# Patient Record
Sex: MALE | Race: BLACK OR AFRICAN AMERICAN | NOT HISPANIC OR LATINO | Employment: FULL TIME | URBAN - METROPOLITAN AREA
[De-identification: names, ages, dates, MRNs, and addresses within clinical notes are randomized per-mention and may not be internally consistent; named-entity substitution may affect disease eponyms.]

---

## 2018-03-23 ENCOUNTER — THERAPY VISIT (OUTPATIENT)
Dept: PHYSICAL THERAPY | Facility: CLINIC | Age: 53
End: 2018-03-23
Payer: OTHER MISCELLANEOUS

## 2018-03-23 DIAGNOSIS — M75.42 IMPINGEMENT SYNDROME, SHOULDER, LEFT: ICD-10-CM

## 2018-03-23 DIAGNOSIS — M25.512 ACUTE PAIN OF LEFT SHOULDER: Primary | ICD-10-CM

## 2018-03-23 PROCEDURE — 97161 PT EVAL LOW COMPLEX 20 MIN: CPT | Mod: GP | Performed by: PHYSICAL THERAPIST

## 2018-03-23 PROCEDURE — 97110 THERAPEUTIC EXERCISES: CPT | Mod: GP | Performed by: PHYSICAL THERAPIST

## 2018-03-23 NOTE — MR AVS SNAPSHOT
After Visit Summary   3/23/2018    Dimitrios Goldberg    MRN: [id number]           Patient Information     Date Of Birth          1965        Visit Information        Provider Department      3/23/2018 7:30 AM Sandy Alvarez, PT [facility]        Today's Diagnoses     Acute pain of left shoulder    -  1    Impingement syndrome, shoulder, left           Follow-ups after your visit        Your next 10 appointments already scheduled     Mar 30, 2018  2:40 PM CDT   ADELINE Extremity with Sandy Alvarez PT   [facility] ([facility])    [address]  [facility] [address]   [phone]            Apr 06, 2018  2:40 PM CDT   ADELINE Extremity with Sandy Alvarez PT   [facility] ([facility])    [address]  [facility] [address]   [phone]            Apr 13, 2018  2:40 PM CDT   ADELINE Extremity with Sandy Alvarez PT   [facility] ([facility])    [address]  [facility] [address]   [phone]            Apr 20, 2018  2:40 PM CDT   ADELINE Extremity with Sandy Alvarez PT   [facility] ([facility])    [address]  [facility] [address]   [phone]              Who to contact     If you have questions or need follow up information about today's clinic visit or your schedule please contact [facility] PHYSICAL THERAP[facility] directly at [phone].  Normal or non-critical lab and imaging results will be communicated to you by MyChart, letter or phone within 4 business days after the clinic has received the results. If you do not hear from us within 7 days, please contact the clinic through MyChart or phone. If you have a critical or abnormal  "lab result, we will notify you by phone as soon as possible.  Submit refill requests through [employer] or call your pharmacy and they will forward the refill request to us. Please allow 3 business days for your refill to be completed.          Additional Information About Your Visit        MyChart Information     [employer] lets you send messages to your doctor, view your test results, renew your prescriptions, schedule appointments and more. To sign up, go to [email or web address] . Click on \"Log in\" on the left side of the screen, which will take you to the Welcome page. Then click on \"Sign up Now\" on the right side of the page.     You will be asked to enter the access code listed below, as well as some personal information. Please follow the directions to create your username and password.     Your access code is: [email or web address]  Expires: 2018 12:24 PM     Your access code will  in 90 days. If you need help or a new code, please call your [place] clinic or [phone].        Care EveryWhere ID     This is your Care EveryWhere ID. This could be used by other organizations to access your [place] medical records  [id number]         Blood Pressure from Last 3 Encounters:   No data found for BP    Weight from Last 3 Encounters:   No data found for Wt              We Performed the Following     HC PT EVAL, LOW COMPLEXITY     ADELINE INITIAL EVAL REPORT     THERAPEUTIC EXERCISES        Primary Care Provider Fax #    Physician No Ref-Primary [phone]       No address on file        Equal Access to Services     WALLY ELLIOTT : Hadii yarelis nelsono Soearnestine, waaxda luqadaha, qaybta kaalmada adeegyada, laisha ruby . So [facility] [phone].    ATENCIÓN: Si habla español, tiene a boyce disposición servicios gratuitos de asistencia lingüística. Llame al [phone].    We comply with applicable federal civil rights laws and [place] laws. We do not discriminate on the basis of race, color, " national origin, age, disability, sex, sexual orientation, or gender identity.            Thank you!     Thank you for choosing [facility]  for your care. Our goal is always to provide you with excellent care. Hearing back from our patients is one way we can continue to improve our services. Please take a few minutes to complete the written survey that you may receive in the mail after your visit with us. Thank you!             Your Updated Medication List - Protect others around you: Learn how to safely use, store and throw away your medicines at www.disposemymeds.org.      Notice  As of 3/23/2018 12:24 PM    You have not been prescribed any medications.

## 2018-03-23 NOTE — PROGRESS NOTES
[facility] Initial Evaluation  Subjective:  Patient is a 53 year old male presenting with rehab left shoulder hpi. The history is provided by the patient. No  was used.   Dimitrios Goldberg is a 53 year old male with a left shoulder condition.  Condition occurred with:  Unknown cause.  Condition occurred: for unknown reasons.  This is a new condition  Pt reports he has had shoulder pain on his L on and off for 8-10 years. He states that it will flare about 1x/year. He most recently had his flare of pain around Christmas of 2017. He went to see Dr. Brand and had an injection on 3-15-18. PMH: SLAP repair on R in 2007 and previous PT for impingement before and after his surgery (most recently in 2012). .    Patient reports pain:  In the joint and upper trap.  Radiates to:  Upper arm.  Pain is described as sharp and shooting and is intermittent and reported as 2/10 (best 0/10 at worst 10/10).  Associated symptoms:  Loss of strength, loss of motion/stiffness and painful arc. Pain is the same all the time.  Symptoms are exacerbated by lying on extremity, lifting, using arm behind back, using arm overhead and using arm at shoulder level and relieved by rest and activity/movement.  Since onset symptoms are rapidly improving.  Special tests:  X-ray.  Previous treatment includes physical therapy.  There was moderate improvement following previous treatment.  General health as reported by patient is good.  Pertinent medical history includes:  None.  Medical allergies: no.  Other surgeries include:  Orthopedic surgery (R shoulder SLAP in 2007).  Current medications:  None as reported by the patient.  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  Patient is working in normal job without restrictions.  Primary job tasks include:  Repetitive tasks, prolonged standing, operating a machine, lifting, driving and prolonged sitting.    Barriers include:  None as reported by the  patient.    Red flags:  None as reported by the patient.                        Objective:  System                   Shoulder Evaluation:  ROM:  AROM:    Flexion:  Left:  155    Right:  155    Abduction:  Left: 123   Right:  162      External Rotation:  Left:  70    Right:  81            Extension/Internal Rotation:  Left:  T7    Right:  T8          Strength:  : R shoulder: 5/5/5/5- and L shoulder: 5-/5-/5/4.                                                               General     ROS    Assessment/Plan:    Patient is a 53 year old male with left side shoulder complaints.    Patient has the following significant findings with corresponding treatment plan.                Diagnosis 1:  L shoulder impingement    Pain -  hot/cold therapy, manual therapy, self management, education and home program  Decreased ROM/flexibility - manual therapy and therapeutic exercise  Decreased joint mobility - manual therapy and therapeutic exercise  Decreased strength - therapeutic exercise and therapeutic activities  Impaired muscle performance - neuro re-education  Decreased function - therapeutic activities    Therapy Evaluation Codes:   1) History comprised of:   Personal factors that impact the plan of care:      None.    Comorbidity factors that impact the plan of care are:      None.     Medications impacting care: None.  2) Examination of Body Systems comprised of:   Body structures and functions that impact the plan of care:      Shoulder.   Activity limitations that impact the plan of care are:      Bathing, Cooking, Driving, Dressing, Grasping, Lifting and Sleeping.  3) Clinical presentation characteristics are:   Stable/Uncomplicated.  4) Decision-Making    Low complexity using standardized patient assessment instrument and/or measureable assessment of functional outcome.  Cumulative Therapy Evaluation is: Low complexity.    Previous and current functional limitations:  (See Goal Flow Sheet for this information)    Short  term and Long term goals: (See Goal Flow Sheet for this information)     Communication ability:  Patient appears to be able to clearly communicate and understand verbal and written communication and follow directions correctly.  Treatment Explanation - The following has been discussed with the patient:   RX ordered/plan of care  Anticipated outcomes  Possible risks and side effects  This patient would benefit from PT intervention to resume normal activities.   Rehab potential is good.    Frequency:  1 X week, once daily  Duration:  for 6 weeks  Discharge Plan:  Achieve all LTG.  Independent in home treatment program.  Reach maximal therapeutic benefit.    Please refer to the daily flowsheet for treatment today, total treatment time and time spent performing 1:1 timed codes.

## 2018-03-30 ENCOUNTER — THERAPY VISIT (OUTPATIENT)
Dept: PHYSICAL THERAPY | Facility: CLINIC | Age: 53
End: 2018-03-30
Payer: OTHER MISCELLANEOUS

## 2018-03-30 DIAGNOSIS — M75.42 IMPINGEMENT SYNDROME, SHOULDER, LEFT: ICD-10-CM

## 2018-03-30 DIAGNOSIS — M25.512 ACUTE PAIN OF LEFT SHOULDER: ICD-10-CM

## 2018-03-30 PROCEDURE — 97110 THERAPEUTIC EXERCISES: CPT | Mod: GP | Performed by: PHYSICAL THERAPIST

## 2018-03-30 PROCEDURE — 97140 MANUAL THERAPY 1/> REGIONS: CPT | Mod: GP | Performed by: PHYSICAL THERAPIST

## 2018-03-30 NOTE — PROGRESS NOTES
Subjective:  HPI                    Objective:  System    Physical Exam    General     ROS    Assessment/Plan:    SUBJECTIVE  Subjective changes as noted by pt:  He feels like he is not good as he was last week--felt some pain when he was driving and noticed it more 2-3 days ago but it's not back to where it was before he had the shot.        Current pain level: 5/10     Changes in function:  Yes (See Goal flowsheet attached for changes in current functional level)     Adverse reaction to treatment or activity:  None    OBJECTIVE  Changes in objective findings:  Yes, L shoulder AROM after stretchin, 128 when pain starts but can raise to 155    Pt has increased UT pain and hiking with S/L ABD    Hypertonicity noted along UT/LS and rhomboids today.      ASSESSMENT  Dimitrios continues to require intervention to meet STG and LTG's: PT  Patient's symptoms are resolving.  Patient is progressing as expected.  Response to therapy has shown an improvement in  pain level and ROM   Progress made towards STG/LTG?  Yes (See Goal flowsheet attached for updates on achievement of STG and LTG)    PLAN  Current treatment program is being advanced to more complex exercises.    PTA/ATC plan:  N/A    Please refer to the daily flowsheet for treatment today, total treatment time and time spent performing 1:1 timed codes.

## 2018-03-30 NOTE — MR AVS SNAPSHOT
After Visit Summary   3/30/2018    Dimitrios Goldberg    MRN: [id number]           Patient Information     Date Of Birth          1965        Visit Information        Provider Department      3/30/2018 2:40 PM Sandy Alvarez, PT [facility]        Today's Diagnoses     Acute pain of left shoulder        Impingement syndrome, shoulder, left           Follow-ups after your visit        Your next 10 appointments already scheduled     Apr 06, 2018  2:40 PM CDT   ADELINE Extremity with Sandy Alvarez PT   [facility] ([facility])    [address]  [facility] [address]   [phone]            Apr 13, 2018  2:40 PM CDT   ADELINE Extremity with Sandy Alvarez PT   [facility] ([facility])    [address]  [facility] [address]   [phone]            Apr 20, 2018  2:40 PM CDT   ADELINE Extremity with Sandy Alvarez PT   [facility] ([facility])    [address]  [facility] [address]   [phone]              Who to contact     If you have questions or need follow up information about today's clinic visit or your schedule please contact [facility] PHYSICAL [facility] directly at [phone].  Normal or non-critical lab and imaging results will be communicated to you by MyChart, letter or phone within 4 business days after the clinic has received the results. If you do not hear from us within 7 days, please contact the clinic through MyChart or phone. If you have a critical or abnormal lab result, we will notify you by phone as soon as possible.  Submit refill requests through [employer] or call your pharmacy and they will forward the refill request to us. Please allow 3 business days for your refill to be completed.     "      Additional Information About Your Visit        MyChart Information     [employer] lets you send messages to your doctor, view your test results, renew your prescriptions, schedule appointments and more. To sign up, go to [email or web address] . Click on \"Log in\" on the left side of the screen, which will take you to the Welcome page. Then click on \"Sign up Now\" on the right side of the page.     You will be asked to enter the access code listed below, as well as some personal information. Please follow the directions to create your username and password.     Your access code is: [email or web address]  Expires: 2018 12:24 PM     Your access code will  in 90 days. If you need help or a new code, please call your [place] clinic or [phone].        Care EveryWhere ID     This is your Care EveryWhere ID. This could be used by other organizations to access your [place] medical records  [id number]         Blood Pressure from Last 3 Encounters:   No data found for BP    Weight from Last 3 Encounters:   No data found for Wt              We Performed the Following     MANUAL THER TECH,1+REGIONS,EA 15 MIN     THERAPEUTIC EXERCISES        Primary Care Provider Fax #    Physician No Ref-Primary [phone]       No address on file        Equal Access to Services     WALLY ELLIOTT : Hadii yarelis nelsono Sogennaali, waaxda luqadaha, qaybta kaalmada adeegyada, laisha alvarez. So [facility] [phone].    ATENCIÓN: Si habla español, tiene a boyce disposición servicios gratuitos de asistencia lingüística. Llame al [phone].    We comply with applicable federal civil rights laws and [place] laws. We do not discriminate on the basis of race, color, national origin, age, disability, sex, sexual orientation, or gender identity.            Thank you!     Thank you for choosing [facility] PHYSICAL THERAPY  for your care. Our goal is always to provide you with " excellent care. Hearing back from our patients is one way we can continue to improve our services. Please take a few minutes to complete the written survey that you may receive in the mail after your visit with us. Thank you!             Your Updated Medication List - Protect others around you: Learn how to safely use, store and throw away your medicines at www.disposemymeds.org.      Notice  As of 3/30/2018  3:44 PM    You have not been prescribed any medications.

## 2018-04-06 ENCOUNTER — THERAPY VISIT (OUTPATIENT)
Dept: PHYSICAL THERAPY | Facility: CLINIC | Age: 53
End: 2018-04-06
Payer: OTHER MISCELLANEOUS

## 2018-04-06 DIAGNOSIS — M25.512 ACUTE PAIN OF LEFT SHOULDER: ICD-10-CM

## 2018-04-06 DIAGNOSIS — M75.42 IMPINGEMENT SYNDROME, SHOULDER, LEFT: ICD-10-CM

## 2018-04-06 PROCEDURE — 97140 MANUAL THERAPY 1/> REGIONS: CPT | Mod: GP | Performed by: PHYSICAL THERAPIST

## 2018-04-06 PROCEDURE — 97110 THERAPEUTIC EXERCISES: CPT | Mod: GP | Performed by: PHYSICAL THERAPIST

## 2018-04-06 NOTE — MR AVS SNAPSHOT
After Visit Summary   4/6/2018    Dimitrios Goldberg    MRN: [id number]           Patient Information     Date Of Birth          1965        Visit Information        Provider Department      4/6/2018 2:40 PM Sandy Alvarez, PT [facility] Physical Therapy        Today's Diagnoses     Acute pain of left shoulder        Impingement syndrome, shoulder, left           Follow-ups after your visit        Your next 10 appointments already scheduled     Apr 13, 2018  2:40 PM CDT   ADELINE Extremity with Sandy Alvarez PT   [facility] ([facility])    [address]  [facility] [address]   [phone]            Apr 20, 2018  2:40 PM CDT   ADELINE Extremity with Sandy Alvarez PT   [facility] ([facility])    [address]  [facility] [address]   [phone]              Who to contact     If you have questions or need follow up information about today's clinic visit or your schedule please contact [facility] PHYSICAL THERAPY directly at [phone].  Normal or non-critical lab and imaging results will be communicated to you by MyChart, letter or phone within 4 business days after the clinic has received the results. If you do not hear from us within 7 days, please contact the clinic through MyChart or phone. If you have a critical or abnormal lab result, we will notify you by phone as soon as possible.  Submit refill requests through [employer]Trackr or call your pharmacy and they will forward the refill request to us. Please allow 3 business days for your refill to be completed.          Additional Information About Your Visit        [employer] Information     docTrackr lets you send messages to your doctor, view your test results, renew your prescriptions, schedule appointments and more. To sign up, go to  [email or web address] . Click on \"Log in\" on the left side of the screen, which will take you to the Welcome page. Then click on \"Sign up Now\" on the right side of the page.     You will be asked to enter the access code listed below, as well as some personal information. Please follow the directions to create your username and password.     Your access code is: [email or web address]  Expires: 2018 12:24 PM     Your access code will  in 90 days. If you need help or a new code, please call your [place] clinic or [phone].        Care EveryWhere ID     This is your Care EveryWhere ID. This could be used by other organizations to access your [place] medical records  [id number]         Blood Pressure from Last 3 Encounters:   No data found for BP    Weight from Last 3 Encounters:   No data found for Wt              We Performed the Following     MANUAL THER TECH,1+REGIONS,EA 15 MIN     THERAPEUTIC EXERCISES        Primary Care Provider Fax #    Physician No Ref-Primary [phone]       No address on file        Equal Access to Services     [facility]: Hadii yarelis Villa, waaxda lujozef, qaybta kaaldarryl arroyo, laisha ruby . So [facility] [phone].    ATENCIÓN: Si habla español, tiene a boyce disposición servicios gratuitos de asistencia lingüística. Llame al [phone].    We comply with applicable federal civil rights laws and [place] laws. We do not discriminate on the basis of race, color, national origin, age, disability, sex, sexual orientation, or gender identity.            Thank you!     Thank you for choosing [facility] PHYSICAL THERAPY  for your care. Our goal is always to provide you with excellent care. Hearing back from our patients is one way we can continue to improve our services. Please take a few minutes to complete the written survey that you may receive in the mail after your visit with us. Thank you!      "        Your Updated Medication List - Protect others around you: Learn how to safely use, store and throw away your medicines at www.disposemymeds.org.      Notice  As of 4/6/2018  4:09 PM    You have not been prescribed any medications.

## 2018-04-06 NOTE — PROGRESS NOTES
"Subjective:  HPI                    Objective:  System    Physical Exam    General     ROS    Assessment/Plan:    SUBJECTIVE  Subjective changes as noted by pt:  Pt reports that his shot is wearing off and he is \"feeling it more than he did a few weeks ago but it's still better than it was before the shot.\"       Current pain level: 2/10     Changes in function:  Yes (See Goal flowsheet attached for changes in current functional level)     Adverse reaction to treatment or activity:  None    OBJECTIVE  Changes in objective findings:  Yes, L shoulder AROM: 100 but had 150 with AAROM        ASSESSMENT  Dimitrios continues to require intervention to meet STG and LTG's: PT  Patient's symptoms are resolving.  Patient is progressing as expected.  Response to therapy has shown an improvement in  pain level and ROM   Progress made towards STG/LTG?  Yes (See Goal flowsheet attached for updates on achievement of STG and LTG)    PLAN  Current treatment program is being advanced to more complex exercises.    PTA/ATC plan:  N/A    Please refer to the daily flowsheet for treatment today, total treatment time and time spent performing 1:1 timed codes.          "

## 2018-04-13 ENCOUNTER — THERAPY VISIT (OUTPATIENT)
Dept: PHYSICAL THERAPY | Facility: CLINIC | Age: 53
End: 2018-04-13
Payer: OTHER MISCELLANEOUS

## 2018-04-13 DIAGNOSIS — M25.512 ACUTE PAIN OF LEFT SHOULDER: ICD-10-CM

## 2018-04-13 DIAGNOSIS — M75.42 IMPINGEMENT SYNDROME, SHOULDER, LEFT: ICD-10-CM

## 2018-04-13 PROCEDURE — 97140 MANUAL THERAPY 1/> REGIONS: CPT | Mod: GP | Performed by: PHYSICAL THERAPIST

## 2018-04-13 PROCEDURE — 97110 THERAPEUTIC EXERCISES: CPT | Mod: GP | Performed by: PHYSICAL THERAPIST

## 2018-04-13 NOTE — MR AVS SNAPSHOT
After Visit Summary   4/13/2018    Dimitrios Goldberg    MRN: [id number]           Patient Information     Date Of Birth          1965        Visit Information        Provider Department      4/13/2018 2:40 PM Sandy Alvarez, RAMBO [facility]        Today's Diagnoses     Acute pain of left shoulder        Impingement syndrome, shoulder, left           Follow-ups after your visit        Your next 10 appointments already scheduled     Apr 20, 2018  2:40 PM CDT   ADELINE Extremity with Sandy Alvarez PT   [facility] ([facility])    [address]  [facility] [address]   [phone]            Apr 27, 2018 11:30 AM CDT   ADELINE Extremity with Sandy Alvarez PT   [facility] ([facility])    [address]  [facility] [address]   [phone]            May 07, 2018  2:00 PM CDT   ADELINE Extremity with Sandy Alvarez PT   [facility] ([facility])    [address]  [facility] [address]   [phone]              Who to contact     If you have questions or need follow up information about today's clinic visit or your schedule please contact [facility] directly at [phone].  Normal or non-critical lab and imaging results will be communicated to you by MyChart, letter or phone within 4 business days after the clinic has received the results. If you do not hear from us within 7 days, please contact the clinic through MyChart or phone. If you have a critical or abnormal lab result, we will notify you by phone as soon as possible.  Submit refill requests through [employer] or call your pharmacy and they will forward the refill request to us. Please allow 3 business days for your refill to be completed.     "      Additional Information About Your Visit        MyChart Information     Piictu lets you send messages to your doctor, view your test results, renew your prescriptions, schedule appointments and more. To sign up, go to [email or web address] . Click on \"Log in\" on the left side of the screen, which will take you to the Welcome page. Then click on \"Sign up Now\" on the right side of the page.     You will be asked to enter the access code listed below, as well as some personal information. Please follow the directions to create your username and password.     Your access code is: [email or web address]  Expires: 2018 12:24 PM     Your access code will  in 90 days. If you need help or a new code, please call your [place] clinic or [phone].        Care EveryWhere ID     This is your Care EveryWhere ID. This could be used by other organizations to access your [place] medical records  [id number]         Blood Pressure from Last 3 Encounters:   No data found for BP    Weight from Last 3 Encounters:   No data found for Wt              We Performed the Following     MANUAL THER TECH,1+REGIONS,EA 15 MIN     THERAPEUTIC EXERCISES        Primary Care Provider Fax #    Physician No Ref-Primary [phone]       No address on file        Equal Access to Services     [facility]ALLY E[facility] : Hadii yarelis nelsono Sogennaali, waaxda luqadaha, qaybta kaalmada adeegyada, laisha alvarez. So [facility] [phone].    ATENCIÓN: Si habla español, tiene a boyce disposición servicios gratuitos de asistencia lingüística. Llame al [phone].    We comply with applicable federal civil rights laws and [place] laws. We do not discriminate on the basis of race, color, national origin, age, disability, sex, sexual orientation, or gender identity.            Thank you!     Thank you for choosing [facility]ER[facility]  for your care. Our goal is always to provide you with " excellent care. Hearing back from our patients is one way we can continue to improve our services. Please take a few minutes to complete the written survey that you may receive in the mail after your visit with us. Thank you!             Your Updated Medication List - Protect others around you: Learn how to safely use, store and throw away your medicines at www.disposemymeds.org.      Notice  As of 4/13/2018 11:59 PM    You have not been prescribed any medications.

## 2018-04-13 NOTE — PROGRESS NOTES
Subjective:  HPI                    Objective:  System    Physical Exam    General     ROS    Assessment/Plan:    SUBJECTIVE  Subjective changes as noted by pt:  Pt reports his aching pain is still the same--more deep in the joint.  He gets sore doing the exercises and it's sore when he is leaning on his L elbow or reaching out to the side. 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 He has had more overtime this week so he hasn't been as consistent with the exercises.       Current pain level: 1-2/10     Changes in function:  Yes (See Goal flowsheet attached for changes in current functional level)     Adverse reaction to treatment or activity:  None    OBJECTIVE  Changes in objective findings:  Yes, L shoulder displays posterior tightness and passive extension on L=60 vs R=70    L shoulder ABD was WNL compared to R and pain free after repeated passive extension on table with chair.    ASSESSMENT  Dimitrios continues to require intervention to meet STG and LTG's: PT  Patient's symptoms are resolving.  Patient is progressing as expected.  Response to therapy has shown an improvement in  pain level  Progress made towards STG/LTG?  Yes (See Goal flowsheet attached for updates on achievement of STG and LTG)    PLAN  Current treatment program is being advanced to more complex exercises.    PTA/ATC plan:  N/A    Please refer to the daily flowsheet for treatment today, total treatment time and time spent performing 1:1 timed codes.

## 2018-04-20 ENCOUNTER — THERAPY VISIT (OUTPATIENT)
Dept: PHYSICAL THERAPY | Facility: CLINIC | Age: 53
End: 2018-04-20
Payer: OTHER MISCELLANEOUS

## 2018-04-20 DIAGNOSIS — M75.42 IMPINGEMENT SYNDROME, SHOULDER, LEFT: ICD-10-CM

## 2018-04-20 DIAGNOSIS — M25.512 ACUTE PAIN OF LEFT SHOULDER: ICD-10-CM

## 2018-04-20 PROCEDURE — 97140 MANUAL THERAPY 1/> REGIONS: CPT | Mod: GP | Performed by: PHYSICAL THERAPIST

## 2018-04-20 PROCEDURE — 97110 THERAPEUTIC EXERCISES: CPT | Mod: GP | Performed by: PHYSICAL THERAPIST

## 2018-04-20 NOTE — MR AVS SNAPSHOT
After Visit Summary   4/20/2018    Dimitrios Goldberg    MRN: [id number]           Patient Information     Date Of Birth          1965        Visit Information        Provider Department      4/20/2018 2:40 PM Sandy Alvarez, RAMBO [facility]        Today's Diagnoses     Acute pain of left shoulder        Impingement syndrome, shoulder, left           Follow-ups after your visit        Your next 10 appointments already scheduled     Apr 27, 2018 11:30 AM CDT   ADELINE Extremity with Sandy Alvarez PT   [facility] ([facility])    [address]  [facility] [address]   [phone]            May 07, 2018  2:00 PM CDT   ADELINE Extremity with Sandy Alvarez PT   [facility] ([facility])    [address]  [facility] [address]   [phone]              Who to contact     If you have questions or need follow up information about today's clinic visit or your schedule please contact [facility] PHYSICAL THERAPY directly at [phone].  Normal or non-critical lab and imaging results will be communicated to you by MyChart, letter or phone within 4 business days after the clinic has received the results. If you do not hear from us within 7 days, please contact the clinic through MyChart or phone. If you have a critical or abnormal lab result, we will notify you by phone as soon as possible.  Submit refill requests through "[employer] or call your pharmacy and they will forward the refill request to us. Please allow 3 business days for your refill to be completed.          Additional Information About Your Visit        [facility] Information     "[employer] lets you send messages to your doctor, view your test results, renew your prescriptions, schedule appointments and more. To sign up, go to  "[email or web address] . Click on \"Log in\" on the left side of the screen, which will take you to the Welcome page. Then click on \"Sign up Now\" on the right side of the page.     You will be asked to enter the access code listed below, as well as some personal information. Please follow the directions to create your username and password.     Your access code is: [email or web address]  Expires: 2018 12:24 PM     Your access code will  in 90 days. If you need help or a new code, please call your [place] clinic or [phone].        Care EveryWhere ID     This is your Care EveryWhere ID. This could be used by other organizations to access your [place] medical records  [id number]         Blood Pressure from Last 3 Encounters:   No data found for BP    Weight from Last 3 Encounters:   No data found for Wt              We Performed the Following     MANUAL THER TECH,1+REGIONS,EA 15 MIN     THERAPEUTIC EXERCISES        Primary Care Provider Fax #    Physician No Ref-Primary [phone]       No address on file        Equal Access to Services     [facility]: Hadii yarelis Villa, waaxda lujozef, qaybta kaaldarryl arroyo, laisha ruby . So [facility] [phone].    ATENCIÓN: Si habla español, tiene a boyce disposición servicios gratuitos de asistencia lingüística. Llame al [phone].    We comply with applicable federal civil rights laws and [place] laws. We do not discriminate on the basis of race, color, national origin, age, disability, sex, sexual orientation, or gender identity.            Thank you!     Thank you for choosing [facility]ICAL THERAP[facility]  for your care. Our goal is always to provide you with excellent care. Hearing back from our patients is one way we can continue to improve our services. Please take a few minutes to complete the written survey that you may receive in the mail after your visit with us. Thank you!      "        Your Updated Medication List - Protect others around you: Learn how to safely use, store and throw away your medicines at www.disposemymeds.org.      Notice  As of 4/20/2018  3:40 PM    You have not been prescribed any medications.

## 2018-04-20 NOTE — PROGRESS NOTES
"Subjective:  HPI                    Objective:  System    Physical Exam    General     ROS    Assessment/Plan:    SUBJECTIVE  Subjective changes as noted by pt:  Pt reports that shoveling the snow last weekend aggravated his shoulder and it's been sore all week. Pain is worse than it was last week. He feels like it's the \"dead pain\" that he had before the injection.        Current pain level: 4/10     Changes in function:  Yes (See Goal flowsheet attached for changes in current functional level)     Adverse reaction to treatment or activity:  None    OBJECTIVE  Changes in objective findings:  Yes, repeated shoulder extension on table did change his pain with abduction AROM to a 1-2/10 vs a 4/10        ASSESSMENT  Dimitrios continues to require intervention to meet STG and LTG's: PT  Patient's symptoms are resolving.  Patient is progressing as expected.  Response to therapy has shown an improvement in  pain level, ROM  and flexibility  Progress made towards STG/LTG?  Yes (See Goal flowsheet attached for updates on achievement of STG and LTG)    PLAN  Current treatment program is being advanced to more complex exercises.    PTA/ATC plan:  N/A    Please refer to the daily flowsheet for treatment today, total treatment time and time spent performing 1:1 timed codes.          "

## 2018-04-27 ENCOUNTER — THERAPY VISIT (OUTPATIENT)
Dept: PHYSICAL THERAPY | Facility: CLINIC | Age: 53
End: 2018-04-27
Payer: OTHER MISCELLANEOUS

## 2018-04-27 DIAGNOSIS — M25.512 ACUTE PAIN OF LEFT SHOULDER: ICD-10-CM

## 2018-04-27 DIAGNOSIS — M75.42 IMPINGEMENT SYNDROME, SHOULDER, LEFT: ICD-10-CM

## 2018-04-27 PROCEDURE — 97110 THERAPEUTIC EXERCISES: CPT | Mod: GP | Performed by: PHYSICAL THERAPIST

## 2018-04-27 PROCEDURE — 97140 MANUAL THERAPY 1/> REGIONS: CPT | Mod: GP | Performed by: PHYSICAL THERAPIST

## 2018-04-27 NOTE — MR AVS SNAPSHOT
"              After Visit Summary   4/27/2018    Dimitrios Goldberg    MRN: [id number]           Patient Information     Date Of Birth          1965        Visit Information        Provider Department      4/27/2018 11:30 AM Sandy Alvarez PT [facility]        Today's Diagnoses     Acute pain of left shoulder        Impingement syndrome, shoulder, left           Follow-ups after your visit        Your next 10 appointments already scheduled     May 07, 2018  2:00 PM CDT   ADELINE Extremity with Sandy Alvarez PT   [facility] ([facility])    [address]  [facility] [address] [phone]              Who to contact     If you have questions or need follow up information about today's clinic visit or your schedule please [facility] directly at [phone].  Normal or non-critical lab and imaging results will be communicated to you by MyChart, letter or phone within 4 business days after the clinic has received the results. If you do not hear from us within 7 days, please contact the clinic through [employer] or phone. If you have a critical or abnormal lab result, we will notify you by phone as soon as possible.  Submit refill requests through iwi or call your pharmacy and they will forward the refill request to us. Please allow 3 business days for your refill to be completed.          Additional Information About Your Visit        [employer] Information     iwi lets you send messages to your doctor, view your test results, renew your prescriptions, schedule appointments and more. To sign up, go to [email or web address] . Click on \"Log in\" on the left side of the screen, which will take you to the Welcome page. Then click on \"Sign up Now\" on the right side of the page.     You will be asked to enter the access code listed below, " as well as some personal information. Please follow the directions to create your username and password.     Your access code is: [email or web address]  Expires: 2018 12:24 PM     Your access code will  in 90 days. If you need help or a new code, please call your [place] clinic or [phone].        Care EveryWhere ID     This is your Care EveryWhere ID. This could be used by other organizations to access your [place] medical records  [id number]         Blood Pressure from Last 3 Encounters:   No data found for BP    Weight from Last 3 Encounters:   No data found for Wt              We Performed the Following     MANUAL THER TECH,1+REGIONS,EA 15 MIN     THERAPEUTIC EXERCISES        Primary Care Provider Fax #    Physician No Ref-Primary [phone]       No address on file        Equal Access to Services     WALLY ELLIOTT : Hadii yarelis mina Soearnestine, waaxda luqadaha, qaybta kaalmada adereal, laisha ruby . So [facility] [phone].    ATENCIÓN: Si habla español, tiene a boyce disposición servicios gratuitos de asistencia lingüística. Llame al [phone].    We comply with applicable federal civil rights laws and [place] laws. We do not discriminate on the basis of race, color, national origin, age, disability, sex, sexual orientation, or gender identity.            Thank you!     Thank you for choosing [facility] PH[facility]ICAL THERAP[facility]  for your care. Our goal is always to provide you with excellent care. Hearing back from our patients is one way we can continue to improve our services. Please take a few minutes to complete the written survey that you may receive in the mail after your visit with us. Thank you!             Your Updated Medication List - Protect others around you: Learn how to safely use, store and throw away your medicines at www.disposemymeds.org.      Notice  As of 2018  1:06 PM    You have not been prescribed any medications.

## 2018-04-27 NOTE — PROGRESS NOTES
Subjective:  HPI                    Objective:  System    Physical Exam    General     ROS    Assessment/Plan:    SUBJECTIVE  Subjective changes as noted by pt:  Pt reports his pain is better than it was last week. He has been doing his exercise (repeated extension) about 2-3x/day.       Current pain level: 2/10     Changes in function:  Yes (See Goal flowsheet attached for changes in current functional level)     Adverse reaction to treatment or activity:  None    OBJECTIVE  Changes in objective findings:  Yes,  L shoulder AROM abduction to 120 was 10/10 pain. After 1 set of repeated extension on the table, his motion went to 130 and he rated it an 8/10. After a second set,         ASSESSMENT  Dimitrios continues to require intervention to meet STG and LTG's: PT  Patient's symptoms are resolving.  Patient is progressing as expected.  Response to therapy has shown an improvement in  pain level, ROM  and flexibility  Progress made towards STG/LTG?  Yes (See Goal flowsheet attached for updates on achievement of STG and LTG)    PLAN  Current treatment program is being advanced to more complex exercises.    PTA/ATC plan:  N/A    Please refer to the daily flowsheet for treatment today, total treatment time and time spent performing 1:1 timed codes.

## 2018-05-07 ENCOUNTER — THERAPY VISIT (OUTPATIENT)
Dept: PHYSICAL THERAPY | Facility: CLINIC | Age: 53
End: 2018-05-07
Payer: OTHER MISCELLANEOUS

## 2018-05-07 DIAGNOSIS — M75.42 IMPINGEMENT SYNDROME, SHOULDER, LEFT: ICD-10-CM

## 2018-05-07 DIAGNOSIS — M25.512 ACUTE PAIN OF LEFT SHOULDER: ICD-10-CM

## 2018-05-07 PROCEDURE — 97112 NEUROMUSCULAR REEDUCATION: CPT | Mod: GP | Performed by: PHYSICAL THERAPIST

## 2018-05-07 PROCEDURE — 97110 THERAPEUTIC EXERCISES: CPT | Mod: GP | Performed by: PHYSICAL THERAPIST

## 2018-05-07 NOTE — PROGRESS NOTES
"Subjective:  HPI                    Objective:  System    Physical Exam    General     ROS  PROGRESS  REPORT    Progress reporting period is from 3-23-18 to 5-7-18.       SUBJECTIVE  Subjective changes noted by patient:  Pt reports that overall he is still better than he was before the injection--he doesn't feel the overall \"dead pain\" as much as he used to (a 2/10 vs a 5/10) that is constantly there. Pt states that the injection made him pain free for about 1.5 weeks. The pain slowly has come back--however not quite to where it was prior to the injection. He states when he sleeping on his shoulder and when he reaches out too fast he gets the same amount of pain as what he had before his injection.   .       Current pain level is 2/10  .     Previous pain level was  6/10  .   Changes in function:  Yes (See Goal flowsheet attached for changes in current functional level)  Adverse reaction to treatment or activity: None    OBJECTIVE  Changes noted in objective findings:  Yes, L shoulder AROM: 150/110 (due to pain but can push up to 140)/T6/65    L shoulder strength:4+/5/5/4 with pain and a \"pop\" with flexion    +scapular assistance test, however the eccentric phase was still painful with an audible \"clunk\"            ASSESSMENT/PLAN  Updated problem list and treatment plan: Diagnosis 1:  L shoulder impingement    Pain -  hot/cold therapy, manual therapy, self management, education, directional preference exercise and home program  Decreased ROM/flexibility - manual therapy and therapeutic exercise  Decreased joint mobility - manual therapy and therapeutic exercise  Decreased strength - therapeutic exercise and therapeutic activities  Impaired muscle performance - neuro re-education  Decreased function - therapeutic activities  STG/LTGs have been met or progress has been made towards goals:  Yes (See Goal flow sheet completed today.)  Assessment of Progress: The patient's condition is improving.  The patient's " condition has potential to improve.  The patient's progress has slowed.  Self Management Plans:  Patient has been instructed in a home treatment program.  Patient is independent in a home treatment program.  Patient  has been instructed in self management of symptoms.  Patient is independent in self management of symptoms.  I have re-evaluated this patient and find that the nature, scope, duration and intensity of the therapy is appropriate for the medical condition of the patient.  Dimitrios continues to require the following intervention to meet STG and LTG's:  PT    Recommendations:  This patient would benefit from further evaluation with MD (appt this week) to discuss next steps as it appears as though his progress with PT has slowed in the past few weeks and he continues to struggle with painful arc abduction.     Please refer to the daily flowsheet for treatment today, total treatment time and time spent performing 1:1 timed codes.

## 2018-05-07 NOTE — LETTER
"[facility]IC [facility]  [address] [facility]  [place] [address] [phone]    May 7, 2018    Re: Dimitrios Goldberg   :   1965  MRN:  [id number]   REFERRING PHYSICIAN:   Myles Brand    [facility]    Date of Initial Evaluation:  3-23-18  Visits:  Rxs Used: 7  Reason for Referral:     Acute pain of left shoulder  Impingement syndrome, shoulder, left    EVALUATION SUMMARY      PROGRESS  REPORT    Progress reporting period is from 3-23-18 to 18.       SUBJECTIVE  Subjective changes noted by patient:  Pt reports that overall he is still better than he was before the injection--he doesn't feel the overall \"dead pain\" as much as he used to (a 2/10 vs a 5/10) that is constantly there. Pt states that the injection made him pain free for about 1.5 weeks. The pain slowly has come back--however not quite to where it was prior to the injection. He states when he sleeping on his shoulder and when he reaches out too fast he gets the same amount of pain as what he had before his injection.   .       Current pain level is 2/10  .     Previous pain level was  6/10  .   Changes in function:  Yes (See Goal flowsheet attached for changes in current functional level)  Adverse reaction to treatment or activity: None    OBJECTIVE  Changes noted in objective findings:  Yes, L shoulder AROM: 150/110 (due to pain but can push up to 140)/T6/65    L shoulder strength:4+/5/5/4 with pain and a \"pop\" with flexion    +scapular assistance test, however the eccentric phase was still painful with an audible \"clunk\"            ASSESSMENT/PLAN  Updated problem list and treatment plan: Diagnosis 1:  L shoulder impingement    Pain -  hot/cold therapy, manual therapy, self management, education, directional preference exercise and home program  Decreased ROM/flexibility - manual therapy and therapeutic exercise  Decreased joint " mobility - manual therapy and therapeutic exercise  Decreased strength - therapeutic exercise and therapeutic activities  Impaired muscle performance - neuro re-education  Decreased function - therapeutic activities  STG/LTGs have been met or progress has been made towards goals:  Yes (See Goal flow sheet completed today.)  Assessment of Progress: The patient's condition is improving.  The patient's condition has potential to improve.  The patient's progress has slowed.  Self Management Plans:  Patient has been instructed in a home treatment program.  Patient is independent in a home treatment program.  Patient  has been instructed in self management of symptoms.  Patient is independent in self management of symptoms.  I have re-evaluated this patient and find that the nature, scope, duration and intensity of the therapy is appropriate for the medical condition of the patient.  Dimitrios continues to require the following intervention to meet STG and LTG's:  PT    Recommendations:  This patient would benefit from further evaluation with MD (appt this week) to discuss next steps as it appears as though his progress with PT has slowed in the past few weeks and he continues to struggle with painful arc abduction.     Please refer to the daily flowsheet for treatment today, total treatment time and time spent performing 1:1 timed codes.    PN was sent with patient today.        Thank you for your referral!!    INQUIRIES  Therapist: Sandy Young DPT,[facility]  [facility]  [address] [place] [address]. [address]  [facility] [place] [address]  Phone: [phone]  Fax: [phone]

## 2018-05-07 NOTE — MR AVS SNAPSHOT
"              After Visit Summary   2018    Dimitrios Goldberg    MRN: [id number]           Patient Information     Date Of Birth          1965        Visit Information        Provider Department      2018 2:00 PM Sandy Alvarez PT [facility] Physical [facility]        Today's Diagnoses     Acute pain of left shoulder        Impingement syndrome, shoulder, left           Follow-ups after your visit        Who to contact     If you have questions or need follow up information about today's clinic visit or your schedule please contact [facility]THLETIC [facility] PHYSICAL [facility] directly at [phone].  Normal or non-critical lab and imaging results will be communicated to you by [employer], letter or phone within 4 business days after the clinic has received the results. If you do not hear from us within 7 days, please contact the clinic through [employer] or phone. If you have a critical or abnormal lab result, we will notify you by phone as soon as possible.  Submit refill requests through [email or web address] or call your pharmacy and they will forward the refill request to us. Please allow 3 business days for your refill to be completed.          Additional Information About Your Visit        MyChart Information     [email or web address] lets you send messages to your doctor, view your test results, renew your prescriptions, schedule appointments and more. To sign up, go to [email or web address] . Click on \"Log in\" on the left side of the screen, which will take you to the Welcome page. Then click on \"Sign up Now\" on the right side of the page.     You will be asked to enter the access code listed below, as well as some personal information. Please follow the directions to create your username and password.     Your access code is: [email or web address]  Expires: 2018 12:24 PM     Your access code will  in 90 days. If you need help or a new code, please call your [place] clinic or " [phone].        Care EveryWhere ID     This is your Care EveryWhere ID. This could be used by other organizations to access your [place] medical records  [id number]         Blood Pressure from Last 3 Encounters:   No data found for BP    Weight from Last 3 Encounters:   No data found for Wt              We Performed the Following     ADELINE PROGRESS NOTES REPORT     NEUROMUSCULAR RE-EDUCATION     THERAPEUTIC EXERCISES        Primary Care Provider Fax #    Physician No Ref-Primary [phone]       No address on file        Equal Access to Services     AWLLY ELLIOTT : Hadii aad ku hadasho Soomaali, waaxda luqadaha, qaybta kaalmada adeegyada, waxay idiin hayaan adeeg meggankylegonzalez laalf . So [facility] [phone].    ATENCIÓN: Si habla español, tiene a boyce disposición servicios gratuitos de asistencia lingüística. [facility] [phone].    We comply with applicable federal civil rights laws and [place] laws. We do not discriminate on the basis of race, color, national origin, age, disability, sex, sexual orientation, or gender identity.            Thank you!     Thank you for choosing [facility] PHYSICAL THERAP[facility]  for your care. Our goal is always to provide you with excellent care. Hearing back from our patients is one way we can continue to improve our services. Please take a few minutes to complete the written survey that you may receive in the mail after your visit with us. Thank you!             Your Updated Medication List - Protect others around you: Learn how to safely use, store and throw away your medicines at www.disposemymeds.org.      Notice  As of 5/7/2018  2:45 PM    You have not been prescribed any medications.

## 2018-07-12 ENCOUNTER — THERAPY VISIT (OUTPATIENT)
Dept: PHYSICAL THERAPY | Facility: CLINIC | Age: 53
End: 2018-07-12
Payer: OTHER MISCELLANEOUS

## 2018-07-12 DIAGNOSIS — Z98.890 S/P SHOULDER SURGERY: ICD-10-CM

## 2018-07-12 DIAGNOSIS — M25.512 ACUTE PAIN OF LEFT SHOULDER: Primary | ICD-10-CM

## 2018-07-12 PROBLEM — M75.42 IMPINGEMENT SYNDROME, SHOULDER, LEFT: Status: RESOLVED | Noted: 2018-03-23 | Resolved: 2018-07-12

## 2018-07-12 PROCEDURE — 97161 PT EVAL LOW COMPLEX 20 MIN: CPT | Mod: GP

## 2018-07-12 PROCEDURE — 97110 THERAPEUTIC EXERCISES: CPT | Mod: GP

## 2018-07-12 NOTE — PROGRESS NOTES
[place] [facility] Initial Evaluation  Subjective:  Patient is a 53 year old male presenting with rehab left ankle/foot hpi.                                      Pertinent medical history includes:  Smoking.      Current medications:  Pain medication.  Current occupation is .    Primary job tasks include:  Prolonged standing, lifting, repetitive tasks, operating a machine, prolonged sitting and driving.                                Objective:  System    Physical Exam    General     ROS    Assessment/Plan:

## 2018-07-12 NOTE — MR AVS SNAPSHOT
After Visit Summary   7/12/2018    Dimitrios Goldberg    MRN: [id number]           Patient Information     Date Of Birth          1965        Visit Information        Provider Department      7/12/2018 7:40 AM Juan Diaz, PT [facility] Physical Therapy        Today's Diagnoses     Acute pain of left shoulder    -  1    S/P shoulder surgery           Follow-ups after your visit        Your next 10 appointments already scheduled     Jul 18, 2018 11:40 AM CDT   ADELINE Extremity with Juan Diaz PT   [facility] ([facility])    [address]  [facility] [address]   [phone]            Jul 23, 2018 11:00 AM CDT   ADELINE Extremity with Juan Diaz PT   [facility] ([facility])    [address]  [facility] [address]   [phone]            Jul 30, 2018 11:00 AM CDT   ADELINE Extremity with Juan Diaz PT   [facility] ([facility])    [address]  [facility] [address]   [phone]              Who to contact     If you have questions or need follow up information about today's clinic visit or your schedule please contact [facility] directly at [phone].  Normal or non-critical lab and imaging results will be communicated to you by MyChart, letter or phone within 4 business days after the clinic has received the results. If you do not hear from us within 7 days, please contact the clinic through MyChart or phone. If you have a critical or abnormal lab result, we will notify you by phone as soon as possible.  Submit refill requests through [employer] or call your pharmacy and they will forward the refill request to us. Please allow 3 business days for your refill to be completed.          Additional  "Information About Your Visit        MyChart Information     [employer] lets you send messages to your doctor, view your test results, renew your prescriptions, schedule appointments and more. To sign up, go to [email or web address] . Click on \"Log in\" on the left side of the screen, which will take you to the Welcome page. Then click on \"Sign up Now\" on the right side of the page.     You will be asked to enter the access code listed below, as well as some personal information. Please follow the directions to create your username and password.     Your access code is: [email or web address]  Expires: 10/10/2018  8:15 AM     Your access code will  in 90 days. If you need help or a new code, please call your [place] clinic or [phone].        Care EveryWhere ID     This is your Care EveryWhere ID. This could be used by other organizations to access your [place] medical records  [id number]         Blood Pressure from Last 3 Encounters:   No data found for BP    Weight from Last 3 Encounters:   No data found for Wt              We Performed the Following     HC PT EVAL, LOW COMPLEXITY     ADELINE INITIAL EVAL REPORT     THERAPEUTIC EXERCISES        Primary Care Provider Fax #    Physician No Ref-Primary [phone]       No address on file        Equal Access to Services     WALLY ELLIOTT : Hadii yarelis nelsono Soearnestine, waaxda luqadaha, qaybta kaalmada adeegyada, laisha ruby . So [facility] [phone].    ATENCIÓN: Si habla español, tiene a boyce disposición servicios gratuitos de asistencia lingüística. Llame al [phone].    We comply with applicable federal civil rights laws and [place] laws. We do not discriminate on the basis of race, color, national origin, age, disability, sex, sexual orientation, or gender identity.            Thank you!     Thank you for choosing [facility] PHYSICAL THERAPY  for your care. Our goal is always to provide you with " excellent care. Hearing back from our patients is one way we can continue to improve our services. Please take a few minutes to complete the written survey that you may receive in the mail after your visit with us. Thank you!             Your Updated Medication List - Protect others around you: Learn how to safely use, store and throw away your medicines at www.disposemymeds.org.      Notice  As of 7/12/2018  8:15 AM    You have not been prescribed any medications.

## 2018-07-12 NOTE — PROGRESS NOTES
[place] [facility] Initial Evaluation  Subjective:  Patient is a 53 year old male presenting with rehab left shoulder hpi.   Dimitrios Goldberg is a 53 year old male with a left shoulder condition.  Condition occurred with:  Other.  Condition occurred: other.  This is a new condition  S/P L SAD, DCE, debridement on 6/20/18. CC: sore; Sleep is ok; Works as a  - return to [place] at 6 wks;  .    Patient reports pain:  In the joint.     and is intermittent Pain Scale: tough to answer.  Associated symptoms:  Loss of strength and loss of motion/stiffness.   Symptoms are exacerbated by certain positions and using arm overhead and relieved by rest.  Since onset symptoms are gradually improving.    Previous treatment includes physical therapy.                                                  Objective:  System                   Shoulder Evaluation:  ROM:  AROM:    Flexion:  Left:  150        Abduction:  Left: 130       Internal Rotation:  Left:  40      External Rotation:  Left:  95                          Strength:  : Grossly 4+/5 L RTC.                          Palpation:  Palpation assessed shoulder: Incisional                                          General     ROS    Assessment/Plan:    Patient is a 53 year old male with left side shoulder complaints.    Patient has the following significant findings with corresponding treatment plan.                Diagnosis 1:  S/P L sh SAD, DCE, debridement   Pain -  education and home program  Decreased ROM/flexibility - manual therapy and therapeutic exercise  Decreased strength - therapeutic exercise and therapeutic activities  Impaired muscle performance - neuro re-education  Decreased function - therapeutic activities    Therapy Evaluation Codes:   1) History comprised of:   Personal factors that impact the plan of care:      None.    Comorbidity factors that impact the plan of care are:      None.     Medications impacting care: None.  2) Examination of Body Systems  comprised of:   Body structures and functions that impact the plan of care:      Shoulder.   Activity limitations that impact the plan of care are:      reaching .  3) Clinical presentation characteristics are:   Stable/Uncomplicated.  4) Decision-Making    Low complexity using standardized patient assessment instrument and/or measureable assessment of functional outcome.  Cumulative Therapy Evaluation is: Low complexity.    Previous and current functional limitations:  (See Goal Flow Sheet for this information)    Short term and Long term goals: (See Goal Flow Sheet for this information)     Communication ability:  Patient appears to be able to clearly communicate and understand verbal and written communication and follow directions correctly.  Treatment Explanation - The following has been discussed with the patient:   RX ordered/plan of care  Anticipated outcomes  Possible risks and side effects  This patient would benefit from PT intervention to resume normal activities.   Rehab potential is good.    Frequency:  1 X week, once daily  Duration:  for 6 weeks  Discharge Plan:  Achieve all LTG.  Independent in home treatment program.  Reach maximal therapeutic benefit.    Please refer to the daily flowsheet for treatment today, total treatment time and time spent performing 1:1 timed codes.

## 2018-07-18 ENCOUNTER — THERAPY VISIT (OUTPATIENT)
Dept: PHYSICAL THERAPY | Facility: CLINIC | Age: 53
End: 2018-07-18
Payer: OTHER MISCELLANEOUS

## 2018-07-18 DIAGNOSIS — Z98.890 S/P SHOULDER SURGERY: ICD-10-CM

## 2018-07-18 DIAGNOSIS — M25.512 ACUTE PAIN OF LEFT SHOULDER: ICD-10-CM

## 2018-07-18 PROCEDURE — 97110 THERAPEUTIC EXERCISES: CPT | Mod: GP

## 2018-07-18 PROCEDURE — 97112 NEUROMUSCULAR REEDUCATION: CPT | Mod: GP

## 2018-07-23 ENCOUNTER — THERAPY VISIT (OUTPATIENT)
Dept: PHYSICAL THERAPY | Facility: CLINIC | Age: 53
End: 2018-07-23
Payer: OTHER MISCELLANEOUS

## 2018-07-23 DIAGNOSIS — M25.512 ACUTE PAIN OF LEFT SHOULDER: ICD-10-CM

## 2018-07-23 DIAGNOSIS — Z98.890 S/P SHOULDER SURGERY: ICD-10-CM

## 2018-07-23 PROCEDURE — 97140 MANUAL THERAPY 1/> REGIONS: CPT | Mod: GP

## 2018-07-23 PROCEDURE — 97110 THERAPEUTIC EXERCISES: CPT | Mod: GP

## 2018-07-23 PROCEDURE — 97112 NEUROMUSCULAR REEDUCATION: CPT | Mod: GP

## 2018-07-30 ENCOUNTER — THERAPY VISIT (OUTPATIENT)
Dept: PHYSICAL THERAPY | Facility: CLINIC | Age: 53
End: 2018-07-30
Payer: OTHER MISCELLANEOUS

## 2018-07-30 DIAGNOSIS — Z98.890 S/P SHOULDER SURGERY: ICD-10-CM

## 2018-07-30 DIAGNOSIS — M25.512 ACUTE PAIN OF LEFT SHOULDER: ICD-10-CM

## 2018-07-30 PROCEDURE — 97112 NEUROMUSCULAR REEDUCATION: CPT | Mod: GP

## 2018-07-30 PROCEDURE — 97110 THERAPEUTIC EXERCISES: CPT | Mod: GP

## 2018-07-30 PROCEDURE — 97140 MANUAL THERAPY 1/> REGIONS: CPT | Mod: GP

## 2018-08-16 ENCOUNTER — THERAPY VISIT (OUTPATIENT)
Dept: PHYSICAL THERAPY | Facility: CLINIC | Age: 53
End: 2018-08-16
Payer: OTHER MISCELLANEOUS

## 2018-08-16 DIAGNOSIS — Z98.890 S/P SHOULDER SURGERY: ICD-10-CM

## 2018-08-16 DIAGNOSIS — M25.512 ACUTE PAIN OF LEFT SHOULDER: ICD-10-CM

## 2018-08-16 PROCEDURE — 97140 MANUAL THERAPY 1/> REGIONS: CPT | Mod: GP | Performed by: PHYSICAL THERAPIST

## 2018-08-16 PROCEDURE — 97110 THERAPEUTIC EXERCISES: CPT | Mod: GP | Performed by: PHYSICAL THERAPIST

## 2018-08-22 ENCOUNTER — THERAPY VISIT (OUTPATIENT)
Dept: PHYSICAL THERAPY | Facility: CLINIC | Age: 53
End: 2018-08-22
Payer: OTHER MISCELLANEOUS

## 2018-08-22 DIAGNOSIS — M25.512 ACUTE PAIN OF LEFT SHOULDER: ICD-10-CM

## 2018-08-22 DIAGNOSIS — Z98.890 S/P SHOULDER SURGERY: ICD-10-CM

## 2018-08-22 PROCEDURE — 97110 THERAPEUTIC EXERCISES: CPT | Mod: GP

## 2018-08-22 PROCEDURE — 97112 NEUROMUSCULAR REEDUCATION: CPT | Mod: GP

## 2018-08-29 ENCOUNTER — THERAPY VISIT (OUTPATIENT)
Dept: PHYSICAL THERAPY | Facility: CLINIC | Age: 53
End: 2018-08-29
Payer: OTHER MISCELLANEOUS

## 2018-08-29 DIAGNOSIS — Z98.890 S/P SHOULDER SURGERY: ICD-10-CM

## 2018-08-29 DIAGNOSIS — M25.512 ACUTE PAIN OF LEFT SHOULDER: ICD-10-CM

## 2018-08-29 PROCEDURE — 97112 NEUROMUSCULAR REEDUCATION: CPT | Mod: GP

## 2018-08-29 PROCEDURE — 97110 THERAPEUTIC EXERCISES: CPT | Mod: GP

## 2018-08-29 NOTE — PROGRESS NOTES
Subjective:  HPI                    Objective:  System                   Shoulder Evaluation:  ROM:          Strength:  normal                                                               General     ROS    Assessment/Plan:    PROGRESS  REPORT      SUBJECTIVE  Subjective changes noted by patient:  .  Subjective: Not getting catch anymore; occasionally getting soreness with certain activities.      Changes in function:  Yes (See Goal flowsheet attached for changes in current functional level)  Adverse reaction to treatment or activity: None    OBJECTIVE  Changes noted in objective findings:  The objective findings below are from DOS 8/29/18.  Objective: Flex=172; Abd=172; HR=809; IR=40;      ASSESSMENT/PLAN  Updated problem list and treatment plan: Diagnosis 1:  S/P L sh SAD, DCE, debridement on 6/20/18 Pain -  self management, education and home program  Decreased ROM/flexibility - manual therapy and therapeutic exercise  Impaired muscle performance - neuro re-education  Decreased function - therapeutic activities  STG/LTGs have been met or progress has been made towards goals:  Yes (See Goal flow sheet completed today.)  Assessment of Progress: The patient's condition is improving.  Self Management Plans:  Patient has been instructed in a home treatment program.  I have re-evaluated this patient and find that the nature, scope, duration and intensity of the therapy is appropriate for the medical condition of the patient.  Dimitrios continues to require the following intervention to meet STG and LTG's:  PT    Recommendations:  This patient would benefit from continued therapy.     Frequency:  1 X week, once daily  Duration:  for 2 weeks        Please refer to the daily flowsheet for treatment today, total treatment time and time spent performing 1:1 timed codes.

## 2018-08-29 NOTE — MR AVS SNAPSHOT
After Visit Summary   8/29/2018    Dimitrios Goldberg    MRN: [id number]           Patient Information     Date Of Birth          1965        Visit Information        Provider Department      8/29/2018 2:10 PM Juan Diaz, PT [facility]        Today's Diagnoses     S/P shoulder surgery        Acute pain of left shoulder           Follow-ups after your visit        Your next 10 appointments already scheduled     Sep 05, 2018  2:10 PM CDT   ADELINE Extremity with Juan Diaz PT   [facility] ([facility])    [address]  [facility] [address] [phone]            Sep 12, 2018  1:30 PM CDT   ADELINE Extremity with Juan Diaz PT   [facility] ([facility])    [address]  [facility] [id number] [phone]              Who to contact     If you have questions or need follow up information about today's clinic visit or your schedule please contact [facility] PH[facility] THERAP[facility] directly at [phone].  Normal or non-critical lab and imaging results will be communicated to you by MyChart, letter or phone within 4 business days after the clinic has received the results. If you do not hear from us within 7 days, please contact the clinic through MyChart or phone. If you have a critical or abnormal lab result, we will notify you by phone as soon as possible.  Submit refill requests through [employer] or call your pharmacy and they will forward the refill request to us. Please allow 3 business days for your refill to be completed.          Additional Information About Your Visit        Care EveryWhere ID     This is your Care EveryWhere ID. This could be used by other organizations to access your [place] medical records  [id number]         Blood Pressure from Last 3 Encounters:   No  data found for BP    Weight from Last 3 Encounters:   No data found for Wt              We Performed the Following     NEUROMUSCULAR RE-EDUCATION     THERAPEUTIC EXERCISES        Primary Care Provider Fax #    Physician No Ref-Primary [phone]       No address on file        Equal Access to Services     WALLY LEXI : Cedric yarelis larkin keeley Villa, waroxanneda luqadaha, harry kaalmada dina, laisha dallas oliortega villatoro laDariomaral alvarez. So [facility] [phone].    ATENCIÓN: Si habla español, tiene a boyce disposición servicios gratuitos de asistencia lingüística. Llame al [phone].    We comply with applicable federal civil rights laws and [place] laws. We do not discriminate on the basis of race, color, national origin, age, disability, sex, sexual orientation, or gender identity.            Thank you!     Thank you for choosing [place] [facility] PHYSICAL THERAPY  for your care. Our goal is always to provide you with excellent care. Hearing back from our patients is one way we can continue to improve our services. Please take a few minutes to complete the written survey that you may receive in the mail after your visit with us. Thank you!             Your Updated Medication List - Protect others around you: Learn how to safely use, store and throw away your medicines at www.disposemymeds.org.      Notice  As of 8/29/2018  2:53 PM    You have not been prescribed any medications.

## 2018-09-05 ENCOUNTER — THERAPY VISIT (OUTPATIENT)
Dept: PHYSICAL THERAPY | Facility: CLINIC | Age: 53
End: 2018-09-05
Payer: OTHER MISCELLANEOUS

## 2018-09-05 DIAGNOSIS — M25.512 ACUTE PAIN OF LEFT SHOULDER: ICD-10-CM

## 2018-09-05 DIAGNOSIS — Z98.890 S/P SHOULDER SURGERY: ICD-10-CM

## 2018-09-05 PROCEDURE — 97110 THERAPEUTIC EXERCISES: CPT | Mod: GP

## 2018-09-05 PROCEDURE — 97112 NEUROMUSCULAR REEDUCATION: CPT | Mod: GP

## 2018-09-12 ENCOUNTER — THERAPY VISIT (OUTPATIENT)
Dept: PHYSICAL THERAPY | Facility: CLINIC | Age: 53
End: 2018-09-12
Payer: OTHER MISCELLANEOUS

## 2018-09-12 DIAGNOSIS — Z98.890 S/P SHOULDER SURGERY: ICD-10-CM

## 2018-09-12 DIAGNOSIS — M25.512 ACUTE PAIN OF LEFT SHOULDER: ICD-10-CM

## 2018-09-12 PROCEDURE — 97112 NEUROMUSCULAR REEDUCATION: CPT | Mod: GP

## 2018-09-12 PROCEDURE — 97110 THERAPEUTIC EXERCISES: CPT | Mod: GP

## 2018-09-19 ENCOUNTER — THERAPY VISIT (OUTPATIENT)
Dept: PHYSICAL THERAPY | Facility: CLINIC | Age: 53
End: 2018-09-19
Payer: OTHER MISCELLANEOUS

## 2018-09-19 DIAGNOSIS — M25.512 ACUTE PAIN OF LEFT SHOULDER: ICD-10-CM

## 2018-09-19 DIAGNOSIS — Z98.890 S/P SHOULDER SURGERY: ICD-10-CM

## 2018-09-19 PROCEDURE — 97112 NEUROMUSCULAR REEDUCATION: CPT | Mod: GP

## 2018-09-19 PROCEDURE — 97110 THERAPEUTIC EXERCISES: CPT | Mod: GP

## 2018-09-26 ENCOUNTER — THERAPY VISIT (OUTPATIENT)
Dept: PHYSICAL THERAPY | Facility: CLINIC | Age: 53
End: 2018-09-26
Payer: OTHER MISCELLANEOUS

## 2018-09-26 DIAGNOSIS — M25.512 ACUTE PAIN OF LEFT SHOULDER: ICD-10-CM

## 2018-09-26 DIAGNOSIS — Z98.890 S/P SHOULDER SURGERY: ICD-10-CM

## 2018-09-26 PROCEDURE — 97112 NEUROMUSCULAR REEDUCATION: CPT | Mod: GP

## 2018-09-26 PROCEDURE — 97110 THERAPEUTIC EXERCISES: CPT | Mod: GP

## 2018-10-03 ENCOUNTER — THERAPY VISIT (OUTPATIENT)
Dept: PHYSICAL THERAPY | Facility: CLINIC | Age: 53
End: 2018-10-03
Payer: OTHER MISCELLANEOUS

## 2018-10-03 DIAGNOSIS — Z98.890 S/P SHOULDER SURGERY: ICD-10-CM

## 2018-10-03 DIAGNOSIS — M25.512 ACUTE PAIN OF LEFT SHOULDER: ICD-10-CM

## 2018-10-03 PROCEDURE — 97110 THERAPEUTIC EXERCISES: CPT | Mod: GP

## 2018-10-03 PROCEDURE — 97112 NEUROMUSCULAR REEDUCATION: CPT | Mod: GP

## 2018-10-10 ENCOUNTER — THERAPY VISIT (OUTPATIENT)
Dept: PHYSICAL THERAPY | Facility: CLINIC | Age: 53
End: 2018-10-10
Payer: OTHER MISCELLANEOUS

## 2018-10-10 DIAGNOSIS — M25.512 ACUTE PAIN OF LEFT SHOULDER: ICD-10-CM

## 2018-10-10 DIAGNOSIS — Z98.890 S/P SHOULDER SURGERY: ICD-10-CM

## 2018-10-10 PROCEDURE — 97112 NEUROMUSCULAR REEDUCATION: CPT | Mod: GP

## 2018-10-10 PROCEDURE — 97110 THERAPEUTIC EXERCISES: CPT | Mod: GP

## 2018-10-17 ENCOUNTER — THERAPY VISIT (OUTPATIENT)
Dept: PHYSICAL THERAPY | Facility: CLINIC | Age: 53
End: 2018-10-17
Payer: OTHER MISCELLANEOUS

## 2018-10-17 DIAGNOSIS — M25.512 ACUTE PAIN OF LEFT SHOULDER: ICD-10-CM

## 2018-10-17 DIAGNOSIS — Z98.890 S/P SHOULDER SURGERY: ICD-10-CM

## 2018-10-17 PROCEDURE — 97112 NEUROMUSCULAR REEDUCATION: CPT | Mod: GP

## 2018-10-17 PROCEDURE — 97110 THERAPEUTIC EXERCISES: CPT | Mod: GP

## 2018-10-24 ENCOUNTER — THERAPY VISIT (OUTPATIENT)
Dept: PHYSICAL THERAPY | Facility: CLINIC | Age: 53
End: 2018-10-24
Payer: OTHER MISCELLANEOUS

## 2018-10-24 DIAGNOSIS — Z98.890 S/P SHOULDER SURGERY: ICD-10-CM

## 2018-10-24 DIAGNOSIS — M25.512 ACUTE PAIN OF LEFT SHOULDER: ICD-10-CM

## 2018-10-24 PROCEDURE — 97110 THERAPEUTIC EXERCISES: CPT | Mod: GP

## 2018-10-24 PROCEDURE — 97112 NEUROMUSCULAR REEDUCATION: CPT | Mod: GP

## 2019-09-09 PROBLEM — Z98.890 S/P SHOULDER SURGERY: Status: RESOLVED | Noted: 2018-07-12 | Resolved: 2019-09-09

## 2019-09-09 PROBLEM — M25.512 ACUTE PAIN OF LEFT SHOULDER: Status: RESOLVED | Noted: 2018-07-12 | Resolved: 2019-09-09

## 2019-09-09 NOTE — PROGRESS NOTES
Discharge Note    Progress reporting period is from initial evaluation date (please see noted date below) to Oct 24, 2018.  Linked Episodes   Type: Episode: Status: Noted: Resolved: Last update: Updated by:   PHYSICAL THERAPY s/p L RIRI, ADWOA Active 7/12/2018  10/24/2018  1:52 PM Juan Diaz, PT      Comments:       Dimitrios failed to follow up and current status is unknown.  Please see information below for last relevant information on current status.  Patient seen for 15 visits.    SUBJECTIVE  Subjective changes noted by patient:     .  Current pain level is  .     Previous pain level was   .   Changes in function:  Yes (See Goal flowsheet attached for changes in current functional level)  Adverse reaction to treatment or activity: None    OBJECTIVE  Changes noted in objective findings:       ASSESSMENT/PLAN  Diagnosis: s/p L RIRI, ADWOA,    Updated problem list and treatment plan:   Pain - HEP  STG/LTGs have been met or progress has been made towards goals:  Yes, please see goal flowsheet for most current information  Assessment of Progress: current status is unknown.    Last current status: Pt is progressing as expected   Self Management Plans:  HEP  I have re-evaluated this patient and find that the nature, scope, duration and intensity of the therapy is appropriate for the medical condition of the patient.  Dimitrios continues to require the following intervention to meet STG and LTG's:  HEP.    Recommendations:  Discharge with current home program.  Patient to follow up with MD as needed.    Please refer to the daily flowsheet for treatment today, total treatment time and time spent performing 1:1 timed codes.

## 2021-07-25 ENCOUNTER — TRANSFERRED RECORDS (OUTPATIENT)
Dept: HEALTH INFORMATION MANAGEMENT | Facility: CLINIC | Age: 56
End: 2021-07-25

## 2021-07-27 ENCOUNTER — PRE VISIT (OUTPATIENT)
Dept: UROLOGY | Facility: CLINIC | Age: 56
End: 2021-07-27

## 2021-07-27 NOTE — TELEPHONE ENCOUNTER
MEDICAL RECORDS REQUEST   [facility]  Urology Clinic  [address] [place] [address]  PHONE: [phone]  Fax: [phone]        FUTURE VISIT INFORMATION                                                   Dimitrios Veganate : 1965 scheduled for future visit at [facility]    APPOINTMENT INFORMATION:    Date: 2021    Provider:  Feng Agrawal MD    Reason for Visit/Diagnosis: New renal mass with IVC thrombus    REFERRAL INFORMATION:    Referring provider: N/A    Specialty: N/A    Referring providers clinic:  N/A    Clinic contact number:  N/A    RECORDS REQUESTED FOR VISIT                                                     NOTES  STATUS/DETAILS   OFFICE NOTE from referring provider  yes, [facility]   OFFICE NOTE from other specialist  yes, [facility] -- 2021   DISCHARGE SUMMARY from hospital  yes   DISCHARGE REPORT from the ER  no   OPERATIVE REPORT  no   MEDICATION LIST  yes, Care Everywhere   LABS     URINALYSIS (UA)  yes, 2021   URINE CYTOLOGY  no   KIDNEY CANCER     CT Chest, CT Abdomen and Pelvis  yes, 2021, 2021   MRI ABDOMEN  (REPORT & SLIDES)  no   PATHOLOGY REPORT & SLIDES  no     PRE-VISIT CHECKLIST      Record collection complete yes   Appointment appropriately scheduled           (right time/right provider) Yes   Joint diagnostic appointment coordinated correctly          (ensure right order & amount of time) Yes   MyChart activation No   Questionnaire complete If no, please explain in process       Action 2021 JTV 10:58am   Action Taken [facility] called patient for Verbal Consent. Patient gave verbal consent for [facility] to collect and update patient's medical records. Patient confirmed that he has been seen at [facility] and has not had any recent imaging done.      @1:02 pm, CSS reached out to [facility] and spoke to Scott. Scott confirmed that Patient has images from 2021.  Scott confirmed that he will push the images over to [facility] today.     @2:10pm, CSS received images from [facility]. Images are resolved in PACS. Nurse Khalida notified.      Action 07/28/2021 JTV 8:03am   Action Taken CSS received medical records from [facility] and sent to scanning for processing.

## 2021-07-28 ENCOUNTER — OFFICE VISIT (OUTPATIENT)
Dept: UROLOGY | Facility: CLINIC | Age: 56
End: 2021-07-28
Payer: COMMERCIAL

## 2021-07-28 VITALS
HEART RATE: 81 BPM | SYSTOLIC BLOOD PRESSURE: 132 MMHG | DIASTOLIC BLOOD PRESSURE: 92 MMHG | BODY MASS INDEX: 30.34 KG/M2 | HEIGHT: 72 IN | WEIGHT: 224 LBS

## 2021-07-28 DIAGNOSIS — D49.511 NEOPLASM OF RIGHT KIDNEY WITH THROMBUS OF INFERIOR VENA CAVA (H): Primary | ICD-10-CM

## 2021-07-28 DIAGNOSIS — I82.220 NEOPLASM OF RIGHT KIDNEY WITH THROMBUS OF INFERIOR VENA CAVA (H): Primary | ICD-10-CM

## 2021-07-28 DIAGNOSIS — N28.89 RIGHT RENAL MASS: ICD-10-CM

## 2021-07-28 PROCEDURE — 99205 OFFICE O/P NEW HI 60 MIN: CPT | Mod: GC | Performed by: UROLOGY

## 2021-07-28 RX ORDER — NORTRIPTYLINE HCL 10 MG
10 CAPSULE ORAL AT BEDTIME
COMMUNITY
Start: 2020-03-02 | End: 2024-01-10

## 2021-07-28 RX ORDER — ACYCLOVIR 800 MG/1
800 TABLET ORAL 2 TIMES DAILY PRN
COMMUNITY
Start: 2021-01-28 | End: 2023-03-16

## 2021-07-28 RX ORDER — LISINOPRIL/HYDROCHLOROTHIAZIDE 10-12.5 MG
1 TABLET ORAL EVERY EVENING
COMMUNITY
Start: 2021-05-27 | End: 2021-12-07

## 2021-07-28 RX ORDER — RIZATRIPTAN BENZOATE 10 MG/1
10 TABLET, ORALLY DISINTEGRATING ORAL PRN
COMMUNITY
Start: 2020-07-08 | End: 2023-03-16

## 2021-07-28 ASSESSMENT — MIFFLIN-ST. JEOR: SCORE: 1884.06

## 2021-07-28 ASSESSMENT — PAIN SCALES - GENERAL: PAINLEVEL: MODERATE PAIN (5)

## 2021-07-28 NOTE — NURSING NOTE
Chief Complaint   Patient presents with     Consult For     renal mass       Blood pressure (!) 132/92, pulse 81, height 1.829 m (6'), weight 101.6 kg (224 lb). Body mass index is 30.38 kg/m .    Patient Active Problem List   Diagnosis   (none) - all problems resolved or deleted       No Known Allergies    Current Outpatient Medications   Medication Sig Dispense Refill     acyclovir (ZOVIRAX) 800 MG tablet Take 800 mg by mouth as needed       nortriptyline (PAMELOR) 10 MG capsule Take 10 mg by mouth daily       rizatriptan (MAXALT-MLT) 10 MG ODT Take 10 mg by mouth as needed       enoxaparin ANTICOAGULANT (LOVENOX) 100 MG/ML syringe Inject 100 mg into the vein 2 times daily       lisinopril-hydrochlorothiazide (ZESTORETIC) 10-12.5 MG tablet Take 1 tablet by mouth daily         Social History     Tobacco Use     Smoking status: Former Smoker     Types: Cigarettes     Smokeless tobacco: Never Used   Substance Use Topics     Alcohol use: None     Drug use: None       Mariana Liu  7/28/2021  4:44 PM
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
## 2021-07-29 ENCOUNTER — TELEPHONE (OUTPATIENT)
Dept: ONCOLOGY | Facility: CLINIC | Age: 56
End: 2021-07-29

## 2021-07-29 ENCOUNTER — PATIENT OUTREACH (OUTPATIENT)
Dept: UROLOGY | Facility: CLINIC | Age: 56
End: 2021-07-29

## 2021-07-29 DIAGNOSIS — D49.511 NEOPLASM OF RIGHT KIDNEY WITH THROMBUS OF INFERIOR VENA CAVA (H): Primary | ICD-10-CM

## 2021-07-29 DIAGNOSIS — Z11.59 ENCOUNTER FOR SCREENING FOR OTHER VIRAL DISEASES: ICD-10-CM

## 2021-07-29 DIAGNOSIS — I82.220 NEOPLASM OF RIGHT KIDNEY WITH THROMBUS OF INFERIOR VENA CAVA (H): Primary | ICD-10-CM

## 2021-07-29 PROBLEM — N28.89 RIGHT RENAL MASS: Status: ACTIVE | Noted: 2021-07-29

## 2021-07-29 RX ORDER — CEFAZOLIN SODIUM 2 G/50ML
2 SOLUTION INTRAVENOUS
Status: CANCELLED | OUTPATIENT
Start: 2021-07-29

## 2021-07-29 RX ORDER — HEPARIN SODIUM 5000 [USP'U]/.5ML
5000 INJECTION, SOLUTION INTRAVENOUS; SUBCUTANEOUS
Status: CANCELLED | OUTPATIENT
Start: 2021-07-29

## 2021-07-29 RX ORDER — ACETAMINOPHEN 500 MG
500-1000 TABLET ORAL EVERY 8 HOURS PRN
COMMUNITY

## 2021-07-29 RX ORDER — CEFAZOLIN SODIUM 2 G/50ML
2 SOLUTION INTRAVENOUS SEE ADMIN INSTRUCTIONS
Status: CANCELLED | OUTPATIENT
Start: 2021-07-29

## 2021-07-29 NOTE — PHARMACY - PREOPERATIVE ASSESSMENT CENTER
Anticoagulation Note - Preoperative Assessment Center (PAC) Pharmacist     Patient was interviewed on July 29, 2021 as a part of PAC clinic appointment. The purpose of this note is to document the perioperative anticoagulation plan outlined by the providers caring for Dimitrios Goldberg.     Current Regimen  Anticoagulation Regimen as of July 29, 2021: enoxaparin 100 mg subcutaneous every 12 hours scheduled   Indication: IVC tumor thrombus   Prescriber:  Dr. Pizarro  Expected Duration of therapy: undetermined   Current medications that may interact with this include: none  Last SCr (care everywhere): 1.74 mg/dL est CrCl ~58 mL/min    Perioperative plan  Dimitrios Goldberg is scheduled for RIGHT OPEN RADICAL NEPHRECTOMY, INFERIOR VENA CAVA THROMBECTOMY WITH RECONSTRUCTION on 8/10/21 with Dr. Agrawal and the perioperative anticoagulation plan outlined by PAC staff and surgery team (note out to Dr. Agrawal to confirm plan) is take last dose of lovenox 24 hr prior to surgery.     Resumption of anticoagulation after procedure will be based on surgery team assessment of bleeding risks and complications.  This plan may require re-assessment and modification by his primary team in the perioperative setting depending on patients clinical situation.        Clarence Fowler [facility]

## 2021-07-29 NOTE — TELEPHONE ENCOUNTER
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

## 2021-07-29 NOTE — TELEPHONE ENCOUNTER
Patient is scheduled for surgery with Dr. Agrawal     Spoke with: Patient     Date of Surgery: Tuesday 08/10/21     Location: [place] OR     Informed patient they will need an adult  Yes    Pre op with Provider shayy    H&P: Scheduled with PAC video visit Friday 07/30/21 @ 7:15am     Pre-procedure COVID-19 Test: Saturday 08/07/21 @ 10:20am     Additional imaging/appointments: 2 week post op in person with Dr. Agrawal Thursday 08/26/21 @ 10:45am     Surgery packet: optifreight to patient 07/28/21, verified address on file is current and correct.      Additional comments: shayy

## 2021-07-29 NOTE — PROGRESS NOTES
Preoperative Assessment Center Medication History Note    Medication history completed on July 29, 2021 by this writer. See Epic admission navigator for prior to admission medications. Operating room staff will still need to confirm medications and last dose information on day of surgery.     Medication history interview sources  Patient interview: Yes  Care Everywhere records: Yes  Surescripts pharmacy refill records: Yes  Other (if applicable):     Changes made to PTA medication list (reason)  Added: acetaminophen  Deleted: none  Changed: enoxaparin route. Acyclovir     Additional medication history information (including reliability of information, actions taken by pharmacist):    -- No recent (within 30 days) course of antibiotics  -- No recent (within 30 days) course of systemic steroids  -- Patient declines being on any other prescription or over-the-counter medications    Prior to Admission medications    Medication Sig Last Dose Taking? Auth Provider   acetaminophen (TYLENOL) 500 MG tablet Take 500-1,000 mg by mouth every 8 hours as needed for mild pain Taking Yes Unknown, Entered By History   enoxaparin ANTICOAGULANT (LOVENOX) 100 MG/ML syringe Inject 100 mg Subcutaneous 2 times daily  Taking Yes Reported, Patient   lisinopril-hydrochlorothiazide (ZESTORETIC) 10-12.5 MG tablet Take 1 tablet by mouth every evening  Taking Yes Reported, Patient   nortriptyline (PAMELOR) 10 MG capsule Take 10 mg by mouth At Bedtime  Taking Yes Reported, Patient   rizatriptan (MAXALT-MLT) 10 MG ODT Take 10 mg by mouth as needed for migraine  Taking Yes Reported, Patient   acyclovir (ZOVIRAX) 800 MG tablet Take 800 mg by mouth as needed  Patient not taking: Reported on 7/29/2021 Not Taking  Reported, Patient          Medication history completed by: Clarence Fowler [facility]

## 2021-07-30 ENCOUNTER — VIRTUAL VISIT (OUTPATIENT)
Dept: SURGERY | Facility: CLINIC | Age: 56
End: 2021-07-30
Payer: COMMERCIAL

## 2021-07-30 ENCOUNTER — ANESTHESIA EVENT (OUTPATIENT)
Dept: SURGERY | Facility: CLINIC | Age: 56
DRG: 656 | End: 2021-07-30
Payer: COMMERCIAL

## 2021-07-30 ENCOUNTER — PRE VISIT (OUTPATIENT)
Dept: SURGERY | Facility: CLINIC | Age: 56
End: 2021-07-30

## 2021-07-30 DIAGNOSIS — Z01.818 PREOP EXAMINATION: Primary | ICD-10-CM

## 2021-07-30 PROCEDURE — 99204 OFFICE O/P NEW MOD 45 MIN: CPT | Mod: 95 | Performed by: PHYSICIAN ASSISTANT

## 2021-07-30 ASSESSMENT — ENCOUNTER SYMPTOMS: SEIZURES: 0

## 2021-07-30 ASSESSMENT — LIFESTYLE VARIABLES: TOBACCO_USE: 1

## 2021-07-30 ASSESSMENT — PAIN SCALES - GENERAL: PAINLEVEL: MODERATE PAIN (5)
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
## 2021-07-30 NOTE — PROGRESS NOTES
Dimitrios is a 56 year old who is being evaluated via a billable video visit.      How would you like to obtain your AVS? MyChart  If the video visit is dropped, the invitation should be resent by: Text to cell phone: [phone]    HPI         Review of Systems         Objective    Vitals - Patient Reported  Pain Score: Moderate Pain (5)        Physical Exam

## 2021-07-30 NOTE — PATIENT INSTRUCTIONS
Preparing for Your Surgery      Name:  Dimitrios Goldberg   MRN:  [id number]   :  1965   Today's Date:  2021       Arriving for surgery:  Surgery date:  8/10/21  Arrival time:  6AM    Restrictions due to COVID 19:       One visitor is allowed in the Pre Op area. When you go into surgery, one visitor is allowed to wait in the Surgery Waiting Room       (provided there is enough space to social distance).   After surgery- Two visitors are allowed at a time if you have a private room and one visitor is allowed for those in a semi-private room.   Every 4 days the visitor(s) can rotate. During the 4 day period, the visitor(s) must be consistent. No visitors under the age of 18 years old.   Visiting Hours: 8 am - 8:30 pm   No ill visitors.   All visitors must wear face mask.    [employer] parking is available for anyone with mobility limitations or disabilities.  ([place]  24 hours/ 7 days a week; [facility]  7 am- 3:30 pm, Mon- Fri)    Please come to:     [facility] [address]  [address] [place] [address]    When entering the hospital you will be asked COVID screening questions, you will then be directed to Registration.  Registration will direct you to the 3rd floor Surgery waiting room. Please ask if you need an escort or a wheelchair to the Surgery Waiting Room.  Preop number- [phone]     What can I eat or drink?  -  You may eat and drink normally for up to 8 hours before your surgery. (Until Midnight)  -  You may have clear liquids until 2 hours before surgery. (Until 21, 6AM)    Examples of clear liquids:  Water  Clear broth  Juices (apple, white grape, white cranberry  and cider) without pulp  Noncarbonated, powder based beverages  (lemonade and Misbah-Aid)  Sodas (Sprite, 7-Up, ginger ale and seltzer)  Coffee or tea (without milk or cream)  Gatorade    -  No Alcohol for at least 24 hours before surgery     Which medicines can I  take?    You will be called by PAC PA regarding when to hold Enoxaparin(Lovenox).  Hold Aspirin for 7 days before surgery.   Hold Multivitamins for 7 days before surgery.  Hold Supplements for 7 days before surgery.  Hold Ibuprofen (Advil, Motrin) for 1 day before surgery--unless otherwise directed by surgeon.  Hold Rizatriptan(Maxalt) for 24 hours prior to surgery.  Hold Naproxen (Aleve) for 4 days before surgery.          -  PLEASE TAKE these medications the day of surgery:    Acetaminophen(Tylenol) as needed    How do I prepare myself?  - Please take 2 showers before surgery using Scrubcare or Hibiclens soap.    Use this soap only from the neck to your toes.     Leave the soap on your skin for one minute--then rinse thoroughly.      You may use your own shampoo and conditioner; no other hair products.   - Please remove all jewelry and body piercings.  - No lotions, deodorants or fragrance.  - Bring your ID and insurance card.    - All patients are required to have a Covid-19 test within 4 days of surgery/procedure.      -Patients will be contacted by the [facility] scheduling team within 1 week of surgery to make an appointment.      - Patients may call the Scheduling team at [phone] if they have not been scheduled within 4 days of  surgery.          Questions or Concerns:    - For any questions regarding the day of surgery or your hospital stay, please contact the Pre Admission Nursing Office at [phone].       - If you have health changes between today and your surgery please call your surgeon.       For questions after surgery please call your surgeons office.

## 2021-08-05 ENCOUNTER — ANESTHESIA (OUTPATIENT)
Dept: MRI IMAGING | Facility: CLINIC | Age: 56
End: 2021-08-05

## 2021-08-05 ENCOUNTER — ANCILLARY PROCEDURE (OUTPATIENT)
Dept: MRI IMAGING | Facility: CLINIC | Age: 56
End: 2021-08-05
Attending: UROLOGY
Payer: COMMERCIAL

## 2021-08-05 DIAGNOSIS — N28.89 RIGHT RENAL MASS: ICD-10-CM

## 2021-08-05 DIAGNOSIS — D49.511 NEOPLASM OF RIGHT KIDNEY WITH THROMBUS OF INFERIOR VENA CAVA (H): ICD-10-CM

## 2021-08-05 DIAGNOSIS — I82.220 NEOPLASM OF RIGHT KIDNEY WITH THROMBUS OF INFERIOR VENA CAVA (H): ICD-10-CM

## 2021-08-05 PROCEDURE — A9585 GADOBUTROL INJECTION: HCPCS | Performed by: RADIOLOGY

## 2021-08-05 PROCEDURE — 74183 MRI ABD W/O CNTR FLWD CNTR: CPT | Mod: TC | Performed by: RADIOLOGY

## 2021-08-05 RX ORDER — GADOBUTROL 604.72 MG/ML
10 INJECTION INTRAVENOUS ONCE
Status: COMPLETED | OUTPATIENT
Start: 2021-08-05 | End: 2021-08-05

## 2021-08-05 RX ADMIN — GADOBUTROL 10 ML: 604.72 INJECTION INTRAVENOUS at 08:50

## 2021-08-06 ENCOUNTER — MYC MEDICAL ADVICE (OUTPATIENT)
Dept: UROLOGY | Facility: CLINIC | Age: 56
End: 2021-08-06

## 2021-08-07 ENCOUNTER — LAB (OUTPATIENT)
Dept: URGENT CARE | Facility: URGENT CARE | Age: 56
End: 2021-08-07
Payer: COMMERCIAL

## 2021-08-07 DIAGNOSIS — Z11.59 ENCOUNTER FOR SCREENING FOR OTHER VIRAL DISEASES: ICD-10-CM

## 2021-08-07 PROCEDURE — U0005 INFEC AGEN DETEC AMPLI PROBE: HCPCS

## 2021-08-07 PROCEDURE — U0003 INFECTIOUS AGENT DETECTION BY NUCLEIC ACID (DNA OR RNA); SEVERE ACUTE RESPIRATORY SYNDROME CORONAVIRUS 2 (SARS-COV-2) (CORONAVIRUS DISEASE [COVID-19]), AMPLIFIED PROBE TECHNIQUE, MAKING USE OF HIGH THROUGHPUT TECHNOLOGIES AS DESCRIBED BY CMS-2020-01-R: HCPCS

## 2021-08-08 LAB — SARS-COV-2 RNA RESP QL NAA+PROBE: NEGATIVE

## 2021-08-09 ENCOUNTER — PRE VISIT (OUTPATIENT)
Dept: UROLOGY | Facility: CLINIC | Age: 56
End: 2021-08-09

## 2021-08-09 ENCOUNTER — MYC MEDICAL ADVICE (OUTPATIENT)
Dept: UROLOGY | Facility: CLINIC | Age: 56
End: 2021-08-09

## 2021-08-09 NOTE — TELEPHONE ENCOUNTER
Reason for visit: Post op follow up     Relevant information: Renal mass; nephrectomy on 8/10    Records/imaging/labs/orders: MRI abd done 8/5    Pt called: N/A    At Rooming: Standard

## 2021-08-10 ENCOUNTER — ANESTHESIA (OUTPATIENT)
Dept: ULTRASOUND IMAGING | Facility: CLINIC | Age: 56
End: 2021-08-10

## 2021-08-10 ENCOUNTER — APPOINTMENT (OUTPATIENT)
Dept: GENERAL RADIOLOGY | Facility: CLINIC | Age: 56
DRG: 656 | End: 2021-08-10
Attending: UROLOGY
Payer: COMMERCIAL

## 2021-08-10 ENCOUNTER — HOSPITAL ENCOUNTER (INPATIENT)
Facility: CLINIC | Age: 56
LOS: 7 days | Discharge: HOME | DRG: 656 | End: 2021-08-17
Attending: UROLOGY | Admitting: UROLOGY
Payer: COMMERCIAL

## 2021-08-10 ENCOUNTER — ANESTHESIA (OUTPATIENT)
Dept: SURGERY | Facility: CLINIC | Age: 56
DRG: 656 | End: 2021-08-10
Payer: COMMERCIAL

## 2021-08-10 ENCOUNTER — ANESTHESIA (OUTPATIENT)
Dept: GENERAL RADIOLOGY | Facility: CLINIC | Age: 56
End: 2021-08-10

## 2021-08-10 DIAGNOSIS — I82.220 NEOPLASM OF RIGHT KIDNEY WITH THROMBUS OF INFERIOR VENA CAVA (H): ICD-10-CM

## 2021-08-10 DIAGNOSIS — N28.89 RIGHT RENAL MASS: ICD-10-CM

## 2021-08-10 DIAGNOSIS — D49.511 NEOPLASM OF RIGHT KIDNEY WITH THROMBUS OF INFERIOR VENA CAVA (H): ICD-10-CM

## 2021-08-10 LAB
ABO/RH(D): NORMAL
ALBUMIN SERPL-MCNC: 2.6 G/DL (ref 3.4–5)
ALP SERPL-CCNC: 63 U/L (ref 40–150)
ALT SERPL W P-5'-P-CCNC: 205 U/L (ref 0–70)
ANION GAP SERPL CALCULATED.3IONS-SCNC: 4 MMOL/L (ref 3–14)
ANION GAP SERPL CALCULATED.3IONS-SCNC: 8 MMOL/L (ref 3–14)
ANTIBODY SCREEN: NEGATIVE
APTT PPP: 31 SECONDS (ref 22–38)
APTT PPP: 35 SECONDS (ref 22–38)
APTT PPP: 52 SECONDS (ref 22–38)
AST SERPL W P-5'-P-CCNC: 256 U/L (ref 0–45)
BASE EXCESS BLDA CALC-SCNC: -0.4 MMOL/L (ref -9.6–2)
BASE EXCESS BLDA CALC-SCNC: -1.7 MMOL/L (ref -9.6–2)
BASE EXCESS BLDA CALC-SCNC: -2.2 MMOL/L (ref -9.6–2)
BASE EXCESS BLDA CALC-SCNC: -5.7 MMOL/L (ref -9.6–2)
BASE EXCESS BLDA CALC-SCNC: 2 MMOL/L (ref -9.6–2)
BASOPHILS # BLD AUTO: 0 10E3/UL (ref 0–0.2)
BASOPHILS # BLD AUTO: 0 10E3/UL (ref 0–0.2)
BASOPHILS NFR BLD AUTO: 0 %
BASOPHILS NFR BLD AUTO: 1 %
BILIRUB DIRECT SERPL-MCNC: 0.2 MG/DL (ref 0–0.2)
BILIRUB SERPL-MCNC: 0.8 MG/DL (ref 0.2–1.3)
BLD PROD TYP BPU: NORMAL
BLOOD COMPONENT TYPE: NORMAL
BUN SERPL-MCNC: 26 MG/DL (ref 7–30)
BUN SERPL-MCNC: 28 MG/DL (ref 7–30)
CA-I BLD-MCNC: 4.3 MG/DL (ref 4.4–5.2)
CA-I BLD-MCNC: 4.6 MG/DL (ref 4.4–5.2)
CA-I BLD-MCNC: 4.9 MG/DL (ref 4.4–5.2)
CA-I BLD-MCNC: 5 MG/DL (ref 4.4–5.2)
CA-I BLD-MCNC: 5.3 MG/DL (ref 4.4–5.2)
CA-I BLD-MCNC: 5.5 MG/DL (ref 4.4–5.2)
CALCIUM SERPL-MCNC: 9.1 MG/DL (ref 8.5–10.1)
CALCIUM SERPL-MCNC: 9.4 MG/DL (ref 8.5–10.1)
CF REDUC 30M P MA P HEP LENFR BLD TEG: 88.4 % (ref 0–8)
CF REDUC 60M P MA LENFR BLD TEG: 93.7 % (ref 0–15)
CF REDUC 60M P MA P HEPASE LENFR BLD TEG: 94.1 % (ref 0–15)
CFT BLD TEG: 1 MINUTE (ref 1–3)
CFT P HPASE BLD TEG: 1.1 MINUTE (ref 1–3)
CHLORIDE BLD-SCNC: 104 MMOL/L (ref 94–109)
CHLORIDE BLD-SCNC: 108 MMOL/L (ref 94–109)
CHLORIDE BLD-SCNC: 109 MMOL/L (ref 94–109)
CHLORIDE BLD-SCNC: 111 MMOL/L (ref 94–109)
CI (COAGULATION INDEX) NATIVE: 0.4 (ref -3–3)
CI (COAGULATION INDEX): 0.8 (ref -3–3)
CLOT ANGLE BLD TEG: 76.8 DEGREES (ref 59–74)
CLOT ANGLE P HPASE BLD TEG: 75.4 DEGREES (ref 59–74)
CLOT INIT BLD TEG: 1.8 MINUTE (ref 4–9)
CLOT INIT P HPASE BLD TEG: 1.9 MINUTE (ref 4–9)
CLOT LYSIS 30M P MA LENFR BLD TEG: 87.6 % (ref 0–8)
CLOT STRENGTH BLD TEG: 2.7 KD/SC (ref 5.3–13.2)
CLOT STRENGTH P HPASE BLD TEG: 2.5 KD/SC (ref 5.3–13.2)
CO2 SERPL-SCNC: 26 MMOL/L (ref 20–32)
CO2 SERPL-SCNC: 27 MMOL/L (ref 20–32)
CODING SYSTEM: NORMAL
CREAT SERPL-MCNC: 1.85 MG/DL (ref 0.66–1.25)
CREAT SERPL-MCNC: 2.12 MG/DL (ref 0.66–1.25)
CROSSMATCH: NORMAL
EOSINOPHIL # BLD AUTO: 0 10E3/UL (ref 0–0.7)
EOSINOPHIL # BLD AUTO: 0.2 10E3/UL (ref 0–0.7)
EOSINOPHIL NFR BLD AUTO: 0 %
EOSINOPHIL NFR BLD AUTO: 3 %
ERYTHROCYTE [DISTWIDTH] IN BLOOD BY AUTOMATED COUNT: 14.1 % (ref 10–15)
ERYTHROCYTE [DISTWIDTH] IN BLOOD BY AUTOMATED COUNT: 14.2 % (ref 10–15)
ERYTHROCYTE [DISTWIDTH] IN BLOOD BY AUTOMATED COUNT: 14.5 % (ref 10–15)
ERYTHROCYTE [DISTWIDTH] IN BLOOD BY AUTOMATED COUNT: 14.5 % (ref 10–15)
FIBRINOGEN PPP-MCNC: 252 MG/DL (ref 170–490)
FIBRINOGEN PPP-MCNC: 321 MG/DL (ref 170–490)
GFR SERPL CREATININE-BSD FRML MDRD: 34 ML/MIN/1.73M2
GFR SERPL CREATININE-BSD FRML MDRD: 40 ML/MIN/1.73M2
GLUCOSE BLD-MCNC: 118 MG/DL (ref 70–99)
GLUCOSE BLD-MCNC: 130 MG/DL (ref 70–99)
GLUCOSE BLD-MCNC: 167 MG/DL (ref 70–99)
GLUCOSE BLD-MCNC: 171 MG/DL (ref 70–99)
GLUCOSE BLD-MCNC: 232 MG/DL (ref 70–99)
GLUCOSE BLD-MCNC: 253 MG/DL (ref 70–99)
GLUCOSE BLD-MCNC: 91 MG/DL (ref 70–99)
GLUCOSE BLDC GLUCOMTR-MCNC: 110 MG/DL (ref 70–99)
GLUCOSE BLDC GLUCOMTR-MCNC: 112 MG/DL (ref 70–99)
GLUCOSE BLDC GLUCOMTR-MCNC: 123 MG/DL (ref 70–99)
GLUCOSE BLDC GLUCOMTR-MCNC: 152 MG/DL (ref 70–99)
GLUCOSE BLDC GLUCOMTR-MCNC: 90 MG/DL (ref 70–99)
GLUCOSE BLDC GLUCOMTR-MCNC: 98 MG/DL (ref 70–99)
HBA1C MFR BLD: 5.8 % (ref 0–5.6)
HCO3 BLDA-SCNC: 20 MMOL/L (ref 21–28)
HCO3 BLDA-SCNC: 23 MMOL/L (ref 21–28)
HCO3 BLDA-SCNC: 23 MMOL/L (ref 21–28)
HCO3 BLDA-SCNC: 24 MMOL/L (ref 21–28)
HCO3 BLDA-SCNC: 27 MMOL/L (ref 21–28)
HCT VFR BLD AUTO: 27.6 % (ref 40–53)
HCT VFR BLD AUTO: 27.8 % (ref 40–53)
HCT VFR BLD AUTO: 30.9 % (ref 40–53)
HCT VFR BLD AUTO: 35.3 % (ref 40–53)
HGB BLD-MCNC: 11 G/DL (ref 13.3–17.7)
HGB BLD-MCNC: 8 G/DL (ref 13.3–17.7)
HGB BLD-MCNC: 8.3 G/DL (ref 13.3–17.7)
HGB BLD-MCNC: 9 G/DL (ref 13.3–17.7)
HGB BLD-MCNC: 9.1 G/DL (ref 13.3–17.7)
HGB BLD-MCNC: 9.2 G/DL (ref 13.3–17.7)
HGB BLD-MCNC: 9.4 G/DL (ref 13.3–17.7)
HGB BLD-MCNC: 9.6 G/DL (ref 13.3–17.7)
HGB BLD-MCNC: 9.8 G/DL (ref 13.3–17.7)
IMM GRANULOCYTES # BLD: 0 10E3/UL
IMM GRANULOCYTES # BLD: 0.1 10E3/UL
IMM GRANULOCYTES NFR BLD: 1 %
IMM GRANULOCYTES NFR BLD: 1 %
INR PPP: 1.23 (ref 0.85–1.15)
INR PPP: 1.24 (ref 0.85–1.15)
INR PPP: 1.42 (ref 0.85–1.15)
ISSUE DATE AND TIME: NORMAL
LACTATE BLD-SCNC: 1.3 MMOL/L
LACTATE BLD-SCNC: 1.4 MMOL/L
LACTATE BLD-SCNC: 1.5 MMOL/L
LACTATE BLD-SCNC: 3.4 MMOL/L
LACTATE BLD-SCNC: 4.1 MMOL/L
LACTATE SERPL-SCNC: 4.3 MMOL/L (ref 0.7–2)
LYMPHOCYTES # BLD AUTO: 0.7 10E3/UL (ref 0.8–5.3)
LYMPHOCYTES # BLD AUTO: 1.5 10E3/UL (ref 0.8–5.3)
LYMPHOCYTES NFR BLD AUTO: 26 %
LYMPHOCYTES NFR BLD AUTO: 6 %
MCF BLD TEG: 35.2 MM (ref 55–74)
MCF P HPASE BLD TEG: 33.6 MM (ref 55–74)
MCH RBC QN AUTO: 26.8 PG (ref 26.5–33)
MCH RBC QN AUTO: 28.1 PG (ref 26.5–33)
MCH RBC QN AUTO: 29.7 PG (ref 26.5–33)
MCH RBC QN AUTO: 29.8 PG (ref 26.5–33)
MCHC RBC AUTO-ENTMCNC: 31.2 G/DL (ref 31.5–36.5)
MCHC RBC AUTO-ENTMCNC: 31.7 G/DL (ref 31.5–36.5)
MCHC RBC AUTO-ENTMCNC: 33 G/DL (ref 31.5–36.5)
MCHC RBC AUTO-ENTMCNC: 34.5 G/DL (ref 31.5–36.5)
MCV RBC AUTO: 86 FL (ref 78–100)
MCV RBC AUTO: 86 FL (ref 78–100)
MCV RBC AUTO: 89 FL (ref 78–100)
MCV RBC AUTO: 91 FL (ref 78–100)
MONOCYTES # BLD AUTO: 0.5 10E3/UL (ref 0–1.3)
MONOCYTES # BLD AUTO: 0.6 10E3/UL (ref 0–1.3)
MONOCYTES NFR BLD AUTO: 6 %
MONOCYTES NFR BLD AUTO: 9 %
NEUTROPHILS # BLD AUTO: 3.5 10E3/UL (ref 1.6–8.3)
NEUTROPHILS # BLD AUTO: 9.7 10E3/UL (ref 1.6–8.3)
NEUTROPHILS NFR BLD AUTO: 60 %
NEUTROPHILS NFR BLD AUTO: 87 %
NRBC # BLD AUTO: 0 10E3/UL
NRBC # BLD AUTO: 0 10E3/UL
NRBC BLD AUTO-RTO: 0 /100
NRBC BLD AUTO-RTO: 0 /100
OXYHGB MFR BLDA: 97 % (ref 92–100)
OXYHGB MFR BLDA: 98 % (ref 92–100)
OXYHGB MFR BLDA: 98 % (ref 92–100)
PCO2 BLDA: 36 MM HG (ref 35–45)
PCO2 BLDA: 38 MM HG (ref 35–45)
PCO2 BLDA: 39 MM HG (ref 35–45)
PCO2 BLDA: 40 MM HG (ref 35–45)
PCO2 BLDA: 40 MM HG (ref 35–45)
PH BLDA: 7.32 [PH] (ref 7.35–7.45)
PH BLDA: 7.37 [PH] (ref 7.35–7.45)
PH BLDA: 7.41 [PH] (ref 7.35–7.45)
PH BLDA: 7.41 [PH] (ref 7.35–7.45)
PH BLDA: 7.43 [PH] (ref 7.35–7.45)
PLATELET # BLD AUTO: 124 10E3/UL (ref 150–450)
PLATELET # BLD AUTO: 198 10E3/UL (ref 150–450)
PLATELET # BLD AUTO: 253 10E3/UL (ref 150–450)
PLATELET # BLD AUTO: 386 10E3/UL (ref 150–450)
PO2 BLDA: 117 MM HG (ref 80–105)
PO2 BLDA: 126 MM HG (ref 80–105)
PO2 BLDA: 142 MM HG (ref 80–105)
PO2 BLDA: 273 MM HG (ref 80–105)
PO2 BLDA: 277 MM HG (ref 80–105)
POTASSIUM BLD-SCNC: 3.4 MMOL/L (ref 3.5–5)
POTASSIUM BLD-SCNC: 3.5 MMOL/L (ref 3.5–5)
POTASSIUM BLD-SCNC: 3.7 MMOL/L (ref 3.4–5.3)
POTASSIUM BLD-SCNC: 3.7 MMOL/L (ref 3.5–5)
POTASSIUM BLD-SCNC: 4.1 MMOL/L (ref 3.5–5)
POTASSIUM BLD-SCNC: 4.2 MMOL/L (ref 3.4–5.3)
POTASSIUM BLD-SCNC: 4.3 MMOL/L (ref 3.5–5)
POTASSIUM BLD-SCNC: 4.8 MMOL/L (ref 3.4–5.3)
PROT SERPL-MCNC: 5.3 G/DL (ref 6.8–8.8)
RBC # BLD AUTO: 3.05 10E6/UL (ref 4.4–5.9)
RBC # BLD AUTO: 3.23 10E6/UL (ref 4.4–5.9)
RBC # BLD AUTO: 3.49 10E6/UL (ref 4.4–5.9)
RBC # BLD AUTO: 4.1 10E6/UL (ref 4.4–5.9)
SODIUM BLD-SCNC: 138 MMOL/L (ref 133–144)
SODIUM BLD-SCNC: 138 MMOL/L (ref 133–144)
SODIUM BLD-SCNC: 140 MMOL/L (ref 133–144)
SODIUM SERPL-SCNC: 139 MMOL/L (ref 133–144)
SODIUM SERPL-SCNC: 139 MMOL/L (ref 133–144)
SPECIMEN EXPIRATION DATE: NORMAL
UNIT ABO/RH: NORMAL
UNIT NUMBER: NORMAL
UNIT STATUS: NORMAL
UNIT TYPE ISBT: 2800
UNIT TYPE ISBT: 5100
UNIT TYPE ISBT: 600
UNIT TYPE ISBT: 6200
UNIT TYPE ISBT: 7300
UNIT TYPE ISBT: 8400
UNIT TYPE ISBT: 9500
WBC # BLD AUTO: 11 10E3/UL (ref 4–11)
WBC # BLD AUTO: 11 10E3/UL (ref 4–11)
WBC # BLD AUTO: 5.7 10E3/UL (ref 4–11)
WBC # BLD AUTO: 8.4 10E3/UL (ref 4–11)

## 2021-08-10 PROCEDURE — 47600 CHOLECYSTECTOMY: CPT | Mod: 62 | Performed by: UROLOGY

## 2021-08-10 PROCEDURE — C1768 GRAFT, VASCULAR: HCPCS | Performed by: UROLOGY

## 2021-08-10 PROCEDURE — 85025 COMPLETE CBC W/AUTO DIFF WBC: CPT | Performed by: UROLOGY

## 2021-08-10 PROCEDURE — 999N000141 HC STATISTIC PRE-PROCEDURE NURSING ASSESSMENT: Performed by: UROLOGY

## 2021-08-10 PROCEDURE — 85396 CLOTTING ASSAY WHOLE BLOOD: CPT | Performed by: UROLOGY

## 2021-08-10 PROCEDURE — 360N000077 HC SURGERY LEVEL 4, PER MIN: Performed by: UROLOGY

## 2021-08-10 PROCEDURE — 85730 THROMBOPLASTIN TIME PARTIAL: CPT | Performed by: UROLOGY

## 2021-08-10 PROCEDURE — 82248 BILIRUBIN DIRECT: CPT | Performed by: SURGERY

## 2021-08-10 PROCEDURE — 99291 CRITICAL CARE FIRST HOUR: CPT | Mod: 24 | Performed by: INTERNAL MEDICINE

## 2021-08-10 PROCEDURE — 258N000003 HC RX IP 258 OP 636: Performed by: UROLOGY

## 2021-08-10 PROCEDURE — 258N000003 HC RX IP 258 OP 636: Performed by: STUDENT IN AN ORGANIZED HEALTH CARE EDUCATION/TRAINING PROGRAM

## 2021-08-10 PROCEDURE — 250N000009 HC RX 250

## 2021-08-10 PROCEDURE — 47600 CHOLECYSTECTOMY: CPT | Mod: 62 | Performed by: TRANSPLANT SURGERY

## 2021-08-10 PROCEDURE — 85730 THROMBOPLASTIN TIME PARTIAL: CPT | Performed by: STUDENT IN AN ORGANIZED HEALTH CARE EDUCATION/TRAINING PROGRAM

## 2021-08-10 PROCEDURE — 271N000002 HC RX 271

## 2021-08-10 PROCEDURE — P9041 ALBUMIN (HUMAN),5%, 50ML: HCPCS | Performed by: ANESTHESIOLOGY

## 2021-08-10 PROCEDURE — 250N000011 HC RX IP 250 OP 636

## 2021-08-10 PROCEDURE — 71045 X-RAY EXAM CHEST 1 VIEW: CPT | Mod: 26 | Performed by: STUDENT IN AN ORGANIZED HEALTH CARE EDUCATION/TRAINING PROGRAM

## 2021-08-10 PROCEDURE — P9056 BLOOD, L/R, IRRADIATED: HCPCS | Performed by: UROLOGY

## 2021-08-10 PROCEDURE — 94002 VENT MGMT INPAT INIT DAY: CPT

## 2021-08-10 PROCEDURE — 999N000155 HC STATISTIC RAPCV CVP MONITORING

## 2021-08-10 PROCEDURE — 06U00KZ SUPPLEMENT INFERIOR VENA CAVA WITH NONAUTOLOGOUS TISSUE SUBSTITUTE, OPEN APPROACH: ICD-10-PCS | Performed by: TRANSPLANT SURGERY

## 2021-08-10 PROCEDURE — 82435 ASSAY OF BLOOD CHLORIDE: CPT

## 2021-08-10 PROCEDURE — 250N000011 HC RX IP 250 OP 636: Performed by: STUDENT IN AN ORGANIZED HEALTH CARE EDUCATION/TRAINING PROGRAM

## 2021-08-10 PROCEDURE — 999N000157 HC STATISTIC RCP TIME EA 10 MIN

## 2021-08-10 PROCEDURE — P9016 RBC LEUKOCYTES REDUCED: HCPCS | Performed by: UROLOGY

## 2021-08-10 PROCEDURE — 250N000011 HC RX IP 250 OP 636: Performed by: UROLOGY

## 2021-08-10 PROCEDURE — 999N000065 XR ABDOMEN PORT 1 VIEWS

## 2021-08-10 PROCEDURE — 85610 PROTHROMBIN TIME: CPT | Performed by: UROLOGY

## 2021-08-10 PROCEDURE — 200N000002 HC R&B ICU UMMC

## 2021-08-10 PROCEDURE — 36415 COLL VENOUS BLD VENIPUNCTURE: CPT | Performed by: UROLOGY

## 2021-08-10 PROCEDURE — 85610 PROTHROMBIN TIME: CPT | Performed by: STUDENT IN AN ORGANIZED HEALTH CARE EDUCATION/TRAINING PROGRAM

## 2021-08-10 PROCEDURE — 82330 ASSAY OF CALCIUM: CPT

## 2021-08-10 PROCEDURE — C1758 CATHETER, URETERAL: HCPCS | Performed by: UROLOGY

## 2021-08-10 PROCEDURE — 84132 ASSAY OF SERUM POTASSIUM: CPT | Performed by: UROLOGY

## 2021-08-10 PROCEDURE — 250N000009 HC RX 250: Performed by: STUDENT IN AN ORGANIZED HEALTH CARE EDUCATION/TRAINING PROGRAM

## 2021-08-10 PROCEDURE — 50230 REMOVAL KIDNEY OPEN RADICAL: CPT | Mod: 22 | Performed by: TRANSPLANT SURGERY

## 2021-08-10 PROCEDURE — 50230 REMOVAL KIDNEY OPEN RADICAL: CPT | Mod: 22 | Performed by: UROLOGY

## 2021-08-10 PROCEDURE — 86923 COMPATIBILITY TEST ELECTRIC: CPT | Performed by: UROLOGY

## 2021-08-10 PROCEDURE — 82810 BLOOD GASES O2 SAT ONLY: CPT

## 2021-08-10 PROCEDURE — 0DNE0ZZ RELEASE LARGE INTESTINE, OPEN APPROACH: ICD-10-PCS | Performed by: TRANSPLANT SURGERY

## 2021-08-10 PROCEDURE — 85025 COMPLETE CBC W/AUTO DIFF WBC: CPT | Performed by: ANESTHESIOLOGY

## 2021-08-10 PROCEDURE — 0DN90ZZ RELEASE DUODENUM, OPEN APPROACH: ICD-10-PCS | Performed by: TRANSPLANT SURGERY

## 2021-08-10 PROCEDURE — P9037 PLATE PHERES LEUKOREDU IRRAD: HCPCS | Performed by: UROLOGY

## 2021-08-10 PROCEDURE — 88304 TISSUE EXAM BY PATHOLOGIST: CPT | Mod: 26 | Performed by: PATHOLOGY

## 2021-08-10 PROCEDURE — 06C90ZZ EXTIRPATION OF MATTER FROM RIGHT RENAL VEIN, OPEN APPROACH: ICD-10-PCS | Performed by: UROLOGY

## 2021-08-10 PROCEDURE — 38780 REMOVE ABDOMEN LYMPH NODES: CPT | Mod: 62 | Performed by: UROLOGY

## 2021-08-10 PROCEDURE — 84295 ASSAY OF SERUM SODIUM: CPT | Performed by: STUDENT IN AN ORGANIZED HEALTH CARE EDUCATION/TRAINING PROGRAM

## 2021-08-10 PROCEDURE — 88304 TISSUE EXAM BY PATHOLOGIST: CPT | Mod: TC | Performed by: UROLOGY

## 2021-08-10 PROCEDURE — 258N000003 HC RX IP 258 OP 636: Performed by: NURSE ANESTHETIST, CERTIFIED REGISTERED

## 2021-08-10 PROCEDURE — 83036 HEMOGLOBIN GLYCOSYLATED A1C: CPT | Performed by: STUDENT IN AN ORGANIZED HEALTH CARE EDUCATION/TRAINING PROGRAM

## 2021-08-10 PROCEDURE — 370N000017 HC ANESTHESIA TECHNICAL FEE, PER MIN: Performed by: UROLOGY

## 2021-08-10 PROCEDURE — 272N000001 HC OR GENERAL SUPPLY STERILE: Performed by: UROLOGY

## 2021-08-10 PROCEDURE — 250N000025 HC SEVOFLURANE, PER MIN: Performed by: UROLOGY

## 2021-08-10 PROCEDURE — 710N000011 HC RECOVERY PHASE 1, LEVEL 3, PER MIN: Performed by: UROLOGY

## 2021-08-10 PROCEDURE — 34502 RECONSTRUCT VENA CAVA: CPT | Mod: 22 | Performed by: TRANSPLANT SURGERY

## 2021-08-10 PROCEDURE — P9012 CRYOPRECIPITATE EACH UNIT: HCPCS | Performed by: UROLOGY

## 2021-08-10 PROCEDURE — 0DN80ZZ RELEASE SMALL INTESTINE, OPEN APPROACH: ICD-10-PCS | Performed by: TRANSPLANT SURGERY

## 2021-08-10 PROCEDURE — 250N000009 HC RX 250: Performed by: NURSE ANESTHETIST, CERTIFIED REGISTERED

## 2021-08-10 PROCEDURE — P9059 PLASMA, FRZ BETWEEN 8-24HOUR: HCPCS | Performed by: UROLOGY

## 2021-08-10 PROCEDURE — 82330 ASSAY OF CALCIUM: CPT | Performed by: UROLOGY

## 2021-08-10 PROCEDURE — 0JN80ZZ RELEASE ABDOMEN SUBCUTANEOUS TISSUE AND FASCIA, OPEN APPROACH: ICD-10-PCS | Performed by: TRANSPLANT SURGERY

## 2021-08-10 PROCEDURE — 999N000015 HC STATISTIC ARTERIAL MONITORING DAILY

## 2021-08-10 PROCEDURE — 07BD0ZZ EXCISION OF AORTIC LYMPHATIC, OPEN APPROACH: ICD-10-PCS | Performed by: TRANSPLANT SURGERY

## 2021-08-10 PROCEDURE — 250N000011 HC RX IP 250 OP 636: Performed by: ANESTHESIOLOGY

## 2021-08-10 PROCEDURE — 250N000011 HC RX IP 250 OP 636: Performed by: NURSE ANESTHETIST, CERTIFIED REGISTERED

## 2021-08-10 PROCEDURE — 85027 COMPLETE CBC AUTOMATED: CPT | Performed by: STUDENT IN AN ORGANIZED HEALTH CARE EDUCATION/TRAINING PROGRAM

## 2021-08-10 PROCEDURE — 84295 ASSAY OF SERUM SODIUM: CPT

## 2021-08-10 PROCEDURE — 86901 BLOOD TYPING SEROLOGIC RH(D): CPT | Performed by: UROLOGY

## 2021-08-10 PROCEDURE — 258N000003 HC RX IP 258 OP 636: Performed by: ANESTHESIOLOGY

## 2021-08-10 PROCEDURE — 85384 FIBRINOGEN ACTIVITY: CPT | Performed by: UROLOGY

## 2021-08-10 PROCEDURE — 80048 BASIC METABOLIC PNL TOTAL CA: CPT | Performed by: ANESTHESIOLOGY

## 2021-08-10 PROCEDURE — 0FT40ZZ RESECTION OF GALLBLADDER, OPEN APPROACH: ICD-10-PCS | Performed by: TRANSPLANT SURGERY

## 2021-08-10 PROCEDURE — 85027 COMPLETE CBC AUTOMATED: CPT | Performed by: UROLOGY

## 2021-08-10 PROCEDURE — 0TT00ZZ RESECTION OF RIGHT KIDNEY, OPEN APPROACH: ICD-10-PCS | Performed by: TRANSPLANT SURGERY

## 2021-08-10 PROCEDURE — 74018 RADEX ABDOMEN 1 VIEW: CPT | Mod: 26 | Performed by: STUDENT IN AN ORGANIZED HEALTH CARE EDUCATION/TRAINING PROGRAM

## 2021-08-10 PROCEDURE — 999N000065 XR CHEST PORT 1 VIEW

## 2021-08-10 PROCEDURE — 82803 BLOOD GASES ANY COMBINATION: CPT

## 2021-08-10 PROCEDURE — 88307 TISSUE EXAM BY PATHOLOGIST: CPT | Mod: 26 | Performed by: PATHOLOGY

## 2021-08-10 DEVICE — GRAFT GORTEX PERICARDIAL MEMBRANE 06X12CM 1PCM100: Type: IMPLANTABLE DEVICE | Site: VENA CAVA | Status: FUNCTIONAL

## 2021-08-10 RX ORDER — CEFAZOLIN SODIUM 2 G/100ML
2 INJECTION, SOLUTION INTRAVENOUS
Status: COMPLETED | OUTPATIENT
Start: 2021-08-10 | End: 2021-08-10

## 2021-08-10 RX ORDER — NALOXONE HYDROCHLORIDE 0.4 MG/ML
0.2 INJECTION, SOLUTION INTRAMUSCULAR; INTRAVENOUS; SUBCUTANEOUS
Status: DISCONTINUED | OUTPATIENT
Start: 2021-08-10 | End: 2021-08-17 | Stop reason: HOSPADM

## 2021-08-10 RX ORDER — FLUMAZENIL 0.1 MG/ML
0.2 INJECTION, SOLUTION INTRAVENOUS
Status: DISCONTINUED | OUTPATIENT
Start: 2021-08-10 | End: 2021-08-10 | Stop reason: HOSPADM

## 2021-08-10 RX ORDER — POLYETHYLENE GLYCOL 3350 17 G/17G
17 POWDER, FOR SOLUTION ORAL DAILY
Status: DISCONTINUED | OUTPATIENT
Start: 2021-08-11 | End: 2021-08-11

## 2021-08-10 RX ORDER — NOREPINEPHRINE BITARTRATE 0.06 MG/ML
INJECTION, SOLUTION INTRAVENOUS
Status: DISCONTINUED
Start: 2021-08-10 | End: 2021-08-11 | Stop reason: HOSPADM

## 2021-08-10 RX ORDER — ALBUMIN, HUMAN INJ 5% 5 %
250 SOLUTION INTRAVENOUS ONCE
Status: COMPLETED | OUTPATIENT
Start: 2021-08-10 | End: 2021-08-10

## 2021-08-10 RX ORDER — NALOXONE HYDROCHLORIDE 0.4 MG/ML
0.2 INJECTION, SOLUTION INTRAMUSCULAR; INTRAVENOUS; SUBCUTANEOUS
Status: DISCONTINUED | OUTPATIENT
Start: 2021-08-10 | End: 2021-08-10 | Stop reason: HOSPADM

## 2021-08-10 RX ORDER — ALBUTEROL SULFATE 0.83 MG/ML
2.5 SOLUTION RESPIRATORY (INHALATION) EVERY 4 HOURS PRN
Status: DISCONTINUED | OUTPATIENT
Start: 2021-08-10 | End: 2021-08-10 | Stop reason: HOSPADM

## 2021-08-10 RX ORDER — EPHEDRINE SULFATE 50 MG/ML
INJECTION, SOLUTION INTRAMUSCULAR; INTRAVENOUS; SUBCUTANEOUS PRN
Status: DISCONTINUED | OUTPATIENT
Start: 2021-08-10 | End: 2021-08-10

## 2021-08-10 RX ORDER — PROPOFOL 10 MG/ML
INJECTION, EMULSION INTRAVENOUS PRN
Status: DISCONTINUED | OUTPATIENT
Start: 2021-08-10 | End: 2021-08-10

## 2021-08-10 RX ORDER — ONDANSETRON 2 MG/ML
4 INJECTION INTRAMUSCULAR; INTRAVENOUS EVERY 6 HOURS PRN
Status: DISCONTINUED | OUTPATIENT
Start: 2021-08-10 | End: 2021-08-17 | Stop reason: HOSPADM

## 2021-08-10 RX ORDER — CALCIUM CHLORIDE 100 MG/ML
INJECTION INTRAVENOUS; INTRAVENTRICULAR PRN
Status: DISCONTINUED | OUTPATIENT
Start: 2021-08-10 | End: 2021-08-10

## 2021-08-10 RX ORDER — PROPOFOL 10 MG/ML
INJECTION, EMULSION INTRAVENOUS
Status: DISCONTINUED
Start: 2021-08-10 | End: 2021-08-11 | Stop reason: HOSPADM

## 2021-08-10 RX ORDER — ONDANSETRON 4 MG/1
4 TABLET, ORALLY DISINTEGRATING ORAL EVERY 30 MIN PRN
Status: DISCONTINUED | OUTPATIENT
Start: 2021-08-10 | End: 2021-08-10 | Stop reason: HOSPADM

## 2021-08-10 RX ORDER — NALOXONE HYDROCHLORIDE 0.4 MG/ML
0.4 INJECTION, SOLUTION INTRAMUSCULAR; INTRAVENOUS; SUBCUTANEOUS
Status: DISCONTINUED | OUTPATIENT
Start: 2021-08-10 | End: 2021-08-17 | Stop reason: HOSPADM

## 2021-08-10 RX ORDER — FENTANYL CITRATE 50 UG/ML
INJECTION, SOLUTION INTRAMUSCULAR; INTRAVENOUS
Status: COMPLETED
Start: 2021-08-10 | End: 2021-08-10

## 2021-08-10 RX ORDER — NICOTINE POLACRILEX 4 MG
15-30 LOZENGE BUCCAL
Status: DISCONTINUED | OUTPATIENT
Start: 2021-08-10 | End: 2021-08-10 | Stop reason: HOSPADM

## 2021-08-10 RX ORDER — ONDANSETRON 2 MG/ML
4 INJECTION INTRAMUSCULAR; INTRAVENOUS EVERY 30 MIN PRN
Status: DISCONTINUED | OUTPATIENT
Start: 2021-08-10 | End: 2021-08-10 | Stop reason: HOSPADM

## 2021-08-10 RX ORDER — LIDOCAINE HYDROCHLORIDE 20 MG/ML
INJECTION, SOLUTION INFILTRATION; PERINEURAL PRN
Status: DISCONTINUED | OUTPATIENT
Start: 2021-08-10 | End: 2021-08-10

## 2021-08-10 RX ORDER — LIDOCAINE 40 MG/G
CREAM TOPICAL
Status: DISCONTINUED | OUTPATIENT
Start: 2021-08-10 | End: 2021-08-17 | Stop reason: HOSPADM

## 2021-08-10 RX ORDER — SODIUM CHLORIDE, SODIUM LACTATE, POTASSIUM CHLORIDE, CALCIUM CHLORIDE 600; 310; 30; 20 MG/100ML; MG/100ML; MG/100ML; MG/100ML
INJECTION, SOLUTION INTRAVENOUS CONTINUOUS PRN
Status: DISCONTINUED | OUTPATIENT
Start: 2021-08-10 | End: 2021-08-10

## 2021-08-10 RX ORDER — HEPARIN SODIUM 10000 [USP'U]/100ML
0-5000 INJECTION, SOLUTION INTRAVENOUS CONTINUOUS
Status: DISCONTINUED | OUTPATIENT
Start: 2021-08-10 | End: 2021-08-13

## 2021-08-10 RX ORDER — DEXTROSE MONOHYDRATE 25 G/50ML
25-50 INJECTION, SOLUTION INTRAVENOUS
Status: DISCONTINUED | OUTPATIENT
Start: 2021-08-10 | End: 2021-08-11

## 2021-08-10 RX ORDER — FENTANYL CITRATE 50 UG/ML
50 INJECTION, SOLUTION INTRAMUSCULAR; INTRAVENOUS ONCE
Status: COMPLETED | OUTPATIENT
Start: 2021-08-10 | End: 2021-08-10

## 2021-08-10 RX ORDER — HEPARIN SODIUM 10000 [USP'U]/100ML
0-5000 INJECTION, SOLUTION INTRAVENOUS CONTINUOUS
Status: DISCONTINUED | OUTPATIENT
Start: 2021-08-10 | End: 2021-08-10

## 2021-08-10 RX ORDER — SODIUM CHLORIDE 9 MG/ML
INJECTION, SOLUTION INTRAVENOUS CONTINUOUS PRN
Status: DISCONTINUED | OUTPATIENT
Start: 2021-08-10 | End: 2021-08-10

## 2021-08-10 RX ORDER — MEPERIDINE HYDROCHLORIDE 25 MG/ML
12.5 INJECTION INTRAMUSCULAR; INTRAVENOUS; SUBCUTANEOUS
Status: DISCONTINUED | OUTPATIENT
Start: 2021-08-10 | End: 2021-08-10 | Stop reason: HOSPADM

## 2021-08-10 RX ORDER — NALOXONE HYDROCHLORIDE 0.4 MG/ML
0.4 INJECTION, SOLUTION INTRAMUSCULAR; INTRAVENOUS; SUBCUTANEOUS
Status: DISCONTINUED | OUTPATIENT
Start: 2021-08-10 | End: 2021-08-10 | Stop reason: HOSPADM

## 2021-08-10 RX ORDER — AMOXICILLIN 250 MG
1 CAPSULE ORAL 2 TIMES DAILY
Status: DISCONTINUED | OUTPATIENT
Start: 2021-08-10 | End: 2021-08-11

## 2021-08-10 RX ORDER — DEXAMETHASONE SODIUM PHOSPHATE 4 MG/ML
INJECTION, SOLUTION INTRA-ARTICULAR; INTRALESIONAL; INTRAMUSCULAR; INTRAVENOUS; SOFT TISSUE PRN
Status: DISCONTINUED | OUTPATIENT
Start: 2021-08-10 | End: 2021-08-10

## 2021-08-10 RX ORDER — DEXTROSE MONOHYDRATE 25 G/50ML
25-50 INJECTION, SOLUTION INTRAVENOUS
Status: DISCONTINUED | OUTPATIENT
Start: 2021-08-10 | End: 2021-08-10 | Stop reason: HOSPADM

## 2021-08-10 RX ORDER — SODIUM CHLORIDE, SODIUM LACTATE, POTASSIUM CHLORIDE, CALCIUM CHLORIDE 600; 310; 30; 20 MG/100ML; MG/100ML; MG/100ML; MG/100ML
INJECTION, SOLUTION INTRAVENOUS CONTINUOUS
Status: DISCONTINUED | OUTPATIENT
Start: 2021-08-10 | End: 2021-08-10 | Stop reason: HOSPADM

## 2021-08-10 RX ORDER — FENTANYL CITRATE 50 UG/ML
50 INJECTION, SOLUTION INTRAMUSCULAR; INTRAVENOUS EVERY 5 MIN PRN
Status: DISCONTINUED | OUTPATIENT
Start: 2021-08-10 | End: 2021-08-10 | Stop reason: HOSPADM

## 2021-08-10 RX ORDER — FENTANYL CITRATE 50 UG/ML
25-50 INJECTION, SOLUTION INTRAMUSCULAR; INTRAVENOUS
Status: DISCONTINUED | OUTPATIENT
Start: 2021-08-10 | End: 2021-08-10 | Stop reason: HOSPADM

## 2021-08-10 RX ORDER — LIDOCAINE 40 MG/G
CREAM TOPICAL
Status: DISCONTINUED | OUTPATIENT
Start: 2021-08-10 | End: 2021-08-10 | Stop reason: HOSPADM

## 2021-08-10 RX ORDER — FENTANYL CITRATE 50 UG/ML
INJECTION, SOLUTION INTRAMUSCULAR; INTRAVENOUS PRN
Status: DISCONTINUED | OUTPATIENT
Start: 2021-08-10 | End: 2021-08-10

## 2021-08-10 RX ORDER — DEXTROSE MONOHYDRATE 100 MG/ML
INJECTION, SOLUTION INTRAVENOUS CONTINUOUS PRN
Status: DISCONTINUED | OUTPATIENT
Start: 2021-08-10 | End: 2021-08-10 | Stop reason: HOSPADM

## 2021-08-10 RX ORDER — ACETAMINOPHEN 325 MG/1
650 TABLET ORAL EVERY 6 HOURS
Status: DISCONTINUED | OUTPATIENT
Start: 2021-08-13 | End: 2021-08-13

## 2021-08-10 RX ORDER — HEPARIN SODIUM 5000 [USP'U]/.5ML
5000 INJECTION, SOLUTION INTRAVENOUS; SUBCUTANEOUS
Status: COMPLETED | OUTPATIENT
Start: 2021-08-10 | End: 2021-08-10

## 2021-08-10 RX ORDER — PHENYLEPHRINE HCL IN 0.9% NACL 50MG/250ML
.1-1 PLASTIC BAG, INJECTION (ML) INTRAVENOUS CONTINUOUS
Status: DISCONTINUED | OUTPATIENT
Start: 2021-08-10 | End: 2021-08-10 | Stop reason: HOSPADM

## 2021-08-10 RX ORDER — PANTOPRAZOLE SODIUM 40 MG/1
40 TABLET, DELAYED RELEASE ORAL
Status: DISCONTINUED | OUTPATIENT
Start: 2021-08-11 | End: 2021-08-17 | Stop reason: HOSPADM

## 2021-08-10 RX ORDER — PROPOFOL 10 MG/ML
5-75 INJECTION, EMULSION INTRAVENOUS CONTINUOUS
Status: DISCONTINUED | OUTPATIENT
Start: 2021-08-10 | End: 2021-08-12

## 2021-08-10 RX ORDER — NOREPINEPHRINE BITARTRATE 0.06 MG/ML
.01-.6 INJECTION, SOLUTION INTRAVENOUS CONTINUOUS
Status: DISCONTINUED | OUTPATIENT
Start: 2021-08-10 | End: 2021-08-13

## 2021-08-10 RX ORDER — ONDANSETRON 4 MG/1
4 TABLET, ORALLY DISINTEGRATING ORAL EVERY 6 HOURS PRN
Status: DISCONTINUED | OUTPATIENT
Start: 2021-08-10 | End: 2021-08-17 | Stop reason: HOSPADM

## 2021-08-10 RX ORDER — HYDROMORPHONE HYDROCHLORIDE 1 MG/ML
0.2 INJECTION, SOLUTION INTRAMUSCULAR; INTRAVENOUS; SUBCUTANEOUS EVERY 5 MIN PRN
Status: DISCONTINUED | OUTPATIENT
Start: 2021-08-10 | End: 2021-08-10 | Stop reason: HOSPADM

## 2021-08-10 RX ORDER — DEXTROSE MONOHYDRATE 100 MG/ML
INJECTION, SOLUTION INTRAVENOUS CONTINUOUS PRN
Status: DISCONTINUED | OUTPATIENT
Start: 2021-08-10 | End: 2021-08-11

## 2021-08-10 RX ORDER — NICOTINE POLACRILEX 4 MG
15-30 LOZENGE BUCCAL
Status: DISCONTINUED | OUTPATIENT
Start: 2021-08-10 | End: 2021-08-11

## 2021-08-10 RX ORDER — CEFAZOLIN SODIUM 2 G/100ML
2 INJECTION, SOLUTION INTRAVENOUS SEE ADMIN INSTRUCTIONS
Status: DISCONTINUED | OUTPATIENT
Start: 2021-08-10 | End: 2021-08-10 | Stop reason: HOSPADM

## 2021-08-10 RX ORDER — LABETALOL HYDROCHLORIDE 5 MG/ML
5 INJECTION, SOLUTION INTRAVENOUS EVERY 5 MIN PRN
Status: DISCONTINUED | OUTPATIENT
Start: 2021-08-10 | End: 2021-08-10 | Stop reason: HOSPADM

## 2021-08-10 RX ORDER — SODIUM CHLORIDE, SODIUM LACTATE, POTASSIUM CHLORIDE, CALCIUM CHLORIDE 600; 310; 30; 20 MG/100ML; MG/100ML; MG/100ML; MG/100ML
INJECTION, SOLUTION INTRAVENOUS CONTINUOUS
Status: DISCONTINUED | OUTPATIENT
Start: 2021-08-10 | End: 2021-08-12

## 2021-08-10 RX ORDER — SODIUM CHLORIDE 9 MG/ML
INJECTION, SOLUTION INTRAVENOUS CONTINUOUS
Status: DISCONTINUED | OUTPATIENT
Start: 2021-08-10 | End: 2021-08-10

## 2021-08-10 RX ADMIN — MIDAZOLAM 1 MG: 1 INJECTION INTRAMUSCULAR; INTRAVENOUS at 12:30

## 2021-08-10 RX ADMIN — Medication 2 G: at 08:30

## 2021-08-10 RX ADMIN — PHENYLEPHRINE HYDROCHLORIDE 200 MCG: 10 INJECTION INTRAVENOUS at 16:09

## 2021-08-10 RX ADMIN — TRANEXAMIC ACID 125 MG/HR: 1 INJECTION, SOLUTION INTRAVENOUS at 16:26

## 2021-08-10 RX ADMIN — NOREPINEPHRINE BITARTRATE 12.8 MCG: 1 INJECTION, SOLUTION, CONCENTRATE INTRAVENOUS at 15:25

## 2021-08-10 RX ADMIN — Medication 50 MCG/HR: at 21:22

## 2021-08-10 RX ADMIN — PHENYLEPHRINE HYDROCHLORIDE 200 MCG: 10 INJECTION INTRAVENOUS at 13:56

## 2021-08-10 RX ADMIN — PHENYLEPHRINE HYDROCHLORIDE 200 MCG: 10 INJECTION INTRAVENOUS at 14:34

## 2021-08-10 RX ADMIN — NOREPINEPHRINE BITARTRATE 12.8 MCG: 1 INJECTION, SOLUTION, CONCENTRATE INTRAVENOUS at 15:19

## 2021-08-10 RX ADMIN — DEXAMETHASONE SODIUM PHOSPHATE 6 MG: 4 INJECTION, SOLUTION INTRA-ARTICULAR; INTRALESIONAL; INTRAMUSCULAR; INTRAVENOUS; SOFT TISSUE at 10:00

## 2021-08-10 RX ADMIN — PHENYLEPHRINE HYDROCHLORIDE 200 MCG: 10 INJECTION INTRAVENOUS at 14:00

## 2021-08-10 RX ADMIN — Medication 0.05 MCG/KG/MIN: at 20:00

## 2021-08-10 RX ADMIN — Medication 2 G: at 12:30

## 2021-08-10 RX ADMIN — FENTANYL CITRATE 50 MCG: 50 INJECTION, SOLUTION INTRAMUSCULAR; INTRAVENOUS at 13:02

## 2021-08-10 RX ADMIN — SODIUM CHLORIDE, POTASSIUM CHLORIDE, SODIUM LACTATE AND CALCIUM CHLORIDE: 600; 310; 30; 20 INJECTION, SOLUTION INTRAVENOUS at 14:37

## 2021-08-10 RX ADMIN — CALCIUM CHLORIDE 500 MG: 100 INJECTION INTRAVENOUS; INTRAVENTRICULAR at 12:12

## 2021-08-10 RX ADMIN — SODIUM CHLORIDE: 0.9 INJECTION, SOLUTION INTRAVENOUS at 09:15

## 2021-08-10 RX ADMIN — HEPARIN SODIUM 5000 UNITS: 5000 INJECTION, SOLUTION INTRAVENOUS; SUBCUTANEOUS at 09:10

## 2021-08-10 RX ADMIN — PHENYLEPHRINE HYDROCHLORIDE 100 MCG: 10 INJECTION INTRAVENOUS at 08:37

## 2021-08-10 RX ADMIN — PHENYLEPHRINE HYDROCHLORIDE 200 MCG: 10 INJECTION INTRAVENOUS at 15:04

## 2021-08-10 RX ADMIN — HUMAN INSULIN 3 UNITS/HR: 100 INJECTION, SOLUTION SUBCUTANEOUS at 15:40

## 2021-08-10 RX ADMIN — Medication 10 MG: at 08:25

## 2021-08-10 RX ADMIN — NOREPINEPHRINE BITARTRATE 12.8 MCG: 1 INJECTION, SOLUTION, CONCENTRATE INTRAVENOUS at 15:05

## 2021-08-10 RX ADMIN — ROCURONIUM BROMIDE 50 MG: 10 INJECTION INTRAVENOUS at 16:00

## 2021-08-10 RX ADMIN — PHENYLEPHRINE HYDROCHLORIDE 200 MCG: 10 INJECTION INTRAVENOUS at 12:33

## 2021-08-10 RX ADMIN — FENTANYL CITRATE 25 MCG: 50 INJECTION, SOLUTION INTRAMUSCULAR; INTRAVENOUS at 12:58

## 2021-08-10 RX ADMIN — ROCURONIUM BROMIDE 20 MG: 10 INJECTION INTRAVENOUS at 13:40

## 2021-08-10 RX ADMIN — NOREPINEPHRINE BITARTRATE 25.6 MCG: 1 INJECTION, SOLUTION, CONCENTRATE INTRAVENOUS at 15:27

## 2021-08-10 RX ADMIN — HEPARIN SODIUM 1200 UNITS/HR: 10000 INJECTION, SOLUTION INTRAVENOUS at 20:35

## 2021-08-10 RX ADMIN — SODIUM CHLORIDE, POTASSIUM CHLORIDE, SODIUM LACTATE AND CALCIUM CHLORIDE 500 ML: 600; 310; 30; 20 INJECTION, SOLUTION INTRAVENOUS at 18:30

## 2021-08-10 RX ADMIN — FENTANYL CITRATE 50 MCG: 50 INJECTION, SOLUTION INTRAMUSCULAR; INTRAVENOUS at 20:40

## 2021-08-10 RX ADMIN — FENTANYL CITRATE 50 MCG: 50 INJECTION, SOLUTION INTRAMUSCULAR; INTRAVENOUS at 18:13

## 2021-08-10 RX ADMIN — PHENYLEPHRINE HYDROCHLORIDE 100 MCG: 10 INJECTION INTRAVENOUS at 11:40

## 2021-08-10 RX ADMIN — PHENYLEPHRINE HYDROCHLORIDE 100 MCG: 10 INJECTION INTRAVENOUS at 16:08

## 2021-08-10 RX ADMIN — ROCURONIUM BROMIDE 45 MG: 10 INJECTION INTRAVENOUS at 08:35

## 2021-08-10 RX ADMIN — PROPOFOL 40 MCG/KG/MIN: 10 INJECTION, EMULSION INTRAVENOUS at 19:00

## 2021-08-10 RX ADMIN — SODIUM CHLORIDE, POTASSIUM CHLORIDE, SODIUM LACTATE AND CALCIUM CHLORIDE: 600; 310; 30; 20 INJECTION, SOLUTION INTRAVENOUS at 08:20

## 2021-08-10 RX ADMIN — CALCIUM CHLORIDE 500 MG: 100 INJECTION INTRAVENOUS; INTRAVENTRICULAR at 15:33

## 2021-08-10 RX ADMIN — CALCIUM CHLORIDE 250 MG: 100 INJECTION INTRAVENOUS; INTRAVENTRICULAR at 14:02

## 2021-08-10 RX ADMIN — SODIUM CHLORIDE: 0.9 INJECTION, SOLUTION INTRAVENOUS at 14:37

## 2021-08-10 RX ADMIN — PROPOFOL 100 MCG/KG/MIN: 10 INJECTION, EMULSION INTRAVENOUS at 17:24

## 2021-08-10 RX ADMIN — LIDOCAINE HYDROCHLORIDE 100 MG: 20 INJECTION, SOLUTION INFILTRATION; PERINEURAL at 08:23

## 2021-08-10 RX ADMIN — PHENYLEPHRINE HYDROCHLORIDE 100 MCG: 10 INJECTION INTRAVENOUS at 13:32

## 2021-08-10 RX ADMIN — SODIUM CHLORIDE, POTASSIUM CHLORIDE, SODIUM LACTATE AND CALCIUM CHLORIDE: 600; 310; 30; 20 INJECTION, SOLUTION INTRAVENOUS at 21:14

## 2021-08-10 RX ADMIN — ROCURONIUM BROMIDE 40 MG: 10 INJECTION INTRAVENOUS at 12:00

## 2021-08-10 RX ADMIN — Medication 0.5 MCG/KG/MIN: at 08:32

## 2021-08-10 RX ADMIN — MIDAZOLAM 2 MG: 1 INJECTION INTRAMUSCULAR; INTRAVENOUS at 07:47

## 2021-08-10 RX ADMIN — Medication 2 G: at 15:15

## 2021-08-10 RX ADMIN — PHENYLEPHRINE HYDROCHLORIDE 200 MCG: 10 INJECTION INTRAVENOUS at 15:01

## 2021-08-10 RX ADMIN — NOREPINEPHRINE BITARTRATE 12.8 MCG: 1 INJECTION, SOLUTION, CONCENTRATE INTRAVENOUS at 15:22

## 2021-08-10 RX ADMIN — Medication 100 MG: at 08:25

## 2021-08-10 RX ADMIN — SODIUM CHLORIDE, POTASSIUM CHLORIDE, SODIUM LACTATE AND CALCIUM CHLORIDE: 600; 310; 30; 20 INJECTION, SOLUTION INTRAVENOUS at 11:00

## 2021-08-10 RX ADMIN — PHENYLEPHRINE HYDROCHLORIDE 200 MCG: 10 INJECTION INTRAVENOUS at 14:26

## 2021-08-10 RX ADMIN — FENTANYL CITRATE 50 MCG: 50 INJECTION, SOLUTION INTRAMUSCULAR; INTRAVENOUS at 19:00

## 2021-08-10 RX ADMIN — ROCURONIUM BROMIDE 5 MG: 10 INJECTION INTRAVENOUS at 08:23

## 2021-08-10 RX ADMIN — CALCIUM CHLORIDE 1000 MG: 100 INJECTION INTRAVENOUS; INTRAVENTRICULAR at 15:12

## 2021-08-10 RX ADMIN — PHENYLEPHRINE HYDROCHLORIDE 100 MCG: 10 INJECTION INTRAVENOUS at 13:15

## 2021-08-10 RX ADMIN — ROCURONIUM BROMIDE 30 MG: 10 INJECTION INTRAVENOUS at 11:15

## 2021-08-10 RX ADMIN — PHENYLEPHRINE HYDROCHLORIDE 100 MCG: 10 INJECTION INTRAVENOUS at 08:35

## 2021-08-10 RX ADMIN — ROCURONIUM BROMIDE 30 MG: 10 INJECTION INTRAVENOUS at 09:30

## 2021-08-10 RX ADMIN — ALBUMIN HUMAN 250 ML: 0.05 INJECTION, SOLUTION INTRAVENOUS at 18:30

## 2021-08-10 RX ADMIN — FENTANYL CITRATE 125 MCG: 50 INJECTION, SOLUTION INTRAMUSCULAR; INTRAVENOUS at 08:23

## 2021-08-10 RX ADMIN — Medication 5 MG: at 08:37

## 2021-08-10 RX ADMIN — FENTANYL CITRATE 50 MCG: 50 INJECTION, SOLUTION INTRAMUSCULAR; INTRAVENOUS at 07:41

## 2021-08-10 RX ADMIN — PHENYLEPHRINE HYDROCHLORIDE 100 MCG: 10 INJECTION INTRAVENOUS at 12:40

## 2021-08-10 RX ADMIN — CALCIUM CHLORIDE 750 MG: 100 INJECTION INTRAVENOUS; INTRAVENTRICULAR at 14:55

## 2021-08-10 RX ADMIN — CALCIUM CHLORIDE 500 MG: 100 INJECTION INTRAVENOUS; INTRAVENTRICULAR at 13:13

## 2021-08-10 RX ADMIN — PROPOFOL 150 MG: 10 INJECTION, EMULSION INTRAVENOUS at 08:25

## 2021-08-10 RX ADMIN — MIDAZOLAM 1 MG: 1 INJECTION INTRAMUSCULAR; INTRAVENOUS at 08:15

## 2021-08-10 RX ADMIN — TRANEXAMIC ACID 1 G: 1 INJECTION, SOLUTION INTRAVENOUS at 16:05

## 2021-08-10 RX ADMIN — SODIUM CHLORIDE, SODIUM LACTATE, POTASSIUM CHLORIDE, CALCIUM CHLORIDE: 600; 310; 30; 20 INJECTION, SOLUTION INTRAVENOUS at 16:07

## 2021-08-10 RX ADMIN — Medication: at 22:30

## 2021-08-10 ASSESSMENT — ACTIVITIES OF DAILY LIVING (ADL): ADLS_ACUITY_SCORE: 15

## 2021-08-10 ASSESSMENT — MIFFLIN-ST. JEOR
SCORE: 1847
SCORE: 1862

## 2021-08-10 NOTE — ANESTHESIA PROCEDURE NOTES
"Airway       Patient location during procedure: OR       Procedure Start/Stop Times: 8/10/2021 8:25 AM and 8/10/2021 8:27 AM  Staff -        Anesthesiologist:  Lj Melendez MD       CRNA: Nile Blackwell APRN CRNA       Performed By: CRNA  Consent for Airway        Urgency: elective  Indications and Patient Condition       Indications for airway management: mery-procedural       Induction type:intravenous       Mask difficulty assessment: 1 - vent by mask    Final Airway Details       Final airway type: endotracheal airway       Successful airway: ETT - single  Endotracheal Airway Details        ETT size (mm): 7.5       Cuffed: yes       Successful intubation technique: video laryngoscopy       VL Blade Size: Other (comments) (C-MAC 4)       Grade View of Cords: 2 (With pronounced sniffing position (ear anterior to level of sternum))       Adjucts: stylet (Stylet was NOT Utilized)       Position: Right       Measured from: gums/teeth       Secured at (cm): 23       Bite Block used: Green NESTOR Tooth Guard.    Post intubation assessment        Placement verified by: capnometry, equal breath sounds and chest rise        Number of attempts at approach: 1       Number of other approaches attempted: 0       Secured with: pink tape       Ease of procedure: easy       Dentition: Unchanged and Intact    Additional Comments       Preoperative assessment revealed a Mallampati score of 4 with a Thyromental Distance of 3 fingerbreadths.  Patient was observed to have a prominent    Patient was positioned (awake) with optimized \"sniffing\" position (ear to level of sternum with slight neck extension) with folded pillow behind shoulders/neck.  Preoxygenation was performed with 100% FiO2.  Induction of anesthesia was performed with Lidocaine, Propofol, and Fentanyl IV.  Bag-Mask ventilation was performed/confirmed prior to administration of Succinylcholine IV administration (Easy Mask without Oral Airway).  C-MAC Videoscope " (size 4 blade) was utilized with a non-styletted 7.5 ETT (Easy Placement).      Of note - Direct Laryngoscopy with C-MAC size 4 blade revealed a DL view grade 3 (only arytenoids) with significant cricoid pressure (view may have improved with additional head lift into pronounced sniffing position).
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
## 2021-08-10 NOTE — OR NURSING
JOAO Melendez at bedside in PACU. Reviewed post op xray. Central line and NG tube placement confirmed. Labs just drawn at end of OR case, no new labs needed at this time in PACU since ICU bed will be available soon.

## 2021-08-10 NOTE — ANESTHESIA PROCEDURE NOTES
Erector spinae Procedure Note  Pre-Procedure   Staff -        Anesthesiologist:  Andres Kumar MD       Resident/Fellow: Nile Flores MD       Performed By: resident       Location: pre-op       Procedure Start/Stop Times: 8/10/2021 6:50 AM and 8/10/2021 7:20 AM       Pre-Anesthestic Checklist: patient identified, IV checked, site marked, risks and benefits discussed, informed consent, monitors and equipment checked, pre-op evaluation, at physician/surgeon's request and post-op pain management  Timeout:       Correct Patient: Yes        Correct Procedure: Yes        Correct Site: Yes        Correct Position: Yes        Correct Laterality: Yes        Site Marked: Yes  Procedure Documentation  Procedure: Erector spinae       Diagnosis: POST OPERATIVE PAIN       Laterality: right       Patient Position: prone       Patient Prep/Sterile Barriers: sterile gloves, mask, patient draped       Skin prep: Chloraprep       Insertion Site: T8-9.       Needle Type: Touhy needle       Needle Gauge: 20.        Needle Length (Inches): 3.13        Catheter: 18 G.         Catheter threaded easily.         Threaded 10 cm at skin.         Ultrasound guided       1. Ultrasound was used to identify targeted nerve, plexus, vascular marker, or fascial plane and place a needle adjacent to it in real-time.       2. Ultrasound was used to visualize the spread of anesthetic in close proximity to the above referenced structure.    Assessment/Narrative         The placement was negative for: blood aspirated, painful injection and site bleeding       Paresthesias: No.       Bolus given via catheter. No blood aspirated via catheter.        Secured via Tegaderm and Dermabond.        Insertion/Infusion Method: Continuous Infusion       Complications: none    Comments:  Right sided erector spinae catheter inserted 10cm at skin, 5cm into erector spinae space. Secured with biopatch, dermabond, tegaderm and tape.    Nile Flores DO  [facility]  Department of Anesthesiology  [phone]

## 2021-08-10 NOTE — ANESTHESIA PROCEDURE NOTES
Arterial Line Procedure Note  Pre-Procedure   Staff -        Anesthesiologist:  Lj Melendez MD       Performed By: anesthesiologist       Location: OR       Procedure Start/Stop Times: 8/10/2021 8:32 AM and 8/10/2021 8:36 AM       Pre-Anesthestic Checklist: patient identified, IV checked, risks and benefits discussed, informed consent, monitors and equipment checked, pre-op evaluation and at physician/surgeon's request  Timeout:       Correct Patient: Yes        Correct Procedure: Yes        Correct Site: Yes        Correct Position: Yes   Procedure   Procedure: arterial line       Laterality: right       Insertion Site: radial.  Sterile Prep        Standard elements of sterile barrier followed       Skin prep: Chloraprep  Insertion/Injection        Technique: ultrasound guided        1. Ultrasound was used to evaluate the access site.       2. Artery evaluated via ultrasound for patency/adequacy.       3. Using real-time ultrasound the needle/catheter was observed entering the artery/vein.       5. The visualized structures were anatomically normal.       6. There were no apparent abnormal pathologic findings.       Catheter Type/Size: 20 G, 1.75 in/4.5 cm quick cath (integral wire)  Narrative        Tegaderm dressing used.       Complications: None apparent,      Arterial waveform: Yes

## 2021-08-10 NOTE — PROGRESS NOTES
Block procedure complete.  Vss.  Patient responding appropriately.  Monitor will remain attached to patient until transferred to OR.

## 2021-08-10 NOTE — LETTER
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]  Admit Date/Time: 8/10/2021  6:13 AM  Discharge Date: 21  Payor Source: Payor: MEDICA / Plan: MEDICA NON-FV ACO NON IFB / Product Type: Indemnity   Discharge Plan: home       Discharge Needs Assessment:  Needs      Most Recent Value   Equipment Currently Used at Home  none        Follow-up plan:    Future Appointments   Date Time Provider Department Center   2021 11:00 AM Feng Agrawal MD [facility]       Any outstanding tests or procedures:        Referrals     Future Labs/Procedures    Physical Therapy Referral     Process Instructions:    Work Related Injury: Functional Capacity and Work Conditioning are only offered at [facility] (service can be provided by PT or OT).    *This therapy referral will be filtered to a centralized scheduling office at [facility] and the patient will receive a call to schedule an appointment at a [place] location most convenient for them. *    Comments:    [facility]    Please be aware that coverage of these services is subject to the terms and limitations of your health insurance plan.  Call member services at your health plan with any benefit or coverage questions.  [facility]  Please call the facility your provider referred you to schedule your appointment            Supplies     Future Labs/Procedures    [facility] Order     Process Instructions:    By signing this order, the Authorizing Provider is attesting that they have completed a face-to-face evaluation on the patient to determine  their need for this equipment in the last 60 days. A new face-to-face evaluation is required each time  A new prescription for one of the specified items is ordered.   If an additional provider completed the evaluation, please indicate their name below.     **As of 2018, an order requisition and face sheet will print for all DME orders. Please give printed order and face sheet to patient if not obtaining product from [facility] DME closet.     Comments:    DME Documentation:   Describe the reason for need to support medical necessity: impaired mobility.     I, the undersigned, certify that the above prescribed supplies are medically necessary for this patient and is both reasonable and necessary in reference to accepted standards of medical and necessary in reference to accepted standards of medical practice in the treatment of this patient's condition and is not prescribed as a convenience.          Key Recommendations:  See AVS    April Mcconnell RN    AVS/Discharge Summary is the source of truth; this is a helpful guide for improved communication of patient story

## 2021-08-10 NOTE — BRIEF OP NOTE
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]  Post-operative diagnosis Same as pre-operative diagnosis    Procedure: Procedure(s):  RIGHT OPEN RADICAL NEPHRECTOMY,  INFERIOR VENA CAVA THROMBECTOMY WITH RECONSTRUCTION WITH GORTEX PATCH  Laparotomy, lysis adhesions, combined  Cholecystectomy  Surgeon: Surgeon(s) and Role:  Panel 1:     * Feng Agrawal MD - Primary     * Ramona Harrington MD - Resident - Assisting  Panel 2:     * Bandar Hogue MD - Primary     * Kary Edgar MD - Fellow - Assisting  Anesthesia: Combined General with Block   Estimated blood loss: 8 liters  Drains: Wisam-Brewer  Specimens:   ID Type Source Tests Collected by Time Destination   1 :  Tissue Gallbladder SURGICAL PATHOLOGY EXAM Feng Agrawal MD 8/10/2021  2:53 PM    2 : Rigth Kidney Tissue Kidney, Right SURGICAL PATHOLOGY EXAM Feng Agrawal MD 8/10/2021  3:21 PM    3 : Inferior vena Cava Thrombus Tissue Other SURGICAL PATHOLOGY EXAM Feng Agrawal MD 8/10/2021  3:24 PM    4 : Intra aortic vena cava Lymph node Tissue Lymph Node(s) SURGICAL PATHOLOGY EXAM Feng Agrawal MD 8/10/2021  4:06 PM    A :  Blood, arterial Line, arterial PARTIAL THROMBOPLASTIN TIME, INR, CBC WITH PLATELETS & DIFFERENTIAL, FIBRINOGEN ANTIGEN (Canceled) Feng Agrawal MD 8/10/2021  2:25 PM      Findings:   Significant adhesions of small  bowel to anterior abdominal wall and RUQ organs  to each other requiring extensive BAYLEE and cholecystectomy. Many many parasitic vessels. Right kidney removed en bloc followed by IVC thrombus. IVC patched by transplant surgery with bovine pericardium patch.    Intra-op given 13U PRBCs, 3 platelets, 8 FFP, 2 cryo, 2L  crystalloid.  Complications: None.  Implants:   Implant Name Type Inv. Item Serial No.  Lot No. LRB No. Used Action   GRAFT GORTEX PERICARDIAL MEMBRANE [id number] - [id number] Graft GRAFT GORTEX PERICARDIAL MEMBRANE [id number]  [employer]  N/A 1 Implanted         To SICU overnight  Post-op labs  Low intensity heparin gtt per transplant  NPO, PCA

## 2021-08-10 NOTE — ANESTHESIA CARE TRANSFER NOTE
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    Anesthesia Type:   General     Note:    Oropharynx: endotracheal tube in place, nasal gatric tube in place and ventilatory support  Level of Consciousness: iatrogenic sedation      Independent Airway: airway patency not satisfactory and stable  Dentition: dentition unchanged  Vital Signs Stable: post-procedure vital signs reviewed and stable  Report to RN Given: handoff report given  Patient transferred to: PACU  Comments: Placed on vent in PACU and stable.  Handoff Report: Identifed the Patient, Identified the Reponsible Provider, Reviewed the pertinent medical history, Discussed the surgical course, Reviewed Intra-OP anesthesia mangement and issues during anesthesia, Set expectations for post-procedure period and Allowed opportunity for questions and acknowledgement of understanding      Vitals:  Vitals Value Taken Time   /88 08/10/21 1750   Temp     Pulse 107 08/10/21 1754   Resp     SpO2 100 % 08/10/21 1754   Vitals shown include unvalidated device data.    Electronically Signed By: PILY Martinez CRNA  August 10, 2021  5:55 PM

## 2021-08-10 NOTE — ANESTHESIA PROCEDURE NOTES
Central Line/PA Catheter Placement  Pre-Procedure   Staff -        Anesthesiologist:  Lj Melendez MD       Performed By: anesthesiologist       Location: OR       Procedure Start/Stop Times: 8/10/2021 8:44 AM and 8/10/2021 8:51 AM       Pre-Anesthestic Checklist: patient identified, IV checked, site marked, risks and benefits discussed, informed consent, monitors and equipment checked, pre-op evaluation and at physician/surgeon's request  Timeout:       Correct Patient: Yes        Correct Procedure: Yes        Correct Site: Yes        Correct Position: Yes        Correct Laterality: Yes     Procedure   Procedure: central line       Laterality: right       Insertion Site: internal jugular.       Patient Position: Trendelenburg  Sterile Prep        All elements of maximal sterile barrier technique followed       Patient Prep/Sterile Barriers: draped, x2, hand hygiene, gloves , hat , mask , draped, gown, sterile gel and probe cover       Skin prep: Chloraprep  Insertion/Injection        Technique: ultrasound guided and Seldinger Technique        1. Ultrasound was used to evaluate the access site.       2. Vein evaluated via ultrasound for patency/adequacy.       3. Using real-time ultrasound the needle/catheter was observed entering the artery/vein.       5. The visualized structures were anatomically normal.       6. There were no apparent abnormal pathologic findings.       Introducer Type: 9 Fr, 2-lumen MAC        Hands-off Catheter: Hands-off 3-lumen via introducer   Narrative         Secured by: suture       Tegaderm and Biopatch dressing used.       Complications: None apparent,        blood aspirated from all lumens,        All lumens flushed: Yes       Verification method: Placement to be verified post-op and NESTOR

## 2021-08-11 ENCOUNTER — APPOINTMENT (OUTPATIENT)
Dept: GENERAL RADIOLOGY | Facility: CLINIC | Age: 56
DRG: 656 | End: 2021-08-11
Attending: UROLOGY
Payer: COMMERCIAL

## 2021-08-11 ENCOUNTER — ANESTHESIA EVENT (OUTPATIENT)
Dept: ULTRASOUND IMAGING | Facility: CLINIC | Age: 56
End: 2021-08-11

## 2021-08-11 LAB
ALBUMIN SERPL-MCNC: 2.4 G/DL (ref 3.4–5)
ALP SERPL-CCNC: 59 U/L (ref 40–150)
ALT SERPL W P-5'-P-CCNC: 194 U/L (ref 0–70)
ANION GAP SERPL CALCULATED.3IONS-SCNC: 5 MMOL/L (ref 3–14)
AST SERPL W P-5'-P-CCNC: 233 U/L (ref 0–45)
BILIRUB DIRECT SERPL-MCNC: 0.2 MG/DL (ref 0–0.2)
BILIRUB SERPL-MCNC: 0.4 MG/DL (ref 0.2–1.3)
BUN SERPL-MCNC: 26 MG/DL (ref 7–30)
CALCIUM SERPL-MCNC: 8.2 MG/DL (ref 8.5–10.1)
CHLORIDE BLD-SCNC: 107 MMOL/L (ref 94–109)
CO2 SERPL-SCNC: 25 MMOL/L (ref 20–32)
CREAT SERPL-MCNC: 2.2 MG/DL (ref 0.66–1.25)
ERYTHROCYTE [DISTWIDTH] IN BLOOD BY AUTOMATED COUNT: 14.5 % (ref 10–15)
GFR SERPL CREATININE-BSD FRML MDRD: 32 ML/MIN/1.73M2
GLUCOSE BLD-MCNC: 121 MG/DL (ref 70–99)
GLUCOSE BLDC GLUCOMTR-MCNC: 103 MG/DL (ref 70–99)
GLUCOSE BLDC GLUCOMTR-MCNC: 103 MG/DL (ref 70–99)
GLUCOSE BLDC GLUCOMTR-MCNC: 104 MG/DL (ref 70–99)
GLUCOSE BLDC GLUCOMTR-MCNC: 104 MG/DL (ref 70–99)
GLUCOSE BLDC GLUCOMTR-MCNC: 106 MG/DL (ref 70–99)
GLUCOSE BLDC GLUCOMTR-MCNC: 107 MG/DL (ref 70–99)
GLUCOSE BLDC GLUCOMTR-MCNC: 109 MG/DL (ref 70–99)
GLUCOSE BLDC GLUCOMTR-MCNC: 79 MG/DL (ref 70–99)
GLUCOSE BLDC GLUCOMTR-MCNC: 89 MG/DL (ref 70–99)
GLUCOSE BLDC GLUCOMTR-MCNC: 90 MG/DL (ref 70–99)
GLUCOSE BLDC GLUCOMTR-MCNC: 97 MG/DL (ref 70–99)
GLUCOSE BLDC GLUCOMTR-MCNC: 99 MG/DL (ref 70–99)
HCT VFR BLD AUTO: 26.2 % (ref 40–53)
HGB BLD-MCNC: 9.1 G/DL (ref 13.3–17.7)
LACTATE SERPL-SCNC: 0.5 MMOL/L (ref 0.7–2)
LACTATE SERPL-SCNC: 0.7 MMOL/L (ref 0.7–2)
LACTATE SERPL-SCNC: 0.8 MMOL/L (ref 0.7–2)
LACTATE SERPL-SCNC: 0.9 MMOL/L (ref 0.7–2)
MAGNESIUM SERPL-MCNC: 1.8 MG/DL (ref 1.6–2.3)
MAGNESIUM SERPL-MCNC: 1.8 MG/DL (ref 1.6–2.3)
MCH RBC QN AUTO: 29.4 PG (ref 26.5–33)
MCHC RBC AUTO-ENTMCNC: 34.7 G/DL (ref 31.5–36.5)
MCV RBC AUTO: 85 FL (ref 78–100)
PHOSPHATE SERPL-MCNC: 4.2 MG/DL (ref 2.5–4.5)
PHOSPHATE SERPL-MCNC: 4.4 MG/DL (ref 2.5–4.5)
PLATELET # BLD AUTO: 189 10E3/UL (ref 150–450)
POTASSIUM BLD-SCNC: 4.6 MMOL/L (ref 3.4–5.3)
PROT SERPL-MCNC: 5.2 G/DL (ref 6.8–8.8)
RBC # BLD AUTO: 3.1 10E6/UL (ref 4.4–5.9)
SODIUM SERPL-SCNC: 137 MMOL/L (ref 133–144)
UFH PPP CHRO-ACNC: 0.3 IU/ML
UFH PPP CHRO-ACNC: 0.34 IU/ML
WBC # BLD AUTO: 10.6 10E3/UL (ref 4–11)

## 2021-08-11 PROCEDURE — 999N000157 HC STATISTIC RCP TIME EA 10 MIN

## 2021-08-11 PROCEDURE — 250N000013 HC RX MED GY IP 250 OP 250 PS 637

## 2021-08-11 PROCEDURE — 250N000011 HC RX IP 250 OP 636: Performed by: DIETITIAN, REGISTERED

## 2021-08-11 PROCEDURE — 83605 ASSAY OF LACTIC ACID: CPT | Performed by: SURGERY

## 2021-08-11 PROCEDURE — 250N000011 HC RX IP 250 OP 636: Performed by: STUDENT IN AN ORGANIZED HEALTH CARE EDUCATION/TRAINING PROGRAM

## 2021-08-11 PROCEDURE — 250N000013 HC RX MED GY IP 250 OP 250 PS 637: Performed by: STUDENT IN AN ORGANIZED HEALTH CARE EDUCATION/TRAINING PROGRAM

## 2021-08-11 PROCEDURE — 85520 HEPARIN ASSAY: CPT | Performed by: STUDENT IN AN ORGANIZED HEALTH CARE EDUCATION/TRAINING PROGRAM

## 2021-08-11 PROCEDURE — 200N000002 HC R&B ICU UMMC

## 2021-08-11 PROCEDURE — 85520 HEPARIN ASSAY: CPT | Performed by: UROLOGY

## 2021-08-11 PROCEDURE — 83735 ASSAY OF MAGNESIUM: CPT | Performed by: UROLOGY

## 2021-08-11 PROCEDURE — 83735 ASSAY OF MAGNESIUM: CPT

## 2021-08-11 PROCEDURE — 84100 ASSAY OF PHOSPHORUS: CPT | Performed by: UROLOGY

## 2021-08-11 PROCEDURE — 99291 CRITICAL CARE FIRST HOUR: CPT | Mod: 24 | Performed by: SURGERY

## 2021-08-11 PROCEDURE — 83605 ASSAY OF LACTIC ACID: CPT | Performed by: UROLOGY

## 2021-08-11 PROCEDURE — 250N000013 HC RX MED GY IP 250 OP 250 PS 637: Performed by: DIETITIAN, REGISTERED

## 2021-08-11 PROCEDURE — 999N000065 XR ABDOMEN PORT 1 VIEWS

## 2021-08-11 PROCEDURE — 82248 BILIRUBIN DIRECT: CPT | Performed by: SURGERY

## 2021-08-11 PROCEDURE — 80048 BASIC METABOLIC PNL TOTAL CA: CPT | Performed by: STUDENT IN AN ORGANIZED HEALTH CARE EDUCATION/TRAINING PROGRAM

## 2021-08-11 PROCEDURE — 258N000003 HC RX IP 258 OP 636: Performed by: STUDENT IN AN ORGANIZED HEALTH CARE EDUCATION/TRAINING PROGRAM

## 2021-08-11 PROCEDURE — 36592 COLLECT BLOOD FROM PICC: CPT | Performed by: STUDENT IN AN ORGANIZED HEALTH CARE EDUCATION/TRAINING PROGRAM

## 2021-08-11 PROCEDURE — 74018 RADEX ABDOMEN 1 VIEW: CPT | Mod: 26 | Performed by: STUDENT IN AN ORGANIZED HEALTH CARE EDUCATION/TRAINING PROGRAM

## 2021-08-11 PROCEDURE — 94003 VENT MGMT INPAT SUBQ DAY: CPT

## 2021-08-11 PROCEDURE — 84100 ASSAY OF PHOSPHORUS: CPT

## 2021-08-11 PROCEDURE — 85014 HEMATOCRIT: CPT | Performed by: STUDENT IN AN ORGANIZED HEALTH CARE EDUCATION/TRAINING PROGRAM

## 2021-08-11 PROCEDURE — C9113 INJ PANTOPRAZOLE SODIUM, VIA: HCPCS | Performed by: STUDENT IN AN ORGANIZED HEALTH CARE EDUCATION/TRAINING PROGRAM

## 2021-08-11 RX ORDER — ACETAMINOPHEN 325 MG/1
325 TABLET ORAL EVERY 4 HOURS
Status: DISCONTINUED | OUTPATIENT
Start: 2021-08-11 | End: 2021-08-11

## 2021-08-11 RX ORDER — AMOXICILLIN 250 MG
2 CAPSULE ORAL 2 TIMES DAILY
Status: DISCONTINUED | OUTPATIENT
Start: 2021-08-11 | End: 2021-08-17 | Stop reason: HOSPADM

## 2021-08-11 RX ORDER — ACETAMINOPHEN 325 MG/1
650 TABLET ORAL EVERY 6 HOURS PRN
Status: DISCONTINUED | OUTPATIENT
Start: 2021-08-11 | End: 2021-08-13

## 2021-08-11 RX ORDER — NORTRIPTYLINE HCL 10 MG
10 CAPSULE ORAL AT BEDTIME
Status: DISCONTINUED | OUTPATIENT
Start: 2021-08-11 | End: 2021-08-17 | Stop reason: HOSPADM

## 2021-08-11 RX ORDER — POLYETHYLENE GLYCOL 3350 17 G/17G
17 POWDER, FOR SOLUTION ORAL DAILY
Status: DISCONTINUED | OUTPATIENT
Start: 2021-08-12 | End: 2021-08-17 | Stop reason: HOSPADM

## 2021-08-11 RX ORDER — NICOTINE POLACRILEX 4 MG
15-30 LOZENGE BUCCAL
Status: DISCONTINUED | OUTPATIENT
Start: 2021-08-11 | End: 2021-08-17 | Stop reason: HOSPADM

## 2021-08-11 RX ORDER — OXYCODONE HYDROCHLORIDE 5 MG/1
5 TABLET ORAL EVERY 4 HOURS PRN
Status: DISCONTINUED | OUTPATIENT
Start: 2021-08-11 | End: 2021-08-11

## 2021-08-11 RX ORDER — OXYCODONE HYDROCHLORIDE 5 MG/1
5 TABLET ORAL EVERY 4 HOURS PRN
Status: DISCONTINUED | OUTPATIENT
Start: 2021-08-11 | End: 2021-08-13

## 2021-08-11 RX ORDER — AMOXICILLIN 250 MG
1 CAPSULE ORAL 2 TIMES DAILY
Status: DISCONTINUED | OUTPATIENT
Start: 2021-08-11 | End: 2021-08-17 | Stop reason: HOSPADM

## 2021-08-11 RX ORDER — DEXTROSE MONOHYDRATE 25 G/50ML
25-50 INJECTION, SOLUTION INTRAVENOUS
Status: DISCONTINUED | OUTPATIENT
Start: 2021-08-11 | End: 2021-08-17 | Stop reason: HOSPADM

## 2021-08-11 RX ADMIN — Medication 25 MCG: at 00:20

## 2021-08-11 RX ADMIN — POLYETHYLENE GLYCOL 3350 17 G: 17 POWDER, FOR SOLUTION ORAL at 08:02

## 2021-08-11 RX ADMIN — Medication 25 MCG: at 02:23

## 2021-08-11 RX ADMIN — ACETAMINOPHEN 325 MG: 325 TABLET, FILM COATED ORAL at 14:32

## 2021-08-11 RX ADMIN — Medication: at 16:30

## 2021-08-11 RX ADMIN — Medication 50 MCG: at 10:34

## 2021-08-11 RX ADMIN — SODIUM CHLORIDE, POTASSIUM CHLORIDE, SODIUM LACTATE AND CALCIUM CHLORIDE: 600; 310; 30; 20 INJECTION, SOLUTION INTRAVENOUS at 15:41

## 2021-08-11 RX ADMIN — Medication 25 MCG: at 04:45

## 2021-08-11 RX ADMIN — OXYCODONE HYDROCHLORIDE 5 MG: 5 TABLET ORAL at 14:32

## 2021-08-11 RX ADMIN — DOCUSATE SODIUM 50 MG AND SENNOSIDES 8.6 MG 2 TABLET: 8.6; 5 TABLET, FILM COATED ORAL at 20:44

## 2021-08-11 RX ADMIN — SODIUM CHLORIDE, POTASSIUM CHLORIDE, SODIUM LACTATE AND CALCIUM CHLORIDE: 600; 310; 30; 20 INJECTION, SOLUTION INTRAVENOUS at 06:37

## 2021-08-11 RX ADMIN — HEPARIN SODIUM 1200 UNITS/HR: 10000 INJECTION, SOLUTION INTRAVENOUS at 16:44

## 2021-08-11 RX ADMIN — PANTOPRAZOLE SODIUM 40 MG: 40 INJECTION, POWDER, FOR SOLUTION INTRAVENOUS at 07:51

## 2021-08-11 RX ADMIN — NORTRIPTYLINE HYDROCHLORIDE 10 MG: 10 CAPSULE ORAL at 22:40

## 2021-08-11 RX ADMIN — DOCUSATE SODIUM 50 MG AND SENNOSIDES 8.6 MG 1 TABLET: 8.6; 5 TABLET, FILM COATED ORAL at 08:02

## 2021-08-11 RX ADMIN — SODIUM CHLORIDE, POTASSIUM CHLORIDE, SODIUM LACTATE AND CALCIUM CHLORIDE: 600; 310; 30; 20 INJECTION, SOLUTION INTRAVENOUS at 16:44

## 2021-08-11 RX ADMIN — PROPOFOL 40 MCG/KG/MIN: 10 INJECTION, EMULSION INTRAVENOUS at 00:06

## 2021-08-11 RX ADMIN — PROPOFOL 35 MCG/KG/MIN: 10 INJECTION, EMULSION INTRAVENOUS at 03:56

## 2021-08-11 ASSESSMENT — ACTIVITIES OF DAILY LIVING (ADL)
ADLS_ACUITY_SCORE: 15
ADLS_ACUITY_SCORE: 15
ADLS_ACUITY_SCORE: 23
ADLS_ACUITY_SCORE: 15
ADLS_ACUITY_SCORE: 15
ADLS_ACUITY_SCORE: 19

## 2021-08-11 NOTE — PROGRESS NOTES
[facility], Procedure Note          Extubation:       Dimitrios Goldberg  MRN# [id number]   August 11, 2021, 2:27 PM         Patient extubated at: August 11, 2021, 2:27 PM   Supplemental Oxygen: Via nasal cannula at 4 liters per minute   Cough: The cough is good   Secretion Mode: Able to clear   Secretion Amount: Moderate amount, moderately thick and white / yellow in color   Respiratory Exam:: Breath sounds: good aeration     Location: bilaterally   Skin Exam:: Patient color: natural   Patient Status: Currently appears comfortable   Arterial Blood Gasses: pH Arterial POCT (no units)   Date Value   08/10/2021 7.37     pO2 Arterial POCT (mm Hg)   Date Value   08/10/2021 277 (H)     pCO2 Arterial POCT (mm Hg)   Date Value   08/10/2021 40     Bicarbonate Arterial POCT (mmol/L)   Date Value   08/10/2021 23            Recorded by Tara Wright

## 2021-08-11 NOTE — PROGRESS NOTES
LIVER Transplant Surgery  Inpatient Daily Progress Note  08/11/2021    Assessment & Plan: 56 year old y/o male POD#1 s/p right radical nephrectomy with IVC thrombectomy and reconstruction with bovine pericardium patch, lysis of adhesions, and cholecystectomy on 8/10/2021.     -continue management per primary- OOB/Ambulate  -monitor Hgb, LFTs and LA    NICKO/Fellow/Resident Provider: Kary Edgar MD     Faculty: Bandar Hogue M.D.    __________________________________________________________________  Interval History: Extubated, complains of mild abdominal pain, denies nausea, vomiting.   ROS:   A 10-point review of systems was negative except as noted above.    Curent Meds:   [START ON 8/13/2021] acetaminophen  650 mg Oral Q6H    insulin aspart  1-6 Units Subcutaneous Q4H    pantoprazole  40 mg Oral QAM AC    Or    pantoprazole  40 mg Per Feeding Tube QAM AC    Or    pantoprazole (PROTONIX) IV  40 mg Intravenous QAM AC    [START ON 8/12/2021] polyethylene glycol  17 g Oral Daily    senna-docusate  1 tablet Oral BID    Or    senna-docusate  2 tablet Oral BID    sodium chloride (PF)  3 mL Intracatheter Q8H       Physical Exam:     Admit Weight: 99.4 kg (219 lb 2.2 oz)    Current Vitals:   BP (!) 85/58   Pulse (!) 130   Temp 99.6  F (37.6  C) (Oral)   Resp 12   Ht 1.829 m (6')   Wt 97.9 kg (215 lb 13.3 oz)   SpO2 100%   BMI 29.27 kg/m           Vital sign ranges:    Temp:  [97.9  F (36.6  C)-99.7  F (37.6  C)] 99.6  F (37.6  C)  Pulse:  [] 130  Resp:  [12-16] 12  BP: ()/(54-88) 85/58  MAP:  [47 mmHg-98 mmHg] 74 mmHg  Arterial Line BP: ()/(31-81) 107/57  FiO2 (%):  [30 %-50 %] 30 %  SpO2:  [75 %-100 %] 100 %  Patient Vitals for the past 24 hrs:   BP Temp Temp src Pulse Resp SpO2 Weight   08/11/21 1600 -- 99.6  F (37.6  C) Oral (!) 130 -- 100 % --   08/11/21 1500 -- -- -- 117 -- 99 % --   08/11/21 1445 -- -- -- 118 -- 100 % --   08/11/21 1430 -- -- -- 115 -- 100 % --   08/11/21 1415  -- -- -- 106 -- 100 % --   08/11/21 1400 -- -- -- 109 -- 100 % --   08/11/21 1345 -- -- -- 110 -- 99 % --   08/11/21 1330 -- -- -- 115 -- 99 % --   08/11/21 1315 -- -- -- 117 -- 100 % --   08/11/21 1300 -- -- -- (!) 124 -- 99 % --   08/11/21 1245 -- -- -- (!) 122 -- 100 % --   08/11/21 1230 -- -- -- 116 -- 100 % --   08/11/21 1215 -- -- -- 110 -- 100 % --   08/11/21 1200 -- 98  F (36.7  C) Axillary 114 12 100 % --   08/11/21 1145 -- -- -- 117 -- 100 % --   08/11/21 1130 -- -- -- 108 13 100 % --   08/11/21 1115 -- -- -- 108 -- 100 % --   08/11/21 1100 -- -- -- 111 -- 100 % --   08/11/21 1045 -- -- -- 114 -- 99 % --   08/11/21 1030 -- -- -- (!) 126 -- 100 % --   08/11/21 1015 -- -- -- 120 -- 100 % --   08/11/21 1000 -- -- -- 117 -- 100 % --   08/11/21 0945 -- -- -- 120 -- 100 % --   08/11/21 0930 -- -- -- 113 -- 100 % --   08/11/21 0915 -- -- -- 112 -- 100 % --   08/11/21 0900 -- -- -- 111 -- 100 % --   08/11/21 0845 -- -- -- 106 -- 100 % --   08/11/21 0830 -- -- -- 112 -- 100 % --   08/11/21 0815 -- -- -- 109 -- 100 % --   08/11/21 0800 -- 98.9  F (37.2  C) Axillary 112 14 100 % --   08/11/21 0745 -- -- -- 101 -- 99 % --   08/11/21 0730 -- -- -- 103 -- 100 % --   08/11/21 0715 -- -- -- 105 -- 100 % --   08/11/21 0700 -- -- -- 108 -- 100 % --   08/11/21 0645 -- -- -- 112 -- 100 % --   08/11/21 0630 -- -- -- 111 -- 100 % --   08/11/21 0615 -- -- -- 112 -- 100 % --   08/11/21 0600 -- -- -- 111 14 100 % --   08/11/21 0545 -- -- -- 108 -- 100 % --   08/11/21 0530 -- -- -- 112 -- 100 % --   08/11/21 0515 -- -- -- 112 -- 100 % --   08/11/21 0500 -- -- -- 120 14 100 % --   08/11/21 0445 -- -- -- 116 -- 100 % --   08/11/21 0430 -- -- -- 114 -- 100 % --   08/11/21 0415 -- -- -- 115 -- 100 % --   08/11/21 0400 -- 98.7  F (37.1  C) Axillary 110 15 100 % --   08/11/21 0345 -- -- -- 111 -- 100 % --   08/11/21 0330 -- -- -- 112 -- 100 % --   08/11/21 0315 -- -- -- 108 -- 100 % --   08/11/21 0300 -- -- -- 105 -- 100 % --   08/11/21  0245 -- -- -- 113 -- 100 % --   08/11/21 0243 -- -- -- 114 -- 100 % --   08/11/21 0230 -- -- -- 116 -- 100 % --   08/11/21 0215 -- -- -- 112 -- 94 % --   08/11/21 0200 -- -- -- 113 16 100 % --   08/11/21 0145 -- -- -- 110 -- 100 % --   08/11/21 0130 -- -- -- 102 -- 100 % --   08/11/21 0115 -- -- -- 105 -- 100 % --   08/11/21 0100 -- -- -- 105 14 99 % --   08/11/21 0045 -- -- -- 105 -- 100 % --   08/11/21 0030 -- -- -- 105 -- 100 % --   08/11/21 0015 -- -- -- 106 -- (!) 75 % --   08/11/21 0000 -- 99.1  F (37.3  C) Axillary 108 14 99 % --   08/10/21 2345 -- -- -- 105 -- 100 % --   08/10/21 2330 -- -- -- 103 -- 100 % --   08/10/21 2315 -- -- -- 103 -- 100 % --   08/10/21 2300 -- -- -- 101 14 100 % --   08/10/21 2245 -- -- -- 96 -- 97 % --   08/10/21 2230 -- -- -- 97 -- 95 % --   08/10/21 2215 -- -- -- 98 -- 100 % --   08/10/21 2200 -- -- -- 101 14 98 % --   08/10/21 2145 -- -- -- 98 -- 99 % --   08/10/21 2130 -- -- -- 97 -- 97 % --   08/10/21 2120 -- -- -- 96 -- 99 % --   08/10/21 2115 -- -- -- 95 -- 100 % --   08/10/21 2110 -- -- -- 96 -- 100 % --   08/10/21 2100 -- -- -- 98 14 100 % --   08/10/21 2050 -- -- -- 95 -- 100 % --   08/10/21 2045 -- -- -- 94 -- 100 % --   08/10/21 2040 -- -- -- 91 -- 100 % --   08/10/21 2030 -- -- -- 95 -- 100 % --   08/10/21 2020 -- -- -- 98 -- 100 % --   08/10/21 2015 -- -- -- 100 -- 96 % --   08/10/21 2010 -- -- -- 99 -- 97 % --   08/10/21 2000 -- 99.7  F (37.6  C) Axillary 101 14 100 % 97.9 kg (215 lb 13.3 oz)   08/10/21 1950 -- -- -- 107 -- 100 % --   08/10/21 1945 -- -- -- 107 -- 100 % --   08/10/21 1940 (!) 85/58 -- -- 109 -- 100 % --   08/10/21 1915 108/71 -- -- 109 14 100 % --   08/10/21 1900 124/80 97.9  F (36.6  C) Core 105 15 100 % --   08/10/21 1845 100/69 -- -- 104 15 100 % --   08/10/21 1830 (!) 79/54 -- -- 114 15 100 % --   08/10/21 1815 102/70 98.1  F (36.7  C) Core 113 14 100 % --   08/10/21 1800 128/88 -- -- 109 14 100 % --   08/10/21 1745 127/79 98  F (36.7  C) Core 107  16 100 % --     General Appearance: in no apparent distress.   Skin: normal Heart: regular rate and rhythm  Lungs: clear to auscultation  Abdomen: flat, and  mildly tender. The wound is c/d/i.  : The genitalia are not edematous.   Extremities: edema: absent.    Neurologic: awake and alert.      Frailty Scores    There is no flowsheet data to display.         Data:   CMP  Recent Labs   Lab 08/11/21  1414 08/11/21  1323 08/11/21  0801 08/11/21  0231 08/10/21  2215 08/10/21  1630 08/10/21  1603 08/10/21  1529   NA  --   --   --  137 139 138  --   --    POTASSIUM  --   --   --  4.6 4.8 4.3 4.2   < >   CHLORIDE  --   --   --  107 109  --   --   --    CO2  --   --   --  25 26  --   --   --    GLC 79 89 107* 121* 118* 232*  --    < >   BUN  --   --   --  26 26  --   --   --    CR  --   --   --  2.20* 2.12*  --   --   --    GFRESTIMATED  --   --   --  32* 34*  --   --   --    BETSY  --   --   --  8.2* 9.1  --   --   --    ICAW  --   --   --   --   --  5.3* 5.0  --    MAG  --   --  1.8 1.8  --   --   --   --    PHOS  --   --  4.4 4.2  --   --   --   --    ALBUMIN  --   --   --  2.4* 2.6*  --   --   --    BILITOTAL  --   --   --  0.4 0.8  --   --   --    ALKPHOS  --   --   --  59 63  --   --   --    AST  --   --   --  233* 256*  --   --   --    ALT  --   --   --  194* 205*  --   --   --     < > = values in this interval not displayed.     CBC  Recent Labs   Lab 08/11/21  0231 08/10/21  2215   HGB 9.1* 9.6*   WBC 10.6 11.0    198   A1C  --  5.8*     COAGS  Recent Labs   Lab 08/10/21  2215 08/10/21  1603   INR 1.23* 1.42*   PTT 52* 31      UrinalysisNo lab results found.    Attestation:  Patient has been seen and evaluated by me with transplant team.   Vital signs, labs, medications and orders were reviewed.   When obtained, diagnostic images were reviewed by me and interpreted as above.    The care plan was discussed with the multidisciplinary team and I agree with the findings and plan in this note, with any  differences recorded in blue.    .

## 2021-08-11 NOTE — PROGRESS NOTES
Urology  Progress Note    Remained intubated post-procedure and transferred to ICU  No acute events although somewhat labile Bps overnight     Exam  BP (!) 85/58   Pulse 104   Temp 99.1  F (37.3  C) (Axillary)   Resp 16   Ht 1.829 m (6')   Wt 97.9 kg (215 lb 13.3 oz)   SpO2 100%   BMI 29.27 kg/m    No acute distress, intubated and responding to commands   Intubated   Refused abdominal exam     UOP 1010/230  MADAI 60/19    Labs  WBC (10.6)  Hgb (9.1)  Cr (2.2)    AM labs pending    Assessment/Plan  56 year old y/o male POD#1 s/p right radical nephrectomy with IVC thrombectomy and reconstruction with bovine pericardium patch, lysis of adhesions, and cholecystectomy on 8/10/2021.     Neuro: Scheduled tylenol, OnQ pump, fent gtt  CV: Pressors PRN to maintain blood pressure   Pulm: incentive spirometry while awake  FEN/GI: NPO, MIVF @ 100/hr.   Endo: On insulin gtt  : barrera while intubated and not ambulatory   Heme/ID: low intensity heparin gtt  Activity: Up ad trinh, goal to ambulate QID   PPx: SCDs in bed, pantoprazole, heparin gtt   Dispo: ICU pending pressor requirement, intubation    Seen and examined with the chief resident. Will discuss with Dr. Nghia Mcclure MD  Urology Resident     Contacting the Urology Team     Please use the following job codes to reach the Urology Team. Note that you must use an in house phone and that job codes cannot receive text pages.     On weekdays, dial [phone] (or star-star-star [phone] on the new [employer] telephones) then [phone] to reach the Adult Urology resident or PA on call    On weekdays, dial [phone] (or star-star-star [phone] on the new [employer] telephones) then [phone] to reach the Pediatric Urology resident    On weeknights and weekends, dial [phone] (or star-star-star [phone] on the new [employer] telephones) then [phone] to reach the Urology resident on call (for both Adult and Pediatrics)

## 2021-08-11 NOTE — OR NURSING
JOAO Last at bedside during hypotension. Received orders for fluid bolus, albumin, and pressors. Responded well. ICU bed ready. Will meet patient in ICU to start onQ pump.

## 2021-08-11 NOTE — PLAN OF CARE
Major Shift Events:  A&Ox4, CMS and neuro intact, sinus tach 100-130s with pain, art line with MAP > 65 off levo, extubated and on RA, barrera with good UO, NG LIS with hypoactive BS, transverse abdominal incision & MADAI drain CDI, R internal jugular CVC/MAC infusing heparin gtt and IVMF, On-Q @ 8mL/hr and PCA dilaudid for pain, transitioned to sliding scale insulin  Plan: continue plan of care, pain control and mobilization   For vital signs and complete assessments, please see documentation flowsheets.

## 2021-08-11 NOTE — PLAN OF CARE
Care Provided: 08/10/2021 from around 2000 to 08/09/2021 at 0730    Temp: 99.1  F (37.3  C) Temp src: Axillary BP: (!) 85/58 Pulse: 114   Resp: 14 SpO2: 100 % O2 Device: Mechanical Ventilator Oxygen Delivery: 2 LPM    Neuro: Patient's RAAS is at -3 during the shift. He does open his eyes and follow commands, but patient remains on sedation. His pupils are PERRL.   Cardiac: Sinus tachycardia for the later part of the shift, previously was NSR. Trace +1 edema to his ankles bilaterally. Pulses are 1+ to radial and DP bilaterally. Upper and lower extremities are cool to palpation, cap refill <3 seconds.   Respiratory: Patient has ET tube in place. On mechanical ventilation. Tolerating well. Sats are at 100% at 30 FiO2.  GI: BMx 0. NPO.  : Adequate urine output. White draining clear, yellow urine.   Activity: Turn q2hr.   Pain: Patient has needed 25mcg of fentanyl boluses for CPOT score of 2. Other then this he has been resting well. Propofol, fentanyl, and ropivacaine are infusing at this time.   Skin: Surgical incision to abdomen. Edges approximated, closed with liquid bandage. No other skin concerns on assessment.     Incision/Surgical Site 08/10/21 Abdomen (Active)   Incision Assessment WDL 08/11/21 0000   Closure Liquid bandage 08/11/21 0000   Incision Drainage Amount None 08/11/21 0000   Dressing Intervention Open to air / No Dressing 08/10/21 1845     Lab:  Lactate has been trending down (4.3 at 1603 and last check at 0000 was 0.9)  His creatinine has gone up 1.85 --> 2.12 -->2.20  Blood sugars have been between 106-109 since being on 0.2 units of insulin.   Hgb remains stable, >9.0  LDAs:    - Right internal jugular, triple lumen  - Right radial art line  - RLQ MADAI  - L hand PIV, SL  - erector spinae catheter, ropivacaine running at 8ml/hr      Shift Events: Patient has been resting since arrival to the floor. Tolerating the vent well.   Plan: Will continue to monitor pt closely and notify primary team with any  changes. Per intensivist, goal is to extubate patient in the morning.
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
## 2021-08-11 NOTE — PROGRESS NOTES
Admitted/transferred from: PACU  Reason for admission/transfer: Hypotension, patient remains intubated  2 RN skin assessment: completed by Shannan RETANA  Result of skin assessment and interventions/actions: No new interventions, turn q2hr.  Height, weight, drug calc weight: Done  Patient belongings (see Flowsheet)  MDRO education added to care plan N/A  ?
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
## 2021-08-11 NOTE — PHARMACY-ADMISSION MEDICATION HISTORY
Admission Medication History Completed by Pharmacy    See [facility] Admission Navigator for allergy information, preferred outpatient pharmacy, prior to admission medications and immunization status.     Medication History Sources:     Patient interview with pre-op assessment clinic pharmD (see note dated 7/30/2021)    Changes made to PTA medication list (reason):    Added: None    Deleted: None    Changed: None    Additional Information:    None    Prior to Admission medications    Medication Sig Last Dose Taking? Auth Provider   acetaminophen (TYLENOL) 500 MG tablet Take 500-1,000 mg by mouth every 8 hours as needed for mild pain Past Month at Unknown time Yes Unknown, Entered By History   enoxaparin ANTICOAGULANT (LOVENOX) 100 MG/ML syringe Inject 100 mg Subcutaneous 2 times daily  8/9/2021 at 0730 Yes Reported, Patient   lisinopril-hydrochlorothiazide (ZESTORETIC) 10-12.5 MG tablet Take 1 tablet by mouth every evening  8/9/2021 at 2000 Yes Reported, Patient   nortriptyline (PAMELOR) 10 MG capsule Take 10 mg by mouth At Bedtime  8/9/2021 at 2000 Yes Reported, Patient   rizatriptan (MAXALT-MLT) 10 MG ODT Take 10 mg by mouth as needed for migraine  Past Month at Unknown time Yes Reported, Patient   acyclovir (ZOVIRAX) 800 MG tablet Take 800 mg by mouth as needed  Patient not taking: Reported on 7/29/2021 More than a month at Unknown time  Reported, Patient       Date completed: 08/11/21    Medication history completed by: Grace Norman, PharmD

## 2021-08-11 NOTE — OR NURSING
ICU resident at bedside as patient leaving for ICU. Asked him to clarify orders for TXA and heparin gtt. Also need orders for NG tube. Will assess upstairs.
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
## 2021-08-11 NOTE — PROGRESS NOTES
Regional Anesthesia Catheter Evaluation    Patient POD 1 with larger incision extending to left abdomen than anticipated, only right ES placed pre-operatively.     Assessed patient on evening pain rounds. Intubated, sedated on propofol with fentanyl infusion at 50 mcg/hr. Requiring 0.3 of NE to maintain MAPs > 65. Upon arrival, pt sleeping but easily aroused reporting mild to moderate pain around 5/10. Strength equal in bilateral upper and lower extremities. Pt denied symptoms of LAST. Catheter Site intact, unobstructed and without abnormalities.     Infusion started at 23:00 without bolus due to labile BP's. Recommended to wean off of fentanyl pump as ES catheter infusion kicks in.   - MEDICATION: ON-Q pump started at 23:00 with 0.2% Ropivicaine through single right sided ES catheter at 8mL/hr  - PROCEDURE: As stated above. No bolus given.  - POST-PROCEDURE: Bedside RN aware of need to continue BP, P and MAP monitoring Q 10 min for an additional 30 min, due to need for pressors. Contact RAPS (jobcode ID [id number]) if experiencing any untoward effects or MAP less than 60.  Will reassess in the am. Hopefully able to wean off the fentanyl drip with equal if not better pain control.     RAPS Contact Info (24 hour job code pager is the last 4 digits) For in-house use only:  [place] phone: [place] [phone], [place] [employer] [phone], [facility] [phone], then enter call-back number.    Text: Use [employer] on the Intranet <Paging/Directory> tab and enter Jobcode ID.   If no call back at any time, contact the hospital  and ask for RAPS attending or backup     Bruce Johnson MD  8/10/2021 11:57 PM  Anesthesia Resident  [id number]

## 2021-08-12 ENCOUNTER — ANESTHESIA EVENT (OUTPATIENT)
Dept: MRI IMAGING | Facility: CLINIC | Age: 56
End: 2021-08-12

## 2021-08-12 LAB
ALBUMIN SERPL-MCNC: 1.9 G/DL (ref 3.4–5)
ALP SERPL-CCNC: 48 U/L (ref 40–150)
ALT SERPL W P-5'-P-CCNC: 127 U/L (ref 0–70)
ANION GAP SERPL CALCULATED.3IONS-SCNC: 4 MMOL/L (ref 3–14)
AST SERPL W P-5'-P-CCNC: 110 U/L (ref 0–45)
BILIRUB DIRECT SERPL-MCNC: <0.1 MG/DL (ref 0–0.2)
BILIRUB SERPL-MCNC: 0.3 MG/DL (ref 0.2–1.3)
BLD PROD TYP BPU: NORMAL
BLOOD COMPONENT TYPE: NORMAL
BUN SERPL-MCNC: 23 MG/DL (ref 7–30)
CALCIUM SERPL-MCNC: 7.3 MG/DL (ref 8.5–10.1)
CHLORIDE BLD-SCNC: 109 MMOL/L (ref 94–109)
CO2 SERPL-SCNC: 26 MMOL/L (ref 20–32)
CODING SYSTEM: NORMAL
CREAT SERPL-MCNC: 2.16 MG/DL (ref 0.66–1.25)
CROSSMATCH: NORMAL
ERYTHROCYTE [DISTWIDTH] IN BLOOD BY AUTOMATED COUNT: 15 % (ref 10–15)
ERYTHROCYTE [DISTWIDTH] IN BLOOD BY AUTOMATED COUNT: 15.2 % (ref 10–15)
GFR SERPL CREATININE-BSD FRML MDRD: 33 ML/MIN/1.73M2
GLUCOSE BLD-MCNC: 84 MG/DL (ref 70–99)
GLUCOSE BLDC GLUCOMTR-MCNC: 72 MG/DL (ref 70–99)
GLUCOSE BLDC GLUCOMTR-MCNC: 78 MG/DL (ref 70–99)
GLUCOSE BLDC GLUCOMTR-MCNC: 81 MG/DL (ref 70–99)
GLUCOSE BLDC GLUCOMTR-MCNC: 85 MG/DL (ref 70–99)
GLUCOSE BLDC GLUCOMTR-MCNC: 92 MG/DL (ref 70–99)
GLUCOSE BLDC GLUCOMTR-MCNC: 98 MG/DL (ref 70–99)
HCT VFR BLD AUTO: 20 % (ref 40–53)
HCT VFR BLD AUTO: 29.7 % (ref 40–53)
HGB BLD-MCNC: 6.6 G/DL (ref 13.3–17.7)
HGB BLD-MCNC: 8.4 G/DL (ref 13.3–17.7)
HGB BLD-MCNC: 9.8 G/DL (ref 13.3–17.7)
ISSUE DATE AND TIME: NORMAL
LACTATE SERPL-SCNC: 0.6 MMOL/L (ref 0.7–2)
MAGNESIUM SERPL-MCNC: 1.8 MG/DL (ref 1.6–2.3)
MCH RBC QN AUTO: 29.6 PG (ref 26.5–33)
MCH RBC QN AUTO: 29.7 PG (ref 26.5–33)
MCHC RBC AUTO-ENTMCNC: 33 G/DL (ref 31.5–36.5)
MCHC RBC AUTO-ENTMCNC: 33 G/DL (ref 31.5–36.5)
MCV RBC AUTO: 90 FL (ref 78–100)
MCV RBC AUTO: 90 FL (ref 78–100)
PHOSPHATE SERPL-MCNC: 3.4 MG/DL (ref 2.5–4.5)
PLATELET # BLD AUTO: 113 10E3/UL (ref 150–450)
PLATELET # BLD AUTO: 146 10E3/UL (ref 150–450)
POTASSIUM BLD-SCNC: 3.9 MMOL/L (ref 3.4–5.3)
PROT SERPL-MCNC: 4.7 G/DL (ref 6.8–8.8)
RBC # BLD AUTO: 2.23 10E6/UL (ref 4.4–5.9)
RBC # BLD AUTO: 3.3 10E6/UL (ref 4.4–5.9)
SODIUM SERPL-SCNC: 139 MMOL/L (ref 133–144)
UFH PPP CHRO-ACNC: 0.19 IU/ML
UFH PPP CHRO-ACNC: 0.23 IU/ML
UFH PPP CHRO-ACNC: 0.32 IU/ML
UNIT ABO/RH: NORMAL
UNIT NUMBER: NORMAL
UNIT STATUS: NORMAL
UNIT TYPE ISBT: 5100
WBC # BLD AUTO: 10.2 10E3/UL (ref 4–11)
WBC # BLD AUTO: 7.7 10E3/UL (ref 4–11)

## 2021-08-12 PROCEDURE — 86923 COMPATIBILITY TEST ELECTRIC: CPT

## 2021-08-12 PROCEDURE — 250N000013 HC RX MED GY IP 250 OP 250 PS 637: Performed by: STUDENT IN AN ORGANIZED HEALTH CARE EDUCATION/TRAINING PROGRAM

## 2021-08-12 PROCEDURE — 250N000013 HC RX MED GY IP 250 OP 250 PS 637

## 2021-08-12 PROCEDURE — 120N000005 HC R&B MS OVERFLOW UMMC

## 2021-08-12 PROCEDURE — 85018 HEMOGLOBIN: CPT

## 2021-08-12 PROCEDURE — 250N000011 HC RX IP 250 OP 636: Performed by: PHYSICIAN ASSISTANT

## 2021-08-12 PROCEDURE — 85520 HEPARIN ASSAY: CPT | Performed by: UROLOGY

## 2021-08-12 PROCEDURE — 36415 COLL VENOUS BLD VENIPUNCTURE: CPT

## 2021-08-12 PROCEDURE — 999N000128 HC STATISTIC PERIPHERAL IV START W/O US GUIDANCE

## 2021-08-12 PROCEDURE — 85027 COMPLETE CBC AUTOMATED: CPT | Performed by: STUDENT IN AN ORGANIZED HEALTH CARE EDUCATION/TRAINING PROGRAM

## 2021-08-12 PROCEDURE — 82248 BILIRUBIN DIRECT: CPT | Performed by: PHYSICIAN ASSISTANT

## 2021-08-12 PROCEDURE — P9016 RBC LEUKOCYTES REDUCED: HCPCS

## 2021-08-12 PROCEDURE — 83735 ASSAY OF MAGNESIUM: CPT | Performed by: UROLOGY

## 2021-08-12 PROCEDURE — 85027 COMPLETE CBC AUTOMATED: CPT

## 2021-08-12 PROCEDURE — 36592 COLLECT BLOOD FROM PICC: CPT | Performed by: STUDENT IN AN ORGANIZED HEALTH CARE EDUCATION/TRAINING PROGRAM

## 2021-08-12 PROCEDURE — 258N000003 HC RX IP 258 OP 636: Performed by: STUDENT IN AN ORGANIZED HEALTH CARE EDUCATION/TRAINING PROGRAM

## 2021-08-12 PROCEDURE — 84100 ASSAY OF PHOSPHORUS: CPT | Performed by: UROLOGY

## 2021-08-12 PROCEDURE — 83605 ASSAY OF LACTIC ACID: CPT

## 2021-08-12 PROCEDURE — 80069 RENAL FUNCTION PANEL: CPT | Performed by: STUDENT IN AN ORGANIZED HEALTH CARE EDUCATION/TRAINING PROGRAM

## 2021-08-12 PROCEDURE — 250N000011 HC RX IP 250 OP 636: Performed by: STUDENT IN AN ORGANIZED HEALTH CARE EDUCATION/TRAINING PROGRAM

## 2021-08-12 PROCEDURE — 99232 SBSQ HOSP IP/OBS MODERATE 35: CPT | Mod: 24 | Performed by: SURGERY

## 2021-08-12 PROCEDURE — 250N000013 HC RX MED GY IP 250 OP 250 PS 637: Performed by: DIETITIAN, REGISTERED

## 2021-08-12 RX ORDER — RIZATRIPTAN BENZOATE 10 MG/1
10 TABLET, ORALLY DISINTEGRATING ORAL
Status: DISCONTINUED | OUTPATIENT
Start: 2021-08-12 | End: 2021-08-12

## 2021-08-12 RX ORDER — BENZONATATE 100 MG/1
100 CAPSULE ORAL 3 TIMES DAILY PRN
Status: DISCONTINUED | OUTPATIENT
Start: 2021-08-12 | End: 2021-08-17 | Stop reason: HOSPADM

## 2021-08-12 RX ORDER — GUAIFENESIN 600 MG/1
600 TABLET, EXTENDED RELEASE ORAL 2 TIMES DAILY PRN
Status: DISCONTINUED | OUTPATIENT
Start: 2021-08-12 | End: 2021-08-17 | Stop reason: HOSPADM

## 2021-08-12 RX ORDER — BUPIVACAINE HYDROCHLORIDE 2.5 MG/ML
5 INJECTION, SOLUTION EPIDURAL; INFILTRATION; INTRACAUDAL ONCE
Status: COMPLETED | OUTPATIENT
Start: 2021-08-12 | End: 2021-08-12

## 2021-08-12 RX ORDER — RIZATRIPTAN BENZOATE 10 MG/1
10 TABLET, ORALLY DISINTEGRATING ORAL DAILY PRN
Status: COMPLETED | OUTPATIENT
Start: 2021-08-12 | End: 2021-08-12

## 2021-08-12 RX ADMIN — BUPIVACAINE HYDROCHLORIDE 12.5 MG: 2.5 INJECTION, SOLUTION EPIDURAL; INFILTRATION; INTRACAUDAL; PERINEURAL at 11:17

## 2021-08-12 RX ADMIN — RIZATRIPTAN BENZOATE 10 MG: 10 TABLET, ORALLY DISINTEGRATING ORAL at 17:22

## 2021-08-12 RX ADMIN — POLYETHYLENE GLYCOL 3350 17 G: 17 POWDER, FOR SOLUTION ORAL at 07:32

## 2021-08-12 RX ADMIN — PANTOPRAZOLE SODIUM 40 MG: 40 TABLET, DELAYED RELEASE ORAL at 07:32

## 2021-08-12 RX ADMIN — ACETAMINOPHEN 650 MG: 325 TABLET, FILM COATED ORAL at 17:25

## 2021-08-12 RX ADMIN — HEPARIN SODIUM 1350 UNITS/HR: 10000 INJECTION, SOLUTION INTRAVENOUS at 09:08

## 2021-08-12 RX ADMIN — SODIUM CHLORIDE, POTASSIUM CHLORIDE, SODIUM LACTATE AND CALCIUM CHLORIDE: 600; 310; 30; 20 INJECTION, SOLUTION INTRAVENOUS at 10:51

## 2021-08-12 RX ADMIN — DOCUSATE SODIUM 50 MG AND SENNOSIDES 8.6 MG 2 TABLET: 8.6; 5 TABLET, FILM COATED ORAL at 07:31

## 2021-08-12 RX ADMIN — DOCUSATE SODIUM 50 MG AND SENNOSIDES 8.6 MG 2 TABLET: 8.6; 5 TABLET, FILM COATED ORAL at 19:31

## 2021-08-12 ASSESSMENT — ACTIVITIES OF DAILY LIVING (ADL)
ADLS_ACUITY_SCORE: 19
ADLS_ACUITY_SCORE: 21
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
## 2021-08-12 NOTE — PLAN OF CARE
Major Shift Events: Pt remains VS on RA-2L, -130s. NG pulled and pt tolerating clear liquid diet. Good UOP. Incisions EDL, MADAI with minimal output. Heperin at 1350 and Dilaudid PCA for pain. 1 unit PRBC and Hmg recheck stable. White pulled at 1600, pt due to void. internal jugular/MAC line pulled prior to transport.

## 2021-08-12 NOTE — PROGRESS NOTES
LIVER Transplant Surgery  Inpatient Daily Progress Note  08/12/2021    Assessment & Plan: 56 year old male POD#2 s/p right radical nephrectomy with IVC thrombectomy and reconstruction with bovine pericardium patch, lysis of adhesions, and cholecystectomy on 8/10/2021.     -continue management per primary- OOB/Ambulate  -monitor Hgb every 6 hours, LFTs daily  -Continue low intensity heparin gtt as able if HGB remains stable after 1 unit PRB transfusion this AM, may hold if necessary     NICKO/Fellow/Resident Provider: Mel Tyler PA-C    Faculty: Bandar Hogue M.D.    __________________________________________________________________  Interval History: Extubated, complains of mild abdominal pain, denies nausea, vomiting.     ROS:   A 10-point review of systems was negative except as noted above.    Curent Meds:   [START ON 8/13/2021] acetaminophen  650 mg Oral Q6H    insulin aspart  1-6 Units Subcutaneous Q4H    nortriptyline  10 mg Oral At Bedtime    pantoprazole  40 mg Oral QAM AC    Or    pantoprazole  40 mg Per Feeding Tube QAM AC    Or    pantoprazole (PROTONIX) IV  40 mg Intravenous QAM AC    polyethylene glycol  17 g Oral Daily    senna-docusate  1 tablet Oral BID    Or    senna-docusate  2 tablet Oral BID    sodium chloride (PF)  3 mL Intracatheter Q8H    sodium chloride (PF)  5 mL Perineural Once       Physical Exam:     Admit Weight: 99.4 kg (219 lb 2.2 oz)    Current Vitals:   /78   Pulse (!) 133   Temp 99.3  F (37.4  C) (Oral)   Resp 16   Ht 1.829 m (6')   Wt 97.9 kg (215 lb 13.3 oz)   SpO2 99%   BMI 29.27 kg/m           Vital sign ranges:    Temp:  [98.6  F (37  C)-99.6  F (37.6  C)] 99.3  F (37.4  C)  Pulse:  [120-141] 133  Resp:  [16-20] 16  BP: ()/(63-87) 120/78  MAP:  [63 mmHg-78 mmHg] 78 mmHg  Arterial Line BP: ()/(52-65) 107/65  SpO2:  [89 %-100 %] 99 %  Patient Vitals for the past 24 hrs:   BP Temp Temp src Pulse Resp SpO2   08/12/21 1500 120/78 -- -- (!) 133 -- 99 %    08/12/21 1400 121/87 -- -- 120 -- 100 %   08/12/21 1300 118/70 -- -- (!) 126 -- 98 %   08/12/21 1200 116/73 99.3  F (37.4  C) Oral 120 16 91 %   08/12/21 1100 110/77 -- -- (!) 125 -- 98 %   08/12/21 1000 111/81 -- -- (!) 128 -- 98 %   08/12/21 0900 108/72 -- -- (!) 125 -- 98 %   08/12/21 0800 107/72 98.8  F (37.1  C) Oral (!) 124 18 98 %   08/12/21 0700 108/70 98.6  F (37  C) Oral (!) 126 -- 97 %   08/12/21 0620 101/66 98.8  F (37.1  C) Oral (!) 126 20 --   08/12/21 0610 91/69 99.4  F (37.4  C) Oral (!) 130 20 98 %   08/12/21 0600 98/67 99.4  F (37.4  C) Oral (!) 129 20 98 %   08/12/21 0500 99/63 -- -- (!) 129 -- 97 %   08/12/21 0400 104/69 99.2  F (37.3  C) Oral (!) 135 18 98 %   08/12/21 0300 110/69 -- -- (!) 137 -- 100 %   08/12/21 0200 104/66 -- -- (!) 131 -- 99 %   08/12/21 0100 111/74 -- -- (!) 134 -- 99 %   08/12/21 0000 110/68 99.5  F (37.5  C) Oral (!) 135 16 100 %   08/11/21 2300 105/72 -- -- (!) 133 -- 100 %   08/11/21 2200 107/68 -- -- (!) 141 -- 100 %   08/11/21 2100 -- -- -- (!) 128 -- 100 %   08/11/21 2043 -- -- -- (!) 133 -- (!) 89 %   08/11/21 2000 -- 98.6  F (37  C) Oral (!) 140 18 90 %   08/11/21 1900 -- -- -- (!) 133 -- 94 %   08/11/21 1800 -- -- -- (!) 134 -- 92 %   08/11/21 1700 -- -- -- (!) 129 -- 100 %   08/11/21 1600 -- 99.6  F (37.6  C) Oral (!) 130 20 100 %     General Appearance: in no apparent distress.   Skin: normal Heart: regular rate and rhythm  Lungs: NLB on 1 L oxygen  Abdomen: flat, and  mildly tender. The wound is c/d/i.  : Voiding   Extremities: edema: absent.    Neurologic: awake and alert.      Frailty Scores    There is no flowsheet data to display.         Data:   CMP  Recent Labs   Lab 08/12/21  1148 08/12/21  0741 08/12/21  0431 08/11/21  0801 08/11/21  0231 08/10/21  1803 08/10/21  1630 08/10/21  1603 08/10/21  1529   NA  --   --  139  --  137   < > 138  --   --    POTASSIUM  --   --  3.9  --  4.6   < > 4.3 4.2  --    CHLORIDE  --   --  109  --  107   < >  --   --    --    CO2  --   --  26  --  25   < >  --   --   --    GLC 81 85 84 107* 121*   < > 232*  --    < >   BUN  --   --  23  --  26   < >  --   --   --    CR  --   --  2.16*  --  2.20*   < >  --   --   --    GFRESTIMATED  --   --  33*  --  32*   < >  --   --   --    BETSY  --   --  7.3*  --  8.2*   < >  --   --   --    ICAW  --   --   --   --   --   --  5.3* 5.0  --    MAG  --   --  1.8 1.8 1.8   < >  --   --   --    PHOS  --   --  3.4 4.4 4.2   < >  --   --   --    ALBUMIN  --   --  1.9*  --  2.4*   < >  --   --   --    BILITOTAL  --   --  0.3  --  0.4   < >  --   --   --    ALKPHOS  --   --  48  --  59   < >  --   --   --    AST  --   --  110*  --  233*   < >  --   --   --    ALT  --   --  127*  --  194*   < >  --   --   --     < > = values in this interval not displayed.     CBC  Recent Labs   Lab 08/12/21  1023 08/12/21  0431 08/10/21  2215   HGB 9.8* 6.6* 9.6*   WBC 7.7 10.2 11.0   * 146* 198   A1C  --   --  5.8*     COAGS  Recent Labs   Lab 08/10/21  2215 08/10/21  1603   INR 1.23* 1.42*   PTT 52* 31      UrinalysisNo lab results found.    Attestation:  Patient has been seen and evaluated by me with transplant team.   Vital signs, labs, medications and orders were reviewed.   When obtained, diagnostic images were reviewed by me and interpreted as above.    The care plan was discussed with the multidisciplinary team and I agree with the findings and plan in this note, with any differences recorded in blue.    .
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
## 2021-08-12 NOTE — PLAN OF CARE
Major Shift Events: Neurologically intact. Has not got out of bed yet. PCA utilized for pain. Sinus tachycardia 130's. BP stable. Arterial line pulled due to not getting a reading anymore. 1L nasal cannula. Ice chips/sips of water. Tolerating well. White having adequate UOP. MADAI drainage 10-15 mL q4h. Hgb 6.6 this am. RBC unit currently being transfused. Heparin gtt increased with a bolus. Next recheck 1130. Electrolytes WNL.    Plan: Continue with current POC.    For vital signs and complete assessments, please see documentation flowsheets.

## 2021-08-12 NOTE — PROGRESS NOTES
Regional Anesthesia Catheter Evaluation    Patient extubated and weaned off pressors at approximately 15:00 this afternoon. Patient taken off of fent ggt and started on dilaudid PCA.    Assessed patient on evening pain rounds. Upon arrival, pt reporting 7/10 pain at rest, worse with movement and coughing.  Strength equal in bilateral upper and lower extremities. Pt denied symptoms of LAST. Catheter Site intact, unobstructed and without abnormalities. Pt hemodynamically stable and off of pressors.    Bolus administered at 18:45  - MEDICATION: PF bupivacaine 0.125% 10 mL via left nerve block catheter, Total given 10mL  - PROCEDURE: Clinician bolus administered incrementally; negative aspirate.  No symptoms of local anesthetic systemic toxicity (LAST). Remained with and assessed patient for 10 min post-injection. BP, P and MAP stable  - POST-PROCEDURE: Bedside RN aware of need to continue BP, P and MAP monitoring Q 10 min for an additional 30 min. Contact RAPS (jobcode ID [id number]) if experiencing any untoward effects or MAP less than 60    RAPS Contact Info (24 hour job code pager is the last 4 digits) For in-house use only:  [place] phone: [place] [phone], [place] [phone], Peds [phone], then enter call-back number.    Text: Use [employer] on the Intranet <Paging/Directory> tab and enter Jobcode ID.   If no call back at any time, contact the hospital  and ask for RAPS attending or backup     Bruce Johnson MD  8/11/2021 8:12 PM  Anesthesia Resident  PGY-4/CA-3

## 2021-08-12 NOTE — PROGRESS NOTES
Urology  Progress Note    Successfully extubated yesterday   Started on dilaudid PCA   No nausea or vomiting       Exam  /69 (BP Location: Left arm)   Pulse (!) 135   Temp 99.2  F (37.3  C) (Oral)   Resp 18   Ht 1.829 m (6')   Wt 97.9 kg (215 lb 13.3 oz)   SpO2 98%   BMI 29.27 kg/m    No acute distress, comfortable appearing   Normal respiratory effort on NC   Abdomen midly distended, appropriately tender, incisions covered by dressing. MADAI Serosanguinous.   Barrera with clear yellow urine in tubing     UOP 1490/350  /350  MADAI 87/25    Labs  WBC 10.2 (10.6)  Hgb 6.6 (9.1)  Cr 2.16 (2.2)    AM labs pending    Assessment/Plan  56 year old y/o male POD#2 s/p right radical nephrectomy with IVC thrombectomy and reconstruction with bovine pericardium patch, lysis of adhesions, and cholecystectomy on 8/10/2021.     Neuro: Scheduled tylenol, OnQ pump, dilaudid PCA   CV: Tachycardic overnight, Hgb now 6.6. Transfusing now.   Pulm: successfully extubated, encourage IS   FEN/GI: NPO, MIVF @ 100/hr.   Endo: sliding scale insulin   : barrera while intubated and not ambulatory   Heme/ID: low intensity heparin gtt  Activity: Up ad trinh, goal to ambulate QID   PPx: SCDs in bed, pantoprazole, heparin gtt   Dispo: Floor pending hemodynamic stabilization     Seen and examined with the chief resident. Will discuss with Dr. Nghia Mcclure MD  Urology Resident     Contacting the Urology Team     Please use the following job codes to reach the Urology Team. Note that you must use an in house phone and that job codes cannot receive text pages.     On weekdays, dial [phone] (or star-star-star 777 on the new [employer] telephones) then [phone] to reach the Adult Urology resident or PA on call    On weekdays, dial [phone] (or star-star-star 777 on the new [employer] telephones) then [phone] to reach the Pediatric Urology resident    On weeknights and weekends, dial [phone] (or star-star-star 777 on the new [employer] telephones) then [phone] to reach  the Urology resident on call (for both Adult and Pediatrics)

## 2021-08-13 ENCOUNTER — ANESTHESIA (OUTPATIENT)
Dept: ULTRASOUND IMAGING | Facility: CLINIC | Age: 56
End: 2021-08-13

## 2021-08-13 ENCOUNTER — ANESTHESIA EVENT (OUTPATIENT)
Dept: ULTRASOUND IMAGING | Facility: CLINIC | Age: 56
End: 2021-08-13

## 2021-08-13 ENCOUNTER — APPOINTMENT (OUTPATIENT)
Dept: ULTRASOUND IMAGING | Facility: CLINIC | Age: 56
DRG: 656 | End: 2021-08-13
Attending: PHYSICIAN ASSISTANT
Payer: COMMERCIAL

## 2021-08-13 LAB
ALBUMIN SERPL-MCNC: 2 G/DL (ref 3.4–5)
ALP SERPL-CCNC: 59 U/L (ref 40–150)
ALT SERPL W P-5'-P-CCNC: 143 U/L (ref 0–70)
ANION GAP SERPL CALCULATED.3IONS-SCNC: 5 MMOL/L (ref 3–14)
AST SERPL W P-5'-P-CCNC: 95 U/L (ref 0–45)
BILIRUB DIRECT SERPL-MCNC: 0.1 MG/DL (ref 0–0.2)
BILIRUB SERPL-MCNC: 0.4 MG/DL (ref 0.2–1.3)
BLD PROD TYP BPU: NORMAL
BLOOD COMPONENT TYPE: NORMAL
BUN SERPL-MCNC: 23 MG/DL (ref 7–30)
CALCIUM SERPL-MCNC: 8.2 MG/DL (ref 8.5–10.1)
CHLORIDE BLD-SCNC: 105 MMOL/L (ref 94–109)
CO2 SERPL-SCNC: 28 MMOL/L (ref 20–32)
CODING SYSTEM: NORMAL
CREAT SERPL-MCNC: 2.1 MG/DL (ref 0.66–1.25)
CROSSMATCH: NORMAL
ERYTHROCYTE [DISTWIDTH] IN BLOOD BY AUTOMATED COUNT: 14.6 % (ref 10–15)
ERYTHROCYTE [DISTWIDTH] IN BLOOD BY AUTOMATED COUNT: 14.8 % (ref 10–15)
GFR SERPL CREATININE-BSD FRML MDRD: 34 ML/MIN/1.73M2
GLUCOSE BLD-MCNC: 76 MG/DL (ref 70–99)
GLUCOSE BLDC GLUCOMTR-MCNC: 107 MG/DL (ref 70–99)
GLUCOSE BLDC GLUCOMTR-MCNC: 56 MG/DL (ref 70–99)
GLUCOSE BLDC GLUCOMTR-MCNC: 59 MG/DL (ref 70–99)
GLUCOSE BLDC GLUCOMTR-MCNC: 70 MG/DL (ref 70–99)
GLUCOSE BLDC GLUCOMTR-MCNC: 71 MG/DL (ref 70–99)
GLUCOSE BLDC GLUCOMTR-MCNC: 72 MG/DL (ref 70–99)
GLUCOSE BLDC GLUCOMTR-MCNC: 73 MG/DL (ref 70–99)
GLUCOSE BLDC GLUCOMTR-MCNC: 78 MG/DL (ref 70–99)
GLUCOSE BLDC GLUCOMTR-MCNC: 90 MG/DL (ref 70–99)
GLUCOSE BLDC GLUCOMTR-MCNC: 91 MG/DL (ref 70–99)
HCT VFR BLD AUTO: 23.1 % (ref 40–53)
HCT VFR BLD AUTO: 23.7 % (ref 40–53)
HGB BLD-MCNC: 7.3 G/DL (ref 13.3–17.7)
HGB BLD-MCNC: 7.4 G/DL (ref 13.3–17.7)
HGB BLD-MCNC: 7.4 G/DL (ref 13.3–17.7)
HGB BLD-MCNC: 7.5 G/DL (ref 13.3–17.7)
HGB BLD-MCNC: 7.5 G/DL (ref 13.3–17.7)
HGB BLD-MCNC: 7.6 G/DL (ref 13.3–17.7)
ISSUE DATE AND TIME: NORMAL
LACTATE SERPL-SCNC: 0.7 MMOL/L (ref 0.7–2)
MAGNESIUM SERPL-MCNC: 2.1 MG/DL (ref 1.6–2.3)
MCH RBC QN AUTO: 29.6 PG (ref 26.5–33)
MCH RBC QN AUTO: 30 PG (ref 26.5–33)
MCHC RBC AUTO-ENTMCNC: 31.6 G/DL (ref 31.5–36.5)
MCHC RBC AUTO-ENTMCNC: 32.1 G/DL (ref 31.5–36.5)
MCV RBC AUTO: 94 FL (ref 78–100)
MCV RBC AUTO: 94 FL (ref 78–100)
PHOSPHATE SERPL-MCNC: 3.3 MG/DL (ref 2.5–4.5)
PLAT MORPH BLD: NORMAL
PLATELET # BLD AUTO: 187 10E3/UL (ref 150–450)
PLATELET # BLD AUTO: 214 10E3/UL (ref 150–450)
POTASSIUM BLD-SCNC: 4 MMOL/L (ref 3.4–5.3)
PROT SERPL-MCNC: 5.8 G/DL (ref 6.8–8.8)
RBC # BLD AUTO: 2.47 10E6/UL (ref 4.4–5.9)
RBC # BLD AUTO: 2.53 10E6/UL (ref 4.4–5.9)
RBC MORPH BLD: NORMAL
SODIUM SERPL-SCNC: 138 MMOL/L (ref 133–144)
UFH PPP CHRO-ACNC: 0.21 IU/ML
UFH PPP CHRO-ACNC: 0.31 IU/ML
UFH PPP CHRO-ACNC: 0.49 IU/ML
UFH PPP CHRO-ACNC: 0.6 IU/ML
UNIT ABO/RH: NORMAL
UNIT NUMBER: NORMAL
UNIT STATUS: NORMAL
UNIT TYPE ISBT: 5100
WBC # BLD AUTO: 12.1 10E3/UL (ref 4–11)
WBC # BLD AUTO: 9.6 10E3/UL (ref 4–11)

## 2021-08-13 PROCEDURE — 85027 COMPLETE CBC AUTOMATED: CPT | Performed by: STUDENT IN AN ORGANIZED HEALTH CARE EDUCATION/TRAINING PROGRAM

## 2021-08-13 PROCEDURE — 36415 COLL VENOUS BLD VENIPUNCTURE: CPT | Performed by: UROLOGY

## 2021-08-13 PROCEDURE — 93010 ELECTROCARDIOGRAM REPORT: CPT | Performed by: INTERNAL MEDICINE

## 2021-08-13 PROCEDURE — 85520 HEPARIN ASSAY: CPT | Performed by: UROLOGY

## 2021-08-13 PROCEDURE — 93005 ELECTROCARDIOGRAM TRACING: CPT

## 2021-08-13 PROCEDURE — 250N000011 HC RX IP 250 OP 636: Performed by: STUDENT IN AN ORGANIZED HEALTH CARE EDUCATION/TRAINING PROGRAM

## 2021-08-13 PROCEDURE — 93975 VASCULAR STUDY: CPT | Mod: 26 | Performed by: STUDENT IN AN ORGANIZED HEALTH CARE EDUCATION/TRAINING PROGRAM

## 2021-08-13 PROCEDURE — 258N000003 HC RX IP 258 OP 636: Performed by: STUDENT IN AN ORGANIZED HEALTH CARE EDUCATION/TRAINING PROGRAM

## 2021-08-13 PROCEDURE — 250N000013 HC RX MED GY IP 250 OP 250 PS 637: Performed by: STUDENT IN AN ORGANIZED HEALTH CARE EDUCATION/TRAINING PROGRAM

## 2021-08-13 PROCEDURE — 85018 HEMOGLOBIN: CPT | Performed by: STUDENT IN AN ORGANIZED HEALTH CARE EDUCATION/TRAINING PROGRAM

## 2021-08-13 PROCEDURE — 80048 BASIC METABOLIC PNL TOTAL CA: CPT | Performed by: STUDENT IN AN ORGANIZED HEALTH CARE EDUCATION/TRAINING PROGRAM

## 2021-08-13 PROCEDURE — P9016 RBC LEUKOCYTES REDUCED: HCPCS | Performed by: STUDENT IN AN ORGANIZED HEALTH CARE EDUCATION/TRAINING PROGRAM

## 2021-08-13 PROCEDURE — 84100 ASSAY OF PHOSPHORUS: CPT | Performed by: UROLOGY

## 2021-08-13 PROCEDURE — 36415 COLL VENOUS BLD VENIPUNCTURE: CPT | Performed by: STUDENT IN AN ORGANIZED HEALTH CARE EDUCATION/TRAINING PROGRAM

## 2021-08-13 PROCEDURE — 86923 COMPATIBILITY TEST ELECTRIC: CPT | Performed by: STUDENT IN AN ORGANIZED HEALTH CARE EDUCATION/TRAINING PROGRAM

## 2021-08-13 PROCEDURE — 85014 HEMATOCRIT: CPT | Performed by: UROLOGY

## 2021-08-13 PROCEDURE — 93975 VASCULAR STUDY: CPT

## 2021-08-13 PROCEDURE — 82248 BILIRUBIN DIRECT: CPT | Performed by: PHYSICIAN ASSISTANT

## 2021-08-13 PROCEDURE — 250N000011 HC RX IP 250 OP 636: Performed by: PHYSICIAN ASSISTANT

## 2021-08-13 PROCEDURE — 83735 ASSAY OF MAGNESIUM: CPT | Performed by: UROLOGY

## 2021-08-13 PROCEDURE — 250N000013 HC RX MED GY IP 250 OP 250 PS 637

## 2021-08-13 PROCEDURE — 250N000013 HC RX MED GY IP 250 OP 250 PS 637: Performed by: DIETITIAN, REGISTERED

## 2021-08-13 PROCEDURE — 250N000011 HC RX IP 250 OP 636

## 2021-08-13 PROCEDURE — 83605 ASSAY OF LACTIC ACID: CPT | Performed by: UROLOGY

## 2021-08-13 PROCEDURE — 120N000005 HC R&B MS OVERFLOW UMMC

## 2021-08-13 RX ORDER — BUPIVACAINE HYDROCHLORIDE 2.5 MG/ML
5 INJECTION, SOLUTION EPIDURAL; INFILTRATION; INTRACAUDAL ONCE
Status: COMPLETED | OUTPATIENT
Start: 2021-08-13 | End: 2021-08-13

## 2021-08-13 RX ORDER — RIZATRIPTAN BENZOATE 10 MG/1
10 TABLET, ORALLY DISINTEGRATING ORAL DAILY PRN
Status: COMPLETED | OUTPATIENT
Start: 2021-08-13 | End: 2021-08-13

## 2021-08-13 RX ORDER — ACETAMINOPHEN 325 MG/1
650 TABLET ORAL EVERY 4 HOURS PRN
Status: DISCONTINUED | OUTPATIENT
Start: 2021-08-13 | End: 2021-08-17 | Stop reason: HOSPADM

## 2021-08-13 RX ORDER — ACETAMINOPHEN 325 MG/1
650 TABLET ORAL EVERY 8 HOURS
Status: DISCONTINUED | OUTPATIENT
Start: 2021-08-13 | End: 2021-08-13

## 2021-08-13 RX ORDER — DIPHENHYDRAMINE HYDROCHLORIDE 50 MG/ML
25 INJECTION INTRAMUSCULAR; INTRAVENOUS EVERY 6 HOURS PRN
Status: DISCONTINUED | OUTPATIENT
Start: 2021-08-13 | End: 2021-08-17 | Stop reason: HOSPADM

## 2021-08-13 RX ORDER — HEPARIN SODIUM 10000 [USP'U]/100ML
0-5000 INJECTION, SOLUTION INTRAVENOUS CONTINUOUS
Status: DISCONTINUED | OUTPATIENT
Start: 2021-08-13 | End: 2021-08-16 | Stop reason: ALTCHOICE

## 2021-08-13 RX ORDER — RIZATRIPTAN BENZOATE 10 MG/1
10 TABLET ORAL
Status: DISCONTINUED | OUTPATIENT
Start: 2021-08-13 | End: 2021-08-13

## 2021-08-13 RX ORDER — CALCIUM CARBONATE 500 MG/1
500 TABLET, CHEWABLE ORAL 3 TIMES DAILY PRN
Status: DISCONTINUED | OUTPATIENT
Start: 2021-08-13 | End: 2021-08-17 | Stop reason: HOSPADM

## 2021-08-13 RX ADMIN — BUPIVACAINE HYDROCHLORIDE 12.5 MG: 2.5 INJECTION, SOLUTION EPIDURAL; INFILTRATION; INTRACAUDAL; PERINEURAL at 09:18

## 2021-08-13 RX ADMIN — CALCIUM CARBONATE (ANTACID) CHEW TAB 500 MG 500 MG: 500 CHEW TAB at 18:53

## 2021-08-13 RX ADMIN — ACETAMINOPHEN 650 MG: 325 TABLET, FILM COATED ORAL at 05:48

## 2021-08-13 RX ADMIN — GUAIFENESIN 600 MG: 600 TABLET ORAL at 20:06

## 2021-08-13 RX ADMIN — Medication: at 21:45

## 2021-08-13 RX ADMIN — HEPARIN SODIUM 1500 UNITS/HR: 10000 INJECTION, SOLUTION INTRAVENOUS at 00:22

## 2021-08-13 RX ADMIN — DIPHENHYDRAMINE HYDROCHLORIDE 25 MG: 50 INJECTION, SOLUTION INTRAMUSCULAR; INTRAVENOUS at 04:43

## 2021-08-13 RX ADMIN — HEPARIN SODIUM 1200 UNITS/HR: 10000 INJECTION, SOLUTION INTRAVENOUS at 23:25

## 2021-08-13 RX ADMIN — NORTRIPTYLINE HYDROCHLORIDE 10 MG: 10 CAPSULE ORAL at 21:47

## 2021-08-13 RX ADMIN — DEXTROSE AND SODIUM CHLORIDE: 5; 900 INJECTION, SOLUTION INTRAVENOUS at 18:52

## 2021-08-13 RX ADMIN — ACETAMINOPHEN 650 MG: 325 TABLET, FILM COATED ORAL at 00:20

## 2021-08-13 RX ADMIN — DOCUSATE SODIUM 50 MG AND SENNOSIDES 8.6 MG 2 TABLET: 8.6; 5 TABLET, FILM COATED ORAL at 08:37

## 2021-08-13 RX ADMIN — ACETAMINOPHEN 650 MG: 325 TABLET, FILM COATED ORAL at 20:06

## 2021-08-13 RX ADMIN — DOCUSATE SODIUM 50 MG AND SENNOSIDES 8.6 MG 1 TABLET: 8.6; 5 TABLET, FILM COATED ORAL at 20:06

## 2021-08-13 RX ADMIN — NORTRIPTYLINE HYDROCHLORIDE 10 MG: 10 CAPSULE ORAL at 00:20

## 2021-08-13 RX ADMIN — RIZATRIPTAN BENZOATE 10 MG: 10 TABLET, ORALLY DISINTEGRATING ORAL at 20:05

## 2021-08-13 RX ADMIN — RIZATRIPTAN BENZOATE 10 MG: 10 TABLET, ORALLY DISINTEGRATING ORAL at 09:55

## 2021-08-13 RX ADMIN — ACETAMINOPHEN 650 MG: 325 TABLET, FILM COATED ORAL at 11:41

## 2021-08-13 RX ADMIN — PANTOPRAZOLE SODIUM 40 MG: 40 TABLET, DELAYED RELEASE ORAL at 08:37

## 2021-08-13 ASSESSMENT — ACTIVITIES OF DAILY LIVING (ADL)
ADLS_ACUITY_SCORE: 21
ADLS_ACUITY_SCORE: 19
ADLS_ACUITY_SCORE: 21
ADLS_ACUITY_SCORE: 19
ADLS_ACUITY_SCORE: 20
ADLS_ACUITY_SCORE: 21

## 2021-08-13 ASSESSMENT — MIFFLIN-ST. JEOR: SCORE: 1938

## 2021-08-13 NOTE — PROGRESS NOTES
Urology  Progress Note    Barrera removed, patient having difficulty voiding. Bladder scanned for 430 and straight cath'd this AM.   Hgb up after transfusion  internal jugular/MAC line removed   Transferred out of ICU       Exam  /81 (BP Location: Right arm)   Pulse 118   Temp 98.3  F (36.8  C) (Oral)   Resp 16   Ht 1.829 m (6')   Wt 97.9 kg (215 lb 13.3 oz)   SpO2 99%   BMI 29.27 kg/m    No acute distress, comfortable appearing   Normal respiratory effort on NC   Abdomen midly distended, appropriately tender, incisions covered by dressing. MADAI Serosanguinous.     /150  MADAI 65/-    Labs  WBC 12.1 (7.7)  Hgb 7.3 (8.4)  Cr 2.1 (2.2)    AM labs pending    Assessment/Plan  56 year old y/o male POD#3 s/p right radical nephrectomy with IVC thrombectomy and reconstruction with bovine pericardium patch, lysis of adhesions, and cholecystectomy on 8/10/2021.     Neuro: Scheduled tylenol, OnQ pump, dilaudid PCA   CV: Remains tachycardic but other VSS.  Pulm: successfully extubated, encourage IS   FEN/GI: CLD, MIVF @ 100/hr.   Endo: sliding scale insulin   : Requiring PRN straight caths, may need barrera back in unable to void again   Heme/ID: low intensity heparin gtt, transfusion goal >7.0   Activity: Up ad trinh, goal to ambulate QID   PPx: SCDs in bed, pantoprazole, heparin gtt. Will discuss hep gtt in the setting of dropping Hgb.   Dispo: Floor    Seen and examined with the chief resident. Will discuss with Dr. Nghia Mcclure MD  Urology Resident     Contacting the Urology Team     Please use the following job codes to reach the Urology Team. Note that you must use an in house phone and that job codes cannot receive text pages.     On weekdays, dial [phone] (or star-star-star 777 on the new [employer] telephones) then 0817 to reach the Adult Urology resident or PA on call    On weekdays, dial [phone] (or star-star-star 777 on the new [employer] telephones) then 0818 to reach the Pediatric Urology resident    On  weeknights and weekends, dial [phone] (or star-star-star 777 on the new "[employer] telephones) then [phone] to reach the Urology resident on call (for both Adult and Pediatrics)

## 2021-08-13 NOTE — PROVIDER NOTIFICATION
"Urology paged \"[id number] [phone]. Dimitrios Goldberg. Pt reporting generalized itching.VSS. No PRNs for itching available. TIFFANY Coelho #[id number]  Orders for IV benadryl added q6h.  "

## 2021-08-13 NOTE — PROVIDER NOTIFICATION
Paged Urology resident Shayla:  orders to give pt RBC but pt does not have a blood consent. I left a blood consent on cart in patient room.

## 2021-08-13 NOTE — PROGRESS NOTES
Pt transferred from: 4A  Reason for transfer: decreased level of care  Family aware of transfer: yes, came with pt  Disposition of belongings: brought with pt  Teaching: oriented to room and unit  Report called to/from: 4A nurse  Skin double check completed by: Tete Lopez RN  Pt has Right MADAI drain, abdominal incision closed with glue, catheter in pt's back, 2 PIV, and scar from plasma donation on left forearm. Preventative mepilex on pt's coccyx

## 2021-08-13 NOTE — PLAN OF CARE
Pt transferred to floor around 1600 from . Pt's VSS on 2L O2, but HR in 120's. Pt's pain well managed w/ On-Q pump and dilaudid PCA. Did give rizatriptan and tylenol x1 for headache. Has increased pain with moving/turns. Hgb recheck WNL, hep 10a level 0.23-bolus given and heparin gtt increased to 1500 unit(s)/hr. Recheck will be at 0130. Pt has 2 PIV and MADAI drain w/ bloody output. On a clear liquid diet and tolerating water well. Assist x1-2 if out of bed. Mepilex on pt's lower back. Abdominal incision looks good w/ no swelling/redness/drainage. Is coughing up some secretions and using suction to help get them out, paged team for cough medicine as it is painful when he coughs. Pt has not urinated yet, bladder scanned for 85 ml-continue to monitor. No other concerns, continue to monitor & follow plan of care.     Problem: Adult Inpatient Plan of Care  Goal: Plan of Care Review  Outcome: No Change     Problem: Adult Inpatient Plan of Care  Goal: Readiness for Transition of Care  Outcome: No Change

## 2021-08-13 NOTE — PLAN OF CARE
/83 (BP Location: Right arm)   Pulse 114   Temp 99.1  F (37.3  C) (Oral)   Resp 16   Ht 1.829 m (6')   Wt 97.9 kg (215 lb 13.3 oz)   SpO2 99%   BMI 29.27 kg/m      VSS ex pulse on 1.5 L NC. A/O x4, uses call light appropriately. Pain managed with On-Q pump, and PCA dilaudid. Scheduled tylenol given, PRN oxycodone discontinued. No BM overnight, C-diff sample needs to be collected. Pt voided 150 mL at beginning of shift but had low UO overnight. Bladder scanned for 433 mL and straight cath for 450 mL.  Heparin infusion increased from 1500 units/hr to 1650 units/hr after 01:30 Xa result with bolus given. Recheck scheduled for 08:15. PIVs infusing with heparin and dilaudid + NS TKO. Abd incision covered with liquid bandage. MADAI drain dressing CDI with 20 mL bright red drainage during shift. Pt is using suction PRN for intermittent secretions. Clear liquid diet and tolerating well.    Pt also triggered SIRS. VSS ex pulse, lactate resulted at 0.7. Will continue POC.

## 2021-08-13 NOTE — PROGRESS NOTES
LIVER Transplant Surgery  Inpatient Daily Progress Note  08/13/2021    Assessment & Plan: 56 year old male POD#2 s/p right radical nephrectomy with IVC thrombectomy and reconstruction with bovine pericardium patch, lysis of adhesions, and cholecystectomy on 8/10/2021.     -continue management per primary- OOB/Ambulate  -monitor Hgb every 6 hours, LFTs every other day  -Continue low intensity heparin gtt as able, hold this AM, give PRBC transfusion and monitor. Restart as able.   -US liver to evaluate IVC and portal flow and for mery-hepatic fluid collections/hematomas    NICKO/Fellow/Resident Provider: Mel Tyler PA-C    Faculty: Bandar Hogue M.D.    __________________________________________________________________  Interval History: Extubated, complains of mild abdominal pain.     ROS:   A 10-point review of systems was negative except as noted above.    Curent Meds:   acetaminophen  650 mg Oral Q6H    insulin aspart  1-6 Units Subcutaneous Q4H    nortriptyline  10 mg Oral At Bedtime    pantoprazole  40 mg Oral QAM AC    Or    pantoprazole  40 mg Per Feeding Tube QAM AC    Or    pantoprazole (PROTONIX) IV  40 mg Intravenous QAM AC    polyethylene glycol  17 g Oral Daily    senna-docusate  1 tablet Oral BID    Or    senna-docusate  2 tablet Oral BID    sodium chloride (PF)  3 mL Intracatheter Q8H    sodium chloride (PF)  5 mL Perineural Once    sodium chloride (PF)  5 mL Perineural Once       Physical Exam:     Admit Weight: 99.4 kg (219 lb 2.2 oz)    Current Vitals:   /73   Pulse (!) 121   Temp 98.9  F (37.2  C)   Resp 20   Ht 1.829 m (6')   Wt 107 kg (235 lb 14.3 oz)   SpO2 99%   BMI 31.99 kg/m           Vital sign ranges:    Temp:  [98.3  F (36.8  C)-99.4  F (37.4  C)] 98.9  F (37.2  C)  Pulse:  [114-133] 121  Resp:  [16-20] 20  BP: (102-129)/(70-87) 125/73  SpO2:  [91 %-100 %] 99 %  Patient Vitals for the past 24 hrs:   BP Temp Temp src Pulse Resp SpO2 Weight   08/13/21 1100 125/73 98.9  F  (37.2  C) -- (!) 121 20 -- --   08/13/21 1047 120/83 98.6  F (37  C) Oral 115 18 99 % --   08/13/21 0940 120/84 -- -- 116 -- -- --   08/13/21 0744 102/72 99.4  F (37.4  C) Oral 118 16 99 % 107 kg (235 lb 14.3 oz)   08/13/21 0625 116/83 99.1  F (37.3  C) Oral 114 16 99 % --   08/13/21 0545 106/76 98.9  F (37.2  C) Oral 119 16 100 % --   08/13/21 0400 113/81 98.3  F (36.8  C) Oral 118 16 99 % --   08/13/21 0000 116/82 98.9  F (37.2  C) Oral (!) 124 16 99 % --   08/12/21 1913 129/84 99.2  F (37.3  C) Axillary (!) 132 16 98 % --   08/12/21 1630 128/85 98.8  F (37.1  C) Oral (!) 123 18 98 % --   08/12/21 1500 120/78 -- -- (!) 133 -- 99 % --   08/12/21 1400 121/87 -- -- 120 -- 100 % --   08/12/21 1300 118/70 -- -- (!) 126 -- 98 % --   08/12/21 1200 116/73 99.3  F (37.4  C) Oral 120 16 91 % --     General Appearance: in no apparent distress.   Skin: normal Heart: regular rate and rhythm  Lungs: NLB on 1L oxygen  Abdomen: flat, and  mildly tender. The wound is c/d/i. MADAI in place with serosang output.   : Voiding   Extremities: edema: absent.    Neurologic: awake and alert.      Frailty Scores    There is no flowsheet data to display.         Data:   CMP  Recent Labs   Lab 08/13/21  0840 08/13/21  0437 08/12/21  0431 08/10/21  1803 08/10/21  1630 08/10/21  1603 08/10/21  1529   NA  --  138 139   < > 138  --   --    POTASSIUM  --  4.0 3.9   < > 4.3 4.2  --    CHLORIDE  --  105 109   < >  --   --   --    CO2  --  28 26   < >  --   --   --    GLC 71 76 84   < > 232*  --    < >   BUN  --  23 23   < >  --   --   --    CR  --  2.10* 2.16*   < >  --   --   --    GFRESTIMATED  --  34* 33*   < >  --   --   --    BETSY  --  8.2* 7.3*   < >  --   --   --    ICAW  --   --   --   --  5.3* 5.0  --    MAG  --  2.1 1.8   < >  --   --   --    PHOS  --  3.3 3.4   < >  --   --   --    ALBUMIN  --  2.0* 1.9*   < >  --   --   --    BILITOTAL  --  0.4 0.3   < >  --   --   --    ALKPHOS  --  59 48   < >  --   --   --    AST  --  95* 110*   < >  --    --   --    ALT  --  143* 127*   < >  --   --   --     < > = values in this interval not displayed.     CBC  Recent Labs   Lab 08/13/21  1022 08/13/21  0650 08/13/21  0438 08/12/21  1023 08/10/21  2215   HGB 7.4* 7.4* 7.3* 9.8* 9.6*   WBC  --   --  12.1* 7.7 11.0   PLT  --   --  187 113* 198   A1C  --   --   --   --  5.8*     COAGS  Recent Labs   Lab 08/10/21  2215 08/10/21  1603   INR 1.23* 1.42*   PTT 52* 31      UrinalysisNo lab results found.    Attestation:  Patient has been seen and evaluated by me with transplant team.   Vital signs, labs, medications and orders were reviewed.   When obtained, diagnostic images were reviewed by me and interpreted as above.    The care plan was discussed with the multidisciplinary team and I agree with the findings and plan in this note, with any differences recorded in blue.    .
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
## 2021-08-14 ENCOUNTER — APPOINTMENT (OUTPATIENT)
Dept: PHYSICAL THERAPY | Facility: CLINIC | Age: 56
DRG: 656 | End: 2021-08-14
Attending: UROLOGY
Payer: COMMERCIAL

## 2021-08-14 LAB
ABO/RH(D): NORMAL
ANION GAP SERPL CALCULATED.3IONS-SCNC: 2 MMOL/L (ref 3–14)
ANTIBODY SCREEN: NEGATIVE
BLD PROD TYP BPU: NORMAL
BLOOD COMPONENT TYPE: NORMAL
BUN SERPL-MCNC: 20 MG/DL (ref 7–30)
CALCIUM SERPL-MCNC: 8 MG/DL (ref 8.5–10.1)
CHLORIDE BLD-SCNC: 105 MMOL/L (ref 94–109)
CO2 SERPL-SCNC: 31 MMOL/L (ref 20–32)
CODING SYSTEM: NORMAL
CREAT SERPL-MCNC: 1.91 MG/DL (ref 0.66–1.25)
CROSSMATCH: NORMAL
ERYTHROCYTE [DISTWIDTH] IN BLOOD BY AUTOMATED COUNT: 14.6 % (ref 10–15)
GFR SERPL CREATININE-BSD FRML MDRD: 38 ML/MIN/1.73M2
GLUCOSE BLD-MCNC: 99 MG/DL (ref 70–99)
GLUCOSE BLDC GLUCOMTR-MCNC: 100 MG/DL (ref 70–99)
GLUCOSE BLDC GLUCOMTR-MCNC: 87 MG/DL (ref 70–99)
GLUCOSE BLDC GLUCOMTR-MCNC: 89 MG/DL (ref 70–99)
GLUCOSE BLDC GLUCOMTR-MCNC: 90 MG/DL (ref 70–99)
GLUCOSE BLDC GLUCOMTR-MCNC: 94 MG/DL (ref 70–99)
GLUCOSE BLDC GLUCOMTR-MCNC: 94 MG/DL (ref 70–99)
GLUCOSE BLDC GLUCOMTR-MCNC: 99 MG/DL (ref 70–99)
HCT VFR BLD AUTO: 21.9 % (ref 40–53)
HGB BLD-MCNC: 7 G/DL (ref 13.3–17.7)
HGB BLD-MCNC: 7 G/DL (ref 13.3–17.7)
HGB BLD-MCNC: 8.5 G/DL (ref 13.3–17.7)
ISSUE DATE AND TIME: NORMAL
MAGNESIUM SERPL-MCNC: 2.3 MG/DL (ref 1.6–2.3)
MCH RBC QN AUTO: 29.7 PG (ref 26.5–33)
MCHC RBC AUTO-ENTMCNC: 32 G/DL (ref 31.5–36.5)
MCV RBC AUTO: 93 FL (ref 78–100)
PHOSPHATE SERPL-MCNC: 2.6 MG/DL (ref 2.5–4.5)
PLATELET # BLD AUTO: 217 10E3/UL (ref 150–450)
POTASSIUM BLD-SCNC: 3.8 MMOL/L (ref 3.4–5.3)
RBC # BLD AUTO: 2.36 10E6/UL (ref 4.4–5.9)
SODIUM SERPL-SCNC: 138 MMOL/L (ref 133–144)
SPECIMEN EXPIRATION DATE: NORMAL
UFH PPP CHRO-ACNC: 0.1 IU/ML
UFH PPP CHRO-ACNC: 0.37 IU/ML
UFH PPP CHRO-ACNC: 0.54 IU/ML
UNIT ABO/RH: NORMAL
UNIT NUMBER: NORMAL
UNIT STATUS: NORMAL
UNIT TYPE ISBT: 5100
WBC # BLD AUTO: 8.1 10E3/UL (ref 4–11)

## 2021-08-14 PROCEDURE — 85520 HEPARIN ASSAY: CPT | Performed by: UROLOGY

## 2021-08-14 PROCEDURE — 85018 HEMOGLOBIN: CPT | Performed by: STUDENT IN AN ORGANIZED HEALTH CARE EDUCATION/TRAINING PROGRAM

## 2021-08-14 PROCEDURE — 250N000013 HC RX MED GY IP 250 OP 250 PS 637: Performed by: STUDENT IN AN ORGANIZED HEALTH CARE EDUCATION/TRAINING PROGRAM

## 2021-08-14 PROCEDURE — 999N000127 HC STATISTIC PERIPHERAL IV START W US GUIDANCE

## 2021-08-14 PROCEDURE — 85014 HEMATOCRIT: CPT | Performed by: STUDENT IN AN ORGANIZED HEALTH CARE EDUCATION/TRAINING PROGRAM

## 2021-08-14 PROCEDURE — 97161 PT EVAL LOW COMPLEX 20 MIN: CPT | Mod: GP | Performed by: PHYSICAL THERAPIST

## 2021-08-14 PROCEDURE — 84100 ASSAY OF PHOSPHORUS: CPT | Performed by: UROLOGY

## 2021-08-14 PROCEDURE — 250N000011 HC RX IP 250 OP 636: Performed by: STUDENT IN AN ORGANIZED HEALTH CARE EDUCATION/TRAINING PROGRAM

## 2021-08-14 PROCEDURE — 36415 COLL VENOUS BLD VENIPUNCTURE: CPT | Performed by: STUDENT IN AN ORGANIZED HEALTH CARE EDUCATION/TRAINING PROGRAM

## 2021-08-14 PROCEDURE — 120N000005 HC R&B MS OVERFLOW UMMC

## 2021-08-14 PROCEDURE — 80048 BASIC METABOLIC PNL TOTAL CA: CPT | Performed by: STUDENT IN AN ORGANIZED HEALTH CARE EDUCATION/TRAINING PROGRAM

## 2021-08-14 PROCEDURE — P9016 RBC LEUKOCYTES REDUCED: HCPCS | Performed by: STUDENT IN AN ORGANIZED HEALTH CARE EDUCATION/TRAINING PROGRAM

## 2021-08-14 PROCEDURE — 86900 BLOOD TYPING SEROLOGIC ABO: CPT | Performed by: UROLOGY

## 2021-08-14 PROCEDURE — 97116 GAIT TRAINING THERAPY: CPT | Mod: GP | Performed by: PHYSICAL THERAPIST

## 2021-08-14 PROCEDURE — 250N000013 HC RX MED GY IP 250 OP 250 PS 637: Performed by: DIETITIAN, REGISTERED

## 2021-08-14 PROCEDURE — 83735 ASSAY OF MAGNESIUM: CPT | Performed by: UROLOGY

## 2021-08-14 PROCEDURE — 86923 COMPATIBILITY TEST ELECTRIC: CPT | Performed by: STUDENT IN AN ORGANIZED HEALTH CARE EDUCATION/TRAINING PROGRAM

## 2021-08-14 PROCEDURE — 250N000013 HC RX MED GY IP 250 OP 250 PS 637

## 2021-08-14 PROCEDURE — 258N000003 HC RX IP 258 OP 636: Performed by: STUDENT IN AN ORGANIZED HEALTH CARE EDUCATION/TRAINING PROGRAM

## 2021-08-14 PROCEDURE — 36415 COLL VENOUS BLD VENIPUNCTURE: CPT | Performed by: UROLOGY

## 2021-08-14 PROCEDURE — 97530 THERAPEUTIC ACTIVITIES: CPT | Mod: GP | Performed by: PHYSICAL THERAPIST

## 2021-08-14 RX ORDER — RIZATRIPTAN BENZOATE 10 MG/1
10 TABLET, ORALLY DISINTEGRATING ORAL
Status: DISCONTINUED | OUTPATIENT
Start: 2021-08-14 | End: 2021-08-17 | Stop reason: HOSPADM

## 2021-08-14 RX ADMIN — DOCUSATE SODIUM 50 MG AND SENNOSIDES 8.6 MG 1 TABLET: 8.6; 5 TABLET, FILM COATED ORAL at 20:43

## 2021-08-14 RX ADMIN — PANTOPRAZOLE SODIUM 40 MG: 40 TABLET, DELAYED RELEASE ORAL at 08:19

## 2021-08-14 RX ADMIN — ACETAMINOPHEN 650 MG: 325 TABLET, FILM COATED ORAL at 05:16

## 2021-08-14 RX ADMIN — ACETAMINOPHEN 650 MG: 325 TABLET, FILM COATED ORAL at 20:43

## 2021-08-14 RX ADMIN — HEPARIN SODIUM 1200 UNITS/HR: 10000 INJECTION, SOLUTION INTRAVENOUS at 04:33

## 2021-08-14 RX ADMIN — DIPHENHYDRAMINE HYDROCHLORIDE 25 MG: 50 INJECTION, SOLUTION INTRAMUSCULAR; INTRAVENOUS at 14:36

## 2021-08-14 RX ADMIN — DEXTROSE AND SODIUM CHLORIDE: 5; 900 INJECTION, SOLUTION INTRAVENOUS at 21:55

## 2021-08-14 RX ADMIN — DEXTROSE AND SODIUM CHLORIDE: 5; 900 INJECTION, SOLUTION INTRAVENOUS at 08:25

## 2021-08-14 RX ADMIN — NORTRIPTYLINE HYDROCHLORIDE 10 MG: 10 CAPSULE ORAL at 20:43

## 2021-08-14 RX ADMIN — DOCUSATE SODIUM 50 MG AND SENNOSIDES 8.6 MG 2 TABLET: 8.6; 5 TABLET, FILM COATED ORAL at 08:19

## 2021-08-14 ASSESSMENT — ACTIVITIES OF DAILY LIVING (ADL)
ADLS_ACUITY_SCORE: 18
ADLS_ACUITY_SCORE: 20
ADLS_ACUITY_SCORE: 20
ADLS_ACUITY_SCORE: 18
ADLS_ACUITY_SCORE: 18
ADLS_ACUITY_SCORE: 20

## 2021-08-14 NOTE — PROGRESS NOTES
REGIONAL ANESTHESIA PAIN SERVICE CONTINUOUS NERVE INFUSION NOTE  Subjective and Interval History: Pt reports moderate pain control via nerve block continuous infusion.  Denies any weakness, paresthesias, circumoral numbness, metallic taste or tinnitus. Patient currently denies nausea or vomiting.     Clinically Aligned Pain Assessment (CAPA):   Comfort (How is your pain?): Comfortably manageable  Change in Pain (Since your last medication/intervention?): About the same  Pain Control (How are your pain treatments working?):  Fully effective pain control  Functioning (Are you able to do activities to get better?) : Can do most things, but pain gets in the way of some        OBJECTIVE:     Patient Vitals for the past 24 hrs:   BP Temp Temp src Pulse Resp SpO2   08/14/21 0410 115/71 37.7  C (99.8  F) Oral 113 18 99 %   08/13/21 2331 115/77 37.4  C (99.4  F) Oral 115 18 99 %   08/13/21 2150 117/71 -- -- -- -- --   08/13/21 1914 123/84 37.4  C (99.3  F) Oral 110 20 99 %   08/13/21 1609 112/75 37.1  C (98.7  F) Oral 107 20 100 %   08/13/21 1325 113/80 37  C (98.6  F) Oral 118 20 --   08/13/21 1302 114/76 36.8  C (98.2  F) Oral 115 20 99 %   08/13/21 1210 113/75 36.8  C (98.3  F) Oral 109 20 99 %   08/13/21 1100 125/73 37.2  C (98.9  F) -- (!) 121 20 --   08/13/21 1047 120/83 37  C (98.6  F) Oral 115 18 99 %   08/13/21 0940 120/84 -- -- 116 -- --       Exam:   Pt is pleasant and conversational, in no acute distress, AOx3.  Strength 5/5 and symmetric grossly in bilateral LE.    Sensorium in tact to light touch in bilateral LE.  Insertion sites c/d/i, no tenderness, erythema, heme, edema.     ASSESSMENT:     Dimitrios Goldberg is a 56 year old male  s/p right radical nephrectomy POD4 and placement of right sided erector spinae catheter (catheter day 5) for analgesia.  Pt denies weakness nor paresthesias, but still with an adequate sensory block.  No evidence of adverse side effects associated with local anesthetic.       PLAN:  - Bolus: 10 ml 0.25% bupivicaine at 9:40am  - Continuous: ON-Q at 7 ml/hr 0.2% ropivicaine  - pt can receive local anesthetic bolus Q 12 hr PRN, bedside nurse must contact RICK jobcode pager [phone]  - Anticoagulation: none ordered at this time, please contact RICK if dose or medication is changed  - will continue to follow and adjust as needed  - discussed plan with attending anesthesiologist    Reginald Brody  Anesthesiology resident  Pager [phone]     24 hour Job Code Pager.  For in-house use only.     [place]:  * * [phone]  [place]: * * [phone]  Peds: * * [phone]  Enter call-back number and #      This pager only accepts text messages through [facility]

## 2021-08-14 NOTE — PLAN OF CARE
[id number]:    A&O. VSS. O2 100% on 2L NC. Changed from clear liquid diet to full liquid diet. Enjoys eating ice cream but po intake minimal before diet changed. Glucose checks every 4 hours. On scheduled check glucose in 50s, asymptomatic. Had orange juice x 3, improved to 90. Then c/o nonradiating chest pain 4/10 after quickly drinking lots of juice. MDs notified, EKG done, Tums given. Now has D5NS at 75 mL/hr. White in place. MADAI small sanguinous output. Serial hgb checks, possible restart heparin gtt if hgbs stable. Preventative sacral mepiplex. White in place. Abd incision CDI. PIV x 2. Dilaudid PCA, on-q in place, states incisional pain increases w coughing. Self suctioning thick secretions PRN. Continue POC.

## 2021-08-14 NOTE — PLAN OF CARE
"/71 (BP Location: Left arm)   Pulse 113   Temp 99.8  F (37.7  C) (Oral)   Resp 18   Ht 1.829 m (6')   Wt 107 kg (235 lb 14.3 oz)   SpO2 99%   BMI 31.99 kg/m      Patient T max 99.8, is tachycardic but other vital signs stable. Patient is alert and oriented. Difficult to assess patients pain. When asked if he feels his pain is improving he will say \"yes\" and then when asked for a number will give a higher number then before. When reminded of his previous answer that he stated his pain was improving patient will say he doesn't know. Using the Dilaudid PCA and given Tylenol for pain x2. Patient has mucus that he uses the yankuer to suction. Pain increases with coughing. Given mucinex x1 along with ice. Educated patient on using a pillow as a splint. MADAI drain CDI, 20 mL out overnight. Patient is passing gas, and adequate urine output via White. Abdomen is tender, guarded, and semi-firm. Incision approximated. Bowel sounds audible and normoactive. Patient reports passing gas, no bowel movement. Restarted heparin gtt at 1200 units/hr per urology, per check this AM to continue at the same rate, recheck ordered for noon. Continue with plan of care.   Problem: Risk for Delirium  Goal: Improved Sleep  Outcome: No Change     Problem: Pain Acute  Goal: Acceptable Pain Control and Functional Ability  Outcome: No Change     "
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
## 2021-08-14 NOTE — PROVIDER NOTIFICATION
"Urology paged at [phone]    \"ERICK, 2985-- pt HGB is 7.5, do you want to keep to heparin gtt on hold? Thank you!   [phone]\"    MD placed orders for heparin gtt. However pharmacy called unit at [phone] asking to repage and clarify since hemoglobin hasnt changed much since heparin gtt was stopped. Will repage urology to clarify.      \"ERICK, 0957-- pharmacy just wanted to clarify if you want to restart that heparin gtt since hemoglobin has not changed much. Current HGB 7.5 and looks like HGB was 7.4 when gtt was stopped. Thank you again!\"     MD called back saying yes would like to restart that drip-called and informed pharmacy  "

## 2021-08-14 NOTE — PROVIDER NOTIFICATION
"Paged urology at [phone]:     \"KELLEE, 6845-- FYI hemoglobin is 7.0 this morning. Per protocol the heparin gtt is running at 1200 units/hr still.\"  "

## 2021-08-14 NOTE — PROGRESS NOTES
Urology  Progress Note    Had heartburn overnight, EKG WNL  x1 emesis overnight  +gas, no BMs  Received x1u yesterday  Heparin low dose drip restarted after x2 Hgs 7.4 and 7.5 respectively  Pain controlled  Minimal ambulation      Exam  /71 (BP Location: Left arm)   Pulse 113   Temp 99.8  F (37.7  C) (Oral)   Resp 18   Ht 1.829 m (6')   Wt 107 kg (235 lb 14.3 oz)   SpO2 99%   BMI 31.99 kg/m    No acute distress, comfortable appearing   Normal respiratory effort on NC   Abdomen midly distended, appropriately tender, incisions covered by dressing. MADAI Serosanguinous.   Trace pedal edema    UOP 1150/500    Labs  WBC 8.1 (9.6)  Hgb 7.0 (7.5)  Cr 1.91 (2.1)    Assessment/Plan  56 year old y/o male POD#4 s/p right radical nephrectomy with IVC thrombectomy and reconstruction with bovine pericardium patch, lysis of adhesions, and cholecystectomy on 8/10/2021.     Neuro: Scheduled tylenol, OnQ pump, dilaudid PCA   CV: Remains tachycardic but other VSS.  Pulm: successfully extubated, encourage IS   FEN/GI: CLD, MIVF @ 100/hr.   Endo: sliding scale insulin   : Requiring PRN straight caths, may need barrera back in unable to void again   Heme/ID: low intensity heparin gtt restarted overnight for stable Hg at 7.4, Hg this morning now 7.0, will recheck this AM and determine plan for transfusion and heparin drip   Activity: Up ad trinh, goal to ambulate QID , PT ordered but have not seen patient yet  PPx: SCDs in bed, pantoprazole, heparin gtt  Dispo: Floor    Seen and examined with the chief resident. Will discuss with Dr. Nghia Mcguire  PGY-2  [phone]       Contacting the Urology Team     Please use the following job codes to reach the Urology Team. Note that you must use an in house phone and that job codes cannot receive text pages.     On weekdays, dial [phone] (or star-star-star [phone] on the new [employer] telephones) then [phone] to reach the Adult Urology resident or PA on call    On weekdays, dial [phone] (or  star-star-star [phone] on the new [employer] telephones) then [phone] to reach the Pediatric Urology resident    On weeknights and weekends, dial [phone] (or star-star-star [phone] on the new [employer] telephones) then [phone] to reach the Urology resident on call (for both Adult and Pediatrics)

## 2021-08-14 NOTE — PLAN OF CARE
Vital signs:  Temp: 97.7  F (36.5  C) Temp src: Oral BP: 110/77 Pulse: 105   Resp: 18 SpO2: 100 % O2 Device: Nasal cannula Oxygen Delivery: 1.5 LPM Height: 182.9 cm (6') Weight: 107 kg (235 lb 14.3 oz)  Estimated body mass index is 31.99 kg/m  as calculated from the following:    Height as of this encounter: 1.829 m (6').    Weight as of this encounter: 107 kg (235 lb 14.3 oz).     Dimitrios's pain is unchanged.  He states he feels most of his discomfort on his lower right side and across his abdomen.  He has been using his dilaudid as needed; ropivacaine on-going at 8cc/hr.  PT assessed/worked with him mid-morning and he is currently up the recliner.  Heparin drip unchanged at 1200units/hr - waiting for lab to draw 1215p 10a level - they have been called three times.  RBC's ordered for hgb of 7.0 and currently infusing.  Team did not want his heparin drip turned off today but instead to have both infusing at the same time. Vascular called and third PIV placed.  White intact.  MADAI with dark red returns.  To continue his plan of care.       Problem: Pain Acute  Goal: Acceptable Pain Control and Functional Ability  Outcome: No Change     Problem: Adult Inpatient Plan of Care  Goal: Plan of Care Review  Flowsheets (Taken 8/14/2021 1314)  Plan of Care Reviewed With: patient  Progress: no change

## 2021-08-14 NOTE — PROGRESS NOTES
Regional Anesthesia Catheter Evaluation    Assessed patient on evening pain rounds. Upon arrival, pt reporting 5/10 pain at rest, worse with movement and coughing.  Strength equal in bilateral upper and lower extremities. Pt denied symptoms of LAST. Catheter Site intact, unobstructed and without abnormalities. Pt hemodynamically stable and off of pressors.    Bolus administered at 21:30  - MEDICATION: PF bupivacaine 0.25% 7 mL via right nerve block catheter, Total given 7mL  - PROCEDURE: Clinician bolus administered incrementally; negative aspirate.  No symptoms of local anesthetic systemic toxicity (LAST). Remained with and assessed patient for 10 min post-injection. BP, P and MAP stable  - POST-PROCEDURE: Bedside RN aware of need to continue BP, P and MAP monitoring Q 10 min for an additional 30 min. Contact RAPS (jobcode ID [id number]) if experiencing any untoward effects or MAP less than 60    RAPS Contact Info (24 hour job code pager is the last 4 digits) For in-house use only:  [employer] phone: [place] [phone], [email or web address] [phone], [facility] [phone], then enter call-back number.    Text: Use [employer] on the Intranet <Paging/Directory> tab and enter Jobcode ID.   If no call back at any time, contact the hospital  and ask for RAPS attending or backup     Bruce Johnson MD  8/11/2021 8:12 PM  Anesthesia Resident  PGY-3/CA-2

## 2021-08-14 NOTE — PROGRESS NOTES
LIVER Transplant Surgery  Inpatient Daily Progress Note  08/14/2021    Assessment & Plan: Dimitrios Goldberg is a 56 year old male who is s/p right radical nephrectomy with IVC thrombectomy and reconstruction with bovine pericardium patch, lysis of adhesions, and cholecystectomy on 8/10/2021. 8/13 Liver US ordered d/t warm ischemic time intra-op demonstrates patent doppler, IVC patch not demonstrated, IVC patent, small fluid collection noted. Hgb stable on heparin drip.    -Continue management per primary team  -Monitor Hgb daily, LFTs every other day  -Continue low intensity heparin gtt      NICKO/Fellow/Resident Provider: Khalida Chatterjee, NP 9587    Faculty: Bandar Hogue M.D.    __________________________________________________________________  Interval History:   Patient reports feeling well today. Reports pain but this is controlled.     ROS:   A 10-point review of systems was negative except as noted above.    Curent Meds:   insulin aspart  1-6 Units Subcutaneous Q4H    nortriptyline  10 mg Oral At Bedtime    pantoprazole  40 mg Oral QAM AC    polyethylene glycol  17 g Oral Daily    senna-docusate  1 tablet Oral BID    Or    senna-docusate  2 tablet Oral BID    sodium chloride (PF)  3 mL Intracatheter Q8H    sodium chloride (PF)  5 mL Perineural Once    sodium chloride (PF)  5 mL Perineural Once       Physical Exam:     Admit Weight: 99.4 kg (219 lb 2.2 oz)    Current Vitals:   /86   Pulse 101   Temp 98  F (36.7  C) (Oral)   Resp 16   Ht 1.829 m (6')   Wt 107 kg (235 lb 14.3 oz)   SpO2 100%   BMI 31.99 kg/m           Vital sign ranges:    Temp:  [97.7  F (36.5  C)-99.8  F (37.7  C)] 98  F (36.7  C)  Pulse:  [] 101  Resp:  [16-20] 16  BP: (110-125)/(71-86) 125/86  SpO2:  [99 %-100 %] 100 %  Patient Vitals for the past 24 hrs:   BP Temp Temp src Pulse Resp SpO2   08/14/21 1400 125/86 98  F (36.7  C) Oral 101 16 --   08/14/21 1351 115/77 98.2  F (36.8  C) Oral 98 16 --   08/14/21 1253 110/77  97.7  F (36.5  C) Oral 105 18 100 %   08/14/21 0839 110/80 98  F (36.7  C) Oral 100 18 100 %   08/14/21 0410 115/71 99.8  F (37.7  C) Oral 113 18 99 %   08/13/21 2331 115/77 99.4  F (37.4  C) Oral 115 18 99 %   08/13/21 2150 117/71 -- -- -- -- --   08/13/21 1914 123/84 99.3  F (37.4  C) Oral 110 20 99 %   08/13/21 1609 112/75 98.7  F (37.1  C) Oral 107 20 100 %     General Appearance: in no apparent distress. Upright in chair.   Skin: perfused  Lungs: NLB on 1.5L oxygen  Extremities: edema: absent.    Neurologic: awake and alert.      Frailty Scores    There is no flowsheet data to display.         Data:   CMP  Recent Labs   Lab 08/14/21  1300 08/14/21  0847 08/14/21  0527 08/13/21  0437 08/12/21  0431 08/10/21  1803 08/10/21  1630 08/10/21  1603 08/10/21  1529   NA  --   --  138 138 139   < > 138  --   --    POTASSIUM  --   --  3.8 4.0 3.9   < > 4.3 4.2  --    CHLORIDE  --   --  105 105 109   < >  --   --   --    CO2  --   --  31 28 26   < >  --   --   --    GLC 89 99 99 76 84   < > 232*  --    < >   BUN  --   --  20 23 23   < >  --   --   --    CR  --   --  1.91* 2.10* 2.16*   < >  --   --   --    GFRESTIMATED  --   --  38* 34* 33*   < >  --   --   --    BETSY  --   --  8.0* 8.2* 7.3*   < >  --   --   --    ICAW  --   --   --   --   --   --  5.3* 5.0  --    MAG  --   --  2.3 2.1 1.8   < >  --   --   --    PHOS  --   --  2.6 3.3 3.4   < >  --   --   --    ALBUMIN  --   --   --  2.0* 1.9*   < >  --   --   --    BILITOTAL  --   --   --  0.4 0.3   < >  --   --   --    ALKPHOS  --   --   --  59 48   < >  --   --   --    AST  --   --   --  95* 110*   < >  --   --   --    ALT  --   --   --  143* 127*   < >  --   --   --     < > = values in this interval not displayed.     CBC  Recent Labs   Lab 08/14/21  1058 08/14/21  0527 08/13/21  2144 08/10/21  2215   HGB 7.0* 7.0* 7.5*  7.5* 9.6*   WBC  --  8.1 9.6 11.0   PLT  --  217 214 198   A1C  --   --   --  5.8*     COAGS  Recent Labs   Lab 08/10/21  2215 08/10/21  1603   INR  1.23* 1.42*   PTT 52* 31      UrinalysisNo lab results found.      Attestation:  Patient has been seen and evaluated by me.   Vital signs, labs, medications and orders were reviewed.   When obtained, diagnostic images were reviewed by me and interpreted as above.    The care plan was discussed with the multidisciplinary team and I agree with the findings and plan in this note, with any differences recorded in blue.    .

## 2021-08-15 ENCOUNTER — APPOINTMENT (OUTPATIENT)
Dept: PHYSICAL THERAPY | Facility: CLINIC | Age: 56
DRG: 656 | End: 2021-08-15
Attending: UROLOGY
Payer: COMMERCIAL

## 2021-08-15 ENCOUNTER — ANESTHESIA EVENT (OUTPATIENT)
Dept: GENERAL RADIOLOGY | Facility: CLINIC | Age: 56
End: 2021-08-15

## 2021-08-15 LAB
ALBUMIN SERPL-MCNC: 2 G/DL (ref 3.4–5)
ALP SERPL-CCNC: 94 U/L (ref 40–150)
ALT SERPL W P-5'-P-CCNC: 110 U/L (ref 0–70)
ANION GAP SERPL CALCULATED.3IONS-SCNC: 5 MMOL/L (ref 3–14)
AST SERPL W P-5'-P-CCNC: 54 U/L (ref 0–45)
BILIRUB DIRECT SERPL-MCNC: <0.1 MG/DL (ref 0–0.2)
BILIRUB SERPL-MCNC: 0.4 MG/DL (ref 0.2–1.3)
BUN SERPL-MCNC: 16 MG/DL (ref 7–30)
CALCIUM SERPL-MCNC: 8.1 MG/DL (ref 8.5–10.1)
CHLORIDE BLD-SCNC: 105 MMOL/L (ref 94–109)
CO2 SERPL-SCNC: 28 MMOL/L (ref 20–32)
CREAT SERPL-MCNC: 1.69 MG/DL (ref 0.66–1.25)
ERYTHROCYTE [DISTWIDTH] IN BLOOD BY AUTOMATED COUNT: 14.2 % (ref 10–15)
GFR SERPL CREATININE-BSD FRML MDRD: 44 ML/MIN/1.73M2
GLUCOSE BLD-MCNC: 102 MG/DL (ref 70–99)
GLUCOSE BLDC GLUCOMTR-MCNC: 100 MG/DL (ref 70–99)
GLUCOSE BLDC GLUCOMTR-MCNC: 101 MG/DL (ref 70–99)
GLUCOSE BLDC GLUCOMTR-MCNC: 78 MG/DL (ref 70–99)
GLUCOSE BLDC GLUCOMTR-MCNC: 90 MG/DL (ref 70–99)
GLUCOSE BLDC GLUCOMTR-MCNC: 96 MG/DL (ref 70–99)
HCT VFR BLD AUTO: 24.4 % (ref 40–53)
HGB BLD-MCNC: 7.9 G/DL (ref 13.3–17.7)
MAGNESIUM SERPL-MCNC: 2.2 MG/DL (ref 1.6–2.3)
MCH RBC QN AUTO: 30.2 PG (ref 26.5–33)
MCHC RBC AUTO-ENTMCNC: 32.4 G/DL (ref 31.5–36.5)
MCV RBC AUTO: 93 FL (ref 78–100)
PHOSPHATE SERPL-MCNC: 2.5 MG/DL (ref 2.5–4.5)
PLATELET # BLD AUTO: 264 10E3/UL (ref 150–450)
POTASSIUM BLD-SCNC: 3.6 MMOL/L (ref 3.4–5.3)
PROT SERPL-MCNC: 6 G/DL (ref 6.8–8.8)
RBC # BLD AUTO: 2.62 10E6/UL (ref 4.4–5.9)
SODIUM SERPL-SCNC: 138 MMOL/L (ref 133–144)
UFH PPP CHRO-ACNC: 0.12 IU/ML
UFH PPP CHRO-ACNC: 0.33 IU/ML
UFH PPP CHRO-ACNC: <0.1 IU/ML
WBC # BLD AUTO: 7.1 10E3/UL (ref 4–11)

## 2021-08-15 PROCEDURE — 250N000013 HC RX MED GY IP 250 OP 250 PS 637

## 2021-08-15 PROCEDURE — 82040 ASSAY OF SERUM ALBUMIN: CPT | Performed by: PHYSICIAN ASSISTANT

## 2021-08-15 PROCEDURE — 84100 ASSAY OF PHOSPHORUS: CPT | Performed by: UROLOGY

## 2021-08-15 PROCEDURE — 80069 RENAL FUNCTION PANEL: CPT | Performed by: STUDENT IN AN ORGANIZED HEALTH CARE EDUCATION/TRAINING PROGRAM

## 2021-08-15 PROCEDURE — 250N000013 HC RX MED GY IP 250 OP 250 PS 637: Performed by: STUDENT IN AN ORGANIZED HEALTH CARE EDUCATION/TRAINING PROGRAM

## 2021-08-15 PROCEDURE — 120N000005 HC R&B MS OVERFLOW UMMC

## 2021-08-15 PROCEDURE — 271N000002 HC RX 271: Performed by: STUDENT IN AN ORGANIZED HEALTH CARE EDUCATION/TRAINING PROGRAM

## 2021-08-15 PROCEDURE — 97530 THERAPEUTIC ACTIVITIES: CPT | Mod: GP | Performed by: PHYSICAL THERAPIST

## 2021-08-15 PROCEDURE — 85520 HEPARIN ASSAY: CPT | Performed by: UROLOGY

## 2021-08-15 PROCEDURE — 250N000013 HC RX MED GY IP 250 OP 250 PS 637: Performed by: DIETITIAN, REGISTERED

## 2021-08-15 PROCEDURE — 36415 COLL VENOUS BLD VENIPUNCTURE: CPT | Performed by: UROLOGY

## 2021-08-15 PROCEDURE — 250N000011 HC RX IP 250 OP 636: Performed by: STUDENT IN AN ORGANIZED HEALTH CARE EDUCATION/TRAINING PROGRAM

## 2021-08-15 PROCEDURE — 258N000003 HC RX IP 258 OP 636: Performed by: STUDENT IN AN ORGANIZED HEALTH CARE EDUCATION/TRAINING PROGRAM

## 2021-08-15 PROCEDURE — 250N000009 HC RX 250: Performed by: STUDENT IN AN ORGANIZED HEALTH CARE EDUCATION/TRAINING PROGRAM

## 2021-08-15 PROCEDURE — 83735 ASSAY OF MAGNESIUM: CPT | Performed by: UROLOGY

## 2021-08-15 PROCEDURE — 97116 GAIT TRAINING THERAPY: CPT | Mod: GP | Performed by: PHYSICAL THERAPIST

## 2021-08-15 PROCEDURE — 85027 COMPLETE CBC AUTOMATED: CPT | Performed by: STUDENT IN AN ORGANIZED HEALTH CARE EDUCATION/TRAINING PROGRAM

## 2021-08-15 PROCEDURE — 36415 COLL VENOUS BLD VENIPUNCTURE: CPT | Performed by: STUDENT IN AN ORGANIZED HEALTH CARE EDUCATION/TRAINING PROGRAM

## 2021-08-15 RX ADMIN — DOCUSATE SODIUM 50 MG AND SENNOSIDES 8.6 MG 2 TABLET: 8.6; 5 TABLET, FILM COATED ORAL at 08:14

## 2021-08-15 RX ADMIN — NORTRIPTYLINE HYDROCHLORIDE 10 MG: 10 CAPSULE ORAL at 21:17

## 2021-08-15 RX ADMIN — DEXTROSE AND SODIUM CHLORIDE: 5; 900 INJECTION, SOLUTION INTRAVENOUS at 11:55

## 2021-08-15 RX ADMIN — HEPARIN SODIUM 1600 UNITS/HR: 10000 INJECTION, SOLUTION INTRAVENOUS at 14:29

## 2021-08-15 RX ADMIN — HEPARIN SODIUM 1150 UNITS/HR: 10000 INJECTION, SOLUTION INTRAVENOUS at 02:59

## 2021-08-15 RX ADMIN — POLYETHYLENE GLYCOL 3350 17 G: 17 POWDER, FOR SOLUTION ORAL at 16:03

## 2021-08-15 RX ADMIN — DOCUSATE SODIUM 50 MG AND SENNOSIDES 8.6 MG 1 TABLET: 8.6; 5 TABLET, FILM COATED ORAL at 20:29

## 2021-08-15 RX ADMIN — PANTOPRAZOLE SODIUM 40 MG: 40 TABLET, DELAYED RELEASE ORAL at 08:14

## 2021-08-15 RX ADMIN — ONDANSETRON 4 MG: 4 TABLET, ORALLY DISINTEGRATING ORAL at 16:03

## 2021-08-15 RX ADMIN — Medication: at 09:49

## 2021-08-15 ASSESSMENT — ACTIVITIES OF DAILY LIVING (ADL)
ADLS_ACUITY_SCORE: 18

## 2021-08-15 NOTE — PLAN OF CARE
Vital signs:  Temp: 98.7  F (37.1  C) Temp src: Oral BP: 124/85 Pulse: 119   Resp: 18 SpO2: 95 % O2 Device: None (Room air) Oxygen Delivery: 1 LPM Height: 182.9 cm (6') Weight: 107 kg (235 lb 14.3 oz)  Estimated body mass index is 31.99 kg/m  as calculated from the following:    Height as of this encounter: 1.829 m (6').    Weight as of this encounter: 107 kg (235 lb 14.3 oz).     Vital signs stable other than his heart rates which have ranged between 108-119.  Pain unchanged.  He is alert and answers questions appropriately but can at times appear as if he has no idea what is going on and why does he have pain.  Also, keep asking when he would be able to go home.  Reassurance given and reminded him that he needs to be able to urinate on his own, eat with minimal nausea and be on oral pain medications.  He has been up in his chair and needs guidance when up but he is stable on his feet.  He continues to use a pillow for pressure against his abdomen.  Incision is tender but healing.  MADAI site is dry and intact.  White patent.  Heparin drip increased to 1600u/hr post a 3000u bolus for a heparin 10a level of 0.12.  Re-check due this evening at 2045 per lab.   New onQ infusion started with 31cc wasted from previous bulb.  He is going to try and eat something (jello and hot cereal)  this evening.

## 2021-08-15 NOTE — PLAN OF CARE
"/83 (BP Location: Right arm)   Pulse 109   Temp 98.5  F (36.9  C) (Oral)   Resp 18   Ht 1.829 m (6')   Wt 107 kg (235 lb 14.3 oz)   SpO2 97%   BMI 31.99 kg/m      Patient tachycardic but otherwise vitally stable. Pain remains unchanged. At start of shift patient reporting a \"bad headache\" and wanted his rizatriptan, provider paged and ordered a one time dose. Once medication arrived on unit patient stated his pain was a 0/10. Told patient that it would be best to use this medication when needed, patient agreeable to this plan and just took Tylenol. Rizatriptan available. Patient using his Dilaudid PCA and has ropivacaine ON-Q pump running at 8mL/hr. Less coughing today then previous, which helps patients pain. Abdomen is slightly distended, guarded and tender to the touch. MADAI site is CDI, small amount of bloody drainage in suction bulb. Hypoactive bowel sounds, patient reports passing gas but no BM yet. Heparin gtt was stopped for an hour per protocol and restarted at 1150 units/hour. After AM morning draw protocol called for a 6,000 unit bolus and increasing dose to 1,450 units/hour. Recheck ordered for noon. Verified that it was OK to proceed with the bolus with pharmacy. Blood glucose stable, no insulin required. Patient did not require any electrolyte replacement today. Continue with plan of care.   Problem: Adult Inpatient Plan of Care  Goal: Plan of Care Review  Outcome: No Change     Problem: Pain Acute  Goal: Acceptable Pain Control and Functional Ability  Outcome: No Change     "

## 2021-08-15 NOTE — PROVIDER NOTIFICATION
"Urology paged #[phone] at [phone]--  \"ERICK [id number]-- pt would like his migraine medication Rizatriptan 10 mg this evening. Could this be ordered? Thank you!\"  "

## 2021-08-15 NOTE — PROGRESS NOTES
Urology  Progress Note    Evaluated by PT - recommending home with assist   Complaining of headache overnight   Pain tolerable  Gas but no BM     Exam  /83 (BP Location: Right arm)   Pulse 109   Temp 98.5  F (36.9  C) (Oral)   Resp 18   Ht 1.829 m (6')   Wt 107 kg (235 lb 14.3 oz)   SpO2 97%   BMI 31.99 kg/m    No acute distress, comfortable appearing   Normal respiratory effort on NC   Abdomen midly distended, appropriately tender, incisions covered by dressing. MADAI Serosanguinous.   Trace pedal edema    UOP 1350/450  MADAI 30/-    Labs  WBC 7.1   Hgb 7.9  Cr 1.69     Assessment/Plan  56 year old y/o male POD#5 s/p right radical nephrectomy with IVC thrombectomy and reconstruction with bovine pericardium patch, lysis of adhesions, and cholecystectomy on 8/10/2021. Post-op course notable for acute blood loss anemia requiring pRBC transfusions (2 units total)    Neuro: Scheduled tylenol, OnQ pump, dilaudid PCA   CV: Remains tachycardic but other VSS.  Pulm: successfully extubated, encourage IS   FEN/GI: Will keep on FLD given poor appetite,  MIVF @ 75/hr.   Endo: sliding scale insulin   : White catheter in place, will TOV prior to discharge   Heme/ID: continue low intensity heparin gtt, acute blood loss anemia w/ Hgb stabilizing, appropriate response to most recent pRBC transfusion.  Activity: Up ad trinh, goal to ambulate QID , PT  PPx: SCDs in bed, pantoprazole, heparin gtt  Dispo: Floor    Seen and examined with the chief resident and staff Dr. Ding. Will discuss with Dr. Agrawal        Contacting the Urology Team     Please use the following job codes to reach the Urology Team. Note that you must use an in house phone and that job codes cannot receive text pages.     On weekdays, dial [phone] (or star-star-star [phone] on the new [employer] telephones) then [phone] to reach the Adult Urology resident or PA on call    On weekdays, dial [phone] (or star-star-star [phone] on the new [employer] telephones) then [phone] to reach the  Pediatric Urology resident    On weeknights and weekends, dial [phone] (or star-star-star [phone] on the new [employer] telephones) then [phone] to reach the Urology resident on call (for both Adult and Pediatrics)

## 2021-08-15 NOTE — PROGRESS NOTES
Perioperative Pain Service Cross Cover Note    S: Patient reports 5/10 pain.  Denies circumoral numbness, metallic taste, lightheadedness, visual and auditory disturbances, disorientation, or drowsiness.    O: Patient is alert and oriented. No signs or symptoms of LAST  Vitals: 112/80, T: 98.9, P: 104, R: 18    A/P: 56 year old @gender@  s/p right radical nephrectomy POD4 and placement of right sided erector spinae catheter (catheter day 5) for analgesia.   Erector spinae catheter bolused with PF bupivicaine 0.25%, 10 mL  at 8:30pm. Catheter incrementally with negative aspirate before and between each mL without complication, no symptoms of local anesthetic toxicity (LAST).  Remained with and assessed patient for 10 min post-injection.  Bedside nurse aware she should continue monitor BP/P, MAP Q 5 min x 30 min, and assess for any untoward effects to local anesthetic following injection and contact anesthesia for any questions or concerns.    Reginald Brody  Anesthesiology resident  [phone]    8/14/2021 8:39 PM

## 2021-08-15 NOTE — PROGRESS NOTES
REGIONAL ANESTHESIA PAIN SERVICE CONTINUOUS NERVE INFUSION NOTE  August 15, 2021    Dimitrios Goldberg is a 56 year old male  s/p right radical nephrectomy POD4 and placement of right sided erector spinae catheter (catheter day 5) for analgesia.  Pt denies weakness nor paresthesias, but still with an adequate sensory block.  No evidence of adverse side effects associated with local anesthetic.     Subjective and Interval History: Overnight events: able to sleep well, pain moderately controlled.  Seen at 0830.  Patient reports 4/10 at rest and 7/10 with activity, adequate pain control with current nerve block infusion and analgesics (see below).  Ambulating with assistance,  no weakness, paresthesias, circumoral numbness, metallic taste or tinnitus.    Antithrombotic/Thrombolytic Therapy ordered:  Heparin infusion    Analgesic Medications:   Medications related to Pain Management (From now, onward)    Start     Dose/Rate Route Frequency Ordered Stop    08/13/21 1632  acetaminophen (TYLENOL) tablet 650 mg      650 mg Oral EVERY 4 HOURS PRN 08/13/21 1632      08/13/21 0427  diphenhydrAMINE (BENADRYL) injection 25 mg      25 mg Intravenous EVERY 6 HOURS PRN 08/13/21 0427      08/12/21 0800  polyethylene glycol (MIRALAX) Packet 17 g      17 g Oral DAILY 08/11/21 1608      08/11/21 2330  HYDROmorphone (DILAUDID) PCA 0.2 mg/mL OPIOID NAIVE (age less than 65 years)       Intravenous CONTINUOUS 08/11/21 2321      08/11/21 2200  nortriptyline (PAMELOR) capsule 10 mg      10 mg Oral AT BEDTIME 08/11/21 1947 08/11/21 2000  senna-docusate (SENOKOT-S/PERICOLACE) 8.6-50 MG per tablet 1 tablet      1 tablet Oral 2 TIMES DAILY 08/11/21 1608 08/11/21 2000  senna-docusate (SENOKOT-S/PERICOLACE) 8.6-50 MG per tablet 2 tablet      2 tablet Oral 2 TIMES DAILY 08/11/21 1608      08/10/21 1941  lidocaine 1 % 0.1-1 mL      0.1-1 mL Other EVERY 1 HOUR PRN 08/10/21 1941      08/10/21 1941  lidocaine (LMX4) cream       Topical EVERY  1 HOUR PRN 08/10/21 1941      08/10/21 1030  ropivacaine 0.2% (NAROPIN) 750 mL in ON-Q C-Bloc select flow ([id number] holds 600-750 mL) single cath disposable pump      8 mL/hr  Irrigation CONTINUOUS 08/10/21 1022             Objective:  Lab results  Recent Labs   Lab Test 08/15/21  0442   WBC 7.1   RBC 2.62*   HGB 7.9*   HCT 24.4*   MCV 93   MCH 30.2   MCHC 32.4   RDW 14.2          Lab Results   Component Value Date    INR 1.23 08/10/2021    INR 1.42 08/10/2021    INR 1.24 08/10/2021       Vitals:    Temp:  [36.5  C (97.7  F)-37.2  C (98.9  F)] 37.1  C (98.7  F)  Pulse:  [] 119  Resp:  [16-18] 18  BP: (106-125)/(75-90) 124/85  SpO2:  [94 %-100 %] 95 %    Exam:   GEN: alert, mild distress and ambulating slowly  Strength overall symmetric  SKIN: right erector spinae (ES) catheter site with dressing c/d/i, no tenderness, erythema, heme, edema      Assessment/PLAN  Catheter Day #6 right erector spinae (ES) T5-6 catheter infusion:  - Bolus: 10 ml 0.25% bupivicaine at 8:40am  - Continuous: ON-Q at 8 ml/hr 0.2% ropivicaine  - pt can receive local anesthetic bolus Q 12 hr PRN, bedside nurse must contact [facility] jobcode pager [phone]  - Anticoagulation: continuous heparin, please contact RAPS if dose or medication is changed  - will continue to follow and adjust as needed  - discussed plan with attending anesthesiologist    Pt given bolus, nursing notified to assess patient over next 30 minutes. No immediate side effects noted within the first 10 minutes.    Antithrombotic/thrombolytic therapy:    - discussed plan with attending anesthesiologist    Nile Flores MD  Regional Anesthesia Pain Service  8/15/2021 12:09 PM    [facility] Contact Info (24 hour job code pager is the last 4 digits) For in-house use only:   Job code ID: [id number]   [facility] 0589  [facility] 0602  [place] phone: dial * * * [phone], enter jobcode ID, then enter call-back number.    Text: Use [email or web address] on the Intranet <Paging/Directory> tab and enter Jobcode  ID.   If no call back at any time, contact the hospital  and ask for RAPS attending or backup

## 2021-08-16 ENCOUNTER — APPOINTMENT (OUTPATIENT)
Dept: PHYSICAL THERAPY | Facility: CLINIC | Age: 56
DRG: 656 | End: 2021-08-16
Attending: UROLOGY
Payer: COMMERCIAL

## 2021-08-16 LAB
ANION GAP SERPL CALCULATED.3IONS-SCNC: 6 MMOL/L (ref 3–14)
ATRIAL RATE - MUSE: 106 BPM
BUN SERPL-MCNC: 14 MG/DL (ref 7–30)
CALCIUM SERPL-MCNC: 8 MG/DL (ref 8.5–10.1)
CHLORIDE BLD-SCNC: 104 MMOL/L (ref 94–109)
CO2 SERPL-SCNC: 27 MMOL/L (ref 20–32)
CREAT SERPL-MCNC: 1.7 MG/DL (ref 0.66–1.25)
DIASTOLIC BLOOD PRESSURE - MUSE: NORMAL MMHG
ERYTHROCYTE [DISTWIDTH] IN BLOOD BY AUTOMATED COUNT: 13.9 % (ref 10–15)
GFR SERPL CREATININE-BSD FRML MDRD: 44 ML/MIN/1.73M2
GLUCOSE BLD-MCNC: 100 MG/DL (ref 70–99)
GLUCOSE BLDC GLUCOMTR-MCNC: 101 MG/DL (ref 70–99)
GLUCOSE BLDC GLUCOMTR-MCNC: 109 MG/DL (ref 70–99)
GLUCOSE BLDC GLUCOMTR-MCNC: 83 MG/DL (ref 70–99)
GLUCOSE BLDC GLUCOMTR-MCNC: 83 MG/DL (ref 70–99)
GLUCOSE BLDC GLUCOMTR-MCNC: 84 MG/DL (ref 70–99)
GLUCOSE BLDC GLUCOMTR-MCNC: 85 MG/DL (ref 70–99)
HCT VFR BLD AUTO: 24.6 % (ref 40–53)
HGB BLD-MCNC: 8 G/DL (ref 13.3–17.7)
INTERPRETATION ECG - MUSE: NORMAL
MAGNESIUM SERPL-MCNC: 2.1 MG/DL (ref 1.6–2.3)
MCH RBC QN AUTO: 30.4 PG (ref 26.5–33)
MCHC RBC AUTO-ENTMCNC: 32.5 G/DL (ref 31.5–36.5)
MCV RBC AUTO: 94 FL (ref 78–100)
P AXIS - MUSE: 5 DEGREES
PATH REPORT.COMMENTS IMP SPEC: ABNORMAL
PATH REPORT.COMMENTS IMP SPEC: ABNORMAL
PATH REPORT.COMMENTS IMP SPEC: YES
PATH REPORT.FINAL DX SPEC: ABNORMAL
PATH REPORT.GROSS SPEC: ABNORMAL
PATH REPORT.MICROSCOPIC SPEC OTHER STN: ABNORMAL
PATH REPORT.RELEVANT HX SPEC: ABNORMAL
PATHOLOGY SYNOPTIC REPORT: ABNORMAL
PHOSPHATE SERPL-MCNC: 2.9 MG/DL (ref 2.5–4.5)
PHOTO IMAGE: ABNORMAL
PLATELET # BLD AUTO: 299 10E3/UL (ref 150–450)
POTASSIUM BLD-SCNC: 3.5 MMOL/L (ref 3.4–5.3)
PR INTERVAL - MUSE: 152 MS
QRS DURATION - MUSE: 92 MS
QT - MUSE: 334 MS
QTC - MUSE: 443 MS
R AXIS - MUSE: -13 DEGREES
RBC # BLD AUTO: 2.63 10E6/UL (ref 4.4–5.9)
SODIUM SERPL-SCNC: 137 MMOL/L (ref 133–144)
SYSTOLIC BLOOD PRESSURE - MUSE: NORMAL MMHG
T AXIS - MUSE: -9 DEGREES
UFH PPP CHRO-ACNC: 0.26 IU/ML
VENTRICULAR RATE- MUSE: 106 BPM
WBC # BLD AUTO: 7.3 10E3/UL (ref 4–11)

## 2021-08-16 PROCEDURE — 36415 COLL VENOUS BLD VENIPUNCTURE: CPT | Performed by: UROLOGY

## 2021-08-16 PROCEDURE — 250N000011 HC RX IP 250 OP 636: Performed by: PHYSICIAN ASSISTANT

## 2021-08-16 PROCEDURE — 85520 HEPARIN ASSAY: CPT | Performed by: UROLOGY

## 2021-08-16 PROCEDURE — 250N000013 HC RX MED GY IP 250 OP 250 PS 637: Performed by: DIETITIAN, REGISTERED

## 2021-08-16 PROCEDURE — 80048 BASIC METABOLIC PNL TOTAL CA: CPT | Performed by: STUDENT IN AN ORGANIZED HEALTH CARE EDUCATION/TRAINING PROGRAM

## 2021-08-16 PROCEDURE — 97116 GAIT TRAINING THERAPY: CPT | Mod: GP

## 2021-08-16 PROCEDURE — 250N000013 HC RX MED GY IP 250 OP 250 PS 637

## 2021-08-16 PROCEDURE — 250N000013 HC RX MED GY IP 250 OP 250 PS 637: Performed by: STUDENT IN AN ORGANIZED HEALTH CARE EDUCATION/TRAINING PROGRAM

## 2021-08-16 PROCEDURE — 258N000003 HC RX IP 258 OP 636: Performed by: STUDENT IN AN ORGANIZED HEALTH CARE EDUCATION/TRAINING PROGRAM

## 2021-08-16 PROCEDURE — 250N000013 HC RX MED GY IP 250 OP 250 PS 637: Performed by: PHYSICIAN ASSISTANT

## 2021-08-16 PROCEDURE — 84100 ASSAY OF PHOSPHORUS: CPT | Performed by: UROLOGY

## 2021-08-16 PROCEDURE — 83735 ASSAY OF MAGNESIUM: CPT | Performed by: UROLOGY

## 2021-08-16 PROCEDURE — 85027 COMPLETE CBC AUTOMATED: CPT | Performed by: STUDENT IN AN ORGANIZED HEALTH CARE EDUCATION/TRAINING PROGRAM

## 2021-08-16 PROCEDURE — 120N000005 HC R&B MS OVERFLOW UMMC

## 2021-08-16 PROCEDURE — 97530 THERAPEUTIC ACTIVITIES: CPT | Mod: GP

## 2021-08-16 PROCEDURE — 250N000011 HC RX IP 250 OP 636: Performed by: STUDENT IN AN ORGANIZED HEALTH CARE EDUCATION/TRAINING PROGRAM

## 2021-08-16 RX ORDER — LISINOPRIL 10 MG/1
10 TABLET ORAL DAILY
Status: DISCONTINUED | OUTPATIENT
Start: 2021-08-16 | End: 2021-08-17 | Stop reason: HOSPADM

## 2021-08-16 RX ORDER — HYDROMORPHONE HCL IN WATER/PF 6 MG/30 ML
0.2 PATIENT CONTROLLED ANALGESIA SYRINGE INTRAVENOUS
Status: DISCONTINUED | OUTPATIENT
Start: 2021-08-16 | End: 2021-08-17 | Stop reason: HOSPADM

## 2021-08-16 RX ORDER — OXYCODONE HYDROCHLORIDE 5 MG/1
5 TABLET ORAL EVERY 4 HOURS PRN
Status: DISCONTINUED | OUTPATIENT
Start: 2021-08-16 | End: 2021-08-17 | Stop reason: HOSPADM

## 2021-08-16 RX ORDER — HYDROMORPHONE HCL IN WATER/PF 6 MG/30 ML
0.2 PATIENT CONTROLLED ANALGESIA SYRINGE INTRAVENOUS
Status: DISCONTINUED | OUTPATIENT
Start: 2021-08-16 | End: 2021-08-16

## 2021-08-16 RX ORDER — ASPIRIN 81 MG/1
81 TABLET, CHEWABLE ORAL DAILY
Status: DISCONTINUED | OUTPATIENT
Start: 2021-08-16 | End: 2021-08-17 | Stop reason: HOSPADM

## 2021-08-16 RX ADMIN — PANTOPRAZOLE SODIUM 40 MG: 40 TABLET, DELAYED RELEASE ORAL at 09:27

## 2021-08-16 RX ADMIN — ACETAMINOPHEN 650 MG: 325 TABLET, FILM COATED ORAL at 23:19

## 2021-08-16 RX ADMIN — DOCUSATE SODIUM 50 MG AND SENNOSIDES 8.6 MG 2 TABLET: 8.6; 5 TABLET, FILM COATED ORAL at 20:10

## 2021-08-16 RX ADMIN — DEXTROSE AND SODIUM CHLORIDE: 5; 900 INJECTION, SOLUTION INTRAVENOUS at 14:39

## 2021-08-16 RX ADMIN — DEXTROSE AND SODIUM CHLORIDE: 5; 900 INJECTION, SOLUTION INTRAVENOUS at 00:41

## 2021-08-16 RX ADMIN — HEPARIN SODIUM 1600 UNITS/HR: 10000 INJECTION, SOLUTION INTRAVENOUS at 04:17

## 2021-08-16 RX ADMIN — ENOXAPARIN SODIUM 80 MG: 80 INJECTION SUBCUTANEOUS at 17:26

## 2021-08-16 RX ADMIN — POLYETHYLENE GLYCOL 3350 17 G: 17 POWDER, FOR SOLUTION ORAL at 09:27

## 2021-08-16 RX ADMIN — OXYCODONE 5 MG: 5 TABLET ORAL at 21:40

## 2021-08-16 RX ADMIN — NORTRIPTYLINE HYDROCHLORIDE 10 MG: 10 CAPSULE ORAL at 20:10

## 2021-08-16 RX ADMIN — ASPIRIN 81 MG CHEWABLE TABLET 81 MG: 81 TABLET CHEWABLE at 12:19

## 2021-08-16 RX ADMIN — DOCUSATE SODIUM 50 MG AND SENNOSIDES 8.6 MG 2 TABLET: 8.6; 5 TABLET, FILM COATED ORAL at 09:27

## 2021-08-16 RX ADMIN — LISINOPRIL 10 MG: 10 TABLET ORAL at 14:31

## 2021-08-16 ASSESSMENT — ACTIVITIES OF DAILY LIVING (ADL)
ADLS_ACUITY_SCORE: 18
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
## 2021-08-16 NOTE — PLAN OF CARE
BP (!) 141/89 (BP Location: Left arm)   Pulse 101   Temp 98.6  F (37  C) (Oral)   Resp 18   Ht 1.829 m (6')   Wt 107 kg (235 lb 14.3 oz)   SpO2 98%   BMI 31.99 kg/m        S: no acute events 0700 - 1900    B: 56 year old y/o male POD#5 s/p right radical nephrectomy with IVC thrombectomy and reconstruction with bovine pericardium patch, lysis of adhesions, and cholecystectomy on 8/10/2021. Post-op course notable for acute blood loss anemia requiring pRBC transfusions (2 units total).    A: Afebrile, VSS, (ex slightly elevated diastolic BP and HR), sats 98% on RA. Denies nausea, vomiting. Has barrera catheter in place, with good UOP. 1 BM on shift.  Endorses pain 3-4 this am, resolved after the administration of PCA pump bump x2. Patient up with the assist x1. IV fluids going at 75 ml/hr. Today, heparin gtt was discontinued, patient started on aspirin and lovenox. PCA pump discontinued, patient transitioned to oral Tylenol PRN and PRN IV dilaudid for pain management. Patient doing well has not asked for PRN medication. Patient showered and worked with PT. Has walked the halls x2.     R: No further nursing concerns, continue plan of care.
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
## 2021-08-16 NOTE — PROGRESS NOTES
Dimitrios Goldberg is a 56 year old male  s/p right radical nephrectomy POD4 and placement of right sided erector spinae catheter (catheter day 5) for analgesia.  Pt denies weakness nor paresthesias, but still with an adequate sensory block.  No evidence of adverse side effects associated with local anesthetic.     S: patient feels pain is controlled. Does not feel pain coming from the catheter sites.    Temp: 37.2  C (98.9  F) Temp src: Oral BP: (!) 139/99 Pulse: 113   Resp: 16 SpO2: 95 % O2 Device: None (Room air) Oxygen Delivery: 1 LPM   O: catheter site without erythema or edema. Appear intact and at appropriate depth    A/P: given bolus of 10ml 0.25% bupivacaine at 2030. Nursing notified to check vitals over next 30 minutes. No immediate symptoms of LAST.    Nile Flores DO CA-2  Department of Anesthesiology  [phone]

## 2021-08-16 NOTE — PLAN OF CARE
"/76 (BP Location: Left arm)   Pulse 107   Temp 98.7  F (37.1  C) (Oral)   Resp 19   Ht 1.829 m (6')   Wt 107 kg (235 lb 14.3 oz)   SpO2 99%   BMI 31.99 kg/m      Patient tachycardic but otherwise vitally stable. Is alert and oriented but forgetful at times. Pain remains unchanged. Patient using his Dilaudid PCA and has ropivacaine ON-Q pump running at 8mL/hr. Received a scheduled bolus from anesthesia, tolerated well. Abdomen remains slightly distended, guarded, but less tender to the touch. Hypoactive, but audible bowel sounds noted. Still no bowel movement, but is passing gas. Encouraged to take 2 senna tablets but patient was nervous it would be painful to have a \"forced\" bowel movement so only took 1 senna. Continue to educate patient on post op constipation.  MADAI site is CDI, small amount of bloody drainage in suction bulb.  Heparin gtt to continue to run at 1600 units an hour following the evening heparin 10a result. AM heparin level was within the ordered range, protocol states to leave gtt running at 1600 units/hr. As this was the second level within the ordered range protocol states to have next recheck ordered for tomorrow morning.  Blood glucose stable, no insulin required. Continue with plan of care.    Problem: Pain Acute  Goal: Acceptable Pain Control and Functional Ability  Outcome: No Change     Problem: Risk for Delirium  Goal: Improved Sleep  Outcome: Improving     "

## 2021-08-16 NOTE — PLAN OF CARE
[id number]:  NEURO: A&Ox4. Anxious at times.  RESPIRATORY: O2 sats mid-90s on RA. Lungs clear, bases dim.  CARDIO: /83, HR 110s. Afebrile.  GI/: White with good output. A bit of stomach discomfort - unable to describe, but felt that bowels might be moving a bit. Miralax, Zofran given with some improvement  SKIN: Incision approximated, CDI. MADAI site, Ropivacaine dssg CDI. No new deficits noted.  ACTIVITY: Spent most of this 4 hours in the chair. Took a short walk in the [place] with SBA of 2.  PAIN: Incisional pain controlled with PCA dilaudid.  LINES: PIV x3. Heparin gtt running at 1600 units/hr. Next XA ordered for 2030 tonight. D5NS infusion at 75mL/hr.  PLAN: Continue plan of care. Notify MDs with changes/concerns.

## 2021-08-16 NOTE — PROGRESS NOTES
Urology  Progress Note    No acute events  Ambulated in hallway  Wants to have a regular diet and is itching to leave the hospital     Exam  /76 (BP Location: Left arm)   Pulse 107   Temp 98.7  F (37.1  C) (Oral)   Resp 19   Ht 1.829 m (6')   Wt 107 kg (235 lb 14.3 oz)   SpO2 99%   BMI 31.99 kg/m    No acute distress, comfortable appearing   Normal respiratory effort on NC   Abdomen soft, non-distended, appropriately tender, incisions covered by dressing. MADAI Serosanguinous.     UOP 1750/-  MADAI 20/-    Labs  WBC 7.3   Hgb 8.0  Cr 1.70    Assessment/Plan  56 year old y/o male POD#6 s/p right radical nephrectomy with IVC thrombectomy and reconstruction with bovine pericardium patch, lysis of adhesions, and cholecystectomy on 8/10/2021. Post-op course notable for acute blood loss anemia requiring pRBC transfusions (2 units total)    Neuro: Scheduled tylenol, OnQ pump, dilaudid PCA. Will discuss dc'ing OnQ with anesthesia.   CV: Remains tachycardic but other VSS.  Pulm: successfully extubated, encourage IS   FEN/GI: Regular diet today  MIVF @ 75/hr.   Endo: sliding scale insulin   : White catheter in place, will TOV prior to discharge   Heme/ID: continue low intensity heparin gtt, acute blood loss anemia w/ Hgb stabilizing, appropriate response to most recent pRBC transfusion. Will discuss transitioning to home anticoagulation with transplant.   Activity: Up ad trinh, goal to ambulate QID , PT  PPx: SCDs in bed, pantoprazole, heparin gtt.   Dispo: Floor    Seen and examined with the chief resident. Will discuss with Dr. Agrawal        Contacting the Urology Team     Please use the following job codes to reach the Urology Team. Note that you must use an in house phone and that job codes cannot receive text pages.     On weekdays, dial [phone] (or star-star-star 777 on the new [employer] telephones) then 0817 to reach the Adult Urology resident or PA on call    On weekdays, dial [phone] (or star-star-star 777 on the new  [employer] telephones) then 0818 to reach the Pediatric Urology resident    On weeknights and weekends, dial [phone] (or star-star-star 777 on the new [employer] telephones) then [phone] to reach the Urology resident on call (for both Adult and Pediatrics)

## 2021-08-16 NOTE — PROGRESS NOTES
"Urology Brief Note:  Spoke with transplant surgery (Anand).  He recommended changing from low intensity heparin gtt to \"equivalent\" SQ heparin dosing and continuing a 30 day course.  Also will need to be on lifelong aspirin 81mg daily.      Discussed SQ heparin equivalencies with the pharmacist who recommends lovenox SQ 80mg twice daily x 30 days.     - Will start asa 81mg now.  Continue lifelong  - Once epidural has been removed will start lovenox SQ 80mg bid and continue for 30 days postop.    - No need for dedicated transplant surgery followup given that he will have routine surveillance imaging that will also image the IVC graft    [facility]  [phone]  "

## 2021-08-17 ENCOUNTER — APPOINTMENT (OUTPATIENT)
Dept: PHYSICAL THERAPY | Facility: CLINIC | Age: 56
DRG: 656 | End: 2021-08-17
Attending: UROLOGY
Payer: COMMERCIAL

## 2021-08-17 VITALS
HEART RATE: 98 BPM | HEIGHT: 72 IN | SYSTOLIC BLOOD PRESSURE: 137 MMHG | DIASTOLIC BLOOD PRESSURE: 96 MMHG | RESPIRATION RATE: 16 BRPM | WEIGHT: 222.8 LBS | BODY MASS INDEX: 30.18 KG/M2 | TEMPERATURE: 99.5 F | OXYGEN SATURATION: 99 %

## 2021-08-17 LAB
ALBUMIN SERPL-MCNC: 2 G/DL (ref 3.4–5)
ALP SERPL-CCNC: 105 U/L (ref 40–150)
ALT SERPL W P-5'-P-CCNC: 72 U/L (ref 0–70)
ANION GAP SERPL CALCULATED.3IONS-SCNC: 4 MMOL/L (ref 3–14)
AST SERPL W P-5'-P-CCNC: 33 U/L (ref 0–45)
BILIRUB DIRECT SERPL-MCNC: <0.1 MG/DL (ref 0–0.2)
BILIRUB SERPL-MCNC: 0.3 MG/DL (ref 0.2–1.3)
BUN SERPL-MCNC: 13 MG/DL (ref 7–30)
CALCIUM SERPL-MCNC: 8.6 MG/DL (ref 8.5–10.1)
CHLORIDE BLD-SCNC: 108 MMOL/L (ref 94–109)
CO2 SERPL-SCNC: 28 MMOL/L (ref 20–32)
CREAT SERPL-MCNC: 1.63 MG/DL (ref 0.66–1.25)
ERYTHROCYTE [DISTWIDTH] IN BLOOD BY AUTOMATED COUNT: 13.6 % (ref 10–15)
GFR SERPL CREATININE-BSD FRML MDRD: 46 ML/MIN/1.73M2
GLUCOSE BLD-MCNC: 101 MG/DL (ref 70–99)
GLUCOSE BLDC GLUCOMTR-MCNC: 79 MG/DL (ref 70–99)
GLUCOSE BLDC GLUCOMTR-MCNC: 92 MG/DL (ref 70–99)
HCT VFR BLD AUTO: 24.1 % (ref 40–53)
HGB BLD-MCNC: 7.9 G/DL (ref 13.3–17.7)
MAGNESIUM SERPL-MCNC: 2.1 MG/DL (ref 1.6–2.3)
MCH RBC QN AUTO: 30.3 PG (ref 26.5–33)
MCHC RBC AUTO-ENTMCNC: 32.8 G/DL (ref 31.5–36.5)
MCV RBC AUTO: 92 FL (ref 78–100)
PHOSPHATE SERPL-MCNC: 2.8 MG/DL (ref 2.5–4.5)
PLATELET # BLD AUTO: 350 10E3/UL (ref 150–450)
POTASSIUM BLD-SCNC: 3.2 MMOL/L (ref 3.4–5.3)
PROT SERPL-MCNC: 5.8 G/DL (ref 6.8–8.8)
RBC # BLD AUTO: 2.61 10E6/UL (ref 4.4–5.9)
SODIUM SERPL-SCNC: 140 MMOL/L (ref 133–144)
UFH PPP CHRO-ACNC: 0.2 IU/ML
WBC # BLD AUTO: 8.1 10E3/UL (ref 4–11)

## 2021-08-17 PROCEDURE — 36415 COLL VENOUS BLD VENIPUNCTURE: CPT | Performed by: UROLOGY

## 2021-08-17 PROCEDURE — 97116 GAIT TRAINING THERAPY: CPT | Mod: GP

## 2021-08-17 PROCEDURE — 82248 BILIRUBIN DIRECT: CPT | Performed by: PHYSICIAN ASSISTANT

## 2021-08-17 PROCEDURE — 250N000013 HC RX MED GY IP 250 OP 250 PS 637: Performed by: DIETITIAN, REGISTERED

## 2021-08-17 PROCEDURE — 80048 BASIC METABOLIC PNL TOTAL CA: CPT | Performed by: STUDENT IN AN ORGANIZED HEALTH CARE EDUCATION/TRAINING PROGRAM

## 2021-08-17 PROCEDURE — 85014 HEMATOCRIT: CPT | Performed by: STUDENT IN AN ORGANIZED HEALTH CARE EDUCATION/TRAINING PROGRAM

## 2021-08-17 PROCEDURE — 250N000013 HC RX MED GY IP 250 OP 250 PS 637: Performed by: PHYSICIAN ASSISTANT

## 2021-08-17 PROCEDURE — 250N000013 HC RX MED GY IP 250 OP 250 PS 637: Performed by: STUDENT IN AN ORGANIZED HEALTH CARE EDUCATION/TRAINING PROGRAM

## 2021-08-17 PROCEDURE — 97530 THERAPEUTIC ACTIVITIES: CPT | Mod: GP

## 2021-08-17 PROCEDURE — 85520 HEPARIN ASSAY: CPT | Performed by: UROLOGY

## 2021-08-17 PROCEDURE — 84100 ASSAY OF PHOSPHORUS: CPT | Performed by: UROLOGY

## 2021-08-17 PROCEDURE — 258N000003 HC RX IP 258 OP 636: Performed by: STUDENT IN AN ORGANIZED HEALTH CARE EDUCATION/TRAINING PROGRAM

## 2021-08-17 PROCEDURE — 83735 ASSAY OF MAGNESIUM: CPT | Performed by: UROLOGY

## 2021-08-17 PROCEDURE — 250N000013 HC RX MED GY IP 250 OP 250 PS 637: Performed by: UROLOGY

## 2021-08-17 RX ORDER — POLYETHYLENE GLYCOL 3350 17 G/17G
17 POWDER, FOR SOLUTION ORAL DAILY
Qty: 510 G | Refills: 0 | Status: SHIPPED | OUTPATIENT
Start: 2021-08-17 | End: 2021-12-07

## 2021-08-17 RX ORDER — ASPIRIN 81 MG/1
81 TABLET, CHEWABLE ORAL DAILY
Qty: 90 TABLET | Refills: 12 | Status: SHIPPED | OUTPATIENT
Start: 2021-08-18 | End: 2022-08-08

## 2021-08-17 RX ORDER — AMOXICILLIN 250 MG
1 CAPSULE ORAL 2 TIMES DAILY
Qty: 20 TABLET | Refills: 0 | Status: SHIPPED | OUTPATIENT
Start: 2021-08-17 | End: 2021-12-07

## 2021-08-17 RX ORDER — POTASSIUM CHLORIDE 750 MG/1
40 TABLET, EXTENDED RELEASE ORAL ONCE
Status: COMPLETED | OUTPATIENT
Start: 2021-08-17 | End: 2021-08-17

## 2021-08-17 RX ORDER — OXYCODONE HYDROCHLORIDE 5 MG/1
5 TABLET ORAL EVERY 4 HOURS PRN
Qty: 12 TABLET | Refills: 0 | Status: SHIPPED | OUTPATIENT
Start: 2021-08-17 | End: 2021-08-20

## 2021-08-17 RX ADMIN — ASPIRIN 81 MG CHEWABLE TABLET 81 MG: 81 TABLET CHEWABLE at 08:32

## 2021-08-17 RX ADMIN — DEXTROSE AND SODIUM CHLORIDE: 5; 900 INJECTION, SOLUTION INTRAVENOUS at 08:33

## 2021-08-17 RX ADMIN — POLYETHYLENE GLYCOL 3350 17 G: 17 POWDER, FOR SOLUTION ORAL at 08:32

## 2021-08-17 RX ADMIN — DOCUSATE SODIUM 50 MG AND SENNOSIDES 8.6 MG 2 TABLET: 8.6; 5 TABLET, FILM COATED ORAL at 08:31

## 2021-08-17 RX ADMIN — PANTOPRAZOLE SODIUM 40 MG: 40 TABLET, DELAYED RELEASE ORAL at 08:32

## 2021-08-17 RX ADMIN — LISINOPRIL 10 MG: 10 TABLET ORAL at 08:32

## 2021-08-17 RX ADMIN — POTASSIUM CHLORIDE 40 MEQ: 750 TABLET, EXTENDED RELEASE ORAL at 08:32

## 2021-08-17 ASSESSMENT — ACTIVITIES OF DAILY LIVING (ADL)
ADLS_ACUITY_SCORE: 18

## 2021-08-17 ASSESSMENT — MIFFLIN-ST. JEOR: SCORE: 1878.61

## 2021-08-17 NOTE — PLAN OF CARE
BP (!) 137/96 (BP Location: Left arm)   Pulse 98   Temp 99.5  F (37.5  C) (Oral)   Resp 16   Ht 1.829 m (6')   Wt 101.1 kg (222 lb 12.8 oz)   SpO2 99%   BMI 30.22 kg/m        S: Patient ready for discharge today    B: 56 year old y/o male POD#7 s/p right radical nephrectomy with IVC thrombectomy and reconstruction with bovine pericardium patch, lysis of adhesions, and cholecystectomy on 8/10/2021. Post-op course notable for acute blood loss anemia requiring pRBC transfusions (2 units total)    A: Afebrile, VSS (ex elevated diastolic pressures). Sats 99% on RA. Denies pain, nausea, vomiting. Up with SBA and walker. 1 BM this am. White catheter removed,patient voided spontaneously for 300 ml. Patient evaluated by PT, provided walker for discharge. PT recommends home health visit, which she discussed with care coordinator. Patient eating small amounts. He is eager to return home. Discussed safe ambulation and provided discharge teaching according to discharge instructions placed by urology team.    R: No further nursing concerns, continue plan of care.

## 2021-08-17 NOTE — PROGRESS NOTES
Urology  Progress Note    Pain well controlled off of PCA   Transitioned to lovenox   Nerve catheter removed   Working with PT and ambulating       Exam  /88   Pulse 109   Temp 98.5  F (36.9  C) (Oral)   Resp 18   Ht 1.829 m (6')   Wt 107 kg (235 lb 14.3 oz)   SpO2 97%   BMI 31.99 kg/m    No acute distress, comfortable appearing   Normal respiratory effort on room air   Abdomen soft, non-distended, appropriately tender, incisions covered by dressing. MADAI Serosanguinous.     UOP 1550/-  MADAI 5/-    Labs  WBC 8.1   Hgb 7.9  Cr 1.60    Assessment/Plan  56 year old y/o male POD#7 s/p right radical nephrectomy with IVC thrombectomy and reconstruction with bovine pericardium patch, lysis of adhesions, and cholecystectomy on 8/10/2021. Post-op course notable for acute blood loss anemia requiring pRBC transfusions (2 units total)    Neuro: Scheduled tylenol, PRN dilaudid/oxy.   CV: Remains tachycardic but other VSS.  Pulm: Encourage IS   FEN/GI: Regular diet MIVF @ 75/hr.   Endo: sliding scale insulin   : Repeat TOV today. Remove MADAI today.   Heme/ID: ASA 81mg, lovenox   Activity: Up ad trinh, goal to ambulate QID , PT  PPx: SCDs in bed, pantoprazole, ASA/lovenox   Dispo: Discharge home     Seen and examined with the chief resident. Will discuss with Dr. Agrawal        Contacting the Urology Team     Please use the following job codes to reach the Urology Team. Note that you must use an in house phone and that job codes cannot receive text pages.     On weekdays, dial [phone] (or star-star-star 777 on the new [email or web address] telephones) then [phone] to reach the Adult Urology resident or PA on call    On weekdays, dial [phone] (or star-star-star 777 on the new [email or web address] telephones) then 0818 to reach the Pediatric Urology resident    On weeknights and weekends, dial [phone] (or star-star-star 777 on the new [email or web address] telephones) then [phone] to reach the Urology resident on call (for both Adult and Pediatrics)

## 2021-08-17 NOTE — PLAN OF CARE
Pt continues to be tachy overnight but other VSS. Getting up with SBA and walker to bathroom. Had 1 BM overnight & passing flatus. Pain per pt is a constant dull ache. PRN oxycodone given x1 & tylenol x1. Some relief if pain from medication. White intact with good urine output. MADAI drain in intact with minimal drainage. BS stable. No corrective insulin required. Potassium replacement scheduled for 0800 with recheck at 1200 today. Encouraging PO intake & ambulation. Will continue to follow plan of care.     Problem: Adult Inpatient Plan of Care  Goal: Plan of Care Review  Outcome: No Change  Goal: Patient-Specific Goal (Individualized)  Outcome: No Change  Goal: Absence of Hospital-Acquired Illness or Injury  Outcome: No Change  Intervention: Identify and Manage Fall Risk  Recent Flowsheet Documentation  Taken 8/16/2021 2000 by Nazanin Weber, RN  Safety Promotion/Fall Prevention: assistive device/personal items within reach  Intervention: Prevent Skin Injury  Recent Flowsheet Documentation  Taken 8/16/2021 2000 by Nazanin Weber, RN  Body Position: position changed independently  Goal: Optimal Comfort and Wellbeing  Outcome: No Change  Goal: Readiness for Transition of Care  Outcome: No Change     Problem: Risk for Delirium  Goal: Optimal Coping  Outcome: No Change  Goal: Improved Behavioral Control  Outcome: No Change  Goal: Improved Attention and Thought Clarity  Outcome: No Change  Goal: Improved Sleep  Outcome: No Change     Problem: Pain Acute  Goal: Acceptable Pain Control and Functional Ability  Outcome: No Change

## 2021-08-17 NOTE — PROGRESS NOTES
Pt discharged to: home  Via: personal vehicle  Accompanied by: spouse  Belongings: cell phone, shoes, personal clothing  Teaching: discharge teaching  Clinic appointment: 8/26  Report called/faxed: n/a  Local housing: n/a
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
## 2021-08-17 NOTE — PROGRESS NOTES
Care Management Follow Up    Length of Stay (days): 7    Expected Discharge Date: 08/17/2021     Concerns to be Addressed:       Patient plan of care discussed at interdisciplinary rounds: Spoke with bedside RN and PT    Anticipated Discharge Disposition:  Home  Anticipated Discharge Services:  OP PT  Anticipated Discharge DME:        Additional Information:  Discharge orders placed. Spoke with PT who is recommended one session with home PT. Unfortunately home care does not provide one session. PT does not recommend OP PT.     Spoke with patient who lives with wife and 13 YO child. He is independent at baseline. Discussed discharge recommendations. He would like an OP PT referral made to the [facility] system faxed to [phone]        April Mcconnell [place], [place]  Float Care Coordinator  Covering 7D [facility]   Pager: [phone]

## 2021-08-18 ENCOUNTER — PATIENT OUTREACH (OUTPATIENT)
Dept: UROLOGY | Facility: CLINIC | Age: 56
End: 2021-08-18

## 2021-08-18 NOTE — PLAN OF CARE
Physical Therapy Discharge Summary    Reason for therapy discharge:    Discharged to home with home therapy.    Progress towards therapy goal(s). See goals on Care Plan in [facility] electronic health record for goal details.  Goals partially met.  Barriers to achieving goals:   discharge from facility.    Therapy recommendation(s):    Continued therapy is recommended.  Rationale/Recommendations:  to progress strength, endurance, and balance to return pt to PLOF.

## 2021-08-23 ENCOUNTER — TELEPHONE (OUTPATIENT)
Dept: UROLOGY | Facility: CLINIC | Age: 56
End: 2021-08-23

## 2021-08-23 DIAGNOSIS — D49.511 NEOPLASM OF RIGHT KIDNEY WITH THROMBUS OF INFERIOR VENA CAVA (H): Primary | ICD-10-CM

## 2021-08-23 DIAGNOSIS — I82.220 NEOPLASM OF RIGHT KIDNEY WITH THROMBUS OF INFERIOR VENA CAVA (H): Primary | ICD-10-CM

## 2021-08-23 NOTE — TELEPHONE ENCOUNTER
[facility] Call Center    Phone Message    May a detailed message be left on voicemail: yes     Reason for Call: Medication Refill Request    Has the patient contacted the pharmacy for the refill? Yes   Name of medication being requested: Oxycodone  Provider who prescribed the medication: Dr. Agrawal  Pharmacy: [facility] [place] [facility]  Date medication is needed: 08/23/2021   Pt is still in pain from surgery and is out of pain medication      Action Taken: Message routed to:  [facility] Uro    Travel Screening: Not Applicable

## 2021-08-23 NOTE — TELEPHONE ENCOUNTER
[facility] Call Center    Phone Message    May a detailed message be left on voicemail: yes     Reason for Call: Other: Dimitrios aziza Chasity's call.  He states he missed it because it came in as a restricted number, but will answer it this next time.  Please call him at your earliest convenience.  Thank you!     Action Taken: Message routed to:  [facility]  Urology    Travel Screening: Not Applicable

## 2021-08-23 NOTE — TELEPHONE ENCOUNTER
[facility] Call Center    Phone Message    May a detailed message be left on voicemail: yes     Reason for Call: Other: Dimitrios is calling in regards to getting a refill on his pain medication. He is hoping to get it refilled by the end of the day 8/23 being that he is having pain. Please give him a call to discuss. Thanks     Action Taken: Message routed to:  [facility] Uro    Travel Screening: Not Applicable

## 2021-08-23 NOTE — TELEPHONE ENCOUNTER
He wants more pain meds   He is always having dull incisional pain at level 2-3 but gets often sharp pain at 8-9  feels like needles poking him  He has the pharmacy listed at the end of the message [facility] Chasity Madison LPN Staff Nurse

## 2021-08-24 RX ORDER — OXYCODONE HYDROCHLORIDE 5 MG/1
5 TABLET ORAL EVERY 6 HOURS PRN
Qty: 15 TABLET | Refills: 0 | Status: SHIPPED | OUTPATIENT
Start: 2021-08-24 | End: 2021-12-07

## 2021-08-26 ENCOUNTER — OFFICE VISIT (OUTPATIENT)
Dept: UROLOGY | Facility: CLINIC | Age: 56
End: 2021-08-26
Payer: COMMERCIAL

## 2021-08-26 VITALS
HEIGHT: 72 IN | SYSTOLIC BLOOD PRESSURE: 95 MMHG | HEART RATE: 102 BPM | BODY MASS INDEX: 29.8 KG/M2 | WEIGHT: 220 LBS | DIASTOLIC BLOOD PRESSURE: 65 MMHG

## 2021-08-26 DIAGNOSIS — C64.1 RENAL CELL CARCINOMA, RIGHT (H): ICD-10-CM

## 2021-08-26 DIAGNOSIS — D49.511 NEOPLASM OF RIGHT KIDNEY WITH THROMBUS OF INFERIOR VENA CAVA (H): Primary | ICD-10-CM

## 2021-08-26 DIAGNOSIS — I82.220 NEOPLASM OF RIGHT KIDNEY WITH THROMBUS OF INFERIOR VENA CAVA (H): Primary | ICD-10-CM

## 2021-08-26 PROCEDURE — 99024 POSTOP FOLLOW-UP VISIT: CPT | Performed by: UROLOGY

## 2021-08-26 ASSESSMENT — MIFFLIN-ST. JEOR: SCORE: 1865.91

## 2021-08-26 ASSESSMENT — PAIN SCALES - GENERAL: PAINLEVEL: MILD PAIN (3)
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
## 2021-08-26 NOTE — NURSING NOTE
Chief Complaint   Patient presents with     Surgical Followup     post op       Blood pressure 95/65, pulse 102, height 1.829 m (6'), weight 99.8 kg (220 lb). Body mass index is 29.84 kg/m .    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)     Right renal mass       No Known Allergies    Current Outpatient Medications   Medication Sig Dispense Refill     acetaminophen (TYLENOL) 500 MG tablet Take 500-1,000 mg by mouth every 8 hours as needed for mild pain       acyclovir (ZOVIRAX) 800 MG tablet Take 800 mg by mouth as needed        aspirin (ASA) 81 MG chewable tablet Take 1 tablet (81 mg) by mouth daily 90 tablet 12     enoxaparin ANTICOAGULANT (LOVENOX) 80 MG/0.8ML syringe Inject 0.8 mLs (80 mg) Subcutaneous every 12 hours 48 mL 0     lisinopril-hydrochlorothiazide (ZESTORETIC) 10-12.5 MG tablet Take 1 tablet by mouth every evening        nortriptyline (PAMELOR) 10 MG capsule Take 10 mg by mouth At Bedtime        oxyCODONE (ROXICODONE) 5 MG tablet Take 1 tablet (5 mg) by mouth every 6 hours as needed for pain 15 tablet 0     polyethylene glycol (MIRALAX) 17 GM/Dose powder Take 17 g by mouth daily 510 g 0     rizatriptan (MAXALT-MLT) 10 MG ODT Take 10 mg by mouth as needed for migraine        senna-docusate (SENOKOT-S/PERICOLACE) 8.6-50 MG tablet Take 1 tablet by mouth 2 times daily 20 tablet 0       Social History     Tobacco Use     Smoking status: Former Smoker     Types: Cigarettes     Smokeless tobacco: Never Used   Substance Use Topics     Alcohol use: Not Currently     Drug use: Not Currently       Mariana Liu  8/26/2021  11:27 AM

## 2021-08-26 NOTE — PATIENT INSTRUCTIONS
Please schedule a follow up appointment with Dr. Agrawal in 3 months (11/23/2021 10:15 AM) with CT and labs prior.     It was a pleasure meeting with you today.  Thank you for allowing me and my team the privilege of caring for you today.  YOU are the reason we are here, and I truly hope we provided you with the excellent service you deserve.  Please let us know if there is anything else we can do for you so that we can be sure you are leaving completely satisfied with your care experience.

## 2021-08-29 ENCOUNTER — TELEPHONE (OUTPATIENT)
Dept: MULTI SPECIALTY CLINIC | Facility: CLINIC | Age: 56
End: 2021-08-29

## 2021-08-29 NOTE — TELEPHONE ENCOUNTER
Telephone Encounter    Date: 5:21 PM; 8/29/2021  Patient Name: Dimitrios Goldberg  MRN: [id number]    Dimitrios Goldberg is a 56 year old year old male with a history of a renal mass that was suspicious for renal cell carcinoma as well as a level III IVC tumor thrombus present. He underwent resection of this mass with assistance from transplant surgery as well as reconstruction of his IVC, extensive lysis of adhesions, cholecystectomy, and para-caval retroperitoneal lymphadenectomy on 8/10/2021. He was discharged from the hospital on 8/17/2021 and calls today reporting falls, and low blood pressure. He states that his PO intake has been a little less than he would want it to be although feels he's been making a normal amount of urine. He is still taking oxycodone PRN for pain.     We discussed the possible etiologies for his falls including anemia, hypotension secondary to poor PO intake, and side effects of medication. I encouraged him to stand up slowly and to continue to monitor his symptoms. I encouraged him to ensure he is eating and drinking normally, and also to limit his use of narcotic pain medications. I also encouraged him to seek out higher level care for his symptomatic hypotension and to obtain basic labs, as he may be anemic or have other acute electrolyte abnormalities contributing to his symptoms. He is amenable to this plan and will look for an urgent care vs ER to get a formal evaluation.     Plans:  - Patient will present to an urgent care vs ER for a formal evaluation, vitals, physical exam, and basic labs  - Further workup and care pending evaluation   - Patient advised that because this is an encounter performed over the telephone, I am not able to perform a physical exam and thus any advice given is limited in nature and may not be completely informed.  The patient accepts this risk and if they have concerns with it they should be seen and evaluated in person by a medical professional.   -  Discussed strict return precautions, including but not limited to: fevers > 101.4, chills, an inability to keep food or fluids down, increasing hematuria, and an inability to urinate.  - Patient expressed understanding and was in agreement with plan.    --    Armando Mcclure MD

## 2021-08-30 ENCOUNTER — HOSPITAL ENCOUNTER (EMERGENCY)
Facility: CLINIC | Age: 56
Discharge: HOME | End: 2021-08-30
Attending: EMERGENCY MEDICINE | Admitting: EMERGENCY MEDICINE
Payer: COMMERCIAL

## 2021-08-30 ENCOUNTER — APPOINTMENT (OUTPATIENT)
Dept: CT IMAGING | Facility: CLINIC | Age: 56
End: 2021-08-30
Attending: EMERGENCY MEDICINE
Payer: COMMERCIAL

## 2021-08-30 VITALS
DIASTOLIC BLOOD PRESSURE: 94 MMHG | OXYGEN SATURATION: 100 % | HEART RATE: 91 BPM | TEMPERATURE: 97.7 F | RESPIRATION RATE: 18 BRPM | SYSTOLIC BLOOD PRESSURE: 118 MMHG

## 2021-08-30 DIAGNOSIS — E86.0 DEHYDRATION: ICD-10-CM

## 2021-08-30 DIAGNOSIS — R42 DIZZINESS: ICD-10-CM

## 2021-08-30 LAB
ABO/RH(D): NORMAL
ALBUMIN SERPL-MCNC: 2.6 G/DL (ref 3.4–5)
ALP SERPL-CCNC: 66 U/L (ref 40–150)
ALT SERPL W P-5'-P-CCNC: 19 U/L (ref 0–70)
ANION GAP SERPL CALCULATED.3IONS-SCNC: 5 MMOL/L (ref 3–14)
ANTIBODY SCREEN: NEGATIVE
AST SERPL W P-5'-P-CCNC: 9 U/L (ref 0–45)
BASOPHILS # BLD AUTO: 0 10E3/UL (ref 0–0.2)
BASOPHILS NFR BLD AUTO: 0 %
BILIRUB SERPL-MCNC: 0.2 MG/DL (ref 0.2–1.3)
BUN SERPL-MCNC: 20 MG/DL (ref 7–30)
CALCIUM SERPL-MCNC: 7.7 MG/DL (ref 8.5–10.1)
CHLORIDE BLD-SCNC: 109 MMOL/L (ref 94–109)
CO2 SERPL-SCNC: 26 MMOL/L (ref 20–32)
CREAT SERPL-MCNC: 1.68 MG/DL (ref 0.66–1.25)
EOSINOPHIL # BLD AUTO: 0 10E3/UL (ref 0–0.7)
EOSINOPHIL NFR BLD AUTO: 1 %
ERYTHROCYTE [DISTWIDTH] IN BLOOD BY AUTOMATED COUNT: 13.7 % (ref 10–15)
GFR SERPL CREATININE-BSD FRML MDRD: 45 ML/MIN/1.73M2
GLUCOSE BLD-MCNC: 78 MG/DL (ref 70–99)
HCT VFR BLD AUTO: 31.7 % (ref 40–53)
HGB BLD-MCNC: 9.8 G/DL (ref 13.3–17.7)
IMM GRANULOCYTES # BLD: 0 10E3/UL
IMM GRANULOCYTES NFR BLD: 0 %
LACTATE SERPL-SCNC: 1.1 MMOL/L (ref 0.7–2)
LYMPHOCYTES # BLD AUTO: 1.4 10E3/UL (ref 0.8–5.3)
LYMPHOCYTES NFR BLD AUTO: 29 %
MCH RBC QN AUTO: 29.1 PG (ref 26.5–33)
MCHC RBC AUTO-ENTMCNC: 30.9 G/DL (ref 31.5–36.5)
MCV RBC AUTO: 94 FL (ref 78–100)
MONOCYTES # BLD AUTO: 0.4 10E3/UL (ref 0–1.3)
MONOCYTES NFR BLD AUTO: 8 %
NEUTROPHILS # BLD AUTO: 3 10E3/UL (ref 1.6–8.3)
NEUTROPHILS NFR BLD AUTO: 62 %
NRBC # BLD AUTO: 0 10E3/UL
NRBC BLD AUTO-RTO: 0 /100
PLATELET # BLD AUTO: 464 10E3/UL (ref 150–450)
POTASSIUM BLD-SCNC: 3.4 MMOL/L (ref 3.4–5.3)
PROT SERPL-MCNC: 6 G/DL (ref 6.8–8.8)
RBC # BLD AUTO: 3.37 10E6/UL (ref 4.4–5.9)
SODIUM SERPL-SCNC: 140 MMOL/L (ref 133–144)
SPECIMEN EXPIRATION DATE: NORMAL
WBC # BLD AUTO: 4.8 10E3/UL (ref 4–11)

## 2021-08-30 PROCEDURE — 96360 HYDRATION IV INFUSION INIT: CPT | Performed by: EMERGENCY MEDICINE

## 2021-08-30 PROCEDURE — 93010 ELECTROCARDIOGRAM REPORT: CPT | Performed by: EMERGENCY MEDICINE

## 2021-08-30 PROCEDURE — 80053 COMPREHEN METABOLIC PANEL: CPT | Performed by: EMERGENCY MEDICINE

## 2021-08-30 PROCEDURE — 99285 EMERGENCY DEPT VISIT HI MDM: CPT | Mod: 25 | Performed by: EMERGENCY MEDICINE

## 2021-08-30 PROCEDURE — 85025 COMPLETE CBC W/AUTO DIFF WBC: CPT | Performed by: EMERGENCY MEDICINE

## 2021-08-30 PROCEDURE — 74176 CT ABD & PELVIS W/O CONTRAST: CPT | Mod: 26 | Performed by: RADIOLOGY

## 2021-08-30 PROCEDURE — 74176 CT ABD & PELVIS W/O CONTRAST: CPT

## 2021-08-30 PROCEDURE — 93005 ELECTROCARDIOGRAM TRACING: CPT | Performed by: EMERGENCY MEDICINE

## 2021-08-30 PROCEDURE — 86900 BLOOD TYPING SEROLOGIC ABO: CPT | Performed by: EMERGENCY MEDICINE

## 2021-08-30 PROCEDURE — 36415 COLL VENOUS BLD VENIPUNCTURE: CPT | Performed by: EMERGENCY MEDICINE

## 2021-08-30 PROCEDURE — 96361 HYDRATE IV INFUSION ADD-ON: CPT | Performed by: EMERGENCY MEDICINE

## 2021-08-30 PROCEDURE — 83605 ASSAY OF LACTIC ACID: CPT | Performed by: EMERGENCY MEDICINE

## 2021-08-30 PROCEDURE — 258N000003 HC RX IP 258 OP 636: Performed by: EMERGENCY MEDICINE

## 2021-08-30 RX ORDER — SODIUM CHLORIDE 9 MG/ML
INJECTION, SOLUTION INTRAVENOUS CONTINUOUS
Status: DISCONTINUED | OUTPATIENT
Start: 2021-08-30 | End: 2021-08-30 | Stop reason: HOSPADM

## 2021-08-30 RX ADMIN — SODIUM CHLORIDE 1000 ML: 9 INJECTION, SOLUTION INTRAVENOUS at 11:59

## 2021-08-30 RX ADMIN — SODIUM CHLORIDE 1000 ML: 9 INJECTION, SOLUTION INTRAVENOUS at 14:48

## 2021-08-30 ASSESSMENT — ENCOUNTER SYMPTOMS
HEADACHES: 0
DIZZINESS: 1
NECK PAIN: 0
VOMITING: 0
DIARRHEA: 0
LIGHT-HEADEDNESS: 1
SHORTNESS OF BREATH: 0
ABDOMINAL PAIN: 1
NAUSEA: 0

## 2021-08-30 NOTE — DISCHARGE INSTRUCTIONS
Your CT scan and labs today were unremarkable.  Your symptoms are likely related to inadequate fluid and salt intake.  Do your best to maintain oral intake.  And then follow-up on the 14th at your previously scheduled appointment to discuss further treatment with oncology.  Follow-up with your primary care provider.  Return to the emergency department as needed for any new or worsening symptoms.
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
## 2021-08-30 NOTE — ED TRIAGE NOTES
"Patient arrives via EMS, from [facility].     Reports patient syncopal episodes x2 yesterday. Orthostatic BP's reported. Reports history of soft and low BP's \"Trouble getting it above 100 sbp\".     18 R AC. aprox 400cc of Normal saline.     .     Reports, Right sided nephrectomy 3 weeks ago.     VSS en route   "

## 2021-08-31 PROBLEM — A60.00 HERPES GENITALIS: Status: ACTIVE | Noted: 2021-08-31

## 2021-08-31 PROBLEM — S43.439A LABRAL TEAR OF SHOULDER: Status: ACTIVE | Noted: 2021-08-31

## 2021-08-31 PROBLEM — N28.89 RIGHT RENAL MASS: Status: RESOLVED | Noted: 2021-07-29 | Resolved: 2021-08-31

## 2021-08-31 PROBLEM — C64.1 RENAL CELL CARCINOMA, RIGHT (H): Status: ACTIVE | Noted: 2021-08-31

## 2021-08-31 LAB
ATRIAL RATE - MUSE: 90 BPM
DIASTOLIC BLOOD PRESSURE - MUSE: NORMAL MMHG
INTERPRETATION ECG - MUSE: NORMAL
P AXIS - MUSE: 20 DEGREES
PR INTERVAL - MUSE: 152 MS
QRS DURATION - MUSE: 90 MS
QT - MUSE: 362 MS
QTC - MUSE: 442 MS
R AXIS - MUSE: -12 DEGREES
SYSTOLIC BLOOD PRESSURE - MUSE: NORMAL MMHG
T AXIS - MUSE: 13 DEGREES
VENTRICULAR RATE- MUSE: 90 BPM

## 2021-08-31 NOTE — TELEPHONE ENCOUNTER
RECORDS STATUS - ALL OTHER DIAGNOSIS      RECORDS RECEIVED FROM: [facility]   DATE RECEIVED: 8/31   NOTES STATUS DETAILS   OFFICE NOTE from referring provider [facility] Feng Agrawal MD in [facility] [place], [place] UROLOGY: 8/26/21   OFFICE NOTE from medical oncologist     DISCHARGE SUMMARY from hospital     DISCHARGE REPORT from the ER [facility] 8/30/21: Epic  7/25/21:    OPERATIVE REPORT Epic 8/10/21: Nephrectomy   MEDICATION LIST [facility] 8/30/21   CLINICAL TRIAL TREATMENTS TO DATE     LABS     PATHOLOGY REPORTS [facility] 8/10/21: Surg Path   ANYTHING RELATED TO DIAGNOSIS Epic 8/30/21   GENONOMIC TESTING     TYPE:     IMAGING (NEED IMAGES & REPORT)     CT SCANS PACS 8/30/21: Epic  7/26/21, 7/21/21:    MRI PACS [facility]   MAMMO     ULTRASOUND     PET

## 2021-09-04 ENCOUNTER — HEALTH MAINTENANCE LETTER (OUTPATIENT)
Age: 56
End: 2021-09-04

## 2021-09-14 ENCOUNTER — PRE VISIT (OUTPATIENT)
Dept: ONCOLOGY | Facility: CLINIC | Age: 56
End: 2021-09-14

## 2021-10-05 ENCOUNTER — ONCOLOGY VISIT (OUTPATIENT)
Dept: ONCOLOGY | Facility: CLINIC | Age: 56
End: 2021-10-05
Attending: UROLOGY
Payer: COMMERCIAL

## 2021-10-05 VITALS
SYSTOLIC BLOOD PRESSURE: 135 MMHG | HEART RATE: 89 BPM | DIASTOLIC BLOOD PRESSURE: 92 MMHG | WEIGHT: 196.5 LBS | OXYGEN SATURATION: 99 % | TEMPERATURE: 98.4 F | BODY MASS INDEX: 26.65 KG/M2

## 2021-10-05 DIAGNOSIS — I82.220 NEOPLASM OF RIGHT KIDNEY WITH THROMBUS OF INFERIOR VENA CAVA (H): ICD-10-CM

## 2021-10-05 DIAGNOSIS — D49.511 NEOPLASM OF RIGHT KIDNEY WITH THROMBUS OF INFERIOR VENA CAVA (H): ICD-10-CM

## 2021-10-05 PROCEDURE — 99205 OFFICE O/P NEW HI 60 MIN: CPT | Performed by: INTERNAL MEDICINE

## 2021-10-05 PROCEDURE — G0463 HOSPITAL OUTPT CLINIC VISIT: HCPCS

## 2021-10-05 NOTE — PROGRESS NOTES
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  He was having right flank pain and presented to the ED on 7/19/2021.  He was discharged home on NSAIDs and Flexeril.  The following week he noted marked swelling in both of his legs.  He presented to the urgent care on 7/25/2021 and was eventually admitted after his creatinine was noted to be elevated at 1.9 and a CT showed a 8 x 8 cm right renal mass with IVC invasion and tumor thrombus.  He was worked up with an MRI of the abdomen on 8/5/2021 which again revealed 9.3 x 7.5 cm exophytic mass arising from the right kidney.  There was additional heterogeneous enhancing tumor thrombus that extended through the right renal vein into the IVC up to the intrahepatic portion.  Pathology from this resection has revealed 10.5 cm clear cell carcinoma arising from the right kidney with 20% necrosis, grade 3 of 4.  Tumor thrombus extended into the liver and consistent with RCC.    CURRENT INTERVENTIONS:  Post right radical nephrectomy     HISTORY OF PRESENT ILLNESS   Dimitrios Goldberg is 56-year-old male with no significant past medical history who has been recently diagnosed with locally advanced kidney cancer has been referred to medical oncology for consideration of adjuvant therapy.    He noticed one day that his legs were swollen. He went to  on 7/25/21. He had been wearing a boot as he had fractured his left toe for 2 weeks starting 7/4/21. He noticed swelling in his right leg. He was referred to ED from  as he needed more work up per them.      He has returned to work yesterday. His job is not very intense. He has irritation in his scars. He gets a funny feeling even if his shirt brushes on the scar. Nothing else seems to be wrong.     He has hypertension. He is not taking the medication now as it was taking his blood pressure too low. He is on ASA daily. He is taking tylenol, nortriptyline, rizatriptan for his headaches. He takes nortriptyline daily and has to take tylenol and rizatriptan if he gets his headaches.      PAST MEDICAL HISTORY   - hypertension   - Migraine headaches   - RCC - clear cell as above      CURRENT OUTPATIENT MEDICATIONS     Current Outpatient Medications   Medication Sig     acetaminophen (TYLENOL) 500 MG tablet Take 500-1,000 mg by mouth every 8 hours as needed for mild pain     acyclovir (ZOVIRAX) 800 MG tablet Take 800 mg by mouth as needed      aspirin (ASA) 81 MG chewable tablet Take 1 tablet (81 mg) by mouth daily     nortriptyline (PAMELOR) 10 MG capsule Take 10 mg by mouth At Bedtime      rizatriptan (MAXALT-MLT) 10 MG ODT Take 10 mg by mouth as needed for migraine      lisinopril-hydrochlorothiazide (ZESTORETIC) 10-12.5 MG tablet Take 1 tablet by mouth every evening      oxyCODONE (ROXICODONE) 5 MG tablet Take 1 tablet (5 mg) by mouth every 6 hours as needed for pain     polyethylene glycol (MIRALAX) 17 GM/Dose powder Take 17 g by mouth daily     senna-docusate (SENOKOT-S/PERICOLACE) 8.6-50 MG tablet Take 1 tablet by mouth 2 times daily     No current facility-administered medications for this visit.        ALLERGIES    No Known Allergies     SOCIAL HISTORY   He is  and has 1 kid with current wife and 4 with previous wife. His daughter is 14 yrs old.   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     He quit smoking a long time ago. He smoked about 1-2 packs a day. Smoke more on the days he drank. He quit in 2000 - for about 15 yrs. He quit drinking in 1994. He denies drugs since 1994.       FAMILY HISTORY   - none in immediate family   Father  in the night  - siblings are healthy     REVIEW OF SYSTEMS   As above in the HPI, o/w complete 12-point ROS was negative.     PHYSICAL EXAM   B/P: 135/92, T: 98.4, P: 89, R: Data Unavailable  Wt Readings from Last 3 Encounters:   10/05/21 89.1 kg (196 lb 8 oz)   21 99.8 kg (220 lb)   21 101.1 kg (222 lb 12.8 oz)     GEN: NAD  HEENT: PERRL, EOMI, no icterus, injection or pallor. Oropharynx is clear.  NECK: no cervical or supraclavicular lymphadenopathy  LUNGS: clear bilaterally  CV: regular, no murmurs, rubs, or gallops  ABDOMEN: soft, non-tender, non-distended, normal bowel sounds, no hepatosplenomegaly by percussion or palpation  EXT: warm, well perfused, no edema  NEURO: alert  SKIN: no rashes     LABORATORY AND IMAGING STUDIES     Recent Labs   Lab Test 21  1156 21  1132 21  0819 21  0345 21  2311 21  0829 21  0429 08/15/21  1207 08/15/21  0442 21  0831 21  0527     --   --  140  --   --  137  --  138  --  138   POTASSIUM 3.4  --   --  3.2*  --   --  3.5  --  3.6  --  3.8   CHLORIDE 109  --   --  108  --   --  104  --  105  --  105   CO2 26  --   --  28  --   --  27  --  28  --  31   ANIONGAP 5  --   --  4  --   --  6  --  5  --  2*   BUN 20  --   --  13  --   --  14  --  16  --  20   CR 1.68*  --   --  1.63*  --   --  1.70*  --  1.69*  --  1.91*   GLC 78 79 92 101* 109*   < > 100*   < > 102*   < > 99   BETSY 7.7*  --   --  8.6  --   --  8.0*  --  8.1*  --  8.0*    < > = values in this interval not displayed.     Recent Labs   Lab Test 21  0345 21  0429 08/15/21  0442 21  0527 21  0437   MAG 2.1 2.1 2.2 2.3 2.1   PHOS 2.8 2.9 2.5 2.6 3.3     Recent Labs   Lab Test 21  1156 21  0345 21  0429 08/15/21  0442 21  1709 21  1058 21  0527 08/10/21  1529 08/10/21  1425 08/10/21  1128 08/10/21  0707   WBC 4.8 8.1 7.3 7.1  --   --   8.1   < > 11.0   < > 5.7   HGB 9.8* 7.9* 8.0* 7.9* 8.5*   < > 7.0*   < > 9.8*   < > 11.0*   * 350 299 264  --   --  217   < > 253   < > 386   MCV 94 92 94 93  --   --  93   < > 89   < > 86   NEUTROPHIL 62  --   --   --   --   --   --   --  87  --  60    < > = values in this interval not displayed.     Recent Labs   Lab Test 08/30/21  1156 08/17/21  0345 08/15/21  0442   BILITOTAL 0.2 0.3 0.4   ALKPHOS 66 105 94   ALT 19 72* 110*   AST 9 33 54*   ALBUMIN 2.6* 2.0* 2.0*     No results found for: TSH  No results for input(s): CEA in the last 59425 hours.  Results for orders placed or performed during the hospital encounter of 08/30/21   CT Abdomen Pelvis w/o Contrast    Narrative    EXAMINATION: CT ABDOMEN PELVIS W/O CONTRAST  8/30/2021 12:54 PM      CLINICAL HISTORY: Abdominal pain, fever, post-op; Abdominal pain,  dizziness, status post 3 weeks right-sided nephrectomy    COMPARISON: MRI 8/5/2021    PROCEDURE COMMENTS: CT of the abdomen and pelvis was performed without  intravenous and oral contrast. Coronal and sagittal reformatted images  of the abdomen and pelvis obtained.    FINDINGS:    Support devices: None.    Lower chest:  Trace right pleural effusion versus pleural thickening. Linear  subsegmental atelectasis in the lung bases.     Abdomen/pelvis:  Scattered hepatic hypoattenuating lesions are better characterized on  comparison MRI as likely hemangiomas. Cholecystectomy. No intra or  extrahepatic biliary dilation. The pancreas, adrenal glands, and  spleen are normal.    Postsurgical changes of right nephrectomy. Mixed attenuation  postsurgical fluid, fat stranding, and soft tissue thickening in the  right nephrectomy bed. No definite abscess formation. No appreciable  left renal mass, stone, or hydronephrosis. Normal bladder, prostate,  and seminal vesicles. Trace free fluid in the pelvis. Trace residual  postoperative free air anterior to the left hepatic lobe. There are no  abnormally dilated or  thickened loops of small bowel or colon. Normal  appendix. Colonic diverticulosis.    Upper abdominal wall incisional changes without associated collection.  Scattered soft tissue nodules in the subcutaneous fat of the anterior  abdominal wall, likely sequela of subcutaneous injections. There are  no abnormally sized lymph nodes.    Bones:   Normal. Benign bone island in the inferior end plate of L1.      Impression    IMPRESSION:    1. Postoperative changes of right nephrectomy. No definite abscess  formation.  2. Incisional changes in the upper abdominal wall without associated  collection.  3. Scattered hepatic hypodensities are most consistent with  hemangiomas on comparison MRI.    I have personally reviewed the examination and initial interpretation  and I agree with the findings.    DANAY BORGES,          SYSTEM ID:  [id number]     Case Report   Surgical Pathology Report                         Case: [id number]                                   Authorizing Provider:  Feng Agrawal      Collected:           08/10/2021 02:53 PM                                  MD Shahram                                                                    Ordering Location:     [facility] OR                 Received:            08/11/2021 08:38 AM           Pathologist:           Jesus Simpson MD                                                            Specimens:   A) - Gallbladder                                                                                     B) - Kidney, Right, Right Kidney                                                                     C) - Other, Inferior vena Cava Thrombus                                                              D) - Lymph Node(s), Intra aortic vena cava Lymph node                                      Final Diagnosis   A. Gallbladder, cholecystectomy:  - Chronic cholecystitis     B. Right kidney, radical nephrectomy:  - Clear cell renal cell carcinoma, WHO/ISUP grade  "G3  - Please refer to synoptic report for details     C. Inferior vena cava Thrombus, thrombectomy:  - Positive for clear cell renal cell carcinoma     D. Intra aortic vena cava Lymph node, excision:  - Two reactive lymph nodes (0/2)      Electronically signed by Jesus Simpson MD on 8/16/2021 at  5:51 PM   Synoptic Checklist     KIDNEY: Nephrectomy  8th Edition - Protocol posted: 2/26/2020  KIDNEY: NEPHRECTOMY, PARTIAL OR RADICAL - B  SPECIMEN   Procedure  Radical nephrectomy    Specimen Laterality  Right    TUMOR   Tumor Site  Middle      Lower pole    Histologic Type  Clear cell renal cell carcinoma    Histologic Grade  G3: Nucleoli conspicuous and eosinophilic at 100x magnification    Tumor Size  Greatest Dimension (Centimeters): 10.5 cm   Additional Dimension (Centimeters)  8.4 cm     7.7 cm   Tumor Focality  Unifocal    Tumor Extension  Tumor extension into renal sinus      Tumor extension into major vein (renal vein or its segmental branches, inferior vena cava)    Sarcomatoid Features  Not identified    Rhabdoid Features  Not identified    Tumor Necrosis  Present    Percentage of Necrosis  20 %   Lymphovascular Invasion  Not identified    MARGINS   Margins  Uninvolved by invasive carcinoma    LYMPH NODES   Number of Lymph Nodes Involved  0    Number of Lymph Nodes Examined  2    PATHOLOGIC STAGE CLASSIFICATION (pTNM, AJCC 8th Edition)      Primary Tumor (pT)  pT3b    Regional Lymph Nodes (pN)  pN0    ADDITIONAL FINDINGS   Pathologic Findings in Nonneoplastic Kidney  Benign nephrosclerosis    Comment(s)   Comment(s)  Tumor thrombus is present at the vein margin but is not attached to the vein wall.    .      Clinical Information   LAXMI Arellano is a 56-year-old male with right renal mass with IVC tumor thrombus.      Gross Description   LAXMI NUNEZ Gallbladder, :  The specimen is received in formalin with proper patient identification, labeled \"gallbladder\".  The specimen consists of a 9.7 x 2.3 x 1.6 cm " "intact, roughened, pink-tan gallbladder with 0.5 cm of patent cystic duct.  The lumen is dilated and contains viscous green-yellow bile; no choleliths are present.  The mucosa is velvety tan-pink and bile-stained.  The wall thickness ranges from 0.2 to 0.5 cm. Specimen is representatively submitted in cassette A1 (cystic duct inked black).     B. Kidney, Right, Right Kidney:  The specimen is received fresh with proper patient identification labeled \"right kidney\".  The specimen consists of a 600.2 g right nephrectomy specimen (16.2 x 10.1 x 7.6 cm).  The mid-inferior kidney contains a 10.5 x 8.4 by 7.7 centimeters tan-yellow to tan-pink, moderately circumscribed, nodular, cortical mass. The mass has focal hemorrhage near the hilum (approximately 35% of the mass) and appears dusky dark-brown near the inferior anti-hilum (approximately 15% of the mass).  The mass measures 0.6 cm from the renal artery, 1.2 cm from the ureteral margin, grossly abuts the renal vein margin and perinephric fat, and appears to grossly invade into the renal sinus fat and calyces; renal sinus fat is scant. Gerota's fascia is inked blue, and outer surface of specimen is inked black. No adrenal gland is grossly identified. No hilar lymph nodes are grossly identified. Specimen is representatively submitted as follows:     Summary of Sections:  B1: ureteral and vascular margins, en face  B2: mass with relationship to renal pelvis/ureter  B3: mass with relationship to renal vein, including dusky area of tumor  B4: mass with relationship to renal sinus fat  B5: mass with relationship to to Gerota's fascia and renal capsule  B6: mass with relationship to outer surface and perinephric fat   B7: sections of mass from hemorrhagic area  B8: sections of mass from dusky area  B9-B11: sections of mass near hilar vessel region  B12: section of mass with possible invasion of renal vein  B13: mass with relationship to unremarkable renal parenchyma  B14: " "unremarkable renal parenchyma      C. Other, Inferior vena Cava Thrombus:  The specimen is received fresh with proper patient identification labeled \"inferior vena cava thrombus\".  The specimen consists of a 7.2 x 3.2 x 1.9 cm aggregate of brown-red blood clot and tan-pink, friable, papillary soft tissue.  Specimen is representatively submitted in cassettes C1-C2.     D. Lymph Node(s), Intra aortic vena cava Lymph node:  The specimen is received in formalin with proper patient identification, labeled \"intra aortic vena cava lymph node\".  The specimen consists of 2 pieces of tan-brown soft tissue measuring 2.5 x 2.5 x 0.4 cm and 4.4 x 2.4 x 1.0 cm, containing 2 pink-red, firm possible lymph nodes measuring 1.4 and 1.5 cm in greatest dimension. The smaller possible lymph node also contains an eccentric 0.2 cm white-tan discoloration. Both possible lymph nodes are submitted in their entirety in cassettes D1-D2.             ASSESSMENT    1. Stage III (pT3,cN0,M0), clear-cell carcinoma from right kidney with tumor thrombus extending into the intrahepatic IVC  2. Hypertension and chronic kidney disease  3. ECOG performance status of zero    DISCUSSION   I had a lengthy discussion with Dimitrios who is alone at this clinic visit.  I explained him that his pathology has revealed clear cell kidney cancer.  He had locally advanced disease.  I explained him the staging for solid tumors in general and kidney cancer in particular.  He has at least stage III disease with the tumor thrombus being positive for tumor extension into the intrahepatic IVC.    I reviewed actual images from his CT scan of the abdomen and pelvis with him and showed him his tumor in the right kidney.  I explained him that without any additional therapy based on the MSKCC nomogram he has over 26% chance of disease recurrence within the next 5 years.  Various different strategies have been tried to improve the success rate of surgery.  I reviewed adjuvant " therapy post resection of primary tumor in general for most malignancies and specific to kidney cancer. Adjuvant therapy is helpful when there is high chance of systemic failure and systemic therapy has good efficacy.Together these factors contribute to improved cure rates with utilizing adjuvant systemic therapy.  VEGF-TKI have been tested in the adjuvant setting and sunitinib has been approved.  I briefly reviewed the two studies - ASSURE and S-TRAC - both of which exploreD the option of adjuvant sunitinib and placebo.  In addition, the ASSURE trial had a third comparison arm of sorafenib.  In the ASSURE trial, 1,943 patients were randomized to either sunitinib, sorafenib or a placebo.  There were no differences in disease-free survival which was the primary endpoint of the study for the 3 arms.  The disease-free survival for sunitinib was 5.8 years, sorafenib was 6.1 years, and 6.6 years for placebo.  Based on the study, sunitinib or sorafenib was not considered a good adjuvant treatment.  However, results from S-TRAC study which has been recently published suggested benefit for sunitinib when used for a period of 1 year.  In this study, the median disease-free survival for the sunitinib arm was 6.8 years as compared to 5.6 years for the placebo arm.  The data for survival is not available at this point in time.  Indeed, the patient selection between the two studies are likely different and might have contributed to the difference in the study results.  In particular, the ASSURE study allowed patients with stage I (pT1b tumors as long as they had Sonya grade 3 or 4 disease), while the S-TRAC study did not allow this and had only patients with stage II or III disease.         Due to the significant toxicity and the limited benefit with sunitinib I usually do not recommend sunitinib and adjuvant setting.      I reviewed the keynote-564 study which I personally participated in that compared to pembrolizumab to  placebo post nephrectomy.  The data from the study has been recently published in the NEJM and it does suggest improvement in both progression free survival and overall survival with 1 year of treatment with PD1 inhibitor pembrolizumab. N Engl J Med 2021; 385:683-694.  I explained him that pembrolizumab is not yet approved by the FDA for adjuvant therapy in kidney cancer but it is quite likely that it would gain the approval in the near future.  Pembrolizumab has been approved for treatment of recurrent metastatic kidney cancer along with if the VEGF-TKI axitinib or lenvatinib.     I extensively reviewed immunotherapy with a PD-L1 inhibitor - pembrozilumab. I explained the concept of checkpoint inhibitor with a physiological role to sustain an effective but precise and limited immune response. PD-L1 is expressed on healthy, normal tissue in the area of trauma so that the immune response is limited to dead tissue and the infecting agents and does not extend over to the normal tissue. The tumor uses some of these checks and balances to its advantage and successfully evades the immune system. Pembrozilumab is administered intravenously every 3 weeks as an outpatient and interferes with this negative interaction between white cells and PDL1 on tumor cells permitting anti-tumor response.     This places patient at risk for autoimmunity if there are any white cells directed against any part of our own body. When this immune response involves skin give rash and is called dermatitis, with gut it presents with diarrhea and is called colitis, and with lung it gives shortness of breath and is called pneumonitis. Similarly, depending on the affected tissue, we might have hepatitis, nephritis, thyroiditis, hypophysitis and so on. For the most part, only a few percentage of patients has substantial side effects; for example, pneumonitis or hepatitis. In these cases, we identify and act aggressively. We can use oral steroids to  suppress the autoimmune response. The antitumor response is known to persist even beyond that. We would continue to treat as long as there are no unacceptable side effects and tumor is relatively stable without significant disease progression.     He was little hesitant to consider therapy at this time.  I do not see any staging CT scans done for him.  I suggested that I would follow him in 6 to 8 weeks with labs and restaging scans so that we have chest imaging available.      PLAN   -Recommend adjuvant immunotherapy with pembrolizumab based on the keynote-564 study  -Dimitrios is hesitant about starting therapy at this time  -I would like to see him in 6 to 8 weeks time with labs and restaging scans including CT of chest, abdomen and pelvis.  -We will again reviewed option of adjuvant immunotherapy for a year at the next visit.    60 minutes spent on the date of the encounter doing chart review, history and exam, documentation and further activities as noted above     Nick Campos  Adj ,  Division of Hematology, Oncology & Transplantation  [facility].

## 2021-10-05 NOTE — NURSING NOTE
Oncology Rooming Note    October 5, 2021 3:10 PM   Dimitrios Goldberg is a 56 year old male who presents for:    Chief Complaint   Patient presents with     Oncology Clinic Visit     Neoplasm of right kidney with thrombus of inferior vena cava      Initial Vitals: BP (!) 135/92 (BP Location: Right arm, Patient Position: Sitting, Cuff Size: Adult Regular)   Pulse 89   Temp 98.4  F (36.9  C) (Oral)   Wt 89.1 kg (196 lb 8 oz)   SpO2 99%   BMI 26.65 kg/m   Estimated body mass index is 26.65 kg/m  as calculated from the following:    Height as of 8/26/21: 1.829 m (6').    Weight as of this encounter: 89.1 kg (196 lb 8 oz). Body surface area is 2.13 meters squared.  Data Unavailable Comment: Data Unavailable   No LMP for male patient.  Allergies reviewed: Yes  Medications reviewed: Yes    Medications: MEDICATION REFILLS NEEDED TODAY. Provider was notified.  Pharmacy name entered into [employer]: [id number] [employer] - [place], [place] - [address]    Clinical concerns: Please refill low dose aspirin for patient- would like to  at pharmacy down stairs before he leaves today. Please discuss growth on right kidney- was it cancer?          Denice Reyes LPN October 5, 2021 3:12 PM

## 2021-10-05 NOTE — LETTER
10/5/2021         RE: Dimitrios Goldberg  [address]  [facility], [place] [address]        Dear Colleague,    Thank you for referring your patient, Dimitrios Goldberg, to the [facility]. Please see a copy of my visit note below.    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  He was having right flank pain and presented to the ED on 7/19/2021.  He was discharged home on NSAIDs and Flexeril.  The following week he noted marked swelling in both of his legs.  He presented to the urgent care on 7/25/2021 and was eventually admitted after his creatinine was noted to be elevated at 1.9 and a CT showed a 8 x 8 cm right renal mass with IVC invasion and tumor thrombus.  He was worked up with an MRI of the abdomen on 8/5/2021 which again revealed 9.3 x 7.5 cm exophytic mass arising from the right kidney.  There was additional heterogeneous enhancing tumor thrombus that extended through the right renal vein into the IVC up to the intrahepatic portion.  Pathology from this resection has revealed 10.5 cm clear cell carcinoma arising from the right kidney with 20% necrosis, grade 3 of 4.  Tumor thrombus extended into the liver and consistent with RCC.    CURRENT INTERVENTIONS:  Post right radical nephrectomy     HISTORY OF PRESENT ILLNESS   Dimitrios Goldberg is 56-year-old male with no significant past medical history who has been recently diagnosed with locally advanced kidney cancer has been referred to medical oncology for consideration of adjuvant therapy.    He  noticed one day that his legs were swollen. He went to  on 7/25/21. He had been wearing a boot as he had fractured his left toe for 2 weeks starting 7/4/21. He noticed swelling in his right leg. He was referred to ED from  as he needed more work up per them.     He has returned to work yesterday. His job is not very intense. He has irritation in his scars. He gets a funny feeling even if his shirt brushes on the scar. Nothing else seems to be wrong.     He has hypertension. He is not taking the medication now as it was taking his blood pressure too low. He is on ASA daily. He is taking tylenol, nortriptyline, rizatriptan for his headaches. He takes nortriptyline daily and has to take tylenol and rizatriptan if he gets his headaches.      PAST MEDICAL HISTORY   - hypertension   - Migraine headaches   - RCC - clear cell as above      CURRENT OUTPATIENT MEDICATIONS     Current Outpatient Medications   Medication Sig     acetaminophen (TYLENOL) 500 MG tablet Take 500-1,000 mg by mouth every 8 hours as needed for mild pain     acyclovir (ZOVIRAX) 800 MG tablet Take 800 mg by mouth as needed      aspirin (ASA) 81 MG chewable tablet Take 1 tablet (81 mg) by mouth daily     nortriptyline (PAMELOR) 10 MG capsule Take 10 mg by mouth At Bedtime      rizatriptan (MAXALT-MLT) 10 MG ODT Take 10 mg by mouth as needed for migraine      lisinopril-hydrochlorothiazide (ZESTORETIC) 10-12.5 MG tablet Take 1 tablet by mouth every evening      oxyCODONE (ROXICODONE) 5 MG tablet Take 1 tablet (5 mg) by mouth every 6 hours as needed for pain     polyethylene glycol (MIRALAX) 17 GM/Dose powder Take 17 g by mouth daily     senna-docusate (SENOKOT-S/PERICOLACE) 8.6-50 MG tablet Take 1 tablet by mouth 2 times daily     No current facility-administered medications for this visit.        ALLERGIES    No Known Allergies     SOCIAL HISTORY   He is  and has 1 kid with current wife and 4 with previous wife. His daughter is 14 yrs old.    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     He quit smoking a long time ago. He smoked about 1-2 packs a day. Smoke more on the days he drank. He quit in  - for about 15 yrs. He quit drinking in . He denies drugs since .      FAMILY HISTORY   - none in immediate family   Father  in the night  - siblings are healthy     REVIEW OF SYSTEMS   As above in the HPI, o/w complete 12-point ROS was negative.     PHYSICAL EXAM   B/P: 135/92, T: 98.4, P: 89, R: Data Unavailable  Wt Readings from Last 3 Encounters:   10/05/21 89.1 kg (196 lb 8 oz)   21 99.8 kg (220 lb)   21 101.1 kg (222 lb 12.8 oz)     GEN: NAD  HEENT: PERRL, EOMI, no icterus, injection or pallor. Oropharynx is clear.  NECK: no cervical or supraclavicular lymphadenopathy  LUNGS: clear bilaterally  CV: regular, no murmurs, rubs, or gallops  ABDOMEN: soft, non-tender, non-distended, normal bowel sounds, no hepatosplenomegaly by percussion or palpation  EXT: warm, well perfused, no edema  NEURO: alert  SKIN: no rashes     LABORATORY AND IMAGING STUDIES     Recent Labs   Lab Test 21  1156 21  1132 21  0819 21  0345 21  2311 21  0829 21  0429 08/15/21  1207 08/15/21  0442 21  0831 21  0527     --   --  140  --   --  137  --  138  --  138   POTASSIUM 3.4  --   --  3.2*  --   --  3.5  --  3.6  --  3.8   CHLORIDE 109  --   --  108  --   --  104  --  105  --  105   CO2 26  --   --  28  --   --  27  --  28  --  31   ANIONGAP 5  --   --  4  --   --  6  --  5  --  2*   BUN 20  --   --  13  --   --  14  --  16  --  20   CR 1.68*  --   --  1.63*  --   --  1.70*  --  1.69*  --  1.91*   GLC 78 79 92 101* 109*   < > 100*   < > 102*   < > 99   BETSY 7.7*  --   --  8.6  --   --  8.0*  --  8.1*  --  8.0*    < > = values in this interval not displayed.     Recent Labs   Lab Test 21  0345 21  0429 08/15/21  0442 21  0527 21  0437   MAG 2.1 2.1  2.2 2.3 2.1   PHOS 2.8 2.9 2.5 2.6 3.3     Recent Labs   Lab Test 08/30/21  1156 08/17/21  0345 08/16/21  0429 08/15/21  0442 08/14/21  1709 08/14/21  1058 08/14/21  0527 08/10/21  1529 08/10/21  1425 08/10/21  1128 08/10/21  0707   WBC 4.8 8.1 7.3 7.1  --   --  8.1   < > 11.0   < > 5.7   HGB 9.8* 7.9* 8.0* 7.9* 8.5*   < > 7.0*   < > 9.8*   < > 11.0*   * 350 299 264  --   --  217   < > 253   < > 386   MCV 94 92 94 93  --   --  93   < > 89   < > 86   NEUTROPHIL 62  --   --   --   --   --   --   --  87  --  60    < > = values in this interval not displayed.     Recent Labs   Lab Test 08/30/21  1156 08/17/21  0345 08/15/21  0442   BILITOTAL 0.2 0.3 0.4   ALKPHOS 66 105 94   ALT 19 72* 110*   AST 9 33 54*   ALBUMIN 2.6* 2.0* 2.0*     No results found for: TSH  No results for input(s): CEA in the last 77712 hours.  Results for orders placed or performed during the hospital encounter of 08/30/21   CT Abdomen Pelvis w/o Contrast    Narrative    EXAMINATION: CT ABDOMEN PELVIS W/O CONTRAST  8/30/2021 12:54 PM      CLINICAL HISTORY: Abdominal pain, fever, post-op; Abdominal pain,  dizziness, status post 3 weeks right-sided nephrectomy    COMPARISON: MRI 8/5/2021    PROCEDURE COMMENTS: CT of the abdomen and pelvis was performed without  intravenous and oral contrast. Coronal and sagittal reformatted images  of the abdomen and pelvis obtained.    FINDINGS:    Support devices: None.    Lower chest:  Trace right pleural effusion versus pleural thickening. Linear  subsegmental atelectasis in the lung bases.     Abdomen/pelvis:  Scattered hepatic hypoattenuating lesions are better characterized on  comparison MRI as likely hemangiomas. Cholecystectomy. No intra or  extrahepatic biliary dilation. The pancreas, adrenal glands, and  spleen are normal.    Postsurgical changes of right nephrectomy. Mixed attenuation  postsurgical fluid, fat stranding, and soft tissue thickening in the  right nephrectomy bed. No definite abscess  formation. No appreciable  left renal mass, stone, or hydronephrosis. Normal bladder, prostate,  and seminal vesicles. Trace free fluid in the pelvis. Trace residual  postoperative free air anterior to the left hepatic lobe. There are no  abnormally dilated or thickened loops of small bowel or colon. Normal  appendix. Colonic diverticulosis.    Upper abdominal wall incisional changes without associated collection.  Scattered soft tissue nodules in the subcutaneous fat of the anterior  abdominal wall, likely sequela of subcutaneous injections. There are  no abnormally sized lymph nodes.    Bones:   Normal. Benign bone island in the inferior end plate of L1.      Impression    IMPRESSION:    1. Postoperative changes of right nephrectomy. No definite abscess  formation.  2. Incisional changes in the upper abdominal wall without associated  collection.  3. Scattered hepatic hypodensities are most consistent with  hemangiomas on comparison MRI.    I have personally reviewed the examination and initial interpretation  and I agree with the findings.    DANAY BORGES DO         SYSTEM ID:  [id number]     Case Report   Surgical Pathology Report                         Case: [id number]                                   Authorizing Provider:  Feng Agrawal      Collected:           08/10/2021 02:53 PM                                  MD Shahram                                                                    Ordering Location:     [facility] MAIN OR                 Received:            08/11/2021 08:38 AM           Pathologist:           Jesus Simpson MD                                                            Specimens:   A) - Gallbladder                                                                                     B) - Kidney, Right, Right Kidney                                                                     C) - Other, Inferior vena Cava Thrombus                                                               D) - Lymph Node(s), Intra aortic vena cava Lymph node                                      Final Diagnosis   A. Gallbladder, cholecystectomy:  - Chronic cholecystitis     B. Right kidney, radical nephrectomy:  - Clear cell renal cell carcinoma, WHO/ISUP grade G3  - Please refer to synoptic report for details     C. Inferior vena cava Thrombus, thrombectomy:  - Positive for clear cell renal cell carcinoma     D. Intra aortic vena cava Lymph node, excision:  - Two reactive lymph nodes (0/2)      Electronically signed by Jesus Simpson MD on 8/16/2021 at  5:51 PM   Synoptic Checklist     KIDNEY: Nephrectomy  8th Edition - Protocol posted: 2/26/2020  KIDNEY: NEPHRECTOMY, PARTIAL OR RADICAL - B  SPECIMEN   Procedure  Radical nephrectomy    Specimen Laterality  Right    TUMOR   Tumor Site  Middle      Lower pole    Histologic Type  Clear cell renal cell carcinoma    Histologic Grade  G3: Nucleoli conspicuous and eosinophilic at 100x magnification    Tumor Size  Greatest Dimension (Centimeters): 10.5 cm   Additional Dimension (Centimeters)  8.4 cm     7.7 cm   Tumor Focality  Unifocal    Tumor Extension  Tumor extension into renal sinus      Tumor extension into major vein (renal vein or its segmental branches, inferior vena cava)    Sarcomatoid Features  Not identified    Rhabdoid Features  Not identified    Tumor Necrosis  Present    Percentage of Necrosis  20 %   Lymphovascular Invasion  Not identified    MARGINS   Margins  Uninvolved by invasive carcinoma    LYMPH NODES   Number of Lymph Nodes Involved  0    Number of Lymph Nodes Examined  2    PATHOLOGIC STAGE CLASSIFICATION (pTNM, AJCC 8th Edition)      Primary Tumor (pT)  pT3b    Regional Lymph Nodes (pN)  pN0    ADDITIONAL FINDINGS   Pathologic Findings in Nonneoplastic Kidney  Benign nephrosclerosis    Comment(s)   Comment(s)  Tumor thrombus is present at the vein margin but is not attached to the vein wall.    .      Clinical Information   UUMAGAIL   This is a  "56-year-old male with right renal mass with IVC tumor thrombus.      Gross Description   UDUANE   A. Gallbladder, :  The specimen is received in formalin with proper patient identification, labeled \"gallbladder\".  The specimen consists of a 9.7 x 2.3 x 1.6 cm intact, roughened, pink-tan gallbladder with 0.5 cm of patent cystic duct.  The lumen is dilated and contains viscous green-yellow bile; no choleliths are present.  The mucosa is velvety tan-pink and bile-stained.  The wall thickness ranges from 0.2 to 0.5 cm. Specimen is representatively submitted in cassette A1 (cystic duct inked black).     B. Kidney, Right, Right Kidney:  The specimen is received fresh with proper patient identification labeled \"right kidney\".  The specimen consists of a 600.2 g right nephrectomy specimen (16.2 x 10.1 x 7.6 cm).  The mid-inferior kidney contains a 10.5 x 8.4 by 7.7 centimeters tan-yellow to tan-pink, moderately circumscribed, nodular, cortical mass. The mass has focal hemorrhage near the hilum (approximately 35% of the mass) and appears dusky dark-brown near the inferior anti-hilum (approximately 15% of the mass).  The mass measures 0.6 cm from the renal artery, 1.2 cm from the ureteral margin, grossly abuts the renal vein margin and perinephric fat, and appears to grossly invade into the renal sinus fat and calyces; renal sinus fat is scant. Gerota's fascia is inked blue, and outer surface of specimen is inked black. No adrenal gland is grossly identified. No hilar lymph nodes are grossly identified. Specimen is representatively submitted as follows:     Summary of Sections:  B1: ureteral and vascular margins, en face  B2: mass with relationship to renal pelvis/ureter  B3: mass with relationship to renal vein, including dusky area of tumor  B4: mass with relationship to renal sinus fat  B5: mass with relationship to to Gerota's fascia and renal capsule  B6: mass with relationship to outer surface and perinephric fat   B7: " "sections of mass from hemorrhagic area  B8: sections of mass from dusky area  B9-B11: sections of mass near hilar vessel region  B12: section of mass with possible invasion of renal vein  B13: mass with relationship to unremarkable renal parenchyma  B14: unremarkable renal parenchyma      C. Other, Inferior vena Cava Thrombus:  The specimen is received fresh with proper patient identification labeled \"inferior vena cava thrombus\".  The specimen consists of a 7.2 x 3.2 x 1.9 cm aggregate of brown-red blood clot and tan-pink, friable, papillary soft tissue.  Specimen is representatively submitted in cassettes C1-C2.     D. Lymph Node(s), Intra aortic vena cava Lymph node:  The specimen is received in formalin with proper patient identification, labeled \"intra aortic vena cava lymph node\".  The specimen consists of 2 pieces of tan-brown soft tissue measuring 2.5 x 2.5 x 0.4 cm and 4.4 x 2.4 x 1.0 cm, containing 2 pink-red, firm possible lymph nodes measuring 1.4 and 1.5 cm in greatest dimension. The smaller possible lymph node also contains an eccentric 0.2 cm white-tan discoloration. Both possible lymph nodes are submitted in their entirety in cassettes D1-D2.             ASSESSMENT    1. Stage III (pT3,cN0,M0), clear-cell carcinoma from right kidney with tumor thrombus extending into the intrahepatic IVC  2. Hypertension and chronic kidney disease  3. ECOG performance status of zero    DISCUSSION   I had a lengthy discussion with Dimitrios who is alone at this clinic visit.  I explained him that his pathology has revealed clear cell kidney cancer.  He had locally advanced disease.  I explained him the staging for solid tumors in general and kidney cancer in particular.  He has at least stage III disease with the tumor thrombus being positive for tumor extension into the intrahepatic IVC.    I reviewed actual images from his CT scan of the abdomen and pelvis with him and showed him his tumor in the right kidney.  I " explained him that without any additional therapy based on [place] [facility] [place] he has over 26% chance of disease recurrence within the next 5 years.  Various different strategies have been tried to improve the success rate of surgery.  I reviewed adjuvant therapy post resection of primary tumor in general for most malignancies and specific to kidney cancer. Adjuvant therapy is helpful when there is high chance of systemic failure and systemic therapy has good efficacy.Together these factors contribute to improved cure rates with utilizing adjuvant systemic therapy.  VEGF-TKI have been tested in the adjuvant setting and sunitinib has been approved.  I briefly reviewed the two studies - ASSURE and S-TRAC - both of which exploreD the option of adjuvant sunitinib and placebo.  In addition, the ASSURE trial had a third comparison arm of sorafenib.  In the ASSURE trial, 1,943 patients were randomized to either sunitinib, sorafenib or a placebo.  There were no differences in disease-free survival which was the primary endpoint of the study for the 3 arms.  The disease-free survival for sunitinib was 5.8 years, sorafenib was 6.1 years, and 6.6 years for placebo.  Based on the study, sunitinib or sorafenib was not considered a good adjuvant treatment.  However, results from [facility] study which has been recently published suggested benefit for sunitinib when used for a period of 1 year.  In this study, the median disease-free survival for the sunitinib arm was 6.8 years as compared to 5.6 years for the placebo arm.  The data for survival is not available at this point in time.  Indeed, the patient selection between the two studies are likely different and might have contributed to the difference in the study results.  In particular, the ASSURE study allowed patients with stage I (pT1b tumors as long as they had Sonya grade 3 or 4 disease), while the S-TRAC study did not allow this and had only patients with stage II or III  disease.         Due to the significant toxicity and the limited benefit with sunitinib I usually do not recommend sunitinib and adjuvant setting.      I reviewed the keynote-564 study which I personally participated in that compared to pembrolizumab to placebo post nephrectomy.  The data from the study has been recently published in the NEJM and it does suggest improvement in both progression free survival and overall survival with 1 year of treatment with PD1 inhibitor pembrolizumab. N Engl J Med 2021; 385:683-694.  I explained him that pembrolizumab is not yet approved by the FDA for adjuvant therapy in kidney cancer but it is quite likely that it would gain the approval in the near future.  Pembrolizumab has been approved for treatment of recurrent metastatic kidney cancer along with if the VEGF-TKI axitinib or lenvatinib.     I extensively reviewed immunotherapy with a PD-L1 inhibitor - pembrozilumab. I explained the concept of checkpoint inhibitor with a physiological role to sustain an effective but precise and limited immune response. PD-L1 is expressed on healthy, normal tissue in the area of trauma so that the immune response is limited to dead tissue and the infecting agents and does not extend over to the normal tissue. The tumor uses some of these checks and balances to its advantage and successfully evades the immune system. Pembrozilumab is administered intravenously every 3 weeks as an outpatient and interferes with this negative interaction between white cells and PDL1 on tumor cells permitting anti-tumor response.     This places patient at risk for autoimmunity if there are any white cells directed against any part of our own body. When this immune response involves skin give rash and is called dermatitis, with gut it presents with diarrhea and is called colitis, and with lung it gives shortness of breath and is called pneumonitis. Similarly, depending on the affected tissue, we might have  hepatitis, nephritis, thyroiditis, hypophysitis and so on. For the most part, only a few percentage of patients has substantial side effects; for example, pneumonitis or hepatitis. In these cases, we identify and act aggressively. We can use oral steroids to suppress the autoimmune response. The antitumor response is known to persist even beyond that. We would continue to treat as long as there are no unacceptable side effects and tumor is relatively stable without significant disease progression.     He was little hesitant to consider therapy at this time.  I do not see any staging CT scans done for him.  I suggested that I would follow him in 6 to 8 weeks with labs and restaging scans so that we have chest imaging available.      PLAN   -Recommend adjuvant immunotherapy with pembrolizumab based on the keynote-564 study  -Dimitrios is hesitant about starting therapy at this time  -I would like to see him in 6 to 8 weeks time with labs and restaging scans including CT of chest, abdomen and pelvis.  -We will again reviewed option of adjuvant immunotherapy for a year at the next visit.    60 minutes spent on the date of the encounter doing chart review, history and exam, documentation and further activities as noted above     Nick Campos  Adj ,  Division of Hematology, Oncology & Transplantation  [facility].         Again, thank you for allowing me to participate in the care of your patient.        Sincerely,        Nick Campos MD

## 2021-11-30 ENCOUNTER — ANCILLARY PROCEDURE (OUTPATIENT)
Dept: CT IMAGING | Facility: CLINIC | Age: 56
End: 2021-11-30
Attending: UROLOGY
Payer: COMMERCIAL

## 2021-11-30 ENCOUNTER — LAB (OUTPATIENT)
Dept: LAB | Facility: CLINIC | Age: 56
End: 2021-11-30
Attending: UROLOGY
Payer: COMMERCIAL

## 2021-11-30 DIAGNOSIS — D49.511 NEOPLASM OF RIGHT KIDNEY WITH THROMBUS OF INFERIOR VENA CAVA (H): ICD-10-CM

## 2021-11-30 DIAGNOSIS — I82.220 NEOPLASM OF RIGHT KIDNEY WITH THROMBUS OF INFERIOR VENA CAVA (H): ICD-10-CM

## 2021-11-30 LAB
ALBUMIN SERPL-MCNC: 3.3 G/DL (ref 3.4–5)
ALBUMIN UR-MCNC: NEGATIVE MG/DL
ALP SERPL-CCNC: 77 U/L (ref 40–150)
ALT SERPL W P-5'-P-CCNC: 26 U/L (ref 0–70)
ANION GAP SERPL CALCULATED.3IONS-SCNC: 5 MMOL/L (ref 3–14)
APPEARANCE UR: CLEAR
AST SERPL W P-5'-P-CCNC: 19 U/L (ref 0–45)
BASOPHILS # BLD AUTO: 0 10E3/UL (ref 0–0.2)
BASOPHILS NFR BLD AUTO: 0 %
BILIRUB SERPL-MCNC: 0.2 MG/DL (ref 0.2–1.3)
BILIRUB UR QL STRIP: NEGATIVE
BUN SERPL-MCNC: 18 MG/DL (ref 7–30)
CALCIUM SERPL-MCNC: 8.9 MG/DL (ref 8.5–10.1)
CHLORIDE BLD-SCNC: 103 MMOL/L (ref 94–109)
CO2 SERPL-SCNC: 30 MMOL/L (ref 20–32)
COLOR UR AUTO: COLORLESS
CREAT BLD-MCNC: 1.7 MG/DL (ref 0.7–1.3)
CREAT SERPL-MCNC: 1.48 MG/DL (ref 0.66–1.25)
EOSINOPHIL # BLD AUTO: 0.1 10E3/UL (ref 0–0.7)
EOSINOPHIL NFR BLD AUTO: 3 %
ERYTHROCYTE [DISTWIDTH] IN BLOOD BY AUTOMATED COUNT: 13.9 % (ref 10–15)
GFR SERPL CREATININE-BSD FRML MDRD: 44 ML/MIN/1.73M2
GFR SERPL CREATININE-BSD FRML MDRD: 52 ML/MIN/1.73M2
GLUCOSE BLD-MCNC: 78 MG/DL (ref 70–99)
GLUCOSE UR STRIP-MCNC: NEGATIVE MG/DL
HCT VFR BLD AUTO: 38.4 % (ref 40–53)
HGB BLD-MCNC: 12 G/DL (ref 13.3–17.7)
HGB UR QL STRIP: NEGATIVE
IMM GRANULOCYTES # BLD: 0 10E3/UL
IMM GRANULOCYTES NFR BLD: 0 %
KETONES UR STRIP-MCNC: NEGATIVE MG/DL
LEUKOCYTE ESTERASE UR QL STRIP: NEGATIVE
LYMPHOCYTES # BLD AUTO: 1.8 10E3/UL (ref 0.8–5.3)
LYMPHOCYTES NFR BLD AUTO: 34 %
MCH RBC QN AUTO: 27.3 PG (ref 26.5–33)
MCHC RBC AUTO-ENTMCNC: 31.3 G/DL (ref 31.5–36.5)
MCV RBC AUTO: 87 FL (ref 78–100)
MONOCYTES # BLD AUTO: 0.4 10E3/UL (ref 0–1.3)
MONOCYTES NFR BLD AUTO: 7 %
NEUTROPHILS # BLD AUTO: 3 10E3/UL (ref 1.6–8.3)
NEUTROPHILS NFR BLD AUTO: 56 %
NITRATE UR QL: NEGATIVE
NRBC # BLD AUTO: 0 10E3/UL
NRBC BLD AUTO-RTO: 0 /100
PH UR STRIP: 7 [PH] (ref 5–7)
PLATELET # BLD AUTO: 280 10E3/UL (ref 150–450)
POTASSIUM BLD-SCNC: 4.2 MMOL/L (ref 3.4–5.3)
PROT SERPL-MCNC: 6.8 G/DL (ref 6.8–8.8)
RBC # BLD AUTO: 4.4 10E6/UL (ref 4.4–5.9)
RBC URINE: 1 /HPF
SODIUM SERPL-SCNC: 138 MMOL/L (ref 133–144)
SP GR UR STRIP: 1.03 (ref 1–1.03)
UROBILINOGEN UR STRIP-MCNC: NORMAL MG/DL
WBC # BLD AUTO: 5.4 10E3/UL (ref 4–11)
WBC URINE: <1 /HPF

## 2021-11-30 PROCEDURE — 81001 URINALYSIS AUTO W/SCOPE: CPT | Performed by: PATHOLOGY

## 2021-11-30 PROCEDURE — 80053 COMPREHEN METABOLIC PANEL: CPT | Performed by: PATHOLOGY

## 2021-11-30 PROCEDURE — 85025 COMPLETE CBC W/AUTO DIFF WBC: CPT | Performed by: PATHOLOGY

## 2021-11-30 PROCEDURE — 82565 ASSAY OF CREATININE: CPT | Performed by: PATHOLOGY

## 2021-11-30 PROCEDURE — 74178 CT ABD&PLV WO CNTR FLWD CNTR: CPT | Mod: GC | Performed by: RADIOLOGY

## 2021-11-30 PROCEDURE — 71260 CT THORAX DX C+: CPT | Mod: GC | Performed by: RADIOLOGY

## 2021-11-30 PROCEDURE — 36415 COLL VENOUS BLD VENIPUNCTURE: CPT | Performed by: PATHOLOGY

## 2021-11-30 RX ORDER — IOPAMIDOL 755 MG/ML
120 INJECTION, SOLUTION INTRAVASCULAR ONCE
Status: COMPLETED | OUTPATIENT
Start: 2021-11-30 | End: 2021-11-30

## 2021-11-30 RX ADMIN — IOPAMIDOL 120 ML: 755 INJECTION, SOLUTION INTRAVASCULAR at 13:38

## 2021-12-07 ENCOUNTER — ONCOLOGY VISIT (OUTPATIENT)
Dept: ONCOLOGY | Facility: CLINIC | Age: 56
End: 2021-12-07
Attending: INTERNAL MEDICINE
Payer: COMMERCIAL

## 2021-12-07 ENCOUNTER — PRE VISIT (OUTPATIENT)
Dept: UROLOGY | Facility: CLINIC | Age: 56
End: 2021-12-07

## 2021-12-07 VITALS
WEIGHT: 206.1 LBS | SYSTOLIC BLOOD PRESSURE: 134 MMHG | HEIGHT: 72 IN | DIASTOLIC BLOOD PRESSURE: 92 MMHG | OXYGEN SATURATION: 100 % | BODY MASS INDEX: 27.92 KG/M2 | TEMPERATURE: 98 F | HEART RATE: 80 BPM | RESPIRATION RATE: 16 BRPM

## 2021-12-07 DIAGNOSIS — C64.1 RENAL CELL CARCINOMA, RIGHT (H): ICD-10-CM

## 2021-12-07 DIAGNOSIS — D49.511 NEOPLASM OF RIGHT KIDNEY WITH THROMBUS OF INFERIOR VENA CAVA (H): Primary | ICD-10-CM

## 2021-12-07 DIAGNOSIS — I82.220 NEOPLASM OF RIGHT KIDNEY WITH THROMBUS OF INFERIOR VENA CAVA (H): Primary | ICD-10-CM

## 2021-12-07 PROCEDURE — G0463 HOSPITAL OUTPT CLINIC VISIT: HCPCS

## 2021-12-07 PROCEDURE — 99215 OFFICE O/P EST HI 40 MIN: CPT | Performed by: INTERNAL MEDICINE

## 2021-12-07 RX ORDER — ALBUTEROL SULFATE 0.83 MG/ML
2.5 SOLUTION RESPIRATORY (INHALATION)
Status: CANCELLED | OUTPATIENT
Start: 2022-01-17

## 2021-12-07 RX ORDER — HEPARIN SODIUM (PORCINE) LOCK FLUSH IV SOLN 100 UNIT/ML 100 UNIT/ML
5 SOLUTION INTRAVENOUS
Status: CANCELLED | OUTPATIENT
Start: 2022-01-17

## 2021-12-07 RX ORDER — ALBUTEROL SULFATE 90 UG/1
1-2 AEROSOL, METERED RESPIRATORY (INHALATION)
Status: CANCELLED
Start: 2022-01-17

## 2021-12-07 RX ORDER — MEPERIDINE HYDROCHLORIDE 25 MG/ML
25 INJECTION INTRAMUSCULAR; INTRAVENOUS; SUBCUTANEOUS EVERY 30 MIN PRN
Status: CANCELLED | OUTPATIENT
Start: 2022-01-17

## 2021-12-07 RX ORDER — HEPARIN SODIUM,PORCINE 10 UNIT/ML
5 VIAL (ML) INTRAVENOUS
Status: CANCELLED | OUTPATIENT
Start: 2022-01-17

## 2021-12-07 RX ORDER — LORAZEPAM 2 MG/ML
0.5 INJECTION INTRAMUSCULAR EVERY 4 HOURS PRN
Status: CANCELLED | OUTPATIENT
Start: 2022-01-17

## 2021-12-07 RX ORDER — EPINEPHRINE 1 MG/ML
0.3 INJECTION, SOLUTION INTRAMUSCULAR; SUBCUTANEOUS EVERY 5 MIN PRN
Status: CANCELLED | OUTPATIENT
Start: 2022-01-17

## 2021-12-07 RX ORDER — NALOXONE HYDROCHLORIDE 0.4 MG/ML
0.2 INJECTION, SOLUTION INTRAMUSCULAR; INTRAVENOUS; SUBCUTANEOUS
Status: CANCELLED | OUTPATIENT
Start: 2022-01-17

## 2021-12-07 RX ORDER — METHYLPREDNISOLONE SODIUM SUCCINATE 125 MG/2ML
125 INJECTION, POWDER, LYOPHILIZED, FOR SOLUTION INTRAMUSCULAR; INTRAVENOUS
Status: CANCELLED
Start: 2022-01-17

## 2021-12-07 RX ORDER — DIPHENHYDRAMINE HYDROCHLORIDE 50 MG/ML
50 INJECTION INTRAMUSCULAR; INTRAVENOUS
Status: CANCELLED
Start: 2022-01-17

## 2021-12-07 ASSESSMENT — PAIN SCALES - GENERAL: PAINLEVEL: NO PAIN (0)

## 2021-12-07 ASSESSMENT — MIFFLIN-ST. JEOR: SCORE: 1802.99

## 2021-12-07 NOTE — CONFIDENTIAL NOTE
Reason for visit: Follow up w/ CT and labs     Relevant information: Renal cell carcinoma; right nephrectomy w/ IVC thrombectomy 8/2021    Records/imaging/labs/orders: CT abdomen on 11/30; CBC and CMP on 11/30    Pt called: N/A    At Rooming: Standard
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
## 2021-12-07 NOTE — NURSING NOTE
"Oncology Rooming Note    December 7, 2021 12:26 PM   Dimitrios Goldberg is a 56 year old male who presents for:    Chief Complaint   Patient presents with     Oncology Clinic Visit     [facility] RETURN - RENAL CELL CARCINOMA     Initial Vitals: BP (!) 134/92   Pulse 80   Temp 98  F (36.7  C)   Resp 16   Ht 1.829 m (6' 0.01\")   Wt 93.5 kg (206 lb 1.6 oz)   SpO2 100%   BMI 27.95 kg/m   Estimated body mass index is 27.95 kg/m  as calculated from the following:    Height as of this encounter: 1.829 m (6' 0.01\").    Weight as of this encounter: 93.5 kg (206 lb 1.6 oz). Body surface area is 2.18 meters squared.  No Pain (0) Comment: Data Unavailable   No LMP for male patient.  Allergies reviewed: Yes  Medications reviewed: Yes    Medications: Medication refills not needed today.  Pharmacy name entered into [facility]: [id number] [place] [facility], [place] - [address] [place] [address]    Clinical concerns: No new concerns. [facility] was notified.      Tacho Orosco LPN            "
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
## 2021-12-21 ENCOUNTER — OFFICE VISIT (OUTPATIENT)
Dept: UROLOGY | Facility: CLINIC | Age: 56
End: 2021-12-21
Payer: COMMERCIAL

## 2021-12-21 VITALS
DIASTOLIC BLOOD PRESSURE: 97 MMHG | SYSTOLIC BLOOD PRESSURE: 131 MMHG | HEIGHT: 72 IN | WEIGHT: 200 LBS | HEART RATE: 95 BPM | BODY MASS INDEX: 27.09 KG/M2

## 2021-12-21 DIAGNOSIS — N18.31 CHRONIC KIDNEY DISEASE, STAGE 3A (H): ICD-10-CM

## 2021-12-21 DIAGNOSIS — C64.1 RENAL CELL CARCINOMA, RIGHT (H): Primary | ICD-10-CM

## 2021-12-21 PROCEDURE — 99213 OFFICE O/P EST LOW 20 MIN: CPT | Performed by: UROLOGY

## 2021-12-21 ASSESSMENT — MIFFLIN-ST. JEOR: SCORE: 1775.19

## 2021-12-21 ASSESSMENT — PAIN SCALES - GENERAL: PAINLEVEL: NO PAIN (0)

## 2021-12-21 NOTE — PATIENT INSTRUCTIONS
Please follow up with Dr. Agrawal in 6 months.     It was a pleasure meeting with you today.  Thank you for allowing me and my team the privilege of caring for you today.  YOU are the reason we are here, and I truly hope we provided you with the excellent service you deserve.  Please let us know if there is anything else we can do for you so that we can be sure you are leaving completely satisfied with your care experience.
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
## 2022-01-03 ENCOUNTER — PATIENT OUTREACH (OUTPATIENT)
Dept: ONCOLOGY | Facility: CLINIC | Age: 57
End: 2022-01-03
Payer: COMMERCIAL

## 2022-01-04 NOTE — PROGRESS NOTES
TC to pt returning his call regarding coverage for his immunotherapy. Pt stated [facility] is out of network for him and he needs to get special approval to get ongoing tx with us. Pt stated he was specifically sent to us for surgery and understands he will either have to file the appropriate paperwork to get immunotherapy here or he can go back to [facility] which is in-network. Per Bradley Hammer, pt should go back to the original referring provider if a new referral is needed. My Chart message sent to pt with this information.
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
## 2022-01-14 RX ORDER — PROCHLORPERAZINE MALEATE 10 MG
10 TABLET ORAL EVERY 6 HOURS PRN
Qty: 30 TABLET | Refills: 2 | Status: SHIPPED | OUTPATIENT
Start: 2022-01-14 | End: 2022-07-12

## 2022-01-14 RX ORDER — LORAZEPAM 0.5 MG/1
0.5 TABLET ORAL EVERY 4 HOURS PRN
Qty: 30 TABLET | Refills: 2 | Status: SHIPPED | OUTPATIENT
Start: 2022-01-14 | End: 2022-07-12

## 2022-01-17 ENCOUNTER — ONCOLOGY VISIT (OUTPATIENT)
Dept: ONCOLOGY | Facility: CLINIC | Age: 57
End: 2022-01-17
Attending: PHYSICIAN ASSISTANT
Payer: COMMERCIAL

## 2022-01-17 ENCOUNTER — APPOINTMENT (OUTPATIENT)
Dept: LAB | Facility: CLINIC | Age: 57
End: 2022-01-17
Attending: INTERNAL MEDICINE
Payer: COMMERCIAL

## 2022-01-17 VITALS
DIASTOLIC BLOOD PRESSURE: 91 MMHG | TEMPERATURE: 98.1 F | RESPIRATION RATE: 16 BRPM | BODY MASS INDEX: 28.18 KG/M2 | HEART RATE: 111 BPM | WEIGHT: 207.8 LBS | SYSTOLIC BLOOD PRESSURE: 141 MMHG | OXYGEN SATURATION: 97 %

## 2022-01-17 VITALS — HEART RATE: 90 BPM

## 2022-01-17 DIAGNOSIS — C64.1 RENAL CELL CARCINOMA, RIGHT (H): Primary | ICD-10-CM

## 2022-01-17 DIAGNOSIS — I82.220 NEOPLASM OF RIGHT KIDNEY WITH THROMBUS OF INFERIOR VENA CAVA (H): ICD-10-CM

## 2022-01-17 DIAGNOSIS — D49.511 NEOPLASM OF RIGHT KIDNEY WITH THROMBUS OF INFERIOR VENA CAVA (H): ICD-10-CM

## 2022-01-17 LAB
ALBUMIN SERPL-MCNC: 3.7 G/DL (ref 3.4–5)
ALP SERPL-CCNC: 78 U/L (ref 40–150)
ALT SERPL W P-5'-P-CCNC: 35 U/L (ref 0–70)
ANION GAP SERPL CALCULATED.3IONS-SCNC: 8 MMOL/L (ref 3–14)
AST SERPL W P-5'-P-CCNC: 15 U/L (ref 0–45)
BASOPHILS # BLD AUTO: 0 10E3/UL (ref 0–0.2)
BASOPHILS NFR BLD AUTO: 0 %
BILIRUB SERPL-MCNC: 0.4 MG/DL (ref 0.2–1.3)
BUN SERPL-MCNC: 22 MG/DL (ref 7–30)
CALCIUM SERPL-MCNC: 9.4 MG/DL (ref 8.5–10.1)
CHLORIDE BLD-SCNC: 105 MMOL/L (ref 94–109)
CO2 SERPL-SCNC: 27 MMOL/L (ref 20–32)
CREAT SERPL-MCNC: 1.67 MG/DL (ref 0.66–1.25)
EOSINOPHIL # BLD AUTO: 0.1 10E3/UL (ref 0–0.7)
EOSINOPHIL NFR BLD AUTO: 2 %
ERYTHROCYTE [DISTWIDTH] IN BLOOD BY AUTOMATED COUNT: 15.7 % (ref 10–15)
GFR SERPL CREATININE-BSD FRML MDRD: 48 ML/MIN/1.73M2
GLUCOSE BLD-MCNC: 100 MG/DL (ref 70–99)
HCT VFR BLD AUTO: 43.3 % (ref 40–53)
HGB BLD-MCNC: 13.8 G/DL (ref 13.3–17.7)
IMM GRANULOCYTES # BLD: 0 10E3/UL
IMM GRANULOCYTES NFR BLD: 1 %
LYMPHOCYTES # BLD AUTO: 1.8 10E3/UL (ref 0.8–5.3)
LYMPHOCYTES NFR BLD AUTO: 37 %
MCH RBC QN AUTO: 26.8 PG (ref 26.5–33)
MCHC RBC AUTO-ENTMCNC: 31.9 G/DL (ref 31.5–36.5)
MCV RBC AUTO: 84 FL (ref 78–100)
MONOCYTES # BLD AUTO: 0.4 10E3/UL (ref 0–1.3)
MONOCYTES NFR BLD AUTO: 7 %
NEUTROPHILS # BLD AUTO: 2.5 10E3/UL (ref 1.6–8.3)
NEUTROPHILS NFR BLD AUTO: 53 %
NRBC # BLD AUTO: 0 10E3/UL
NRBC BLD AUTO-RTO: 0 /100
PLATELET # BLD AUTO: 309 10E3/UL (ref 150–450)
POTASSIUM BLD-SCNC: 4.2 MMOL/L (ref 3.4–5.3)
PROT SERPL-MCNC: 7.5 G/DL (ref 6.8–8.8)
RBC # BLD AUTO: 5.14 10E6/UL (ref 4.4–5.9)
SODIUM SERPL-SCNC: 140 MMOL/L (ref 133–144)
TSH SERPL DL<=0.005 MIU/L-ACNC: 0.69 MU/L (ref 0.4–4)
WBC # BLD AUTO: 4.9 10E3/UL (ref 4–11)

## 2022-01-17 PROCEDURE — 36415 COLL VENOUS BLD VENIPUNCTURE: CPT | Performed by: INTERNAL MEDICINE

## 2022-01-17 PROCEDURE — 258N000003 HC RX IP 258 OP 636: Performed by: INTERNAL MEDICINE

## 2022-01-17 PROCEDURE — 85025 COMPLETE CBC W/AUTO DIFF WBC: CPT

## 2022-01-17 PROCEDURE — 99215 OFFICE O/P EST HI 40 MIN: CPT | Performed by: PHYSICIAN ASSISTANT

## 2022-01-17 PROCEDURE — 80053 COMPREHEN METABOLIC PANEL: CPT | Performed by: INTERNAL MEDICINE

## 2022-01-17 PROCEDURE — G0463 HOSPITAL OUTPT CLINIC VISIT: HCPCS

## 2022-01-17 PROCEDURE — 96413 CHEMO IV INFUSION 1 HR: CPT

## 2022-01-17 PROCEDURE — 84443 ASSAY THYROID STIM HORMONE: CPT | Performed by: INTERNAL MEDICINE

## 2022-01-17 PROCEDURE — 250N000011 HC RX IP 250 OP 636: Performed by: INTERNAL MEDICINE

## 2022-01-17 RX ADMIN — SODIUM CHLORIDE 400 MG: 9 INJECTION, SOLUTION INTRAVENOUS at 15:41

## 2022-01-17 RX ADMIN — SODIUM CHLORIDE 250 ML: 9 INJECTION, SOLUTION INTRAVENOUS at 15:41

## 2022-01-17 ASSESSMENT — PAIN SCALES - GENERAL: PAINLEVEL: NO PAIN (0)

## 2022-01-17 NOTE — NURSING NOTE
Chief Complaint   Patient presents with     Blood Draw     PIV started, labs drawn, saline locked, vitals taken, checked into next appt     Donny German RN on 1/17/2022 at 2:27 PM

## 2022-01-17 NOTE — PROGRESS NOTES
Infusion Nursing Note:  Dimitrios Goldberg presents today for Cycle 1 Day 1 Keytruda   Patient seen by provider today: Yes: Tiffanie Rose   present during visit today: Not Applicable.    Note: pt had provider appointment prior to infusion.  Pt states that he is feeling well today for treatment.    Reviewed today's labs and plan of care with patient.      Patient is new to the infusion room today and is receiving keytruda for the first time.  Patient oriented to infusion room, location of bathrooms and nutrition stations, and call light.  Verified that patient received written keytruda information previously; Shazia Yoder RNCC previously provided teaching to patient. Verbally reviewed keytruda teaching, side effects, take-home medications, and follow-up schedule with patient.     Patient instructed to call triage with any questions or if he experiences a temperature >100.5, shaking chills, uncontrolled nausea/vomiting/diarrhea, dizziness, shortness of breath, bleeding not relieved with pressure, or with any other concerns.      Intravenous Access:  Peripheral IV placed.    Treatment Conditions:     Ref. Range 1/17/2022 14:25   Sodium Latest Ref Range: 133 - 144 mmol/L 140   Potassium Latest Ref Range: 3.4 - 5.3 mmol/L 4.2   Chloride Latest Ref Range: 94 - 109 mmol/L 105   Carbon Dioxide Latest Ref Range: 20 - 32 mmol/L 27   Urea Nitrogen Latest Ref Range: 7 - 30 mg/dL 22   Creatinine Latest Ref Range: 0.66 - 1.25 mg/dL 1.67 (H)   GFR Estimate Latest Ref Range: >60 mL/min/1.73m2 48 (L)   Calcium Latest Ref Range: 8.5 - 10.1 mg/dL 9.4   Anion Gap Latest Ref Range: 3 - 14 mmol/L 8   Albumin Latest Ref Range: 3.4 - 5.0 g/dL 3.7   Protein Total Latest Ref Range: 6.8 - 8.8 g/dL 7.5   Bilirubin Total Latest Ref Range: 0.2 - 1.3 mg/dL 0.4   Alkaline Phosphatase Latest Ref Range: 40 - 150 U/L 78   ALT Latest Ref Range: 0 - 70 U/L 35   AST Latest Ref Range: 0 - 45 U/L 15   TSH Latest Ref Range: 0.40 - 4.00 mU/L  0.69   Glucose Latest Ref Range: 70 - 99 mg/dL 100 (H)   WBC Latest Ref Range: 4.0 - 11.0 10e3/uL 4.9   Hemoglobin Latest Ref Range: 13.3 - 17.7 g/dL 13.8   Hematocrit Latest Ref Range: 40.0 - 53.0 % 43.3   Platelet Count Latest Ref Range: 150 - 450 10e3/uL 309   RBC Count Latest Ref Range: 4.40 - 5.90 10e6/uL 5.14   MCV Latest Ref Range: 78 - 100 fL 84   MCH Latest Ref Range: 26.5 - 33.0 pg 26.8   MCHC Latest Ref Range: 31.5 - 36.5 g/dL 31.9   RDW Latest Ref Range: 10.0 - 15.0 % 15.7 (H)   % Neutrophils Latest Units: % 53   % Lymphocytes Latest Units: % 37   % Monocytes Latest Units: % 7   % Eosinophils Latest Units: % 2   % Basophils Latest Units: % 0   Absolute Basophils Latest Ref Range: 0.0 - 0.2 10e3/uL 0.0   Absolute Eosinophils Latest Ref Range: 0.0 - 0.7 10e3/uL 0.1   Absolute Immature Granulocytes Latest Ref Range: <=0.4 10e3/uL 0.0   Absolute Lymphocytes Latest Ref Range: 0.8 - 5.3 10e3/uL 1.8   Absolute Monocytes Latest Ref Range: 0.0 - 1.3 10e3/uL 0.4   % Immature Granulocytes Latest Units: % 1   Absolute Neutrophils Latest Ref Range: 1.6 - 8.3 10e3/uL 2.5       Results reviewed, labs MET treatment parameters, ok to proceed with treatment.      Post Infusion Assessment:  Patient tolerated infusion without incident.  Blood return noted pre and post infusion.  Site patent and intact, free from redness, edema or discomfort.  No evidence of extravasations.  Access discontinued per protocol.       Discharge Plan:   Prescription refills given for ativan and compazine.  Discharge instructions reviewed with: Patient.  Patient and/or family verbalized understanding of discharge instructions and all questions answered.  Copy of AVS reviewed with patient and/or family.  Patient will return 2/28 for next appointment.  Patient discharged in stable condition accompanied by: self.  Departure Mode: Ambulatory.    Patricia Ray RN

## 2022-01-17 NOTE — PATIENT INSTRUCTIONS
[facility] Triage and after hours / weekends / holidays:  [phone]    Please call the triage or after hours line if you experience a temperature greater than or equal to 100.5, shaking chills, have uncontrolled nausea, vomiting and/or diarrhea, dizziness, shortness of breath, chest pain, bleeding, unexplained bruising, or if you have any other new/concerning symptoms, questions or concerns.      If you are having any concerning symptoms or wish to speak to a provider before your next infusion visit, please call your care coordinator or triage to notify them so we can adequately serve you.     If you need a refill on a narcotic prescription or other medication, please call before your infusion appointment.                 January 2022 Sunday Monday Tuesday Wednesday Thursday Friday Saturday                                 1       2     3     4     5     6     7     8       9     10     11     12     13     14     15       16     17    LAB PERIPHERAL   2:00 PM   (15 min.)   [facility] DRAW   [facility]    RETURN   2:15 PM   (45 min.)   Tiffanie Rose PA-C   [facility]    ONC INFUSION 1 HR (60 MIN)   3:30 PM   (60 min.)    ONC INFUSION NURSE   [facility] 18     19     20     21     22       23     24     25     26     27     28     29       30     31                                          February 2022 Sunday Monday Tuesday Wednesday Thursday Friday Saturday             1     2     3     4     5       6     7     8     9     10     11     12       13     14     15     16     17     18     19       20     21     22     23     24     25     26       27     28    LAB PERIPHERAL  12:00 PM   (15 min.)   [facility]   [facility]    RETURN  12:15 PM   (45 min.)   Tiffanie Rose PA-C   [facility]    ONC INFUSION 1 HR (60 MIN)   1:30 PM   (60 min.)   [facility]  ONC INFUSION NURSE   [facility]                                         Recent Results (from the past 24 hour(s))   Comprehensive metabolic panel    Collection Time: 01/17/22  2:25 PM   Result Value Ref Range    Sodium 140 133 - 144 mmol/L    Potassium 4.2 3.4 - 5.3 mmol/L    Chloride 105 94 - 109 mmol/L    Carbon Dioxide (CO2) 27 20 - 32 mmol/L    Anion Gap 8 3 - 14 mmol/L    Urea Nitrogen 22 7 - 30 mg/dL    Creatinine 1.67 (H) 0.66 - 1.25 mg/dL    Calcium 9.4 8.5 - 10.1 mg/dL    Glucose 100 (H) 70 - 99 mg/dL    Alkaline Phosphatase 78 40 - 150 U/L    AST 15 0 - 45 U/L    ALT 35 0 - 70 U/L    Protein Total 7.5 6.8 - 8.8 g/dL    Albumin 3.7 3.4 - 5.0 g/dL    Bilirubin Total 0.4 0.2 - 1.3 mg/dL    GFR Estimate 48 (L) >60 mL/min/1.73m2   TSH with free T4 reflex    Collection Time: 01/17/22  2:25 PM   Result Value Ref Range    TSH 0.69 0.40 - 4.00 mU/L   CBC with platelets and differential    Collection Time: 01/17/22  2:25 PM   Result Value Ref Range    WBC Count 4.9 4.0 - 11.0 10e3/uL    RBC Count 5.14 4.40 - 5.90 10e6/uL    Hemoglobin 13.8 13.3 - 17.7 g/dL    Hematocrit 43.3 40.0 - 53.0 %    MCV 84 78 - 100 fL    MCH 26.8 26.5 - 33.0 pg    MCHC 31.9 31.5 - 36.5 g/dL    RDW 15.7 (H) 10.0 - 15.0 %    Platelet Count 309 150 - 450 10e3/uL    % Neutrophils 53 %    % Lymphocytes 37 %    % Monocytes 7 %    % Eosinophils 2 %    % Basophils 0 %    % Immature Granulocytes 1 %    NRBCs per 100 WBC 0 <1 /100    Absolute Neutrophils 2.5 1.6 - 8.3 10e3/uL    Absolute Lymphocytes 1.8 0.8 - 5.3 10e3/uL    Absolute Monocytes 0.4 0.0 - 1.3 10e3/uL    Absolute Eosinophils 0.1 0.0 - 0.7 10e3/uL    Absolute Basophils 0.0 0.0 - 0.2 10e3/uL    Absolute Immature Granulocytes 0.0 <=0.4 10e3/uL    Absolute NRBCs 0.0 10e3/uL
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
## 2022-02-28 ENCOUNTER — ONCOLOGY VISIT (OUTPATIENT)
Dept: ONCOLOGY | Facility: CLINIC | Age: 57
End: 2022-02-28
Attending: PHYSICIAN ASSISTANT
Payer: COMMERCIAL

## 2022-02-28 ENCOUNTER — APPOINTMENT (OUTPATIENT)
Dept: LAB | Facility: CLINIC | Age: 57
End: 2022-02-28
Attending: PHYSICIAN ASSISTANT
Payer: COMMERCIAL

## 2022-02-28 VITALS
SYSTOLIC BLOOD PRESSURE: 122 MMHG | WEIGHT: 211.3 LBS | RESPIRATION RATE: 16 BRPM | BODY MASS INDEX: 28.66 KG/M2 | HEART RATE: 96 BPM | OXYGEN SATURATION: 99 % | DIASTOLIC BLOOD PRESSURE: 85 MMHG | TEMPERATURE: 98 F

## 2022-02-28 DIAGNOSIS — I82.220 NEOPLASM OF RIGHT KIDNEY WITH THROMBUS OF INFERIOR VENA CAVA (H): ICD-10-CM

## 2022-02-28 DIAGNOSIS — D49.511 NEOPLASM OF RIGHT KIDNEY WITH THROMBUS OF INFERIOR VENA CAVA (H): ICD-10-CM

## 2022-02-28 DIAGNOSIS — C64.1 RENAL CELL CARCINOMA, RIGHT (H): Primary | ICD-10-CM

## 2022-02-28 LAB
ALBUMIN SERPL-MCNC: 3.3 G/DL (ref 3.4–5)
ALP SERPL-CCNC: 91 U/L (ref 40–150)
ALT SERPL W P-5'-P-CCNC: 25 U/L (ref 0–70)
ANION GAP SERPL CALCULATED.3IONS-SCNC: 6 MMOL/L (ref 3–14)
AST SERPL W P-5'-P-CCNC: 16 U/L (ref 0–45)
BASOPHILS # BLD AUTO: 0 10E3/UL (ref 0–0.2)
BASOPHILS NFR BLD AUTO: 0 %
BILIRUB SERPL-MCNC: 0.2 MG/DL (ref 0.2–1.3)
BUN SERPL-MCNC: 11 MG/DL (ref 7–30)
CALCIUM SERPL-MCNC: 9.2 MG/DL (ref 8.5–10.1)
CHLORIDE BLD-SCNC: 106 MMOL/L (ref 94–109)
CO2 SERPL-SCNC: 28 MMOL/L (ref 20–32)
CREAT SERPL-MCNC: 1.44 MG/DL (ref 0.66–1.25)
EOSINOPHIL # BLD AUTO: 0.3 10E3/UL (ref 0–0.7)
EOSINOPHIL NFR BLD AUTO: 5 %
ERYTHROCYTE [DISTWIDTH] IN BLOOD BY AUTOMATED COUNT: 14.8 % (ref 10–15)
GFR SERPL CREATININE-BSD FRML MDRD: 57 ML/MIN/1.73M2
GLUCOSE BLD-MCNC: 128 MG/DL (ref 70–99)
HCT VFR BLD AUTO: 37.1 % (ref 40–53)
HGB BLD-MCNC: 11.8 G/DL (ref 13.3–17.7)
IMM GRANULOCYTES # BLD: 0 10E3/UL
IMM GRANULOCYTES NFR BLD: 0 %
LYMPHOCYTES # BLD AUTO: 1.6 10E3/UL (ref 0.8–5.3)
LYMPHOCYTES NFR BLD AUTO: 32 %
MCH RBC QN AUTO: 26.3 PG (ref 26.5–33)
MCHC RBC AUTO-ENTMCNC: 31.8 G/DL (ref 31.5–36.5)
MCV RBC AUTO: 83 FL (ref 78–100)
MONOCYTES # BLD AUTO: 0.4 10E3/UL (ref 0–1.3)
MONOCYTES NFR BLD AUTO: 7 %
NEUTROPHILS # BLD AUTO: 2.7 10E3/UL (ref 1.6–8.3)
NEUTROPHILS NFR BLD AUTO: 56 %
NRBC # BLD AUTO: 0 10E3/UL
NRBC BLD AUTO-RTO: 0 /100
PLATELET # BLD AUTO: 379 10E3/UL (ref 150–450)
POTASSIUM BLD-SCNC: 3.8 MMOL/L (ref 3.4–5.3)
PROT SERPL-MCNC: 6.9 G/DL (ref 6.8–8.8)
RBC # BLD AUTO: 4.49 10E6/UL (ref 4.4–5.9)
SODIUM SERPL-SCNC: 140 MMOL/L (ref 133–144)
T4 FREE SERPL-MCNC: 0.86 NG/DL (ref 0.76–1.46)
TSH SERPL DL<=0.005 MIU/L-ACNC: 0.01 MU/L (ref 0.4–4)
WBC # BLD AUTO: 4.9 10E3/UL (ref 4–11)

## 2022-02-28 PROCEDURE — 80053 COMPREHEN METABOLIC PANEL: CPT | Performed by: PHYSICIAN ASSISTANT

## 2022-02-28 PROCEDURE — G0463 HOSPITAL OUTPT CLINIC VISIT: HCPCS

## 2022-02-28 PROCEDURE — 258N000003 HC RX IP 258 OP 636: Performed by: PHYSICIAN ASSISTANT

## 2022-02-28 PROCEDURE — 36415 COLL VENOUS BLD VENIPUNCTURE: CPT

## 2022-02-28 PROCEDURE — 85025 COMPLETE CBC W/AUTO DIFF WBC: CPT

## 2022-02-28 PROCEDURE — 96413 CHEMO IV INFUSION 1 HR: CPT

## 2022-02-28 PROCEDURE — 99215 OFFICE O/P EST HI 40 MIN: CPT | Performed by: PHYSICIAN ASSISTANT

## 2022-02-28 PROCEDURE — 84439 ASSAY OF FREE THYROXINE: CPT | Performed by: PHYSICIAN ASSISTANT

## 2022-02-28 PROCEDURE — 84443 ASSAY THYROID STIM HORMONE: CPT | Performed by: PHYSICIAN ASSISTANT

## 2022-02-28 PROCEDURE — 250N000011 HC RX IP 250 OP 636: Performed by: PHYSICIAN ASSISTANT

## 2022-02-28 RX ORDER — EPINEPHRINE 1 MG/ML
0.3 INJECTION, SOLUTION INTRAMUSCULAR; SUBCUTANEOUS EVERY 5 MIN PRN
Status: CANCELLED | OUTPATIENT
Start: 2022-02-28

## 2022-02-28 RX ORDER — HEPARIN SODIUM,PORCINE 10 UNIT/ML
5 VIAL (ML) INTRAVENOUS
Status: CANCELLED | OUTPATIENT
Start: 2022-02-28

## 2022-02-28 RX ORDER — LORAZEPAM 2 MG/ML
0.5 INJECTION INTRAMUSCULAR EVERY 4 HOURS PRN
Status: CANCELLED | OUTPATIENT
Start: 2022-02-28

## 2022-02-28 RX ORDER — SILDENAFIL 100 MG/1
50 TABLET, FILM COATED ORAL PRN
Status: ON HOLD | COMMUNITY
Start: 2021-12-26 | End: 2023-08-11

## 2022-02-28 RX ORDER — LISINOPRIL/HYDROCHLOROTHIAZIDE 10-12.5 MG
1 TABLET ORAL DAILY
COMMUNITY
Start: 2022-02-08 | End: 2022-08-08

## 2022-02-28 RX ORDER — METHYLPREDNISOLONE SODIUM SUCCINATE 125 MG/2ML
125 INJECTION, POWDER, LYOPHILIZED, FOR SOLUTION INTRAMUSCULAR; INTRAVENOUS
Status: CANCELLED
Start: 2022-02-28

## 2022-02-28 RX ORDER — CYCLOBENZAPRINE HCL 10 MG
TABLET ORAL
COMMUNITY
Start: 2022-02-06 | End: 2022-07-12

## 2022-02-28 RX ORDER — ALBUTEROL SULFATE 90 UG/1
1-2 AEROSOL, METERED RESPIRATORY (INHALATION)
Status: CANCELLED
Start: 2022-02-28

## 2022-02-28 RX ORDER — NALOXONE HYDROCHLORIDE 0.4 MG/ML
0.2 INJECTION, SOLUTION INTRAMUSCULAR; INTRAVENOUS; SUBCUTANEOUS
Status: CANCELLED | OUTPATIENT
Start: 2022-02-28

## 2022-02-28 RX ORDER — HEPARIN SODIUM (PORCINE) LOCK FLUSH IV SOLN 100 UNIT/ML 100 UNIT/ML
5 SOLUTION INTRAVENOUS
Status: CANCELLED | OUTPATIENT
Start: 2022-02-28

## 2022-02-28 RX ORDER — DIPHENHYDRAMINE HYDROCHLORIDE 50 MG/ML
50 INJECTION INTRAMUSCULAR; INTRAVENOUS
Status: CANCELLED
Start: 2022-02-28

## 2022-02-28 RX ORDER — ALBUTEROL SULFATE 0.83 MG/ML
2.5 SOLUTION RESPIRATORY (INHALATION)
Status: CANCELLED | OUTPATIENT
Start: 2022-02-28

## 2022-02-28 RX ORDER — MEPERIDINE HYDROCHLORIDE 25 MG/ML
25 INJECTION INTRAMUSCULAR; INTRAVENOUS; SUBCUTANEOUS EVERY 30 MIN PRN
Status: CANCELLED | OUTPATIENT
Start: 2022-02-28

## 2022-02-28 RX ADMIN — SODIUM CHLORIDE 400 MG: 9 INJECTION, SOLUTION INTRAVENOUS at 14:05

## 2022-02-28 RX ADMIN — SODIUM CHLORIDE 250 ML: 9 INJECTION, SOLUTION INTRAVENOUS at 14:05

## 2022-02-28 ASSESSMENT — PAIN SCALES - GENERAL: PAINLEVEL: NO PAIN (0)
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
## 2022-02-28 NOTE — NURSING NOTE
Oncology Rooming Note    February 28, 2022 12:29 PM   Dimitrios Goldberg is a 56 year old male who presents for:    Chief Complaint   Patient presents with     Blood Draw     labs drawn with piv start by rn.  vs taken     Oncology Clinic Visit     rtn fo renal cell carcinoma     Initial Vitals: /85 (BP Location: Right arm, Patient Position: Sitting, Cuff Size: Adult Large)   Pulse 96   Temp 98  F (36.7  C) (Oral)   Resp 16   Wt 95.8 kg (211 lb 4.8 oz)   SpO2 99%   BMI 28.66 kg/m   Estimated body mass index is 28.66 kg/m  as calculated from the following:    Height as of 12/21/21: 1.829 m (6').    Weight as of this encounter: 95.8 kg (211 lb 4.8 oz). Body surface area is 2.21 meters squared.  No Pain (0) Comment: Data Unavailable   No LMP for male patient.  Allergies reviewed: Yes  Medications reviewed: Yes    Medications: Medication refills not needed today.  Pharmacy name entered into [employer]: [id number] [place] [facility] [place], [place] - [address] [facility] [address]    Clinical concerns: none       Adeline Vo, EMT

## 2022-02-28 NOTE — NURSING NOTE
Chief Complaint   Patient presents with     Blood Draw     labs drawn with piv start by rn.  vs taken     Labs drawn with PIV start by rn.  Pt tolerated well.  VS taken and pt checked in for next appt.    Nataly eSay RN

## 2022-02-28 NOTE — PROGRESS NOTES
Infusion Nursing Note:  Dimitrios Goldberg presents today for Cycle 2 Day 1 Pembrolizumab.    Patient seen by provider today: Yes: Tiffanie Rose PA-C.   present during visit today: Not Applicable.      Intravenous Access:  Peripheral IV placed in lab today.    Treatment Conditions:  Lab Results   Component Value Date    HGB 11.8 (L) 02/28/2022    WBC 4.9 02/28/2022    ANEUTAUTO 2.7 02/28/2022     02/28/2022      Lab Results   Component Value Date     02/28/2022    POTASSIUM 3.8 02/28/2022    MAG 2.1 08/17/2021    CR 1.44 (H) 02/28/2022    BETSY 9.2 02/28/2022    BILITOTAL 0.2 02/28/2022    ALBUMIN 3.3 (L) 02/28/2022    ALT 25 02/28/2022    AST 16 02/28/2022     Results reviewed, labs MET treatment parameters, ok to proceed with treatment.    Post Infusion Assessment:  Patient tolerated infusion without incident.  Blood return noted pre and post infusion.  Site patent and intact, free from redness, edema or discomfort.  No evidence of extravasations.  Access discontinued per protocol.     Discharge Plan:   Patient declined prescription refills.  Discharge instructions reviewed with: Patient.  Patient and/or family verbalized understanding of discharge instructions and all questions answered.  AVS to patient via [employer].  Patient will return 4/19/22 for next appointment.   Patient discharged in stable condition accompanied by: self.  Departure Mode: Ambulatory.  Face to Face time: 0.      ARRON FAUSTIN RN

## 2022-04-15 ENCOUNTER — ANCILLARY PROCEDURE (OUTPATIENT)
Dept: CT IMAGING | Facility: CLINIC | Age: 57
End: 2022-04-15
Attending: INTERNAL MEDICINE
Payer: COMMERCIAL

## 2022-04-15 DIAGNOSIS — I82.220 NEOPLASM OF RIGHT KIDNEY WITH THROMBUS OF INFERIOR VENA CAVA (H): ICD-10-CM

## 2022-04-15 DIAGNOSIS — C64.1 RENAL CELL CARCINOMA, RIGHT (H): ICD-10-CM

## 2022-04-15 DIAGNOSIS — D49.511 NEOPLASM OF RIGHT KIDNEY WITH THROMBUS OF INFERIOR VENA CAVA (H): ICD-10-CM

## 2022-04-15 LAB
CREAT BLD-MCNC: 2.3 MG/DL (ref 0.7–1.3)
GFR SERPL CREATININE-BSD FRML MDRD: 32 ML/MIN/1.73M2

## 2022-04-15 PROCEDURE — 82565 ASSAY OF CREATININE: CPT | Performed by: PATHOLOGY

## 2022-04-15 PROCEDURE — 71250 CT THORAX DX C-: CPT | Mod: GC | Performed by: RADIOLOGY

## 2022-04-15 PROCEDURE — 74176 CT ABD & PELVIS W/O CONTRAST: CPT | Mod: GC | Performed by: RADIOLOGY

## 2022-04-15 RX ORDER — IOPAMIDOL 755 MG/ML
130 INJECTION, SOLUTION INTRAVASCULAR ONCE
Status: DISCONTINUED | OUTPATIENT
Start: 2022-04-15 | End: 2022-04-15 | Stop reason: CLARIF

## 2022-04-19 ENCOUNTER — ONCOLOGY VISIT (OUTPATIENT)
Dept: ONCOLOGY | Facility: CLINIC | Age: 57
End: 2022-04-19
Attending: INTERNAL MEDICINE
Payer: COMMERCIAL

## 2022-04-19 ENCOUNTER — APPOINTMENT (OUTPATIENT)
Dept: LAB | Facility: CLINIC | Age: 57
End: 2022-04-19
Attending: PHYSICIAN ASSISTANT
Payer: COMMERCIAL

## 2022-04-19 VITALS
WEIGHT: 218.9 LBS | DIASTOLIC BLOOD PRESSURE: 88 MMHG | TEMPERATURE: 98.6 F | SYSTOLIC BLOOD PRESSURE: 115 MMHG | HEART RATE: 91 BPM | BODY MASS INDEX: 29.69 KG/M2 | OXYGEN SATURATION: 99 % | RESPIRATION RATE: 16 BRPM

## 2022-04-19 DIAGNOSIS — C64.1 RENAL CELL CARCINOMA, RIGHT (H): Primary | ICD-10-CM

## 2022-04-19 DIAGNOSIS — I82.220 NEOPLASM OF RIGHT KIDNEY WITH THROMBUS OF INFERIOR VENA CAVA (H): ICD-10-CM

## 2022-04-19 DIAGNOSIS — D49.511 NEOPLASM OF RIGHT KIDNEY WITH THROMBUS OF INFERIOR VENA CAVA (H): ICD-10-CM

## 2022-04-19 DIAGNOSIS — E03.4 HYPOTHYROIDISM DUE TO ACQUIRED ATROPHY OF THYROID: ICD-10-CM

## 2022-04-19 DIAGNOSIS — N18.4 CKD (CHRONIC KIDNEY DISEASE) STAGE 4, GFR 15-29 ML/MIN (H): ICD-10-CM

## 2022-04-19 LAB
ALBUMIN SERPL-MCNC: 4.1 G/DL (ref 3.4–5)
ALP SERPL-CCNC: 75 U/L (ref 40–150)
ALT SERPL W P-5'-P-CCNC: 43 U/L (ref 0–70)
ANION GAP SERPL CALCULATED.3IONS-SCNC: 9 MMOL/L (ref 3–14)
AST SERPL W P-5'-P-CCNC: 43 U/L (ref 0–45)
BASOPHILS # BLD AUTO: 0 10E3/UL (ref 0–0.2)
BASOPHILS NFR BLD AUTO: 1 %
BILIRUB SERPL-MCNC: 0.4 MG/DL (ref 0.2–1.3)
BUN SERPL-MCNC: 20 MG/DL (ref 7–30)
CALCIUM SERPL-MCNC: 9.7 MG/DL (ref 8.5–10.1)
CHLORIDE BLD-SCNC: 100 MMOL/L (ref 94–109)
CO2 SERPL-SCNC: 30 MMOL/L (ref 20–32)
CREAT SERPL-MCNC: 2.22 MG/DL (ref 0.66–1.25)
EOSINOPHIL # BLD AUTO: 0.2 10E3/UL (ref 0–0.7)
EOSINOPHIL NFR BLD AUTO: 4 %
ERYTHROCYTE [DISTWIDTH] IN BLOOD BY AUTOMATED COUNT: 16 % (ref 10–15)
GFR SERPL CREATININE-BSD FRML MDRD: 34 ML/MIN/1.73M2
GLUCOSE BLD-MCNC: 115 MG/DL (ref 70–99)
HCT VFR BLD AUTO: 43.6 % (ref 40–53)
HGB BLD-MCNC: 14.2 G/DL (ref 13.3–17.7)
IMM GRANULOCYTES # BLD: 0 10E3/UL
IMM GRANULOCYTES NFR BLD: 0 %
LYMPHOCYTES # BLD AUTO: 2 10E3/UL (ref 0.8–5.3)
LYMPHOCYTES NFR BLD AUTO: 39 %
MCH RBC QN AUTO: 27 PG (ref 26.5–33)
MCHC RBC AUTO-ENTMCNC: 32.6 G/DL (ref 31.5–36.5)
MCV RBC AUTO: 83 FL (ref 78–100)
MONOCYTES # BLD AUTO: 0.2 10E3/UL (ref 0–1.3)
MONOCYTES NFR BLD AUTO: 4 %
NEUTROPHILS # BLD AUTO: 2.7 10E3/UL (ref 1.6–8.3)
NEUTROPHILS NFR BLD AUTO: 52 %
NRBC # BLD AUTO: 0 10E3/UL
NRBC BLD AUTO-RTO: 0 /100
PLATELET # BLD AUTO: 257 10E3/UL (ref 150–450)
POTASSIUM BLD-SCNC: 3.6 MMOL/L (ref 3.4–5.3)
PROT SERPL-MCNC: 7.5 G/DL (ref 6.8–8.8)
RADIOLOGIST FLAGS: NORMAL
RBC # BLD AUTO: 5.25 10E6/UL (ref 4.4–5.9)
SODIUM SERPL-SCNC: 139 MMOL/L (ref 133–144)
T4 FREE SERPL-MCNC: 0.15 NG/DL (ref 0.76–1.46)
TSH SERPL DL<=0.005 MIU/L-ACNC: 50.63 MU/L (ref 0.4–4)
WBC # BLD AUTO: 5.2 10E3/UL (ref 4–11)

## 2022-04-19 PROCEDURE — 258N000003 HC RX IP 258 OP 636: Performed by: INTERNAL MEDICINE

## 2022-04-19 PROCEDURE — 84439 ASSAY OF FREE THYROXINE: CPT | Performed by: INTERNAL MEDICINE

## 2022-04-19 PROCEDURE — 84443 ASSAY THYROID STIM HORMONE: CPT | Performed by: INTERNAL MEDICINE

## 2022-04-19 PROCEDURE — 99215 OFFICE O/P EST HI 40 MIN: CPT | Performed by: INTERNAL MEDICINE

## 2022-04-19 PROCEDURE — 96413 CHEMO IV INFUSION 1 HR: CPT

## 2022-04-19 PROCEDURE — 85025 COMPLETE CBC W/AUTO DIFF WBC: CPT

## 2022-04-19 PROCEDURE — 250N000011 HC RX IP 250 OP 636: Performed by: INTERNAL MEDICINE

## 2022-04-19 PROCEDURE — 36415 COLL VENOUS BLD VENIPUNCTURE: CPT | Performed by: INTERNAL MEDICINE

## 2022-04-19 PROCEDURE — 80053 COMPREHEN METABOLIC PANEL: CPT | Performed by: INTERNAL MEDICINE

## 2022-04-19 PROCEDURE — G0463 HOSPITAL OUTPT CLINIC VISIT: HCPCS

## 2022-04-19 RX ORDER — HEPARIN SODIUM (PORCINE) LOCK FLUSH IV SOLN 100 UNIT/ML 100 UNIT/ML
5 SOLUTION INTRAVENOUS
Status: CANCELLED | OUTPATIENT
Start: 2022-07-12

## 2022-04-19 RX ORDER — METHYLPREDNISOLONE SODIUM SUCCINATE 125 MG/2ML
125 INJECTION, POWDER, LYOPHILIZED, FOR SOLUTION INTRAMUSCULAR; INTRAVENOUS
Status: CANCELLED
Start: 2022-04-19

## 2022-04-19 RX ORDER — DIPHENHYDRAMINE HYDROCHLORIDE 50 MG/ML
50 INJECTION INTRAMUSCULAR; INTRAVENOUS
Status: CANCELLED
Start: 2022-04-19

## 2022-04-19 RX ORDER — MEPERIDINE HYDROCHLORIDE 25 MG/ML
25 INJECTION INTRAMUSCULAR; INTRAVENOUS; SUBCUTANEOUS EVERY 30 MIN PRN
Status: CANCELLED | OUTPATIENT
Start: 2022-07-12

## 2022-04-19 RX ORDER — EPINEPHRINE 1 MG/ML
0.3 INJECTION, SOLUTION INTRAMUSCULAR; SUBCUTANEOUS EVERY 5 MIN PRN
Status: CANCELLED | OUTPATIENT
Start: 2022-04-19

## 2022-04-19 RX ORDER — HEPARIN SODIUM,PORCINE 10 UNIT/ML
5 VIAL (ML) INTRAVENOUS
Status: CANCELLED | OUTPATIENT
Start: 2022-04-19

## 2022-04-19 RX ORDER — ALBUTEROL SULFATE 90 UG/1
1-2 AEROSOL, METERED RESPIRATORY (INHALATION)
Status: CANCELLED
Start: 2022-04-19

## 2022-04-19 RX ORDER — HEPARIN SODIUM,PORCINE 10 UNIT/ML
5 VIAL (ML) INTRAVENOUS
Status: CANCELLED | OUTPATIENT
Start: 2022-07-12

## 2022-04-19 RX ORDER — MEPERIDINE HYDROCHLORIDE 25 MG/ML
25 INJECTION INTRAMUSCULAR; INTRAVENOUS; SUBCUTANEOUS EVERY 30 MIN PRN
Status: CANCELLED | OUTPATIENT
Start: 2022-04-19

## 2022-04-19 RX ORDER — ALBUTEROL SULFATE 0.83 MG/ML
2.5 SOLUTION RESPIRATORY (INHALATION)
Status: CANCELLED | OUTPATIENT
Start: 2022-07-12

## 2022-04-19 RX ORDER — ALBUTEROL SULFATE 0.83 MG/ML
2.5 SOLUTION RESPIRATORY (INHALATION)
Status: CANCELLED | OUTPATIENT
Start: 2022-04-19

## 2022-04-19 RX ORDER — DIPHENHYDRAMINE HYDROCHLORIDE 50 MG/ML
50 INJECTION INTRAMUSCULAR; INTRAVENOUS
Status: CANCELLED
Start: 2022-07-12

## 2022-04-19 RX ORDER — HEPARIN SODIUM (PORCINE) LOCK FLUSH IV SOLN 100 UNIT/ML 100 UNIT/ML
5 SOLUTION INTRAVENOUS
Status: CANCELLED | OUTPATIENT
Start: 2022-04-19

## 2022-04-19 RX ORDER — EPINEPHRINE 1 MG/ML
0.3 INJECTION, SOLUTION INTRAMUSCULAR; SUBCUTANEOUS EVERY 5 MIN PRN
Status: CANCELLED | OUTPATIENT
Start: 2022-05-31

## 2022-04-19 RX ORDER — METHYLPREDNISOLONE SODIUM SUCCINATE 125 MG/2ML
125 INJECTION, POWDER, LYOPHILIZED, FOR SOLUTION INTRAMUSCULAR; INTRAVENOUS
Status: CANCELLED
Start: 2022-05-31

## 2022-04-19 RX ORDER — ALBUTEROL SULFATE 0.83 MG/ML
2.5 SOLUTION RESPIRATORY (INHALATION)
Status: CANCELLED | OUTPATIENT
Start: 2022-05-31

## 2022-04-19 RX ORDER — NALOXONE HYDROCHLORIDE 0.4 MG/ML
0.2 INJECTION, SOLUTION INTRAMUSCULAR; INTRAVENOUS; SUBCUTANEOUS
Status: CANCELLED | OUTPATIENT
Start: 2022-07-12

## 2022-04-19 RX ORDER — ALBUTEROL SULFATE 90 UG/1
1-2 AEROSOL, METERED RESPIRATORY (INHALATION)
Status: CANCELLED
Start: 2022-05-31

## 2022-04-19 RX ORDER — LORAZEPAM 2 MG/ML
0.5 INJECTION INTRAMUSCULAR EVERY 4 HOURS PRN
Status: CANCELLED | OUTPATIENT
Start: 2022-04-19

## 2022-04-19 RX ORDER — METHYLPREDNISOLONE SODIUM SUCCINATE 125 MG/2ML
125 INJECTION, POWDER, LYOPHILIZED, FOR SOLUTION INTRAMUSCULAR; INTRAVENOUS
Status: CANCELLED
Start: 2022-07-12

## 2022-04-19 RX ORDER — LORAZEPAM 2 MG/ML
0.5 INJECTION INTRAMUSCULAR EVERY 4 HOURS PRN
Status: CANCELLED | OUTPATIENT
Start: 2022-07-12

## 2022-04-19 RX ORDER — MEPERIDINE HYDROCHLORIDE 25 MG/ML
25 INJECTION INTRAMUSCULAR; INTRAVENOUS; SUBCUTANEOUS EVERY 30 MIN PRN
Status: CANCELLED | OUTPATIENT
Start: 2022-05-31

## 2022-04-19 RX ORDER — LORAZEPAM 2 MG/ML
0.5 INJECTION INTRAMUSCULAR EVERY 4 HOURS PRN
Status: CANCELLED | OUTPATIENT
Start: 2022-05-31

## 2022-04-19 RX ORDER — DIPHENHYDRAMINE HYDROCHLORIDE 50 MG/ML
50 INJECTION INTRAMUSCULAR; INTRAVENOUS
Status: CANCELLED
Start: 2022-05-31

## 2022-04-19 RX ORDER — HEPARIN SODIUM,PORCINE 10 UNIT/ML
5 VIAL (ML) INTRAVENOUS
Status: CANCELLED | OUTPATIENT
Start: 2022-05-31

## 2022-04-19 RX ORDER — EPINEPHRINE 1 MG/ML
0.3 INJECTION, SOLUTION INTRAMUSCULAR; SUBCUTANEOUS EVERY 5 MIN PRN
Status: CANCELLED | OUTPATIENT
Start: 2022-07-12

## 2022-04-19 RX ORDER — NALOXONE HYDROCHLORIDE 0.4 MG/ML
0.2 INJECTION, SOLUTION INTRAMUSCULAR; INTRAVENOUS; SUBCUTANEOUS
Status: CANCELLED | OUTPATIENT
Start: 2022-04-19

## 2022-04-19 RX ORDER — NALOXONE HYDROCHLORIDE 0.4 MG/ML
0.2 INJECTION, SOLUTION INTRAMUSCULAR; INTRAVENOUS; SUBCUTANEOUS
Status: CANCELLED | OUTPATIENT
Start: 2022-05-31

## 2022-04-19 RX ORDER — ALBUTEROL SULFATE 90 UG/1
1-2 AEROSOL, METERED RESPIRATORY (INHALATION)
Status: CANCELLED
Start: 2022-07-12

## 2022-04-19 RX ORDER — HEPARIN SODIUM (PORCINE) LOCK FLUSH IV SOLN 100 UNIT/ML 100 UNIT/ML
5 SOLUTION INTRAVENOUS
Status: CANCELLED | OUTPATIENT
Start: 2022-05-31

## 2022-04-19 RX ORDER — ATORVASTATIN CALCIUM 20 MG/1
20 TABLET, FILM COATED ORAL DAILY
COMMUNITY
Start: 2022-03-09 | End: 2024-01-02

## 2022-04-19 RX ADMIN — SODIUM CHLORIDE 400 MG: 9 INJECTION, SOLUTION INTRAVENOUS at 14:38

## 2022-04-19 RX ADMIN — SODIUM CHLORIDE 1000 ML: 9 INJECTION, SOLUTION INTRAVENOUS at 14:00

## 2022-04-19 ASSESSMENT — PAIN SCALES - GENERAL: PAINLEVEL: MODERATE PAIN (4)

## 2022-04-19 NOTE — NURSING NOTE
Oncology Rooming Note    April 19, 2022 12:27 PM   Dimitrios Goldberg is a 57 year old male who presents for:    Chief Complaint   Patient presents with     Blood Draw     Labs drawn via piv by RN in lab.  VS taken     Oncology Clinic Visit     NEOPLASM OF RIGHT KIDNEY WITH THROMBUS OF INFERIOR VENA CAVA     Initial Vitals: /88   Pulse 91   Temp 98.6  F (37  C) (Oral)   Resp 16   Wt 99.3 kg (218 lb 14.4 oz)   SpO2 99%   BMI 29.69 kg/m   Estimated body mass index is 29.69 kg/m  as calculated from the following:    Height as of 12/21/21: 1.829 m (6').    Weight as of this encounter: 99.3 kg (218 lb 14.4 oz). Body surface area is 2.25 meters squared.  Moderate Pain (4) Comment: Data Unavailable   No LMP for male patient.  Allergies reviewed: Yes  Medications reviewed: Yes    Medications: Medication refills not needed today.  Pharmacy name entered into [employer]: [id number] [place] [facility] [place] , [place] - [address]    Clinical concerns: Dry spots.        Enriqueta Kerr, CMA
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
## 2022-04-19 NOTE — NURSING NOTE
Chief Complaint   Patient presents with     Blood Draw     Labs drawn via piv by RN in lab.  VS taken       Labs drawn from PIV placed by RN. Line flushed with saline. Vitals taken. Pt checked in for appointment(s).    Nallely Barba RN

## 2022-04-25 RX ORDER — LEVOTHYROXINE SODIUM 75 UG/1
75 TABLET ORAL DAILY
Qty: 30 TABLET | Refills: 11 | Status: SHIPPED | OUTPATIENT
Start: 2022-04-25 | End: 2022-08-08

## 2022-04-26 ENCOUNTER — PATIENT OUTREACH (OUTPATIENT)
Dept: ONCOLOGY | Facility: CLINIC | Age: 57
End: 2022-04-26
Payer: COMMERCIAL

## 2022-04-26 NOTE — PROGRESS NOTES
TC to pt returning his call regarding getting oral surgery under sedation. Pt has a procedure planned at [facility], point of contact is April Gonzalez @ [phone], Fax - [phone]. Pt stated the oral surgeon's office needs records in order to proceed. Told pt writer will contact April, ask what they need, and send required information.     Per April, the oral surgeon's concern is pt's kidney function and they would like most recent labs and office visits notes faxed to them for review to determine if pt is eligible for sedation during procedure. Information faxed to [phone]

## 2022-05-02 ENCOUNTER — LAB (OUTPATIENT)
Dept: LAB | Facility: CLINIC | Age: 57
End: 2022-05-02
Attending: INTERNAL MEDICINE
Payer: COMMERCIAL

## 2022-05-02 ENCOUNTER — OFFICE VISIT (OUTPATIENT)
Dept: NEPHROLOGY | Facility: CLINIC | Age: 57
End: 2022-05-02
Attending: INTERNAL MEDICINE
Payer: COMMERCIAL

## 2022-05-02 VITALS
HEART RATE: 82 BPM | OXYGEN SATURATION: 95 % | BODY MASS INDEX: 29.63 KG/M2 | WEIGHT: 218.5 LBS | SYSTOLIC BLOOD PRESSURE: 110 MMHG | DIASTOLIC BLOOD PRESSURE: 79 MMHG

## 2022-05-02 DIAGNOSIS — E03.4 HYPOTHYROIDISM DUE TO ACQUIRED ATROPHY OF THYROID: ICD-10-CM

## 2022-05-02 DIAGNOSIS — I82.220 NEOPLASM OF RIGHT KIDNEY WITH THROMBUS OF INFERIOR VENA CAVA (H): ICD-10-CM

## 2022-05-02 DIAGNOSIS — N18.4 CKD (CHRONIC KIDNEY DISEASE) STAGE 4, GFR 15-29 ML/MIN (H): ICD-10-CM

## 2022-05-02 DIAGNOSIS — C64.1 RENAL CELL CARCINOMA, RIGHT (H): ICD-10-CM

## 2022-05-02 DIAGNOSIS — D49.511 NEOPLASM OF RIGHT KIDNEY WITH THROMBUS OF INFERIOR VENA CAVA (H): ICD-10-CM

## 2022-05-02 LAB
ALBUMIN SERPL-MCNC: 4.3 G/DL (ref 3.4–5)
ALBUMIN UR-MCNC: NEGATIVE MG/DL
ALP SERPL-CCNC: 72 U/L (ref 40–150)
ALT SERPL W P-5'-P-CCNC: 72 U/L (ref 0–70)
ANION GAP SERPL CALCULATED.3IONS-SCNC: 7 MMOL/L (ref 3–14)
APPEARANCE UR: CLEAR
AST SERPL W P-5'-P-CCNC: 71 U/L (ref 0–45)
BILIRUB SERPL-MCNC: 0.6 MG/DL (ref 0.2–1.3)
BILIRUB UR QL STRIP: NEGATIVE
BUN SERPL-MCNC: 28 MG/DL (ref 7–30)
CALCIUM SERPL-MCNC: 9.9 MG/DL (ref 8.5–10.1)
CHLORIDE BLD-SCNC: 98 MMOL/L (ref 94–109)
CO2 SERPL-SCNC: 31 MMOL/L (ref 20–32)
COLOR UR AUTO: YELLOW
CREAT SERPL-MCNC: 2.67 MG/DL (ref 0.66–1.25)
CREAT UR-MCNC: 163 MG/DL
ERYTHROCYTE [DISTWIDTH] IN BLOOD BY AUTOMATED COUNT: 16.8 % (ref 10–15)
GFR SERPL CREATININE-BSD FRML MDRD: 27 ML/MIN/1.73M2
GLUCOSE BLD-MCNC: 89 MG/DL (ref 70–99)
GLUCOSE UR STRIP-MCNC: NEGATIVE MG/DL
HCT VFR BLD AUTO: 47.3 % (ref 40–53)
HGB BLD-MCNC: 15.1 G/DL (ref 13.3–17.7)
HGB UR QL STRIP: NEGATIVE
KETONES UR STRIP-MCNC: NEGATIVE MG/DL
LEUKOCYTE ESTERASE UR QL STRIP: NEGATIVE
MCH RBC QN AUTO: 27.4 PG (ref 26.5–33)
MCHC RBC AUTO-ENTMCNC: 31.9 G/DL (ref 31.5–36.5)
MCV RBC AUTO: 86 FL (ref 78–100)
MICROALBUMIN UR-MCNC: 9 MG/L
MICROALBUMIN/CREAT UR: 5.52 MG/G CR (ref 0–17)
MUCOUS THREADS #/AREA URNS LPF: PRESENT /LPF
NITRATE UR QL: NEGATIVE
PH UR STRIP: 5 [PH] (ref 5–7)
PLATELET # BLD AUTO: 249 10E3/UL (ref 150–450)
POTASSIUM BLD-SCNC: 4.3 MMOL/L (ref 3.4–5.3)
PROT SERPL-MCNC: 8 G/DL (ref 6.8–8.8)
RBC # BLD AUTO: 5.52 10E6/UL (ref 4.4–5.9)
RBC URINE: <1 /HPF
SODIUM SERPL-SCNC: 136 MMOL/L (ref 133–144)
SP GR UR STRIP: 1.01 (ref 1–1.03)
UROBILINOGEN UR STRIP-MCNC: NORMAL MG/DL
WBC # BLD AUTO: 5.2 10E3/UL (ref 4–11)
WBC URINE: <1 /HPF

## 2022-05-02 PROCEDURE — G0463 HOSPITAL OUTPT CLINIC VISIT: HCPCS

## 2022-05-02 PROCEDURE — 85027 COMPLETE CBC AUTOMATED: CPT

## 2022-05-02 PROCEDURE — 36415 COLL VENOUS BLD VENIPUNCTURE: CPT

## 2022-05-02 PROCEDURE — 99204 OFFICE O/P NEW MOD 45 MIN: CPT | Performed by: INTERNAL MEDICINE

## 2022-05-02 PROCEDURE — 81001 URINALYSIS AUTO W/SCOPE: CPT

## 2022-05-02 PROCEDURE — 82043 UR ALBUMIN QUANTITATIVE: CPT

## 2022-05-02 PROCEDURE — 80053 COMPREHEN METABOLIC PANEL: CPT

## 2022-05-02 RX ORDER — CLINDAMYCIN HCL 300 MG
CAPSULE ORAL
COMMUNITY
Start: 2022-04-29 | End: 2022-06-02

## 2022-05-02 RX ORDER — AMLODIPINE BESYLATE 5 MG/1
2.5 TABLET ORAL DAILY
Qty: 15 TABLET | Refills: 3 | Status: SHIPPED | OUTPATIENT
Start: 2022-05-02 | End: 2022-06-27

## 2022-05-02 RX ORDER — HYDROCODONE BITARTRATE AND ACETAMINOPHEN 5; 325 MG/1; MG/1
TABLET ORAL
COMMUNITY
Start: 2022-04-29 | End: 2022-07-12

## 2022-05-02 NOTE — NURSING NOTE
Chief Complaint   Patient presents with     Blood Draw     Labs drawn via  by RN in lab.      Protein time urine ordered by Dr. Alan today. Pt received 24hour urine collection kit. Explained collection procedure to pt and wife. Verbalized understanding of procedure.    Jennifer Lambert RN
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
## 2022-05-02 NOTE — PATIENT INSTRUCTIONS
It was a pleasure taking care of you today.  I've included a brief summary of our discussion and care plan from today's visit below.  Please review this information with your primary care provider.    Your creatinine level was noticed to be significantly higher than usual a few days ago. The potential reasons are numerous and include a side-effect of keytruda, a side-effect of lisinopril-hydrochlorothiazide, a blood clot of the vessel supplying the kidney among others    My recommendations are summarized as follows:  -Stop lisinopril/hydrochlorothiazide and start monitoring your blood pressure in two days as attached twice a day and write it down  -If your BP is more than 130/80 start taking amlodipine 2.5 mg daily  -Do labs for me today and again in 2 weeks before your next appointment  -Do a doppler study of the vessels supplying your kidney  -I will see you again in clinic in 2 weeks for follow up    To schedule imaging please call [phone]     To schedule your lab appointment at the [facility], please call       Return to Nephrology Clinic in 2 weeks to review your progress.    _______________________________________________________________________    Who do I call with any questions after my visit?    Please be in touch if there are any further questions that arise following today's visit.  There are multiple ways to contact your nephrology care team.      During business hours, you may reach your Nephrology LPN Care Coordinator, Tamiko, at .      To schedule or reschedule an appointment, please call [phone].    You can always send a secure message through [employer].  [employer] messages are answered by your nurse or doctor typically within 24 hours.  Please allow extra time on weekends and holidays.      For urgent/emergent questions after business hours, you may reach the on-call Nephrology Fellow by contacting the [facility] at [phone].      How will I get the results of any tests ordered?    You will receive all of your results.  If you have signed up for [employer], any tests ordered at your visit will be available to you after your physician reviews them.  Typically this takes 1-2 weeks.  If there are urgent results that require a change in your care plan, your physician or nurse will call you to discuss the next steps.      What is [employer]?  [employer] is a secure way for you to access all of your healthcare records from the [facility].  It is a web based computer program, so you can sign on to it from any location.  It also allows you to send secure messages to your care team.  I recommend signing up for [employer] access if you have not already done so and are comfortable with using a computer.      How do I schedule a follow-up visit?  If you did not schedule a follow-up visit today, please call [phone] to schedule a follow-up office visit.        Sincerely,      Dr. Mariah Alan  [facility]  Division of Nephrology and Hypertension
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
## 2022-05-03 ENCOUNTER — TELEPHONE (OUTPATIENT)
Dept: ONCOLOGY | Facility: CLINIC | Age: 57
End: 2022-05-03
Payer: COMMERCIAL

## 2022-05-03 NOTE — TELEPHONE ENCOUNTER
FMLA paperwork received via patient from [employer]. Will be placed in provider folder for signature upon completion.     Fax: [phone]      Michelle Lara CMA

## 2022-05-04 ENCOUNTER — ANCILLARY PROCEDURE (OUTPATIENT)
Dept: ULTRASOUND IMAGING | Facility: CLINIC | Age: 57
End: 2022-05-04
Attending: INTERNAL MEDICINE
Payer: COMMERCIAL

## 2022-05-04 DIAGNOSIS — N18.4 CKD (CHRONIC KIDNEY DISEASE) STAGE 4, GFR 15-29 ML/MIN (H): ICD-10-CM

## 2022-05-04 PROCEDURE — 93975 VASCULAR STUDY: CPT | Mod: GC | Performed by: RADIOLOGY

## 2022-05-04 PROCEDURE — 76770 US EXAM ABDO BACK WALL COMP: CPT | Mod: GC | Performed by: RADIOLOGY

## 2022-05-05 ENCOUNTER — TELEPHONE (OUTPATIENT)
Dept: NEPHROLOGY | Facility: CLINIC | Age: 57
End: 2022-05-05
Payer: COMMERCIAL

## 2022-05-05 NOTE — TELEPHONE ENCOUNTER
Left VM to call him directly about his concerns with his kidneys.    Received call back from Dimitrios. Writer educated him on what Dr. Alan's notes said in regards to his acute kidney injury and the tests/ things she was doing to try to see what was causing it. He also voiced some frustration with not knowing who he was scheduled to see on Monday- he stated he was just told he had to come to this clinic and see a doctor and have labs done and he wasn't sure who he was seeing or why. Writer explained to him who a nephrologist is and why he was referred here. Dimitrios seemed to have a better understanding of what was going on after conversation. He was agreeable to having a  call him to get set up for another visit in 2 week.s

## 2022-05-10 DIAGNOSIS — N17.9 ACUTE RENAL FAILURE, UNSPECIFIED ACUTE RENAL FAILURE TYPE (H): Primary | ICD-10-CM

## 2022-05-11 NOTE — TELEPHONE ENCOUNTER
Signed form received and faxed to [employer], fax # [phone]. Successful transmission verified via rightfax. Copy of forms sent to scanning, original forms mailed to patient's home address on file.    Enriqueta Kerr CMA on 5/11/2022 at 7:44 AM

## 2022-05-12 ENCOUNTER — TELEPHONE (OUTPATIENT)
Dept: NEPHROLOGY | Facility: CLINIC | Age: 57
End: 2022-05-12
Payer: COMMERCIAL

## 2022-05-12 DIAGNOSIS — N12 INTERSTITIAL NEPHRITIS: Primary | ICD-10-CM

## 2022-05-12 RX ORDER — PREDNISONE 20 MG/1
80 TABLET ORAL DAILY
Qty: 120 TABLET | Refills: 1 | Status: SHIPPED | OUTPATIENT
Start: 2022-05-12 | End: 2022-06-02

## 2022-05-12 RX ORDER — PREDNISONE 20 MG/1
80 TABLET ORAL DAILY
Qty: 120 TABLET | Refills: 1 | Status: CANCELLED | OUTPATIENT
Start: 2022-05-12 | End: 2022-06-11

## 2022-05-12 RX ORDER — PREDNISONE 20 MG/1
80 TABLET ORAL DAILY
Qty: 120 TABLET | Refills: 1 | Status: CANCELLED | OUTPATIENT
Start: 2022-05-12 | End: 2022-07-11

## 2022-05-12 NOTE — TELEPHONE ENCOUNTER
Received a call from Dimitrios. He spoke with Dr. Alan this week and wanted to know the plan going forward. He states she had him stop one of his medications and put him on a different one and also wanted to stop his oncology treatments and possibly start him on prednisone. He wants to know when this might happen. He is also hoping to get in sooner to see her than June 6th. Writer send message to Dr. Alan.    Mariah Alan MD Forrest, Trina J, RN  Hello,   I thought I was supposed to see him this Monday. We had talked about him doing a CMP asap and then starting prednisone 80 mg/day. The CMP is already  If unable to do the CMP, he can start immediately prednisone.   By all means will send the prescription for him. And also please schedule him to see me before June 6. I don't mind being overbooked.   Thanks     Spoke to Dimitrios about the above. Prednisone script was sent to his [facility] pharmacy. He will go in on Monday to have CMP. Writer also told him we are working on trying to get him scheduled for May 30th but will call him back once we know more.

## 2022-05-16 ENCOUNTER — LAB (OUTPATIENT)
Dept: LAB | Facility: CLINIC | Age: 57
End: 2022-05-16
Attending: INTERNAL MEDICINE
Payer: COMMERCIAL

## 2022-05-16 DIAGNOSIS — N17.9 ACUTE RENAL FAILURE, UNSPECIFIED ACUTE RENAL FAILURE TYPE (H): ICD-10-CM

## 2022-05-16 DIAGNOSIS — I82.220 NEOPLASM OF RIGHT KIDNEY WITH THROMBUS OF INFERIOR VENA CAVA (H): ICD-10-CM

## 2022-05-16 DIAGNOSIS — D49.511 NEOPLASM OF RIGHT KIDNEY WITH THROMBUS OF INFERIOR VENA CAVA (H): ICD-10-CM

## 2022-05-16 DIAGNOSIS — C64.1 RENAL CELL CARCINOMA, RIGHT (H): ICD-10-CM

## 2022-05-16 DIAGNOSIS — E03.4 HYPOTHYROIDISM DUE TO ACQUIRED ATROPHY OF THYROID: ICD-10-CM

## 2022-05-16 DIAGNOSIS — N18.4 CKD (CHRONIC KIDNEY DISEASE) STAGE 4, GFR 15-29 ML/MIN (H): ICD-10-CM

## 2022-05-16 LAB
ALBUMIN SERPL-MCNC: 4.2 G/DL (ref 3.4–5)
ALP SERPL-CCNC: 66 U/L (ref 40–150)
ALT SERPL W P-5'-P-CCNC: 104 U/L (ref 0–70)
ANION GAP SERPL CALCULATED.3IONS-SCNC: 9 MMOL/L (ref 3–14)
AST SERPL W P-5'-P-CCNC: 73 U/L (ref 0–45)
BILIRUB SERPL-MCNC: 0.5 MG/DL (ref 0.2–1.3)
BUN SERPL-MCNC: 28 MG/DL (ref 7–30)
CALCIUM SERPL-MCNC: 9.2 MG/DL (ref 8.5–10.1)
CHLORIDE BLD-SCNC: 104 MMOL/L (ref 94–109)
CO2 SERPL-SCNC: 28 MMOL/L (ref 20–32)
COLLECT DURATION TIME UR: 24 H
CREAT 24H UR-MRATE: 1.64 G/SPEC (ref 1–2)
CREAT SERPL-MCNC: 1.7 MG/DL (ref 0.66–1.25)
CREAT UR-MCNC: 54 MG/DL
GFR SERPL CREATININE-BSD FRML MDRD: 46 ML/MIN/1.73M2
GLUCOSE BLD-MCNC: 117 MG/DL (ref 70–99)
POTASSIUM BLD-SCNC: 3.8 MMOL/L (ref 3.4–5.3)
PROT 24H UR-MRATE: 0.18 G/SPEC (ref 0.04–0.23)
PROT SERPL-MCNC: 7.9 G/DL (ref 6.8–8.8)
PROT UR-MCNC: 0.06 G/L
PROT/CREAT 24H UR: 0.11 G/G CR (ref 0–0.2)
SODIUM SERPL-SCNC: 141 MMOL/L (ref 133–144)
SPECIMEN VOL UR: 3046 ML
TSH SERPL DL<=0.005 MIU/L-ACNC: 15.13 MU/L (ref 0.4–4)

## 2022-05-16 PROCEDURE — 80053 COMPREHEN METABOLIC PANEL: CPT

## 2022-05-16 PROCEDURE — 84443 ASSAY THYROID STIM HORMONE: CPT

## 2022-05-16 PROCEDURE — 36415 COLL VENOUS BLD VENIPUNCTURE: CPT

## 2022-05-16 PROCEDURE — 84156 ASSAY OF PROTEIN URINE: CPT

## 2022-05-16 NOTE — NURSING NOTE
Chief Complaint   Patient presents with     Blood Draw     Labs drawn via  by RN in lab.      Labs collected from venipuncture by RN. Pt tolerated well.     Cindy Kwon RN

## 2022-05-18 ENCOUNTER — MYC MEDICAL ADVICE (OUTPATIENT)
Dept: NEPHROLOGY | Facility: CLINIC | Age: 57
End: 2022-05-18
Payer: COMMERCIAL

## 2022-05-18 DIAGNOSIS — I82.220 NEOPLASM OF RIGHT KIDNEY WITH THROMBUS OF INFERIOR VENA CAVA (H): ICD-10-CM

## 2022-05-18 DIAGNOSIS — C64.1 RENAL CELL CARCINOMA, RIGHT (H): ICD-10-CM

## 2022-05-18 DIAGNOSIS — E03.4 HYPOTHYROIDISM DUE TO ACQUIRED ATROPHY OF THYROID: ICD-10-CM

## 2022-05-18 DIAGNOSIS — D49.511 NEOPLASM OF RIGHT KIDNEY WITH THROMBUS OF INFERIOR VENA CAVA (H): ICD-10-CM

## 2022-05-18 RX ORDER — LEVOTHYROXINE SODIUM 75 UG/1
TABLET ORAL
Qty: 30 TABLET | Refills: 11 | OUTPATIENT
Start: 2022-05-18

## 2022-05-23 ENCOUNTER — TELEPHONE (OUTPATIENT)
Dept: NEPHROLOGY | Facility: CLINIC | Age: 57
End: 2022-05-23
Payer: COMMERCIAL

## 2022-05-23 DIAGNOSIS — N18.4 CKD (CHRONIC KIDNEY DISEASE) STAGE 4, GFR 15-29 ML/MIN (H): Primary | ICD-10-CM

## 2022-05-23 RX ORDER — AMLODIPINE BESYLATE 5 MG/1
5 TABLET ORAL DAILY
Qty: 30 TABLET | Refills: 3 | Status: SHIPPED | OUTPATIENT
Start: 2022-05-23 | End: 2022-06-27

## 2022-05-23 NOTE — TELEPHONE ENCOUNTER
Received call from Dimitrios. He is leaving town tomorrow and will be out of his amlodipine. His script is for 2.5mg daily but he states he was told by Dr. Alan that he should go up to 5mg daily if his BP was not controlled so that is what he has been doing. His pharmacy will not refill because it is too early with the current script he has on file. He states his BP has been 120/91 and 116/82 since increasing to 5mg of amlodipine daily.  Routed to Dr. Alan to see if we can write for a new script for 5mg daily.    New script placed by Dr. Alan for 5mg amlodipine daily. Dimitrios updated.

## 2022-05-31 ENCOUNTER — TELEPHONE (OUTPATIENT)
Dept: ONCOLOGY | Facility: CLINIC | Age: 57
End: 2022-05-31
Payer: COMMERCIAL

## 2022-05-31 DIAGNOSIS — N17.9 ACUTE RENAL FAILURE, UNSPECIFIED ACUTE RENAL FAILURE TYPE (H): ICD-10-CM

## 2022-05-31 DIAGNOSIS — N18.4 CKD (CHRONIC KIDNEY DISEASE) STAGE 4, GFR 15-29 ML/MIN (H): Primary | ICD-10-CM

## 2022-05-31 NOTE — PROGRESS NOTES
Received a call from Dimitrios. Wondering if he should still go and do labs on 6/2 (was going to be for his oncology infusion originally).Writer said that he would need labs prior to virtual appointment on 6/6 with Dr. Alan so he can keep that lab appointment and writer will make sure those labs are ordered. Dimitrios verbalized understanding and will get labs done on 6/2 for .

## 2022-05-31 NOTE — TELEPHONE ENCOUNTER
Pt was told to stop chemo infusion until he finishes steriod by nephrologist and Dr. Campos  Pt started steriod  couple weeks ago   He has virtual appt with nephrologist 6/10/22   Pt calling to question if he still needs to come for lab and visit with Mariana Jeter CNP and if he can cancel chemo infusion that day.     Writer sent a message to Mariana Jeter CNP   Provider would like to see pt and do lab that day. Okay to cancel chemo infusion.   Message sent to scheduling to cancel chemo 6/2/22  Writer called pt and informed of above plan   Pt asked if he needs to be fasting  For lab that day (CMP, CBC with platelet and dif, albumin random urine and UA with microscopic) pt currently on steroid   Writer advised pt it would not be a bad a day to fast for CMP since he is on steroid and will have glucose checked that morning.   Pt voiced understanding and agreement with plan.

## 2022-06-02 ENCOUNTER — ONCOLOGY VISIT (OUTPATIENT)
Dept: ONCOLOGY | Facility: CLINIC | Age: 57
End: 2022-06-02
Attending: NURSE PRACTITIONER
Payer: COMMERCIAL

## 2022-06-02 ENCOUNTER — LAB (OUTPATIENT)
Dept: LAB | Facility: CLINIC | Age: 57
End: 2022-06-02
Attending: NURSE PRACTITIONER
Payer: COMMERCIAL

## 2022-06-02 VITALS
RESPIRATION RATE: 16 BRPM | WEIGHT: 217.4 LBS | SYSTOLIC BLOOD PRESSURE: 136 MMHG | TEMPERATURE: 98.3 F | BODY MASS INDEX: 29.48 KG/M2 | OXYGEN SATURATION: 98 % | DIASTOLIC BLOOD PRESSURE: 95 MMHG | HEART RATE: 92 BPM

## 2022-06-02 VITALS
HEART RATE: 97 BPM | HEIGHT: 73 IN | WEIGHT: 217.6 LBS | TEMPERATURE: 98.5 F | OXYGEN SATURATION: 98 % | SYSTOLIC BLOOD PRESSURE: 134 MMHG | DIASTOLIC BLOOD PRESSURE: 99 MMHG | BODY MASS INDEX: 28.84 KG/M2

## 2022-06-02 DIAGNOSIS — D49.511 NEOPLASM OF RIGHT KIDNEY WITH THROMBUS OF INFERIOR VENA CAVA (H): ICD-10-CM

## 2022-06-02 DIAGNOSIS — N12 INTERSTITIAL NEPHRITIS: ICD-10-CM

## 2022-06-02 DIAGNOSIS — I82.220 NEOPLASM OF RIGHT KIDNEY WITH THROMBUS OF INFERIOR VENA CAVA (H): ICD-10-CM

## 2022-06-02 DIAGNOSIS — Z79.2 PROPHYLACTIC ANTIBIOTIC: Primary | ICD-10-CM

## 2022-06-02 DIAGNOSIS — N17.9 ACUTE RENAL FAILURE, UNSPECIFIED ACUTE RENAL FAILURE TYPE (H): ICD-10-CM

## 2022-06-02 LAB
ALBUMIN SERPL-MCNC: 3.7 G/DL (ref 3.4–5)
ALBUMIN UR-MCNC: NEGATIVE MG/DL
ALP SERPL-CCNC: 64 U/L (ref 40–150)
ALT SERPL W P-5'-P-CCNC: 187 U/L (ref 0–70)
ANION GAP SERPL CALCULATED.3IONS-SCNC: 9 MMOL/L (ref 3–14)
APPEARANCE UR: CLEAR
AST SERPL W P-5'-P-CCNC: 40 U/L (ref 0–45)
BASOPHILS # BLD AUTO: 0 10E3/UL (ref 0–0.2)
BASOPHILS NFR BLD AUTO: 0 %
BILIRUB SERPL-MCNC: 0.6 MG/DL (ref 0.2–1.3)
BILIRUB UR QL STRIP: NEGATIVE
BUN SERPL-MCNC: 29 MG/DL (ref 7–30)
CALCIUM SERPL-MCNC: 9.2 MG/DL (ref 8.5–10.1)
CHLORIDE BLD-SCNC: 102 MMOL/L (ref 94–109)
CO2 SERPL-SCNC: 27 MMOL/L (ref 20–32)
COLOR UR AUTO: COLORLESS
CREAT SERPL-MCNC: 1.59 MG/DL (ref 0.66–1.25)
CREAT UR-MCNC: 26 MG/DL
EOSINOPHIL # BLD AUTO: 0 10E3/UL (ref 0–0.7)
EOSINOPHIL NFR BLD AUTO: 0 %
ERYTHROCYTE [DISTWIDTH] IN BLOOD BY AUTOMATED COUNT: 20.2 % (ref 10–15)
GFR SERPL CREATININE-BSD FRML MDRD: 50 ML/MIN/1.73M2
GLUCOSE BLD-MCNC: 102 MG/DL (ref 70–99)
GLUCOSE UR STRIP-MCNC: NEGATIVE MG/DL
HCT VFR BLD AUTO: 39.3 % (ref 40–53)
HGB BLD-MCNC: 12.9 G/DL (ref 13.3–17.7)
HGB UR QL STRIP: ABNORMAL
IMM GRANULOCYTES # BLD: 0.5 10E3/UL
IMM GRANULOCYTES NFR BLD: 3 %
KETONES UR STRIP-MCNC: NEGATIVE MG/DL
LEUKOCYTE ESTERASE UR QL STRIP: NEGATIVE
LYMPHOCYTES # BLD AUTO: 1.6 10E3/UL (ref 0.8–5.3)
LYMPHOCYTES NFR BLD AUTO: 10 %
MCH RBC QN AUTO: 28.2 PG (ref 26.5–33)
MCHC RBC AUTO-ENTMCNC: 32.8 G/DL (ref 31.5–36.5)
MCV RBC AUTO: 86 FL (ref 78–100)
MICROALBUMIN UR-MCNC: <5 MG/L
MICROALBUMIN/CREAT UR: NORMAL MG/G{CREAT}
MONOCYTES # BLD AUTO: 0.6 10E3/UL (ref 0–1.3)
MONOCYTES NFR BLD AUTO: 4 %
NEUTROPHILS # BLD AUTO: 13.4 10E3/UL (ref 1.6–8.3)
NEUTROPHILS NFR BLD AUTO: 83 %
NITRATE UR QL: NEGATIVE
NRBC # BLD AUTO: 0 10E3/UL
NRBC BLD AUTO-RTO: 0 /100
PH UR STRIP: 6 [PH] (ref 5–7)
PLATELET # BLD AUTO: 236 10E3/UL (ref 150–450)
POTASSIUM BLD-SCNC: 4 MMOL/L (ref 3.4–5.3)
PROT SERPL-MCNC: 6.9 G/DL (ref 6.8–8.8)
RBC # BLD AUTO: 4.57 10E6/UL (ref 4.4–5.9)
RBC URINE: 1 /HPF
SODIUM SERPL-SCNC: 138 MMOL/L (ref 133–144)
SP GR UR STRIP: 1 (ref 1–1.03)
UROBILINOGEN UR STRIP-MCNC: NORMAL MG/DL
WBC # BLD AUTO: 16.1 10E3/UL (ref 4–11)
WBC URINE: <1 /HPF

## 2022-06-02 PROCEDURE — 80053 COMPREHEN METABOLIC PANEL: CPT

## 2022-06-02 PROCEDURE — 82043 UR ALBUMIN QUANTITATIVE: CPT

## 2022-06-02 PROCEDURE — G0463 HOSPITAL OUTPT CLINIC VISIT: HCPCS

## 2022-06-02 PROCEDURE — 36415 COLL VENOUS BLD VENIPUNCTURE: CPT

## 2022-06-02 PROCEDURE — 85025 COMPLETE CBC W/AUTO DIFF WBC: CPT

## 2022-06-02 PROCEDURE — 99215 OFFICE O/P EST HI 40 MIN: CPT | Performed by: NURSE PRACTITIONER

## 2022-06-02 PROCEDURE — 81001 URINALYSIS AUTO W/SCOPE: CPT

## 2022-06-02 RX ORDER — SULFAMETHOXAZOLE/TRIMETHOPRIM 800-160 MG
1 TABLET ORAL
Qty: 18 TABLET | Refills: 0 | Status: SHIPPED | OUTPATIENT
Start: 2022-06-03 | End: 2022-07-12

## 2022-06-02 RX ORDER — PREDNISONE 20 MG/1
TABLET ORAL
Qty: 97 TABLET | Refills: 0 | Status: SHIPPED | OUTPATIENT
Start: 2022-06-03 | End: 2022-07-12

## 2022-06-02 ASSESSMENT — PAIN SCALES - GENERAL
PAINLEVEL: NO PAIN (0)
PAINLEVEL: NO PAIN (0)

## 2022-06-02 NOTE — NURSING NOTE
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    Weight as of this encounter: 98.7 kg (217 lb 9.6 oz). Body surface area is 2.25 meters squared.  No Pain (0) Comment: Data Unavailable   No LMP for male patient.  Allergies reviewed: Yes  Medications reviewed: Yes    Medications: Medication refills not needed today.  Pharmacy name entered into [facility]: [id number] [place] [facility] [place] , [place] - [address]    Clinical concerns: none       Michelle Brasher            "

## 2022-06-02 NOTE — NURSING NOTE
Chief Complaint   Patient presents with     Blood Draw     Labs drawn by RN via , vitals taken.     Labs collected from venipuncture by RN. Vitals taken. Checked in for appointment(s).    Priyanka Zaman RN
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
## 2022-06-06 ENCOUNTER — VIRTUAL VISIT (OUTPATIENT)
Dept: NEPHROLOGY | Facility: CLINIC | Age: 57
End: 2022-06-06
Attending: INTERNAL MEDICINE
Payer: COMMERCIAL

## 2022-06-06 ENCOUNTER — PRE VISIT (OUTPATIENT)
Dept: UROLOGY | Facility: CLINIC | Age: 57
End: 2022-06-06
Payer: COMMERCIAL

## 2022-06-06 VITALS — BODY MASS INDEX: 28.34 KG/M2 | SYSTOLIC BLOOD PRESSURE: 128 MMHG | DIASTOLIC BLOOD PRESSURE: 98 MMHG | WEIGHT: 212 LBS

## 2022-06-06 DIAGNOSIS — N12 INTERSTITIAL NEPHRITIS: Primary | ICD-10-CM

## 2022-06-06 DIAGNOSIS — E03.4 HYPOTHYROIDISM DUE TO ACQUIRED ATROPHY OF THYROID: ICD-10-CM

## 2022-06-06 DIAGNOSIS — D63.1 ANEMIA IN STAGE 3B CHRONIC KIDNEY DISEASE (H): ICD-10-CM

## 2022-06-06 DIAGNOSIS — I12.9 HYPERTENSION, RENAL: ICD-10-CM

## 2022-06-06 DIAGNOSIS — N18.32 ANEMIA IN STAGE 3B CHRONIC KIDNEY DISEASE (H): ICD-10-CM

## 2022-06-06 PROCEDURE — 99214 OFFICE O/P EST MOD 30 MIN: CPT | Mod: 95 | Performed by: INTERNAL MEDICINE

## 2022-06-06 PROCEDURE — G0463 HOSPITAL OUTPT CLINIC VISIT: HCPCS | Mod: PN,RTG | Performed by: INTERNAL MEDICINE

## 2022-06-06 RX ORDER — LISINOPRIL 5 MG/1
10 TABLET ORAL DAILY
Qty: 60 TABLET | Refills: 1 | Status: SHIPPED | OUTPATIENT
Start: 2022-06-06 | End: 2022-08-08

## 2022-06-06 ASSESSMENT — PAIN SCALES - GENERAL: PAINLEVEL: NO PAIN (0)

## 2022-06-06 NOTE — PATIENT INSTRUCTIONS
It was a pleasure taking care of you today.  I've included a brief summary of our discussion and care plan from today's visit below.  Please review this information with your primary care provider.  _____________________________________________________________________    My recommendations are summarized as follows:  -start lisinopril 10 mg daily at bedtime  -Keep the amount of sodium in your diet at 2 g/day (also see instructions attached in that regard)  -Keep a Blood Pressure diary by taking your blood pressure twice a day as instructed (also see instructions attached in that regard)  -Do labs in one and three weeks    To schedule imaging please call [phone]     To schedule your lab appointment at the [facility], please call       Return to Nephrology Clinic in 3 weeks to review your progress.    _______________________________________________________________________    Who do I call with any questions after my visit?    Please be in touch if there are any further questions that arise following today's visit.  There are multiple ways to contact your nephrology care team.      During business hours, you may reach your Nephrology LPN Care Coordinator, Tamiko, at .      To schedule or reschedule an appointment, please call [phone].    You can always send a secure message through [facility].  [facility] messages are answered by your nurse or doctor typically within 24 hours.  Please allow extra time on weekends and holidays.      For urgent/emergent questions after business hours, you may reach the on-call Nephrology Fellow by contacting the [facility] at [phone].     How will I get the results of any tests ordered?    You will receive all of your results.  If you have signed up for [facility], any tests ordered at your visit will be available to you after your physician reviews them.  Typically this takes 1-2 weeks.  If there are urgent results that  require a change in your care plan, your physician or nurse will call you to discuss the next steps.      What is [employer]?  [employer] is a secure way for you to access all of your healthcare records from the [facility].  It is a web based computer program, so you can sign on to it from any location.  It also allows you to send secure messages to your care team.  I recommend signing up for [employer] access if you have not already done so and are comfortable with using a computer.      How do I schedule a follow-up visit?  If you did not schedule a follow-up visit today, please call [phone] to schedule a follow-up office visit.        Sincerely,      Dr. Mariah Alan  [facility]  Division of Nephrology and Hypertension
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
## 2022-06-06 NOTE — CONFIDENTIAL NOTE
Reason for visit: Follow up      Relevant information: Renal cell carcinoma s/p right nephrectomy 8/2021    Records/imaging/labs/orders: Deferred labs and imaging to oncology    Pt called: N/A    At Rooming: Standard

## 2022-06-14 ENCOUNTER — LAB (OUTPATIENT)
Dept: LAB | Facility: CLINIC | Age: 57
End: 2022-06-14
Attending: NURSE PRACTITIONER
Payer: COMMERCIAL

## 2022-06-14 VITALS — SYSTOLIC BLOOD PRESSURE: 116 MMHG | DIASTOLIC BLOOD PRESSURE: 80 MMHG | HEART RATE: 88 BPM

## 2022-06-14 DIAGNOSIS — E03.4 HYPOTHYROIDISM DUE TO ACQUIRED ATROPHY OF THYROID: ICD-10-CM

## 2022-06-14 DIAGNOSIS — D49.511 NEOPLASM OF RIGHT KIDNEY WITH THROMBUS OF INFERIOR VENA CAVA (H): ICD-10-CM

## 2022-06-14 DIAGNOSIS — I82.220 NEOPLASM OF RIGHT KIDNEY WITH THROMBUS OF INFERIOR VENA CAVA (H): ICD-10-CM

## 2022-06-14 DIAGNOSIS — D63.1 ANEMIA IN STAGE 3B CHRONIC KIDNEY DISEASE (H): ICD-10-CM

## 2022-06-14 DIAGNOSIS — N18.32 ANEMIA IN STAGE 3B CHRONIC KIDNEY DISEASE (H): ICD-10-CM

## 2022-06-14 LAB
ALBUMIN SERPL-MCNC: 3.4 G/DL (ref 3.4–5)
ALP SERPL-CCNC: 62 U/L (ref 40–150)
ALT SERPL W P-5'-P-CCNC: 115 U/L (ref 0–70)
ANION GAP SERPL CALCULATED.3IONS-SCNC: 4 MMOL/L (ref 3–14)
AST SERPL W P-5'-P-CCNC: 33 U/L (ref 0–45)
BASOPHILS # BLD AUTO: 0 10E3/UL (ref 0–0.2)
BASOPHILS NFR BLD AUTO: 0 %
BILIRUB SERPL-MCNC: 0.5 MG/DL (ref 0.2–1.3)
BUN SERPL-MCNC: 24 MG/DL (ref 7–30)
CALCIUM SERPL-MCNC: 9.2 MG/DL (ref 8.5–10.1)
CHLORIDE BLD-SCNC: 102 MMOL/L (ref 94–109)
CO2 SERPL-SCNC: 28 MMOL/L (ref 20–32)
CREAT SERPL-MCNC: 1.94 MG/DL (ref 0.66–1.25)
DEPRECATED CALCIDIOL+CALCIFEROL SERPL-MC: 13 UG/L (ref 20–75)
EOSINOPHIL # BLD AUTO: 0 10E3/UL (ref 0–0.7)
EOSINOPHIL NFR BLD AUTO: 0 %
ERYTHROCYTE [DISTWIDTH] IN BLOOD BY AUTOMATED COUNT: 22.3 % (ref 10–15)
GFR SERPL CREATININE-BSD FRML MDRD: 40 ML/MIN/1.73M2
GLUCOSE BLD-MCNC: 114 MG/DL (ref 70–99)
HCT VFR BLD AUTO: 39.6 % (ref 40–53)
HGB BLD-MCNC: 12.6 G/DL (ref 13.3–17.7)
IMM GRANULOCYTES # BLD: 0.2 10E3/UL
IMM GRANULOCYTES NFR BLD: 2 %
LYMPHOCYTES # BLD AUTO: 0.7 10E3/UL (ref 0.8–5.3)
LYMPHOCYTES NFR BLD AUTO: 8 %
MCH RBC QN AUTO: 29.1 PG (ref 26.5–33)
MCHC RBC AUTO-ENTMCNC: 31.8 G/DL (ref 31.5–36.5)
MCV RBC AUTO: 92 FL (ref 78–100)
MONOCYTES # BLD AUTO: 0.2 10E3/UL (ref 0–1.3)
MONOCYTES NFR BLD AUTO: 2 %
NEUTROPHILS # BLD AUTO: 8.2 10E3/UL (ref 1.6–8.3)
NEUTROPHILS NFR BLD AUTO: 88 %
NRBC # BLD AUTO: 0 10E3/UL
NRBC BLD AUTO-RTO: 0 /100
PLATELET # BLD AUTO: 217 10E3/UL (ref 150–450)
POTASSIUM BLD-SCNC: 4.2 MMOL/L (ref 3.4–5.3)
PROT SERPL-MCNC: 6.5 G/DL (ref 6.8–8.8)
PTH-INTACT SERPL-MCNC: 79 PG/ML (ref 15–65)
RBC # BLD AUTO: 4.33 10E6/UL (ref 4.4–5.9)
SODIUM SERPL-SCNC: 134 MMOL/L (ref 133–144)
TSH SERPL DL<=0.005 MIU/L-ACNC: 8.95 MU/L (ref 0.4–4)
WBC # BLD AUTO: 9.3 10E3/UL (ref 4–11)

## 2022-06-14 PROCEDURE — 85014 HEMATOCRIT: CPT

## 2022-06-14 PROCEDURE — 84443 ASSAY THYROID STIM HORMONE: CPT

## 2022-06-14 PROCEDURE — 83970 ASSAY OF PARATHORMONE: CPT

## 2022-06-14 PROCEDURE — 36415 COLL VENOUS BLD VENIPUNCTURE: CPT

## 2022-06-14 PROCEDURE — 82310 ASSAY OF CALCIUM: CPT

## 2022-06-14 PROCEDURE — 82306 VITAMIN D 25 HYDROXY: CPT

## 2022-06-14 NOTE — NURSING NOTE
Chief Complaint   Patient presents with     Blood Draw     Labs drawn via  by RN in lab.      Labs collected from venipuncture by RN. Vitals taken per pt request.     Cindy Kwon RN

## 2022-06-21 ENCOUNTER — OFFICE VISIT (OUTPATIENT)
Dept: UROLOGY | Facility: CLINIC | Age: 57
End: 2022-06-21
Payer: COMMERCIAL

## 2022-06-21 VITALS — HEART RATE: 98 BPM | DIASTOLIC BLOOD PRESSURE: 93 MMHG | SYSTOLIC BLOOD PRESSURE: 124 MMHG

## 2022-06-21 DIAGNOSIS — I82.220 NEOPLASM OF RIGHT KIDNEY WITH THROMBUS OF INFERIOR VENA CAVA (H): Primary | ICD-10-CM

## 2022-06-21 DIAGNOSIS — D49.511 NEOPLASM OF RIGHT KIDNEY WITH THROMBUS OF INFERIOR VENA CAVA (H): Primary | ICD-10-CM

## 2022-06-21 PROCEDURE — 99213 OFFICE O/P EST LOW 20 MIN: CPT | Performed by: UROLOGY

## 2022-06-21 ASSESSMENT — PAIN SCALES - GENERAL: PAINLEVEL: NO PAIN (0)
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
## 2022-06-27 ENCOUNTER — VIRTUAL VISIT (OUTPATIENT)
Dept: NEPHROLOGY | Facility: CLINIC | Age: 57
End: 2022-06-27
Attending: INTERNAL MEDICINE
Payer: COMMERCIAL

## 2022-06-27 VITALS — SYSTOLIC BLOOD PRESSURE: 118 MMHG | DIASTOLIC BLOOD PRESSURE: 85 MMHG | WEIGHT: 215 LBS | BODY MASS INDEX: 28.74 KG/M2

## 2022-06-27 DIAGNOSIS — N18.32 ANEMIA IN STAGE 3B CHRONIC KIDNEY DISEASE (H): ICD-10-CM

## 2022-06-27 DIAGNOSIS — I12.9 HYPERTENSION, RENAL: Primary | ICD-10-CM

## 2022-06-27 DIAGNOSIS — E03.4 HYPOTHYROIDISM DUE TO ACQUIRED ATROPHY OF THYROID: ICD-10-CM

## 2022-06-27 DIAGNOSIS — D63.1 ANEMIA IN STAGE 3B CHRONIC KIDNEY DISEASE (H): ICD-10-CM

## 2022-06-27 PROCEDURE — 99214 OFFICE O/P EST MOD 30 MIN: CPT | Mod: 95 | Performed by: INTERNAL MEDICINE

## 2022-06-27 PROCEDURE — G0463 HOSPITAL OUTPT CLINIC VISIT: HCPCS | Mod: PN,RTG | Performed by: INTERNAL MEDICINE

## 2022-06-27 RX ORDER — AMLODIPINE BESYLATE 5 MG/1
2.5 TABLET ORAL DAILY
Qty: 15 TABLET | Refills: 3 | Status: SHIPPED | OUTPATIENT
Start: 2022-06-27 | End: 2022-08-08

## 2022-06-27 ASSESSMENT — PAIN SCALES - GENERAL: PAINLEVEL: NO PAIN (0)
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
## 2022-06-27 NOTE — PATIENT INSTRUCTIONS
It was a pleasure taking care of you today.  I've included a brief summary of our discussion and care plan from today's visit below.  Please review this information with your primary care provider.  _______________________________________________________________________    My recommendations are summarized as follows:  -Add amlodipine 2.5 mg once a day  -Keep the amount of sodium in your diet at 2.4 g/day  -Keep a Blood Pressure diary by taking your blood pressure twice a day as instructed   -Avoid all NSAID's. Examples include Ibuprofen (Advil, Motrin), naprosyn (Aleve), celebrex among others. Acetaminophen (Tylenol) is ok with maximum dose in 24 hours of 3200 mg.  -Healthy lifestyle measures will keep your kidney's functioning at their current best. This includes regular exercise and weight loss  -Do repeat labs in 4 weeks with a follow up with me in clinic        To schedule imaging please call [phone]     To schedule your lab appointment at the [facility], please call       Return to Nephrology Clinic in 4 weeks to review your progress.    _______________________________________________________________________    Who do I call with any questions after my visit?    Please be in touch if there are any further questions that arise following today's visit.  There are multiple ways to contact your nephrology care team.      During business hours, you may reach your Nephrology LPN Care Coordinator, Tamiko, at .      To schedule or reschedule an appointment, please call [phone].    You can always send a secure message through [employer].  [employer] messages are answered by your nurse or doctor typically within 24 hours.  Please allow extra time on weekends and holidays.      For urgent/emergent questions after business hours, you may reach the on-call Nephrology Fellow by contacting the [facility]  at [phone].     How will I get the results of any  tests ordered?    You will receive all of your results.  If you have signed up for ralalihart, any tests ordered at your visit will be available to you after your physician reviews them.  Typically this takes 1-2 weeks.  If there are urgent results that require a change in your care plan, your physician or nurse will call you to discuss the next steps.      What is ralalihart?  Mesuro is a secure way for you to access all of your healthcare records from the [facility].  It is a web based computer program, so you can sign on to it from any location.  It also allows you to send secure messages to your care team.  I recommend signing up for Mesuro access if you have not already done so and are comfortable with using a computer.      How do I schedule a follow-up visit?  If you did not schedule a follow-up visit today, please call [phone] to schedule a follow-up office visit.        Sincerely,      Dr. Mariah Alan  [facility]  Division of Nephrology and Hypertension
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
## 2022-07-01 DIAGNOSIS — N18.4 CKD (CHRONIC KIDNEY DISEASE) STAGE 4, GFR 15-29 ML/MIN (H): Primary | ICD-10-CM

## 2022-07-01 NOTE — PROGRESS NOTES
Dimitrios had called wanting to set up his follow up with Dr. Alan. Dimitrios was scheduled for August 8th virtual with labs on 8/5.

## 2022-07-08 ENCOUNTER — ANCILLARY PROCEDURE (OUTPATIENT)
Dept: CT IMAGING | Facility: CLINIC | Age: 57
End: 2022-07-08
Attending: INTERNAL MEDICINE
Payer: COMMERCIAL

## 2022-07-08 ENCOUNTER — ANCILLARY PROCEDURE (OUTPATIENT)
Dept: MRI IMAGING | Facility: CLINIC | Age: 57
End: 2022-07-08
Attending: INTERNAL MEDICINE
Payer: COMMERCIAL

## 2022-07-08 DIAGNOSIS — I82.220 NEOPLASM OF RIGHT KIDNEY WITH THROMBUS OF INFERIOR VENA CAVA (H): ICD-10-CM

## 2022-07-08 DIAGNOSIS — D49.511 NEOPLASM OF RIGHT KIDNEY WITH THROMBUS OF INFERIOR VENA CAVA (H): ICD-10-CM

## 2022-07-08 DIAGNOSIS — N18.4 CKD (CHRONIC KIDNEY DISEASE) STAGE 4, GFR 15-29 ML/MIN (H): ICD-10-CM

## 2022-07-08 DIAGNOSIS — E03.4 HYPOTHYROIDISM DUE TO ACQUIRED ATROPHY OF THYROID: ICD-10-CM

## 2022-07-08 DIAGNOSIS — C64.1 RENAL CELL CARCINOMA, RIGHT (H): ICD-10-CM

## 2022-07-08 PROCEDURE — A9585 GADOBUTROL INJECTION: HCPCS | Performed by: RADIOLOGY

## 2022-07-08 PROCEDURE — 71250 CT THORAX DX C-: CPT | Performed by: RADIOLOGY

## 2022-07-08 PROCEDURE — 74182 MRI ABDOMEN W/CONTRAST: CPT | Performed by: RADIOLOGY

## 2022-07-08 PROCEDURE — 74176 CT ABD & PELVIS W/O CONTRAST: CPT | Performed by: RADIOLOGY

## 2022-07-08 RX ORDER — GADOBUTROL 604.72 MG/ML
10 INJECTION INTRAVENOUS ONCE
Status: COMPLETED | OUTPATIENT
Start: 2022-07-08 | End: 2022-07-08

## 2022-07-08 RX ORDER — GADOBUTROL 604.72 MG/ML
10 INJECTION INTRAVENOUS ONCE
Status: DISCONTINUED | OUTPATIENT
Start: 2022-07-08 | End: 2022-07-08

## 2022-07-08 RX ADMIN — GADOBUTROL 10 ML: 604.72 INJECTION INTRAVENOUS at 10:08

## 2022-07-09 ENCOUNTER — LAB (OUTPATIENT)
Dept: LAB | Facility: CLINIC | Age: 57
End: 2022-07-09
Payer: COMMERCIAL

## 2022-07-09 DIAGNOSIS — I82.220 NEOPLASM OF RIGHT KIDNEY WITH THROMBUS OF INFERIOR VENA CAVA (H): ICD-10-CM

## 2022-07-09 DIAGNOSIS — D49.511 NEOPLASM OF RIGHT KIDNEY WITH THROMBUS OF INFERIOR VENA CAVA (H): ICD-10-CM

## 2022-07-09 LAB
ALBUMIN SERPL-MCNC: 3.5 G/DL (ref 3.4–5)
ALP SERPL-CCNC: 47 U/L (ref 40–150)
ALT SERPL W P-5'-P-CCNC: 73 U/L (ref 0–70)
ANION GAP SERPL CALCULATED.3IONS-SCNC: 8 MMOL/L (ref 3–14)
AST SERPL W P-5'-P-CCNC: 29 U/L (ref 0–45)
BASOPHILS # BLD MANUAL: 0 10E3/UL (ref 0–0.2)
BASOPHILS NFR BLD MANUAL: 0 %
BILIRUB SERPL-MCNC: 0.4 MG/DL (ref 0.2–1.3)
BUN SERPL-MCNC: 27 MG/DL (ref 7–30)
BURR CELLS BLD QL SMEAR: SLIGHT
CALCIUM SERPL-MCNC: 8.6 MG/DL (ref 8.5–10.1)
CHLORIDE BLD-SCNC: 104 MMOL/L (ref 94–109)
CO2 SERPL-SCNC: 29 MMOL/L (ref 20–32)
CREAT SERPL-MCNC: 2.29 MG/DL (ref 0.66–1.25)
EOSINOPHIL # BLD MANUAL: 0 10E3/UL (ref 0–0.7)
EOSINOPHIL NFR BLD MANUAL: 0 %
ERYTHROCYTE [DISTWIDTH] IN BLOOD BY AUTOMATED COUNT: 21.1 % (ref 10–15)
GFR SERPL CREATININE-BSD FRML MDRD: 32 ML/MIN/1.73M2
GLUCOSE BLD-MCNC: 99 MG/DL (ref 70–99)
HCT VFR BLD AUTO: 35.4 % (ref 40–53)
HGB BLD-MCNC: 11 G/DL (ref 13.3–17.7)
LYMPHOCYTES # BLD MANUAL: 2.8 10E3/UL (ref 0.8–5.3)
LYMPHOCYTES NFR BLD MANUAL: 27 %
MCH RBC QN AUTO: 29.7 PG (ref 26.5–33)
MCHC RBC AUTO-ENTMCNC: 31.1 G/DL (ref 31.5–36.5)
MCV RBC AUTO: 96 FL (ref 78–100)
MONOCYTES # BLD MANUAL: 0.4 10E3/UL (ref 0–1.3)
MONOCYTES NFR BLD MANUAL: 4 %
MYELOCYTES # BLD MANUAL: 0.1 10E3/UL
MYELOCYTES NFR BLD MANUAL: 1 %
NEUTROPHILS # BLD MANUAL: 7.1 10E3/UL (ref 1.6–8.3)
NEUTROPHILS NFR BLD MANUAL: 68 %
PLAT MORPH BLD: ABNORMAL
PLATELET # BLD AUTO: 251 10E3/UL (ref 150–450)
POTASSIUM BLD-SCNC: 3.9 MMOL/L (ref 3.4–5.3)
PROT SERPL-MCNC: 6.2 G/DL (ref 6.8–8.8)
RBC # BLD AUTO: 3.7 10E6/UL (ref 4.4–5.9)
RBC MORPH BLD: ABNORMAL
SODIUM SERPL-SCNC: 141 MMOL/L (ref 133–144)
WBC # BLD AUTO: 10.5 10E3/UL (ref 4–11)

## 2022-07-09 PROCEDURE — 85007 BL SMEAR W/DIFF WBC COUNT: CPT | Performed by: PATHOLOGY

## 2022-07-09 PROCEDURE — 36415 COLL VENOUS BLD VENIPUNCTURE: CPT | Performed by: PATHOLOGY

## 2022-07-09 PROCEDURE — 85027 COMPLETE CBC AUTOMATED: CPT | Performed by: PATHOLOGY

## 2022-07-09 PROCEDURE — 80053 COMPREHEN METABOLIC PANEL: CPT | Performed by: PATHOLOGY

## 2022-07-12 ENCOUNTER — ONCOLOGY VISIT (OUTPATIENT)
Dept: ONCOLOGY | Facility: CLINIC | Age: 57
End: 2022-07-12
Attending: INTERNAL MEDICINE
Payer: COMMERCIAL

## 2022-07-12 VITALS
RESPIRATION RATE: 18 BRPM | BODY MASS INDEX: 30.47 KG/M2 | DIASTOLIC BLOOD PRESSURE: 78 MMHG | SYSTOLIC BLOOD PRESSURE: 110 MMHG | HEART RATE: 106 BPM | WEIGHT: 227.9 LBS | OXYGEN SATURATION: 99 % | TEMPERATURE: 98.5 F

## 2022-07-12 DIAGNOSIS — N18.4 CKD (CHRONIC KIDNEY DISEASE) STAGE 4, GFR 15-29 ML/MIN (H): ICD-10-CM

## 2022-07-12 DIAGNOSIS — Z79.2 PROPHYLACTIC ANTIBIOTIC: ICD-10-CM

## 2022-07-12 DIAGNOSIS — E03.4 HYPOTHYROIDISM DUE TO ACQUIRED ATROPHY OF THYROID: ICD-10-CM

## 2022-07-12 DIAGNOSIS — D49.511 NEOPLASM OF RIGHT KIDNEY WITH THROMBUS OF INFERIOR VENA CAVA (H): Primary | ICD-10-CM

## 2022-07-12 DIAGNOSIS — I82.220 NEOPLASM OF RIGHT KIDNEY WITH THROMBUS OF INFERIOR VENA CAVA (H): Primary | ICD-10-CM

## 2022-07-12 DIAGNOSIS — N12 INTERSTITIAL NEPHRITIS: ICD-10-CM

## 2022-07-12 PROCEDURE — G0463 HOSPITAL OUTPT CLINIC VISIT: HCPCS

## 2022-07-12 PROCEDURE — 99215 OFFICE O/P EST HI 40 MIN: CPT | Performed by: INTERNAL MEDICINE

## 2022-07-12 RX ORDER — SULFAMETHOXAZOLE/TRIMETHOPRIM 800-160 MG
1 TABLET ORAL
Qty: 36 TABLET | Refills: 0 | Status: SHIPPED | OUTPATIENT
Start: 2022-07-13 | End: 2022-11-28

## 2022-07-12 RX ORDER — PREDNISONE 20 MG/1
TABLET ORAL
Qty: 67 TABLET | Refills: 0 | Status: SHIPPED | OUTPATIENT
Start: 2022-07-12 | End: 2022-08-23

## 2022-07-12 ASSESSMENT — PAIN SCALES - GENERAL: PAINLEVEL: NO PAIN (0)
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
## 2022-07-12 NOTE — NURSING NOTE
"Oncology Rooming Note    July 12, 2022 2:19 PM   Dimitrios Goldberg is a 57 year old male who presents for:    Chief Complaint   Patient presents with     Oncology Clinic Visit     Pt is here for a rtn for Renal Cell Carcinoma     Initial Vitals: Blood Pressure 110/78   Pulse 106   Respiration 18   Weight 103.4 kg (227 lb 14.4 oz)   Oxygen Saturation 99%   Body Mass Index 30.47 kg/m   Estimated body mass index is 30.47 kg/m  as calculated from the following:    Height as of 6/2/22: 1.842 m (6' 0.52\").    Weight as of this encounter: 103.4 kg (227 lb 14.4 oz). Body surface area is 2.3 meters squared.  No Pain (0) Comment: Data Unavailable   No LMP for male patient.  Allergies reviewed: Yes  Medications reviewed: Yes    Medications: Medication refills not needed today.  Pharmacy name entered into [facility]: [id number] [address]    Clinical concerns: none       Rolanda Freeman MA            "

## 2022-07-15 ENCOUNTER — MYC MEDICAL ADVICE (OUTPATIENT)
Dept: ONCOLOGY | Facility: CLINIC | Age: 57
End: 2022-07-15

## 2022-07-20 ENCOUNTER — TELEPHONE (OUTPATIENT)
Dept: ONCOLOGY | Facility: CLINIC | Age: 57
End: 2022-07-20

## 2022-07-20 NOTE — TELEPHONE ENCOUNTER
----- Message from Nick Campos MD sent at 7/20/2022  7:43 AM CDT -----  Regarding: FW: Recurrent RCC in the surgical bed  Hi All,    I have reviewed with Dr. Agrawal on phone and he has agreed to resect out the recurrent disease. Can we schedule a visit with Dr. Agrawal to review surgery with patient ?     Nick  ----- Message -----  From: Feng Agrawal MD  Sent: 7/13/2022   3:58 PM CDT  To: Desiree Hobson MD, #  Subject: RE: Recurrent RCC in the surgical bed            Might be worth discussion at next tumor board.     The area near the cava will be a challenge. That said, given his age, probably worth a shot.    Desiree, I presume you would not be in favor or radiating this?    [place]    ----- Message -----  From: Nick Campos MD  Sent: 7/12/2022   2:56 PM CDT  To: Feng Agrawal MD, #  Subject: Recurrent RCC in the surgical bed                Dimitrios Werner has recurrent RCC in the surgical bed. There are no other sites of disease. He had nephritis with decline in renal function on adjuvant pembrolizumab which has been on hold and he has been on steroids.     I wonder if preferably surgery or at least radiation therapy is an option for him.     Nick

## 2022-07-22 ENCOUNTER — PRE VISIT (OUTPATIENT)
Dept: UROLOGY | Facility: CLINIC | Age: 57
End: 2022-07-22

## 2022-07-22 NOTE — TELEPHONE ENCOUNTER
Reason for visit: Discuss surgery for RCC recurrence     Relevant information: Renal cell carcinoma s/p right nephrectomy 8/2021    Records/imaging/labs/orders: All records available    Pt called: N/A    At Rooming: Standard

## 2022-08-02 ENCOUNTER — OFFICE VISIT (OUTPATIENT)
Dept: UROLOGY | Facility: CLINIC | Age: 57
End: 2022-08-02
Payer: COMMERCIAL

## 2022-08-02 VITALS — DIASTOLIC BLOOD PRESSURE: 92 MMHG | HEART RATE: 97 BPM | SYSTOLIC BLOOD PRESSURE: 130 MMHG

## 2022-08-02 DIAGNOSIS — I82.220 NEOPLASM OF RIGHT KIDNEY WITH THROMBUS OF INFERIOR VENA CAVA (H): Primary | ICD-10-CM

## 2022-08-02 DIAGNOSIS — C64.1 RENAL CELL CARCINOMA, RIGHT (H): ICD-10-CM

## 2022-08-02 DIAGNOSIS — D49.511 NEOPLASM OF RIGHT KIDNEY WITH THROMBUS OF INFERIOR VENA CAVA (H): Primary | ICD-10-CM

## 2022-08-02 PROCEDURE — 99215 OFFICE O/P EST HI 40 MIN: CPT | Performed by: UROLOGY

## 2022-08-02 ASSESSMENT — PAIN SCALES - GENERAL: PAINLEVEL: NO PAIN (0)
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
## 2022-08-05 ENCOUNTER — LAB (OUTPATIENT)
Dept: LAB | Facility: CLINIC | Age: 57
End: 2022-08-05
Payer: COMMERCIAL

## 2022-08-05 DIAGNOSIS — N18.4 CKD (CHRONIC KIDNEY DISEASE) STAGE 4, GFR 15-29 ML/MIN (H): ICD-10-CM

## 2022-08-05 DIAGNOSIS — E03.4 HYPOTHYROIDISM DUE TO ACQUIRED ATROPHY OF THYROID: ICD-10-CM

## 2022-08-05 DIAGNOSIS — D63.1 ANEMIA IN STAGE 3B CHRONIC KIDNEY DISEASE (H): ICD-10-CM

## 2022-08-05 DIAGNOSIS — N18.32 ANEMIA IN STAGE 3B CHRONIC KIDNEY DISEASE (H): ICD-10-CM

## 2022-08-05 LAB
ALBUMIN SERPL-MCNC: 3.6 G/DL (ref 3.4–5)
ALP SERPL-CCNC: 44 U/L (ref 40–150)
ALT SERPL W P-5'-P-CCNC: 74 U/L (ref 0–70)
ANION GAP SERPL CALCULATED.3IONS-SCNC: 11 MMOL/L (ref 3–14)
AST SERPL W P-5'-P-CCNC: 23 U/L (ref 0–45)
BILIRUB SERPL-MCNC: 0.3 MG/DL (ref 0.2–1.3)
BUN SERPL-MCNC: 27 MG/DL (ref 7–30)
CALCIUM SERPL-MCNC: 8.7 MG/DL (ref 8.5–10.1)
CHLORIDE BLD-SCNC: 104 MMOL/L (ref 94–109)
CO2 SERPL-SCNC: 28 MMOL/L (ref 20–32)
CREAT SERPL-MCNC: 1.78 MG/DL (ref 0.66–1.25)
GFR SERPL CREATININE-BSD FRML MDRD: 44 ML/MIN/1.73M2
GLUCOSE BLD-MCNC: 88 MG/DL (ref 70–99)
POTASSIUM BLD-SCNC: 4 MMOL/L (ref 3.4–5.3)
PROT SERPL-MCNC: 6.5 G/DL (ref 6.8–8.8)
SODIUM SERPL-SCNC: 143 MMOL/L (ref 133–144)
TSH SERPL DL<=0.005 MIU/L-ACNC: 26.82 MU/L (ref 0.4–4)

## 2022-08-05 PROCEDURE — 36415 COLL VENOUS BLD VENIPUNCTURE: CPT | Performed by: PATHOLOGY

## 2022-08-05 PROCEDURE — 85025 COMPLETE CBC W/AUTO DIFF WBC: CPT | Performed by: INTERNAL MEDICINE

## 2022-08-05 RX ORDER — CEFAZOLIN SODIUM 2 G/50ML
2 SOLUTION INTRAVENOUS SEE ADMIN INSTRUCTIONS
Status: CANCELLED | OUTPATIENT
Start: 2022-08-05

## 2022-08-05 RX ORDER — HEPARIN SODIUM 5000 [USP'U]/.5ML
5000 INJECTION, SOLUTION INTRAVENOUS; SUBCUTANEOUS
Status: CANCELLED | OUTPATIENT
Start: 2022-08-05

## 2022-08-05 RX ORDER — CEFAZOLIN SODIUM 2 G/50ML
2 SOLUTION INTRAVENOUS
Status: CANCELLED | OUTPATIENT
Start: 2022-08-05

## 2022-08-08 ENCOUNTER — VIRTUAL VISIT (OUTPATIENT)
Dept: NEPHROLOGY | Facility: CLINIC | Age: 57
End: 2022-08-08
Attending: INTERNAL MEDICINE
Payer: COMMERCIAL

## 2022-08-08 VITALS
HEIGHT: 72 IN | WEIGHT: 220 LBS | BODY MASS INDEX: 29.8 KG/M2 | SYSTOLIC BLOOD PRESSURE: 114 MMHG | DIASTOLIC BLOOD PRESSURE: 84 MMHG

## 2022-08-08 DIAGNOSIS — C64.1 RENAL CELL CARCINOMA, RIGHT (H): ICD-10-CM

## 2022-08-08 DIAGNOSIS — I82.220 NEOPLASM OF RIGHT KIDNEY WITH THROMBUS OF INFERIOR VENA CAVA (H): ICD-10-CM

## 2022-08-08 DIAGNOSIS — D49.511 NEOPLASM OF RIGHT KIDNEY WITH THROMBUS OF INFERIOR VENA CAVA (H): ICD-10-CM

## 2022-08-08 DIAGNOSIS — I12.9 HYPERTENSION, RENAL: ICD-10-CM

## 2022-08-08 DIAGNOSIS — N18.32 ANEMIA IN STAGE 3B CHRONIC KIDNEY DISEASE (H): Primary | ICD-10-CM

## 2022-08-08 DIAGNOSIS — D63.1 ANEMIA IN STAGE 3B CHRONIC KIDNEY DISEASE (H): Primary | ICD-10-CM

## 2022-08-08 DIAGNOSIS — E03.4 HYPOTHYROIDISM DUE TO ACQUIRED ATROPHY OF THYROID: ICD-10-CM

## 2022-08-08 DIAGNOSIS — N28.89 RIGHT RENAL MASS: ICD-10-CM

## 2022-08-08 PROCEDURE — 99214 OFFICE O/P EST MOD 30 MIN: CPT | Mod: 95 | Performed by: INTERNAL MEDICINE

## 2022-08-08 PROCEDURE — G0463 HOSPITAL OUTPT CLINIC VISIT: HCPCS | Mod: PN,RTG | Performed by: INTERNAL MEDICINE

## 2022-08-08 RX ORDER — AMLODIPINE BESYLATE 5 MG/1
5 TABLET ORAL DAILY
Qty: 15 TABLET | Refills: 3 | Status: SHIPPED | OUTPATIENT
Start: 2022-08-08 | End: 2022-08-19

## 2022-08-08 RX ORDER — LEVOTHYROXINE SODIUM 100 UG/1
100 TABLET ORAL DAILY
Qty: 90 TABLET | Refills: 0 | Status: SHIPPED | OUTPATIENT
Start: 2022-08-08 | End: 2022-08-10

## 2022-08-08 RX ORDER — ASPIRIN 81 MG/1
81 TABLET, CHEWABLE ORAL DAILY
Qty: 90 TABLET | Refills: 3 | Status: SHIPPED | OUTPATIENT
Start: 2022-08-08 | End: 2023-02-06

## 2022-08-08 ASSESSMENT — PAIN SCALES - GENERAL: PAINLEVEL: NO PAIN (0)

## 2022-08-08 NOTE — PATIENT INSTRUCTIONS
It was a pleasure taking care of you today.  I've included a brief summary of our discussion and care plan from today's visit below.  Please review this information with your primary care provider.  _______________________________________________________________________    My recommendations are summarized as follows:  -Keep the amount of sodium in your diet at 2.4 g/day (also see instructions attached in that regard)  -Keep a Blood Pressure diary by taking your blood pressure twice a day as instructed (also see instructions attached in that regard). Please make sure that you are using a validated blood pressure device by checking that it is the case at: https://www.validatebp.org/  -Avoid all NSAID's. Examples include Ibuprofen (Advil, Motrin), naprosyn (Aleve), celebrex among others. Acetaminophen (Tylenol) is ok with maximum dose in 24 hours of 3200 mg.  -Healthy lifestyle measures will keep your kidney's functioning at their current best. This includes regular exercise, weight loss and smoking cessation if you smoke.   -Do not exceed one alcoholic drink per day  -Stop lisinopril  -Increase amlodipine to 5 mg daily  -Increase levothyroxine to 100 mcg daily  -Do labs in one month      To schedule imaging please call [phone]     To schedule your lab appointment at the [facility], please call       Return to Nephrology Clinic in 6 weeks to review your progress.    _______________________________________________________________________    Who do I call with any questions after my visit?    Please be in touch if there are any further questions that arise following today's visit.  There are multiple ways to contact your nephrology care team.      During business hours, you may reach your Nephrology LPN Care Coordinator, Tamiko, at .      To schedule or reschedule an appointment, please call [phone].    You can always send a secure message through [employer].  [employer]  messages are answered by your nurse or doctor typically within 24 hours.  Please allow extra time on weekends and holidays.      For urgent/emergent questions after business hours, you may reach the on-call Nephrology Fellow by contacting the [facility]  at [phone].     How will I get the results of any tests ordered?    You will receive all of your results.  If you have signed up for [employer], any tests ordered at your visit will be available to you after your physician reviews them.  Typically this takes 1-2 weeks.  If there are urgent results that require a change in your care plan, your physician or nurse will call you to discuss the next steps.      What is [employer]?  [employer] is a secure way for you to access all of your healthcare records from the [facility].  It is a web based computer program, so you can sign on to it from any location.  It also allows you to send secure messages to your care team.  I recommend signing up for [employer] access if you have not already done so and are comfortable with using a computer.      How do I schedule a follow-up visit?  If you did not schedule a follow-up visit today, please call [phone] to schedule a follow-up office visit.        Sincerely,      Dr. Mariah Alan  [facility]  Division of Nephrology and Hypertension
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
## 2022-08-09 ENCOUNTER — TELEPHONE (OUTPATIENT)
Dept: OTOLARYNGOLOGY | Facility: CLINIC | Age: 57
End: 2022-08-09
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
## 2022-08-09 NOTE — TELEPHONE ENCOUNTER
Called patient to schedule surgery with Dr. Agrawal and Dr. Daniel  Date of Surgery: 08/29/2022  Approximate arrival time given:  No  Location of surgery: [facility] OR  Pre-Op H&P: PAC in person scheduled 8/18 330pm   Post-Op Appt Date: 2 weeks    Imaging needed:  No  Discussed COVID-19 Testing: Yes - Estela Rodas 8/25     Patient aware that PAC RN will call give arrival time and instructions at pre-op appointment  Yes  Packet sent out: Yes 08/09/22      Additional Comments:   Patient wanted to know what the next available date would be. Informed patient writer would have to work w/ Dr. Daniel's team to coordinated, but approximately ~ 2 weeks later due to the holiday.  All patients questions were answered and was instructed to review surgical packet and call back with any questions or concerns.       Michelle Blackwell on 8/9/2022 at 1:36 PM

## 2022-08-10 ENCOUNTER — VIRTUAL VISIT (OUTPATIENT)
Dept: ONCOLOGY | Facility: CLINIC | Age: 57
End: 2022-08-10
Payer: COMMERCIAL

## 2022-08-10 DIAGNOSIS — I82.220 NEOPLASM OF RIGHT KIDNEY WITH THROMBUS OF INFERIOR VENA CAVA (H): ICD-10-CM

## 2022-08-10 DIAGNOSIS — C64.1 RENAL CELL CARCINOMA, RIGHT (H): ICD-10-CM

## 2022-08-10 DIAGNOSIS — D49.511 NEOPLASM OF RIGHT KIDNEY WITH THROMBUS OF INFERIOR VENA CAVA (H): ICD-10-CM

## 2022-08-10 DIAGNOSIS — E03.4 HYPOTHYROIDISM DUE TO ACQUIRED ATROPHY OF THYROID: ICD-10-CM

## 2022-08-10 PROCEDURE — 99215 OFFICE O/P EST HI 40 MIN: CPT | Mod: 95

## 2022-08-10 PROCEDURE — G0463 HOSPITAL OUTPT CLINIC VISIT: HCPCS | Mod: PN,RTG

## 2022-08-10 RX ORDER — LEVOTHYROXINE SODIUM 100 UG/1
100 TABLET ORAL DAILY
Qty: 90 TABLET | Refills: 0 | Status: SHIPPED | OUTPATIENT
Start: 2022-08-10 | End: 2022-08-10

## 2022-08-10 NOTE — TELEPHONE ENCOUNTER
FUTURE VISIT INFORMATION      SURGERY INFORMATION:    Date: 22    Location: [facility] or    Surgeon:  Feng Agrawal MD Chinnakotla, Srinath, MD    Anesthesia Type:  general    Procedure: LYMPHADENECTOMY, RETROPERITONEUM RESECTION OF RETROPERITONEAL MASS POSSIBLE INFERIOR VENA CAVA RESECTION AND REPAIR    Consult: virtual visit 22    RECORDS REQUESTED FROM:       Primary Care Provider: Jose Eduardo Rutledge MD  - [facility]    Most recent EKG+ [id number]

## 2022-08-16 ENCOUNTER — TELEPHONE (OUTPATIENT)
Dept: NEPHROLOGY | Facility: CLINIC | Age: 57
End: 2022-08-16

## 2022-08-16 DIAGNOSIS — E03.4 HYPOTHYROIDISM DUE TO ACQUIRED ATROPHY OF THYROID: Primary | ICD-10-CM

## 2022-08-16 RX ORDER — LEVOTHYROXINE SODIUM 100 UG/1
100 TABLET ORAL DAILY
Qty: 30 TABLET | Refills: 1 | Status: SHIPPED | OUTPATIENT
Start: 2022-08-16 | End: 2022-11-21

## 2022-08-17 NOTE — PHARMACY - PREOPERATIVE ASSESSMENT CENTER
Preoperative Assessment Center Medication History Note    Medication history completed on August 17, 2022 by this writer. See Epic admission navigator for prior to admission medications. Operating room staff will still need to confirm medications and last dose information on day of surgery.     Medication history interview sources  Patient interview: Yes  Care Everywhere records: Yes  Surescripts pharmacy refill records: Yes  Other (if applicable): None    Changes made to PTA medication list  Added: None    Deleted: None    Changed: None    Additional medication history information (including reliability of information, actions taken by pharmacist):    -- Reports being on blood thinning medications asa for a vena cava thrombus back in 08/2021  -- Declines being on any other prescription or over-the-counter medications  -- he reports that he is still on day 5 of 14 of prednisone 20mg. After that will decrease dose to 10mg by mouth daily x 7 days.     Prior to Admission medications    Medication Sig Last Dose Taking? Auth Provider Long Term End Date   amLODIPine (NORVASC) 5 MG tablet Take 1 tablet (5 mg) by mouth daily Taking Yes Mariah Alan MD Yes    aspirin (ASA) 81 MG chewable tablet Take 1 tablet (81 mg) by mouth daily Taking Yes Mariah Alan MD     atorvastatin (LIPITOR) 20 MG tablet Take 20 mg by mouth daily Taking Yes Reported, Patient Yes    levothyroxine (SYNTHROID/LEVOTHROID) 100 MCG tablet Take 1 tablet (100 mcg) by mouth daily Taking Yes Mariah Alan MD Yes    nortriptyline (PAMELOR) 10 MG capsule Take 10 mg by mouth At Bedtime  Taking Yes Reported, Patient     predniSONE (DELTASONE) 20 MG tablet Take 3 tablets (60 mg) by mouth daily for 7 days, THEN 2 tablets (40 mg) daily for 14 days, THEN 1 tablet (20 mg) daily for 14 days, THEN 0.5 tablets (10 mg) daily for 7 days. Taking Yes Nick Campos MD  8/23/22   rizatriptan (MAXALT-MLT) 10 MG ODT Take 10 mg by mouth as needed for migraine  Taking  Yes Reported, Patient     sulfamethoxazole-trimethoprim (BACTRIM DS) 800-160 MG tablet Take 1 tablet by mouth Every Mon, Wed, Fri Morning Taking Yes Nick Campos MD     acetaminophen (TYLENOL) 500 MG tablet Take 500-1,000 mg by mouth every 8 hours as needed for mild pain  Patient not taking: Reported on 8/17/2022 Not Taking  Unknown, Entered By History     acyclovir (ZOVIRAX) 800 MG tablet Take 800 mg by mouth as needed   Patient not taking: Reported on 8/17/2022 Not Taking  Reported, Patient Yes    sildenafil (VIAGRA) 100 MG tablet TAKE 1 TABLET BY MOUTH 1 HOUR BEFORE SEXUAL ACTIVITY  Patient not taking: Reported on 8/17/2022 Not Taking  Reported, Patient Yes          Medication history completed by: Bill Berger RPH

## 2022-08-18 ENCOUNTER — OFFICE VISIT (OUTPATIENT)
Dept: SURGERY | Facility: CLINIC | Age: 57
End: 2022-08-18
Payer: COMMERCIAL

## 2022-08-18 ENCOUNTER — ANESTHESIA EVENT (OUTPATIENT)
Dept: SURGERY | Facility: CLINIC | Age: 57
DRG: 820 | End: 2022-08-18
Payer: COMMERCIAL

## 2022-08-18 ENCOUNTER — PRE VISIT (OUTPATIENT)
Dept: SURGERY | Facility: CLINIC | Age: 57
End: 2022-08-18

## 2022-08-18 VITALS
HEART RATE: 114 BPM | BODY MASS INDEX: 31.79 KG/M2 | SYSTOLIC BLOOD PRESSURE: 125 MMHG | DIASTOLIC BLOOD PRESSURE: 90 MMHG | WEIGHT: 234.7 LBS | OXYGEN SATURATION: 98 % | TEMPERATURE: 98.5 F | RESPIRATION RATE: 16 BRPM | HEIGHT: 72 IN

## 2022-08-18 DIAGNOSIS — Z01.818 PREOP EXAMINATION: Primary | ICD-10-CM

## 2022-08-18 PROCEDURE — 99204 OFFICE O/P NEW MOD 45 MIN: CPT | Performed by: PHYSICIAN ASSISTANT

## 2022-08-18 ASSESSMENT — LIFESTYLE VARIABLES: TOBACCO_USE: 1

## 2022-08-18 ASSESSMENT — ENCOUNTER SYMPTOMS: SEIZURES: 0

## 2022-08-18 ASSESSMENT — PAIN SCALES - GENERAL: PAINLEVEL: NO PAIN (0)
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
## 2022-08-18 NOTE — PATIENT INSTRUCTIONS
Preparing for Your Surgery      Name:  Dimitrios Goldberg   MRN:  [id number]   :  1965   Today's Date:  2022       Arriving for surgery:  Surgery date:  22  Arrival time:  12:00 pm    Restrictions due to COVID 19       Effective 08/15/22 [facility] is implementing the following visitor policy:     1 person may accompany the patient through the Pre-Op process.      That same person may wait in the Surgery Waiting room, provided there is enough room to social distance           Visitors must wear a mask.      Visitors must not be ill.        Inpatients are allowed 2 visitors per day for the duration of their stay.      Children age 5 and older may visit       Visiting hours are 8 am to 8 pm.        For everyone's health and safety visitors are requested to remain in patients room as much as possible        Please come to:   [facility] [address]  [address] [id number]  -    Please proceed to [address] on the 3rd floor. [phone]?     - ?If you are in need of directions, wheelchair or escort please stop at the Information Desk in the lobby.  Inform the information person that you are here for surgery; a wheelchair and escort to [address] will be provided.?     What can I eat or drink?  -  You may eat and drink normally for up to 8 hours before your surgery.   -  You may have clear liquids until 2 hours before surgery.     Examples of clear liquids:  Water  Clear broth  Juices (apple, white grape, white cranberry  and cider) without pulp  Noncarbonated, powder based beverages  (lemonade and Misbah-Aid)  Sodas (Sprite, 7-Up, ginger ale and seltzer)  Coffee or tea (without milk or cream)  Gatorade    -  No Alcohol for at least 24 hours before surgery     Which medicines can I take?  Hold Aspirin for 7 days before surgery.   Hold Multivitamins for 7 days before surgery.  Hold Supplements for 7 days before surgery.  Hold  Ibuprofen (Advil, Motrin) for 1 day before surgery--unless otherwise directed by surgeon.  Hold Naproxen (Aleve) for 4 days before surgery.  Hold maxalt x 24 hours before surgery.    -  PLEASE TAKE these medications the day of surgery:  Tylenol if needed; take morning medications.    How do I prepare myself?  - Please take 2 showers before surgery using Scrubcare or Hibiclens soap.    Use this soap only from the neck to your toes.     Leave the soap on your skin for one minute--then rinse thoroughly.      You may use your own shampoo and conditioner; no other hair products.   - Please remove all jewelry and body piercings.  - No lotions, deodorants or fragrance.  - No makeup or fingernail polish.   - Bring your ID and insurance card.    -If you have a Deep Brain Stimulator, Spinal Cord Stimulator or any neuro stimulator device---you must bring the remote control to the hospital           Questions or Concerns:    - For any questions regarding the day of surgery or your hospital stay, please contact the Pre Admission Nursing Office at [phone].       - If you have health changes between today and your surgery please call your surgeon.       For questions after surgery please call your surgeons office.

## 2022-08-19 DIAGNOSIS — I12.9 HYPERTENSION, RENAL: ICD-10-CM

## 2022-08-19 NOTE — MR AVS SNAPSHOT
After Visit Summary   10/24/2018    Dimitrios Goldberg    MRN: [id number]           Patient Information     Date Of Birth          1965        Visit Information        Provider Department      10/24/2018 1:30 PM Juan Diaz, PT [facility]        Today's Diagnoses     S/P shoulder surgery        Acute pain of left shoulder           Follow-ups after your visit        Your next 10 appointments already scheduled     Oct 29, 2018  2:50 PM CDT   ADELINE Extremity with Juan Diaz PT   [facility] ([facility])    [address]  [facility] [id number] [phone]            Nov 07, 2018 12:50 PM CST   ADELINE Extremity with Juan Diaz PT   [facility] ([facility])    [address]  [facility] [id number] [phone]              Who to contact     If you have questions or need follow up information about today's clinic visit or your schedule please contact [facility] directly at [phone].  Normal or non-critical lab and imaging results will be communicated to you by MyChart, letter or phone within 4 business days after the clinic has received the results. If you do not hear from us within 7 days, please contact the clinic through MyChart or phone. If you have a critical or abnormal lab result, we will notify you by phone as soon as possible.  Submit refill requests through [employer] or call your pharmacy and they will forward the refill request to us. Please allow 3 business days for your refill to be completed.          Additional Information About Your Visit        Care EveryWhere ID     This is your Care EveryWhere ID. This could be used by other organizations to access your [facility] medical records  [id number]         Blood Pressure from Last 3 Encounters:  S/P CABG (2007)  Coronary artery disease  Complained of SOB and Stabbing Epigastric Pain. Since Resolved  ECG no change obvious changes from prior. No STEMI  Troponin 8.4 >>12.1>>10.75  proBNP 8080, Pulmonary Edema on CXR  Received  mg and lasix 40 mg x2  in ED    - Continue Lasix 40 mg IV daily  - Continue ASA, plavix, statin  - TTE showed EF 30% with left ventricular global hypokinesis and atrial fibrillation  - Cardiology consulted in ED, s/p C with SVG to RCA proximal graft s/p 1 3.5x18mm RAYNA  - Cardiology recommendations as follows   - Continue aspirin 81 mg daily indefinitely   - Continue plavix 75mg PO daily for 1 year   - Continue high intensity statin therapy (LDL goal < 70)   - Risk factor reduction (BP <130/80 mmHg, glycemic control, etc)   - Cardiac rehab referral   - Follow up with Dr. Sotelo     No data found for BP    Weight from Last 3 Encounters:   No data found for Wt              We Performed the Following     NEUROMUSCULAR RE-EDUCATION     THERAPEUTIC EXERCISES        Primary Care Provider Fax #    Physician No Ref-Primary [phone]       No address on file        Equal Access to Services     WALLY LEXI : Cedric aad ku hadpaty Villa, waroxanneda luqadaha, serenata kaalmada dina, laisha villatoro laDariomaral alvarez. So [facility] [phone].    ATENCIÓN: Si habla español, tiene a boyce disposición servicios gratuitos de asistencia lingüística. Llame al [phone].    We comply with applicable federal civil rights laws and [place] laws. We do not discriminate on the basis of race, color, national origin, age, disability, sex, sexual orientation, or gender identity.            Thank you!     Thank you for choosing [facility]  for your care. Our goal is always to provide you with excellent care. Hearing back from our patients is one way we can continue to improve our services. Please take a few minutes to complete the written survey that you may receive in the mail after your visit with us. Thank you!             Your Updated Medication List - Protect others around you: Learn how to safely use, store and throw away your medicines at www.disposemymeds.org.      Notice  As of 10/24/2018  2:14 PM    You have not been prescribed any medications.

## 2022-08-22 ENCOUNTER — PATIENT OUTREACH (OUTPATIENT)
Dept: UROLOGY | Facility: CLINIC | Age: 57
End: 2022-08-22

## 2022-08-22 NOTE — TELEPHONE ENCOUNTER
Patient had his H & P done with PAC 8/19/22. Calling today to make sure his FMLA paperwork was received. Paperwork received and ready for signature.   Patient is scheduled for his Covid testing 8/25/22    Anna Orona RN, BSN  Care Coordinator Urology

## 2022-08-23 RX ORDER — AMLODIPINE BESYLATE 5 MG/1
5 TABLET ORAL DAILY
Qty: 90 TABLET | Refills: 3 | Status: SHIPPED | OUTPATIENT
Start: 2022-08-23 | End: 2022-09-19

## 2022-08-25 ENCOUNTER — LAB (OUTPATIENT)
Dept: LAB | Facility: CLINIC | Age: 57
End: 2022-08-25
Payer: COMMERCIAL

## 2022-08-25 ENCOUNTER — LAB (OUTPATIENT)
Dept: URGENT CARE | Facility: URGENT CARE | Age: 57
End: 2022-08-25
Payer: COMMERCIAL

## 2022-08-25 DIAGNOSIS — I82.220 NEOPLASM OF RIGHT KIDNEY WITH THROMBUS OF INFERIOR VENA CAVA (H): ICD-10-CM

## 2022-08-25 DIAGNOSIS — Z20.822 ENCOUNTER FOR LABORATORY TESTING FOR COVID-19 VIRUS: ICD-10-CM

## 2022-08-25 DIAGNOSIS — C64.1 RENAL CELL CARCINOMA, RIGHT (H): ICD-10-CM

## 2022-08-25 DIAGNOSIS — D49.511 NEOPLASM OF RIGHT KIDNEY WITH THROMBUS OF INFERIOR VENA CAVA (H): ICD-10-CM

## 2022-08-25 DIAGNOSIS — E03.4 HYPOTHYROIDISM DUE TO ACQUIRED ATROPHY OF THYROID: ICD-10-CM

## 2022-08-25 DIAGNOSIS — I12.9 HYPERTENSION, RENAL: ICD-10-CM

## 2022-08-25 LAB
ALBUMIN SERPL-MCNC: 3.9 G/DL (ref 3.4–5)
ALP SERPL-CCNC: 57 U/L (ref 40–150)
ALT SERPL W P-5'-P-CCNC: 82 U/L (ref 0–70)
ANION GAP SERPL CALCULATED.3IONS-SCNC: 7 MMOL/L (ref 3–14)
AST SERPL W P-5'-P-CCNC: 33 U/L (ref 0–45)
BILIRUB SERPL-MCNC: 0.3 MG/DL (ref 0.2–1.3)
BUN SERPL-MCNC: 26 MG/DL (ref 7–30)
CALCIUM SERPL-MCNC: 9 MG/DL (ref 8.5–10.1)
CHLORIDE BLD-SCNC: 104 MMOL/L (ref 94–109)
CO2 SERPL-SCNC: 27 MMOL/L (ref 20–32)
CREAT SERPL-MCNC: 2.65 MG/DL (ref 0.66–1.25)
GFR SERPL CREATININE-BSD FRML MDRD: 27 ML/MIN/1.73M2
GLUCOSE BLD-MCNC: 137 MG/DL (ref 70–99)
POTASSIUM BLD-SCNC: 4.2 MMOL/L (ref 3.4–5.3)
PROT SERPL-MCNC: 6.6 G/DL (ref 6.8–8.8)
SODIUM SERPL-SCNC: 138 MMOL/L (ref 133–144)
TSH SERPL DL<=0.005 MIU/L-ACNC: 9.07 MU/L (ref 0.4–4)

## 2022-08-25 PROCEDURE — 36415 COLL VENOUS BLD VENIPUNCTURE: CPT

## 2022-08-25 PROCEDURE — 80053 COMPREHEN METABOLIC PANEL: CPT

## 2022-08-25 PROCEDURE — 84443 ASSAY THYROID STIM HORMONE: CPT

## 2022-08-25 PROCEDURE — U0005 INFEC AGEN DETEC AMPLI PROBE: HCPCS

## 2022-08-25 PROCEDURE — U0003 INFECTIOUS AGENT DETECTION BY NUCLEIC ACID (DNA OR RNA); SEVERE ACUTE RESPIRATORY SYNDROME CORONAVIRUS 2 (SARS-COV-2) (CORONAVIRUS DISEASE [COVID-19]), AMPLIFIED PROBE TECHNIQUE, MAKING USE OF HIGH THROUGHPUT TECHNOLOGIES AS DESCRIBED BY CMS-2020-01-R: HCPCS

## 2022-08-26 LAB — SARS-COV-2 RNA RESP QL NAA+PROBE: NEGATIVE

## 2022-08-29 ENCOUNTER — ANESTHESIA (OUTPATIENT)
Dept: SURGERY | Facility: CLINIC | Age: 57
DRG: 820 | End: 2022-08-29
Payer: COMMERCIAL

## 2022-08-29 ENCOUNTER — HOSPITAL ENCOUNTER (INPATIENT)
Facility: CLINIC | Age: 57
LOS: 5 days | Discharge: HOME | DRG: 820 | End: 2022-09-03
Attending: UROLOGY | Admitting: UROLOGY
Payer: COMMERCIAL

## 2022-08-29 DIAGNOSIS — N18.31 CHRONIC KIDNEY DISEASE, STAGE 3A (H): Primary | ICD-10-CM

## 2022-08-29 DIAGNOSIS — D49.511 NEOPLASM OF RIGHT KIDNEY WITH THROMBUS OF INFERIOR VENA CAVA (H): ICD-10-CM

## 2022-08-29 DIAGNOSIS — I82.220 NEOPLASM OF RIGHT KIDNEY WITH THROMBUS OF INFERIOR VENA CAVA (H): ICD-10-CM

## 2022-08-29 PROBLEM — C64.9 KIDNEY CANCER, PRIMARY, WITH METASTASIS FROM KIDNEY TO OTHER SITE (H): Status: ACTIVE | Noted: 2022-08-29

## 2022-08-29 LAB
ABO/RH(D): NORMAL
ANION GAP SERPL CALCULATED.3IONS-SCNC: 10 MMOL/L (ref 7–15)
ANION GAP SERPL CALCULATED.3IONS-SCNC: 11 MMOL/L (ref 7–15)
ANTIBODY SCREEN: NEGATIVE
BASE EXCESS BLDA CALC-SCNC: 1.1 MMOL/L (ref -9.6–2)
BASE EXCESS BLDA CALC-SCNC: 1.1 MMOL/L (ref -9.6–2)
BASE EXCESS BLDA CALC-SCNC: 1.2 MMOL/L (ref -9.6–2)
BASE EXCESS BLDA CALC-SCNC: 1.3 MMOL/L (ref -9.6–2)
BASE EXCESS BLDA CALC-SCNC: 1.3 MMOL/L (ref -9.6–2)
BASE EXCESS BLDA CALC-SCNC: 1.4 MMOL/L (ref -9.6–2)
BASE EXCESS BLDA CALC-SCNC: 1.7 MMOL/L (ref -9.6–2)
BASOPHILS # BLD AUTO: 0.1 10E3/UL (ref 0–0.2)
BASOPHILS NFR BLD AUTO: 1 %
BLD PROD TYP BPU: NORMAL
BLOOD COMPONENT TYPE: NORMAL
BUN SERPL-MCNC: 18 MG/DL (ref 6–20)
BUN SERPL-MCNC: 18.8 MG/DL (ref 6–20)
CA-I BLD-MCNC: 4 MG/DL (ref 4.4–5.2)
CA-I BLD-MCNC: 4.1 MG/DL (ref 4.4–5.2)
CA-I BLD-MCNC: 4.2 MG/DL (ref 4.4–5.2)
CA-I BLD-MCNC: 4.3 MG/DL (ref 4.4–5.2)
CA-I BLD-MCNC: 4.5 MG/DL (ref 4.4–5.2)
CA-I BLD-MCNC: 4.7 MG/DL (ref 4.4–5.2)
CA-I BLD-MCNC: 4.8 MG/DL (ref 4.4–5.2)
CALCIUM SERPL-MCNC: 8.5 MG/DL (ref 8.6–10)
CALCIUM SERPL-MCNC: 9.3 MG/DL (ref 8.6–10)
CF REDUC 60M P MA LENFR BLD TEG: 0 % (ref 0–15)
CF REDUC 60M P MA LENFR BLD TEG: 0 % (ref 0–15)
CFT BLD TEG: 1.2 MINUTE (ref 1–3)
CFT BLD TEG: 2.8 MINUTE (ref 1–3)
CHLORIDE SERPL-SCNC: 104 MMOL/L (ref 98–107)
CHLORIDE SERPL-SCNC: 104 MMOL/L (ref 98–107)
CI (COAGULATION INDEX)(Z) NON NATIVE: -2.5 (ref -3–3)
CI (COAGULATION INDEX)(Z) NON NATIVE: 2.4 (ref -3–3)
CLOT ANGLE BLD TEG: 55.2 DEGREES (ref 53–72)
CLOT ANGLE BLD TEG: 71.8 DEGREES (ref 53–72)
CLOT INIT BLD TEG: 3.7 MINUTE (ref 5–10)
CLOT INIT BLD TEG: 7.9 MINUTE (ref 5–10)
CLOT LYSIS 30M P MA LENFR BLD TEG: 0 % (ref 0–8)
CLOT LYSIS 30M P MA LENFR BLD TEG: 0 % (ref 0–8)
CLOT STRENGTH BLD TEG: 7.5 KD/SC (ref 4.5–11)
CLOT STRENGTH BLD TEG: 81 KD/SC (ref 4.5–11)
CODING SYSTEM: NORMAL
CREAT SERPL-MCNC: 1.65 MG/DL (ref 0.67–1.17)
CREAT SERPL-MCNC: 1.66 MG/DL (ref 0.67–1.17)
CROSSMATCH: NORMAL
DEPRECATED HCO3 PLAS-SCNC: 24 MMOL/L (ref 22–29)
DEPRECATED HCO3 PLAS-SCNC: 25 MMOL/L (ref 22–29)
EOSINOPHIL # BLD AUTO: 0 10E3/UL (ref 0–0.7)
EOSINOPHIL NFR BLD AUTO: 0 %
ERYTHROCYTE [DISTWIDTH] IN BLOOD BY AUTOMATED COUNT: 14.7 % (ref 10–15)
GFR SERPL CREATININE-BSD FRML MDRD: 48 ML/MIN/1.73M2
GFR SERPL CREATININE-BSD FRML MDRD: 48 ML/MIN/1.73M2
GLUCOSE BLD-MCNC: 122 MG/DL (ref 70–99)
GLUCOSE BLD-MCNC: 135 MG/DL (ref 70–99)
GLUCOSE BLD-MCNC: 145 MG/DL (ref 70–99)
GLUCOSE BLD-MCNC: 149 MG/DL (ref 70–99)
GLUCOSE BLD-MCNC: 165 MG/DL (ref 70–99)
GLUCOSE BLD-MCNC: 171 MG/DL (ref 70–99)
GLUCOSE BLD-MCNC: 88 MG/DL (ref 70–99)
GLUCOSE BLDC GLUCOMTR-MCNC: 124 MG/DL (ref 70–99)
GLUCOSE BLDC GLUCOMTR-MCNC: 89 MG/DL (ref 70–99)
GLUCOSE SERPL-MCNC: 114 MG/DL (ref 70–99)
GLUCOSE SERPL-MCNC: 89 MG/DL (ref 70–99)
HCO3 BLDA-SCNC: 25 MMOL/L (ref 21–28)
HCO3 BLDA-SCNC: 26 MMOL/L (ref 21–28)
HCO3 BLDA-SCNC: 26 MMOL/L (ref 21–28)
HCT VFR BLD AUTO: 43.1 % (ref 40–53)
HGB BLD-MCNC: 10.1 G/DL (ref 13.3–17.7)
HGB BLD-MCNC: 10.6 G/DL (ref 13.3–17.7)
HGB BLD-MCNC: 10.9 G/DL (ref 13.3–17.7)
HGB BLD-MCNC: 11.3 G/DL (ref 13.3–17.7)
HGB BLD-MCNC: 11.5 G/DL (ref 13.3–17.7)
HGB BLD-MCNC: 13.4 G/DL (ref 13.3–17.7)
HGB BLD-MCNC: 9.4 G/DL (ref 13.3–17.7)
HGB BLD-MCNC: 9.7 G/DL (ref 13.3–17.7)
HGB BLD-MCNC: 9.9 G/DL (ref 13.3–17.7)
IMM GRANULOCYTES # BLD: 0.3 10E3/UL
IMM GRANULOCYTES NFR BLD: 3 %
ISSUE DATE AND TIME: NORMAL
LACTATE BLD-SCNC: 2 MMOL/L
LACTATE BLD-SCNC: 2 MMOL/L
LACTATE BLD-SCNC: 2.3 MMOL/L
LACTATE BLD-SCNC: 2.4 MMOL/L
LACTATE BLD-SCNC: 2.7 MMOL/L
LACTATE BLD-SCNC: 2.8 MMOL/L
LACTATE BLD-SCNC: 2.9 MMOL/L
LYMPHOCYTES # BLD AUTO: 1.5 10E3/UL (ref 0.8–5.3)
LYMPHOCYTES NFR BLD AUTO: 17 %
MCF BLD TEG: 59.8 MM (ref 50–70)
MCF BLD TEG: 61.8 MM (ref 50–70)
MCH RBC QN AUTO: 30.7 PG (ref 26.5–33)
MCHC RBC AUTO-ENTMCNC: 31.1 G/DL (ref 31.5–36.5)
MCV RBC AUTO: 99 FL (ref 78–100)
MONOCYTES # BLD AUTO: 0.3 10E3/UL (ref 0–1.3)
MONOCYTES NFR BLD AUTO: 4 %
NEUTROPHILS # BLD AUTO: 6.2 10E3/UL (ref 1.6–8.3)
NEUTROPHILS NFR BLD AUTO: 75 %
NRBC # BLD AUTO: 0 10E3/UL
NRBC BLD AUTO-RTO: 0 /100
O2/TOTAL GAS SETTING VFR VENT: 42 %
O2/TOTAL GAS SETTING VFR VENT: 42 %
O2/TOTAL GAS SETTING VFR VENT: 43 %
O2/TOTAL GAS SETTING VFR VENT: 44 %
O2/TOTAL GAS SETTING VFR VENT: 50 %
PCO2 BLDA: 35 MM HG (ref 35–45)
PCO2 BLDA: 36 MM HG (ref 35–45)
PCO2 BLDA: 38 MM HG (ref 35–45)
PCO2 BLDA: 38 MM HG (ref 35–45)
PCO2 BLDA: 42 MM HG (ref 35–45)
PH BLDA: 7.4 [PH] (ref 7.35–7.45)
PH BLDA: 7.44 [PH] (ref 7.35–7.45)
PH BLDA: 7.44 [PH] (ref 7.35–7.45)
PH BLDA: 7.45 [PH] (ref 7.35–7.45)
PH BLDA: 7.45 [PH] (ref 7.35–7.45)
PH BLDA: 7.46 [PH] (ref 7.35–7.45)
PH BLDA: 7.46 [PH] (ref 7.35–7.45)
PLATELET # BLD AUTO: 244 10E3/UL (ref 150–450)
PO2 BLDA: 167 MM HG (ref 80–105)
PO2 BLDA: 167 MM HG (ref 80–105)
PO2 BLDA: 168 MM HG (ref 80–105)
PO2 BLDA: 169 MM HG (ref 80–105)
PO2 BLDA: 170 MM HG (ref 80–105)
PO2 BLDA: 170 MM HG (ref 80–105)
PO2 BLDA: 176 MM HG (ref 80–105)
POTASSIUM BLD-SCNC: 4 MMOL/L (ref 3.5–5)
POTASSIUM BLD-SCNC: 4.1 MMOL/L (ref 3.5–5)
POTASSIUM BLD-SCNC: 4.2 MMOL/L (ref 3.5–5)
POTASSIUM BLD-SCNC: 4.2 MMOL/L (ref 3.5–5)
POTASSIUM BLD-SCNC: 4.3 MMOL/L (ref 3.5–5)
POTASSIUM SERPL-SCNC: 3.9 MMOL/L (ref 3.4–5.3)
POTASSIUM SERPL-SCNC: 4.9 MMOL/L (ref 3.4–5.3)
RBC # BLD AUTO: 4.36 10E6/UL (ref 4.4–5.9)
SODIUM BLD-SCNC: 137 MMOL/L (ref 133–144)
SODIUM BLD-SCNC: 137 MMOL/L (ref 133–144)
SODIUM BLD-SCNC: 138 MMOL/L (ref 133–144)
SODIUM BLD-SCNC: 139 MMOL/L (ref 133–144)
SODIUM SERPL-SCNC: 138 MMOL/L (ref 136–145)
SODIUM SERPL-SCNC: 140 MMOL/L (ref 136–145)
SPECIMEN EXPIRATION DATE: NORMAL
UNIT ABO/RH: NORMAL
UNIT NUMBER: NORMAL
UNIT STATUS: NORMAL
UNIT TYPE ISBT: 5100
WBC # BLD AUTO: 8.3 10E3/UL (ref 4–11)

## 2022-08-29 PROCEDURE — C9290 INJ, BUPIVACAINE LIPOSOME: HCPCS | Performed by: STUDENT IN AN ORGANIZED HEALTH CARE EDUCATION/TRAINING PROGRAM

## 2022-08-29 PROCEDURE — 36415 COLL VENOUS BLD VENIPUNCTURE: CPT | Performed by: UROLOGY

## 2022-08-29 PROCEDURE — 82803 BLOOD GASES ANY COMBINATION: CPT

## 2022-08-29 PROCEDURE — 120N000002 HC R&B MED SURG/OB UMMC

## 2022-08-29 PROCEDURE — 250N000009 HC RX 250: Performed by: UROLOGY

## 2022-08-29 PROCEDURE — 999N000141 HC STATISTIC PRE-PROCEDURE NURSING ASSESSMENT: Performed by: UROLOGY

## 2022-08-29 PROCEDURE — 84132 ASSAY OF SERUM POTASSIUM: CPT | Performed by: STUDENT IN AN ORGANIZED HEALTH CARE EDUCATION/TRAINING PROGRAM

## 2022-08-29 PROCEDURE — 80048 BASIC METABOLIC PNL TOTAL CA: CPT | Performed by: PHYSICIAN ASSISTANT

## 2022-08-29 PROCEDURE — 250N000009 HC RX 250: Performed by: ANESTHESIOLOGY

## 2022-08-29 PROCEDURE — 85396 CLOTTING ASSAY WHOLE BLOOD: CPT | Performed by: UROLOGY

## 2022-08-29 PROCEDURE — 250N000011 HC RX IP 250 OP 636: Performed by: STUDENT IN AN ORGANIZED HEALTH CARE EDUCATION/TRAINING PROGRAM

## 2022-08-29 PROCEDURE — 49203 PR EXCISION/DESTRUCTION OPEN ABDOMINAL TUMORS 5 CM: CPT | Mod: 22 | Performed by: UROLOGY

## 2022-08-29 PROCEDURE — 250N000011 HC RX IP 250 OP 636

## 2022-08-29 PROCEDURE — 86923 COMPATIBILITY TEST ELECTRIC: CPT

## 2022-08-29 PROCEDURE — 0DNU0ZZ RELEASE OMENTUM, OPEN APPROACH: ICD-10-PCS | Performed by: UROLOGY

## 2022-08-29 PROCEDURE — 250N000013 HC RX MED GY IP 250 OP 250 PS 637: Performed by: ANESTHESIOLOGY

## 2022-08-29 PROCEDURE — 0DN80ZZ RELEASE SMALL INTESTINE, OPEN APPROACH: ICD-10-PCS | Performed by: UROLOGY

## 2022-08-29 PROCEDURE — 272N000001 HC OR GENERAL SUPPLY STERILE: Performed by: UROLOGY

## 2022-08-29 PROCEDURE — P9016 RBC LEUKOCYTES REDUCED: HCPCS

## 2022-08-29 PROCEDURE — P9045 ALBUMIN (HUMAN), 5%, 250 ML: HCPCS | Performed by: ANESTHESIOLOGY

## 2022-08-29 PROCEDURE — 250N000011 HC RX IP 250 OP 636: Performed by: UROLOGY

## 2022-08-29 PROCEDURE — 85014 HEMATOCRIT: CPT | Performed by: PHYSICIAN ASSISTANT

## 2022-08-29 PROCEDURE — 49203 PR EXCISION/DESTRUCTION OPEN ABDOMINAL TUMORS 5 CM: CPT | Mod: 80 | Performed by: TRANSPLANT SURGERY

## 2022-08-29 PROCEDURE — 85018 HEMOGLOBIN: CPT | Performed by: STUDENT IN AN ORGANIZED HEALTH CARE EDUCATION/TRAINING PROGRAM

## 2022-08-29 PROCEDURE — 88307 TISSUE EXAM BY PATHOLOGIST: CPT | Mod: 26 | Performed by: PATHOLOGY

## 2022-08-29 PROCEDURE — 360N000077 HC SURGERY LEVEL 4, PER MIN: Performed by: UROLOGY

## 2022-08-29 PROCEDURE — 0DB80ZZ EXCISION OF SMALL INTESTINE, OPEN APPROACH: ICD-10-PCS | Performed by: UROLOGY

## 2022-08-29 PROCEDURE — 250N000011 HC RX IP 250 OP 636: Performed by: ANESTHESIOLOGY

## 2022-08-29 PROCEDURE — 710N000010 HC RECOVERY PHASE 1, LEVEL 2, PER MIN: Performed by: UROLOGY

## 2022-08-29 PROCEDURE — 250N000025 HC SEVOFLURANE, PER MIN: Performed by: UROLOGY

## 2022-08-29 PROCEDURE — 258N000003 HC RX IP 258 OP 636: Performed by: ANESTHESIOLOGY

## 2022-08-29 PROCEDURE — 370N000017 HC ANESTHESIA TECHNICAL FEE, PER MIN: Performed by: UROLOGY

## 2022-08-29 PROCEDURE — 86901 BLOOD TYPING SEROLOGIC RH(D): CPT | Performed by: UROLOGY

## 2022-08-29 PROCEDURE — 84132 ASSAY OF SERUM POTASSIUM: CPT

## 2022-08-29 PROCEDURE — 258N000003 HC RX IP 258 OP 636: Performed by: STUDENT IN AN ORGANIZED HEALTH CARE EDUCATION/TRAINING PROGRAM

## 2022-08-29 PROCEDURE — 250N000009 HC RX 250

## 2022-08-29 PROCEDURE — 88307 TISSUE EXAM BY PATHOLOGIST: CPT | Mod: TC | Performed by: UROLOGY

## 2022-08-29 PROCEDURE — 0WBH0ZZ EXCISION OF RETROPERITONEUM, OPEN APPROACH: ICD-10-PCS | Performed by: UROLOGY

## 2022-08-29 RX ORDER — NALOXONE HYDROCHLORIDE 0.4 MG/ML
0.4 INJECTION, SOLUTION INTRAMUSCULAR; INTRAVENOUS; SUBCUTANEOUS
Status: DISCONTINUED | OUTPATIENT
Start: 2022-08-29 | End: 2022-09-03 | Stop reason: HOSPADM

## 2022-08-29 RX ORDER — ACETAMINOPHEN 325 MG/1
975 TABLET ORAL 4 TIMES DAILY
Status: DISCONTINUED | OUTPATIENT
Start: 2022-08-30 | End: 2022-09-03 | Stop reason: HOSPADM

## 2022-08-29 RX ORDER — KETAMINE HYDROCHLORIDE 10 MG/ML
INJECTION INTRAMUSCULAR; INTRAVENOUS PRN
Status: DISCONTINUED | OUTPATIENT
Start: 2022-08-29 | End: 2022-08-29

## 2022-08-29 RX ORDER — ALBUTEROL SULFATE 0.83 MG/ML
2.5 SOLUTION RESPIRATORY (INHALATION) EVERY 4 HOURS PRN
Status: DISCONTINUED | OUTPATIENT
Start: 2022-08-29 | End: 2022-08-29 | Stop reason: HOSPADM

## 2022-08-29 RX ORDER — ONDANSETRON 2 MG/ML
INJECTION INTRAMUSCULAR; INTRAVENOUS PRN
Status: DISCONTINUED | OUTPATIENT
Start: 2022-08-29 | End: 2022-08-29

## 2022-08-29 RX ORDER — FENTANYL CITRATE 50 UG/ML
25 INJECTION, SOLUTION INTRAMUSCULAR; INTRAVENOUS
Status: DISCONTINUED | OUTPATIENT
Start: 2022-08-29 | End: 2022-08-29 | Stop reason: HOSPADM

## 2022-08-29 RX ORDER — ACETAMINOPHEN 325 MG/1
975 TABLET ORAL
Status: DISCONTINUED | OUTPATIENT
Start: 2022-08-29 | End: 2022-08-29 | Stop reason: HOSPADM

## 2022-08-29 RX ORDER — PREDNISONE 20 MG/1
10 TABLET ORAL DAILY
COMMUNITY
End: 2022-11-28

## 2022-08-29 RX ORDER — HYDRALAZINE HYDROCHLORIDE 20 MG/ML
2.5-5 INJECTION INTRAMUSCULAR; INTRAVENOUS EVERY 10 MIN PRN
Status: DISCONTINUED | OUTPATIENT
Start: 2022-08-29 | End: 2022-08-29 | Stop reason: HOSPADM

## 2022-08-29 RX ORDER — DEXAMETHASONE SODIUM PHOSPHATE 4 MG/ML
INJECTION, SOLUTION INTRA-ARTICULAR; INTRALESIONAL; INTRAMUSCULAR; INTRAVENOUS; SOFT TISSUE PRN
Status: DISCONTINUED | OUTPATIENT
Start: 2022-08-29 | End: 2022-08-29

## 2022-08-29 RX ORDER — AMOXICILLIN 250 MG
1 CAPSULE ORAL 2 TIMES DAILY
Status: DISCONTINUED | OUTPATIENT
Start: 2022-08-29 | End: 2022-09-03 | Stop reason: HOSPADM

## 2022-08-29 RX ORDER — LEVOTHYROXINE SODIUM 100 UG/1
100 TABLET ORAL DAILY
Status: DISCONTINUED | OUTPATIENT
Start: 2022-08-30 | End: 2022-09-03 | Stop reason: HOSPADM

## 2022-08-29 RX ORDER — FENTANYL CITRATE 50 UG/ML
25-50 INJECTION, SOLUTION INTRAMUSCULAR; INTRAVENOUS EVERY 5 MIN PRN
Status: DISCONTINUED | OUTPATIENT
Start: 2022-08-29 | End: 2022-08-29 | Stop reason: HOSPADM

## 2022-08-29 RX ORDER — NALOXONE HYDROCHLORIDE 0.4 MG/ML
0.2 INJECTION, SOLUTION INTRAMUSCULAR; INTRAVENOUS; SUBCUTANEOUS
Status: DISCONTINUED | OUTPATIENT
Start: 2022-08-29 | End: 2022-08-29 | Stop reason: HOSPADM

## 2022-08-29 RX ORDER — PROCHLORPERAZINE MALEATE 5 MG
10 TABLET ORAL EVERY 6 HOURS PRN
Status: DISCONTINUED | OUTPATIENT
Start: 2022-08-29 | End: 2022-09-03 | Stop reason: HOSPADM

## 2022-08-29 RX ORDER — SODIUM CHLORIDE, SODIUM LACTATE, POTASSIUM CHLORIDE, CALCIUM CHLORIDE 600; 310; 30; 20 MG/100ML; MG/100ML; MG/100ML; MG/100ML
INJECTION, SOLUTION INTRAVENOUS CONTINUOUS PRN
Status: DISCONTINUED | OUTPATIENT
Start: 2022-08-29 | End: 2022-08-29

## 2022-08-29 RX ORDER — HEPARIN SODIUM 5000 [USP'U]/.5ML
5000 INJECTION, SOLUTION INTRAVENOUS; SUBCUTANEOUS
Status: COMPLETED | OUTPATIENT
Start: 2022-08-29 | End: 2022-08-29

## 2022-08-29 RX ORDER — CALCIUM CHLORIDE 100 MG/ML
INJECTION INTRAVENOUS; INTRAVENTRICULAR PRN
Status: DISCONTINUED | OUTPATIENT
Start: 2022-08-29 | End: 2022-08-29

## 2022-08-29 RX ORDER — NALOXONE HYDROCHLORIDE 0.4 MG/ML
0.2 INJECTION, SOLUTION INTRAMUSCULAR; INTRAVENOUS; SUBCUTANEOUS
Status: DISCONTINUED | OUTPATIENT
Start: 2022-08-29 | End: 2022-09-03 | Stop reason: HOSPADM

## 2022-08-29 RX ORDER — FENTANYL CITRATE 50 UG/ML
INJECTION, SOLUTION INTRAMUSCULAR; INTRAVENOUS PRN
Status: DISCONTINUED | OUTPATIENT
Start: 2022-08-29 | End: 2022-08-29

## 2022-08-29 RX ORDER — SODIUM CHLORIDE, SODIUM LACTATE, POTASSIUM CHLORIDE, CALCIUM CHLORIDE 600; 310; 30; 20 MG/100ML; MG/100ML; MG/100ML; MG/100ML
INJECTION, SOLUTION INTRAVENOUS CONTINUOUS
Status: DISCONTINUED | OUTPATIENT
Start: 2022-08-29 | End: 2022-08-29 | Stop reason: HOSPADM

## 2022-08-29 RX ORDER — SODIUM CHLORIDE, SODIUM GLUCONATE, SODIUM ACETATE, POTASSIUM CHLORIDE AND MAGNESIUM CHLORIDE 526; 502; 368; 37; 30 MG/100ML; MG/100ML; MG/100ML; MG/100ML; MG/100ML
INJECTION, SOLUTION INTRAVENOUS CONTINUOUS PRN
Status: DISCONTINUED | OUTPATIENT
Start: 2022-08-29 | End: 2022-08-29

## 2022-08-29 RX ORDER — LIDOCAINE 40 MG/G
CREAM TOPICAL
Status: DISCONTINUED | OUTPATIENT
Start: 2022-08-29 | End: 2022-09-03 | Stop reason: HOSPADM

## 2022-08-29 RX ORDER — BISACODYL 10 MG
10 SUPPOSITORY, RECTAL RECTAL DAILY PRN
Status: DISCONTINUED | OUTPATIENT
Start: 2022-08-29 | End: 2022-09-03 | Stop reason: HOSPADM

## 2022-08-29 RX ORDER — ONDANSETRON 4 MG/1
4 TABLET, ORALLY DISINTEGRATING ORAL EVERY 6 HOURS PRN
Status: DISCONTINUED | OUTPATIENT
Start: 2022-08-29 | End: 2022-09-03 | Stop reason: HOSPADM

## 2022-08-29 RX ORDER — ACETAMINOPHEN 325 MG/1
975 TABLET ORAL ONCE
Status: COMPLETED | OUTPATIENT
Start: 2022-08-29 | End: 2022-08-29

## 2022-08-29 RX ORDER — NALOXONE HYDROCHLORIDE 0.4 MG/ML
0.4 INJECTION, SOLUTION INTRAMUSCULAR; INTRAVENOUS; SUBCUTANEOUS
Status: DISCONTINUED | OUTPATIENT
Start: 2022-08-29 | End: 2022-08-29 | Stop reason: HOSPADM

## 2022-08-29 RX ORDER — RIZATRIPTAN BENZOATE 10 MG/1
10 TABLET, ORALLY DISINTEGRATING ORAL
Status: DISCONTINUED | OUTPATIENT
Start: 2022-08-29 | End: 2022-09-03 | Stop reason: HOSPADM

## 2022-08-29 RX ORDER — PROPOFOL 10 MG/ML
INJECTION, EMULSION INTRAVENOUS PRN
Status: DISCONTINUED | OUTPATIENT
Start: 2022-08-29 | End: 2022-08-29

## 2022-08-29 RX ORDER — LABETALOL HYDROCHLORIDE 5 MG/ML
5 INJECTION, SOLUTION INTRAVENOUS EVERY 5 MIN PRN
Status: DISCONTINUED | OUTPATIENT
Start: 2022-08-29 | End: 2022-08-29 | Stop reason: HOSPADM

## 2022-08-29 RX ORDER — CEFAZOLIN SODIUM/WATER 2 G/20 ML
2 SYRINGE (ML) INTRAVENOUS SEE ADMIN INSTRUCTIONS
Status: DISCONTINUED | OUTPATIENT
Start: 2022-08-29 | End: 2022-08-29 | Stop reason: HOSPADM

## 2022-08-29 RX ORDER — BUPIVACAINE HYDROCHLORIDE 2.5 MG/ML
INJECTION, SOLUTION EPIDURAL; INFILTRATION; INTRACAUDAL
Status: COMPLETED | OUTPATIENT
Start: 2022-08-29 | End: 2022-08-29

## 2022-08-29 RX ORDER — OXYCODONE HYDROCHLORIDE 5 MG/1
5 TABLET ORAL EVERY 4 HOURS PRN
Status: DISCONTINUED | OUTPATIENT
Start: 2022-08-29 | End: 2022-08-29 | Stop reason: HOSPADM

## 2022-08-29 RX ORDER — ATORVASTATIN CALCIUM 20 MG/1
20 TABLET, FILM COATED ORAL DAILY
Status: DISCONTINUED | OUTPATIENT
Start: 2022-08-30 | End: 2022-09-03 | Stop reason: HOSPADM

## 2022-08-29 RX ORDER — DEXTROSE MONOHYDRATE 25 G/50ML
25-50 INJECTION, SOLUTION INTRAVENOUS
Status: DISCONTINUED | OUTPATIENT
Start: 2022-08-29 | End: 2022-09-03 | Stop reason: HOSPADM

## 2022-08-29 RX ORDER — HYDROMORPHONE HCL IN WATER/PF 6 MG/30 ML
.2-.4 PATIENT CONTROLLED ANALGESIA SYRINGE INTRAVENOUS EVERY 5 MIN PRN
Status: DISCONTINUED | OUTPATIENT
Start: 2022-08-29 | End: 2022-08-29 | Stop reason: HOSPADM

## 2022-08-29 RX ORDER — DIAZEPAM 10 MG/2ML
2.5 INJECTION, SOLUTION INTRAMUSCULAR; INTRAVENOUS
Status: DISCONTINUED | OUTPATIENT
Start: 2022-08-29 | End: 2022-08-29 | Stop reason: HOSPADM

## 2022-08-29 RX ORDER — CEFAZOLIN SODIUM/WATER 2 G/20 ML
2 SYRINGE (ML) INTRAVENOUS
Status: COMPLETED | OUTPATIENT
Start: 2022-08-29 | End: 2022-08-29

## 2022-08-29 RX ORDER — NICOTINE POLACRILEX 4 MG
15-30 LOZENGE BUCCAL
Status: DISCONTINUED | OUTPATIENT
Start: 2022-08-29 | End: 2022-09-03 | Stop reason: HOSPADM

## 2022-08-29 RX ORDER — MEPERIDINE HYDROCHLORIDE 25 MG/ML
12.5 INJECTION INTRAMUSCULAR; INTRAVENOUS; SUBCUTANEOUS
Status: DISCONTINUED | OUTPATIENT
Start: 2022-08-29 | End: 2022-08-29 | Stop reason: HOSPADM

## 2022-08-29 RX ORDER — FLUMAZENIL 0.1 MG/ML
0.2 INJECTION, SOLUTION INTRAVENOUS
Status: DISCONTINUED | OUTPATIENT
Start: 2022-08-29 | End: 2022-08-29 | Stop reason: HOSPADM

## 2022-08-29 RX ORDER — ONDANSETRON 4 MG/1
4 TABLET, ORALLY DISINTEGRATING ORAL EVERY 30 MIN PRN
Status: DISCONTINUED | OUTPATIENT
Start: 2022-08-29 | End: 2022-08-29 | Stop reason: HOSPADM

## 2022-08-29 RX ORDER — CALCIUM CARBONATE 500 MG/1
500 TABLET, CHEWABLE ORAL 4 TIMES DAILY PRN
Status: DISCONTINUED | OUTPATIENT
Start: 2022-08-29 | End: 2022-09-03 | Stop reason: HOSPADM

## 2022-08-29 RX ORDER — NORTRIPTYLINE HCL 10 MG
10 CAPSULE ORAL AT BEDTIME
Status: DISCONTINUED | OUTPATIENT
Start: 2022-08-29 | End: 2022-09-03 | Stop reason: HOSPADM

## 2022-08-29 RX ORDER — POLYETHYLENE GLYCOL 3350 17 G/17G
17 POWDER, FOR SOLUTION ORAL DAILY PRN
Status: DISCONTINUED | OUTPATIENT
Start: 2022-08-30 | End: 2022-09-03 | Stop reason: HOSPADM

## 2022-08-29 RX ORDER — PHENYLEPHRINE HCL IN 0.9% NACL 50MG/250ML
.5-1.25 PLASTIC BAG, INJECTION (ML) INTRAVENOUS CONTINUOUS
Status: DISCONTINUED | OUTPATIENT
Start: 2022-08-29 | End: 2022-08-29

## 2022-08-29 RX ORDER — NOREPINEPHRINE BITARTRATE 0.06 MG/ML
.01-.6 INJECTION, SOLUTION INTRAVENOUS CONTINUOUS
Status: DISCONTINUED | OUTPATIENT
Start: 2022-08-29 | End: 2022-08-29

## 2022-08-29 RX ORDER — ONDANSETRON 2 MG/ML
4 INJECTION INTRAMUSCULAR; INTRAVENOUS EVERY 6 HOURS PRN
Status: DISCONTINUED | OUTPATIENT
Start: 2022-08-29 | End: 2022-09-03 | Stop reason: HOSPADM

## 2022-08-29 RX ORDER — PREDNISONE 20 MG/1
20 TABLET ORAL DAILY
Status: DISCONTINUED | OUTPATIENT
Start: 2022-08-30 | End: 2022-08-29

## 2022-08-29 RX ORDER — ONDANSETRON 2 MG/ML
4 INJECTION INTRAMUSCULAR; INTRAVENOUS EVERY 30 MIN PRN
Status: DISCONTINUED | OUTPATIENT
Start: 2022-08-29 | End: 2022-08-29 | Stop reason: HOSPADM

## 2022-08-29 RX ORDER — LIDOCAINE HYDROCHLORIDE 20 MG/ML
INJECTION, SOLUTION INFILTRATION; PERINEURAL PRN
Status: DISCONTINUED | OUTPATIENT
Start: 2022-08-29 | End: 2022-08-29

## 2022-08-29 RX ORDER — FENTANYL CITRATE 50 UG/ML
25-50 INJECTION, SOLUTION INTRAMUSCULAR; INTRAVENOUS
Status: DISCONTINUED | OUTPATIENT
Start: 2022-08-29 | End: 2022-08-29 | Stop reason: HOSPADM

## 2022-08-29 RX ORDER — SODIUM CHLORIDE 9 MG/ML
INJECTION, SOLUTION INTRAVENOUS CONTINUOUS
Status: DISCONTINUED | OUTPATIENT
Start: 2022-08-29 | End: 2022-09-02

## 2022-08-29 RX ORDER — LIDOCAINE 40 MG/G
CREAM TOPICAL
Status: DISCONTINUED | OUTPATIENT
Start: 2022-08-29 | End: 2022-08-29 | Stop reason: HOSPADM

## 2022-08-29 RX ORDER — PREDNISONE 10 MG/1
10 TABLET ORAL DAILY
Status: COMPLETED | OUTPATIENT
Start: 2022-08-30 | End: 2022-09-03

## 2022-08-29 RX ADMIN — PHENYLEPHRINE HYDROCHLORIDE 100 MCG: 10 INJECTION INTRAVENOUS at 10:19

## 2022-08-29 RX ADMIN — Medication 20 MG: at 11:02

## 2022-08-29 RX ADMIN — SODIUM CHLORIDE, SODIUM GLUCONATE, SODIUM ACETATE, POTASSIUM CHLORIDE AND MAGNESIUM CHLORIDE: 526; 502; 368; 37; 30 INJECTION, SOLUTION INTRAVENOUS at 12:49

## 2022-08-29 RX ADMIN — ALBUMIN (HUMAN): 12.5 SOLUTION INTRAVENOUS at 14:11

## 2022-08-29 RX ADMIN — SODIUM CHLORIDE, POTASSIUM CHLORIDE, SODIUM LACTATE AND CALCIUM CHLORIDE: 600; 310; 30; 20 INJECTION, SOLUTION INTRAVENOUS at 10:41

## 2022-08-29 RX ADMIN — Medication 20 MG: at 13:29

## 2022-08-29 RX ADMIN — Medication 10 MG: at 12:56

## 2022-08-29 RX ADMIN — SODIUM CHLORIDE, POTASSIUM CHLORIDE, SODIUM LACTATE AND CALCIUM CHLORIDE: 600; 310; 30; 20 INJECTION, SOLUTION INTRAVENOUS at 09:49

## 2022-08-29 RX ADMIN — FENTANYL CITRATE 100 MCG: 50 INJECTION, SOLUTION INTRAMUSCULAR; INTRAVENOUS at 09:55

## 2022-08-29 RX ADMIN — SODIUM CHLORIDE, SODIUM GLUCONATE, SODIUM ACETATE, POTASSIUM CHLORIDE AND MAGNESIUM CHLORIDE: 526; 502; 368; 37; 30 INJECTION, SOLUTION INTRAVENOUS at 11:15

## 2022-08-29 RX ADMIN — DEXAMETHASONE SODIUM PHOSPHATE 6 MG: 4 INJECTION, SOLUTION INTRA-ARTICULAR; INTRALESIONAL; INTRAMUSCULAR; INTRAVENOUS; SOFT TISSUE at 09:56

## 2022-08-29 RX ADMIN — ALBUMIN (HUMAN): 12.5 SOLUTION INTRAVENOUS at 10:59

## 2022-08-29 RX ADMIN — MIDAZOLAM 2 MG: 1 INJECTION INTRAMUSCULAR; INTRAVENOUS at 09:46

## 2022-08-29 RX ADMIN — Medication: at 22:26

## 2022-08-29 RX ADMIN — Medication 10 MG: at 13:21

## 2022-08-29 RX ADMIN — PHENYLEPHRINE HYDROCHLORIDE 100 MCG: 10 INJECTION INTRAVENOUS at 10:23

## 2022-08-29 RX ADMIN — HYDROMORPHONE HYDROCHLORIDE 0.5 MG: 1 INJECTION, SOLUTION INTRAMUSCULAR; INTRAVENOUS; SUBCUTANEOUS at 16:37

## 2022-08-29 RX ADMIN — PHENYLEPHRINE HYDROCHLORIDE 100 MCG: 10 INJECTION INTRAVENOUS at 10:31

## 2022-08-29 RX ADMIN — PHENYLEPHRINE HYDROCHLORIDE 100 MCG: 10 INJECTION INTRAVENOUS at 10:13

## 2022-08-29 RX ADMIN — BUPIVACAINE HYDROCHLORIDE 20 ML: 2.5 INJECTION, SOLUTION EPIDURAL; INFILTRATION; INTRACAUDAL; PERINEURAL at 09:15

## 2022-08-29 RX ADMIN — ALBUMIN (HUMAN): 12.5 SOLUTION INTRAVENOUS at 12:35

## 2022-08-29 RX ADMIN — SODIUM CHLORIDE, SODIUM GLUCONATE, SODIUM ACETATE, POTASSIUM CHLORIDE AND MAGNESIUM CHLORIDE: 526; 502; 368; 37; 30 INJECTION, SOLUTION INTRAVENOUS at 14:43

## 2022-08-29 RX ADMIN — SUGAMMADEX 200 MG: 100 INJECTION, SOLUTION INTRAVENOUS at 16:52

## 2022-08-29 RX ADMIN — HEPARIN SODIUM 5000 UNITS: 5000 INJECTION, SOLUTION INTRAVENOUS; SUBCUTANEOUS at 09:36

## 2022-08-29 RX ADMIN — NOREPINEPHRINE BITARTRATE 6.4 MCG: 1 INJECTION, SOLUTION, CONCENTRATE INTRAVENOUS at 10:41

## 2022-08-29 RX ADMIN — HUMAN INSULIN 1 UNITS/HR: 100 INJECTION, SOLUTION SUBCUTANEOUS at 13:14

## 2022-08-29 RX ADMIN — Medication 20 MG: at 12:18

## 2022-08-29 RX ADMIN — Medication 70 MG: at 09:56

## 2022-08-29 RX ADMIN — CALCIUM CHLORIDE 500 MG: 100 INJECTION INTRAVENOUS; INTRAVENTRICULAR at 14:54

## 2022-08-29 RX ADMIN — PHENYLEPHRINE HYDROCHLORIDE 100 MCG: 10 INJECTION INTRAVENOUS at 10:45

## 2022-08-29 RX ADMIN — ALBUMIN (HUMAN): 12.5 SOLUTION INTRAVENOUS at 10:53

## 2022-08-29 RX ADMIN — Medication 20 MG: at 14:01

## 2022-08-29 RX ADMIN — Medication 10 MG: at 15:12

## 2022-08-29 RX ADMIN — SODIUM CHLORIDE, POTASSIUM CHLORIDE, SODIUM LACTATE AND CALCIUM CHLORIDE: 600; 310; 30; 20 INJECTION, SOLUTION INTRAVENOUS at 17:41

## 2022-08-29 RX ADMIN — PROPOFOL 200 MG: 10 INJECTION, EMULSION INTRAVENOUS at 09:55

## 2022-08-29 RX ADMIN — PHENYLEPHRINE HYDROCHLORIDE 100 MCG: 10 INJECTION INTRAVENOUS at 13:18

## 2022-08-29 RX ADMIN — BUPIVACAINE 20 ML: 13.3 INJECTION, SUSPENSION, LIPOSOMAL INFILTRATION at 09:15

## 2022-08-29 RX ADMIN — PHENYLEPHRINE HYDROCHLORIDE 100 MCG: 10 INJECTION INTRAVENOUS at 10:10

## 2022-08-29 RX ADMIN — ACETAMINOPHEN 975 MG: 325 TABLET, FILM COATED ORAL at 09:19

## 2022-08-29 RX ADMIN — NOREPINEPHRINE BITARTRATE 6.4 MCG: 1 INJECTION, SOLUTION, CONCENTRATE INTRAVENOUS at 10:51

## 2022-08-29 RX ADMIN — SODIUM CHLORIDE: 9 INJECTION, SOLUTION INTRAVENOUS at 22:29

## 2022-08-29 RX ADMIN — ALBUMIN (HUMAN): 12.5 SOLUTION INTRAVENOUS at 14:39

## 2022-08-29 RX ADMIN — Medication 2 G: at 10:02

## 2022-08-29 RX ADMIN — PHENYLEPHRINE HYDROCHLORIDE 100 MCG: 10 INJECTION INTRAVENOUS at 14:21

## 2022-08-29 RX ADMIN — Medication 20 MG: at 14:35

## 2022-08-29 RX ADMIN — Medication 10 MG: at 14:14

## 2022-08-29 RX ADMIN — Medication 10 MG: at 12:23

## 2022-08-29 RX ADMIN — Medication 20 MG: at 11:35

## 2022-08-29 RX ADMIN — SODIUM CHLORIDE, SODIUM LACTATE, POTASSIUM CHLORIDE, CALCIUM CHLORIDE: 600; 310; 30; 20 INJECTION, SOLUTION INTRAVENOUS at 10:34

## 2022-08-29 RX ADMIN — PHENYLEPHRINE HYDROCHLORIDE 100 MCG: 10 INJECTION INTRAVENOUS at 10:00

## 2022-08-29 RX ADMIN — FENTANYL CITRATE 50 MCG: 50 INJECTION, SOLUTION INTRAMUSCULAR; INTRAVENOUS at 13:36

## 2022-08-29 RX ADMIN — Medication 0.03 MCG/KG/MIN: at 10:51

## 2022-08-29 RX ADMIN — Medication 2 G: at 13:59

## 2022-08-29 RX ADMIN — NOREPINEPHRINE BITARTRATE 6.4 MCG: 1 INJECTION, SOLUTION, CONCENTRATE INTRAVENOUS at 12:31

## 2022-08-29 RX ADMIN — LIDOCAINE HYDROCHLORIDE 100 MG: 20 INJECTION, SOLUTION INFILTRATION; PERINEURAL at 09:55

## 2022-08-29 RX ADMIN — ALBUMIN (HUMAN): 12.5 SOLUTION INTRAVENOUS at 12:30

## 2022-08-29 RX ADMIN — ONDANSETRON 4 MG: 2 INJECTION INTRAMUSCULAR; INTRAVENOUS at 16:05

## 2022-08-29 RX ADMIN — Medication 20 MG: at 15:02

## 2022-08-29 RX ADMIN — CALCIUM CHLORIDE 500 MG: 100 INJECTION INTRAVENOUS; INTRAVENTRICULAR at 15:47

## 2022-08-29 RX ADMIN — PHENYLEPHRINE HYDROCHLORIDE 100 MCG: 10 INJECTION INTRAVENOUS at 14:36

## 2022-08-29 RX ADMIN — PHENYLEPHRINE HYDROCHLORIDE 100 MCG: 10 INJECTION INTRAVENOUS at 13:35

## 2022-08-29 ASSESSMENT — ACTIVITIES OF DAILY LIVING (ADL)
ADLS_ACUITY_SCORE: 20

## 2022-08-29 ASSESSMENT — LIFESTYLE VARIABLES: TOBACCO_USE: 1

## 2022-08-29 ASSESSMENT — ENCOUNTER SYMPTOMS: SEIZURES: 0

## 2022-08-29 NOTE — ANESTHESIA POSTPROCEDURE EVALUATION
Patient: Dimitrios Goldberg    Procedure: Procedure(s):  exploratory laparotomy, extensive lysis of adhesions, RESECTION OF RETROPERITONEAL MASS  assist in exploration       Anesthesia Type:  General    Note:  Disposition: Admission   Postop Pain Control: Uneventful            Sign Out: Well controlled pain   PONV: No   Neuro/Psych: Uneventful            Sign Out: Acceptable/Baseline neuro status   Airway/Respiratory: Uneventful            Sign Out: Acceptable/Baseline resp. status   CV/Hemodynamics: Uneventful            Sign Out: Acceptable CV status; No obvious hypovolemia; No obvious fluid overload   Other NRE: NONE   DID A NON-ROUTINE EVENT OCCUR? No    Event details/Postop Comments:  MAC line removed in PACU           Last vitals:  Vitals Value Taken Time   /91 08/29/22 1815   Temp 37.6  C (99.7  F) 08/29/22 1715   Pulse 105 08/29/22 1824   Resp 10 08/29/22 1824   SpO2 99 % 08/29/22 1824   Vitals shown include unvalidated device data.    Electronically Signed By: Fernando Syed MD  August 29, 2022  6:26 PM

## 2022-08-29 NOTE — ANESTHESIA PROCEDURE NOTES
Arterial Line Procedure Note    Pre-Procedure   Staff -        Anesthesiologist:  Michael Kamara MD       Resident/Fellow: Myles Barba MD       Performed By: resident       Location: OR       Pre-Anesthestic Checklist: patient identified, IV checked, risks and benefits discussed, informed consent, monitors and equipment checked, pre-op evaluation and at physician/surgeon's request  Timeout:       Correct Patient: Yes        Correct Procedure: Yes        Correct Site: Yes        Correct Position: Yes   Line Placement:   This line was placed Post Induction  Procedure   Procedure: arterial line       Laterality: left       Insertion Site: radial.  Sterile Prep        Standard elements of sterile barrier followed       Skin prep: Chloraprep  Insertion/Injection        Technique: Seldinger Technique        Catheter Type/Size: 20 G, 1.75 in/4.5 cm quick cath (integral wire)  Narrative         Secured by: suture and anchor securement device       Complications: None apparent,        Arterial waveform: Yes        IBP within 10% of NIBP: Yes
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
## 2022-08-29 NOTE — BRIEF OP NOTE
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 Wisam-Brewer, RLQ  2. 16 Fr White catheter    Specimens:   ID Type Source Tests Collected by Time Destination   1 : Retroperitoneal mass Tissue Other SURGICAL PATHOLOGY EXAM Feng Agrawal MD 8/29/2022  3:05 PM      Findings:   Lateral paracaval/retroperitoneal mass resected but larger, more medial mass unable to be resected due to local invasion..  Complications: None.  Implants: * No implants in log *     Plan:  - PCA, gabriel tylenol  - NPO overnight, CLD in the AM  - MADAI White to remain  - SQH in AM if Hgb WNL    Matthew Tan MD  Urology PGY-4

## 2022-08-29 NOTE — OP NOTE
OPERATIVE REPORT  8/29/2022     PREOPERATIVE DIAGNOSIS:    1.Right renal cell carcinoma  2. 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 Exploratory laparotomy with extensive lysis of adhesions - more than 90 minutes    STAFF SURGEON: Feng Agrawal MD  ASSISTANT STAFF SURGEON: Jerson Daniel MD    RESIDENT(S):  Matthew Medina MD    ANESTHESIA: General    ESTIMATED BLOOD LOSS: 250 mL.     DRAINS: 16Fr barrera; 19 Fr MADAI drain     SIGNIFICANT FINDINGS:  Extensive adhesions requiring more than 90 minutes of adhesiolysis. Lateral mass near edge of liver resected intact and sent to pathology. Primary mass with apparent invasion of vena cava. Also with extensive involvement of duodenum. Upon direct visualization, mass was not resectable given degree of involvement with vena cava and duodenum. Given curative resection was not possible and any attempt for partial resection would be very high risk for duodenal and/or vena cava injury, decision was made to leave mass in situ.     SPECIMEN(S):   ID Type Source Tests Collected by Time Destination   1 : Retroperitoneal mass Tissue Other SURGICAL PATHOLOGY EXAM Feng Agrawal MD 8/29/2022  3:05 PM      OPERATIVE INDICATIONS: Dimitrios Goldberg is a(n) 57 year old male with a history of right renal cell carcinoma with previous nephrectomy and vena cava tumor thrombectomy 8/2021. On follow-up imaging, he was found to have recurrent mass in the retroperitoneum. There were two masses, on in the mery-nephric fat, and a second mass in retrocaval lymph node and question of vena cava invasion. Given the location of this mass and degree of difficulty with initial resection, we discussed that mass may ultimately be unresectable, but given limited treatment options, he  elected for attempted surgical resection. The patient was counseled on the alternatives, risks, and benefits and elected to proceed with the above stated procedure.     DESCRIPTION OF PROCEDURE:   After fully informed voluntary consent was obtained, the patient brought in the operating room, properly identified and general anesthesia was induced, endotracheal intubation performed and IV Ancef administered. White was placed in the bladder in a sterile manner. The patient was then placed in the supine position. The abdomen was shaved, prepped and draped in the usual sterile fashion after the patient had been appropriately positioned on the table and secured to the table with tape.     An anterior subcostal incision was made two finger breadths below the costal margin from the xiphoid to the tip of the 11th rib. Previous incision was used. We divided the rectus muscles, external oblique, internal oblique, and transversus abdominus with electrocautery then safely entered the peritoneum sharply. Upon entry into the abdomen, extensive scar tissue was noted. The small bowel and omentum were adherent to the anterior abdominal wall. Adhesiolysis was performed, and required more than 90 minutes of lysis before small bowel was mobilized. There were several small bowel serosal tears that were repaired with 3-0 silk suture. Once the small bowel was mobilized, the Carrion retractor was placed.     I then began to mobilized the colon, which was notable for additional extensive scar tissue. The lateral plane was mobilized and colon was reflected medially. As with small bowel resection, there was significant scar tissue. The lateral of the two masses was dissected free from surrounding structures, and was removed with the Ligasure. This was passed off for pathology.      Dissection then turned to the more proximal mass. At this point, Dr. Daniel scrubbed in to assist with further dissection. Additional colonic reflection was  completed medially to allow at least visual identification of the medial mass. A colonic serosal tear was repaired with 4-0 prolene suture.   At this point, dissection was notable for extensive adherence of the 2nd portion of the duodenum to the mass. Attempt was made to create a plane between these two structures inferiorly, laterally, and superiorly. Unfortunately, there was no identifiable plane between these duodenum and vena cava, and grossly there appeared to be invasion of the duodenum. Furthermore, on dissection superiorly and inferiorly, the vena cava also was suspicious for direct invasion of the tumor. Given these findings, Dr. Daniel and I agreed that the mass was not resectable without unacceptably high surgical risk, and that complete resection was not feasible. Given we felt we would not be able to render him disease free, decision was made to leave the retrocaval mass in situ. A muscle patch was placed over the area of dissection of this and the vena cava to assist with hemostasis from this area. Eviseal was placed and good hemostasis was noted.      The fascia was closed in 2 layers with four 0 looped PDS sutures. The skin was closed using subcuticular closure and dermabond. A 19 Khmer drain was left in place. The patient tolerated the procedure well. There were no complications. All sponge, needle and instrument counts were correct and doubly verified prior to closure. The patient was then awakened from anesthesia and brought to the PACU in stable condition.    Modifier 22 applied to this case, as the resection of retroperitoneal mass took considerably more surgical time than usual and was complicated by extensive scar tissue from previous surgery.     Feng Agrawal MD  Urology  [facility]

## 2022-08-29 NOTE — ANESTHESIA CARE TRANSFER NOTE
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    Anesthesia Type:   General     Note:    Oropharynx: oropharynx clear of all foreign objects and spontaneously breathing  Level of Consciousness: awake  Oxygen Supplementation: face mask  Level of Supplemental Oxygen (L/min / FiO2): 6  Independent Airway: airway patency satisfactory and stable  Dentition: dentition unchanged  Vital Signs Stable: post-procedure vital signs reviewed and stable  Report to RN Given: handoff report given  Patient transferred to: PACU    Handoff Report: Identifed the Patient, Identified the Reponsible Provider, Reviewed the pertinent medical history, Discussed the surgical course, Reviewed Intra-OP anesthesia mangement and issues during anesthesia, Set expectations for post-procedure period and Allowed opportunity for questions and acknowledgement of understanding      Vitals:  Vitals Value Taken Time   /86 08/29/22 1716   Temp     Pulse 104 08/29/22 1721   Resp 14 08/29/22 1721   SpO2 100 % 08/29/22 1721   Vitals shown include unvalidated device data.    Electronically Signed By: PILY Donald CRNA  August 29, 2022  5:22 PM

## 2022-08-29 NOTE — ANESTHESIA PROCEDURE NOTES
Central Line/PA Catheter Placement    Pre-Procedure   Staff -        Anesthesiologist:  Michael Kamara MD       Resident/Fellow: Myles Barba MD       Performed By: resident       Pre-Anesthestic Checklist: patient identified, IV checked, site marked, risks and benefits discussed, informed consent, monitors and equipment checked, pre-op evaluation and at physician/surgeon's request  Timeout:       Correct Patient: Yes        Correct Procedure: Yes        Correct Site: Yes        Correct Position: Yes        Correct Laterality: Yes   Line Placement:   This line was placed Post Induction    Procedure   Procedure: central line       Laterality: right       Insertion Site: internal jugular.       Patient Position: Trendelenburg  Sterile Prep        All elements of maximal sterile barrier technique followed       Patient Prep/Sterile Barriers: draped, hand hygiene, gloves , hat , mask , draped, gown, sterile gel and probe cover  Insertion/Injection        Technique: ultrasound guided and Seldinger Technique        1. Ultrasound was used to evaluate the access site.       2. Vein evaluated via ultrasound for patency/adequacy.       3. Using real-time ultrasound the needle/catheter was observed entering the artery/vein.       Introducer Type: 9 Fr, 2-lumen MAC        Type: Introducer (w/ triple lumen hands free through introducer)       Catheter Size: 9 Fr       Catheter Length: 15       Number of Lumens: double lumen  Narrative         Secured by: suture       Tegaderm and Biopatch dressing used.       Complications: None apparent,        blood aspirated from all lumens,        All lumens flushed: Yes       Verification method: Placement to be verified post-op

## 2022-08-29 NOTE — ANESTHESIA PROCEDURE NOTES
Airway       Patient location during procedure: OR       Procedure Start/Stop Times: 8/29/2022 9:57 AM  Staff -        CRNA: Goevanna Godfrey APRN CRNA       Performed By: CRNA  Consent for Airway        Urgency: elective  Indications and Patient Condition       Indications for airway management: mery-procedural       Induction type:intravenous       Mask difficulty assessment: 2 - vent by mask + OA or adjuvant +/- NMBA    Final Airway Details       Final airway type: endotracheal airway       Successful airway: ETT - single  Endotracheal Airway Details        ETT size (mm): 8.0       Cuffed: yes       Successful intubation technique: direct laryngoscopy and video laryngoscopy       VL Blade Size: Glidescope 3 (LOW PROFILE BLADE SIZE 3)       Grade View of Cords: 1       Adjucts: stylet       Position: Right       Measured from: lips       Secured at (cm): 23       Bite block used: None    Post intubation assessment        Placement verified by: capnometry, equal breath sounds and chest rise        Number of attempts at approach: 1       Number of other approaches attempted: 0       Secured with: pink tape       Ease of procedure: easy       Dentition: Intact and Unchanged    Medication(s) Administered   Medication Administration Time: 8/29/2022 9:57 AM

## 2022-08-29 NOTE — ANESTHESIA PROCEDURE NOTES
TAP (Bilateral TAP + subcostal) Procedure Note    Pre-Procedure   Staff -        Anesthesiologist:  Warren Null MD       Resident/Fellow: Myles Barba MD       Performed By: resident       Location: pre-op       Procedure Start/Stop Times: 8/29/2022 9:15 AM       Pre-Anesthestic Checklist: patient identified, IV checked, site marked, risks and benefits discussed, informed consent, monitors and equipment checked, pre-op evaluation, at physician/surgeon's request and post-op pain management  Timeout:       Correct Patient: Yes        Correct Procedure: Yes        Correct Site: Yes        Correct Position: Yes        Correct Laterality: Yes        Site Marked: Yes  Procedure Documentation  Procedure: TAP (Bilateral TAP + subcostal)       Diagnosis: POST OPERATIVE PAIN       Laterality: bilateral       Patient Position: supine       Patient Prep/Sterile Barriers: sterile gloves, mask       Skin prep: Chloraprep       Needle Type: short bevel       Needle Gauge: 21.        Needle Length (millimeters): 110        Ultrasound guided       1. Ultrasound was used to identify targeted nerve, plexus, vascular marker, or fascial plane and place a needle adjacent to it in real-time.       2. Ultrasound was used to visualize the spread of anesthetic in close proximity to the above referenced structure.       3. A permanent image is entered into the patient's record.    Assessment/Narrative         The placement was negative for: blood aspirated, painful injection and site bleeding       Paresthesias: No.       Bolus given via needle..        Secured via.        Insertion/Infusion Method: Single Shot       Complications: none       Injection made incrementally with aspirations every 5 mL.    Medication(s) Administered   Bupivacaine 0.25% PF (Infiltration) - Infiltration   20 mL - 8/29/2022 9:15:00 AM  Bupivacaine liposome (Exparel) 1.3% LA inj susp (Infiltration) - Infiltration   20 mL - 8/29/2022 9:15:00  AM  Medication Administration Time: 8/29/2022 9:15 AM

## 2022-08-30 ENCOUNTER — APPOINTMENT (OUTPATIENT)
Dept: OCCUPATIONAL THERAPY | Facility: CLINIC | Age: 57
DRG: 820 | End: 2022-08-30
Attending: UROLOGY
Payer: COMMERCIAL

## 2022-08-30 LAB
ANION GAP SERPL CALCULATED.3IONS-SCNC: 7 MMOL/L (ref 7–15)
BUN SERPL-MCNC: 23.2 MG/DL (ref 6–20)
CALCIUM SERPL-MCNC: 8.7 MG/DL (ref 8.6–10)
CHLORIDE SERPL-SCNC: 102 MMOL/L (ref 98–107)
CREAT SERPL-MCNC: 2.14 MG/DL (ref 0.67–1.17)
DEPRECATED HCO3 PLAS-SCNC: 28 MMOL/L (ref 22–29)
ERYTHROCYTE [DISTWIDTH] IN BLOOD BY AUTOMATED COUNT: 14.9 % (ref 10–15)
GFR SERPL CREATININE-BSD FRML MDRD: 35 ML/MIN/1.73M2
GLUCOSE SERPL-MCNC: 97 MG/DL (ref 70–99)
HCT VFR BLD AUTO: 33.6 % (ref 40–53)
HGB BLD-MCNC: 10.2 G/DL (ref 13.3–17.7)
MCH RBC QN AUTO: 30.6 PG (ref 26.5–33)
MCHC RBC AUTO-ENTMCNC: 30.4 G/DL (ref 31.5–36.5)
MCV RBC AUTO: 101 FL (ref 78–100)
PLATELET # BLD AUTO: 191 10E3/UL (ref 150–450)
POTASSIUM SERPL-SCNC: 4.6 MMOL/L (ref 3.4–5.3)
RBC # BLD AUTO: 3.33 10E6/UL (ref 4.4–5.9)
SODIUM SERPL-SCNC: 137 MMOL/L (ref 136–145)
WBC # BLD AUTO: 10.1 10E3/UL (ref 4–11)

## 2022-08-30 PROCEDURE — 85027 COMPLETE CBC AUTOMATED: CPT | Performed by: PHYSICIAN ASSISTANT

## 2022-08-30 PROCEDURE — 36415 COLL VENOUS BLD VENIPUNCTURE: CPT | Performed by: PHYSICIAN ASSISTANT

## 2022-08-30 PROCEDURE — 250N000011 HC RX IP 250 OP 636: Performed by: STUDENT IN AN ORGANIZED HEALTH CARE EDUCATION/TRAINING PROGRAM

## 2022-08-30 PROCEDURE — 250N000013 HC RX MED GY IP 250 OP 250 PS 637: Performed by: STUDENT IN AN ORGANIZED HEALTH CARE EDUCATION/TRAINING PROGRAM

## 2022-08-30 PROCEDURE — 120N000002 HC R&B MED SURG/OB UMMC

## 2022-08-30 PROCEDURE — 97535 SELF CARE MNGMENT TRAINING: CPT | Mod: GO

## 2022-08-30 PROCEDURE — 80048 BASIC METABOLIC PNL TOTAL CA: CPT | Performed by: PHYSICIAN ASSISTANT

## 2022-08-30 PROCEDURE — 97530 THERAPEUTIC ACTIVITIES: CPT | Mod: GO

## 2022-08-30 PROCEDURE — 97165 OT EVAL LOW COMPLEX 30 MIN: CPT | Mod: GO

## 2022-08-30 PROCEDURE — 250N000012 HC RX MED GY IP 250 OP 636 PS 637: Performed by: UROLOGY

## 2022-08-30 PROCEDURE — 258N000003 HC RX IP 258 OP 636: Performed by: STUDENT IN AN ORGANIZED HEALTH CARE EDUCATION/TRAINING PROGRAM

## 2022-08-30 RX ORDER — HEPARIN SODIUM 5000 [USP'U]/.5ML
5000 INJECTION, SOLUTION INTRAVENOUS; SUBCUTANEOUS 3 TIMES DAILY
Status: DISCONTINUED | OUTPATIENT
Start: 2022-08-30 | End: 2022-09-03 | Stop reason: HOSPADM

## 2022-08-30 RX ORDER — RAMELTEON 8 MG/1
8 TABLET ORAL
Status: DISCONTINUED | OUTPATIENT
Start: 2022-08-30 | End: 2022-09-03 | Stop reason: HOSPADM

## 2022-08-30 RX ORDER — FAMOTIDINE 10 MG
10 TABLET ORAL 2 TIMES DAILY PRN
Status: DISCONTINUED | OUTPATIENT
Start: 2022-08-30 | End: 2022-09-03 | Stop reason: HOSPADM

## 2022-08-30 RX ADMIN — SODIUM CHLORIDE: 9 INJECTION, SOLUTION INTRAVENOUS at 13:33

## 2022-08-30 RX ADMIN — NORTRIPTYLINE HYDROCHLORIDE 10 MG: 10 CAPSULE ORAL at 00:31

## 2022-08-30 RX ADMIN — ACETAMINOPHEN 975 MG: 325 TABLET, FILM COATED ORAL at 16:35

## 2022-08-30 RX ADMIN — HEPARIN SODIUM 5000 UNITS: 5000 INJECTION, SOLUTION INTRAVENOUS; SUBCUTANEOUS at 20:12

## 2022-08-30 RX ADMIN — ACETAMINOPHEN 975 MG: 325 TABLET, FILM COATED ORAL at 09:28

## 2022-08-30 RX ADMIN — SENNOSIDES AND DOCUSATE SODIUM 1 TABLET: 8.6; 5 TABLET ORAL at 09:28

## 2022-08-30 RX ADMIN — PREDNISONE 10 MG: 10 TABLET ORAL at 09:28

## 2022-08-30 RX ADMIN — ACETAMINOPHEN 975 MG: 325 TABLET, FILM COATED ORAL at 20:12

## 2022-08-30 RX ADMIN — ACETAMINOPHEN 975 MG: 325 TABLET, FILM COATED ORAL at 12:18

## 2022-08-30 RX ADMIN — SODIUM CHLORIDE: 9 INJECTION, SOLUTION INTRAVENOUS at 05:27

## 2022-08-30 RX ADMIN — LEVOTHYROXINE SODIUM 100 MCG: 100 TABLET ORAL at 09:28

## 2022-08-30 RX ADMIN — NORTRIPTYLINE HYDROCHLORIDE 10 MG: 10 CAPSULE ORAL at 20:16

## 2022-08-30 RX ADMIN — SENNOSIDES AND DOCUSATE SODIUM 1 TABLET: 8.6; 5 TABLET ORAL at 20:12

## 2022-08-30 RX ADMIN — SODIUM CHLORIDE: 9 INJECTION, SOLUTION INTRAVENOUS at 22:07

## 2022-08-30 RX ADMIN — ATORVASTATIN CALCIUM 20 MG: 20 TABLET, FILM COATED ORAL at 09:28

## 2022-08-30 ASSESSMENT — ACTIVITIES OF DAILY LIVING (ADL)
ADLS_ACUITY_SCORE: 23
ADLS_ACUITY_SCORE: 23
ADLS_ACUITY_SCORE: 22
ADLS_ACUITY_SCORE: 20
PREVIOUS_RESPONSIBILITIES: MEAL PREP;HOUSEKEEPING;LAUNDRY;SHOPPING;MEDICATION MANAGEMENT;FINANCES;DRIVING;WORK
ADLS_ACUITY_SCORE: 22
ADLS_ACUITY_SCORE: 23
ADLS_ACUITY_SCORE: 22

## 2022-08-30 NOTE — PROGRESS NOTES
Blood pressure 128/83, pulse 114, temperature 99.5  F (37.5  C), temperature source Oral, resp. rate 18, height 1.829 m (6'), weight 104.9 kg (231 lb 4.2 oz), SpO2 97 %.      Goal Outcome Evaluation:     Plan of Care Reviewed With: patient   Overall Patient Progress: No change     Pt arrived to unit from PACU ~2200   Status: S/p Ex Lap, extensive BAYLEE, RESECTION OF RETROPERITONEAL MASS  Neuros: A&O x4, CMS intact   Cardiac: Normotensive, ongoing tachycardia in 110s-120s since admission to unit from PACU   Respiratory: LS clear/diminished. Satting >93% on 1 L NC. No reports of shortness of breath    GI/: White w/ adequate UOP. -Flatus, -Bs, Last BM 8/28  Diet/Nausea: Tolerating Ice chips/drinks of clears. No Nausea   Skin: Admitted/transferred from:   2 RN full except Unable to assess coccyx d/t pt refusing to turn/reposition d/t pain   skin assessment completed by Victor Hugo Best RN and Elke BEST RN.  Skin assessment finding: issues found Large Transverse incision spanning entire abdominal region, covered w/ primapore w/ scanted noted drainge  TAIWO White JP   Interventions/actions: skin interventions Pillow places on lower back for comfort. Preventative sacral mepilex in place. Pt educated on importance of frequent repositioning      Will continue to monitor   LDA: R PIV infusing NS @ 125 mL/hr. TAIWO White JP   Labs: Reviewed   Pain: Abdominal pain managed w/ PCA Dilaudid 0.2 mg q10 min   Activity: Pt not get OOB during shift    Plan: Continue POC

## 2022-08-30 NOTE — PROGRESS NOTES
08/30/22 1636   Quick Adds   Type of Visit Initial Occupational Therapy Evaluation   Living Environment   People in Home spouse;child(jennifer), dependent   Current Living Arrangements house   Home Accessibility stairs within home   Number of Stairs, Within Home, Primary greater than 10 stairs;other (see comments)   Stair Railings, Within Home, Primary railings safe and in good condition   Transportation Anticipated car, drives self;family or friend will provide   Living Environment Comments Pt lives with his wife and 15 yo daughter in a split level house with 7 steps up and down. Pt's bedroom and bathroom are upstairs, bathroom has a tub/shower combo. Downstairs has pt's man cave and bathroom with a walk-in shower.   Self-Care   Usual Activity Tolerance good   Current Activity Tolerance moderate   Equipment Currently Used at Home none   Fall history within last six months no  (Pt had abdominal surgery 1 year ago. Per wife, pt fell x 2 shortly after d/c home.)   Activity/Exercise/Self-Care Comment Pt reports he is IND in all ADLs. He has a shower chair from a previous surgery, but does not currently use.   Instrumental Activities of Daily Living (IADL)   Previous Responsibilities meal prep;housekeeping;laundry;shopping;medication management;finances;driving;work   IADL Comments Pt is prior IND in IADLs and drives. Pt works locating underground lines for utilities, currently on leave.   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 On follow-up imaging, he was found to have recurrent mass in the retroperitoneum.   Existing Precautions/Restrictions fall;abdominal   Left  Upper Extremity (Weight-bearing Status) partial weight-bearing (PWB)  (10 lbs)   Right Upper Extremity (Weight-bearing Status) partial weight-bearing (PWB)  (10 lbs)   Left Lower Extremity (Weight-bearing Status) full weight-bearing (FWB)   Right Lower Extremity (Weight-bearing Status) full weight-bearing (FWB)   Cognitive Status Examination   Orientation Status orientation to person, place and time   Affect/Mental Status (Cognitive) WFL   Follows Commands WFL   Cognitive Status Comments Pt's cognition appears to be overall WFL.   Visual Perception   Visual Impairment/Limitations WFL   Sensory   Sensory Quick Adds No deficits were identified   Pain Assessment   Patient Currently in Pain Yes, see Vital Sign flowsheet   Range of Motion Comprehensive   General Range of Motion no range of motion deficits identified   Strength Comprehensive (MMT)   General Manual Muscle Testing (MMT) Assessment no strength deficits identified   Comment, General Manual Muscle Testing (MMT) Assessment   (MMT not formally completed due to abdominal precautions, however BUE strength appears to be at least 3/5 as evidenced by functional performance.)   Coordination   Upper Extremity Coordination No deficits were identified   Transfers   Transfers sit-stand transfer   Sit-Stand Transfer   Sit-Stand [place] (Transfers) contact guard   Activities of Daily Living   BADL Assessment/Intervention bathing;upper body dressing;lower body dressing;grooming;toileting;other (see comments)  (Home management)   Bathing Assessment/Intervention   Position (Bathing) supported sitting   [place] Level (Bathing) moderate assist (50% patient effort)   Comment, (Bathing) Per clinical judgement   Assistive Devices (Bathing) shower chair   Upper Body Dressing Assessment/Training   Comment, (Upper Body Dressing) Per clinical judgement   [place] Level (Upper Body Dressing) independent   Lower Body Dressing Assessment/Training   Position (Lower Body  Dressing) supported sitting   Comment, (Lower Body Dressing) Per clinical judgment   [place] Level (Lower Body Dressing) moderate assist (50% patient effort)   Grooming Assessment/Training   Position (Grooming) supported standing   [place] Level (Grooming) contact guard assist   Comment, (Grooming) Per clinical judgement   Toileting   Position (Toileting) supported sitting   Comment, (Toileting) Per clinical judgement   [place] Level (Toileting) moderate assist (50% patient effort)   Clinical Impression   Criteria for Skilled Therapeutic Interventions Met (OT) Yes, treatment indicated   OT Diagnosis Decreased ADL/IADL IND   OT Problem List-Impairments impacting ADL problems related to;activity tolerance impaired;pain;post-surgical precautions   Assessment of Occupational Performance 3-5 Performance Deficits   Identified Performance Deficits LB bathing, LB dressing, toileting, grooming, home management   Planned Therapy Interventions (OT) ADL retraining;IADL retraining;bed mobility training;transfer training;home program guidelines;progressive activity/exercise   Clinical Decision Making Complexity (OT) low complexity   Anticipated Equipment Needs Upon Discharge (OT)   (TBD)   Risk & Benefits of therapy have been explained evaluation/treatment results reviewed;care plan/treatment goals reviewed;risks/benefits reviewed;participants included;patient;spouse/significant other   Clinical Impression Comments Pt presents with decreased ADL/IADL IND, would benefit from IP OT to progress functional IND within precautions.   OT Discharge Planning   OT Discharge Recommendation (DC Rec) home with assist   OT Rationale for DC Rec Pt performing below baseline in ADLs/IADLs, anticipate will be safe to d/c home with assist from spouse and daughter for heavier ADLs/IADLs.   OT Brief overview of current status STS with CGA   Total Evaluation Time (Minutes)   Total Evaluation Time (Minutes) 10   OT Goals   Therapy  Frequency (OT) Daily   OT Predicted Duration/Target Date for Goal Attainment 09/06/22   OT Goals Hygiene/Grooming;Lower Body Dressing;Lower Body Bathing;Bed Mobility;Toilet Transfer/Toileting;Home Management   OT: Hygiene/Grooming modified independent   OT: Lower Body Dressing including set-up/clothing retrieval;within precautions;Modified independent   OT: Lower Body Bathing Modified independent;with precautions   OT: Bed Mobility Modified independent;within precautions   OT: Toilet Transfer/Toileting Modified independent;cleaning and garment management;toilet transfer;within precautions   OT: Home Management with light demand household tasks;within precautions;ambulatory level;Modified independent

## 2022-08-30 NOTE — PROGRESS NOTES
Urology  Progress Note    NAEO  Persistently tachycardic  Pain well controlled with oral and IV prns  Tolerating sips and chips - no n/v  Not passing gas  Not ambulating  White in place    Exam  /83 (BP Location: Right arm)   Pulse 114   Temp 99.5  F (37.5  C) (Oral)   Resp 18   Ht 1.829 m (6')   Wt 104.9 kg (231 lb 4.2 oz)   SpO2 97%   BMI 31.36 kg/m    No acute distress  Unlabored breathing, NC in place  Abdomen moderately distended but soft,  appropriately tender. Incision with overlying dressing and mild shadowing.  MADAI serosanguinous  White with yellow urine in tubing    UOP 1875/650  MADAI ---/90    Labs  Recent Labs   Lab Test 08/29/22  1755 08/29/22  1640 08/29/22  1545 08/29/22  1048 08/29/22  0900 08/25/22  1644 08/05/22  0643 07/09/22  0833   WBC  --   --   --   --  8.3  --  9.3 10.5   HGB 10.1* 9.9* 9.4*   < > 13.4  --  12.9* 11.0*   CR 1.65*  --   --   --  1.66* 2.65* 1.78* 2.29*    < > = values in this interval not displayed.        Assessment/Plan  57 year old y/o male POD#1 s/p exploratory laparotomy, lysis of adhesions and resection of retroperitoneal mass. Doing well postoperatively     Neuro: tylenol and PCAfor pain control, PTA nortriptyline   CV: PTA statin, start PTA amlodipine   Pulm: IS while awake  FEN/GI: Advance to clear liquid diet, MIVF @ 125/hr  Endo: PTA levo  : White in place and draining  Heme/ID: Hgb stable. PTA pred  Activity: encourage ambulation. PT/OT   PPx: SCDs. Starting SQH if Hgb stable.  Dispo: Med/surg     Seen and examined with the chief resident. Will discuss with Dr. Agrawal.    Michael Crow MD  Urology Resident     Contacting the Urology Team     Please use the following job codes to reach the Urology Team. Note that you must use an in house phone and that job codes cannot receive text pages.     On weekdays, dial [phone] (or star-star-star [phone] on the new [employer] telephones) then [phone] to reach the Adult Urology resident or PA on call    On weekdays, dial [phone] (or  star-star-star 777 on the new [email or web address] telephones) then 0818 to reach the Pediatric Urology resident    On weeknights and weekends, dial [phone] (or star-star-star 777 on the new [email or web address] telephones) then [phone] to reach the Urology resident on call (for both Adult and Pediatrics)

## 2022-08-30 NOTE — PROGRESS NOTES
Blood pressure 137/86, pulse 118, temperature 99.4  F (37.4  C), temperature source Oral, resp. rate 18, height 1.829 m (6'), weight 104.9 kg (231 lb 4.2 oz), SpO2 98 %.    Activity: Not OOB refused  Neuros: AOX4, makes needs known  Cardiac: Tachy, denies chest pain  Respiratory: WDL, RA 98%, denies SOB  GI/: Barrera-AUOP, +BS, LBM 8/28  Diet: Clear liquids, tolerating  Skin/Incisions/Drains: Transverse abd incision, Rt MADAI, barrera  Lines: Rt/Lt PIV, NS 125hr, PCA  Pain: Pain with movement at abd area  Plan: Continue with POC

## 2022-08-30 NOTE — PROGRESS NOTES
D:  s/p exploratory laparotomy, lysis of adhesions and resection of retroperitoneal mass     I/A:   Neuro: A&O x4.   Cardiac: Tachy, HR 110s.   Respiratory: Sats >90% on RA, denied SOB.  GI/: White patent and draining, adequate UOP.  Skin/drains: MADAI drain to bulb suction, patent and draining. Abdominal incision CDI.   IV/Drips: PIV x2.   *NS running at 125 mL/hr.  *PCA pump infusing Dilaudid at 0.2mg/mL  Activity: Stand by assist, in bed majority of the evening.         P: Notifying Urology team of changes.

## 2022-08-31 ENCOUNTER — PRE VISIT (OUTPATIENT)
Dept: UROLOGY | Facility: CLINIC | Age: 57
End: 2022-08-31

## 2022-08-31 ENCOUNTER — APPOINTMENT (OUTPATIENT)
Dept: OCCUPATIONAL THERAPY | Facility: CLINIC | Age: 57
DRG: 820 | End: 2022-08-31
Attending: UROLOGY
Payer: COMMERCIAL

## 2022-08-31 LAB
ANION GAP SERPL CALCULATED.3IONS-SCNC: 5 MMOL/L (ref 7–15)
BUN SERPL-MCNC: 17.7 MG/DL (ref 6–20)
CALCIUM SERPL-MCNC: 8.2 MG/DL (ref 8.6–10)
CHLORIDE SERPL-SCNC: 101 MMOL/L (ref 98–107)
CREAT SERPL-MCNC: 1.6 MG/DL (ref 0.67–1.17)
DEPRECATED HCO3 PLAS-SCNC: 29 MMOL/L (ref 22–29)
ERYTHROCYTE [DISTWIDTH] IN BLOOD BY AUTOMATED COUNT: 14.6 % (ref 10–15)
GFR SERPL CREATININE-BSD FRML MDRD: 50 ML/MIN/1.73M2
GLUCOSE SERPL-MCNC: 85 MG/DL (ref 70–99)
HCT VFR BLD AUTO: 29.1 % (ref 40–53)
HGB BLD-MCNC: 8.7 G/DL (ref 13.3–17.7)
HGB BLD-MCNC: 9.2 G/DL (ref 13.3–17.7)
MCH RBC QN AUTO: 30.4 PG (ref 26.5–33)
MCHC RBC AUTO-ENTMCNC: 29.9 G/DL (ref 31.5–36.5)
MCV RBC AUTO: 102 FL (ref 78–100)
PLATELET # BLD AUTO: 166 10E3/UL (ref 150–450)
POTASSIUM SERPL-SCNC: 4.1 MMOL/L (ref 3.4–5.3)
RBC # BLD AUTO: 2.86 10E6/UL (ref 4.4–5.9)
SODIUM SERPL-SCNC: 135 MMOL/L (ref 136–145)
WBC # BLD AUTO: 9.5 10E3/UL (ref 4–11)

## 2022-08-31 PROCEDURE — 80048 BASIC METABOLIC PNL TOTAL CA: CPT | Performed by: PHYSICIAN ASSISTANT

## 2022-08-31 PROCEDURE — 258N000003 HC RX IP 258 OP 636: Performed by: PHYSICIAN ASSISTANT

## 2022-08-31 PROCEDURE — 250N000011 HC RX IP 250 OP 636: Performed by: STUDENT IN AN ORGANIZED HEALTH CARE EDUCATION/TRAINING PROGRAM

## 2022-08-31 PROCEDURE — 120N000002 HC R&B MED SURG/OB UMMC

## 2022-08-31 PROCEDURE — 36415 COLL VENOUS BLD VENIPUNCTURE: CPT | Performed by: STUDENT IN AN ORGANIZED HEALTH CARE EDUCATION/TRAINING PROGRAM

## 2022-08-31 PROCEDURE — 250N000013 HC RX MED GY IP 250 OP 250 PS 637: Performed by: STUDENT IN AN ORGANIZED HEALTH CARE EDUCATION/TRAINING PROGRAM

## 2022-08-31 PROCEDURE — 36415 COLL VENOUS BLD VENIPUNCTURE: CPT | Performed by: PHYSICIAN ASSISTANT

## 2022-08-31 PROCEDURE — 250N000012 HC RX MED GY IP 250 OP 636 PS 637: Performed by: UROLOGY

## 2022-08-31 PROCEDURE — 258N000003 HC RX IP 258 OP 636: Performed by: STUDENT IN AN ORGANIZED HEALTH CARE EDUCATION/TRAINING PROGRAM

## 2022-08-31 PROCEDURE — 97530 THERAPEUTIC ACTIVITIES: CPT | Mod: GO

## 2022-08-31 PROCEDURE — 97535 SELF CARE MNGMENT TRAINING: CPT | Mod: GO

## 2022-08-31 PROCEDURE — 85027 COMPLETE CBC AUTOMATED: CPT | Performed by: PHYSICIAN ASSISTANT

## 2022-08-31 PROCEDURE — 85018 HEMOGLOBIN: CPT | Performed by: STUDENT IN AN ORGANIZED HEALTH CARE EDUCATION/TRAINING PROGRAM

## 2022-08-31 RX ADMIN — POLYETHYLENE GLYCOL 3350 17 G: 17 POWDER, FOR SOLUTION ORAL at 20:10

## 2022-08-31 RX ADMIN — HEPARIN SODIUM 5000 UNITS: 5000 INJECTION, SOLUTION INTRAVENOUS; SUBCUTANEOUS at 20:10

## 2022-08-31 RX ADMIN — SENNOSIDES AND DOCUSATE SODIUM 1 TABLET: 8.6; 5 TABLET ORAL at 08:44

## 2022-08-31 RX ADMIN — SODIUM CHLORIDE: 9 INJECTION, SOLUTION INTRAVENOUS at 15:33

## 2022-08-31 RX ADMIN — PREDNISONE 10 MG: 10 TABLET ORAL at 08:44

## 2022-08-31 RX ADMIN — SODIUM CHLORIDE: 9 INJECTION, SOLUTION INTRAVENOUS at 06:08

## 2022-08-31 RX ADMIN — ACETAMINOPHEN 975 MG: 325 TABLET, FILM COATED ORAL at 17:18

## 2022-08-31 RX ADMIN — SODIUM CHLORIDE: 9 INJECTION, SOLUTION INTRAVENOUS at 22:58

## 2022-08-31 RX ADMIN — ACETAMINOPHEN 975 MG: 325 TABLET, FILM COATED ORAL at 20:09

## 2022-08-31 RX ADMIN — SENNOSIDES AND DOCUSATE SODIUM 1 TABLET: 8.6; 5 TABLET ORAL at 20:10

## 2022-08-31 RX ADMIN — HEPARIN SODIUM 5000 UNITS: 5000 INJECTION, SOLUTION INTRAVENOUS; SUBCUTANEOUS at 08:44

## 2022-08-31 RX ADMIN — ATORVASTATIN CALCIUM 20 MG: 20 TABLET, FILM COATED ORAL at 08:44

## 2022-08-31 RX ADMIN — LEVOTHYROXINE SODIUM 100 MCG: 100 TABLET ORAL at 08:44

## 2022-08-31 RX ADMIN — ACETAMINOPHEN 975 MG: 325 TABLET, FILM COATED ORAL at 10:24

## 2022-08-31 RX ADMIN — HEPARIN SODIUM 5000 UNITS: 5000 INJECTION, SOLUTION INTRAVENOUS; SUBCUTANEOUS at 14:40

## 2022-08-31 ASSESSMENT — ACTIVITIES OF DAILY LIVING (ADL)
ADLS_ACUITY_SCORE: 25
ADLS_ACUITY_SCORE: 23
ADLS_ACUITY_SCORE: 23
ADLS_ACUITY_SCORE: 25
ADLS_ACUITY_SCORE: 23
ADLS_ACUITY_SCORE: 25
ADLS_ACUITY_SCORE: 25
ADLS_ACUITY_SCORE: 23

## 2022-08-31 NOTE — PLAN OF CARE
Goal Outcome Evaluation:      Pt using PCA diluadid for pain control. Good relief with dose. Will continue to monitor and notify Provider with any changes.

## 2022-08-31 NOTE — PROGRESS NOTES
Urology  Progress Note    NAEO  Persistently tachycardic  Pain well controlled with PCA  Tolerating CLD- no n/v  No BM, no flatus with some hiccups  Little ambulation   White in place    Exam  /87 (BP Location: Right arm)   Pulse 108   Temp 97.4  F (36.3  C) (Oral)   Resp 18   Ht 1.829 m (6')   Wt 104.9 kg (231 lb 4.2 oz)   SpO2 98%   BMI 31.36 kg/m    No acute distress  Unlabored breathing, NC in place  Abdomen moderately distended but soft,  appropriately tender. Incision with overlying dressing and mild shadowing.  MADAI serosanguinous  White with yellow urine in tubing     UOP 2550/700  MADAI 205/25    Labs  Recent Labs   Lab Test 08/30/22  0920 08/29/22  1755 08/29/22  1640 08/29/22  1048 08/29/22  0900 08/25/22  1644 08/05/22  0643   WBC 10.1  --   --   --  8.3  --  9.3   HGB 10.2* 10.1* 9.9*   < > 13.4  --  12.9*   CR 2.14* 1.65*  --   --  1.66*   < > 1.78*    < > = values in this interval not displayed.            Assessment/Plan  57 year old y/o male POD#2 s/p exploratory laparotomy, lysis of adhesions and resection of retroperitoneal mass. Doing well postoperatively. Little ambulation. Continue CLD    Neuro: tylenol and PCA for pain control, PTA nortriptyline   CV: PTA statin, start PTA amlodipine   Pulm: IS while awake  FEN/GI: CLD, MIVF @ 125/hr  Endo: PTA levo  : White in place and draining  Heme/ID: Hgb stable. PTA pred  Activity: encourage ambulation. PT/OT   PPx: SCDs. SQH.  Dispo: Med/surg     Seen and examined with the chief resident. Will discuss with Dr. Agrawal.    Michael Crow MD  Urology Resident     Contacting the Urology Team     Please use the following job codes to reach the Urology Team. Note that you must use an in house phone and that job codes cannot receive text pages.     On weekdays, dial [phone] (or star-star-star 777 on the new [employer]) then [phone] to reach the Adult Urology resident or PA on call    On weekdays, dial [phone] (or star-star-star 777 on the new [employer]  telephones) then 0818 to reach the Pediatric Urology resident    On weeknights and weekends, dial [phone] (or star-star-star 777 on the new [employer] telephones) then [phone] to reach the Urology resident on call (for both Adult and Pediatrics)

## 2022-08-31 NOTE — PLAN OF CARE
/87 (BP Location: Right arm)   Pulse 108   Temp 97.4  F (36.3  C) (Oral)   Resp 18   Ht 1.829 m (6')   Wt 104.9 kg (231 lb 4.2 oz)   SpO2 98%   BMI 31.36 kg/m      Shift: 3473-0098  Status: Renal cell carcinoma. Exploratory laparotomy w/ extensive lysis of adhesions. Resection of peritoneal mass: 8/29/2022.   Neuro: A&O x 4. Denies numbness and tingling.   Resp: WDL. RA. Denies SOB.   Cardiac: Tachycardic.   GI/: Urethral catheter AUOP. Clear liquid diet.   Skin: Warm and dry.   Activity: SBA.   Incision & Drainage: (3) PIV. MADAI drain sanguinous drainage. Transverse abdominal incision-dried drainage-minimal.   Pain: 5/10 on abdomen. Dilaudid PCA in use.   Labs: Urea nitrogen: 23.2. Creatinine: 2.14. GFR: 35.   Changes: No acute changes.   Plan: Continue with POC.       Goal Outcome Evaluation: No change.     Plan of Care Reviewed With: patient     Overall Patient Progress: no change

## 2022-08-31 NOTE — PLAN OF CARE
A&Ox4. Up SBA w/gb. Tachycardic, but OVSS. 99% on 1LPM via NC. Attempted to wean but dipped to 87% so put back on 1 liter. Encouraged use of IS, performed appropriately. Using throughout shift. Ambulated in halls twice. Pain at 3-5/10 at incisional site. Managed with pain pump, ice, rest, repositioning, and ambulation. Incision site RAQUEL. Small amount of clear leakage at incisional site this AM. PIVx3 - 2 SL and one infusing NS at 100mL/hr with dilaudid PCA running, see MAR. Hypo BS, no flatus yet. White in place. MADAI to bulb suction. Watching TV, on cell phone, and resting between cares.     Goal Outcome Evaluation:    Plan of Care Reviewed With: patient     Overall Patient Progress: improving    Outcome Evaluation: ambulating throughout shift, tolerating clears, denies n/v, pain managed with pain pump

## 2022-08-31 NOTE — TELEPHONE ENCOUNTER
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

## 2022-08-31 NOTE — PROGRESS NOTES
BP (!) 135/90 (BP Location: Right arm)   Pulse 109   Temp 98.4  F (36.9  C) (Oral)   Resp 16   Ht 1.829 m (6')   Wt 104.9 kg (231 lb 4.2 oz)   SpO2 98%   BMI 31.36 kg/m       Time: 5579-0004  NEURO: A&Ox4, calls appropriately   RESP: 1L nc-RA sating >92%, denies sob  CARDIAC: Tachycardic, Denies cardiac chest pain  GI/: White in place w/ auop. LBM: 8/28  DIET: CLD, poor appetite   PAIN/NAUSEA: Denies nausea, Minimal pain, dilaudid PCA in use  SKIN: transverse abd incision vera,  LDAs: R MADAI w/ minimal output, 3 piv (2 sl, 1 infusing 100ml/hr+pca dilaudid)  LAB: reviewed  ACTIVITY: SBA  PLAN: Continue POC

## 2022-09-01 ENCOUNTER — APPOINTMENT (OUTPATIENT)
Dept: OCCUPATIONAL THERAPY | Facility: CLINIC | Age: 57
DRG: 820 | End: 2022-09-01
Attending: UROLOGY
Payer: COMMERCIAL

## 2022-09-01 ENCOUNTER — APPOINTMENT (OUTPATIENT)
Dept: GENERAL RADIOLOGY | Facility: CLINIC | Age: 57
DRG: 820 | End: 2022-09-01
Attending: UROLOGY
Payer: COMMERCIAL

## 2022-09-01 LAB
ANION GAP SERPL CALCULATED.3IONS-SCNC: 11 MMOL/L (ref 7–15)
ATRIAL RATE - MUSE: 116 BPM
BUN SERPL-MCNC: 14.7 MG/DL (ref 6–20)
CALCIUM SERPL-MCNC: 9 MG/DL (ref 8.6–10)
CHLORIDE SERPL-SCNC: 101 MMOL/L (ref 98–107)
CREAT SERPL-MCNC: 1.65 MG/DL (ref 0.67–1.17)
DEPRECATED HCO3 PLAS-SCNC: 27 MMOL/L (ref 22–29)
DIASTOLIC BLOOD PRESSURE - MUSE: NORMAL MMHG
ERYTHROCYTE [DISTWIDTH] IN BLOOD BY AUTOMATED COUNT: 14.4 % (ref 10–15)
GFR SERPL CREATININE-BSD FRML MDRD: 48 ML/MIN/1.73M2
GLUCOSE SERPL-MCNC: 64 MG/DL (ref 70–99)
HCT VFR BLD AUTO: 30.7 % (ref 40–53)
HGB BLD-MCNC: 9.3 G/DL (ref 13.3–17.7)
INTERPRETATION ECG - MUSE: NORMAL
MCH RBC QN AUTO: 30.8 PG (ref 26.5–33)
MCHC RBC AUTO-ENTMCNC: 30.3 G/DL (ref 31.5–36.5)
MCV RBC AUTO: 102 FL (ref 78–100)
P AXIS - MUSE: 4 DEGREES
PLATELET # BLD AUTO: 192 10E3/UL (ref 150–450)
POTASSIUM SERPL-SCNC: 3.8 MMOL/L (ref 3.4–5.3)
PR INTERVAL - MUSE: 134 MS
QRS DURATION - MUSE: 88 MS
QT - MUSE: 310 MS
QTC - MUSE: 430 MS
R AXIS - MUSE: -4 DEGREES
RBC # BLD AUTO: 3.02 10E6/UL (ref 4.4–5.9)
SODIUM SERPL-SCNC: 139 MMOL/L (ref 136–145)
SYSTOLIC BLOOD PRESSURE - MUSE: NORMAL MMHG
T AXIS - MUSE: -6 DEGREES
VENTRICULAR RATE- MUSE: 116 BPM
WBC # BLD AUTO: 9.2 10E3/UL (ref 4–11)

## 2022-09-01 PROCEDURE — 36415 COLL VENOUS BLD VENIPUNCTURE: CPT | Performed by: PHYSICIAN ASSISTANT

## 2022-09-01 PROCEDURE — 80048 BASIC METABOLIC PNL TOTAL CA: CPT | Performed by: PHYSICIAN ASSISTANT

## 2022-09-01 PROCEDURE — 999N000147 HC STATISTIC PT IP EVAL DEFER

## 2022-09-01 PROCEDURE — 250N000013 HC RX MED GY IP 250 OP 250 PS 637: Performed by: STUDENT IN AN ORGANIZED HEALTH CARE EDUCATION/TRAINING PROGRAM

## 2022-09-01 PROCEDURE — 71045 X-RAY EXAM CHEST 1 VIEW: CPT

## 2022-09-01 PROCEDURE — 74018 RADEX ABDOMEN 1 VIEW: CPT | Mod: 26 | Performed by: RADIOLOGY

## 2022-09-01 PROCEDURE — 120N000002 HC R&B MED SURG/OB UMMC

## 2022-09-01 PROCEDURE — 93010 ELECTROCARDIOGRAM REPORT: CPT | Performed by: INTERNAL MEDICINE

## 2022-09-01 PROCEDURE — 97535 SELF CARE MNGMENT TRAINING: CPT | Mod: GO

## 2022-09-01 PROCEDURE — 85014 HEMATOCRIT: CPT | Performed by: PHYSICIAN ASSISTANT

## 2022-09-01 PROCEDURE — 93005 ELECTROCARDIOGRAM TRACING: CPT

## 2022-09-01 PROCEDURE — 74018 RADEX ABDOMEN 1 VIEW: CPT

## 2022-09-01 PROCEDURE — 71045 X-RAY EXAM CHEST 1 VIEW: CPT | Mod: 26 | Performed by: RADIOLOGY

## 2022-09-01 PROCEDURE — 258N000003 HC RX IP 258 OP 636: Performed by: PHYSICIAN ASSISTANT

## 2022-09-01 PROCEDURE — 97530 THERAPEUTIC ACTIVITIES: CPT | Mod: GO

## 2022-09-01 PROCEDURE — 250N000011 HC RX IP 250 OP 636: Performed by: STUDENT IN AN ORGANIZED HEALTH CARE EDUCATION/TRAINING PROGRAM

## 2022-09-01 PROCEDURE — 250N000012 HC RX MED GY IP 250 OP 636 PS 637: Performed by: UROLOGY

## 2022-09-01 RX ORDER — BISACODYL 10 MG
10 SUPPOSITORY, RECTAL RECTAL ONCE
Status: COMPLETED | OUTPATIENT
Start: 2022-09-01 | End: 2022-09-01

## 2022-09-01 RX ADMIN — Medication: at 16:44

## 2022-09-01 RX ADMIN — SODIUM CHLORIDE: 9 INJECTION, SOLUTION INTRAVENOUS at 11:35

## 2022-09-01 RX ADMIN — HEPARIN SODIUM 5000 UNITS: 5000 INJECTION, SOLUTION INTRAVENOUS; SUBCUTANEOUS at 07:34

## 2022-09-01 RX ADMIN — LEVOTHYROXINE SODIUM 100 MCG: 100 TABLET ORAL at 07:34

## 2022-09-01 RX ADMIN — HEPARIN SODIUM 5000 UNITS: 5000 INJECTION, SOLUTION INTRAVENOUS; SUBCUTANEOUS at 13:47

## 2022-09-01 RX ADMIN — ACETAMINOPHEN 975 MG: 325 TABLET, FILM COATED ORAL at 20:28

## 2022-09-01 RX ADMIN — SENNOSIDES AND DOCUSATE SODIUM 1 TABLET: 8.6; 5 TABLET ORAL at 20:28

## 2022-09-01 RX ADMIN — NORTRIPTYLINE HYDROCHLORIDE 10 MG: 10 CAPSULE ORAL at 20:28

## 2022-09-01 RX ADMIN — ACETAMINOPHEN 975 MG: 325 TABLET, FILM COATED ORAL at 07:34

## 2022-09-01 RX ADMIN — PREDNISONE 10 MG: 10 TABLET ORAL at 07:34

## 2022-09-01 RX ADMIN — BISACODYL 10 MG: 10 SUPPOSITORY RECTAL at 18:38

## 2022-09-01 RX ADMIN — ATORVASTATIN CALCIUM 20 MG: 20 TABLET, FILM COATED ORAL at 07:34

## 2022-09-01 RX ADMIN — NORTRIPTYLINE HYDROCHLORIDE 10 MG: 10 CAPSULE ORAL at 00:08

## 2022-09-01 RX ADMIN — ACETAMINOPHEN 975 MG: 325 TABLET, FILM COATED ORAL at 12:01

## 2022-09-01 RX ADMIN — SODIUM CHLORIDE: 9 INJECTION, SOLUTION INTRAVENOUS at 00:24

## 2022-09-01 RX ADMIN — SENNOSIDES AND DOCUSATE SODIUM 1 TABLET: 8.6; 5 TABLET ORAL at 07:34

## 2022-09-01 RX ADMIN — SODIUM CHLORIDE: 9 INJECTION, SOLUTION INTRAVENOUS at 21:14

## 2022-09-01 ASSESSMENT — ACTIVITIES OF DAILY LIVING (ADL)
ADLS_ACUITY_SCORE: 25
DEPENDENT_IADLS:: INDEPENDENT
ADLS_ACUITY_SCORE: 25

## 2022-09-01 NOTE — PLAN OF CARE
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    Neuro: A&O x 4. Denies numbness and tingling.   Resp: WDL. 1.5 LPM oxygen. Denies SOB.   Cardiac: HTN within parameter range. Tachycardic.   GI/: Clear liquid diet. No BM. BS audible. Urethral catheter removed. No BM. Writer offered PRN bisacodyl suppository x 2, patient stated that he will try suppository in AM. Pervious shift gave PRN Miralax and senna.   Skin: Warm and dry.   Activity: SBA.   Incision & Drainage: PIV infusing 100 mL/hr NS. MADAI drain RLQ-dressing changed. Transverse abdominal incision approximated with liquid bandage-no drainage. Dilaudid PCA pump.   Pain: 7/10. Encouraged patient to deliver PCA dose. Gave hot packs this AM for lower back pain. Gave abdominal binder, patient states he wants to put on after first AM walk.     Labs: Hemoglobin 9.2.   Changes: Desaturated oxygen 88% on RA. Applied 1.5 LPM oxygen. EKG, x-ray ordered this AM.   Plan: Continue with POC.         Goal Outcome Evaluation: Improving     Plan of Care Reviewed With: patient     Overall Patient Progress: improving

## 2022-09-01 NOTE — PROGRESS NOTES
Urology  Progress Note    NAEO  Endorsed subjective chills, no chest pain or SOB  Persistently tachycardic (111)  Pain well controlled with PCA  Tolerating CLD- no n/v  No BM, no flatus  Ambulated x3  Barrera in place    Exam  BP (!) 134/95 (BP Location: Right arm)   Pulse 111   Temp 99.5  F (37.5  C) (Oral)   Resp 16   Ht 1.829 m (6')   Wt 104.9 kg (231 lb 4.2 oz)   SpO2 94%   BMI 31.36 kg/m    No acute distress  Unlabored breathing, NC in place  Abdomen moderately distended but soft,  appropriately tender. Incision with mild ecchymosis, but no hematoma   MADAI serosanguinous  Barrera with yellow urine in tubing     UOP 3325/--  MADAI 40/--    Labs  Recent Labs     Recent Labs   Lab Test 08/31/22  1416 08/31/22  0639 08/30/22  0920 08/29/22  1755 08/29/22  1048 08/29/22  0900   WBC  --  9.5 10.1  --   --  8.3   HGB 9.2* 8.7* 10.2* 10.1*   < > 13.4   CR  --  1.60* 2.14* 1.65*  --  1.66*    < > = values in this interval not displayed.      *AM labs pending    Assessment/Plan  57 year old y/o male POD#3 s/p exploratory laparotomy, lysis of adhesions and resection of retroperitoneal mass. Doing well postoperatively. Pain controlled. Ambulating so will remove barrera catheter. Tolerating CLD, but no hungry or evidence of ROBF.     Neuro: tylenol and PCA for pain control, PTA nortriptyline   CV: PTA statin, start PTA amlodipine, EKG today - sinus tach (confirmed w/ cardiology)  Pulm: IS while awake, CXR today  FEN/GI: CLD, MIVF @ 125/hr  Endo: PTA levo  : Barrera to be removed   Heme/ID: Hgb stable. PTA pred  Activity: encourage ambulation. PT/OT   PPx: SCDs. SQH.  Dispo: Med/surg     Seen and examined with the chief resident. Will discuss with Dr. Agrawal.    Michael Crow MD  Urology Resident     Contacting the Urology Team     Please use the following job codes to reach the Urology Team. Note that you must use an in house phone and that job codes cannot receive text pages.     On weekdays, dial [phone] (or star-star-star 777 on  the new [place] telephones) then 0817 to reach the Adult Urology resident or PA on call    On weekdays, dial 893 (or star-star-star 777 on the new [place] telephones) then 0818 to reach the Pediatric Urology resident    On weeknights and weekends, dial 893 (or star-star-star 777 on the new [place] telephones) then 0039 to reach the Urology resident on call (for both Adult and Pediatrics)

## 2022-09-01 NOTE — CONSULTS
Care Management Initial Consult    General Information  Assessment completed with: Patient, Care Team Member, -chart review,    Type of CM/SW Visit: Initial Assessment    Primary Care Provider verified and updated as needed: Yes   Readmission within the last 30 days: no previous admission in last 30 days      Reason for Consult:  (elevated readmission score)  Advance Care Planning:            Communication Assessment  Patient's communication style: spoken language (English or Bilingual)    Hearing Difficulty or Deaf: no   Wear Glasses or Blind: yes    Cognitive  Cognitive/Neuro/Behavioral: WDL  Level of Consciousness: alert  Arousal Level: opens eyes spontaneously  Orientation: oriented x 4     Best Language: 0 - No aphasia  Speech: clear, spontaneous, logical    Living Environment:   People in home: spouse, child(jennifer), dependent, other relative(s) (mother in law)     Current living Arrangements: house      Able to return to prior arrangements: yes       Family/Social Support:  Care provided by: self, spouse/significant other  Provides care for: child(jennifer)  Marital Status:   Wife, Children, Other (specify) (mother in law)          Description of Support System: Supportive, Involved    Support Assessment: Adequate social supports, Adequate family and caregiver support    Current Resources:   Patient receiving home care services:       Community Resources:    Equipment currently used at home: none  Supplies currently used at home:      Employment/Financial:  Employment Status: other (see comments) (currently on leave)        Financial Concerns: No concerns identified           Lifestyle & Psychosocial Needs:  Social Determinants of Health     Tobacco Use: Medium Risk     Smoking Tobacco Use: Former Smoker     Smokeless Tobacco Use: Never Used   Alcohol Use: Not on file   Financial Resource Strain: Not on file   Food Insecurity: Not on file   Transportation Needs: Not on file   Physical Activity: Not on file    Stress: Not on file   Social Connections: Not on file   Intimate Partner Violence: Not At Risk     Fear of Current or Ex-Partner: No     Emotionally Abused: No     Physically Abused: No     Sexually Abused: No   Depression: Not at risk     PHQ-2 Score: 0   Housing Stability: Not on file       Functional Status:  Prior to admission patient needed assistance:   Dependent ADLs:: Bathing, Independent  Dependent IADLs:: Independent       Mental Health Status:  Mental Health Status: No Current Concerns       Chemical Dependency Status:  Chemical Dependency Status: No Current Concerns             Values/Beliefs:  Spiritual, Cultural Beliefs, [employer] Practices, Values that affect care:                 Additional Information:    SW consulted due to elevated readmission score.    Met with patient in room. All concerns addressed in this encounter. SW will follow-up as needed.    Cezar Tolliver, BSW, LSW  [phone]

## 2022-09-01 NOTE — PLAN OF CARE
Physical Therapy: Orders received. Chart reviewed and discussed with care team.? Physical Therapy not indicated due to pt demonstrating functional mobility near baseline and has no acute PT needs. OT juan r follow and address all impairments.? Defer discharge recommendations to OT.? Will complete orders.  Please re-consult PT if any new needs arise.

## 2022-09-01 NOTE — PROGRESS NOTES
Blood pressure (!) 131/99, pulse (!) 128, temperature 98.9  F (37.2  C), temperature source Oral, resp. rate 16, height 1.829 m (6'), weight 104.9 kg (231 lb 4.2 oz), SpO2 98 %.    Activity: SBA w/IV pole, able to ambulate in hallway  Neuros: AOX4, makes needs known  Cardiac: Tachy pulse 128, denies chest pain  Respiratory: WDL RA 98%, denies SOB  GI/:  White pulled on nights, about to void this shift, LBM 8/28  Diet: Clear liquid, tolerating  Skin/Incisions/Drains: Transverse abdominal incision, RLQ MADAI, Rt PIV, PCA  Lines: Rt PIV infusing NS 100hr and PCA  Pain: 3-5/10 PCA and schedule Tylenol  Plan: Continue with POC

## 2022-09-02 ENCOUNTER — APPOINTMENT (OUTPATIENT)
Dept: OCCUPATIONAL THERAPY | Facility: CLINIC | Age: 57
DRG: 820 | End: 2022-09-02
Attending: UROLOGY
Payer: COMMERCIAL

## 2022-09-02 LAB
ANION GAP SERPL CALCULATED.3IONS-SCNC: 10 MMOL/L (ref 7–15)
BUN SERPL-MCNC: 14.8 MG/DL (ref 6–20)
CALCIUM SERPL-MCNC: 9 MG/DL (ref 8.6–10)
CHLORIDE SERPL-SCNC: 102 MMOL/L (ref 98–107)
CREAT SERPL-MCNC: 1.5 MG/DL (ref 0.67–1.17)
DEPRECATED HCO3 PLAS-SCNC: 27 MMOL/L (ref 22–29)
ERYTHROCYTE [DISTWIDTH] IN BLOOD BY AUTOMATED COUNT: 14 % (ref 10–15)
GFR SERPL CREATININE-BSD FRML MDRD: 54 ML/MIN/1.73M2
GLUCOSE SERPL-MCNC: 130 MG/DL (ref 70–99)
HCT VFR BLD AUTO: 30.7 % (ref 40–53)
HGB BLD-MCNC: 9.4 G/DL (ref 13.3–17.7)
MCH RBC QN AUTO: 30.3 PG (ref 26.5–33)
MCHC RBC AUTO-ENTMCNC: 30.6 G/DL (ref 31.5–36.5)
MCV RBC AUTO: 99 FL (ref 78–100)
PATH REPORT.COMMENTS IMP SPEC: ABNORMAL
PATH REPORT.COMMENTS IMP SPEC: ABNORMAL
PATH REPORT.COMMENTS IMP SPEC: YES
PATH REPORT.FINAL DX SPEC: ABNORMAL
PATH REPORT.GROSS SPEC: ABNORMAL
PATH REPORT.MICROSCOPIC SPEC OTHER STN: ABNORMAL
PATH REPORT.RELEVANT HX SPEC: ABNORMAL
PHOTO IMAGE: ABNORMAL
PLATELET # BLD AUTO: 232 10E3/UL (ref 150–450)
POTASSIUM SERPL-SCNC: 3.9 MMOL/L (ref 3.4–5.3)
RBC # BLD AUTO: 3.1 10E6/UL (ref 4.4–5.9)
SODIUM SERPL-SCNC: 139 MMOL/L (ref 136–145)
WBC # BLD AUTO: 8.5 10E3/UL (ref 4–11)

## 2022-09-02 PROCEDURE — 250N000012 HC RX MED GY IP 250 OP 636 PS 637: Performed by: UROLOGY

## 2022-09-02 PROCEDURE — 120N000002 HC R&B MED SURG/OB UMMC

## 2022-09-02 PROCEDURE — 250N000013 HC RX MED GY IP 250 OP 250 PS 637: Performed by: PHYSICIAN ASSISTANT

## 2022-09-02 PROCEDURE — 85027 COMPLETE CBC AUTOMATED: CPT | Performed by: PHYSICIAN ASSISTANT

## 2022-09-02 PROCEDURE — 80048 BASIC METABOLIC PNL TOTAL CA: CPT | Performed by: PHYSICIAN ASSISTANT

## 2022-09-02 PROCEDURE — 250N000013 HC RX MED GY IP 250 OP 250 PS 637: Performed by: STUDENT IN AN ORGANIZED HEALTH CARE EDUCATION/TRAINING PROGRAM

## 2022-09-02 PROCEDURE — 36415 COLL VENOUS BLD VENIPUNCTURE: CPT | Performed by: PHYSICIAN ASSISTANT

## 2022-09-02 PROCEDURE — 97530 THERAPEUTIC ACTIVITIES: CPT | Mod: GO

## 2022-09-02 PROCEDURE — 97535 SELF CARE MNGMENT TRAINING: CPT | Mod: GO

## 2022-09-02 PROCEDURE — 250N000011 HC RX IP 250 OP 636: Performed by: STUDENT IN AN ORGANIZED HEALTH CARE EDUCATION/TRAINING PROGRAM

## 2022-09-02 RX ORDER — HYDROMORPHONE HCL IN WATER/PF 6 MG/30 ML
.2-.4 PATIENT CONTROLLED ANALGESIA SYRINGE INTRAVENOUS
Status: DISCONTINUED | OUTPATIENT
Start: 2022-09-02 | End: 2022-09-03 | Stop reason: HOSPADM

## 2022-09-02 RX ORDER — AMLODIPINE BESYLATE 5 MG/1
5 TABLET ORAL DAILY
Status: DISCONTINUED | OUTPATIENT
Start: 2022-09-02 | End: 2022-09-03 | Stop reason: HOSPADM

## 2022-09-02 RX ORDER — OXYCODONE HYDROCHLORIDE 5 MG/1
5-10 TABLET ORAL EVERY 4 HOURS PRN
Status: DISCONTINUED | OUTPATIENT
Start: 2022-09-02 | End: 2022-09-03 | Stop reason: HOSPADM

## 2022-09-02 RX ADMIN — ATORVASTATIN CALCIUM 20 MG: 20 TABLET, FILM COATED ORAL at 09:00

## 2022-09-02 RX ADMIN — LEVOTHYROXINE SODIUM 100 MCG: 100 TABLET ORAL at 09:00

## 2022-09-02 RX ADMIN — SENNOSIDES AND DOCUSATE SODIUM 1 TABLET: 8.6; 5 TABLET ORAL at 09:00

## 2022-09-02 RX ADMIN — ACETAMINOPHEN 975 MG: 325 TABLET, FILM COATED ORAL at 15:49

## 2022-09-02 RX ADMIN — ACETAMINOPHEN 975 MG: 325 TABLET, FILM COATED ORAL at 11:51

## 2022-09-02 RX ADMIN — OXYCODONE HYDROCHLORIDE 5 MG: 5 TABLET ORAL at 21:22

## 2022-09-02 RX ADMIN — SENNOSIDES AND DOCUSATE SODIUM 1 TABLET: 8.6; 5 TABLET ORAL at 20:10

## 2022-09-02 RX ADMIN — HEPARIN SODIUM 5000 UNITS: 5000 INJECTION, SOLUTION INTRAVENOUS; SUBCUTANEOUS at 15:49

## 2022-09-02 RX ADMIN — PREDNISONE 10 MG: 10 TABLET ORAL at 08:59

## 2022-09-02 RX ADMIN — HEPARIN SODIUM 5000 UNITS: 5000 INJECTION, SOLUTION INTRAVENOUS; SUBCUTANEOUS at 08:59

## 2022-09-02 RX ADMIN — ACETAMINOPHEN 975 MG: 325 TABLET, FILM COATED ORAL at 20:10

## 2022-09-02 RX ADMIN — HEPARIN SODIUM 5000 UNITS: 5000 INJECTION, SOLUTION INTRAVENOUS; SUBCUTANEOUS at 20:11

## 2022-09-02 RX ADMIN — POLYETHYLENE GLYCOL 3350 17 G: 17 POWDER, FOR SOLUTION ORAL at 20:11

## 2022-09-02 RX ADMIN — ACETAMINOPHEN 975 MG: 325 TABLET, FILM COATED ORAL at 08:59

## 2022-09-02 RX ADMIN — AMLODIPINE BESYLATE 5 MG: 5 TABLET ORAL at 09:02

## 2022-09-02 RX ADMIN — NORTRIPTYLINE HYDROCHLORIDE 10 MG: 10 CAPSULE ORAL at 20:10

## 2022-09-02 RX ADMIN — OXYCODONE HYDROCHLORIDE 5 MG: 5 TABLET ORAL at 11:51

## 2022-09-02 ASSESSMENT — ACTIVITIES OF DAILY LIVING (ADL)
ADLS_ACUITY_SCORE: 25

## 2022-09-02 NOTE — PROGRESS NOTES
BP (!) 135/96 (BP Location: Right arm)   Pulse 118   Temp 97.8  F (36.6  C) (Oral)   Resp 16   Ht 1.829 m (6')   Wt 104.9 kg (231 lb 4.2 oz)   SpO2 91%   BMI 31.36 kg/m       Time: 4933-6080  NEURO: A&Ox4, calls appropriately   RESP: RA sating >92%, denies sob  CARDIAC: Tachycardic, Denies cardiac chest pain  GI/: Voiding w/ auop (not saving), No bm this shift, LBM: 8/28,   DIET: CLD  PAIN/NAUSEA: Denies nausea, Minimal pain, dilaudid PCA in use  SKIN: transverse abd incision vera, R MADAI drain   LDAs: R MADAI w/ minimal output, R piv infusing 100ml/hr+pca dilaudid  LAB: reviewed  ACTIVITY: SBA, ambulated in the [place]  New Changes this shift: Suppository given this shift.  PLAN: Continue POC

## 2022-09-02 NOTE — PLAN OF CARE
Occupational Therapy Discharge Summary    Reason for therapy discharge:    All goals and outcomes met, no further needs identified.    Progress towards therapy goal(s). See goals on Care Plan in [facility] electronic health record for goal details.  Goals met    Therapy recommendation(s):    No further therapy is recommended.

## 2022-09-02 NOTE — PROGRESS NOTES
Blood pressure (!) 133/95, pulse 113, temperature 98.3  F (36.8  C), temperature source Oral, resp. rate 16, height 1.829 m (6'), weight 106.9 kg (235 lb 11.2 oz), SpO2 96 %.    Activity: SBA outside of room   Neuros: AOX4, makes needs known  Cardiac: HTN, Tachy, denies chest pain  Respiratory: WDL RA 96%, denies SOB  GI/: Voiding spontaneous, +BS, no BM, refuses intervention  Diet: Regular, tolerating  Skin/Incisions/Drains: Transverse abdominal incision, RLQ MADAI, Rt PIV  Lines: Rt PIV SL  Pain: 2-5/10 Oxy  Plan: Discharge tomorrow

## 2022-09-02 NOTE — PROGRESS NOTES
Urology  Progress Note    NAEO  Endorsed subjective chills, no chest pain or SOB  Persistently tachycardic (120bpm);   Pain well controlled with PCA  Tolerating CLD- no n/v  Passing gas  Ambulated    Exam  BP (!) 136/96 (BP Location: Right arm)   Pulse 120   Temp 98.5  F (36.9  C) (Oral)   Resp 16   Ht 1.829 m (6')   Wt 106.9 kg (235 lb 11.2 oz)   SpO2 95%   BMI 31.97 kg/m    No acute distress  Unlabored breathing, NC in place  Abdomen moderately distended but soft,  appropriately tender. Incision with mild ecchymosis, but no hematoma   MADAI serosanguinous     and x1 unmeasured/--  MADAI 25/--    Labs  Recent Labs     Recent Labs   Lab Test 09/01/22  0728 08/31/22  1416 08/31/22  0639 08/30/22  0920   WBC 9.2  --  9.5 10.1   HGB 9.3* 9.2* 8.7* 10.2*   CR 1.65*  --  1.60* 2.14*        AM labs pending    Assessment/Plan  57 year old y/o male POD#4 s/p exploratory laparotomy, lysis of adhesions and resection of retroperitoneal mass. Doing well postoperatively. Pain controlled. Ambulating. Urinating independently. Tolerating CLD, but no hungry or evidence of ROBF.     Neuro: tylenol and PCA for pain control -> will transition to prn oxy/dilaudid, PTA nortriptyline   CV: PTA statin, start PTA amlodipine, EKG today - sinus tach (confirmed w/ cardiology)  Pulm: IS while awake, CXR today  FEN/GI: Fulls, MIVF @ 50/hr  Endo: PTA levo  : track urine outputs   Heme/ID: Hgb stable. PTA pred  Activity: encourage ambulation. PT/OT   PPx: SCDs. SQH.  Dispo: Med/surg     Seen and examined with the chief resident. Will discuss with Dr. Agrawal.    Michael Crow MD  Urology Resident     Contacting the Urology Team     Please use the following job codes to reach the Urology Team. Note that you must use an in house phone and that job codes cannot receive text pages.     On weekdays, dial [phone] (or star-star-star [phone] on the new [place] telephones) then 0817 to reach the Adult Urology resident or PA on call    On weekdays, dial [phone]  (or star-star-star 777 on the new [place] telephones) then 0818 to reach the Pediatric Urology resident    On weeknights and weekends, dial [phone] (or star-star-star 777 on the new [place] telephones) then [phone] to reach the Urology resident on call (for both Adult and Pediatrics)        Addendum in response to the coding query: patient has chronic kidney disease which was acutely managed while hospitalized.

## 2022-09-02 NOTE — PLAN OF CARE
BP (!) 136/96 (BP Location: Right arm)   Pulse 120   Temp 98.5  F (36.9  C) (Oral)   Resp 16   Ht 1.829 m (6')   Wt 106.9 kg (235 lb 11.2 oz)   SpO2 95%   BMI 31.97 kg/m      VSS, pain 5/10 with movement, PCA 0.2 mg every 10 min  with good pain relief, walked in halls, transverse incision RAQUEL, R MADAI with serosanguinous output, voiding, NS changed from 100 ml/hr to 50 ml/hr this morning per MD order, continue with plan of care

## 2022-09-03 VITALS
SYSTOLIC BLOOD PRESSURE: 122 MMHG | HEIGHT: 72 IN | RESPIRATION RATE: 18 BRPM | DIASTOLIC BLOOD PRESSURE: 83 MMHG | WEIGHT: 235.7 LBS | TEMPERATURE: 98.4 F | HEART RATE: 114 BPM | OXYGEN SATURATION: 98 % | BODY MASS INDEX: 31.92 KG/M2

## 2022-09-03 LAB
ANION GAP SERPL CALCULATED.3IONS-SCNC: 12 MMOL/L (ref 7–15)
BUN SERPL-MCNC: 15.3 MG/DL (ref 6–20)
CALCIUM SERPL-MCNC: 8.8 MG/DL (ref 8.6–10)
CHLORIDE SERPL-SCNC: 101 MMOL/L (ref 98–107)
CREAT SERPL-MCNC: 1.64 MG/DL (ref 0.67–1.17)
DEPRECATED HCO3 PLAS-SCNC: 25 MMOL/L (ref 22–29)
ERYTHROCYTE [DISTWIDTH] IN BLOOD BY AUTOMATED COUNT: 14 % (ref 10–15)
GFR SERPL CREATININE-BSD FRML MDRD: 48 ML/MIN/1.73M2
GLUCOSE SERPL-MCNC: 82 MG/DL (ref 70–99)
HCT VFR BLD AUTO: 30.3 % (ref 40–53)
HGB BLD-MCNC: 9.3 G/DL (ref 13.3–17.7)
MCH RBC QN AUTO: 30.3 PG (ref 26.5–33)
MCHC RBC AUTO-ENTMCNC: 30.7 G/DL (ref 31.5–36.5)
MCV RBC AUTO: 99 FL (ref 78–100)
PLATELET # BLD AUTO: 257 10E3/UL (ref 150–450)
POTASSIUM SERPL-SCNC: 3.6 MMOL/L (ref 3.4–5.3)
RBC # BLD AUTO: 3.07 10E6/UL (ref 4.4–5.9)
SODIUM SERPL-SCNC: 138 MMOL/L (ref 136–145)
WBC # BLD AUTO: 8.1 10E3/UL (ref 4–11)

## 2022-09-03 PROCEDURE — 85027 COMPLETE CBC AUTOMATED: CPT | Performed by: PHYSICIAN ASSISTANT

## 2022-09-03 PROCEDURE — 250N000013 HC RX MED GY IP 250 OP 250 PS 637: Performed by: STUDENT IN AN ORGANIZED HEALTH CARE EDUCATION/TRAINING PROGRAM

## 2022-09-03 PROCEDURE — 250N000011 HC RX IP 250 OP 636: Performed by: STUDENT IN AN ORGANIZED HEALTH CARE EDUCATION/TRAINING PROGRAM

## 2022-09-03 PROCEDURE — 36415 COLL VENOUS BLD VENIPUNCTURE: CPT | Performed by: PHYSICIAN ASSISTANT

## 2022-09-03 PROCEDURE — 250N000012 HC RX MED GY IP 250 OP 636 PS 637: Performed by: PHYSICIAN ASSISTANT

## 2022-09-03 PROCEDURE — 80048 BASIC METABOLIC PNL TOTAL CA: CPT | Performed by: PHYSICIAN ASSISTANT

## 2022-09-03 PROCEDURE — 250N000013 HC RX MED GY IP 250 OP 250 PS 637: Performed by: PHYSICIAN ASSISTANT

## 2022-09-03 RX ORDER — SENNOSIDES 8.6 MG
8.6 TABLET ORAL 2 TIMES DAILY
Status: DISCONTINUED | OUTPATIENT
Start: 2022-09-03 | End: 2022-09-03

## 2022-09-03 RX ORDER — AMOXICILLIN 250 MG
1 CAPSULE ORAL 2 TIMES DAILY PRN
Qty: 30 TABLET | Refills: 0 | Status: SHIPPED | OUTPATIENT
Start: 2022-09-03 | End: 2022-11-28

## 2022-09-03 RX ORDER — OXYCODONE HYDROCHLORIDE 5 MG/1
5 TABLET ORAL EVERY 6 HOURS PRN
Qty: 12 TABLET | Refills: 0 | Status: SHIPPED | OUTPATIENT
Start: 2022-09-03 | End: 2022-09-06

## 2022-09-03 RX ADMIN — OXYCODONE HYDROCHLORIDE 10 MG: 5 TABLET ORAL at 02:25

## 2022-09-03 RX ADMIN — PREDNISONE 10 MG: 10 TABLET ORAL at 08:21

## 2022-09-03 RX ADMIN — HEPARIN SODIUM 5000 UNITS: 5000 INJECTION, SOLUTION INTRAVENOUS; SUBCUTANEOUS at 08:22

## 2022-09-03 RX ADMIN — OXYCODONE HYDROCHLORIDE 10 MG: 5 TABLET ORAL at 07:00

## 2022-09-03 RX ADMIN — LEVOTHYROXINE SODIUM 100 MCG: 100 TABLET ORAL at 08:21

## 2022-09-03 RX ADMIN — AMLODIPINE BESYLATE 5 MG: 5 TABLET ORAL at 08:21

## 2022-09-03 RX ADMIN — ACETAMINOPHEN 975 MG: 325 TABLET, FILM COATED ORAL at 08:21

## 2022-09-03 RX ADMIN — ATORVASTATIN CALCIUM 20 MG: 20 TABLET, FILM COATED ORAL at 08:22

## 2022-09-03 RX ADMIN — SENNOSIDES AND DOCUSATE SODIUM 1 TABLET: 8.6; 5 TABLET ORAL at 08:21

## 2022-09-03 ASSESSMENT — ACTIVITIES OF DAILY LIVING (ADL)
ADLS_ACUITY_SCORE: 25
ADLS_ACUITY_SCORE: 24
ADLS_ACUITY_SCORE: 25

## 2022-09-03 NOTE — PHARMACY-ADMISSION MEDICATION HISTORY
Admission Medication History Completed by Pharmacy    See [facility] Admission Navigator for allergy information, preferred outpatient pharmacy, prior to admission medications and immunization status.     Medication History Sources:     Dispense report    Patient report    Care Everywhere    Changes made to PTA medication list (reason):    Added: None    Deleted: None    Changed:   o Acyclovir: every day PRN --> BID PRN  o Prednisone: 1 tab (20 mg) every day --> 1/2 tab (10 mg) every day  o Sildenafil: added instruction    Additional Information:    According to dispense report and patient report, patient used to take lisinopril 5 mg tablet and aspirin 81 mg chew tablet, but these medications were stopped/held before his surgery. Patient reported he stopped aspirin on 8/21 and likely stopped lisinopril on 8/18. Patient is unsure if he is restarting these medications after discharge.     Sildenafil: patient does not remember the strength of his sildenafil, but according to the clinical summary from [facility] in Care Everywhere, the strength of sildenafil is likely 100 mg per tablet. Patient reports to use 1/2 tablet every time needed, and the last time he used it was months ago.       Prior to Admission medications    Medication Sig Last Dose Taking? Auth Provider Long Term End Date   acetaminophen (TYLENOL) 500 MG tablet Take 500-1,000 mg by mouth every 8 hours as needed for mild pain 9/2/2022 at 2010 Yes Unknown, Entered By History     acyclovir (ZOVIRAX) 800 MG tablet Take 800 mg by mouth 2 times daily as needed 8/29/2022 at -- Yes Reported, Patient Yes    amLODIPine (NORVASC) 5 MG tablet Take 1 tablet (5 mg) by mouth daily 8/29/2022 at -- Yes Mariah Alan MD Yes    atorvastatin (LIPITOR) 20 MG tablet Take 20 mg by mouth daily 8/29/2022 at -- Yes Reported, Patient Yes    levothyroxine (SYNTHROID/LEVOTHROID) 100 MCG tablet Take 1 tablet (100 mcg) by mouth daily 8/29/2022 at -- Yes Mariah Alan MD Yes     nortriptyline (PAMELOR) 10 MG capsule Take 10 mg by mouth At Bedtime  8/28/2022 at -- Yes Reported, Patient     predniSONE (DELTASONE) 20 MG tablet Take 10 mg by mouth daily 8/29/2022 at Unknown time Yes Reported, Patient     rizatriptan (MAXALT-MLT) 10 MG ODT Take 10 mg by mouth as needed for migraine  Past Month at Unknown time Yes Reported, Patient     sildenafil (VIAGRA) 100 MG tablet Take 50 mg by mouth as needed More than a month at -- Yes Reported, Patient Yes    sulfamethoxazole-trimethoprim (BACTRIM DS) 800-160 MG tablet Take 1 tablet by mouth Every Mon, Wed, Fri Morning 8/29/2022 at -- Yes Nick Campos MD     aspirin (ASA) 81 MG chewable tablet Take 1 tablet (81 mg) by mouth daily 8/21/2022 at --  Mariah Alan MD         Date completed: 09/02/22    Medication history completed by: Sergio Merino, Pharmacy Intern

## 2022-09-03 NOTE — DISCHARGE SUMMARY
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]  Renal cell carcinoma, right (H) [C64.1]  Kidney cancer, primary, with metastasis from kidney to other site (H) [C64.9]            Discharge Diagnosis:   Same as above  Metastatic renal cell carcinoma  CKD 3a         Procedures:   : Procedure(s):  exploratory laparotomy, extensive lysis of adhesions, RESECTION OF RETROPERITONEAL MASS  Laparotomy, lysis adhesions, combined, assist with exploration        Non-operative procedures:   None performed          Consultations:   PHYSICAL THERAPY ADULT IP CONSULT  OCCUPATIONAL THERAPY ADULT IP CONSULT  CARE MANAGEMENT / SOCIAL WORK IP CONSULT           Imaging Studies:     Results for orders placed or performed during the hospital encounter of 08/29/22   XR Chest Port 1 View    Narrative    Portable chest    INDICATION: Tachycardia    COMPARISON: CT 7/8/2022. Plain film 8/10/2021    FINDINGS: Heart size remains upper normal. Basilar subsegmental  atelectasis again noted bilaterally. Previous moderately extensive  left retrocardiac atelectasis has improvement from the plain film of  8/10/2021.         Impression    IMPRESSION: Bibasilar atelectasis, mild.    BENJAMIN ANDRE MD         SYSTEM ID:  [id number]   XR Abdomen Port 1 View    Narrative    EXAM: XR ABDOMEN PORT 1 VIEW  9/1/2022 12:37 PM      HISTORY: Assess for ileus    COMPARISON: Abdominal x-ray 8/11/2021    FINDINGS: Portable supine AP view of the abdomen. Right abdominal  surgical clips. Percutaneous drain also noted in the right mid  abdomen.    Diffuse  gaseous distention of the colon. There are also some  gas-filled distended central small bowel loops noted measuring up to  3.7 cm. No pneumatosis. No portal venous gas. No abnormal  calcifications. No visualized masses.    Visualized portions of the lung demonstrate no focal airspace  opacities. Soft tissues and osseous structures are unremarkable.      Impression    IMPRESSION:   Diffuse gaseous distention of the bowel suggesting possible ileus.    I have personally reviewed the examination and initial interpretation  and I agree with the findings.    MAYKEL LEARY MD         SYSTEM ID:  [id number]            Medications Prior to Admission:     Medications Prior to Admission   Medication Sig Dispense Refill Last Dose    acetaminophen (TYLENOL) 500 MG tablet Take 500-1,000 mg by mouth every 8 hours as needed for mild pain   9/2/2022 at 2010    acyclovir (ZOVIRAX) 800 MG tablet Take 800 mg by mouth 2 times daily as needed   8/29/2022 at --    amLODIPine (NORVASC) 5 MG tablet Take 1 tablet (5 mg) by mouth daily 90 tablet 3 8/29/2022 at --    atorvastatin (LIPITOR) 20 MG tablet Take 20 mg by mouth daily   8/29/2022 at --    levothyroxine (SYNTHROID/LEVOTHROID) 100 MCG tablet Take 1 tablet (100 mcg) by mouth daily 30 tablet 1 8/29/2022 at --    nortriptyline (PAMELOR) 10 MG capsule Take 10 mg by mouth At Bedtime    8/28/2022 at --    predniSONE (DELTASONE) 20 MG tablet Take 10 mg by mouth daily   8/29/2022 at Unknown time    rizatriptan (MAXALT-MLT) 10 MG ODT Take 10 mg by mouth as needed for migraine    Past Month at Unknown time    sildenafil (VIAGRA) 100 MG tablet Take 50 mg by mouth as needed   More than a month at --    sulfamethoxazole-trimethoprim (BACTRIM DS) 800-160 MG tablet Take 1 tablet by mouth Every Mon, Wed, Fri Morning 36 tablet 0 8/29/2022 at --    aspirin (ASA) 81 MG chewable tablet Take 1 tablet (81 mg) by mouth daily 90 tablet 3 8/21/2022 at --            Discharge Medications:     Current  Discharge Medication List        START taking these medications    Details   oxyCODONE (ROXICODONE) 5 MG tablet Take 1 tablet (5 mg) by mouth every 6 hours as needed for pain  Qty: 12 tablet, Refills: 0    Associated Diagnoses: 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)      senna-docusate (SENOKOT-S/PERICOLACE) 8.6-50 MG tablet Take 1 tablet by mouth 2 times daily as needed for constipation  Qty: 30 tablet, Refills: 0    Associated Diagnoses: 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)           CONTINUE these medications which have NOT CHANGED    Details   acetaminophen (TYLENOL) 500 MG tablet Take 500-1,000 mg by mouth every 8 hours as needed for mild pain      acyclovir (ZOVIRAX) 800 MG tablet Take 800 mg by mouth 2 times daily as needed      amLODIPine (NORVASC) 5 MG tablet Take 1 tablet (5 mg) by mouth daily  Qty: 90 tablet, Refills: 3    Associated Diagnoses: Hypertension, renal      atorvastatin (LIPITOR) 20 MG tablet Take 20 mg by mouth daily      levothyroxine (SYNTHROID/LEVOTHROID) 100 MCG tablet Take 1 tablet (100 mcg) by mouth daily  Qty: 30 tablet, Refills: 1    Associated Diagnoses: Hypothyroidism due to acquired atrophy of thyroid      nortriptyline (PAMELOR) 10 MG capsule Take 10 mg by mouth At Bedtime       predniSONE (DELTASONE) 20 MG tablet Take 10 mg by mouth daily      rizatriptan (MAXALT-MLT) 10 MG ODT Take 10 mg by mouth as needed for migraine       sildenafil (VIAGRA) 100 MG tablet Take 50 mg by mouth as needed      sulfamethoxazole-trimethoprim (BACTRIM DS) 800-160 MG tablet Take 1 tablet by mouth Every Mon, Wed, Fri Morning  Qty: 36 tablet, Refills: 0    Associated Diagnoses: 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); Hypothyroidism due to acquired atrophy of thyroid; CKD (chronic kidney disease) stage 4, GFR 15-29 ml/min (H); Prophylactic antibiotic      aspirin (ASA) 81 MG chewable tablet Take 1 tablet (81 mg) by mouth  daily  Qty: 90 tablet, Refills: 3    Associated Diagnoses: Right renal mass                    Brief History of Illness:   Reason for admission requiring a surgical or invasive procedure:   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    Please refer to the full operative summary for details.           Hospital Course:   The patient's hospital course was unremarkable.  Dimitrios Goldberg recovered as anticipated and experienced no post-operative complications.     On POD#5 patient was ambulating without assitance, tolerating a regular diet, had pain controlled with PO medications to go home with, and requiring no IV medications or fluids. Patient was discharged home with appropriate contact information, follow-up and instructions as seen below in the discharge paperwork.    He had daily BMPs to monitor his CKD 3a.         Final Pathology Result:   Pending at time of discharge         Discharge Instructions and Follow-Up:     Discharge Procedure Orders   Reason for your hospital stay   Order Comments: Retroperitoneal lymph node dissection            Discharge Disposition:     Discharged to Home      Condition at discharge: Good    --    Dilip Blackwell MD  Urology Resident    11:30 AM, 9/3/2022    Feng Agrawal MD  Urology  [facility]

## 2022-09-03 NOTE — PROGRESS NOTES
Urology  Progress Note    NAEO  Pain well controlled with regimen  Tolerating regular diet- no n/v  Passing gas and having BMs  Ambulatory    Exam  /83 (BP Location: Left arm)   Pulse 114   Temp 98.4  F (36.9  C) (Oral)   Resp 18   Ht 1.829 m (6')   Wt 106.9 kg (235 lb 11.2 oz)   SpO2 98%   BMI 31.97 kg/m    No acute distress  Unlabored breathing, NC in place  Abdomen moderately distended but soft,  appropriately tender. Incision with mild ecchymosis, but no hematoma   MADAI serosanguinous    UOP 1x/3x unmeasured  MADAI x/40    Labs  Recent Labs     Recent Labs   Lab Test 09/01/22  0728 08/31/22  1416 08/31/22  0639 08/30/22  0920   WBC 9.2  --  9.5 10.1   HGB 9.3* 9.2* 8.7* 10.2*   CR 1.65*  --  1.60* 2.14*        AM labs pending    Assessment/Plan  57 year old y/o male POD#5 s/p exploratory laparotomy, lysis of adhesions and resection of retroperitoneal mass. Doing well postoperatively. Pain controlled. Ambulating. Urinating independently. Tolerating CLD, but no hungry or evidence of ROBF.     Neuro: tylenol and PRN oxy, PTA nortriptyline   CV: PTA statin & amlodipine, EKG 9/1 - sinus tach (confirmed w/ cardiology)  Pulm: IS while awake, CXR today  FEN/GI: Reg, stopped IVF  Endo: PTA levothyroxine  Rheum: PTA steroids for autoimmune nephritis  : Spont void. MADAI drain out prior to discharge.  Heme/ID: Hgb stable.  Activity: encourage ambulation. PT/OT - ok for home   PPx: SCDs. SQH.  Dispo: Floor, discharge home today      Seen and examined with the chief resident. Will discuss with Dr. Agrawal.    Dilip Blackwell MD  Urology Resident     Contacting the Urology Team     Please use the following job codes to reach the Urology Team. Note that you must use an in house phone and that job codes cannot receive text pages.     On weekdays, dial [phone] (or star-star-star 777 on the new [employer] telephones) then 0817 to reach the Adult Urology resident or PA on call    On weekdays, dial [phone] (or star-star-star 777 on the  new [employer] telephones) then 0818 to reach the Pediatric Urology resident    On weeknights and weekends, dial 893 (or star-star-star 777 on the new [employer] telephones) then [phone] to reach the Urology resident on call (for both Adult and Pediatrics)

## 2022-09-03 NOTE — PLAN OF CARE
Goal Outcome Evaluation:    Plan of Care Reviewed With: patient, spouse     Overall Patient Progress: improving         Patient ready for discharge to home. Discharge instructions reviewed w/ patient. Encouraged to call w/ any questions or concerns. Tolerated regular diet and voiding. Incision sites w/ dermabond vera dry and intact. MADAI dc'd by md. Patient given dressing supplies for old MADAI site cares. Instructions given for follow up and when/who to call w/ any questions or concerns. Abdominal binder given to patient.

## 2022-09-03 NOTE — DISCHARGE INSTRUCTIONS
Activity  - No strenuous exercise for 6 weeks.  - No lifting, pushing, pulling more than 10 pounds for 6 weeks.   - Do not strain with bowel movements.  - Do not drive until you can press the brake pedal quickly and fully without pain.   - Do not operate a motor vehicle while taking narcotic pain medications.     Diet  You may resume your regular post-procedure diet  Many patients do not regain their full appetite for several weeks after surgery  Take it slow, eating small meals frequently  If you notice you are passing less gas or feeling bloated, stop eating solid foods and stick to liquids for the next several hours until you begin to pass gas again.  You are not required to have a bowel movement prior to leaving the hospital. Some patients take several days for bowel movements to return due to anesthesia and pain medications.     Incisions  - You may shower and get incisions wet starting 48 hrs after surgery.  - Do not scrub incisions or submerge wounds for 2 weeks or until seen in follow-up.   - Remove wound dressing 48 hours after surgery.   - If purple dermabond glue was used, avoid applying any lotions or ointments.   - If steri-strips were used, they will fall off on their own.   - Leave incision open to air. Cover with gauze only if needed for comfort or to protect clothing from drainage.   - The stitches do not need to be removed, they will dissolve on their own.    Medications  - Transition from narcotic pain medications to tylenol (acetaminophen) as you are able.  Wean yourself off all pain medications as you are able.  - Some pain medications contain both tylenol (acetaminophen) and a narcotic (Norco, vicodin, percocet), do not take more than 4,000mg of Tylenol (acetaminophen) from all sources in any 24 hour period.  - Narcotics can make you constipated.  Take over the counter fiber (metamucil or benefiber) and stool softeners (miralax, docusate or senna) while taking narcotic pain medications, but  "stop if you develop diarrhea.  - No driving or operating machinery while taking narcotic pain medications     Follow-Up:  - Follow up with Urology. The clinic will reach out to get you scheduled if you do not already have an appointment  - Call or return sooner than your regularly scheduled visit if you develop any of the following: fever (greater than 101.5), uncontrolled pain, uncontrolled nausea or vomiting, or inability to urinate.    Phone numbers:   - Monday through Friday 8am to 4:30pm: Call [phone] with questions, requests for medication refills, or to schedule or confirm an appointment.  - Nights or weekends: call the after hours emergency pager - [phone] and tell the  \"I would like to page the Urology Resident on call.\" Please note, due to prescribing laws, resident physicians are unable to prescribe narcotics after-hours. If you feel as though you will need a refill of a narcotic pain medication, you will need to call the clinic during business hours OR seek emergency care.  - For emergencies, call [phone]    "

## 2022-09-03 NOTE — PLAN OF CARE
Goal Outcome Evaluation:    Plan of Care Reviewed With: patient     Overall Patient Progress: no change    Activity: SBA outside of room   Neuros: AOX4, makes needs known  Cardiac BP (!) 125/92 (BP Location: Right arm)   Pulse 112   Temp 98.4  F (36.9  C) (Oral)   Resp 18   Ht 1.829 m (6')   Wt 106.9 kg (235 lb 11.2 oz)   SpO2 95%   BMI 31.97 kg/m   RA.  Respiratory: WDL RA 96%, denies SOB  GI/: Voiding spontaneous, +BS, no BM, refuses intervention  Diet: Regular, tolerating  Skin/Incisions/Drains: Transverse abdominal incision, RLQ MADAI 40 ml out. Rt PIV  Lines: Rt PIV SL  Pain: 10 Oxy given with good relief.  Plan: Discharge today.

## 2022-09-06 ENCOUNTER — TELEPHONE (OUTPATIENT)
Dept: UROLOGY | Facility: CLINIC | Age: 57
End: 2022-09-06

## 2022-09-06 ENCOUNTER — NURSE TRIAGE (OUTPATIENT)
Dept: NURSING | Facility: CLINIC | Age: 57
End: 2022-09-06

## 2022-09-06 NOTE — OP NOTE
Operation note:    This was an intraoperative consultation requested by Dr. Crowell.  By the time he arrived the operating room Dr. Crowell had already exposed the right side of the abdomen.  I joined in the operation to assist him.  We examined the duodenum we examined the tumor.  We did a little bit of mobilization to further define the tumor.  At this point I noted that the tumor was very adherent to the duodenum second part.  There was no surgical plane.  Removal of the tumor would involve injury to the duodenum.  At that point I felt it was not safe to do excision of the tumor.  There was a second tumor inferiorly.  I assisted Dr. Crowell to remove the tumor please refer to his note.  After that I handed over the case to him and please look at his notes for full details.    Procedure performed by me: Intraoperative assessment of the tumor resectability.

## 2022-09-06 NOTE — TELEPHONE ENCOUNTER
Spoke with patient via phone.  Patient was pleasant and able to ask/answer questions appropriately.  Patient stated he has had little or no appetite today.  Patient states that weakness has improved and he is 'not concerned' about it.  He felt better after eating/drinking.  Patient did not feel the need to go to urgent care/ED at this time and will have labs drawn on 9.8.22 at scheduled appointment.  Patient denies blood in urine or stool and dark colored urine/stool.      Austin Cody, RN  RN Care Coordinator - Urology

## 2022-09-08 ENCOUNTER — LAB (OUTPATIENT)
Dept: LAB | Facility: CLINIC | Age: 57
End: 2022-09-08
Payer: COMMERCIAL

## 2022-09-08 DIAGNOSIS — D49.511 NEOPLASM OF RIGHT KIDNEY WITH THROMBUS OF INFERIOR VENA CAVA (H): ICD-10-CM

## 2022-09-08 DIAGNOSIS — I82.220 NEOPLASM OF RIGHT KIDNEY WITH THROMBUS OF INFERIOR VENA CAVA (H): ICD-10-CM

## 2022-09-08 LAB
ALBUMIN SERPL-MCNC: 3.6 G/DL (ref 3.4–5)
ALP SERPL-CCNC: 75 U/L (ref 40–150)
ALT SERPL W P-5'-P-CCNC: 48 U/L (ref 0–70)
ANION GAP SERPL CALCULATED.3IONS-SCNC: 3 MMOL/L (ref 3–14)
AST SERPL W P-5'-P-CCNC: 30 U/L (ref 0–45)
BASOPHILS # BLD AUTO: 0 10E3/UL (ref 0–0.2)
BASOPHILS NFR BLD AUTO: 0 %
BILIRUB SERPL-MCNC: 0.3 MG/DL (ref 0.2–1.3)
BUN SERPL-MCNC: 16 MG/DL (ref 7–30)
CALCIUM SERPL-MCNC: 9.4 MG/DL (ref 8.5–10.1)
CHLORIDE BLD-SCNC: 103 MMOL/L (ref 94–109)
CO2 SERPL-SCNC: 33 MMOL/L (ref 20–32)
CREAT SERPL-MCNC: 2.02 MG/DL (ref 0.66–1.25)
EOSINOPHIL # BLD AUTO: 0 10E3/UL (ref 0–0.7)
EOSINOPHIL NFR BLD AUTO: 0 %
ERYTHROCYTE [DISTWIDTH] IN BLOOD BY AUTOMATED COUNT: 14.1 % (ref 10–15)
GFR SERPL CREATININE-BSD FRML MDRD: 38 ML/MIN/1.73M2
GLUCOSE BLD-MCNC: 109 MG/DL (ref 70–99)
HCT VFR BLD AUTO: 32.9 % (ref 40–53)
HGB BLD-MCNC: 10.4 G/DL (ref 13.3–17.7)
IMM GRANULOCYTES # BLD: 0.5 10E3/UL
IMM GRANULOCYTES NFR BLD: 5 %
LYMPHOCYTES # BLD AUTO: 1.4 10E3/UL (ref 0.8–5.3)
LYMPHOCYTES NFR BLD AUTO: 14 %
MCH RBC QN AUTO: 31 PG (ref 26.5–33)
MCHC RBC AUTO-ENTMCNC: 31.6 G/DL (ref 31.5–36.5)
MCV RBC AUTO: 98 FL (ref 78–100)
MONOCYTES # BLD AUTO: 0.3 10E3/UL (ref 0–1.3)
MONOCYTES NFR BLD AUTO: 3 %
NEUTROPHILS # BLD AUTO: 7.9 10E3/UL (ref 1.6–8.3)
NEUTROPHILS NFR BLD AUTO: 78 %
PLATELET # BLD AUTO: 478 10E3/UL (ref 150–450)
POTASSIUM BLD-SCNC: 3.8 MMOL/L (ref 3.4–5.3)
PROT SERPL-MCNC: 6.8 G/DL (ref 6.8–8.8)
RBC # BLD AUTO: 3.36 10E6/UL (ref 4.4–5.9)
SODIUM SERPL-SCNC: 139 MMOL/L (ref 133–144)
WBC # BLD AUTO: 10.1 10E3/UL (ref 4–11)

## 2022-09-08 PROCEDURE — 85025 COMPLETE CBC W/AUTO DIFF WBC: CPT

## 2022-09-08 PROCEDURE — 36415 COLL VENOUS BLD VENIPUNCTURE: CPT

## 2022-09-08 PROCEDURE — 80053 COMPREHEN METABOLIC PANEL: CPT

## 2022-09-13 ENCOUNTER — OFFICE VISIT (OUTPATIENT)
Dept: UROLOGY | Facility: CLINIC | Age: 57
End: 2022-09-13
Payer: COMMERCIAL

## 2022-09-13 ENCOUNTER — LAB (OUTPATIENT)
Dept: LAB | Facility: CLINIC | Age: 57
End: 2022-09-13
Payer: COMMERCIAL

## 2022-09-13 VITALS
SYSTOLIC BLOOD PRESSURE: 111 MMHG | WEIGHT: 225 LBS | HEIGHT: 72 IN | BODY MASS INDEX: 30.48 KG/M2 | DIASTOLIC BLOOD PRESSURE: 80 MMHG | HEART RATE: 109 BPM

## 2022-09-13 DIAGNOSIS — D49.511 NEOPLASM OF RIGHT KIDNEY WITH THROMBUS OF INFERIOR VENA CAVA (H): ICD-10-CM

## 2022-09-13 DIAGNOSIS — C64.1 RENAL CELL CARCINOMA, RIGHT (H): ICD-10-CM

## 2022-09-13 DIAGNOSIS — C64.1 RENAL CELL CARCINOMA OF RIGHT KIDNEY METASTATIC TO OTHER SITE (H): Primary | ICD-10-CM

## 2022-09-13 DIAGNOSIS — C64.1 PRIMARY MALIGNANT NEOPLASM OF RIGHT KIDNEY WITH METASTASIS FROM KIDNEY TO OTHER SITE (H): ICD-10-CM

## 2022-09-13 DIAGNOSIS — I82.220 NEOPLASM OF RIGHT KIDNEY WITH THROMBUS OF INFERIOR VENA CAVA (H): ICD-10-CM

## 2022-09-13 LAB
ALBUMIN SERPL BCG-MCNC: 4.2 G/DL (ref 3.5–5.2)
ALP SERPL-CCNC: 77 U/L (ref 40–129)
ALT SERPL W P-5'-P-CCNC: 36 U/L (ref 10–50)
ANION GAP SERPL CALCULATED.3IONS-SCNC: 11 MMOL/L (ref 7–15)
AST SERPL W P-5'-P-CCNC: 28 U/L (ref 10–50)
BASOPHILS # BLD AUTO: 0.1 10E3/UL (ref 0–0.2)
BASOPHILS NFR BLD AUTO: 1 %
BILIRUB SERPL-MCNC: 0.2 MG/DL
BUN SERPL-MCNC: 13.9 MG/DL (ref 6–20)
CALCIUM SERPL-MCNC: 9.5 MG/DL (ref 8.6–10)
CHLORIDE SERPL-SCNC: 102 MMOL/L (ref 98–107)
CREAT SERPL-MCNC: 1.7 MG/DL (ref 0.67–1.17)
DEPRECATED HCO3 PLAS-SCNC: 27 MMOL/L (ref 22–29)
EOSINOPHIL # BLD AUTO: 0 10E3/UL (ref 0–0.7)
EOSINOPHIL NFR BLD AUTO: 0 %
ERYTHROCYTE [DISTWIDTH] IN BLOOD BY AUTOMATED COUNT: 14.3 % (ref 10–15)
GFR SERPL CREATININE-BSD FRML MDRD: 46 ML/MIN/1.73M2
GLUCOSE SERPL-MCNC: 90 MG/DL (ref 70–99)
HCT VFR BLD AUTO: 35.8 % (ref 40–53)
HGB BLD-MCNC: 11 G/DL (ref 13.3–17.7)
IMM GRANULOCYTES # BLD: 0.4 10E3/UL
IMM GRANULOCYTES NFR BLD: 4 %
LYMPHOCYTES # BLD AUTO: 2.4 10E3/UL (ref 0.8–5.3)
LYMPHOCYTES NFR BLD AUTO: 26 %
MCH RBC QN AUTO: 30.1 PG (ref 26.5–33)
MCHC RBC AUTO-ENTMCNC: 30.7 G/DL (ref 31.5–36.5)
MCV RBC AUTO: 98 FL (ref 78–100)
MONOCYTES # BLD AUTO: 0.6 10E3/UL (ref 0–1.3)
MONOCYTES NFR BLD AUTO: 6 %
NEUTROPHILS # BLD AUTO: 5.9 10E3/UL (ref 1.6–8.3)
NEUTROPHILS NFR BLD AUTO: 63 %
NRBC # BLD AUTO: 0 10E3/UL
NRBC BLD AUTO-RTO: 0 /100
PLATELET # BLD AUTO: 564 10E3/UL (ref 150–450)
POTASSIUM SERPL-SCNC: 4.2 MMOL/L (ref 3.4–5.3)
PROT SERPL-MCNC: 6.7 G/DL (ref 6.4–8.3)
RBC # BLD AUTO: 3.65 10E6/UL (ref 4.4–5.9)
SODIUM SERPL-SCNC: 140 MMOL/L (ref 136–145)
WBC # BLD AUTO: 9.3 10E3/UL (ref 4–11)

## 2022-09-13 PROCEDURE — 80053 COMPREHEN METABOLIC PANEL: CPT | Performed by: PATHOLOGY

## 2022-09-13 PROCEDURE — 99024 POSTOP FOLLOW-UP VISIT: CPT | Performed by: UROLOGY

## 2022-09-13 PROCEDURE — 85025 COMPLETE CBC W/AUTO DIFF WBC: CPT | Performed by: PATHOLOGY

## 2022-09-13 PROCEDURE — 36415 COLL VENOUS BLD VENIPUNCTURE: CPT | Performed by: PATHOLOGY

## 2022-09-13 ASSESSMENT — PAIN SCALES - GENERAL: PAINLEVEL: NO PAIN (0)
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
## 2022-09-19 ENCOUNTER — VIRTUAL VISIT (OUTPATIENT)
Dept: NEPHROLOGY | Facility: CLINIC | Age: 57
End: 2022-09-19
Attending: INTERNAL MEDICINE
Payer: COMMERCIAL

## 2022-09-19 DIAGNOSIS — I12.9 HYPERTENSION, RENAL: ICD-10-CM

## 2022-09-19 DIAGNOSIS — E03.9 HYPOTHYROIDISM (ACQUIRED): Primary | ICD-10-CM

## 2022-09-19 DIAGNOSIS — E55.9 VITAMIN D DEFICIENCY: ICD-10-CM

## 2022-09-19 PROCEDURE — G0463 HOSPITAL OUTPT CLINIC VISIT: HCPCS | Mod: PN,RTG | Performed by: INTERNAL MEDICINE

## 2022-09-19 PROCEDURE — 99214 OFFICE O/P EST MOD 30 MIN: CPT | Mod: 24 | Performed by: INTERNAL MEDICINE

## 2022-09-19 RX ORDER — ERGOCALCIFEROL 1.25 MG/1
50000 CAPSULE, LIQUID FILLED ORAL WEEKLY
Qty: 16 CAPSULE | Refills: 1 | Status: SHIPPED | OUTPATIENT
Start: 2022-09-19 | End: 2023-01-03

## 2022-09-19 RX ORDER — AMLODIPINE BESYLATE 5 MG/1
10 TABLET ORAL DAILY
Qty: 120 TABLET | Refills: 3 | Status: SHIPPED | OUTPATIENT
Start: 2022-09-19 | End: 2023-02-06

## 2022-09-19 RX ORDER — FERROUS GLUCONATE 324(38)MG
324 TABLET ORAL
Qty: 90 TABLET | Refills: 3 | Status: SHIPPED | OUTPATIENT
Start: 2022-09-19 | End: 2023-01-17

## 2022-09-19 NOTE — PROGRESS NOTES
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    WBC Count 9.5 4.0 - 11.0 10e3/uL    RBC Count 2.86 (L) 4.40 - 5.90 10e6/uL    Hemoglobin 8.7 (L) 13.3 - 17.7 g/dL    Hematocrit 29.1 (L) 40.0 - 53.0 %     (H) 78 - 100 fL    MCH 30.4 26.5 - 33.0 pg    MCHC 29.9 (L) 31.5 - 36.5 g/dL    RDW 14.6 10.0 - 15.0 %    Platelet Count 166 150 - 450 10e3/uL   Hemoglobin    Collection Time: 08/31/22  2:16 PM   Result Value Ref Range    Hemoglobin 9.2 (L) 13.3 - 17.7 g/dL   EKG 12-lead, tracing only    Collection Time: 09/01/22  6:06 AM   Result Value Ref Range    Systolic Blood Pressure  mmHg    Diastolic Blood Pressure  mmHg    Ventricular Rate 116 BPM    Atrial Rate 116 BPM    NY Interval 134 ms    QRS Duration 88 ms     ms    QTc 430 ms    P Axis 4 degrees    R AXIS -4 degrees    T Axis -6 degrees    Interpretation ECG       Sinus tachycardia  Moderate voltage criteria for LVH, may be normal variant  Nonspecific T wave abnormality  Abnormal ECG  When compared with ECG of 30-AUG-2021 13:56,  Nonspecific T wave abnormality, worse in Lateral leads  Confirmed by Dasha Meyer ([id number]) on 9/1/2022 10:22:06 AM     Basic metabolic  panel    Collection Time: 09/01/22  7:28 AM   Result Value Ref Range    Creatinine 1.65 (H) 0.67 - 1.17 mg/dL    Sodium 139 136 - 145 mmol/L    Potassium 3.8 3.4 - 5.3 mmol/L    Urea Nitrogen 14.7 6.0 - 20.0 mg/dL    Chloride 101 98 - 107 mmol/L    Carbon Dioxide (CO2) 27 22 - 29 mmol/L    Anion Gap 11 7 - 15 mmol/L    Glucose 64 (L) 70 - 99 mg/dL    GFR Estimate 48 (L) >60 mL/min/1.73m2    Calcium 9.0 8.6 - 10.0 mg/dL   CBC with platelets    Collection Time: 09/01/22  7:28 AM   Result Value Ref Range    WBC Count 9.2 4.0 - 11.0 10e3/uL    RBC Count 3.02 (L) 4.40 - 5.90 10e6/uL    Hemoglobin 9.3 (L) 13.3 - 17.7 g/dL    Hematocrit 30.7 (L) 40.0 - 53.0 %     (H) 78 - 100 fL    MCH 30.8 26.5 - 33.0 pg    MCHC 30.3 (L) 31.5 - 36.5 g/dL    RDW 14.4 10.0 - 15.0 %    Platelet Count 192 150 - 450 10e3/uL   Basic metabolic panel    Collection Time: 09/02/22  1:32 PM   Result Value Ref Range    Creatinine 1.50 (H) 0.67 - 1.17 mg/dL    Sodium 139 136 - 145 mmol/L    Potassium 3.9 3.4 - 5.3 mmol/L    Urea Nitrogen 14.8 6.0 - 20.0 mg/dL    Chloride 102 98 - 107 mmol/L    Carbon Dioxide (CO2) 27 22 - 29 mmol/L    Anion Gap 10 7 - 15 mmol/L    Glucose 130 (H) 70 - 99 mg/dL    GFR Estimate 54 (L) >60 mL/min/1.73m2    Calcium 9.0 8.6 - 10.0 mg/dL   CBC with platelets    Collection Time: 09/02/22  1:32 PM   Result Value Ref Range    WBC Count 8.5 4.0 - 11.0 10e3/uL    RBC Count 3.10 (L) 4.40 - 5.90 10e6/uL    Hemoglobin 9.4 (L) 13.3 - 17.7 g/dL    Hematocrit 30.7 (L) 40.0 - 53.0 %    MCV 99 78 - 100 fL    MCH 30.3 26.5 - 33.0 pg    MCHC 30.6 (L) 31.5 - 36.5 g/dL    RDW 14.0 10.0 - 15.0 %    Platelet Count 232 150 - 450 10e3/uL   CBC with platelets    Collection Time: 09/03/22  7:31 AM   Result Value Ref Range    WBC Count 8.1 4.0 - 11.0 10e3/uL    RBC Count 3.07 (L) 4.40 - 5.90 10e6/uL    Hemoglobin 9.3 (L) 13.3 - 17.7 g/dL    Hematocrit 30.3 (L) 40.0 - 53.0 %    MCV 99 78 - 100 fL    MCH 30.3 26.5 - 33.0 pg    MCHC 30.7 (L)  31.5 - 36.5 g/dL    RDW 14.0 10.0 - 15.0 %    Platelet Count 257 150 - 450 10e3/uL   Basic metabolic panel    Collection Time: 09/03/22  7:40 AM   Result Value Ref Range    Creatinine 1.64 (H) 0.67 - 1.17 mg/dL    Sodium 138 136 - 145 mmol/L    Potassium 3.6 3.4 - 5.3 mmol/L    Urea Nitrogen 15.3 6.0 - 20.0 mg/dL    Chloride 101 98 - 107 mmol/L    Carbon Dioxide (CO2) 25 22 - 29 mmol/L    Anion Gap 12 7 - 15 mmol/L    Glucose 82 70 - 99 mg/dL    GFR Estimate 48 (L) >60 mL/min/1.73m2    Calcium 8.8 8.6 - 10.0 mg/dL   Comprehensive metabolic panel    Collection Time: 09/08/22  1:31 PM   Result Value Ref Range    Sodium 139 133 - 144 mmol/L    Potassium 3.8 3.4 - 5.3 mmol/L    Chloride 103 94 - 109 mmol/L    Carbon Dioxide (CO2) 33 (H) 20 - 32 mmol/L    Anion Gap 3 3 - 14 mmol/L    Urea Nitrogen 16 7 - 30 mg/dL    Creatinine 2.02 (H) 0.66 - 1.25 mg/dL    Calcium 9.4 8.5 - 10.1 mg/dL    Glucose 109 (H) 70 - 99 mg/dL    Alkaline Phosphatase 75 40 - 150 U/L    AST 30 0 - 45 U/L    ALT 48 0 - 70 U/L    Protein Total 6.8 6.8 - 8.8 g/dL    Albumin 3.6 3.4 - 5.0 g/dL    Bilirubin Total 0.3 0.2 - 1.3 mg/dL    GFR Estimate 38 (L) >60 mL/min/1.73m2   CBC with platelets and differential    Collection Time: 09/08/22  1:31 PM   Result Value Ref Range    WBC Count 10.1 4.0 - 11.0 10e3/uL    RBC Count 3.36 (L) 4.40 - 5.90 10e6/uL    Hemoglobin 10.4 (L) 13.3 - 17.7 g/dL    Hematocrit 32.9 (L) 40.0 - 53.0 %    MCV 98 78 - 100 fL    MCH 31.0 26.5 - 33.0 pg    MCHC 31.6 31.5 - 36.5 g/dL    RDW 14.1 10.0 - 15.0 %    Platelet Count 478 (H) 150 - 450 10e3/uL    % Neutrophils 78 %    % Lymphocytes 14 %    % Monocytes 3 %    % Eosinophils 0 %    % Basophils 0 %    % Immature Granulocytes 5 %    Absolute Neutrophils 7.9 1.6 - 8.3 10e3/uL    Absolute Lymphocytes 1.4 0.8 - 5.3 10e3/uL    Absolute Monocytes 0.3 0.0 - 1.3 10e3/uL    Absolute Eosinophils 0.0 0.0 - 0.7 10e3/uL    Absolute Basophils 0.0 0.0 - 0.2 10e3/uL    Absolute Immature  Granulocytes 0.5 (H) <=0.4 10e3/uL   Comprehensive metabolic panel    Collection Time: 09/13/22  9:41 AM   Result Value Ref Range    Sodium 140 136 - 145 mmol/L    Potassium 4.2 3.4 - 5.3 mmol/L    Creatinine 1.70 (H) 0.67 - 1.17 mg/dL    Urea Nitrogen 13.9 6.0 - 20.0 mg/dL    Chloride 102 98 - 107 mmol/L    Carbon Dioxide (CO2) 27 22 - 29 mmol/L    Anion Gap 11 7 - 15 mmol/L    Glucose 90 70 - 99 mg/dL    Calcium 9.5 8.6 - 10.0 mg/dL    Protein Total 6.7 6.4 - 8.3 g/dL    Albumin 4.2 3.5 - 5.2 g/dL    Bilirubin Total 0.2 <=1.2 mg/dL    Alkaline Phosphatase 77 40 - 129 U/L    AST 28 10 - 50 U/L    ALT 36 10 - 50 U/L    GFR Estimate 46 (L) >60 mL/min/1.73m2   CBC with platelets and differential    Collection Time: 09/13/22  9:41 AM   Result Value Ref Range    WBC Count 9.3 4.0 - 11.0 10e3/uL    RBC Count 3.65 (L) 4.40 - 5.90 10e6/uL    Hemoglobin 11.0 (L) 13.3 - 17.7 g/dL    Hematocrit 35.8 (L) 40.0 - 53.0 %    MCV 98 78 - 100 fL    MCH 30.1 26.5 - 33.0 pg    MCHC 30.7 (L) 31.5 - 36.5 g/dL    RDW 14.3 10.0 - 15.0 %    Platelet Count 564 (H) 150 - 450 10e3/uL    % Neutrophils 63 %    % Lymphocytes 26 %    % Monocytes 6 %    % Eosinophils 0 %    % Basophils 1 %    % Immature Granulocytes 4 %    NRBCs per 100 WBC 0 <1 /100    Absolute Neutrophils 5.9 1.6 - 8.3 10e3/uL    Absolute Lymphocytes 2.4 0.8 - 5.3 10e3/uL    Absolute Monocytes 0.6 0.0 - 1.3 10e3/uL    Absolute Eosinophils 0.0 0.0 - 0.7 10e3/uL    Absolute Basophils 0.1 0.0 - 0.2 10e3/uL    Absolute Immature Granulocytes 0.4 <=0.4 10e3/uL    Absolute NRBCs 0.0 10e3/uL     CMP  Recent Labs   Lab Test 09/13/22  0941 09/08/22  1331 09/03/22  0740 09/02/22  1332 08/29/22  0803 08/25/22  1644 08/05/22  0643 08/17/21  0819 08/17/21  0345 08/16/21  0829 08/16/21  0429 08/15/21  1207 08/15/21  0442 08/14/21  0831 08/14/21  0527    139 138 139   < > 138 143   < > 140  --  137  --  138  --  138   POTASSIUM 4.2 3.8 3.6 3.9   < > 4.2 4.0   < > 3.2*  --  3.5  --  3.6   --  3.8   CHLORIDE 102 103 101 102   < > 104 104   < > 108  --  104  --  105  --  105   CO2 27 33* 25 27   < > 27 28   < > 28  --  27  --  28  --  31   ANIONGAP 11 3 12 10   < > 7 11   < > 4  --  6  --  5  --  2*   GLC 90 109* 82 130*   < > 137* 88   < > 101*   < > 100*   < > 102*   < > 99   BUN 13.9 16 15.3 14.8   < > 26 27   < > 13  --  14  --  16  --  20   CR 1.70* 2.02* 1.64* 1.50*   < > 2.65* 1.78*   < > 1.63*  --  1.70*  --  1.69*  --  1.91*   GFRESTIMATED 46* 38* 48* 54*   < > 27* 44*   < > 46*  --  44*  --  44*  --  38*   BETSY 9.5 9.4 8.8 9.0   < > 9.0 8.7   < > 8.6  --  8.0*  --  8.1*  --  8.0*   MAG  --   --   --   --   --   --   --   --  2.1  --  2.1  --  2.2  --  2.3   PHOS  --   --   --   --   --   --   --   --  2.8  --  2.9  --  2.5  --  2.6   PROTTOTAL 6.7 6.8  --   --   --  6.6* 6.5*   < > 5.8*  --   --   --  6.0*  --   --    ALBUMIN 4.2 3.6  --   --   --  3.9 3.6   < > 2.0*  --   --   --  2.0*  --   --    BILITOTAL 0.2 0.3  --   --   --  0.3 0.3   < > 0.3  --   --   --  0.4  --   --    ALKPHOS 77 75  --   --   --  57 44   < > 105  --   --   --  94  --   --    AST 28 30  --   --   --  33 23   < > 33  --   --   --  54*  --   --    ALT 36 48  --   --   --  82* 74*   < > 72*  --   --   --  110*  --   --     < > = values in this interval not displayed.     CBC  Recent Labs   Lab Test 09/13/22  0941 09/08/22  1331 09/03/22  0731 09/02/22  1332   HGB 11.0* 10.4* 9.3* 9.4*   WBC 9.3 10.1 8.1 8.5   RBC 3.65* 3.36* 3.07* 3.10*   HCT 35.8* 32.9* 30.3* 30.7*   MCV 98 98 99 99   MCH 30.1 31.0 30.3 30.3   MCHC 30.7* 31.6 30.7* 30.6*   RDW 14.3 14.1 14.0 14.0   * 478* 257 232     INR  Recent Labs   Lab Test 08/10/21  2215 08/10/21  1603 08/10/21  1425   INR 1.23* 1.42* 1.24*   PTT 52* 31 35     ABG  Recent Labs   Lab Test 08/29/22  1640 08/29/22  1545 08/29/22  1452 08/29/22  1359   PH 7.40 7.45 7.44 7.44   PCO2 42 36 38 38   PO2 167* 170* 170* 167*   HCO3 26 25 26 25   O2PER 43.0 42.0 42.0 50.0      URINE  STUDIES  Recent Labs   Lab Test 06/02/22  1019 05/02/22  1157 11/30/21  1408   COLOR Colorless Yellow Colorless   APPEARANCE Clear Clear Clear   URINEGLC Negative Negative Negative   URINEBILI Negative Negative Negative   URINEKETONE Negative Negative Negative   SG 1.005 1.014 1.028   UBLD Small* Negative Negative   URINEPH 6.0 5.0 7.0   PROTEIN Negative Negative Negative   NITRITE Negative Negative Negative   LEUKEST Negative Negative Negative   RBCU 1 <1 1   WBCU <1 <1 <1     Recent Labs   Lab Test 05/16/22  0700   UTPG 0.11       ASSESSMENT AND PLAN:   #CKD stage 3 with LOUIS on CKD resolving and 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 The creatinine seems to have stabilized at 1.7 after he completed two courses of steroids   Renal imaging  is unremarkable. The proteinuria is low grade. The progression is tributary of BP control and other LOUIS episodes  The patient was also instructed to keep the sodium intake around 2400 mg /day, follow a plant-based diet and to avoid NSAIDs     #RCC  concerning for a local recurrence. There are two dominant lesions (tumor growth) within the local area of resected kidney.  Given the proximity to bowel loops, he is not a candidate for  Radiation. Repeat surgery was able to resect only one of two masses.Cabozantinib as a single agent was mentioned by his oncologist in the past and he has an appointment for that tomorrow.     #HTN  Primary and secondary to CKD. Suboptimal control on amlodipine 5 mg daily. Will increase amlodipine to 10 mg daily. Given the presence of one kidney and his risk of LOUIS and the negligible proteinuria, would rather avoid ACE/ARB    #Hypothyroidism: induced by pembrolizumab. Levothyroxine was increased to 100 mcg daily as last TSH is 26.82 on 8/5 . A repeat one done on 8/29 is much improved at 9.07. Will order a repeat one today     #Anemia  Hemoglobin level is 11. Started oral iron  supplementation    #Acid-base status  CO2 level 27 No need for any intervention    #Electrolytes  Na 140  K 4.2     #BMD  Ca   9.5       P    alb  iPTH 79  Vitamin D level 13 in June 2022  Start ergocalciferol 34068K once a week    #CKD journey/transplant not a candidate yet    The total time of this encounter amounted to 40 minutes. This time included time spent with the patient, reviewing records, ordering tests, and performing post visit documentation.     The patient will return to follow up in 3 months    Mariah Alan MD  Division of Renal Disease and Hypertension  September 19, 2022  3:27 PM

## 2022-09-19 NOTE — PATIENT INSTRUCTIONS
It was a pleasure taking care of you today.  I've included a brief summary of our discussion and care plan from today's visit below.  Please review this information with your primary care provider.  _______________________________________________________________________    My recommendations are summarized as follows:  -Keep the amount of sodium in your diet at 2.4 g/day  -Keep a Blood Pressure diary by taking your blood pressure twice a day as instructed Please make sure that you are using a validated blood pressure device by checking that it is the case at: https://www.validatebp.org/  -Avoid all NSAID's. Examples include Ibuprofen (Advil, Motrin), naprosyn (Aleve), celebrex among others. Acetaminophen (Tylenol) is ok with maximum dose in 24 hours of 3200 mg.  -Healthy lifestyle measures will keep your kidney's functioning at their current best. This includes regular exercise, weight loss and smoking cessation if you smoke.   -Do not exceed one alcoholic drink per day  -Increase amlodipine to 10 mg once a day  -Start oral iron everyday with dinner  -Take vitamin D (ergocalciferol) once a week with food  -Check a TSH level with your next labs    To schedule imaging please call [phone]     To schedule your lab appointment at the [facility] and [facility], please call       Return to Nephrology Clinic in 3 months with repeat labs to review your progress.    _______________________________________________________________________    Who do I call with any questions after my visit?    Please be in touch if there are any further questions that arise following today's visit.  There are multiple ways to contact your nephrology care team.      During business hours, you may reach your Nephrology LPN Care Coordinator, Tamiko, at .      To schedule or reschedule an appointment, please call [phone].    You can always send a secure message through [employer].  [employer] messages are answered by  your nurse or doctor typically within 24 hours.  Please allow extra time on weekends and holidays.      For urgent/emergent questions after business hours, you may reach the on-call Nephrology Fellow by contacting the [facility]  at [phone].     How will I get the results of any tests ordered?    You will receive all of your results.  If you have signed up for [employer], any tests ordered at your visit will be available to you after your physician reviews them.  Typically this takes 1-2 weeks.  If there are urgent results that require a change in your care plan, your physician or nurse will call you to discuss the next steps.      What is [employer]?  [employer] is a secure way for you to access all of your healthcare records from the [facility].  It is a web based computer program, so you can sign on to it from any location.  It also allows you to send secure messages to your care team.  I recommend signing up for [employer] access if you have not already done so and are comfortable with using a computer.      How do I schedule a follow-up visit?  If you did not schedule a follow-up visit today, please call [phone] to schedule a follow-up office visit.        Sincerely,      Dr. Mariah Alan  [facility]  Division of Nephrology and Hypertension

## 2022-09-19 NOTE — LETTER
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 mmol/L    Anion Gap 5 (L) 7 - 15 mmol/L    Glucose 85 70 - 99 mg/dL    GFR Estimate 50 (L) >60 mL/min/1.73m2    Calcium 8.2 (L) 8.6 - 10.0 mg/dL   CBC with platelets    Collection Time: 08/31/22  6:39 AM   Result Value Ref Range    WBC Count 9.5 4.0 - 11.0 10e3/uL    RBC Count 2.86 (L) 4.40 - 5.90 10e6/uL    Hemoglobin 8.7 (L) 13.3 - 17.7 g/dL    Hematocrit 29.1 (L) 40.0 - 53.0 %     (H) 78 - 100 fL    MCH 30.4 26.5 - 33.0 pg    MCHC 29.9 (L) 31.5 - 36.5 g/dL    RDW 14.6 10.0 - 15.0 %    Platelet Count 166 150 - 450 10e3/uL   Hemoglobin    Collection Time: 08/31/22  2:16 PM   Result Value Ref Range    Hemoglobin 9.2 (L) 13.3 - 17.7 g/dL   EKG 12-lead, tracing only    Collection Time: 09/01/22  6:06 AM   Result Value Ref Range    Systolic Blood Pressure  mmHg    Diastolic Blood Pressure  mmHg    Ventricular Rate 116 BPM    Atrial Rate 116 BPM    SD Interval 134 ms    QRS Duration 88 ms     ms    QTc 430 ms    P Axis 4 degrees    R AXIS -4 degrees    T Axis -6 degrees     Interpretation ECG       Sinus tachycardia  Moderate voltage criteria for LVH, may be normal variant  Nonspecific T wave abnormality  Abnormal ECG  When compared with ECG of 30-AUG-2021 13:56,  Nonspecific T wave abnormality, worse in Lateral leads  Confirmed by Dasha Meyer ([id number]) on 9/1/2022 10:22:06 AM     Basic metabolic panel    Collection Time: 09/01/22  7:28 AM   Result Value Ref Range    Creatinine 1.65 (H) 0.67 - 1.17 mg/dL    Sodium 139 136 - 145 mmol/L    Potassium 3.8 3.4 - 5.3 mmol/L    Urea Nitrogen 14.7 6.0 - 20.0 mg/dL    Chloride 101 98 - 107 mmol/L    Carbon Dioxide (CO2) 27 22 - 29 mmol/L    Anion Gap 11 7 - 15 mmol/L    Glucose 64 (L) 70 - 99 mg/dL    GFR Estimate 48 (L) >60 mL/min/1.73m2    Calcium 9.0 8.6 - 10.0 mg/dL   CBC with platelets    Collection Time: 09/01/22  7:28 AM   Result Value Ref Range    WBC Count 9.2 4.0 - 11.0 10e3/uL    RBC Count 3.02 (L) 4.40 - 5.90 10e6/uL    Hemoglobin 9.3 (L) 13.3 - 17.7 g/dL    Hematocrit 30.7 (L) 40.0 - 53.0 %     (H) 78 - 100 fL    MCH 30.8 26.5 - 33.0 pg    MCHC 30.3 (L) 31.5 - 36.5 g/dL    RDW 14.4 10.0 - 15.0 %    Platelet Count 192 150 - 450 10e3/uL   Basic metabolic panel    Collection Time: 09/02/22  1:32 PM   Result Value Ref Range    Creatinine 1.50 (H) 0.67 - 1.17 mg/dL    Sodium 139 136 - 145 mmol/L    Potassium 3.9 3.4 - 5.3 mmol/L    Urea Nitrogen 14.8 6.0 - 20.0 mg/dL    Chloride 102 98 - 107 mmol/L    Carbon Dioxide (CO2) 27 22 - 29 mmol/L    Anion Gap 10 7 - 15 mmol/L    Glucose 130 (H) 70 - 99 mg/dL    GFR Estimate 54 (L) >60 mL/min/1.73m2    Calcium 9.0 8.6 - 10.0 mg/dL   CBC with platelets    Collection Time: 09/02/22  1:32 PM   Result Value Ref Range    WBC Count 8.5 4.0 - 11.0 10e3/uL    RBC Count 3.10 (L) 4.40 - 5.90 10e6/uL    Hemoglobin 9.4 (L) 13.3 - 17.7 g/dL    Hematocrit 30.7 (L) 40.0 - 53.0 %    MCV 99 78 - 100 fL    MCH 30.3 26.5 - 33.0 pg    MCHC 30.6 (L) 31.5 - 36.5 g/dL    RDW 14.0 10.0 - 15.0 %    Platelet  Count 232 150 - 450 10e3/uL   CBC with platelets    Collection Time: 09/03/22  7:31 AM   Result Value Ref Range    WBC Count 8.1 4.0 - 11.0 10e3/uL    RBC Count 3.07 (L) 4.40 - 5.90 10e6/uL    Hemoglobin 9.3 (L) 13.3 - 17.7 g/dL    Hematocrit 30.3 (L) 40.0 - 53.0 %    MCV 99 78 - 100 fL    MCH 30.3 26.5 - 33.0 pg    MCHC 30.7 (L) 31.5 - 36.5 g/dL    RDW 14.0 10.0 - 15.0 %    Platelet Count 257 150 - 450 10e3/uL   Basic metabolic panel    Collection Time: 09/03/22  7:40 AM   Result Value Ref Range    Creatinine 1.64 (H) 0.67 - 1.17 mg/dL    Sodium 138 136 - 145 mmol/L    Potassium 3.6 3.4 - 5.3 mmol/L    Urea Nitrogen 15.3 6.0 - 20.0 mg/dL    Chloride 101 98 - 107 mmol/L    Carbon Dioxide (CO2) 25 22 - 29 mmol/L    Anion Gap 12 7 - 15 mmol/L    Glucose 82 70 - 99 mg/dL    GFR Estimate 48 (L) >60 mL/min/1.73m2    Calcium 8.8 8.6 - 10.0 mg/dL   Comprehensive metabolic panel    Collection Time: 09/08/22  1:31 PM   Result Value Ref Range    Sodium 139 133 - 144 mmol/L    Potassium 3.8 3.4 - 5.3 mmol/L    Chloride 103 94 - 109 mmol/L    Carbon Dioxide (CO2) 33 (H) 20 - 32 mmol/L    Anion Gap 3 3 - 14 mmol/L    Urea Nitrogen 16 7 - 30 mg/dL    Creatinine 2.02 (H) 0.66 - 1.25 mg/dL    Calcium 9.4 8.5 - 10.1 mg/dL    Glucose 109 (H) 70 - 99 mg/dL    Alkaline Phosphatase 75 40 - 150 U/L    AST 30 0 - 45 U/L    ALT 48 0 - 70 U/L    Protein Total 6.8 6.8 - 8.8 g/dL    Albumin 3.6 3.4 - 5.0 g/dL    Bilirubin Total 0.3 0.2 - 1.3 mg/dL    GFR Estimate 38 (L) >60 mL/min/1.73m2   CBC with platelets and differential    Collection Time: 09/08/22  1:31 PM   Result Value Ref Range    WBC Count 10.1 4.0 - 11.0 10e3/uL    RBC Count 3.36 (L) 4.40 - 5.90 10e6/uL    Hemoglobin 10.4 (L) 13.3 - 17.7 g/dL    Hematocrit 32.9 (L) 40.0 - 53.0 %    MCV 98 78 - 100 fL    MCH 31.0 26.5 - 33.0 pg    MCHC 31.6 31.5 - 36.5 g/dL    RDW 14.1 10.0 - 15.0 %    Platelet Count 478 (H) 150 - 450 10e3/uL    % Neutrophils 78 %    % Lymphocytes 14 %    %  Monocytes 3 %    % Eosinophils 0 %    % Basophils 0 %    % Immature Granulocytes 5 %    Absolute Neutrophils 7.9 1.6 - 8.3 10e3/uL    Absolute Lymphocytes 1.4 0.8 - 5.3 10e3/uL    Absolute Monocytes 0.3 0.0 - 1.3 10e3/uL    Absolute Eosinophils 0.0 0.0 - 0.7 10e3/uL    Absolute Basophils 0.0 0.0 - 0.2 10e3/uL    Absolute Immature Granulocytes 0.5 (H) <=0.4 10e3/uL   Comprehensive metabolic panel    Collection Time: 09/13/22  9:41 AM   Result Value Ref Range    Sodium 140 136 - 145 mmol/L    Potassium 4.2 3.4 - 5.3 mmol/L    Creatinine 1.70 (H) 0.67 - 1.17 mg/dL    Urea Nitrogen 13.9 6.0 - 20.0 mg/dL    Chloride 102 98 - 107 mmol/L    Carbon Dioxide (CO2) 27 22 - 29 mmol/L    Anion Gap 11 7 - 15 mmol/L    Glucose 90 70 - 99 mg/dL    Calcium 9.5 8.6 - 10.0 mg/dL    Protein Total 6.7 6.4 - 8.3 g/dL    Albumin 4.2 3.5 - 5.2 g/dL    Bilirubin Total 0.2 <=1.2 mg/dL    Alkaline Phosphatase 77 40 - 129 U/L    AST 28 10 - 50 U/L    ALT 36 10 - 50 U/L    GFR Estimate 46 (L) >60 mL/min/1.73m2   CBC with platelets and differential    Collection Time: 09/13/22  9:41 AM   Result Value Ref Range    WBC Count 9.3 4.0 - 11.0 10e3/uL    RBC Count 3.65 (L) 4.40 - 5.90 10e6/uL    Hemoglobin 11.0 (L) 13.3 - 17.7 g/dL    Hematocrit 35.8 (L) 40.0 - 53.0 %    MCV 98 78 - 100 fL    MCH 30.1 26.5 - 33.0 pg    MCHC 30.7 (L) 31.5 - 36.5 g/dL    RDW 14.3 10.0 - 15.0 %    Platelet Count 564 (H) 150 - 450 10e3/uL    % Neutrophils 63 %    % Lymphocytes 26 %    % Monocytes 6 %    % Eosinophils 0 %    % Basophils 1 %    % Immature Granulocytes 4 %    NRBCs per 100 WBC 0 <1 /100    Absolute Neutrophils 5.9 1.6 - 8.3 10e3/uL    Absolute Lymphocytes 2.4 0.8 - 5.3 10e3/uL    Absolute Monocytes 0.6 0.0 - 1.3 10e3/uL    Absolute Eosinophils 0.0 0.0 - 0.7 10e3/uL    Absolute Basophils 0.1 0.0 - 0.2 10e3/uL    Absolute Immature Granulocytes 0.4 <=0.4 10e3/uL    Absolute NRBCs 0.0 10e3/uL     CMP  Recent Labs   Lab Test 09/13/22  0941 09/08/22  1131  09/03/22  0740 09/02/22  1332 08/29/22  0803 08/25/22  1644 08/05/22  0643 08/17/21  0819 08/17/21  0345 08/16/21  0829 08/16/21  0429 08/15/21  1207 08/15/21  0442 08/14/21  0831 08/14/21  0527    139 138 139   < > 138 143   < > 140  --  137  --  138  --  138   POTASSIUM 4.2 3.8 3.6 3.9   < > 4.2 4.0   < > 3.2*  --  3.5  --  3.6  --  3.8   CHLORIDE 102 103 101 102   < > 104 104   < > 108  --  104  --  105  --  105   CO2 27 33* 25 27   < > 27 28   < > 28  --  27  --  28  --  31   ANIONGAP 11 3 12 10   < > 7 11   < > 4  --  6  --  5  --  2*   GLC 90 109* 82 130*   < > 137* 88   < > 101*   < > 100*   < > 102*   < > 99   BUN 13.9 16 15.3 14.8   < > 26 27   < > 13  --  14  --  16  --  20   CR 1.70* 2.02* 1.64* 1.50*   < > 2.65* 1.78*   < > 1.63*  --  1.70*  --  1.69*  --  1.91*   GFRESTIMATED 46* 38* 48* 54*   < > 27* 44*   < > 46*  --  44*  --  44*  --  38*   BETSY 9.5 9.4 8.8 9.0   < > 9.0 8.7   < > 8.6  --  8.0*  --  8.1*  --  8.0*   MAG  --   --   --   --   --   --   --   --  2.1  --  2.1  --  2.2  --  2.3   PHOS  --   --   --   --   --   --   --   --  2.8  --  2.9  --  2.5  --  2.6   PROTTOTAL 6.7 6.8  --   --   --  6.6* 6.5*   < > 5.8*  --   --   --  6.0*  --   --    ALBUMIN 4.2 3.6  --   --   --  3.9 3.6   < > 2.0*  --   --   --  2.0*  --   --    BILITOTAL 0.2 0.3  --   --   --  0.3 0.3   < > 0.3  --   --   --  0.4  --   --    ALKPHOS 77 75  --   --   --  57 44   < > 105  --   --   --  94  --   --    AST 28 30  --   --   --  33 23   < > 33  --   --   --  54*  --   --    ALT 36 48  --   --   --  82* 74*   < > 72*  --   --   --  110*  --   --     < > = values in this interval not displayed.     CBC  Recent Labs   Lab Test 09/13/22  0941 09/08/22  1331 09/03/22  0731 09/02/22  1332   HGB 11.0* 10.4* 9.3* 9.4*   WBC 9.3 10.1 8.1 8.5   RBC 3.65* 3.36* 3.07* 3.10*   HCT 35.8* 32.9* 30.3* 30.7*   MCV 98 98 99 99   MCH 30.1 31.0 30.3 30.3   MCHC 30.7* 31.6 30.7* 30.6*   RDW 14.3 14.1 14.0 14.0   * 478* 257 232      INR  Recent Labs   Lab Test 08/10/21  2215 08/10/21  1603 08/10/21  1425   INR 1.23* 1.42* 1.24*   PTT 52* 31 35     ABG  Recent Labs   Lab Test 08/29/22  1640 08/29/22  1545 08/29/22  1452 08/29/22  1359   PH 7.40 7.45 7.44 7.44   PCO2 42 36 38 38   PO2 167* 170* 170* 167*   HCO3 26 25 26 25   O2PER 43.0 42.0 42.0 50.0      URINE STUDIES  Recent Labs   Lab Test 06/02/22  1019 05/02/22  1157 11/30/21  1408   COLOR Colorless Yellow Colorless   APPEARANCE Clear Clear Clear   URINEGLC Negative Negative Negative   URINEBILI Negative Negative Negative   URINEKETONE Negative Negative Negative   SG 1.005 1.014 1.028   UBLD Small* Negative Negative   URINEPH 6.0 5.0 7.0   PROTEIN Negative Negative Negative   NITRITE Negative Negative Negative   LEUKEST Negative Negative Negative   RBCU 1 <1 1   WBCU <1 <1 <1     Recent Labs   Lab Test 05/16/22  0700   UTPG 0.11       ASSESSMENT AND PLAN:   #CKD stage 3 with LOUIS on CKD resolving and 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 The creatinine seems to have stabilized at 1.7 after he completed two courses of steroids   Renal imaging  is unremarkable. The proteinuria is low grade. The progression is tributary of BP control and other LOUIS episodes  The patient was also instructed to keep the sodium intake around 2400 mg /day, follow a plant-based diet and to avoid NSAIDs     #RCC  concerning for a local recurrence. There are two dominant lesions (tumor growth) within the local area of resected kidney.  Given the proximity to bowel loops, he is not a candidate for  Radiation. Repeat surgery was able to resect only one of two masses.Cabozantinib as a single agent was mentioned by his oncologist in the past and he has an appointment for that tomorrow.     #HTN  Primary and secondary to CKD. Suboptimal control on amlodipine 5 mg daily. Will increase amlodipine to 10 mg daily. Given the presence of one kidney and his  risk of LOUIS and the negligible proteinuria, would rather avoid ACE/ARB    #Hypothyroidism: induced by pembrolizumab. Levothyroxine was increased to 100 mcg daily as last TSH is 26.82 on 8/5 . A repeat one done on 8/29 is much improved at 9.07. Will order a repeat one today     #Anemia  Hemoglobin level is 11. Started oral iron supplementation    #Acid-base status  CO2 level 27 No need for any intervention    #Electrolytes  Na 140  K 4.2     #BMD  Ca   9.5       P    alb  iPTH 79  Vitamin D level 13 in June 2022  Start ergocalciferol [id number] once a week    #CKD journey/transplant not a candidate yet    The total time of this encounter amounted to 40 minutes. This time included time spent with the patient, reviewing records, ordering tests, and performing post visit documentation.     The patient will return to follow up in 3 months    Mariah Alan MD  Division of Renal Disease and Hypertension  September 19, 2022  3:27 PM

## 2022-09-20 ENCOUNTER — ONCOLOGY VISIT (OUTPATIENT)
Dept: ONCOLOGY | Facility: CLINIC | Age: 57
End: 2022-09-20

## 2022-09-20 ENCOUNTER — ONCOLOGY VISIT (OUTPATIENT)
Dept: ONCOLOGY | Facility: CLINIC | Age: 57
End: 2022-09-20
Payer: COMMERCIAL

## 2022-09-20 ENCOUNTER — TELEPHONE (OUTPATIENT)
Dept: ONCOLOGY | Facility: CLINIC | Age: 57
End: 2022-09-20

## 2022-09-20 VITALS
TEMPERATURE: 98.7 F | RESPIRATION RATE: 16 BRPM | WEIGHT: 221 LBS | BODY MASS INDEX: 29.97 KG/M2 | SYSTOLIC BLOOD PRESSURE: 119 MMHG | DIASTOLIC BLOOD PRESSURE: 82 MMHG | OXYGEN SATURATION: 98 % | HEART RATE: 117 BPM

## 2022-09-20 DIAGNOSIS — D49.511 NEOPLASM OF RIGHT KIDNEY WITH THROMBUS OF INFERIOR VENA CAVA (H): Primary | ICD-10-CM

## 2022-09-20 DIAGNOSIS — C64.1 RENAL CELL CARCINOMA, RIGHT (H): ICD-10-CM

## 2022-09-20 DIAGNOSIS — I82.220 NEOPLASM OF RIGHT KIDNEY WITH THROMBUS OF INFERIOR VENA CAVA (H): Primary | ICD-10-CM

## 2022-09-20 DIAGNOSIS — N18.4 CKD (CHRONIC KIDNEY DISEASE) STAGE 4, GFR 15-29 ML/MIN (H): ICD-10-CM

## 2022-09-20 DIAGNOSIS — E03.4 HYPOTHYROIDISM DUE TO ACQUIRED ATROPHY OF THYROID: ICD-10-CM

## 2022-09-20 DIAGNOSIS — C64.1 RENAL CELL CARCINOMA, RIGHT (H): Primary | ICD-10-CM

## 2022-09-20 PROCEDURE — G0463 HOSPITAL OUTPT CLINIC VISIT: HCPCS

## 2022-09-20 PROCEDURE — 99215 OFFICE O/P EST HI 40 MIN: CPT | Performed by: INTERNAL MEDICINE

## 2022-09-20 RX ORDER — ONDANSETRON 8 MG/1
8 TABLET, FILM COATED ORAL EVERY 8 HOURS PRN
Qty: 30 TABLET | Refills: 2 | Status: SHIPPED | OUTPATIENT
Start: 2022-09-20 | End: 2023-03-16

## 2022-09-20 ASSESSMENT — PAIN SCALES - GENERAL: PAINLEVEL: MILD PAIN (2)
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
## 2022-09-20 NOTE — NURSING NOTE
Oncology Rooming Note    September 20, 2022 9:32 AM   Dimitrios Goldberg is a 57 year old male who presents for:    Chief Complaint   Patient presents with     Oncology Clinic Visit     Neoplasm of right kidney     Initial Vitals: /82   Pulse 117   Temp 98.7  F (37.1  C) (Oral)   Resp 16   Wt 100.2 kg (221 lb)   SpO2 98%   BMI 29.97 kg/m   Estimated body mass index is 29.97 kg/m  as calculated from the following:    Height as of 9/13/22: 1.829 m (6').    Weight as of this encounter: 100.2 kg (221 lb). Body surface area is 2.26 meters squared.  Mild Pain (2) Comment: Data Unavailable   No LMP for male patient.  Allergies reviewed: Yes  Medications reviewed: Yes    Medications: Medication refills not needed today.  Pharm[facility] entered into [id number] [place] [facility] [address] [facility]    Clinical concerns: none       Michelle Lara CMA

## 2022-09-20 NOTE — LETTER
9/20/2022         RE: Dimitrios Goldberg  [address]  [place] [address]        Dear Colleague,    Thank you for referring your patient, Dimitrios Goldberg, to the [facility]. Please see a copy of my visit note below.    See Oral Onc Mgmt note.      Again, thank you for allowing me to participate in the care of your patient.      Sincerely,    Tato Gaston RP

## 2022-09-20 NOTE — TELEPHONE ENCOUNTER
PRIOR AUTHORIZATION DENIED    Medication: Cabometyx --PA denied --will appeal    Denial Date: 9/20/2022    Denial Rational: not relapsed or stage IV    Appeal Information: see attached

## 2022-09-20 NOTE — TELEPHONE ENCOUNTER
Free Drug Application Initiated  Medication: Cabometyx  Sponsor: [employer]  Phone #: [phone]  Fax #: [phone]  Additional Information: Fulfilled by [facility]

## 2022-09-20 NOTE — ORAL ONC MGMT
Oral Chemotherapy Monitoring Program    Lab Monitoring Plan  CBC and CMP monthly and as needed  Subjective/Objective:  Dimitrios Goldberg is a 57 year old male contacted by phone for an initial visit for oral chemotherapy education.     No flowsheet data found.    Last PHQ-2 Score on record:   PHQ-2 ( 1999 [employer]) 9/19/2022 8/18/2022   Q1: Little interest or pleasure in doing things 0 0   Q2: Feeling down, depressed or hopeless 0 0   PHQ-2 Score 0 0   PHQ-2 Total Score (12-17 Years)- Positive if 3 or more points; Administer PHQ-A if positive - -       Vitals:  BP:   BP Readings from Last 1 Encounters:   09/20/22 119/82     Wt Readings from Last 1 Encounters:   09/20/22 100.2 kg (221 lb)     Estimated body surface area is 2.26 meters squared as calculated from the following:    Height as of 9/13/22: 1.829 m (6').    Weight as of an earlier encounter on 9/20/22: 100.2 kg (221 lb).    Labs:  _  Result Component Current Result Ref Range   Sodium 140 (9/13/2022) 136 - 145 mmol/L     _  Result Component Current Result Ref Range   Potassium 4.2 (9/13/2022) 3.4 - 5.3 mmol/L     _  Result Component Current Result Ref Range   Calcium 9.5 (9/13/2022) 8.6 - 10.0 mg/dL     No results found for Mag within last 30 days.     No results found for Phos within last 30 days.     _  Result Component Current Result Ref Range   Albumin 4.2 (9/13/2022) 3.5 - 5.2 g/dL     _  Result Component Current Result Ref Range   Urea Nitrogen 13.9 (9/13/2022) 6.0 - 20.0 mg/dL     _  Result Component Current Result Ref Range   Creatinine 1.70 (H) (9/13/2022) 0.67 - 1.17 mg/dL     _  Result Component Current Result Ref Range   AST 28 (9/13/2022) 10 - 50 U/L     _  Result Component Current Result Ref Range   ALT 36 (9/13/2022) 10 - 50 U/L     _  Result Component Current Result Ref Range   Bilirubin Total 0.2 (9/13/2022) <=1.2 mg/dL     _  Result Component Current Result Ref Range   WBC Count 9.3 (9/13/2022) 4.0 - 11.0 10e3/uL     _  Result Component  Current Result Ref Range   Hemoglobin 11.0 (L) (9/13/2022) 13.3 - 17.7 g/dL     _  Result Component Current Result Ref Range   Platelet Count 564 (H) (9/13/2022) 150 - 450 10e3/uL     No results found for ANC within last 30 days.     _  Result Component Current Result Ref Range   Absolute Neutrophils 5.9 (9/13/2022) 1.6 - 8.3 10e3/uL        Assessment:  Patient is appropriate to start therapy.    Plan:  Basic chemotherapy teaching was reviewed with the patient and the patient's family including indication, start date of therapy, dose, administration, adverse effects, missed doses, food and drug interactions, monitoring, side effect management, office contact information, and safe handling. Written materials were provided and all questions answered to Dimitrios's stated satisfaction.    Follow-Up:  About one week after start of Cabometyx.     Tato Gaston PharmD  [facility]  [phone]  September 20, 2022
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
## 2022-09-20 NOTE — TELEPHONE ENCOUNTER
PA Initiation    Medication: Cabometyx --PA in process  Insurance Company: [employer] - Phone [phone] Fax [phone]  Pharmacy Filling the Rx:  n/a  Filling Pharmacy Phone:  n/a  Filling Pharmacy Fax:   n/a  Start Date: 9/20/2022

## 2022-09-20 NOTE — TELEPHONE ENCOUNTER
Appeal Initiated  Medication Cabometyx  Payer or group [employer]  Phone #   Fax # [phone]  Additional Information n/a  Peer review option? n/a  Patient notified? Yes

## 2022-09-22 ENCOUNTER — TELEPHONE (OUTPATIENT)
Dept: ONCOLOGY | Facility: CLINIC | Age: 57
End: 2022-09-22

## 2022-09-22 NOTE — TELEPHONE ENCOUNTER
Thank you,    Nancy Mortensen  Oncology/Transplant [email or web address]  Phone:[phone]  Fax:[phone]

## 2022-09-22 NOTE — TELEPHONE ENCOUNTER
Thank you,    Nancy Mortensen  Oncology/Transplant [email or web address]  Phone:[phone]  Fax:[phone]

## 2022-09-22 NOTE — TELEPHONE ENCOUNTER
Appeal Approved  Effective Dates 09/06/2022 - 09/22/2025  Patient notified? Access still in process  Additional Information  N/a

## 2022-09-23 ENCOUNTER — ANCILLARY PROCEDURE (OUTPATIENT)
Dept: CT IMAGING | Facility: CLINIC | Age: 57
End: 2022-09-23
Attending: INTERNAL MEDICINE
Payer: COMMERCIAL

## 2022-09-23 DIAGNOSIS — D49.511 NEOPLASM OF RIGHT KIDNEY WITH THROMBUS OF INFERIOR VENA CAVA (H): ICD-10-CM

## 2022-09-23 DIAGNOSIS — N18.4 CKD (CHRONIC KIDNEY DISEASE) STAGE 4, GFR 15-29 ML/MIN (H): ICD-10-CM

## 2022-09-23 DIAGNOSIS — E03.4 HYPOTHYROIDISM DUE TO ACQUIRED ATROPHY OF THYROID: ICD-10-CM

## 2022-09-23 DIAGNOSIS — I82.220 NEOPLASM OF RIGHT KIDNEY WITH THROMBUS OF INFERIOR VENA CAVA (H): ICD-10-CM

## 2022-09-23 PROCEDURE — 74177 CT ABD & PELVIS W/CONTRAST: CPT | Performed by: RADIOLOGY

## 2022-09-23 PROCEDURE — 71260 CT THORAX DX C+: CPT | Performed by: RADIOLOGY

## 2022-09-23 RX ORDER — IOPAMIDOL 755 MG/ML
125 INJECTION, SOLUTION INTRAVASCULAR ONCE
Status: COMPLETED | OUTPATIENT
Start: 2022-09-23 | End: 2022-09-23

## 2022-09-23 RX ADMIN — IOPAMIDOL 125 ML: 755 INJECTION, SOLUTION INTRAVASCULAR at 15:29

## 2022-09-26 DIAGNOSIS — E03.4 HYPOTHYROIDISM DUE TO ACQUIRED ATROPHY OF THYROID: ICD-10-CM

## 2022-09-26 DIAGNOSIS — D49.511 NEOPLASM OF RIGHT KIDNEY WITH THROMBUS OF INFERIOR VENA CAVA (H): ICD-10-CM

## 2022-09-26 DIAGNOSIS — N18.4 CKD (CHRONIC KIDNEY DISEASE) STAGE 4, GFR 15-29 ML/MIN (H): ICD-10-CM

## 2022-09-26 DIAGNOSIS — I82.220 NEOPLASM OF RIGHT KIDNEY WITH THROMBUS OF INFERIOR VENA CAVA (H): ICD-10-CM

## 2022-09-26 DIAGNOSIS — C64.1 RENAL CELL CARCINOMA, RIGHT (H): Primary | ICD-10-CM

## 2022-09-26 RX ORDER — PROCHLORPERAZINE MALEATE 10 MG
10 TABLET ORAL EVERY 6 HOURS PRN
Qty: 60 TABLET | Refills: 11 | Status: SHIPPED | OUTPATIENT
Start: 2022-09-26 | End: 2023-03-16

## 2022-09-27 ENCOUNTER — ONCOLOGY VISIT (OUTPATIENT)
Dept: ONCOLOGY | Facility: CLINIC | Age: 57
End: 2022-09-27
Attending: INTERNAL MEDICINE
Payer: COMMERCIAL

## 2022-09-27 VITALS
OXYGEN SATURATION: 97 % | HEART RATE: 110 BPM | DIASTOLIC BLOOD PRESSURE: 77 MMHG | BODY MASS INDEX: 30.11 KG/M2 | SYSTOLIC BLOOD PRESSURE: 110 MMHG | TEMPERATURE: 98.2 F | RESPIRATION RATE: 16 BRPM | WEIGHT: 222 LBS

## 2022-09-27 DIAGNOSIS — C64.1 RENAL CELL CARCINOMA, RIGHT (H): Primary | ICD-10-CM

## 2022-09-27 DIAGNOSIS — N18.4 CKD (CHRONIC KIDNEY DISEASE) STAGE 4, GFR 15-29 ML/MIN (H): ICD-10-CM

## 2022-09-27 DIAGNOSIS — E03.4 HYPOTHYROIDISM DUE TO ACQUIRED ATROPHY OF THYROID: ICD-10-CM

## 2022-09-27 PROCEDURE — 99215 OFFICE O/P EST HI 40 MIN: CPT | Performed by: INTERNAL MEDICINE

## 2022-09-27 PROCEDURE — G0463 HOSPITAL OUTPT CLINIC VISIT: HCPCS

## 2022-09-27 ASSESSMENT — PAIN SCALES - GENERAL: PAINLEVEL: NO PAIN (0)

## 2022-09-27 NOTE — NURSING NOTE
Oncology Rooming Note    September 27, 2022 9:33 AM   Dimitrios Goldberg is a 57 year old male who presents for:    Chief Complaint   Patient presents with     Oncology Clinic Visit     Kidney cancer     Initial Vitals: /77   Pulse 110   Temp 98.2  F (36.8  C) (Oral)   Resp 16   Wt 100.7 kg (222 lb)   SpO2 97%   BMI 30.11 kg/m   Estimated body mass index is 30.11 kg/m  as calculated from the following:    Height as of 9/13/22: 1.829 m (6').    Weight as of this encounter: 100.7 kg (222 lb). Body surface area is 2.26 meters squared.  No Pain (0) Comment: Data Unavailable   No LMP for male patient.  Allergies reviewed: Yes  Medications reviewed: Yes    Medications: Medication refills not needed today.  Pharmacy name entered into [employer]:    [id number] [place] - [address] [place], [place] - [address] [place] [address]    Clinical concerns: none       Michelle Lara CMA
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
## 2022-09-30 ENCOUNTER — TELEPHONE (OUTPATIENT)
Dept: UROLOGY | Facility: CLINIC | Age: 57
End: 2022-09-30

## 2022-09-30 NOTE — TELEPHONE ENCOUNTER
Patient is still having some fatigue and is unable to return to work just yet. He just started Cabozantinib S-Malate which is also contributing. Will give this patient another 3 weeks out of work.     Anna Orona RN, BSN  Care Coordinator Urology

## 2022-10-04 ENCOUNTER — TELEPHONE (OUTPATIENT)
Dept: ONCOLOGY | Facility: CLINIC | Age: 57
End: 2022-10-04

## 2022-10-04 NOTE — TELEPHONE ENCOUNTER
Oral Chemotherapy Monitoring Program    Subjective/Objective:  Dimitrios Goldberg is a 57 year old male contacted by phone for a follow-up visit for oral chemotherapy.  Dimitrios confirms starting Cabometyx on 9/28/22. He takes 40 mg at night before bed, well beyond 2 hours after eating. He has had no missed doses so far. He denies any side effects.     His blood pressure remains well controlled. Recent readings include:   /81, 105/79, 118/81, 113/86, 110/84.       ORAL CHEMOTHERAPY 9/20/2022 9/22/2022 10/4/2022   Assessment Type New Teach;Initial Work up Refill;Other Initial Follow up   Diagnosis Code Renal Cell Cancer Renal Cell Cancer Renal Cell Cancer   Providers Dr. Beht Campos   Clinic Name/Location [facility]   Drug Name Cabometyx (cabozantinib) Cabometyx (cabozantinib) Cabometyx (cabozantinib)   Dose 40 mg 40 mg 40 mg   Current Schedule Daily Daily Daily   Cycle Details Continuous Continuous Continuous   Start Date of Last Cycle - - 9/28/2022   Doses missed in last 2 weeks - - 0   Adherence Assessment - - Adherent   Adverse Effects - - No AE identified during assessment   Home BPs - - all BPs<140/90   Any new drug interactions? No - No   Is the dose as ordered appropriate for the patient? Yes - Yes       Last PHQ-2 Score on record:   PHQ-2 ( [facility]) 9/19/2022 8/18/2022   Q1: Little interest or pleasure in doing things 0 0   Q2: Feeling down, depressed or hopeless 0 0   PHQ-2 Score 0 0   PHQ-2 Total Score (12-17 Years)- Positive if 3 or more points; Administer PHQ-A if positive - -       Vitals:  BP:   BP Readings from Last 1 Encounters:   09/27/22 110/77     Wt Readings from Last 1 Encounters:   09/27/22 100.7 kg (222 lb)     Estimated body surface area is 2.26 meters squared as calculated from the following:    Height as of 9/13/22: 1.829 m (6').    Weight as of 9/27/22: 100.7 kg (222 lb).    Labs:  _  Result Component Current Result Ref Range   Sodium 140 (9/13/2022) 136 - 145  mmol/L     _  Result Component Current Result Ref Range   Potassium 4.2 (9/13/2022) 3.4 - 5.3 mmol/L     _  Result Component Current Result Ref Range   Calcium 9.5 (9/13/2022) 8.6 - 10.0 mg/dL     No results found for Mag within last 30 days.     No results found for Phos within last 30 days.     _  Result Component Current Result Ref Range   Albumin 4.2 (9/13/2022) 3.5 - 5.2 g/dL     _  Result Component Current Result Ref Range   Urea Nitrogen 13.9 (9/13/2022) 6.0 - 20.0 mg/dL     _  Result Component Current Result Ref Range   Creatinine 1.70 (H) (9/13/2022) 0.67 - 1.17 mg/dL     _  Result Component Current Result Ref Range   AST 28 (9/13/2022) 10 - 50 U/L     _  Result Component Current Result Ref Range   ALT 36 (9/13/2022) 10 - 50 U/L     _  Result Component Current Result Ref Range   Bilirubin Total 0.2 (9/13/2022) <=1.2 mg/dL     _  Result Component Current Result Ref Range   WBC Count 9.3 (9/13/2022) 4.0 - 11.0 10e3/uL     _  Result Component Current Result Ref Range   Hemoglobin 11.0 (L) (9/13/2022) 13.3 - 17.7 g/dL     _  Result Component Current Result Ref Range   Platelet Count 564 (H) (9/13/2022) 150 - 450 10e3/uL     No results found for ANC within last 30 days.     _  Result Component Current Result Ref Range   Absolute Neutrophils 5.9 (9/13/2022) 1.6 - 8.3 10e3/uL        Assessment/Plan:  Dimitrios is tolerating Cabometyx well so far.    Follow-Up:  10/24: onc appt and labs    Marsha Garzon, PharmD  Hematology/Oncology Clinical Pharmacist  [facility]  [facility]  [phone]

## 2022-10-09 DIAGNOSIS — C64.1 RENAL CELL CARCINOMA, RIGHT (H): ICD-10-CM

## 2022-10-12 ENCOUNTER — TELEPHONE (OUTPATIENT)
Dept: UROLOGY | Facility: CLINIC | Age: 57
End: 2022-10-12

## 2022-10-12 NOTE — TELEPHONE ENCOUNTER
[facility] Call Center    Phone Message    May a detailed message be left on voicemail: yes     Reason for Call: The patient and his fiance called stating they emailed work forms last week and the insurance company and his work still has not received them. Please contact patient. Thank you.    Action Taken: Message routed to:  [facility] ([facility] Urology    Travel Screening: Not Applicable

## 2022-10-14 DIAGNOSIS — C64.1 RENAL CELL CARCINOMA, RIGHT (H): Primary | ICD-10-CM

## 2022-10-14 NOTE — TELEPHONE ENCOUNTER
Action 10/14/22 MMT   Action Taken  CSS contacted patient to confirm RTW dates. RTW for completed and faxed to his employer's HR and [employer]. CSS also emailed a copy of the competed forms to patient's email address on file per his request.

## 2022-10-14 NOTE — TELEPHONE ENCOUNTER
Call center called with pt on the line asking aout their paperwork, as of Monday their job has yet to receive it. Dr Agrawal is only in on Tuesdays if the form needed to be signed by him. Writer asking for an update on the forms to call and inform pt.

## 2022-10-14 NOTE — TELEPHONE ENCOUNTER
Pt called back stating he had brought in this paperwork on 9/30 and that his job called him on 10/10 and told him that they still had not received his paperwork. Pt stated they had provided another faxing number other than the one listed on his paperwork which is [phone]. He stated he is willing to bring in a new copy of the paperwork if that will help ensure that his forms are taken care of. Please reach out to pt asap. Thanks

## 2022-10-22 ENCOUNTER — HEALTH MAINTENANCE LETTER (OUTPATIENT)
Age: 57
End: 2022-10-22

## 2022-10-22 ENCOUNTER — NURSE TRIAGE (OUTPATIENT)
Dept: NURSING | Facility: CLINIC | Age: 57
End: 2022-10-22

## 2022-10-22 NOTE — TELEPHONE ENCOUNTER
TRIAGE CALL - Is this a 2nd Level Triage? YES   Nurse Triage SBAR - Patient calling   Situation: Pain on the feet  Side effect of medication (pt stated)  Background:    Cabozantinib S-Malate (CABOMETYX) 40 MG  Started 3 weeks ago  -Take 1 tablet (40 mg) by mouth daily Take on an empty stomach 1 hour before or 2 hours after a meal. Avoid grapefruit and grapefruit juice. - Pt takes it at night.  Assessment:  Pain heel on both feet  Skin is very dry - heel started to peel of  Very sensitive to touch   Hard to walk on   Right foot is more painful than the left - pain is more at the heel of his foot.  This area is red spot where it hurts  Pain 5/10   -  When walk is 7/10  Able to walk but pailful   Pain started last night.  Lightheaded when getting up   Drinking water - Urinated 2 hrs ago  Patients denies numbness, swelling, or discoloration on the foot  Patients denies fever,vomiting, or diarrhea.   Measures taken: None  Patient is able to speak in full long sentences without getting short of breath.  Recommended Disposition per protocol No disposition on file. -2LT   Pt s PCP/Clinic Beth, Nick Jett MD - Hematology & Oncology -[facility]    2LT - RN seeking advice from License practitioner to recommend a safe alternative plan for patient.  RN Paged ONCALL, Via answeing service # [phone] @10:27 AM  RN Paged ONCALL, Via answeing service # [phone] @10:55 AM  Provider ON CALL  - returned the page and heard report and advised:  HOLD medication for the weekend  Applied moisturizer and take Tylenol 500 mg E/6  hrs as needed for pain  Has appt with PA, on Monday and we evaluated tretement    RN called pt and related msg to pt  Patient verbalized understanding and agrees with plan  Joanna Leach RN Nurse Triage Advisor 11:12 AM 10/22/2022

## 2022-10-22 NOTE — TELEPHONE ENCOUNTER
Reason for Disposition    [1] Caller has URGENT medicine question about med that PCP or specialist prescribed AND [2] triager unable to answer question    Protocols used: MEDICATION QUESTION CALL-A-AH

## 2022-10-24 ENCOUNTER — APPOINTMENT (OUTPATIENT)
Dept: LAB | Facility: CLINIC | Age: 57
End: 2022-10-24
Attending: INTERNAL MEDICINE
Payer: COMMERCIAL

## 2022-10-24 ENCOUNTER — ONCOLOGY VISIT (OUTPATIENT)
Dept: ONCOLOGY | Facility: CLINIC | Age: 57
End: 2022-10-24
Attending: INTERNAL MEDICINE
Payer: COMMERCIAL

## 2022-10-24 VITALS
OXYGEN SATURATION: 99 % | DIASTOLIC BLOOD PRESSURE: 92 MMHG | RESPIRATION RATE: 16 BRPM | WEIGHT: 219.8 LBS | SYSTOLIC BLOOD PRESSURE: 123 MMHG | TEMPERATURE: 98.4 F | BODY MASS INDEX: 29.81 KG/M2 | HEART RATE: 106 BPM

## 2022-10-24 DIAGNOSIS — C64.1 RENAL CELL CARCINOMA, RIGHT (H): Primary | ICD-10-CM

## 2022-10-24 DIAGNOSIS — E03.4 HYPOTHYROIDISM DUE TO ACQUIRED ATROPHY OF THYROID: ICD-10-CM

## 2022-10-24 DIAGNOSIS — N18.4 CKD (CHRONIC KIDNEY DISEASE) STAGE 4, GFR 15-29 ML/MIN (H): ICD-10-CM

## 2022-10-24 DIAGNOSIS — E03.9 HYPOTHYROIDISM (ACQUIRED): ICD-10-CM

## 2022-10-24 LAB
ALBUMIN SERPL BCG-MCNC: 4.2 G/DL (ref 3.5–5.2)
ALP SERPL-CCNC: 116 U/L (ref 40–129)
ALT SERPL W P-5'-P-CCNC: 41 U/L (ref 10–50)
ANION GAP SERPL CALCULATED.3IONS-SCNC: 10 MMOL/L (ref 7–15)
AST SERPL W P-5'-P-CCNC: 38 U/L (ref 10–50)
BASOPHILS # BLD AUTO: 0 10E3/UL (ref 0–0.2)
BASOPHILS NFR BLD AUTO: 0 %
BILIRUB SERPL-MCNC: 0.3 MG/DL
BUN SERPL-MCNC: 13.2 MG/DL (ref 6–20)
CALCIUM SERPL-MCNC: 9.8 MG/DL (ref 8.6–10)
CHLORIDE SERPL-SCNC: 102 MMOL/L (ref 98–107)
CREAT SERPL-MCNC: 1.55 MG/DL (ref 0.67–1.17)
DEPRECATED HCO3 PLAS-SCNC: 27 MMOL/L (ref 22–29)
EOSINOPHIL # BLD AUTO: 0.2 10E3/UL (ref 0–0.7)
EOSINOPHIL NFR BLD AUTO: 4 %
ERYTHROCYTE [DISTWIDTH] IN BLOOD BY AUTOMATED COUNT: 13.4 % (ref 10–15)
GFR SERPL CREATININE-BSD FRML MDRD: 52 ML/MIN/1.73M2
GLUCOSE SERPL-MCNC: 85 MG/DL (ref 70–99)
HCT VFR BLD AUTO: 41.4 % (ref 40–53)
HGB BLD-MCNC: 13.3 G/DL (ref 13.3–17.7)
IMM GRANULOCYTES # BLD: 0 10E3/UL
IMM GRANULOCYTES NFR BLD: 0 %
LYMPHOCYTES # BLD AUTO: 2.4 10E3/UL (ref 0.8–5.3)
LYMPHOCYTES NFR BLD AUTO: 45 %
MCH RBC QN AUTO: 28.2 PG (ref 26.5–33)
MCHC RBC AUTO-ENTMCNC: 32.1 G/DL (ref 31.5–36.5)
MCV RBC AUTO: 88 FL (ref 78–100)
MONOCYTES # BLD AUTO: 0.4 10E3/UL (ref 0–1.3)
MONOCYTES NFR BLD AUTO: 7 %
NEUTROPHILS # BLD AUTO: 2.4 10E3/UL (ref 1.6–8.3)
NEUTROPHILS NFR BLD AUTO: 44 %
NRBC # BLD AUTO: 0 10E3/UL
NRBC BLD AUTO-RTO: 0 /100
PLATELET # BLD AUTO: 385 10E3/UL (ref 150–450)
POTASSIUM SERPL-SCNC: 3.8 MMOL/L (ref 3.4–5.3)
PROT SERPL-MCNC: 7.4 G/DL (ref 6.4–8.3)
RBC # BLD AUTO: 4.72 10E6/UL (ref 4.4–5.9)
SODIUM SERPL-SCNC: 139 MMOL/L (ref 136–145)
T4 FREE SERPL-MCNC: 1.37 NG/DL (ref 0.9–1.7)
TSH SERPL DL<=0.005 MIU/L-ACNC: 5.07 UIU/ML (ref 0.3–4.2)
WBC # BLD AUTO: 5.5 10E3/UL (ref 4–11)

## 2022-10-24 PROCEDURE — 85025 COMPLETE CBC W/AUTO DIFF WBC: CPT

## 2022-10-24 PROCEDURE — 36415 COLL VENOUS BLD VENIPUNCTURE: CPT

## 2022-10-24 PROCEDURE — 80053 COMPREHEN METABOLIC PANEL: CPT

## 2022-10-24 PROCEDURE — G0463 HOSPITAL OUTPT CLINIC VISIT: HCPCS

## 2022-10-24 PROCEDURE — 84439 ASSAY OF FREE THYROXINE: CPT

## 2022-10-24 PROCEDURE — 99215 OFFICE O/P EST HI 40 MIN: CPT

## 2022-10-24 PROCEDURE — 84443 ASSAY THYROID STIM HORMONE: CPT

## 2022-10-24 ASSESSMENT — PAIN SCALES - GENERAL: PAINLEVEL: EXTREME PAIN (9)

## 2022-10-24 NOTE — NURSING NOTE
Oncology Rooming Note    October 24, 2022 3:06 PM   Dimitrios Goldberg is a 57 year old male who presents for:    Chief Complaint   Patient presents with     Blood Draw     Labs drawn by RN via , vitals taken.     Oncology Clinic Visit     Renal cell carcinoma, right (H); Hypothyroidism due to acquired atrophy of thyroid; CKD (chronic kidney disease) stage 4, GFR 15-29 ml/min (H); Hypothyroidism (acquired)      Initial Vitals: BP (!) 123/92   Pulse 106   Temp 98.4  F (36.9  C)   Resp 16   Wt 99.7 kg (219 lb 12.8 oz)   SpO2 99%   BMI 29.81 kg/m   Estimated body mass index is 29.81 kg/m  as calculated from the following:    Height as of 9/13/22: 1.829 m (6').    Weight as of this encounter: 99.7 kg (219 lb 12.8 oz). Body surface area is 2.25 meters squared.  Extreme Pain (9) Comment: Data Unavailable   No LMP for male patient.  Allergies reviewed: Yes  Medications reviewed: Yes    Medications: Medication refills not needed today.  Pharmacy name entered into [employer]:    [id number] [place] [facility], [place] - [address], [place] - [address] [facility]    Clinical concerns:     Pt reports having pain in his feet, which is a side-effect of one of his meds.        Lew Graham
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
## 2022-10-31 ENCOUNTER — TELEPHONE (OUTPATIENT)
Dept: ONCOLOGY | Facility: CLINIC | Age: 57
End: 2022-10-31

## 2022-10-31 ENCOUNTER — ONCOLOGY VISIT (OUTPATIENT)
Dept: ONCOLOGY | Facility: CLINIC | Age: 57
End: 2022-10-31
Payer: COMMERCIAL

## 2022-10-31 VITALS
OXYGEN SATURATION: 99 % | HEART RATE: 105 BPM | WEIGHT: 221.4 LBS | TEMPERATURE: 98 F | DIASTOLIC BLOOD PRESSURE: 85 MMHG | BODY MASS INDEX: 30.03 KG/M2 | SYSTOLIC BLOOD PRESSURE: 124 MMHG

## 2022-10-31 DIAGNOSIS — I82.220 NEOPLASM OF RIGHT KIDNEY WITH THROMBUS OF INFERIOR VENA CAVA (H): ICD-10-CM

## 2022-10-31 DIAGNOSIS — L27.1 HAND FOOT SYNDROME: ICD-10-CM

## 2022-10-31 DIAGNOSIS — C64.1 RENAL CELL CARCINOMA, RIGHT (H): Primary | ICD-10-CM

## 2022-10-31 DIAGNOSIS — D49.511 NEOPLASM OF RIGHT KIDNEY WITH THROMBUS OF INFERIOR VENA CAVA (H): ICD-10-CM

## 2022-10-31 PROCEDURE — 99215 OFFICE O/P EST HI 40 MIN: CPT

## 2022-10-31 PROCEDURE — G0463 HOSPITAL OUTPT CLINIC VISIT: HCPCS

## 2022-10-31 ASSESSMENT — PAIN SCALES - GENERAL: PAINLEVEL: MILD PAIN (3)

## 2022-10-31 NOTE — TELEPHONE ENCOUNTER
[facility] paperwork received via pt from [employer]. Will be placed in provider folder for signature upon completion.     Fax: [phone] & [phone]      Lew Graham

## 2022-10-31 NOTE — NURSING NOTE
Oncology Rooming Note    October 31, 2022 10:53 AM   Dimitrios Goldberg is a 57 year old male who presents for:    Chief Complaint   Patient presents with     Oncology Clinic Visit     RTN for Renal Cell Carcinoma     Initial Vitals: Blood Pressure 124/85   Pulse 105   Temperature 98  F (36.7  C) (Oral)   Weight 100.4 kg (221 lb 6.4 oz)   Oxygen Saturation 99%   Body Mass Index 30.03 kg/m   Estimated body mass index is 30.03 kg/m  as calculated from the following:    Height as of 9/13/22: 1.829 m (6').    Weight as of this encounter: 100.4 kg (221 lb 6.4 oz). Body surface area is 2.26 meters squared.  Mild Pain (3) Comment: Data Unavailable   No LMP for male patient.  Allergies reviewed: Yes  Medications reviewed: Yes    Medications: Medication refills not needed today.  Pharmacy name entered into [employer]:    [id number] [place], [place] - [address] [place], [place] - [address] [place] [address]    Clinical concerns: Pain in feet are a little better.        Rolanda Freeman MA
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
## 2022-10-31 NOTE — TELEPHONE ENCOUNTER
Signed form received and faxed to [place] [facility], fax # [phone] & [phone]. Successful transmission verified via rightfax. Copy of forms sent to scanning, original forms mailed to patient's home address on file.    Lew Graham on 10/31/2022 at 12:04 PM

## 2022-10-31 NOTE — TELEPHONE ENCOUNTER
PA Initiation    Medication: Cabometyx 20mg PA Pending  Insurance Company: [employer] - Phone [phone] Fax [phone]  Pharmacy Filling the Rx:    Filling Pharmacy Phone:    Filling Pharmacy Fax:    Start Date: 10/31/2022    Erin Lewis CPhT  [facility]  Oncology Pharmacy [email or web address] [email or web address]  [phone] (phone  [phone] (fax

## 2022-11-02 NOTE — TELEPHONE ENCOUNTER
Prior Authorization Approval    Authorization Effective Date: 10/1/2022  Authorization Expiration Date: 10/31/2023  Medication: Cabometyx 20mg PA Approved  Approved Dose/Quantity: 30/30  Reference #: [id number]   Insurance Company: [employer] - Phone [phone] Fax [phone]  Expected CoPay:       CoPay Card Available:      Foundation Assistance Needed:    Which Pharmacy is filling the prescription (Not needed for infusion/clinic administered): [address]  Pharmacy Notified:    Patient Notified:        Erin Lewis CPhT  [facility]  Oncology Pharmacy Liaison   [email or web address]  [phone] (phone  [phone] (fax

## 2022-11-03 ENCOUNTER — TELEPHONE (OUTPATIENT)
Dept: ONCOLOGY | Facility: CLINIC | Age: 57
End: 2022-11-03

## 2022-11-03 NOTE — ORAL ONC MGMT
Oral Chemotherapy Monitoring Program    Subjective/Objective:  Dimitrios Goldberg is a 57 year old male contacted by phone for a follow-up visit for oral chemotherapy. He was taking cabozantinib, however started having hand foot syndrome, which led to cabozantinib being held since 10/22/22. Per oncology visit note from 10/31/22, he is to hold cabozantinib until HFS has resolved to grade 1. Once resumed, his dose will be reduced to 20 mg daily.    Today, Dimitrios reported that his HFS has improved. However, he still has some pain with walking. The blisters on his feet have started to dry up. He is still waiting for his new prescription at the lower dose to arrive. He reported that he called his pharmacy ([facility]), who said the prescription should arrive on Monday 11/7/22.    Dimitrios also reported that he started noticing some red spots on the front of his legs on both legs last night (11/2/22). These spots are not painful. His platelets and LFTs from 10/24/22 are WNLs.    ORAL CHEMOTHERAPY 9/20/2022 9/22/2022 10/4/2022 10/14/2022 11/3/2022   Assessment Type New Teach;Initial Work up Refill;Other Initial Follow up Refill Other   Diagnosis Code Renal Cell Cancer Renal Cell Cancer Renal Cell Cancer Renal Cell Cancer Renal Cell Cancer   Providers Dr. Beth Campos   [facility]   Drug Name Cabometyx (cabozantinib) Cabometyx (cabozantinib) Cabometyx (cabozantinib) Cabometyx (cabozantinib) Cabometyx (cabozantinib)   Dose 40 mg 40 mg 40 mg 40 mg 40 mg   Current Schedule Daily Daily Daily Daily Daily   Cycle Details Continuous Continuous Continuous Continuous Drug on Hold   Start Date of Last Cycle - - 9/28/2022 - -   Doses missed in last 2 weeks - - 0 - -   Adherence Assessment - - Adherent - -   Adverse Effects - - No AE identified during assessment - Palmar-plantar Erythrodysethesia Syndrome   Palmar-plantar Erythrodysethesia syndrome[hand-foot syndrome] -  - - - Grade 2   Pharmacist Intervention(Palmar-plantar) - - - - Yes   Intervention(s) - - - - (No Data)   Home BPs - - all BPs<140/90 - -   Any new drug interactions? No - No - -   Is the dose as ordered appropriate for the patient? Yes - Yes - -       Last PHQ-2 Score on record:   PHQ-2 ( 1999 [employer]) 9/19/2022 8/18/2022   Q1: Little interest or pleasure in doing things 0 0   Q2: Feeling down, depressed or hopeless 0 0   PHQ-2 Score 0 0   PHQ-2 Total Score (12-17 Years)- Positive if 3 or more points; Administer PHQ-A if positive - -       Vitals:  BP:   BP Readings from Last 1 Encounters:   10/31/22 124/85     Wt Readings from Last 1 Encounters:   10/31/22 100.4 kg (221 lb 6.4 oz)     Estimated body surface area is 2.26 meters squared as calculated from the following:    Height as of 9/13/22: 1.829 m (6').    Weight as of 10/31/22: 100.4 kg (221 lb 6.4 oz).    Labs:  _  Result Component Current Result Ref Range   Sodium 139 (10/24/2022) 136 - 145 mmol/L     _  Result Component Current Result Ref Range   Potassium 3.8 (10/24/2022) 3.4 - 5.3 mmol/L     _  Result Component Current Result Ref Range   Calcium 9.8 (10/24/2022) 8.6 - 10.0 mg/dL     No results found for Mag within last 30 days.     No results found for Phos within last 30 days.     _  Result Component Current Result Ref Range   Albumin 4.2 (10/24/2022) 3.5 - 5.2 g/dL     _  Result Component Current Result Ref Range   Urea Nitrogen 13.2 (10/24/2022) 6.0 - 20.0 mg/dL     _  Result Component Current Result Ref Range   Creatinine 1.55 (H) (10/24/2022) 0.67 - 1.17 mg/dL     _  Result Component Current Result Ref Range   AST 38 (10/24/2022) 10 - 50 U/L     _  Result Component Current Result Ref Range   ALT 41 (10/24/2022) 10 - 50 U/L     _  Result Component Current Result Ref Range   Bilirubin Total 0.3 (10/24/2022) <=1.2 mg/dL     _  Result Component Current Result Ref Range   WBC Count 5.5 (10/24/2022) 4.0 - 11.0 10e3/uL     _  Result Component Current Result  Ref Range   Hemoglobin 13.3 (10/24/2022) 13.3 - 17.7 g/dL     _  Result Component Current Result Ref Range   Platelet Count 385 (10/24/2022) 150 - 450 10e3/uL     No results found for ANC within last 30 days.     _  Result Component Current Result Ref Range   Absolute Neutrophils 2.4 (10/24/2022) 1.6 - 8.3 10e3/uL          Assessment/Plan:  Dimitrios's HFS is close to grade 1, and he likely can restart his cabozantinib in a few days. His new prescription will not arrive until Monday 11/7/22, so we will plan to reach out to him again then to assess his HFS and restart the medication.    The new red spots on his legs are unlikely to be related to the cabozantinib as he has been off it since 10/22/22. Will send an IB message to Dr. Campos/Krystal Valenzuela for input.    Follow-Up:  Will plan to follow up with Dimitrios via phone on 11/7/22 to check if his new prescription has arrived, and whether his HFS has improved sufficiently to restart the drug.    Refill Due:  A new prescription was sent for the 20 mg dose of cabozantinib on 10/31/22 for a 30-day supply.    Elisabet Jennings, PharmD, BCPS  Hematology/Oncology Clinical Pharmacist  Oral Chemotherapy Monitoring Program  [facility]  [phone]

## 2022-11-03 NOTE — TELEPHONE ENCOUNTER
Call from provider who received a message regarding new symptoms from the pharmacist how reached out to patient regarding his oral chemotherapy.     He saw Krystal Lermayair BARLOW on Monday 10/31 and at that time they discussed his hand-foot issues and he started Aquaphor and unscented lotion twice daily.  He stated to the pharmacist today per review of that note that this has improved.    He reported new red spots on his leg that he just happened to notice this am after getting out of the shower.  He states he noted 4 spots each on the anterior aspect of his legs bilaterally between his knees and ankles. He describes this as just a flat pink discoloration, nothing red, raised, or open or draining.  He states they are the size of the tip of his finger.  They are non-painful.  They are not itchy.  He is not sure if they were blanchable and is currently not able to assess as he is in his car.    He denies trauma.  No new soaps lotions or detergents with the exception of the Aquaphor he started on Monday.   He has not seen any more pop up during the day today.    He is otherwise well, denying fever, chills, nausea, vomiting, chest pain, shortness of breath, dizziness, or any unusual bleeding or bruising.    Labs reviewed:   PLT 10/24/2022 = 385.000  WBC 10/24/2022 5.5  LFTS 10/24/2022 within normal limits.     He will see if he can send in a photo via [employer] when he gets home.    He is advised to continue to closely monitor and call with any changes or worsening.  Red flag symptoms reviewed with him, including but not limited to worsening skin changes, redness, warmth, swelling to his skin or drainage from the areas, fever, shaking chills, or any unusual bleeding or bruising, and when to seek emergent evaluation and he states understanding.      Routed to team for further review and  recommendations.

## 2022-11-07 ENCOUNTER — TELEPHONE (OUTPATIENT)
Dept: ONCOLOGY | Facility: CLINIC | Age: 57
End: 2022-11-07

## 2022-11-07 ENCOUNTER — PATIENT OUTREACH (OUTPATIENT)
Dept: ONCOLOGY | Facility: CLINIC | Age: 57
End: 2022-11-07

## 2022-11-07 NOTE — TELEPHONE ENCOUNTER
Oral Chemotherapy Monitoring Program.    Placed call to patient in follow-up of Cabozantinib treatment.    Phone call got cut off.  Will call tomorrow as new dosage not due to arrive until today.    Jessica Cedeño, PharmD, BCOP, BCPS  Clinical Pharmacy Specialist  [facility]  Phone [phone]

## 2022-11-08 ENCOUNTER — TELEPHONE (OUTPATIENT)
Dept: ONCOLOGY | Facility: CLINIC | Age: 57
End: 2022-11-08

## 2022-11-08 NOTE — ORAL ONC MGMT
Oral Chemotherapy Monitoring Program     Placed call to patient in follow up of oral chemotherapy, Cabometyx. Patient received 20 mg dosage form yesterday, and started it yesterday.  HFS resolved.    Will reach out to patient in 1 week (11/14) to see how lower dosage is going.    Priscila Cat, PharmD  Oral Chemotherapy Monitoring Program  [facility]  [phone]

## 2022-11-08 NOTE — PROGRESS NOTES
"[facility]: Cancer Care Short Note                                                                                          Incoming Call:     Inbasket from Krystal Valenzuela regarding pt's report of red spots on legs to pharmacy:    \"Ayla,   Can you please call Dimitrios on Monday 11/07/22 to re-assess his legs? I can see him in person if needed sometime next week.\"     Outgoing Call:     Pt stated red blotches are unchanged and do not bother him - they are flat, not raised, not itchy, and denies any changes in personal care products, laundry detergents or clothing.     Updated Krystal via [employer] who stated to offer pt an in-person visit this week to assess if pt is agreeable.      TC to pt who accepted offer to have in-person visit with Krystal. Message sent to scheduling to make appt and notify pt.     Ayla Yoder, MSN, RN, OCN  Oncology Care Coordinator  [facility]      "

## 2022-11-09 ENCOUNTER — ONCOLOGY VISIT (OUTPATIENT)
Dept: ONCOLOGY | Facility: CLINIC | Age: 57
End: 2022-11-09
Attending: INTERNAL MEDICINE
Payer: COMMERCIAL

## 2022-11-09 VITALS
DIASTOLIC BLOOD PRESSURE: 84 MMHG | BODY MASS INDEX: 31.02 KG/M2 | HEART RATE: 105 BPM | RESPIRATION RATE: 18 BRPM | WEIGHT: 228.7 LBS | TEMPERATURE: 98.6 F | OXYGEN SATURATION: 98 % | SYSTOLIC BLOOD PRESSURE: 135 MMHG

## 2022-11-09 DIAGNOSIS — C64.1 RENAL CELL CARCINOMA, RIGHT (H): Primary | ICD-10-CM

## 2022-11-09 DIAGNOSIS — R21 RASH AND NONSPECIFIC SKIN ERUPTION: ICD-10-CM

## 2022-11-09 PROCEDURE — G0463 HOSPITAL OUTPT CLINIC VISIT: HCPCS

## 2022-11-09 PROCEDURE — 99214 OFFICE O/P EST MOD 30 MIN: CPT

## 2022-11-09 ASSESSMENT — PAIN SCALES - GENERAL: PAINLEVEL: NO PAIN (0)
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
## 2022-11-09 NOTE — NURSING NOTE
Oncology Rooming Note    November 9, 2022 1:52 PM   Dimitrios Goldberg is a 57 year old male who presents for:    Chief Complaint   Patient presents with     Oncology Clinic Visit     Renal cell carcinoma      Initial Vitals: /84   Pulse 105   Temp 98.6  F (37  C) (Oral)   Resp 18   Wt 103.7 kg (228 lb 11.2 oz)   SpO2 98%   BMI 31.02 kg/m   Estimated body mass index is 31.02 kg/m  as calculated from the following:    Height as of 9/13/22: 1.829 m (6').    Weight as of this encounter: 103.7 kg (228 lb 11.2 oz). Body surface area is 2.3 meters squared.  No Pain (0) Comment: Data Unavailable   No LMP for male patient.  Allergies reviewed: Yes  Medications reviewed: Yes    Medications: Medication refills not needed today.  Pharmacy name entered into [employer]:    [id number] [place] [facility], [place] - [address], [place] - [address] [facility]  [facility]

## 2022-11-14 ENCOUNTER — TELEPHONE (OUTPATIENT)
Dept: ONCOLOGY | Facility: CLINIC | Age: 57
End: 2022-11-14

## 2022-11-14 NOTE — TELEPHONE ENCOUNTER
Oral Chemotherapy Monitoring Program    Subjective/Objective:  Dimitrios Goldberg is a 57 year old male contacted by phone for a follow-up visit for oral chemotherapy.  Pt confirms taking the appropriate dose of Cabometyx, 20mg daily starting 11/7/22. Denies missed doses, and recent hospital or ED visits. Patient has not had any recent medication changes. Pt states he is not having any foot pain or blisters since restarting Cabometyx at the lower dose.  His feet are still dry, which is an ongoing problem for him.  He states that he is using aquaphor BID, and this seems to be helping the dryness.  He has no other complaints at this time.    ORAL CHEMOTHERAPY 9/20/2022 9/22/2022 10/4/2022 10/14/2022 11/3/2022 11/8/2022 11/14/2022   Assessment Type New Teach;Initial Work up Refill;Other Initial Follow up Refill Other Other Initial Follow up   Diagnosis Code Renal Cell Cancer Renal Cell Cancer Renal Cell Cancer Renal Cell Cancer Renal Cell Cancer Renal Cell Cancer Renal Cell Cancer   Providers Dr. Beth Campos   [facility]   Drug Name Cabometyx (cabozantinib) Cabometyx (cabozantinib) Cabometyx (cabozantinib) Cabometyx (cabozantinib) Cabometyx (cabozantinib) Cabometyx (cabozantinib) Cabometyx (cabozantinib)   Dose 40 mg 40 mg 40 mg 40 mg 40 mg 20 mg 20 mg   Current Schedule Daily Daily Daily Daily Daily Daily Daily   Cycle Details Continuous Continuous Continuous Continuous Drug on Hold Continuous Continuous   Start Date of Last Cycle - - 9/28/2022 - - - 11/7/2022   Doses missed in last 2 weeks - - 0 - - - 0   Adherence Assessment - - Adherent - - - Adherent   Adverse Effects - - No AE identified during assessment - Palmar-plantar Erythrodysethesia Syndrome Palmar-plantar Erythrodysethesia Syndrome Palmar-plantar Erythrodysethesia Syndrome   Palmar-plantar Erythrodysethesia syndrome[hand-foot syndrome] - - - - Grade 2  Grade 1 Grade 1   Pharmacist Intervention(Palmar-plantar) - - - - Yes No No   Intervention(s) - - - - (No Data) - -   Home BPs - - all BPs<140/90 - - - -   Any new drug interactions? No - No - - - No   Is the dose as ordered appropriate for the patient? Yes - Yes - - - Yes   Since the last time we talked, have you been hospitalized or used the emergency room? - - - - - - No       Last PHQ-2 Score on record:   PHQ-2 ( [facility]) 9/19/2022 8/18/2022   Q1: Little interest or pleasure in doing things 0 0   Q2: Feeling down, depressed or hopeless 0 0   PHQ-2 Score 0 0   PHQ-2 Total Score (12-17 Years)- Positive if 3 or more points; Administer PHQ-A if positive - -       Vitals:  BP:   BP Readings from Last 1 Encounters:   11/09/22 135/84     Wt Readings from Last 1 Encounters:   11/09/22 103.7 kg (228 lb 11.2 oz)     Estimated body surface area is 2.3 meters squared as calculated from the following:    Height as of 9/13/22: 1.829 m (6').    Weight as of 11/9/22: 103.7 kg (228 lb 11.2 oz).    Labs:  _  Result Component Current Result Ref Range   Sodium 139 (10/24/2022) 136 - 145 mmol/L     _  Result Component Current Result Ref Range   Potassium 3.8 (10/24/2022) 3.4 - 5.3 mmol/L     _  Result Component Current Result Ref Range   Calcium 9.8 (10/24/2022) 8.6 - 10.0 mg/dL     No results found for Mag within last 30 days.     No results found for Phos within last 30 days.     _  Result Component Current Result Ref Range   Albumin 4.2 (10/24/2022) 3.5 - 5.2 g/dL     _  Result Component Current Result Ref Range   Urea Nitrogen 13.2 (10/24/2022) 6.0 - 20.0 mg/dL     _  Result Component Current Result Ref Range   Creatinine 1.55 (H) (10/24/2022) 0.67 - 1.17 mg/dL     _  Result Component Current Result Ref Range   AST 38 (10/24/2022) 10 - 50 U/L     _  Result Component Current Result Ref Range   ALT 41 (10/24/2022) 10 - 50 U/L     _  Result Component Current Result Ref Range   Bilirubin Total 0.3 (10/24/2022) <=1.2 mg/dL      _  Result Component Current Result Ref Range   WBC Count 5.5 (10/24/2022) 4.0 - 11.0 10e3/uL     _  Result Component Current Result Ref Range   Hemoglobin 13.3 (10/24/2022) 13.3 - 17.7 g/dL     _  Result Component Current Result Ref Range   Platelet Count 385 (10/24/2022) 150 - 450 10e3/uL     No results found for ANC within last 30 days.     _  Result Component Current Result Ref Range   Absolute Neutrophils 2.4 (10/24/2022) 1.6 - 8.3 10e3/uL      Assessment/Plan:  Pt is tolerating the reduced dose of Cabometyx 20mg daily well so far. Continue current therapy as planned.    Follow-Up:  11/28: Review appt with Krystal and labs Grace Myhre, PharmD  Hematology/Oncology Pharmacist  [facility]  [phone]

## 2022-11-17 ENCOUNTER — TELEPHONE (OUTPATIENT)
Dept: ONCOLOGY | Facility: CLINIC | Age: 57
End: 2022-11-17

## 2022-11-17 DIAGNOSIS — I12.9 HYPERTENSION, RENAL: ICD-10-CM

## 2022-11-17 RX ORDER — AMLODIPINE BESYLATE 5 MG/1
10 TABLET ORAL DAILY
Qty: 120 TABLET | Refills: 3 | OUTPATIENT
Start: 2022-11-17

## 2022-11-17 NOTE — TELEPHONE ENCOUNTER
Medication: amlodipine - patient calls as the pharmacy is telling him he     Last prescribing provider: Dr. Alan    Last clinic visit date: 11/9/2022 LUCIO Valenzuela.    Chart reviewed and he has refills.    Call to pharmacy and they do have refills.    He is called and informed.

## 2022-11-21 ENCOUNTER — PATIENT OUTREACH (OUTPATIENT)
Dept: ONCOLOGY | Facility: CLINIC | Age: 57
End: 2022-11-21

## 2022-11-21 DIAGNOSIS — E03.4 HYPOTHYROIDISM DUE TO ACQUIRED ATROPHY OF THYROID: ICD-10-CM

## 2022-11-21 RX ORDER — LEVOTHYROXINE SODIUM 100 UG/1
100 TABLET ORAL DAILY
Qty: 30 TABLET | Refills: 1 | Status: SHIPPED | OUTPATIENT
Start: 2022-11-21 | End: 2023-01-23

## 2022-11-21 NOTE — PROGRESS NOTES
[facility]: Cancer Care Short Note                                                                                          Incoming Call:     VM from pt stating he needs a refill on a medication per his pharmacy     Outgoing Call:     TC to pt who stated he has one day left of his levothyroxine and was told to call his doctor's office. Told pt writer will reach out to care team as it's unclear who should refill this medication. Pt stated understanding of all and agreed to call clinic with any questions or concerns. Inbasket sent to Dr. Campos, Krystal Valenzuela, and Dr. Alan asking them to review and refill.     Ayla Yoder, MSN, RN, OCN  Oncology Care Coordinator  [facility]
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
## 2022-11-28 ENCOUNTER — ONCOLOGY VISIT (OUTPATIENT)
Dept: ONCOLOGY | Facility: CLINIC | Age: 57
End: 2022-11-28
Payer: COMMERCIAL

## 2022-11-28 ENCOUNTER — APPOINTMENT (OUTPATIENT)
Dept: LAB | Facility: CLINIC | Age: 57
End: 2022-11-28
Payer: COMMERCIAL

## 2022-11-28 VITALS
SYSTOLIC BLOOD PRESSURE: 126 MMHG | TEMPERATURE: 98.5 F | HEART RATE: 100 BPM | WEIGHT: 225.4 LBS | RESPIRATION RATE: 18 BRPM | OXYGEN SATURATION: 97 % | BODY MASS INDEX: 30.57 KG/M2 | DIASTOLIC BLOOD PRESSURE: 86 MMHG

## 2022-11-28 DIAGNOSIS — C64.1 RENAL CELL CARCINOMA, RIGHT (H): ICD-10-CM

## 2022-11-28 DIAGNOSIS — E03.4 HYPOTHYROIDISM DUE TO ACQUIRED ATROPHY OF THYROID: ICD-10-CM

## 2022-11-28 DIAGNOSIS — C64.1 RENAL CELL CARCINOMA, RIGHT (H): Primary | ICD-10-CM

## 2022-11-28 DIAGNOSIS — N18.4 CKD (CHRONIC KIDNEY DISEASE) STAGE 4, GFR 15-29 ML/MIN (H): ICD-10-CM

## 2022-11-28 LAB
ALBUMIN SERPL BCG-MCNC: 4.3 G/DL (ref 3.5–5.2)
ALP SERPL-CCNC: 90 U/L (ref 40–129)
ALT SERPL W P-5'-P-CCNC: 20 U/L (ref 10–50)
ANION GAP SERPL CALCULATED.3IONS-SCNC: 11 MMOL/L (ref 7–15)
AST SERPL W P-5'-P-CCNC: 41 U/L (ref 10–50)
BASOPHILS # BLD AUTO: 0 10E3/UL (ref 0–0.2)
BASOPHILS NFR BLD AUTO: 0 %
BILIRUB SERPL-MCNC: 0.3 MG/DL
BUN SERPL-MCNC: 14.7 MG/DL (ref 6–20)
CALCIUM SERPL-MCNC: 9.6 MG/DL (ref 8.6–10)
CHLORIDE SERPL-SCNC: 102 MMOL/L (ref 98–107)
CREAT SERPL-MCNC: 1.6 MG/DL (ref 0.67–1.17)
DEPRECATED HCO3 PLAS-SCNC: 25 MMOL/L (ref 22–29)
EOSINOPHIL # BLD AUTO: 0.2 10E3/UL (ref 0–0.7)
EOSINOPHIL NFR BLD AUTO: 3 %
ERYTHROCYTE [DISTWIDTH] IN BLOOD BY AUTOMATED COUNT: 16.2 % (ref 10–15)
GFR SERPL CREATININE-BSD FRML MDRD: 50 ML/MIN/1.73M2
GLUCOSE SERPL-MCNC: 89 MG/DL (ref 70–99)
HCT VFR BLD AUTO: 40.7 % (ref 40–53)
HGB BLD-MCNC: 13.1 G/DL (ref 13.3–17.7)
IMM GRANULOCYTES # BLD: 0 10E3/UL
IMM GRANULOCYTES NFR BLD: 0 %
LYMPHOCYTES # BLD AUTO: 2.7 10E3/UL (ref 0.8–5.3)
LYMPHOCYTES NFR BLD AUTO: 40 %
MCH RBC QN AUTO: 28.1 PG (ref 26.5–33)
MCHC RBC AUTO-ENTMCNC: 32.2 G/DL (ref 31.5–36.5)
MCV RBC AUTO: 87 FL (ref 78–100)
MONOCYTES # BLD AUTO: 0.3 10E3/UL (ref 0–1.3)
MONOCYTES NFR BLD AUTO: 5 %
NEUTROPHILS # BLD AUTO: 3.5 10E3/UL (ref 1.6–8.3)
NEUTROPHILS NFR BLD AUTO: 52 %
NRBC # BLD AUTO: 0 10E3/UL
NRBC BLD AUTO-RTO: 0 /100
PLATELET # BLD AUTO: 358 10E3/UL (ref 150–450)
POTASSIUM SERPL-SCNC: 3.8 MMOL/L (ref 3.4–5.3)
PROT SERPL-MCNC: 7.3 G/DL (ref 6.4–8.3)
RBC # BLD AUTO: 4.66 10E6/UL (ref 4.4–5.9)
SODIUM SERPL-SCNC: 138 MMOL/L (ref 136–145)
T4 FREE SERPL-MCNC: 0.61 NG/DL (ref 0.9–1.7)
TSH SERPL DL<=0.005 MIU/L-ACNC: 27.96 UIU/ML (ref 0.3–4.2)
WBC # BLD AUTO: 6.8 10E3/UL (ref 4–11)

## 2022-11-28 PROCEDURE — 80053 COMPREHEN METABOLIC PANEL: CPT

## 2022-11-28 PROCEDURE — 84443 ASSAY THYROID STIM HORMONE: CPT

## 2022-11-28 PROCEDURE — G0463 HOSPITAL OUTPT CLINIC VISIT: HCPCS

## 2022-11-28 PROCEDURE — 85025 COMPLETE CBC W/AUTO DIFF WBC: CPT

## 2022-11-28 PROCEDURE — 84439 ASSAY OF FREE THYROXINE: CPT

## 2022-11-28 PROCEDURE — 99214 OFFICE O/P EST MOD 30 MIN: CPT

## 2022-11-28 PROCEDURE — 36415 COLL VENOUS BLD VENIPUNCTURE: CPT

## 2022-11-28 ASSESSMENT — PAIN SCALES - GENERAL: PAINLEVEL: NO PAIN (0)
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
## 2022-11-28 NOTE — NURSING NOTE
Oncology Rooming Note    November 28, 2022 3:56 PM   Dimitrios Goldberg is a 57 year old male who presents for:    Chief Complaint   Patient presents with     Blood Draw     Labs drawn via  by RN in lab. VS taken.      Oncology Clinic Visit     Renal cell carcinoma, right (H)     Initial Vitals: /86 (BP Location: Right arm, Patient Position: Sitting, Cuff Size: Adult Regular)   Pulse 100   Temp 98.5  F (36.9  C) (Oral)   Resp 18   Wt 102.2 kg (225 lb 6.4 oz)   SpO2 97%   BMI 30.57 kg/m   Estimated body mass index is 30.57 kg/m  as calculated from the following:    Height as of 9/13/22: 1.829 m (6').    Weight as of this encounter: 102.2 kg (225 lb 6.4 oz). Body surface area is 2.28 meters squared.  No Pain (0) Comment: Data Unavailable   No LMP for male patient.  Allergies reviewed: Yes  Medications reviewed: Yes    Medications: MEDICATION REFILLS NEEDED TODAY. Provider was notified.  Pharmacy name entered into [facility]:    [id number] [place] - [address], [place] - [address] [place] [address]    Clinical concerns: Please refill levothyroxine.        Denice Reyes LPN November 28, 2022 3:57 PM

## 2022-11-28 NOTE — NURSING NOTE
Chief Complaint   Patient presents with     Blood Draw     Labs drawn via  by RN in lab. VS taken.      Labs collected from venipuncture by RN. Vitals taken. Checked in for appointment(s).    Cindy Kwon RN

## 2022-11-29 DIAGNOSIS — C64.1 RENAL CELL CARCINOMA, RIGHT (H): Primary | ICD-10-CM

## 2022-12-02 ENCOUNTER — APPOINTMENT (OUTPATIENT)
Dept: LAB | Facility: CLINIC | Age: 57
End: 2022-12-02
Attending: INTERNAL MEDICINE
Payer: COMMERCIAL

## 2022-12-02 ENCOUNTER — ONCOLOGY VISIT (OUTPATIENT)
Dept: ONCOLOGY | Facility: CLINIC | Age: 57
End: 2022-12-02
Attending: INTERNAL MEDICINE
Payer: COMMERCIAL

## 2022-12-02 ENCOUNTER — MYC MEDICAL ADVICE (OUTPATIENT)
Dept: ONCOLOGY | Facility: CLINIC | Age: 57
End: 2022-12-02

## 2022-12-02 ENCOUNTER — LAB (OUTPATIENT)
Dept: LAB | Facility: CLINIC | Age: 57
End: 2022-12-02
Payer: COMMERCIAL

## 2022-12-02 VITALS
TEMPERATURE: 98.4 F | OXYGEN SATURATION: 98 % | DIASTOLIC BLOOD PRESSURE: 85 MMHG | RESPIRATION RATE: 16 BRPM | HEART RATE: 98 BPM | SYSTOLIC BLOOD PRESSURE: 124 MMHG | WEIGHT: 227.3 LBS | BODY MASS INDEX: 30.83 KG/M2

## 2022-12-02 DIAGNOSIS — C64.1 RENAL CELL CARCINOMA, RIGHT (H): ICD-10-CM

## 2022-12-02 DIAGNOSIS — R19.5 DARK STOOLS: ICD-10-CM

## 2022-12-02 DIAGNOSIS — C64.1 RENAL CELL CARCINOMA, RIGHT (H): Primary | ICD-10-CM

## 2022-12-02 DIAGNOSIS — R19.5 DARK STOOLS: Primary | ICD-10-CM

## 2022-12-02 LAB
BASOPHILS # BLD AUTO: 0 10E3/UL (ref 0–0.2)
BASOPHILS NFR BLD AUTO: 0 %
EOSINOPHIL # BLD AUTO: 0.3 10E3/UL (ref 0–0.7)
EOSINOPHIL NFR BLD AUTO: 5 %
ERYTHROCYTE [DISTWIDTH] IN BLOOD BY AUTOMATED COUNT: 16.4 % (ref 10–15)
HCT VFR BLD AUTO: 40.8 % (ref 40–53)
HGB BLD-MCNC: 13.1 G/DL (ref 13.3–17.7)
IMM GRANULOCYTES # BLD: 0 10E3/UL
IMM GRANULOCYTES NFR BLD: 0 %
LYMPHOCYTES # BLD AUTO: 2.1 10E3/UL (ref 0.8–5.3)
LYMPHOCYTES NFR BLD AUTO: 43 %
MCH RBC QN AUTO: 28 PG (ref 26.5–33)
MCHC RBC AUTO-ENTMCNC: 32.1 G/DL (ref 31.5–36.5)
MCV RBC AUTO: 87 FL (ref 78–100)
MONOCYTES # BLD AUTO: 0.3 10E3/UL (ref 0–1.3)
MONOCYTES NFR BLD AUTO: 6 %
NEUTROPHILS # BLD AUTO: 2.3 10E3/UL (ref 1.6–8.3)
NEUTROPHILS NFR BLD AUTO: 46 %
NRBC # BLD AUTO: 0 10E3/UL
NRBC BLD AUTO-RTO: 0 /100
PLATELET # BLD AUTO: 308 10E3/UL (ref 150–450)
RBC # BLD AUTO: 4.68 10E6/UL (ref 4.4–5.9)
RETIC HEMOGLOBIN: 33.4 PG (ref 28.2–35.7)
RETICS # AUTO: 0.03 10E6/UL (ref 0.03–0.1)
RETICS/RBC NFR AUTO: 0.7 % (ref 0.5–2)
WBC # BLD AUTO: 5 10E3/UL (ref 4–11)

## 2022-12-02 PROCEDURE — 36415 COLL VENOUS BLD VENIPUNCTURE: CPT

## 2022-12-02 PROCEDURE — 99215 OFFICE O/P EST HI 40 MIN: CPT

## 2022-12-02 PROCEDURE — 85046 RETICYTE/HGB CONCENTRATE: CPT

## 2022-12-02 PROCEDURE — G0463 HOSPITAL OUTPT CLINIC VISIT: HCPCS

## 2022-12-02 PROCEDURE — 85004 AUTOMATED DIFF WBC COUNT: CPT

## 2022-12-02 ASSESSMENT — PAIN SCALES - GENERAL: PAINLEVEL: MILD PAIN (3)

## 2022-12-02 NOTE — TELEPHONE ENCOUNTER
"Is getting scared that the hand foot syndrome is coming back.   Feet are painful when he stands up, No pain in feet when sitting down and not moving feet. More pain in left foot than right, more towards heel area. No blisters, Feet are normal pinkish color.   Not taking anything currently for the pain.   Is using lotion on and off on the feet. Advised it is important to use lotion to feet at least twice daily.     Having some diarrhea, Has been having stools 2-3 times per day for past couple of days. Stools are dark in color and are more pudding to watery consistency. Has not eaten anything new lately. If having more watery stools can use imodium 2 tabs after first loose stool and 1 tablet after each subsequent stool up to 8 tablets per day.     Per Krystal Valenzuela:   Can you clarify what \"dark in color\" means for his stools? Are they black? Melena? Brown? Has he been taking pepto bismol (can turn stools dark).     Please have him try urea cream OTC such has utterly smooth or any other brand if it is not an ingredient already listed in the lotion he is using. He needs to lotion at least twice a day as mentioned, avoid overly hot showers, and especially lotion after showering.     Call placed to [facility] and he is having black tarry stools. Stools it is clingy to the toilet paper stools are not watery.   Has not been using pepto bismal. No bright red blood in stools.   Not having any abdominal pain/stomach aches. No nausea vomiting or light headedness.   No rectal pain.     Advised Dimitrios on using Utterly smooth twice daily and to avoid hot showers and use cream after showering.     Dimitrios states he is going to try using walking boot, he feels like his feet are going to in the same direction as last time. He is concerned that within a day or two he will have blisters on his feet. His family is wondering if they can try Valcyclovir for his feet or if he should stop his chemo meds again?     11:14 Paged Krystal Valenzuela   11:25 " Krystal Valenzuela called back. Patient should be evaluated regarding black tarry stools, she will let this writer know next steps.     11:49    Please let Dimitrios Yusuf know that I can see him but if there is any signs of a GI bleed he will have to go to the ER. Alternatively, he can go to the ER. -- If he decides he wants to come see me, he needs labs and vitals between now and 1 pm. I will see him at 2:30pm. I need his labs done early so they are resulted before our visit. For labs- he needs a CBC and reticulocyte count.    11:55   Call placed to Dimitrios explained above, his wife is currently at a . Would like call back in 15 minutes.     12:15 Call placed to Dimitrios and he will take the 1:00pm appt with labs and VS and 2:30 with Krystal Valenzuela.     Message sent to [facility] to schedule appts and orders for CBC and retic count placed.
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
## 2022-12-02 NOTE — NURSING NOTE
Oncology Rooming Note    December 2, 2022 1:54 PM   Dimitrios Goldberg is a 57 year old male who presents for:    Chief Complaint   Patient presents with     Blood Draw     Vitals taken, venipuncture labs drawn, checked into next appt     Oncology Clinic Visit     Renal Cell Right Kidney     Initial Vitals: /85 (BP Location: Left arm, Patient Position: Sitting, Cuff Size: Adult Large)   Pulse 98   Temp 98.4  F (36.9  C) (Oral)   Resp 16   Wt 103.1 kg (227 lb 4.8 oz)   SpO2 98%   BMI 30.83 kg/m   Estimated body mass index is 30.83 kg/m  as calculated from the following:    Height as of 9/13/22: 1.829 m (6').    Weight as of this encounter: 103.1 kg (227 lb 4.8 oz). Body surface area is 2.29 meters squared.  Mild Pain (3) Comment: Data Unavailable   No LMP for male patient.  Allergies reviewed: Yes  Medications reviewed: Yes    Medications: Medication refills not needed today.  Pharmacy name entered into [facility]:    [id number] [place] [facility], [place] - [address] [place], [place] - [address] [place] [address]    Clinical concerns: Pt presents today for f/u.       Terri Ellison LPN  12/2/2022

## 2022-12-02 NOTE — NURSING NOTE
Chief Complaint   Patient presents with     Blood Draw     Vitals taken, venipuncture labs drawn, checked into next appt     /85 (BP Location: Left arm, Patient Position: Sitting, Cuff Size: Adult Large)   Pulse 98   Temp 98.4  F (36.9  C) (Oral)   Resp 16   Wt 103.1 kg (227 lb 4.8 oz)   SpO2 98%   BMI 30.83 kg/m    Donny German RN on 12/2/2022 at 1:35 PM
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
## 2022-12-04 PROCEDURE — 87506 IADNA-DNA/RNA PROBE TQ 6-11: CPT | Mod: 90 | Performed by: PATHOLOGY

## 2022-12-04 PROCEDURE — 87493 C DIFF AMPLIFIED PROBE: CPT | Mod: 90 | Performed by: PATHOLOGY

## 2022-12-04 PROCEDURE — 99000 SPECIMEN HANDLING OFFICE-LAB: CPT | Performed by: PATHOLOGY

## 2022-12-04 PROCEDURE — 82274 ASSAY TEST FOR BLOOD FECAL: CPT | Mod: 90 | Performed by: PATHOLOGY

## 2022-12-05 ENCOUNTER — TELEPHONE (OUTPATIENT)
Dept: GASTROENTEROLOGY | Facility: CLINIC | Age: 57
End: 2022-12-05

## 2022-12-05 ENCOUNTER — LAB (OUTPATIENT)
Dept: LAB | Facility: CLINIC | Age: 57
End: 2022-12-05
Payer: COMMERCIAL

## 2022-12-05 DIAGNOSIS — E03.9 HYPOTHYROIDISM (ACQUIRED): ICD-10-CM

## 2022-12-05 DIAGNOSIS — E55.9 VITAMIN D DEFICIENCY: ICD-10-CM

## 2022-12-05 DIAGNOSIS — C64.1 RENAL CELL CARCINOMA, RIGHT (H): ICD-10-CM

## 2022-12-05 DIAGNOSIS — I12.9 HYPERTENSION, RENAL: ICD-10-CM

## 2022-12-05 LAB
ALBUMIN MFR UR ELPH: 19.7 MG/DL
ALBUMIN SERPL BCG-MCNC: 4.2 G/DL (ref 3.5–5.2)
ALBUMIN UR-MCNC: 10 MG/DL
ALP SERPL-CCNC: 90 U/L (ref 40–129)
ALT SERPL W P-5'-P-CCNC: 23 U/L (ref 10–50)
ANION GAP SERPL CALCULATED.3IONS-SCNC: 10 MMOL/L (ref 7–15)
APPEARANCE UR: CLEAR
AST SERPL W P-5'-P-CCNC: 31 U/L (ref 10–50)
BASOPHILS # BLD AUTO: 0 10E3/UL (ref 0–0.2)
BASOPHILS NFR BLD AUTO: 1 %
BILIRUB SERPL-MCNC: 0.3 MG/DL
BILIRUB UR QL STRIP: NEGATIVE
BUN SERPL-MCNC: 12.5 MG/DL (ref 6–20)
C COLI+JEJUNI+LARI FUSA STL QL NAA+PROBE: NOT DETECTED
C DIFF TOX B STL QL: NEGATIVE
CALCIUM SERPL-MCNC: 9.3 MG/DL (ref 8.6–10)
CHLORIDE SERPL-SCNC: 102 MMOL/L (ref 98–107)
COLOR UR AUTO: YELLOW
CREAT SERPL-MCNC: 1.7 MG/DL (ref 0.67–1.17)
CREAT UR-MCNC: 387 MG/DL
DEPRECATED HCO3 PLAS-SCNC: 27 MMOL/L (ref 22–29)
EC STX1 GENE STL QL NAA+PROBE: NOT DETECTED
EC STX2 GENE STL QL NAA+PROBE: NOT DETECTED
EOSINOPHIL # BLD AUTO: 0.3 10E3/UL (ref 0–0.7)
EOSINOPHIL NFR BLD AUTO: 5 %
ERYTHROCYTE [DISTWIDTH] IN BLOOD BY AUTOMATED COUNT: 16.4 % (ref 10–15)
GFR SERPL CREATININE-BSD FRML MDRD: 46 ML/MIN/1.73M2
GLUCOSE SERPL-MCNC: 101 MG/DL (ref 70–99)
GLUCOSE UR STRIP-MCNC: NEGATIVE MG/DL
HCT VFR BLD AUTO: 41.8 % (ref 40–53)
HEMOCCULT STL QL IA: NEGATIVE
HGB BLD-MCNC: 13.3 G/DL (ref 13.3–17.7)
HGB UR QL STRIP: ABNORMAL
IMM GRANULOCYTES # BLD: 0 10E3/UL
IMM GRANULOCYTES NFR BLD: 0 %
KETONES UR STRIP-MCNC: NEGATIVE MG/DL
LEUKOCYTE ESTERASE UR QL STRIP: NEGATIVE
LYMPHOCYTES # BLD AUTO: 2.7 10E3/UL (ref 0.8–5.3)
LYMPHOCYTES NFR BLD AUTO: 43 %
MCH RBC QN AUTO: 27.9 PG (ref 26.5–33)
MCHC RBC AUTO-ENTMCNC: 31.8 G/DL (ref 31.5–36.5)
MCV RBC AUTO: 88 FL (ref 78–100)
MONOCYTES # BLD AUTO: 0.4 10E3/UL (ref 0–1.3)
MONOCYTES NFR BLD AUTO: 6 %
MUCOUS THREADS #/AREA URNS LPF: PRESENT /LPF
NEUTROPHILS # BLD AUTO: 3 10E3/UL (ref 1.6–8.3)
NEUTROPHILS NFR BLD AUTO: 45 %
NITRATE UR QL: NEGATIVE
NOROV GI+II ORF1-ORF2 JNC STL QL NAA+PR: NOT DETECTED
NRBC # BLD AUTO: 0 10E3/UL
NRBC BLD AUTO-RTO: 0 /100
PH UR STRIP: 6 [PH] (ref 5–7)
PLATELET # BLD AUTO: 303 10E3/UL (ref 150–450)
POTASSIUM SERPL-SCNC: 3.7 MMOL/L (ref 3.4–5.3)
PROT SERPL-MCNC: 7 G/DL (ref 6.4–8.3)
PROT/CREAT 24H UR: 0.05 MG/MG CR (ref 0–0.2)
RBC # BLD AUTO: 4.77 10E6/UL (ref 4.4–5.9)
RBC URINE: 9 /HPF
RVA NSP5 STL QL NAA+PROBE: NOT DETECTED
SALMONELLA SP RPOD STL QL NAA+PROBE: NOT DETECTED
SHIGELLA SP+EIEC IPAH STL QL NAA+PROBE: NOT DETECTED
SODIUM SERPL-SCNC: 139 MMOL/L (ref 136–145)
SP GR UR STRIP: 1.02 (ref 1–1.03)
TSH SERPL DL<=0.005 MIU/L-ACNC: 37.86 UIU/ML (ref 0.3–4.2)
UROBILINOGEN UR STRIP-MCNC: NORMAL MG/DL
V CHOL+PARA RFBL+TRKH+TNAA STL QL NAA+PR: NOT DETECTED
WBC # BLD AUTO: 6.4 10E3/UL (ref 4–11)
WBC URINE: 1 /HPF
Y ENTERO RECN STL QL NAA+PROBE: NOT DETECTED

## 2022-12-05 PROCEDURE — 85025 COMPLETE CBC W/AUTO DIFF WBC: CPT

## 2022-12-05 PROCEDURE — 80053 COMPREHEN METABOLIC PANEL: CPT

## 2022-12-05 PROCEDURE — 36415 COLL VENOUS BLD VENIPUNCTURE: CPT

## 2022-12-05 PROCEDURE — 81001 URINALYSIS AUTO W/SCOPE: CPT

## 2022-12-05 PROCEDURE — 84443 ASSAY THYROID STIM HORMONE: CPT

## 2022-12-05 PROCEDURE — 84156 ASSAY OF PROTEIN URINE: CPT

## 2022-12-05 NOTE — TELEPHONE ENCOUNTER
Screening Questions  BLUE  KIND OF PREP RED  LOCATION [review exclusion criteria] GREEN  SEDATION TYPE    Y Are you active on mychart?   ERIBERTO ORTIZ  Ordering/Referring Provider?    MEDICA  What type of coverage do you have?  N Have you had a positive covid test in the last 90 days?     30.5  1. BMI  [BMI 40+ - review exclusion criteria]    SELF   2. Are you able to give consent for your medical care? [IF NO,RN REVIEW]        N  3. Are you taking any prescription pain medications on a routine schedule?        N 3a. EXTENDED PREP What kind of prescription?     N 4. Do you have any chemical dependencies such as alcohol, street drugs, or methadone?    N 5. Do you have any history of post-traumatic stress syndrome, severe anxiety or history of psychosis?      **If yes 3- 5 , please schedule with MAC sedation.**          IF YES TO ANY 6 - 10 - HOSPITAL SETTING ONLY.     N 6.   Do you need assistance transferring?     N 7.   Have you had a heart or lung transplant?    N 8.   Are you currently on dialysis?   N 9.   Do you use daily home oxygen?   N 10. Do you take nitroglycerin?   10a. N If yes, how often?     11. [FEMALES]   Are you currently pregnant?     11a.  If yes, how many weeks? [ Greater than 12 weeks, OR NEEDED]    N 12. [review exclusion criteria]  Do you have any implantable devices in your body (pacemaker, defib, LVAD)?    N 13. Do you have Pulmonary Hypertension?             *NEED PAC APPT AT [facility]*     N 14. In the past 6 months, have you had any heart related issues including cardiomyopathy or heart attack?     N 14a. If yes, did it require cardiac stenting if so when?     N 15. Have you had a stroke or Transient ischemic attack (TIA - aka  mini stroke ) within 6 months?      N 16. Do you have mod to severe Obstructive Sleep Apnea?  [Hospital only - Ok at [place]]    N 17. Do you have SEVERE AND UNCONTROLLED asthma?              *NEED PAC APPT AT [facility]*     N 18. Are you currently taking any blood  "thinners?     18a. If yes, inform patient to \"follow up w/ ordering provider for bridging instructions.\"    N 19. Do you take the medication Phentermine?    19a. If yes, \"Hold for 7 days before procedure.  Please consult your prescribing provider if you have questions about holding this medication.\"     Y - CKD (KIDNEY CANCER)    20. Do you have chronic kidney disease?      N  21. Do you have a diagnosis of diabetes?     N  22. On a regular basis do you go 3-5 days between bowel movements?     23. Preferred LOCAL Pharmacy for Pre Prescription    [ LIST ONLY ONE PHARMACY]        [facility] [id number] [place] [facility], [place] - [address]          - CLOSING REMINDERS -    Informed patient they will need an adult    Cannot take any type of public or medical transportation alone    Conscious Sedation- Needs  for 6 hours after the procedure       MAC/General-Needs  for 24 hours after procedure    Pre-Procedure Covid test to be completed [[facility] PCR Testing Required]    Confirmed Nurse will call to complete assessment       - SCHEDULING DETAILS -    Additional comments:  BYRON GARCIA   Surgeon   02/17/2023  Date of Procedure  MAC  Sedation Type     N PAC / Pre-op Required   Location  [facility]        Type of Procedure Scheduled  Upper and Lower Endoscopy [EGD and Colonoscopy]      Which Colonoscopy Prep was Sent?     STANDARD GOLYTELY-If you answer yes to questions #8, #20, #21          "

## 2022-12-05 NOTE — NURSING NOTE
Chief Complaint   Patient presents with     Blood Draw     Labs drawn via  by RN in lab.      Mariana Albarran RN

## 2022-12-06 DIAGNOSIS — C64.1 RENAL CELL CARCINOMA, RIGHT (H): Primary | ICD-10-CM

## 2022-12-06 DIAGNOSIS — L27.1 HAND FOOT SYNDROME: ICD-10-CM

## 2022-12-06 RX ORDER — UREA 200 MG/G
CREAM TOPICAL PRN
Qty: 2400 G | Refills: 0 | Status: SHIPPED | OUTPATIENT
Start: 2022-12-06 | End: 2023-01-03

## 2022-12-06 NOTE — TELEPHONE ENCOUNTER
Caller: Dimitrios Goldberg      Procedure: EGD/COLONOSCOPY    Date, Location, and Surgeon of Procedure Cancelled: 2/17, [facility] [place]    Ordering Provider:KRYSTAL ORTIZ    Reason for cancel (please be detailed, any staff messages or encounters to note?): Per staff message to be scheduled within 1-1.5weeks.    From: Aryan Fuentes   Sent: 12/5/2022   5:44 PM CST   To: [id number]   Subject: FW: needs upper and lower endoscopy within t*     Hello. A referral has been placed. Is this the correct pool to forward this sort of request.     Thanks   Aryan Fuentes   Clinic    [facility]   [phone] Opt. 5 Opt. 2     ----- Message -----   From: Raina Dos Santos   Sent: 12/4/2022   6:40 AM CST   To: Clinic Coordinators-[facility] Team B-Uc   Subject: RE: needs upper and lower endoscopy within t*       ----- Message -----   From: Krystal Ortiz PA-C   Sent: 12/2/2022   4:41 PM CST   To: Ayla Yoder RN, [facility]   Subject: needs upper and lower endoscopy within the n*     Hi schedulers,     Please schedule an upper and lower endoscopy for patient, ideally within the next 1-1.5  weeks.     Ayla- please follow, he has dark tary stools concerning for slow GI bleed.     Thanks,     Krystal Ortiz PA-C         Rescheduled: Y     If rescheduled:    Date: 12/13   [place] [facility] [place]   Prep Resent: RESENT(changes to prep?)   Covid Test Rescheduled: N/A   Note any change or update to original order/sedation: N/A

## 2022-12-07 ENCOUNTER — ONCOLOGY VISIT (OUTPATIENT)
Dept: ONCOLOGY | Facility: CLINIC | Age: 57
End: 2022-12-07
Payer: COMMERCIAL

## 2022-12-07 ENCOUNTER — TELEPHONE (OUTPATIENT)
Dept: GASTROENTEROLOGY | Facility: CLINIC | Age: 57
End: 2022-12-07

## 2022-12-07 VITALS
OXYGEN SATURATION: 97 % | RESPIRATION RATE: 16 BRPM | SYSTOLIC BLOOD PRESSURE: 100 MMHG | HEART RATE: 87 BPM | TEMPERATURE: 98.2 F | WEIGHT: 225.2 LBS | DIASTOLIC BLOOD PRESSURE: 69 MMHG | BODY MASS INDEX: 30.54 KG/M2

## 2022-12-07 DIAGNOSIS — L27.1 HAND FOOT SYNDROME: ICD-10-CM

## 2022-12-07 DIAGNOSIS — C64.1 RENAL CELL CARCINOMA, RIGHT (H): Primary | ICD-10-CM

## 2022-12-07 DIAGNOSIS — R19.5 DARK STOOLS: Primary | ICD-10-CM

## 2022-12-07 PROCEDURE — G0463 HOSPITAL OUTPT CLINIC VISIT: HCPCS

## 2022-12-07 PROCEDURE — 99215 OFFICE O/P EST HI 40 MIN: CPT

## 2022-12-07 RX ORDER — BISACODYL 5 MG
TABLET, DELAYED RELEASE (ENTERIC COATED) ORAL
Qty: 4 TABLET | Refills: 0 | Status: SHIPPED | OUTPATIENT
Start: 2022-12-07 | End: 2023-01-03

## 2022-12-07 RX ORDER — CLOBETASOL PROPIONATE 0.5 MG/G
CREAM TOPICAL 2 TIMES DAILY
Qty: 45 G | Refills: 0 | Status: SHIPPED | OUTPATIENT
Start: 2022-12-07 | End: 2023-01-03

## 2022-12-07 ASSESSMENT — PAIN SCALES - GENERAL: PAINLEVEL: EXTREME PAIN (8)

## 2022-12-07 NOTE — NURSING NOTE
Oncology Rooming Note    December 7, 2022 9:45 AM   Dimitrios Goldberg is a 57 year old male who presents for:    Chief Complaint   Patient presents with     Oncology Clinic Visit     Kidney cancer, primary, with metastasis from kidney to other site      Initial Vitals: /69 (BP Location: Right arm, Patient Position: Sitting, Cuff Size: Adult Large)   Pulse 87   Temp 98.2  F (36.8  C) (Oral)   Resp 16   Wt 102.2 kg (225 lb 3.2 oz)   SpO2 97%   BMI 30.54 kg/m   Estimated body mass index is 30.54 kg/m  as calculated from the following:    Height as of 9/13/22: 1.829 m (6').    Weight as of this encounter: 102.2 kg (225 lb 3.2 oz). Body surface area is 2.28 meters squared.  Extreme Pain (8) Comment: Data Unavailable   No LMP for male patient.  Allergies reviewed: Yes  Medications reviewed: Yes    Medications: Medication refills not needed today.  Pharmacy name entered into [employer]:    [id number] [place], [place] - [address] [place], [place] - [address] [place] [address]    Clinical concerns: Patient reports bilateral foot pain 8/10-right foot hurts more.        Denice Reyes LPN December 7, 2022 9:45 AM
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
## 2022-12-07 NOTE — TELEPHONE ENCOUNTER
Pre assessment questions completed for upcoming colonoscopy/EGD procedure scheduled on 12/13/22    COVID policy reviewed.     Pre-op scheduled  N/A    Reviewed procedural arrival time 0700 and facility location [facility]; [address] [place] [address], [address], [place] [address]    Designated  policy reviewed. Instructed to have someone stay 6 hours post procedure.     Anticoagulation/blood thinners? No    Electronic implanted devices? No    Diabetic? No    Procedure indication: Black tarry stools,     Bowel prep recommendation: Standard Golytely     Reviewed procedure prep instructions.     Prep instructions previously sent via "[employer].      Bowel prep script sent to    [place] [address] [place] [facility] [address] [facility]    Patient verbalized understanding and had no questions or concerns at this time.    YANG DAVILA RN
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
## 2022-12-13 ENCOUNTER — ANESTHESIA (OUTPATIENT)
Dept: SURGERY | Facility: AMBULATORY SURGERY CENTER | Age: 57
End: 2022-12-13
Payer: COMMERCIAL

## 2022-12-13 ENCOUNTER — ANESTHESIA EVENT (OUTPATIENT)
Dept: SURGERY | Facility: AMBULATORY SURGERY CENTER | Age: 57
End: 2022-12-13
Payer: COMMERCIAL

## 2022-12-13 ENCOUNTER — HOSPITAL ENCOUNTER (OUTPATIENT)
Facility: AMBULATORY SURGERY CENTER | Age: 57
Discharge: HOME | End: 2022-12-13
Attending: INTERNAL MEDICINE | Admitting: INTERNAL MEDICINE
Payer: COMMERCIAL

## 2022-12-13 ENCOUNTER — PATIENT OUTREACH (OUTPATIENT)
Dept: ONCOLOGY | Facility: CLINIC | Age: 57
End: 2022-12-13

## 2022-12-13 VITALS
TEMPERATURE: 97.2 F | RESPIRATION RATE: 20 BRPM | WEIGHT: 225 LBS | DIASTOLIC BLOOD PRESSURE: 99 MMHG | HEIGHT: 72 IN | HEART RATE: 113 BPM | SYSTOLIC BLOOD PRESSURE: 128 MMHG | OXYGEN SATURATION: 98 % | BODY MASS INDEX: 30.48 KG/M2

## 2022-12-13 VITALS — HEART RATE: 99 BPM

## 2022-12-13 DIAGNOSIS — K25.9 GASTRIC EROSION, UNSPECIFIED ULCER CHRONICITY: Primary | ICD-10-CM

## 2022-12-13 LAB
COLONOSCOPY: NORMAL
UPPER GI ENDOSCOPY: NORMAL

## 2022-12-13 PROCEDURE — 45380 COLONOSCOPY AND BIOPSY: CPT

## 2022-12-13 PROCEDURE — 43239 EGD BIOPSY SINGLE/MULTIPLE: CPT

## 2022-12-13 PROCEDURE — 88305 TISSUE EXAM BY PATHOLOGIST: CPT | Mod: 26 | Performed by: PATHOLOGY

## 2022-12-13 PROCEDURE — 88305 TISSUE EXAM BY PATHOLOGIST: CPT | Mod: TC | Performed by: INTERNAL MEDICINE

## 2022-12-13 PROCEDURE — 88342 IMHCHEM/IMCYTCHM 1ST ANTB: CPT | Mod: 26 | Performed by: PATHOLOGY

## 2022-12-13 RX ORDER — FENTANYL CITRATE-0.9 % NACL/PF 10 MCG/ML
PLASTIC BAG, INJECTION (ML) INTRAVENOUS PRN
Status: DISCONTINUED | OUTPATIENT
Start: 2022-12-13 | End: 2022-12-13

## 2022-12-13 RX ORDER — PROPOFOL 10 MG/ML
INJECTION, EMULSION INTRAVENOUS PRN
Status: DISCONTINUED | OUTPATIENT
Start: 2022-12-13 | End: 2022-12-13

## 2022-12-13 RX ORDER — NALOXONE HYDROCHLORIDE 0.4 MG/ML
0.2 INJECTION, SOLUTION INTRAMUSCULAR; INTRAVENOUS; SUBCUTANEOUS
Status: DISCONTINUED | OUTPATIENT
Start: 2022-12-13 | End: 2022-12-14 | Stop reason: HOSPADM

## 2022-12-13 RX ORDER — ONDANSETRON 2 MG/ML
4 INJECTION INTRAMUSCULAR; INTRAVENOUS EVERY 6 HOURS PRN
Status: DISCONTINUED | OUTPATIENT
Start: 2022-12-13 | End: 2022-12-14 | Stop reason: HOSPADM

## 2022-12-13 RX ORDER — OMEPRAZOLE 40 MG/1
40 CAPSULE, DELAYED RELEASE ORAL DAILY
Qty: 60 CAPSULE | Refills: 1 | Status: SHIPPED | OUTPATIENT
Start: 2022-12-13 | End: 2023-02-06

## 2022-12-13 RX ORDER — NALOXONE HYDROCHLORIDE 0.4 MG/ML
0.4 INJECTION, SOLUTION INTRAMUSCULAR; INTRAVENOUS; SUBCUTANEOUS
Status: DISCONTINUED | OUTPATIENT
Start: 2022-12-13 | End: 2022-12-14 | Stop reason: HOSPADM

## 2022-12-13 RX ORDER — ONDANSETRON 2 MG/ML
4 INJECTION INTRAMUSCULAR; INTRAVENOUS
Status: DISCONTINUED | OUTPATIENT
Start: 2022-12-13 | End: 2022-12-13 | Stop reason: HOSPADM

## 2022-12-13 RX ORDER — PROCHLORPERAZINE MALEATE 10 MG
10 TABLET ORAL EVERY 6 HOURS PRN
Status: DISCONTINUED | OUTPATIENT
Start: 2022-12-13 | End: 2022-12-14 | Stop reason: HOSPADM

## 2022-12-13 RX ORDER — ONDANSETRON 4 MG/1
4 TABLET, ORALLY DISINTEGRATING ORAL EVERY 6 HOURS PRN
Status: DISCONTINUED | OUTPATIENT
Start: 2022-12-13 | End: 2022-12-14 | Stop reason: HOSPADM

## 2022-12-13 RX ORDER — SODIUM CHLORIDE, SODIUM LACTATE, POTASSIUM CHLORIDE, CALCIUM CHLORIDE 600; 310; 30; 20 MG/100ML; MG/100ML; MG/100ML; MG/100ML
INJECTION, SOLUTION INTRAVENOUS CONTINUOUS PRN
Status: DISCONTINUED | OUTPATIENT
Start: 2022-12-13 | End: 2022-12-13

## 2022-12-13 RX ORDER — LIDOCAINE 40 MG/G
CREAM TOPICAL
Status: DISCONTINUED | OUTPATIENT
Start: 2022-12-13 | End: 2022-12-13 | Stop reason: HOSPADM

## 2022-12-13 RX ORDER — PROPOFOL 10 MG/ML
INJECTION, EMULSION INTRAVENOUS CONTINUOUS PRN
Status: DISCONTINUED | OUTPATIENT
Start: 2022-12-13 | End: 2022-12-13

## 2022-12-13 RX ORDER — FLUMAZENIL 0.1 MG/ML
0.2 INJECTION, SOLUTION INTRAVENOUS
Status: ACTIVE | OUTPATIENT
Start: 2022-12-13 | End: 2022-12-13

## 2022-12-13 RX ADMIN — PROPOFOL 80 MG: 10 INJECTION, EMULSION INTRAVENOUS at 07:59

## 2022-12-13 RX ADMIN — SODIUM CHLORIDE, SODIUM LACTATE, POTASSIUM CHLORIDE, CALCIUM CHLORIDE: 600; 310; 30; 20 INJECTION, SOLUTION INTRAVENOUS at 07:21

## 2022-12-13 RX ADMIN — Medication 100 MCG: at 08:39

## 2022-12-13 RX ADMIN — Medication 100 MCG: at 08:27

## 2022-12-13 RX ADMIN — PROPOFOL 250 MCG/KG/MIN: 10 INJECTION, EMULSION INTRAVENOUS at 07:59
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
## 2022-12-13 NOTE — ANESTHESIA CARE TRANSFER NOTE
Patient: Dimitrios Goldberg    Procedure: Procedure(s):  COLONOSCOPY, WITH BIOPSY  ESOPHAGOGASTRODUODENOSCOPY, WITH BIOPSY       Diagnosis: Dark stools [R19.5]  Renal cell carcinoma, right (H) [C64.1]  Diagnosis Additional Information: No value filed.    Anesthesia Type:   MAC     Note:    Oropharynx: spontaneously breathing  Level of Consciousness: drowsy  Oxygen Supplementation: room air    Independent Airway: airway patency satisfactory and stable  Dentition: dentition unchanged  Vital Signs Stable: post-procedure vital signs reviewed and stable  Report to RN Given: handoff report given  Patient transferred to: Phase II    Handoff Report: Identifed the Patient, Identified the Reponsible Provider, Reviewed the pertinent medical history, Discussed the surgical course, Reviewed Intra-OP anesthesia mangement and issues during anesthesia, Set expectations for post-procedure period and Allowed opportunity for questions and acknowledgement of understanding      Vitals:  Vitals Value Taken Time   BP     Temp     Pulse 99 12/13/22 0845   Resp     SpO2         Electronically Signed By: PILY Garcia CRNA  December 13, 2022  8:49 AM

## 2022-12-13 NOTE — ANESTHESIA POSTPROCEDURE EVALUATION
Patient: Dimitrios Goldberg    Procedure: Procedure(s):  COLONOSCOPY, WITH BIOPSY  ESOPHAGOGASTRODUODENOSCOPY, WITH BIOPSY       Anesthesia Type:  MAC    Note:  Disposition: Outpatient   Postop Pain Control: Uneventful            Sign Out: Well controlled pain   PONV: No   Neuro/Psych: Uneventful            Sign Out: Acceptable/Baseline neuro status   Airway/Respiratory: Uneventful            Sign Out: Acceptable/Baseline resp. status   CV/Hemodynamics: Uneventful            Sign Out: Acceptable CV status; No obvious hypovolemia; No obvious fluid overload   Other NRE: NONE   DID A NON-ROUTINE EVENT OCCUR?            Last vitals:  Vitals Value Taken Time   /99 12/13/22 0910   Temp 36.2  C (97.2  F) 12/13/22 0925   Pulse     Resp 20 12/13/22 0925   SpO2 98 % 12/13/22 0910       Electronically Signed By: Torsten Arellano MD  December 13, 2022  2:06 PM

## 2022-12-14 ENCOUNTER — TELEPHONE (OUTPATIENT)
Dept: PHARMACY | Facility: CLINIC | Age: 57
End: 2022-12-14

## 2022-12-14 NOTE — ORAL ONC MGMT
"Per Dimitrios, HFS on feet has greatly improved, he is doing \"a lot better\" and is back at work. No blisters on the feet but they are still a little tender. He continues the steroid and urea cream as directed.     IB sent to care team to determine plan for restarting cabometyx.    Nazanin Grace, PharmD, [facility]  Oral Chemotherapy Monitoring Program  [facility]  [phone]    "

## 2022-12-15 LAB
PATH REPORT.ADDENDUM SPEC: NORMAL
PATH REPORT.COMMENTS IMP SPEC: NORMAL
PATH REPORT.COMMENTS IMP SPEC: NORMAL
PATH REPORT.FINAL DX SPEC: NORMAL
PATH REPORT.GROSS SPEC: NORMAL
PATH REPORT.MICROSCOPIC SPEC OTHER STN: NORMAL
PATH REPORT.RELEVANT HX SPEC: NORMAL
PHOTO IMAGE: NORMAL

## 2022-12-18 DIAGNOSIS — C64.1 RENAL CELL CARCINOMA, RIGHT (H): ICD-10-CM

## 2022-12-21 ENCOUNTER — TELEPHONE (OUTPATIENT)
Dept: ONCOLOGY | Facility: CLINIC | Age: 57
End: 2022-12-21

## 2022-12-21 NOTE — ORAL ONC MGMT
"Oral Chemotherapy Monitoring Program    Per Dr. Campos 12/20/2022 in-basket about plan to restart Cabometyx. \"Officially it would be 20 mg daily and he could hold off on weekends for toxicity.\"      Placed call in follow up of oral chemotherapy plan for Cabometyx. Spoke to Dimitrios. Communicated plan to restart Cabometyx 20mg once daily on Monday to Friday and off on Saturday and Sunday. Patient has full bottle of 20 mg tablets on hand. Dimitrios verbalized understanding and agreement with plan. Dimitrios will restart today. No question or concerns today. He is aware of next appointments and labs. He will call sooner if symptoms worsen.     Follow up:  1/3: Dr. Campos appointment     Lj Kramer, PharmD  Hematology/Oncology Clinical Pharmacist  [facility]  [facility]      "

## 2022-12-22 DIAGNOSIS — E03.4 HYPOTHYROIDISM DUE TO ACQUIRED ATROPHY OF THYROID: ICD-10-CM

## 2022-12-22 RX ORDER — LEVOTHYROXINE SODIUM 100 UG/1
100 TABLET ORAL DAILY
Qty: 30 TABLET | Refills: 1 | OUTPATIENT
Start: 2022-12-22

## 2022-12-22 NOTE — TELEPHONE ENCOUNTER
Levothyroxine Refill   Last prescribing provider: Krystal Valenzuela     Last clinic visit date: 12/7/22 Krystal Valenzuela     Recommendations for requested medication (if none, N/A): Copied from chart note 12/7/22 Krystal Valenzuela   4. Hypothyroid  -continue levothyroxine 100 mcg daily. Refilled on 11/21/22, he has been out since. I asked him to  his prescription today. Will not make any dose alterations today.      Any other pertinent information (if none, N/A): N//A    Refilled: Y/N, if NO, why?

## 2022-12-30 ENCOUNTER — MYC MEDICAL ADVICE (OUTPATIENT)
Dept: ONCOLOGY | Facility: CLINIC | Age: 57
End: 2022-12-30

## 2022-12-30 NOTE — TELEPHONE ENCOUNTER
Nurse Triage SBAR    Situation: Bilateral foot tenderness since Wednesday    Background:    DX: Neoplasm of R kidney  Provider: Dr. Campos, CHRISTIANO Elizalde, Ayla Yoder, RNCC  Most recent treatment: Fabian Rice's last note:  Patient has return of hand foot syndrome, has been holding cabometyx since 12/06/2022.   - I will discuss with Dr. Campos regarding cabometyx dose reduction to 20 mg every other day vs. Change in treatment due to intolerability of cabometyx.   Most recent appointment: 12/7/22 with CHRISTIANO Elizalde  Upcoming appointments: 1/3/23 with Dr. Campos    Assessment:   Per Jeanmariehart message: noticing the tenderness on the heel of the left foot on Wed. It has gotten worse since then. Very tender this morning. Im worried about it getting to the point that I cant walk again. The right foot is feeling tender on the ball part toward the front.  Starting to irritate me while im driving.      Spoke with Dimitrios who reports that his feet are considerabley more tender today.  He was off the chemo meds for two weeks, and restarted taking them again on Wednesday, 12/12/21. Took them 12/21,12/22, 12/23. Did not take them over the weekend (Sat-Sun). Took them every day this week (12/26, 12/27, 12/28, 12/29) but has not taken yet today.    States his LEFT foot is more affected than R foot, specifically, the heel of left foot is sore. Wednesday it bothered him a little while walking, but today he can feel it even when he is not walking. Pain is 8/10 at worst, now is more like 4-5/10, able to walk/work. He is worried he will get to the point that he can't walk/drive.  R front of foot (ball of foot) is also sore. He can feel it when he drives or touches the gas pedal with his foot.    No cracking, not noticing redness, doesn't look swollen.    Has not taken tylenol  Is using urea cream    Recommendation:   Informed pt that this writer would reach out to the Care Team and get back to him with their recommendations.     Routed to  CHRISTIANO Elizalde and Ayla Yoder RNCC

## 2022-12-30 NOTE — TELEPHONE ENCOUNTER
Oral Chemotherapy Monitoring Program     Placed call to Dimitrios Goldberg.     Relayed information from Krystal Valenzuela that he is to hold his Cabometyx therapy at this time. Plan to have repeat scans and an appointment with Dr. Campos next week.     He was understanding of this plan and thanked me for the follow-up.     Tonya Rodriguez, PharmD, BCACP  Oral Chemotherapy Monitoring Program  [facility]  [phone]  December 30, 2022

## 2023-01-01 NOTE — TELEPHONE ENCOUNTER
Call from Dimitrios, see triage note  Best number to reach him today [phone]  Nurse Triage SBAR    Is this a 2nd Level Triage? YES, LICENSED PRACTITIONER REVIEW IS REQUIRED    Situation:   Weak feeling since discharge home.  Was discharged on Saturday    Background:   8/29/22   exploratory laparotomy, extensive lysis of adhesions, RESECTION OF RETROPERITONEAL MASS (N/A Abdomen)   Laparotomy, lysis adhesions, combined, assist with exploration (N/A Abdomen)      Assessment:   follow up per urology team today  Advised to increase fluid intake, has not been drinking well    Protocol Recommended Disposition:   Callback by PCP Today        Reason for Disposition    Caller has NON-URGENT question and triager unable to answer question    Additional Information    Negative: Sounds like a life-threatening emergency to the triager    Negative: Chest pain    Negative: Difficulty breathing    Negative: Acting confused (e.g., disoriented, slurred speech) or excessively sleepy    Negative: Surgical incision symptoms and questions    Negative: Pain or burning with passing urine (urination) and male    Negative: Pain or burning with passing urine (urination) and female    Negative: Constipation    Negative: New or worsening leg (calf, thigh) pain    Negative: New or worsening leg swelling    Negative: Dizziness is severe, or persists > 24 hours after surgery    Negative: Symptoms arising from use of a urinary catheter (White or Coude)    Negative: Cast problems or questions    Negative: Medication question    Negative: Bright red, wide-spread, sunburn-like rash    Negative: SEVERE headache and after spinal (epidural) anesthesia    Negative: Vomiting and persists > 4 hours    Negative: Vomiting and abdomen looks much more swollen than usual    Negative: Drinking very little and dehydration suspected (e.g., no urine > 12 hours, very dry mouth, very lightheaded)    Negative: Patient sounds very sick or weak to the triager    Negative:  "Sounds like a serious complication to the triager    Negative: Fever > 100.4 F (38.0 C)    Negative: Caller has URGENT question and triager unable to answer question    Negative: SEVERE post-op pain (e.g., excruciating, pain scale 8-10) that is not controlled with pain medications    Negative: Headache and after spinal (epidural) anesthesia and not severe    Negative: Fever present > 3 days (72 hours)    Negative: Patient wants to be seen    Answer Assessment - Initial Assessment Questions  1. SYMPTOM: \"What's the main symptom you're concerned about?\" (e.g., pain, fever, vomiting)      Weak feeling, mostly after is standing for 1-2 minutes  2. ONSET: \"When did weakness  start?\"      After discharge  3. SURGERY: \"What surgery did you have?\"      exploratory laparotomy, extensive lysis of adhesions, RESECTION OF RETROPERITONEAL MASS (N/A Abdomen)   Laparotomy, lysis adhesions, combined, assist with exploration (N/A Abdomen)  4. DATE of SURGERY: \"When was the surgery?\"       8/29/22  5. ANESTHESIA: \" What type of anesthesia did you have?\" (e.g., general, spinal, epidural, local)      general  6. PAIN: \"Is there any pain?\" If Yes, ask: \"How bad is it?\"  (Scale 1-10; or mild, moderate, severe)      When stands pain 6-7/10 when first gets up.  Baseline 5/10  7. FEVER: \"Do you have a fever?\" If Yes, ask: \"What is your temperature, how was it measured, and when did it start?\"      denies  8. VOMITING: \"Is there any vomiting?\" If Yes, ask: \"How many times?\"      None, no nausea, but has  No appetite  9. BLEEDING: \"Is there any bleeding?\" If Yes, ask: \"How much?\" and \"Where?\"      none  10. OTHER SYMPTOMS: \"Do you have any other symptoms?\" (e.g., drainage from wound, painful urination, constipation)        Denies redness or drainage from incision.  Has bowel movement, stools small round. Urinating without difficulty, reports urine darker than usual    Protocols used: POST-OP SYMPTOMS AND [id number]      " calm

## 2023-01-02 ENCOUNTER — ANCILLARY PROCEDURE (OUTPATIENT)
Dept: CT IMAGING | Facility: CLINIC | Age: 58
End: 2023-01-02
Attending: INTERNAL MEDICINE
Payer: COMMERCIAL

## 2023-01-02 ENCOUNTER — LAB (OUTPATIENT)
Dept: LAB | Facility: CLINIC | Age: 58
End: 2023-01-02
Payer: COMMERCIAL

## 2023-01-02 DIAGNOSIS — N18.4 CKD (CHRONIC KIDNEY DISEASE) STAGE 4, GFR 15-29 ML/MIN (H): ICD-10-CM

## 2023-01-02 DIAGNOSIS — I82.220 NEOPLASM OF RIGHT KIDNEY WITH THROMBUS OF INFERIOR VENA CAVA (H): ICD-10-CM

## 2023-01-02 DIAGNOSIS — E03.4 HYPOTHYROIDISM DUE TO ACQUIRED ATROPHY OF THYROID: ICD-10-CM

## 2023-01-02 DIAGNOSIS — D49.511 NEOPLASM OF RIGHT KIDNEY WITH THROMBUS OF INFERIOR VENA CAVA (H): ICD-10-CM

## 2023-01-02 DIAGNOSIS — C64.1 RENAL CELL CARCINOMA, RIGHT (H): ICD-10-CM

## 2023-01-02 LAB
ALBUMIN SERPL-MCNC: 4 G/DL (ref 3.4–5)
ALP SERPL-CCNC: 83 U/L (ref 40–150)
ALT SERPL W P-5'-P-CCNC: 27 U/L (ref 0–70)
ANION GAP SERPL CALCULATED.3IONS-SCNC: <1 MMOL/L (ref 3–14)
AST SERPL W P-5'-P-CCNC: 18 U/L (ref 0–45)
BASOPHILS # BLD AUTO: 0 10E3/UL (ref 0–0.2)
BASOPHILS NFR BLD AUTO: 1 %
BILIRUB SERPL-MCNC: 0.3 MG/DL (ref 0.2–1.3)
BUN SERPL-MCNC: 16 MG/DL (ref 7–30)
CALCIUM SERPL-MCNC: 9.3 MG/DL (ref 8.5–10.1)
CHLORIDE BLD-SCNC: 109 MMOL/L (ref 94–109)
CO2 SERPL-SCNC: 35 MMOL/L (ref 20–32)
CREAT SERPL-MCNC: 2.04 MG/DL (ref 0.66–1.25)
EOSINOPHIL # BLD AUTO: 0.2 10E3/UL (ref 0–0.7)
EOSINOPHIL NFR BLD AUTO: 4 %
ERYTHROCYTE [DISTWIDTH] IN BLOOD BY AUTOMATED COUNT: 18.3 % (ref 10–15)
GFR SERPL CREATININE-BSD FRML MDRD: 37 ML/MIN/1.73M2
GLUCOSE BLD-MCNC: 96 MG/DL (ref 70–99)
HCT VFR BLD AUTO: 41.2 % (ref 40–53)
HGB BLD-MCNC: 13.2 G/DL (ref 13.3–17.7)
HOLD SPECIMEN: NORMAL
IMM GRANULOCYTES # BLD: 0 10E3/UL
IMM GRANULOCYTES NFR BLD: 0 %
LYMPHOCYTES # BLD AUTO: 2.4 10E3/UL (ref 0.8–5.3)
LYMPHOCYTES NFR BLD AUTO: 42 %
MCH RBC QN AUTO: 28.3 PG (ref 26.5–33)
MCHC RBC AUTO-ENTMCNC: 32 G/DL (ref 31.5–36.5)
MCV RBC AUTO: 88 FL (ref 78–100)
MONOCYTES # BLD AUTO: 0.4 10E3/UL (ref 0–1.3)
MONOCYTES NFR BLD AUTO: 7 %
NEUTROPHILS # BLD AUTO: 2.8 10E3/UL (ref 1.6–8.3)
NEUTROPHILS NFR BLD AUTO: 46 %
NRBC # BLD AUTO: 0 10E3/UL
NRBC BLD AUTO-RTO: 0 /100
PLATELET # BLD AUTO: 311 10E3/UL (ref 150–450)
POTASSIUM BLD-SCNC: 3.7 MMOL/L (ref 3.4–5.3)
PROT SERPL-MCNC: 7.5 G/DL (ref 6.8–8.8)
RBC # BLD AUTO: 4.66 10E6/UL (ref 4.4–5.9)
SODIUM SERPL-SCNC: 141 MMOL/L (ref 133–144)
WBC # BLD AUTO: 5.8 10E3/UL (ref 4–11)

## 2023-01-02 PROCEDURE — 71260 CT THORAX DX C+: CPT | Performed by: RADIOLOGY

## 2023-01-02 PROCEDURE — 80050 GENERAL HEALTH PANEL: CPT

## 2023-01-02 PROCEDURE — 84443 ASSAY THYROID STIM HORMONE: CPT | Performed by: INTERNAL MEDICINE

## 2023-01-02 PROCEDURE — 84439 ASSAY OF FREE THYROXINE: CPT | Performed by: INTERNAL MEDICINE

## 2023-01-02 PROCEDURE — 36415 COLL VENOUS BLD VENIPUNCTURE: CPT

## 2023-01-02 PROCEDURE — 74177 CT ABD & PELVIS W/CONTRAST: CPT | Performed by: RADIOLOGY

## 2023-01-02 RX ORDER — IOPAMIDOL 755 MG/ML
135 INJECTION, SOLUTION INTRAVASCULAR ONCE
Status: COMPLETED | OUTPATIENT
Start: 2023-01-02 | End: 2023-01-02

## 2023-01-02 RX ORDER — IOPAMIDOL 755 MG/ML
84 INJECTION, SOLUTION INTRAVASCULAR ONCE
Status: COMPLETED | OUTPATIENT
Start: 2023-01-02 | End: 2023-01-02

## 2023-01-02 RX ADMIN — IOPAMIDOL 135 ML: 755 INJECTION, SOLUTION INTRAVASCULAR at 15:51

## 2023-01-03 ENCOUNTER — ONCOLOGY VISIT (OUTPATIENT)
Dept: ONCOLOGY | Facility: CLINIC | Age: 58
End: 2023-01-03
Attending: INTERNAL MEDICINE
Payer: COMMERCIAL

## 2023-01-03 VITALS
SYSTOLIC BLOOD PRESSURE: 116 MMHG | DIASTOLIC BLOOD PRESSURE: 78 MMHG | HEART RATE: 96 BPM | BODY MASS INDEX: 30.61 KG/M2 | TEMPERATURE: 99 F | OXYGEN SATURATION: 95 % | RESPIRATION RATE: 16 BRPM | WEIGHT: 225.7 LBS

## 2023-01-03 DIAGNOSIS — C64.1 RENAL CELL CARCINOMA, RIGHT (H): Primary | ICD-10-CM

## 2023-01-03 DIAGNOSIS — E03.4 HYPOTHYROIDISM DUE TO ACQUIRED ATROPHY OF THYROID: ICD-10-CM

## 2023-01-03 DIAGNOSIS — N18.4 CKD (CHRONIC KIDNEY DISEASE) STAGE 4, GFR 15-29 ML/MIN (H): ICD-10-CM

## 2023-01-03 DIAGNOSIS — L27.1 HAND FOOT SYNDROME: ICD-10-CM

## 2023-01-03 LAB
T4 FREE SERPL-MCNC: 0.98 NG/DL (ref 0.76–1.46)
TSH SERPL DL<=0.005 MIU/L-ACNC: 14.14 MU/L (ref 0.4–4)

## 2023-01-03 PROCEDURE — G0463 HOSPITAL OUTPT CLINIC VISIT: HCPCS | Performed by: INTERNAL MEDICINE

## 2023-01-03 PROCEDURE — G0463 HOSPITAL OUTPT CLINIC VISIT: HCPCS

## 2023-01-03 PROCEDURE — 99215 OFFICE O/P EST HI 40 MIN: CPT | Performed by: INTERNAL MEDICINE

## 2023-01-03 RX ORDER — UREA 200 MG/G
CREAM TOPICAL PRN
Qty: 2400 G | Refills: 11 | Status: ON HOLD | OUTPATIENT
Start: 2023-01-03 | End: 2023-08-11

## 2023-01-03 RX ORDER — CLOBETASOL PROPIONATE 0.5 MG/G
CREAM TOPICAL 2 TIMES DAILY
Qty: 60 G | Refills: 11 | Status: SHIPPED | OUTPATIENT
Start: 2023-01-03 | End: 2023-10-23

## 2023-01-03 ASSESSMENT — PAIN SCALES - GENERAL: PAINLEVEL: NO PAIN (0)

## 2023-01-03 NOTE — NURSING NOTE
Oncology Rooming Note    January 3, 2023 2:23 PM   Dimitrios Goldberg is a 57 year old male who presents for:    Chief Complaint   Patient presents with     Oncology Clinic Visit     Renal cell carcinoma     Initial Vitals: /78   Pulse 96   Temp 99  F (37.2  C) (Oral)   Resp 16   Wt 102.4 kg (225 lb 11.2 oz)   SpO2 95%   BMI 30.61 kg/m   Estimated body mass index is 30.61 kg/m  as calculated from the following:    Height as of 12/13/22: 1.829 m (6').    Weight as of this encounter: 102.4 kg (225 lb 11.2 oz). Body surface area is 2.28 meters squared.  No Pain (0) Comment: Data Unavailable   No LMP for male patient.  Allergies reviewed: Yes  Medications reviewed: Yes    Medications: Medication refills not needed today.  Pharmacy name entered into [employer]:    [id number] [place] [facility], [place] - [address], [place] - [address] [facility]    Clinical concerns: NONE       Radha Matos EMT
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
## 2023-01-04 ENCOUNTER — DOCUMENTATION ONLY (OUTPATIENT)
Dept: ONCOLOGY | Facility: CLINIC | Age: 58
End: 2023-01-04

## 2023-01-16 ENCOUNTER — TELEPHONE (OUTPATIENT)
Dept: ONCOLOGY | Facility: CLINIC | Age: 58
End: 2023-01-16

## 2023-01-16 NOTE — TELEPHONE ENCOUNTER
Oral Chemotherapy Monitoring Program    Placed call to patient in follow up of cabozantinib therapy.    Left message to please call back in follow up of therapy. No patient or drug names were mentioned.    Trevin Lyons, Pharmacy Intern  Oral Chemotherapy Monitoring Program  [phone]

## 2023-01-18 ENCOUNTER — TELEPHONE (OUTPATIENT)
Dept: ONCOLOGY | Facility: CLINIC | Age: 58
End: 2023-01-18
Payer: COMMERCIAL

## 2023-01-18 NOTE — ORAL ONC MGMT
"Oral Chemotherapy Monitoring Program     Placed call to patient in follow up of oral chemotherapy Cabometyx, and to see if patient has resumed at the lower dose of 20mg every other day.   Spoke with patient and he resumed yesterday, Tuesday 1/17.  He estimates that he has #20 tablets remaining.  Advised patient we would call again next week to see how he is tolerating the Cabometx at this lower dose since he just started it yesterday.  He voiced understanding.    Patient's questions:  What do we call what s left in me?   a mass   It did shrink but do we need to eliminate it all together for me to be healthy?  \"We want the mass as small as possible to keep it from spreading\"     Patient confirmed that his HFS (feet specifically) look great.  Reviewed ways to prevent HFS such as keeping hands and feet moisturized, and avoid tight-fitting shoes or socks.     Priscila Cat, PharmD  Oral Chemotherapy Monitoring Program  [facility]  [phone]  "

## 2023-01-20 ENCOUNTER — MYC MEDICAL ADVICE (OUTPATIENT)
Dept: ONCOLOGY | Facility: CLINIC | Age: 58
End: 2023-01-20
Payer: COMMERCIAL

## 2023-01-20 DIAGNOSIS — C64.1 RENAL CELL CARCINOMA, RIGHT (H): Primary | ICD-10-CM

## 2023-01-20 DIAGNOSIS — E03.4 HYPOTHYROIDISM DUE TO ACQUIRED ATROPHY OF THYROID: ICD-10-CM

## 2023-01-20 NOTE — TELEPHONE ENCOUNTER
Medication:levothyroxine  Last prescribing provider:Krystal Valenzuela  Last clinic visit date: 1/3/23 Dr. Campos  Recommendations for requested medication:n/a  Any other pertinent information:routed to Dr. Campos

## 2023-01-23 RX ORDER — LEVOTHYROXINE SODIUM 100 UG/1
100 TABLET ORAL DAILY
Qty: 30 TABLET | Refills: 1 | Status: SHIPPED | OUTPATIENT
Start: 2023-01-23 | End: 2023-02-08

## 2023-01-24 ENCOUNTER — TELEPHONE (OUTPATIENT)
Dept: ONCOLOGY | Facility: CLINIC | Age: 58
End: 2023-01-24
Payer: COMMERCIAL

## 2023-01-24 NOTE — TELEPHONE ENCOUNTER
Oral Chemotherapy Monitoring Program    Subjective/Objective:  Dimitrios Goldberg is a 57 year old male contacted by phone for a follow-up visit for oral chemotherapy. Dimitrios confirms taking the appropriate dose of Cabometyx 20mg every other day. He denies any side effects since starting therapy. He feels that the HFS in his feet is improved and he has not noticed any blistering so far. Denies any missed doses, medication changes or recent hospital or ED visits.     ORAL CHEMOTHERAPY 12/21/2022 12/30/2022 1/4/2023 1/16/2023 1/18/2023 1/20/2023 1/24/2023   Assessment Type Other Other Chart Review Left Voicemail Other Refill Other   Diagnosis Code Renal Cell Cancer Renal Cell Cancer Renal Cell Cancer Renal Cell Cancer Renal Cell Cancer Renal Cell Cancer Renal Cell Cancer   Providers Dr. Beth Campos   [facility]   Drug Name Cabometyx (cabozantinib) Cabometyx (cabozantinib) Cabometyx (cabozantinib) Cabometyx (cabozantinib) Cabometyx (cabozantinib) Cabometyx (cabozantinib) Cabometyx (cabozantinib)   Dose 20 mg 20 mg 20 mg 20 mg 20 mg 20 mg 20 mg   Current Schedule Daily Daily Every Other Day Every Other Day Every Other Day Every Other Day Every Other Day   Cycle Details Other Drug on Hold Continuous Continuous Continuous Continuous Continuous   Start Date of Last Cycle - - 1/3/2022 - 1/17/2023 - 1/17/2023   Doses missed in last 2 weeks - - - - - - 0   Adherence Assessment Adherent - - - - - Adherent   Adverse Effects Palmar-plantar Erythrodysethesia Syndrome - - - - - No AE identified during assessment   Palmar-plantar Erythrodysethesia syndrome[hand-foot syndrome] Grade 1 - - - - - -   Pharmacist Intervention(Palmar-plantar) No - - - - - -   Intervention(s) - - - - - - -   Home BPs - - - - - - -   Any new drug interactions? No - - - - - No   Is the dose as ordered appropriate for the patient? Yes - - - - - Yes   Since the last  time we talked, have you been hospitalized or used the emergency room? No - - - - - No       Last PHQ-2 Score on record:   PHQ-2 ( [facility]) 9/19/2022 8/18/2022   Q1: Little interest or pleasure in doing things 0 0   Q2: Feeling down, depressed or hopeless 0 0   PHQ-2 Score 0 0   PHQ-2 Total Score (12-17 Years)- Positive if 3 or more points; Administer PHQ-A if positive - -       Vitals:  BP:   BP Readings from Last 1 Encounters:   01/03/23 116/78     Wt Readings from Last 1 Encounters:   01/03/23 102.4 kg (225 lb 11.2 oz)     Estimated body surface area is 2.28 meters squared as calculated from the following:    Height as of 12/13/22: 1.829 m (6').    Weight as of 1/3/23: 102.4 kg (225 lb 11.2 oz).    Labs:  _  Result Component Current Result Ref Range   Sodium 141 (1/2/2023) 133 - 144 mmol/L     _  Result Component Current Result Ref Range   Potassium 3.7 (1/2/2023) 3.4 - 5.3 mmol/L     _  Result Component Current Result Ref Range   Calcium 9.3 (1/2/2023) 8.5 - 10.1 mg/dL     No results found for Mag within last 30 days.     No results found for Phos within last 30 days.     _  Result Component Current Result Ref Range   Albumin 4.0 (1/2/2023) 3.4 - 5.0 g/dL     _  Result Component Current Result Ref Range   Urea Nitrogen 16 (1/2/2023) 7 - 30 mg/dL     _  Result Component Current Result Ref Range   Creatinine 2.04 (H) (1/2/2023) 0.66 - 1.25 mg/dL     _  Result Component Current Result Ref Range   AST 18 (1/2/2023) 0 - 45 U/L     _  Result Component Current Result Ref Range   ALT 27 (1/2/2023) 0 - 70 U/L     _  Result Component Current Result Ref Range   Bilirubin Total 0.3 (1/2/2023) 0.2 - 1.3 mg/dL     _  Result Component Current Result Ref Range   WBC Count 5.8 (1/2/2023) 4.0 - 11.0 10e3/uL     _  Result Component Current Result Ref Range   Hemoglobin 13.2 (L) (1/2/2023) 13.3 - 17.7 g/dL     _  Result Component Current Result Ref Range   Platelet Count 311 (1/2/2023) 150 - 450 10e3/uL     No results found for  ANC within last 30 days.     _  Result Component Current Result Ref Range   Absolute Neutrophils 2.8 (1/2/2023) 1.6 - 8.3 10e3/uL        Assessment/Plan:  Dimitrios is tolerating the restart of therapy well so far. Plan to continue Cabometyx 20mg every other day.     Follow-Up:  2/6: labs + appt with Krystal Rodriguez, PharmD, BCACP  Hematology/Oncology Clinical Pharmacist  Oral Chemotherapy Monitoring Program  [facility]  [phone]

## 2023-01-27 ENCOUNTER — MYC MEDICAL ADVICE (OUTPATIENT)
Dept: ONCOLOGY | Facility: CLINIC | Age: 58
End: 2023-01-27
Payer: COMMERCIAL

## 2023-01-27 NOTE — TELEPHONE ENCOUNTER
"Oncology Nurse Triage    Situation:   Dimitrios reporting the following symptoms:  MYCHART Symptoms: Heel of Left foot feels like blister is coming.     Background:   Treating Provider:   Dr. Campos    Date of last office visit: 1/3/2023 Dr. Campos    Recent Treatments: 4/19/22 D1C3 pembrolizumab  Oral CABOMETYX 20mg    Assessment:     Onset: Last night 1/26/23    Pt has hx of blisters on feet before.     \"Tenderness\"   Pain rates 8/10 if touched/pressed, if walking 5/10    Skin is still intact, regular foot skin color.     Pt continues to use UREA Cream as ordered.     Should pt continue on CABOMETYX or any other recommendations? Pt took today's dose.     Denies fever, chest pain, SOB or other acute symptoms of concern.     Recommendations:     1325 Paged provider Krystal Valenzuela  1344 Per Krystal, Pt is already on the lowest dose of Cabometyx so ideally I would encourage him to continue his Cabometyx as prescribed rest his feet as much as possible, avoid excessive walking, continue diligent use urea cream. Add in his clobetasol topically BID if he isn't already doing that. Have oral chemo pharmacy call him on Monday to follow up.    MyChart reply sent to patient.           "

## 2023-01-30 ENCOUNTER — TELEPHONE (OUTPATIENT)
Dept: ONCOLOGY | Facility: CLINIC | Age: 58
End: 2023-01-30
Payer: COMMERCIAL

## 2023-01-30 NOTE — ORAL ONC MGMT
Oral Chemotherapy Monitoring Program     Placed call to patient in follow up of Cabometyx therapy. Dimitrios said he takes Cabometyx every other day. Today is his day off from the drug. He said his foot has not gotten any worse over the weekend. He said he drinks at least 64 ounces of water per day, usually more. He works for the city of [place] as a  and he is able to go to work. He said he is not on his feet all day and it is manageable. He continues to use both urea and clobetasol topical lotions twice daily each. He will continue with the lotions and Cabometyx as prescribed pending his planned visit with Krystal Valenzuela on Monday 2/6/2023. He will call with any interim questions or concerns. He expressed understanding and agreement with this plan and thanked me for the call.     Tato GuD  [facility]  [phone]  January 30, 2023
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
## 2023-02-04 DIAGNOSIS — K25.9 GASTRIC EROSION, UNSPECIFIED ULCER CHRONICITY: ICD-10-CM

## 2023-02-06 ENCOUNTER — ONCOLOGY VISIT (OUTPATIENT)
Dept: ONCOLOGY | Facility: CLINIC | Age: 58
End: 2023-02-06
Attending: INTERNAL MEDICINE
Payer: COMMERCIAL

## 2023-02-06 ENCOUNTER — APPOINTMENT (OUTPATIENT)
Dept: LAB | Facility: CLINIC | Age: 58
End: 2023-02-06
Attending: INTERNAL MEDICINE
Payer: COMMERCIAL

## 2023-02-06 ENCOUNTER — MYC MEDICAL ADVICE (OUTPATIENT)
Dept: NEPHROLOGY | Facility: CLINIC | Age: 58
End: 2023-02-06

## 2023-02-06 VITALS
DIASTOLIC BLOOD PRESSURE: 83 MMHG | WEIGHT: 225 LBS | HEART RATE: 101 BPM | OXYGEN SATURATION: 98 % | BODY MASS INDEX: 30.52 KG/M2 | RESPIRATION RATE: 16 BRPM | SYSTOLIC BLOOD PRESSURE: 126 MMHG | TEMPERATURE: 98.5 F

## 2023-02-06 DIAGNOSIS — N28.89 RIGHT RENAL MASS: ICD-10-CM

## 2023-02-06 DIAGNOSIS — N18.4 CKD (CHRONIC KIDNEY DISEASE) STAGE 4, GFR 15-29 ML/MIN (H): ICD-10-CM

## 2023-02-06 DIAGNOSIS — C64.1 RENAL CELL CARCINOMA, RIGHT (H): Primary | ICD-10-CM

## 2023-02-06 DIAGNOSIS — K25.9 GASTRIC EROSION, UNSPECIFIED ULCER CHRONICITY: ICD-10-CM

## 2023-02-06 DIAGNOSIS — I12.9 HYPERTENSION, RENAL: ICD-10-CM

## 2023-02-06 DIAGNOSIS — E03.4 HYPOTHYROIDISM DUE TO ACQUIRED ATROPHY OF THYROID: ICD-10-CM

## 2023-02-06 DIAGNOSIS — N18.31 CHRONIC KIDNEY DISEASE, STAGE 3A (H): Primary | ICD-10-CM

## 2023-02-06 LAB
ALBUMIN SERPL BCG-MCNC: 4.4 G/DL (ref 3.5–5.2)
ALP SERPL-CCNC: 90 U/L (ref 40–129)
ALT SERPL W P-5'-P-CCNC: 15 U/L (ref 10–50)
ANION GAP SERPL CALCULATED.3IONS-SCNC: 10 MMOL/L (ref 7–15)
AST SERPL W P-5'-P-CCNC: 26 U/L (ref 10–50)
BASOPHILS # BLD AUTO: 0 10E3/UL (ref 0–0.2)
BASOPHILS NFR BLD AUTO: 1 %
BILIRUB SERPL-MCNC: 0.3 MG/DL
BUN SERPL-MCNC: 12.7 MG/DL (ref 6–20)
CALCIUM SERPL-MCNC: 9.5 MG/DL (ref 8.6–10)
CHLORIDE SERPL-SCNC: 104 MMOL/L (ref 98–107)
CREAT SERPL-MCNC: 1.68 MG/DL (ref 0.67–1.17)
DEPRECATED HCO3 PLAS-SCNC: 27 MMOL/L (ref 22–29)
EOSINOPHIL # BLD AUTO: 0.2 10E3/UL (ref 0–0.7)
EOSINOPHIL NFR BLD AUTO: 3 %
ERYTHROCYTE [DISTWIDTH] IN BLOOD BY AUTOMATED COUNT: 17.9 % (ref 10–15)
GFR SERPL CREATININE-BSD FRML MDRD: 47 ML/MIN/1.73M2
GLUCOSE SERPL-MCNC: 123 MG/DL (ref 70–99)
HCT VFR BLD AUTO: 40.6 % (ref 40–53)
HGB BLD-MCNC: 13.4 G/DL (ref 13.3–17.7)
IMM GRANULOCYTES # BLD: 0 10E3/UL
IMM GRANULOCYTES NFR BLD: 0 %
LYMPHOCYTES # BLD AUTO: 2.3 10E3/UL (ref 0.8–5.3)
LYMPHOCYTES NFR BLD AUTO: 40 %
MCH RBC QN AUTO: 28.6 PG (ref 26.5–33)
MCHC RBC AUTO-ENTMCNC: 33 G/DL (ref 31.5–36.5)
MCV RBC AUTO: 87 FL (ref 78–100)
MONOCYTES # BLD AUTO: 0.3 10E3/UL (ref 0–1.3)
MONOCYTES NFR BLD AUTO: 6 %
NEUTROPHILS # BLD AUTO: 2.9 10E3/UL (ref 1.6–8.3)
NEUTROPHILS NFR BLD AUTO: 50 %
NRBC # BLD AUTO: 0 10E3/UL
NRBC BLD AUTO-RTO: 0 /100
PLATELET # BLD AUTO: 315 10E3/UL (ref 150–450)
POTASSIUM SERPL-SCNC: 4 MMOL/L (ref 3.4–5.3)
PROT SERPL-MCNC: 7.3 G/DL (ref 6.4–8.3)
RBC # BLD AUTO: 4.69 10E6/UL (ref 4.4–5.9)
SODIUM SERPL-SCNC: 141 MMOL/L (ref 136–145)
T4 FREE SERPL-MCNC: 1.24 NG/DL (ref 0.9–1.7)
TSH SERPL DL<=0.005 MIU/L-ACNC: 9.35 UIU/ML (ref 0.3–4.2)
WBC # BLD AUTO: 5.7 10E3/UL (ref 4–11)

## 2023-02-06 PROCEDURE — 85041 AUTOMATED RBC COUNT: CPT

## 2023-02-06 PROCEDURE — 36415 COLL VENOUS BLD VENIPUNCTURE: CPT

## 2023-02-06 PROCEDURE — 84443 ASSAY THYROID STIM HORMONE: CPT

## 2023-02-06 PROCEDURE — 84439 ASSAY OF FREE THYROXINE: CPT

## 2023-02-06 PROCEDURE — 80053 COMPREHEN METABOLIC PANEL: CPT

## 2023-02-06 PROCEDURE — G0463 HOSPITAL OUTPT CLINIC VISIT: HCPCS

## 2023-02-06 PROCEDURE — 99215 OFFICE O/P EST HI 40 MIN: CPT

## 2023-02-06 RX ORDER — ASPIRIN 81 MG/1
81 TABLET, CHEWABLE ORAL DAILY
Qty: 90 TABLET | Refills: 3 | Status: ON HOLD | OUTPATIENT
Start: 2023-02-06 | End: 2023-08-11

## 2023-02-06 RX ORDER — OMEPRAZOLE 40 MG/1
40 CAPSULE, DELAYED RELEASE ORAL DAILY
Qty: 60 CAPSULE | Refills: 1 | Status: SHIPPED | OUTPATIENT
Start: 2023-02-06 | End: 2023-04-20

## 2023-02-06 RX ORDER — AMLODIPINE BESYLATE 5 MG/1
10 TABLET ORAL DAILY
Qty: 120 TABLET | Refills: 3 | Status: ON HOLD | OUTPATIENT
Start: 2023-02-06 | End: 2023-08-11

## 2023-02-06 RX ORDER — OMEPRAZOLE 40 MG/1
CAPSULE, DELAYED RELEASE ORAL
Qty: 60 CAPSULE | Refills: 1 | OUTPATIENT
Start: 2023-02-06

## 2023-02-06 ASSESSMENT — PAIN SCALES - GENERAL: PAINLEVEL: NO PAIN (0)
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
## 2023-02-06 NOTE — NURSING NOTE
Chief Complaint   Patient presents with     Blood Draw     Vitals, blood drawn via VPT by LPN. Pt checked into appt.      KRISTINA Fregoso LPN

## 2023-02-06 NOTE — NURSING NOTE
Oncology Rooming Note    February 6, 2023 3:01 PM   Dimitrios Goldberg is a 57 year old male who presents for:    Chief Complaint   Patient presents with     Blood Draw     Vitals, blood drawn via VPT by LPN. Pt checked into appt.      Oncology Clinic Visit     Renal cell carcinoma, right     Initial Vitals: /83   Pulse 101   Temp 98.5  F (36.9  C) (Oral)   Resp 16   Wt 102.1 kg (225 lb)   SpO2 98%   BMI 30.52 kg/m   Estimated body mass index is 30.52 kg/m  as calculated from the following:    Height as of 12/13/22: 1.829 m (6').    Weight as of this encounter: 102.1 kg (225 lb). Body surface area is 2.28 meters squared.  No Pain (0) Comment: Data Unavailable   No LMP for male patient.  Allergies reviewed: Yes  Medications reviewed: Yes    Medications: MEDICATION REFILLS NEEDED TODAY. Provider was notified.  Pharmacy name entered into [employer]:    [id number] [place] [facility], [place] - [address], [place] - [address] [place] [address]    Clinical concerns: needs refill. aspirin, omeprazole, amlodipine.      Navdeep Vazquez

## 2023-02-08 ENCOUNTER — MYC MEDICAL ADVICE (OUTPATIENT)
Dept: ONCOLOGY | Facility: CLINIC | Age: 58
End: 2023-02-08
Payer: COMMERCIAL

## 2023-02-08 DIAGNOSIS — E03.4 HYPOTHYROIDISM DUE TO ACQUIRED ATROPHY OF THYROID: ICD-10-CM

## 2023-02-08 RX ORDER — LEVOTHYROXINE SODIUM 112 UG/1
112 TABLET ORAL DAILY
Qty: 30 TABLET | Refills: 3 | Status: SHIPPED | OUTPATIENT
Start: 2023-02-08 | End: 2023-04-20

## 2023-02-11 ENCOUNTER — NURSE TRIAGE (OUTPATIENT)
Dept: NURSING | Facility: CLINIC | Age: 58
End: 2023-02-11
Payer: COMMERCIAL

## 2023-02-11 NOTE — TELEPHONE ENCOUNTER
COVID Positive/Requesting COVID treatment    Patient is positive for COVID and requesting treatment options.    Date of positive COVID test  home? 2/11/2023  Current COVID symptoms: fever or chills, cough, headache, sore throat and congestion or runny nose  Date COVID symptoms began: 2/10/23    Message should be routed to clinic RN pool. Best phone number to use for call back: 2/10/23    Doesn't have a pcp currently    Per protocol patient needs to seen for treatment    Michelle Mendoza RN  [facility] Nurse Advisor  1:29 PM  2/11/2023        Reason for Disposition    [1] HIGH RISK for severe COVID complications (e.g., weak immune system, age > 64 years, obesity with BMI > 25, pregnant, chronic lung disease or other chronic medical condition) AND [2] COVID symptoms (e.g., cough, fever)  (Exceptions: Already seen by PCP and no new or worsening symptoms.)    Additional Information    Negative: SEVERE difficulty breathing (e.g., struggling for each breath, speaks in single words)    Negative: Difficult to awaken or acting confused (e.g., disoriented, slurred speech)    Negative: Bluish (or gray) lips or face now    Negative: Shock suspected (e.g., cold/pale/clammy skin, too weak to stand, low BP, rapid pulse)    Negative: Sounds like a life-threatening emergency to the triager    Negative: SEVERE or constant chest pain or pressure  (Exception: Mild central chest pain, present only when coughing.)    Negative: MODERATE difficulty breathing (e.g., speaks in phrases, SOB even at rest, pulse 100-120)    Negative: [1] Headache AND [2] stiff neck (can't touch chin to chest)    Negative: Oxygen level (e.g., pulse oximetry) 90 percent or lower    Negative: Chest pain or pressure    Negative: Patient sounds very sick or weak to the triager    Negative: MILD difficulty breathing (e.g., minimal/no SOB at rest, SOB with walking, pulse <100)    Negative: Fever > 103 F (39.4 C)    Negative: [1] Fever > 101 F (38.3 C) AND [2] age >  60 years    Negative: [1] Fever > 100.0 F (37.8 C) AND [2] bedridden (e.g., nursing home patient, CVA, chronic illness, recovering from surgery)    Protocols used: CORONAVIRUS (COVID-19) DIAGNOSED OR [id number]

## 2023-02-12 ENCOUNTER — VIRTUAL VISIT (OUTPATIENT)
Dept: URGENT CARE | Facility: CLINIC | Age: 58
End: 2023-02-12
Payer: COMMERCIAL

## 2023-02-12 DIAGNOSIS — I10 HYPERTENSION, UNSPECIFIED TYPE: ICD-10-CM

## 2023-02-12 DIAGNOSIS — E78.5 HYPERLIPIDEMIA, UNSPECIFIED HYPERLIPIDEMIA TYPE: ICD-10-CM

## 2023-02-12 DIAGNOSIS — N52.9 ERECTILE DYSFUNCTION, UNSPECIFIED ERECTILE DYSFUNCTION TYPE: ICD-10-CM

## 2023-02-12 DIAGNOSIS — U07.1 INFECTION DUE TO 2019 NOVEL CORONAVIRUS: Primary | ICD-10-CM

## 2023-02-12 DIAGNOSIS — C64.1 RENAL CELL CARCINOMA, RIGHT (H): ICD-10-CM

## 2023-02-12 DIAGNOSIS — N18.31 CHRONIC KIDNEY DISEASE, STAGE 3A (H): ICD-10-CM

## 2023-02-12 PROCEDURE — 99203 OFFICE O/P NEW LOW 30 MIN: CPT | Mod: CS
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
## 2023-02-12 NOTE — PATIENT INSTRUCTIONS
Do NOT take atorvastatin or viagra while you are on Paxlovid.  You can restart them 2-3 days after you finish all the Paxlovid.

## 2023-02-22 ENCOUNTER — TELEPHONE (OUTPATIENT)
Dept: ONCOLOGY | Facility: CLINIC | Age: 58
End: 2023-02-22
Payer: COMMERCIAL

## 2023-02-22 NOTE — ORAL ONC MGMT
Oral Chemotherapy Monitoring Program     Followed up with Dimitrios at the request of Krystal Valenzuela, to check in on his HFS, which has primarily been blisters on his feet. Essentially things are status quo, no real change, and he feels comfortable with continuing on his current dose. No need to send a refill today as he has ~18 tablets on hand, will move this out 1 month. Dimitrios denies any other questions or concerns at this time.    Ron Rios, PharmD, BCPS, BCOP  Hematology/Oncology Clinical Pharmacist  [facility]  [phone]

## 2023-02-24 ENCOUNTER — LAB (OUTPATIENT)
Dept: LAB | Facility: CLINIC | Age: 58
End: 2023-02-24
Attending: INTERNAL MEDICINE
Payer: COMMERCIAL

## 2023-02-24 DIAGNOSIS — N18.31 CHRONIC KIDNEY DISEASE, STAGE 3A (H): ICD-10-CM

## 2023-02-24 LAB
ALBUMIN SERPL BCG-MCNC: 4.4 G/DL (ref 3.5–5.2)
ALP SERPL-CCNC: 85 U/L (ref 40–129)
ALT SERPL W P-5'-P-CCNC: 17 U/L (ref 10–50)
ANION GAP SERPL CALCULATED.3IONS-SCNC: 8 MMOL/L (ref 7–15)
AST SERPL W P-5'-P-CCNC: 22 U/L (ref 10–50)
BILIRUB SERPL-MCNC: 0.2 MG/DL
BUN SERPL-MCNC: 16.6 MG/DL (ref 6–20)
CALCIUM SERPL-MCNC: 9.5 MG/DL (ref 8.6–10)
CHLORIDE SERPL-SCNC: 104 MMOL/L (ref 98–107)
CREAT SERPL-MCNC: 1.72 MG/DL (ref 0.67–1.17)
CREAT UR-MCNC: 248 MG/DL
DEPRECATED HCO3 PLAS-SCNC: 29 MMOL/L (ref 22–29)
GFR SERPL CREATININE-BSD FRML MDRD: 46 ML/MIN/1.73M2
GLUCOSE SERPL-MCNC: 88 MG/DL (ref 70–99)
HGB BLD-MCNC: 13 G/DL (ref 13.3–17.7)
MICROALBUMIN UR-MCNC: 17.6 MG/L
MICROALBUMIN/CREAT UR: 7.1 MG/G CR (ref 0–17)
POTASSIUM SERPL-SCNC: 4.2 MMOL/L (ref 3.4–5.3)
PROT SERPL-MCNC: 7.4 G/DL (ref 6.4–8.3)
SODIUM SERPL-SCNC: 141 MMOL/L (ref 136–145)

## 2023-02-24 PROCEDURE — 85018 HEMOGLOBIN: CPT

## 2023-02-24 PROCEDURE — 36415 COLL VENOUS BLD VENIPUNCTURE: CPT

## 2023-02-24 PROCEDURE — 82570 ASSAY OF URINE CREATININE: CPT

## 2023-02-24 PROCEDURE — 80053 COMPREHEN METABOLIC PANEL: CPT

## 2023-02-24 NOTE — NURSING NOTE
Chief Complaint   Patient presents with     Labs Only     Labs drawn via  by RN.     Labs collected from venipuncture by RN.     Isabel Leonard RN

## 2023-02-27 ENCOUNTER — TELEPHONE (OUTPATIENT)
Dept: ONCOLOGY | Facility: CLINIC | Age: 58
End: 2023-02-27
Payer: COMMERCIAL

## 2023-02-27 NOTE — TELEPHONE ENCOUNTER
Patient calling inquiring about his lab draw next week on 3/6 for Dr. Campos. He would like to know what labs and if he needs to fast prior.    Routed to care team Nicolas who saw patient last and RNCC to follow up with patient.

## 2023-03-01 ENCOUNTER — OFFICE VISIT (OUTPATIENT)
Dept: NEPHROLOGY | Facility: CLINIC | Age: 58
End: 2023-03-01
Payer: COMMERCIAL

## 2023-03-01 VITALS
HEIGHT: 72 IN | BODY MASS INDEX: 30.75 KG/M2 | HEART RATE: 86 BPM | DIASTOLIC BLOOD PRESSURE: 81 MMHG | SYSTOLIC BLOOD PRESSURE: 113 MMHG | OXYGEN SATURATION: 97 % | WEIGHT: 227 LBS

## 2023-03-01 DIAGNOSIS — I12.9 HYPERTENSION, RENAL: Primary | ICD-10-CM

## 2023-03-01 PROCEDURE — 99214 OFFICE O/P EST MOD 30 MIN: CPT | Performed by: INTERNAL MEDICINE

## 2023-03-01 RX ORDER — METOPROLOL SUCCINATE 50 MG/1
25 TABLET, EXTENDED RELEASE ORAL DAILY
Qty: 15 TABLET | Refills: 3 | Status: SHIPPED | OUTPATIENT
Start: 2023-03-01 | End: 2023-06-28

## 2023-03-01 ASSESSMENT — PAIN SCALES - GENERAL: PAINLEVEL: NO PAIN (0)

## 2023-03-01 NOTE — PATIENT INSTRUCTIONS
It was a pleasure taking care of you today.  I've included a brief summary of our discussion and care plan from today's visit below.  Please review this information with your primary care provider.    My recommendations are summarized as follows:  -Keep the amount of sodium in your diet at 2 g/day (also see instructions attached in that regard)  -Keep a Blood Pressure diary by taking your blood pressure twice a day as instructed (also see instructions attached in that regard).Please make sure that you are using a validated blood pressure device by checking that it is the case at: https://www.validatebp.org/  -Avoid all NSAID's. Examples include Ibuprofen (Advil, Motrin), naprosyn (Aleve), celebrex among others. Acetaminophen (Tylenol) is ok with maximum dose in 24 hours of 3200 mg.  -Do not exceed one alcoholic drink per day.  -Start metoprolol 50 mg 1/2 tablet once a day    - Return to Nephrology Clinic in 3 months with repeat labs to review your progress.     To schedule imaging please call [phone]. To schedule your lab appointment at [address] lab call [phone]    Who do I call with any questions after my visit?  Please be in touch if there are any further questions that arise following today's visit.  There are multiple ways to contact your nephrology care team.      During business hours, you may reach your Nephrology RN Care Coordinator, Diana Adkins at . To schedule or reschedule an appointment, please call [phone].    You can always send a secure message through [employer].  [employer] messages are answered by your nurse or doctor typically within 24 hours.  Please allow extra time on weekends and holidays.      For urgent/emergent questions after business hours, you may reach the on-call Nephrology Fellow by contacting the [facility] at [phone].     How will I get the results of any tests ordered?    You will receive all of your results.   If you have signed up for MyChart, any tests ordered at your visit will be available to you after your physician reviews them.  Typically this takes 1-2 weeks.  If there are urgent results that require a change in your care plan, your physician or nurse will call you to discuss the next steps.      What is [employer]?  [email or web address] is a secure way for you to access all of your healthcare records from the [facility].  It is a web based computer program, so you can sign on to it from any location.  It also allows you to send secure messages to your care team.  I recommend signing up for [employer] access if you have not already done so and are comfortable with using a computer.      How do I schedule a follow-up visit?  If you did not schedule a follow-up visit today, please call [phone] to schedule a follow-up office visit.      Sincerely,    Mariah Alan MD  [facility]  Division of Nephrology and Hypertension

## 2023-03-01 NOTE — NURSING NOTE
Chief Complaint   Patient presents with     RECHECK     Hypothyroidism (acquired) +2 more       Vitals:    03/01/23 1113   BP: 113/81   BP Location: Left arm   Patient Position: Sitting   Cuff Size: Adult Large   Pulse: 86   SpO2: 97%   Weight: 103 kg (227 lb)   Height: 1.829 m (6')       Body mass index is 30.79 kg/m .    Trina Lucero, [facility]
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
## 2023-03-06 ENCOUNTER — MYC MEDICAL ADVICE (OUTPATIENT)
Dept: ONCOLOGY | Facility: CLINIC | Age: 58
End: 2023-03-06

## 2023-03-06 ENCOUNTER — LAB (OUTPATIENT)
Dept: LAB | Facility: CLINIC | Age: 58
End: 2023-03-06
Attending: INTERNAL MEDICINE
Payer: COMMERCIAL

## 2023-03-06 DIAGNOSIS — N18.4 CKD (CHRONIC KIDNEY DISEASE) STAGE 4, GFR 15-29 ML/MIN (H): ICD-10-CM

## 2023-03-06 DIAGNOSIS — E03.4 HYPOTHYROIDISM DUE TO ACQUIRED ATROPHY OF THYROID: ICD-10-CM

## 2023-03-06 DIAGNOSIS — C64.1 RENAL CELL CARCINOMA, RIGHT (H): ICD-10-CM

## 2023-03-06 LAB
ALBUMIN SERPL BCG-MCNC: 4.4 G/DL (ref 3.5–5.2)
ALP SERPL-CCNC: 78 U/L (ref 40–129)
ALT SERPL W P-5'-P-CCNC: 16 U/L (ref 10–50)
ANION GAP SERPL CALCULATED.3IONS-SCNC: 9 MMOL/L (ref 7–15)
AST SERPL W P-5'-P-CCNC: 22 U/L (ref 10–50)
BASOPHILS # BLD AUTO: 0 10E3/UL (ref 0–0.2)
BASOPHILS NFR BLD AUTO: 1 %
BILIRUB SERPL-MCNC: 0.3 MG/DL
BUN SERPL-MCNC: 14 MG/DL (ref 6–20)
CALCIUM SERPL-MCNC: 9.6 MG/DL (ref 8.6–10)
CHLORIDE SERPL-SCNC: 103 MMOL/L (ref 98–107)
CREAT SERPL-MCNC: 1.76 MG/DL (ref 0.67–1.17)
DEPRECATED HCO3 PLAS-SCNC: 29 MMOL/L (ref 22–29)
EOSINOPHIL # BLD AUTO: 0.2 10E3/UL (ref 0–0.7)
EOSINOPHIL NFR BLD AUTO: 3 %
ERYTHROCYTE [DISTWIDTH] IN BLOOD BY AUTOMATED COUNT: 15.9 % (ref 10–15)
GFR SERPL CREATININE-BSD FRML MDRD: 45 ML/MIN/1.73M2
GLUCOSE SERPL-MCNC: 98 MG/DL (ref 70–99)
HCT VFR BLD AUTO: 40.8 % (ref 40–53)
HGB BLD-MCNC: 13 G/DL (ref 13.3–17.7)
IMM GRANULOCYTES # BLD: 0 10E3/UL
IMM GRANULOCYTES NFR BLD: 0 %
LYMPHOCYTES # BLD AUTO: 2.2 10E3/UL (ref 0.8–5.3)
LYMPHOCYTES NFR BLD AUTO: 40 %
MCH RBC QN AUTO: 28.1 PG (ref 26.5–33)
MCHC RBC AUTO-ENTMCNC: 31.9 G/DL (ref 31.5–36.5)
MCV RBC AUTO: 88 FL (ref 78–100)
MONOCYTES # BLD AUTO: 0.4 10E3/UL (ref 0–1.3)
MONOCYTES NFR BLD AUTO: 8 %
NEUTROPHILS # BLD AUTO: 2.7 10E3/UL (ref 1.6–8.3)
NEUTROPHILS NFR BLD AUTO: 48 %
NRBC # BLD AUTO: 0 10E3/UL
NRBC BLD AUTO-RTO: 0 /100
PLATELET # BLD AUTO: 298 10E3/UL (ref 150–450)
POTASSIUM SERPL-SCNC: 4.1 MMOL/L (ref 3.4–5.3)
PROT SERPL-MCNC: 7.2 G/DL (ref 6.4–8.3)
RBC # BLD AUTO: 4.62 10E6/UL (ref 4.4–5.9)
SODIUM SERPL-SCNC: 141 MMOL/L (ref 136–145)
T4 FREE SERPL-MCNC: 1.41 NG/DL (ref 0.9–1.7)
TSH SERPL DL<=0.005 MIU/L-ACNC: 8.86 UIU/ML (ref 0.3–4.2)
WBC # BLD AUTO: 5.5 10E3/UL (ref 4–11)

## 2023-03-06 PROCEDURE — 85025 COMPLETE CBC W/AUTO DIFF WBC: CPT

## 2023-03-06 PROCEDURE — 36415 COLL VENOUS BLD VENIPUNCTURE: CPT

## 2023-03-06 PROCEDURE — 80053 COMPREHEN METABOLIC PANEL: CPT

## 2023-03-06 PROCEDURE — 84443 ASSAY THYROID STIM HORMONE: CPT

## 2023-03-06 PROCEDURE — 84439 ASSAY OF FREE THYROXINE: CPT

## 2023-03-06 NOTE — TELEPHONE ENCOUNTER
Oral Chemotherapy Monitoring Program  Lab Follow Up    Reviewed lab results from 3/6/23.    ORAL CHEMOTHERAPY 1/16/2023 1/18/2023 1/20/2023 1/24/2023 1/30/2023 2/22/2023 3/6/2023   Assessment Type Left Voicemail Other Refill Other Other Other Lab Monitoring   Diagnosis Code Renal Cell Cancer Renal Cell Cancer Renal Cell Cancer Renal Cell Cancer Renal Cell Cancer Renal Cell Cancer Renal Cell Cancer   Providers Beth Campos   [facility]   Drug Name Cabometyx (cabozantinib) Cabometyx (cabozantinib) Cabometyx (cabozantinib) Cabometyx (cabozantinib) Cabometyx (cabozantinib) Cabometyx (cabozantinib) Cabometyx (cabozantinib)   Dose 20 mg 20 mg 20 mg 20 mg 20 mg 20 mg 20 mg   Current Schedule Every Other Day Every Other Day Every Other Day Every Other Day Every Other Day Every Other Day Every Other Day   Cycle Details Continuous Continuous Continuous Continuous Continuous Continuous Continuous   Start Date of Last Cycle - 1/17/2023 - 1/17/2023 - - -   Doses missed in last 2 weeks - - - 0 - - -   Adherence Assessment - - - Adherent - - -   Adverse Effects - - - No AE identified during assessment Palmar-plantar Erythrodysethesia Syndrome Palmar-plantar Erythrodysethesia Syndrome -   Palmar-plantar Erythrodysethesia syndrome[hand-foot syndrome] - - - - Grade 1 Grade 1 -   Pharmacist Intervention(Palmar-plantar) - - - - Yes No -   Intervention(s) - - - - Patient education - -   Home BPs - - - - - - -   Any new drug interactions? - - - No - - -   Is the dose as ordered appropriate for the patient? - - - Yes - - -   Since the last time we talked, have you been hospitalized or used the emergency room? - - - No - - -       Labs:  _  Result Component Current Result Ref Range   Sodium 141 (3/6/2023) 136 - 145 mmol/L     _  Result Component Current Result Ref Range   Potassium 4.1 (3/6/2023) 3.4 - 5.3 mmol/L     _  Result Component  Current Result Ref Range   Calcium 9.6 (3/6/2023) 8.6 - 10.0 mg/dL     No results found for Mag within last 30 days.     No results found for Phos within last 30 days.     _  Result Component Current Result Ref Range   Albumin 4.4 (3/6/2023) 3.5 - 5.2 g/dL     _  Result Component Current Result Ref Range   Urea Nitrogen 14.0 (3/6/2023) 6.0 - 20.0 mg/dL     _  Result Component Current Result Ref Range   Creatinine 1.76 (H) (3/6/2023) 0.67 - 1.17 mg/dL     _  Result Component Current Result Ref Range   AST 22 (3/6/2023) 10 - 50 U/L     _  Result Component Current Result Ref Range   ALT 16 (3/6/2023) 10 - 50 U/L     _  Result Component Current Result Ref Range   Bilirubin Total 0.3 (3/6/2023) <=1.2 mg/dL     _  Result Component Current Result Ref Range   WBC Count 5.5 (3/6/2023) 4.0 - 11.0 10e3/uL     _  Result Component Current Result Ref Range   Hemoglobin 13.0 (L) (3/6/2023) 13.3 - 17.7 g/dL     _  Result Component Current Result Ref Range   Platelet Count 298 (3/6/2023) 150 - 450 10e3/uL     No results found for ANC within last 30 days.     _  Result Component Current Result Ref Range   Absolute Neutrophils 2.7 (3/6/2023) 1.6 - 8.3 10e3/uL        Assessment & Plan:  No new concerning abnormalities.  No changes with cabozantinib needed at this time.    [employer] Mobile message sent to patient.    Jessica Cedeño, PharmD, BCPS, BCOP  Oncology Clinical Pharmacy Specialist  [facility]  [phone]

## 2023-03-06 NOTE — NURSING NOTE
Chief Complaint   Patient presents with     Blood Draw     Labs collected from venipuncture by TIFFANY.      Cassandra FORTUNE RN PHN BSN  BMT/Oncology Lab

## 2023-03-09 ASSESSMENT — ENCOUNTER SYMPTOMS
ABDOMINAL PAIN: 0
MYALGIAS: 0
COUGH: 0
DYSURIA: 0
WEAKNESS: 0
HEMATOCHEZIA: 0
NERVOUS/ANXIOUS: 0
EYE PAIN: 0
HEADACHES: 0
ARTHRALGIAS: 1
JOINT SWELLING: 0
SHORTNESS OF BREATH: 0
CONSTIPATION: 0
DIZZINESS: 0
HEMATURIA: 0
SORE THROAT: 0
FEVER: 0
PARESTHESIAS: 0
HEARTBURN: 0
FREQUENCY: 0
PALPITATIONS: 0
NAUSEA: 0
DIARRHEA: 0
CHILLS: 0

## 2023-03-16 ENCOUNTER — OFFICE VISIT (OUTPATIENT)
Dept: FAMILY MEDICINE | Facility: CLINIC | Age: 58
End: 2023-03-16
Payer: COMMERCIAL

## 2023-03-16 VITALS
OXYGEN SATURATION: 94 % | DIASTOLIC BLOOD PRESSURE: 87 MMHG | RESPIRATION RATE: 16 BRPM | HEIGHT: 71 IN | HEART RATE: 100 BPM | BODY MASS INDEX: 30.6 KG/M2 | TEMPERATURE: 97.8 F | WEIGHT: 218.6 LBS | SYSTOLIC BLOOD PRESSURE: 122 MMHG

## 2023-03-16 DIAGNOSIS — Z00.00 ROUTINE GENERAL MEDICAL EXAMINATION AT A HEALTH CARE FACILITY: Primary | ICD-10-CM

## 2023-03-16 DIAGNOSIS — Z13.220 SCREENING FOR HYPERLIPIDEMIA: ICD-10-CM

## 2023-03-16 DIAGNOSIS — G43.909 MIGRAINE WITHOUT STATUS MIGRAINOSUS, NOT INTRACTABLE, UNSPECIFIED MIGRAINE TYPE: ICD-10-CM

## 2023-03-16 DIAGNOSIS — N52.9 ERECTILE DYSFUNCTION, UNSPECIFIED ERECTILE DYSFUNCTION TYPE: ICD-10-CM

## 2023-03-16 DIAGNOSIS — M25.551 HIP PAIN, RIGHT: ICD-10-CM

## 2023-03-16 DIAGNOSIS — B00.9 RECURRENT HERPES SIMPLEX: ICD-10-CM

## 2023-03-16 DIAGNOSIS — E78.5 HYPERLIPIDEMIA, UNSPECIFIED HYPERLIPIDEMIA TYPE: ICD-10-CM

## 2023-03-16 PROCEDURE — 90471 IMMUNIZATION ADMIN: CPT | Performed by: INTERNAL MEDICINE

## 2023-03-16 PROCEDURE — 99214 OFFICE O/P EST MOD 30 MIN: CPT | Mod: 25 | Performed by: INTERNAL MEDICINE

## 2023-03-16 PROCEDURE — 90677 PCV20 VACCINE IM: CPT | Performed by: INTERNAL MEDICINE

## 2023-03-16 PROCEDURE — 99396 PREV VISIT EST AGE 40-64: CPT | Mod: 25 | Performed by: INTERNAL MEDICINE

## 2023-03-16 RX ORDER — RIZATRIPTAN BENZOATE 10 MG/1
10 TABLET, ORALLY DISINTEGRATING ORAL PRN
Qty: 30 TABLET | Refills: 0 | Status: SHIPPED | OUTPATIENT
Start: 2023-03-16

## 2023-03-16 RX ORDER — TADALAFIL 10 MG/1
10 TABLET ORAL DAILY PRN
Qty: 30 TABLET | Refills: 0 | Status: SHIPPED | OUTPATIENT
Start: 2023-03-16 | End: 2024-06-10

## 2023-03-16 RX ORDER — ACYCLOVIR 400 MG/1
400 TABLET ORAL EVERY 8 HOURS
Qty: 15 TABLET | Refills: 0 | Status: SHIPPED | OUTPATIENT
Start: 2023-03-16 | End: 2023-04-17

## 2023-03-16 RX ORDER — TADALAFIL 10 MG/1
10 TABLET ORAL DAILY PRN
Qty: 30 TABLET | Refills: 0 | Status: SHIPPED | OUTPATIENT
Start: 2023-03-16 | End: 2023-03-16

## 2023-03-16 ASSESSMENT — ENCOUNTER SYMPTOMS
DIZZINESS: 0
FEVER: 0
DIARRHEA: 0
WEAKNESS: 0
HEARTBURN: 0
COUGH: 0
FREQUENCY: 0
NERVOUS/ANXIOUS: 0
SORE THROAT: 0
PARESTHESIAS: 0
HEMATOCHEZIA: 0
NAUSEA: 0
EYE PAIN: 0
CHILLS: 0
SHORTNESS OF BREATH: 0
ABDOMINAL PAIN: 0
HEADACHES: 0
JOINT SWELLING: 0
PALPITATIONS: 0
DYSURIA: 0
MYALGIAS: 0
CONSTIPATION: 0
ARTHRALGIAS: 1
HEMATURIA: 0

## 2023-03-16 ASSESSMENT — PAIN SCALES - GENERAL: PAINLEVEL: MILD PAIN (3)

## 2023-03-16 NOTE — PROGRESS NOTES
SUBJECTIVE:   CC: Dimitrios is an 58 year old who presents for preventative health visit.     Patient has been advised of split billing requirements and indicates understanding: Yes  Healthy Habits:     Getting at least 3 servings of Calcium per day:  Yes    Bi-annual eye exam:  Yes    Dental care twice a year:  Yes    Sleep apnea or symptoms of sleep apnea:  None    Diet:  Regular (no restrictions)    Frequency of exercise:  None    Taking medications regularly:  Yes    Medication side effects:  None    PHQ-2 Total Score: 0    Additional concerns today:  No              Today's PHQ-2 Score:   PHQ-2 (  [employer]) 3/9/2023   Q1: Little interest or pleasure in doing things 0   Q2: Feeling down, depressed or hopeless 0   PHQ-2 Score 0   PHQ-2 Total Score (12-17 Years)- Positive if 3 or more points; Administer PHQ-A if positive -   Q1: Little interest or pleasure in doing things Not at all   Q2: Feeling down, depressed or hopeless Not at all   PHQ-2 Score 0       Have you ever done Advance Care Planning? (For example, a Health Directive, POLST, or a discussion with a medical provider or your loved ones about your wishes): No, advance care planning information given to patient to review.  Patient declined advance care planning discussion at this time.    Social History     Tobacco Use     Smoking status: Former     Types: Cigarettes     Quit date: 2000     Years since quittin.2     Smokeless tobacco: Never   Substance Use Topics     Alcohol use: Not Currently         Alcohol Use 3/9/2023   Prescreen: >3 drinks/day or >7 drinks/week? Not Applicable   No flowsheet data found.    Last PSA: No results found for: PSA    Reviewed orders with patient. Reviewed health maintenance and updated orders accordingly - Yes  Lab work is in process    Reviewed and updated as needed this visit by clinical staff   Tobacco  Allergies  Meds              Reviewed and updated as needed this visit by Provider                     Review of  "Systems   Constitutional: Negative for chills and fever.   HENT: Negative for congestion, ear pain, hearing loss and sore throat.    Eyes: Negative for pain and visual disturbance.   Respiratory: Negative for cough and shortness of breath.    Cardiovascular: Negative for chest pain, palpitations and peripheral edema.   Gastrointestinal: Negative for abdominal pain, constipation, diarrhea, heartburn, hematochezia and nausea.   Genitourinary: Negative for dysuria, frequency, genital sores, hematuria, impotence, penile discharge and urgency.   Musculoskeletal: Positive for arthralgias. Negative for joint swelling and myalgias.   Skin: Negative for rash.   Neurological: Negative for dizziness, weakness, headaches and paresthesias.   Psychiatric/Behavioral: Negative for mood changes. The patient is not nervous/anxious.          OBJECTIVE:   /87 (BP Location: Left arm, Patient Position: Sitting, Cuff Size: Adult Regular)   Pulse 100   Temp 97.8  F (36.6  C) (Oral)   Resp 16   Ht 1.816 m (5' 11.5\")   Wt 99.2 kg (218 lb 9.6 oz)   SpO2 94%   BMI 30.07 kg/m      Physical Exam  GENERAL: healthy, alert and no distress  EYES: Eyes grossly normal to inspection, PERRL and conjunctivae and sclerae normal  HENT: ear canals and TM's normal, nose and mouth without ulcers or lesions  NECK: no adenopathy, no asymmetry, masses, or scars and thyroid normal to palpation  RESP: lungs clear to auscultation - no rales, rhonchi or wheezes  CV: regular rate and rhythm, normal S1 S2, no S3 or S4, no murmur, click or rub, no peripheral edema and peripheral pulses strong  ABDOMEN: Multiple scars abdomen which are well-healed  SKIN: no suspicious lesions or rashes  NEURO: Normal strength and tone, mentation intact and speech normal  PSYCH: mentation appears normal, affect normal/bright    Diagnostic Test Results:  Labs reviewed in Epic    ASSESSMENT/PLAN:   (Z00.00) Routine general medical examination at a health care facility  " "(primary encounter diagnosis)  Comment: He is up-to-date with colonoscopy  Denies any LUTS symptoms  Discussed about pneumonia from shingles vaccine  He is going to get the pneumococcal vaccine  Plan: Basic metabolic panel  (Ca, Cl, CO2, Creat,         Gluc, K, Na, BUN)            (Z13.220) Screening for hyperlipidemia  Comment:   Plan: Lipid panel reflex to direct LDL Non-fasting            (E78.5) Hyperlipidemia, unspecified hyperlipidemia type  Comment: Is already on Lipitor  Plan: Lipid panel reflex to direct LDL Non-fasting            (B00.9) Recurrent herpes simplex  Comment: Today I have adjusted his acyclovir for renal  dosing  Plan: acyclovir (ZOVIRAX) 400 MG tablet, OFFICE/OUTPT        VISIT,EST,LEVL IV            (N52.9) Erectile dysfunction, unspecified erectile dysfunction type  Comment:   Plan: tadalafil (CIALIS) 10 MG tablet, OFFICE/OUTPT         VISIT,EST,LEVL IV, DISCONTINUED: tadalafil         (CIALIS) 10 MG tablet            (G43.909) Migraine without status migrainosus, not intractable, unspecified migraine type  Comment:   Plan: rizatriptan (MAXALT-MLT) 10 MG ODT,         OFFICE/OUTPT VISIT,EST,LEVL IV            (M25.551) Hip pain, right  Comment: Complaining of pain in right hip  No history of trauma  On examination he has pain on flexion and extension of the hip    Plan: Orthopedic  Referral, OFFICE/OUTPT         VISIT,EST,LEVL IV                    COUNSELING:   Reviewed preventive health counseling, as reflected in patient instructions       Regular exercise       Healthy diet/nutrition       Vision screening       Immunizations    Vaccinated for: Pneumococcal            BMI:   Estimated body mass index is 30.07 kg/m  as calculated from the following:    Height as of this encounter: 1.816 m (5' 11.5\").    Weight as of this encounter: 99.2 kg (218 lb 9.6 oz).   Weight management plan: Discussed healthy diet and exercise guidelines      He reports that he quit smoking about 23 years " ago. His smoking use included cigarettes. He has never used smokeless tobacco.            Christopher Ribeiro MD  [facility]

## 2023-03-16 NOTE — NURSING NOTE
Prior to immunization administration, verified patients identity using patient s name and date of birth. Please see Immunization Activity for additional information.     Screening Questionnaire for Adult Immunization    Are you sick today?   No   Do you have allergies to medications, food, a vaccine component or latex?   No   Have you ever had a serious reaction after receiving a vaccination?   No   Do you have a long-term health problem with heart, lung, kidney, or metabolic disease (e.g., diabetes), asthma, a blood disorder, no spleen, complement component deficiency, a cochlear implant, or a spinal fluid leak?  Are you on long-term aspirin therapy?   No   Do you have cancer, leukemia, HIV/AIDS, or any other immune system problem?   No   Do you have a parent, brother, or sister with an immune system problem?   No   In the past 3 months, have you taken medications that affect  your immune system, such as prednisone, other steroids, or anticancer drugs; drugs for the treatment of rheumatoid arthritis, Crohn s disease, or psoriasis; or have you had radiation treatments?   No   Have you had a seizure, or a brain or other nervous system problem?   No   During the past year, have you received a transfusion of blood or blood    products, or been given immune (gamma) globulin or antiviral drug?   No   For women: Are you pregnant or is there a chance you could become       pregnant during the next month?   No   Have you received any vaccinations in the past 4 weeks?   No     Immunization questionnaire answers were all negative.        Screening performed by Neri Mandujano MA on 3/16/2023 at 7:59 AM.

## 2023-03-20 DIAGNOSIS — C64.1 RENAL CELL CARCINOMA, RIGHT (H): Primary | ICD-10-CM

## 2023-03-20 NOTE — TELEPHONE ENCOUNTER
DIAGNOSIS: rt hip pain   APPOINTMENT DATE: 03/31/2023   NOTES STATUS DETAILS   OFFICE NOTE from referring provider Internal 03/16/2023 Dr Ribeiro [facility]    OFFICE NOTE from other specialist N/A    DISCHARGE SUMMARY from hospital N/A    DISCHARGE REPORT from the ER N/A    OPERATIVE REPORT N/A    EMG report N/A    MEDICATION LIST N/A    MRI N/A    DEXA (osteoporosis/bone health) N/A    CT SCAN N/A    XRAYS (IMAGES & REPORTS) N/A

## 2023-03-28 ENCOUNTER — TELEPHONE (OUTPATIENT)
Dept: NEPHROLOGY | Facility: CLINIC | Age: 58
End: 2023-03-28
Payer: COMMERCIAL

## 2023-03-28 NOTE — TELEPHONE ENCOUNTER
[facility] Call Center    Phone Message    May a detailed message be left on voicemail: yes     Reason for Call: Medication Refill Request    Has the patient contacted the pharmacy for the refill? Yes   Name of medication being requested: metoprolol succinate ER (TOPROL XL) 50 MG 24 hr tablet [[id number]] (Order [id number])    Provider who prescribed the medication: Dayanna  Pharmacy: [facility]  Date medication is needed: ASAP     Action Taken: Other: neph    Travel Screening: Not Applicable

## 2023-03-28 NOTE — TELEPHONE ENCOUNTER
Metoprolol succinate 25 mg daily        Last written prescription date: 3/1/23        Last fill quantity: 15, # refills: 3        Last office visit: 3/1/23        Future office visit: 6/28/23        Is this a controlled substance?  No       Called pharmacy, patient has refills on file. Called patient to inform him. States that he was told by them that they didn't have any. Explained to call back if they continue to give him issues. Pt verbalized understanding.
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
## 2023-03-31 ENCOUNTER — PRE VISIT (OUTPATIENT)
Dept: ORTHOPEDICS | Facility: CLINIC | Age: 58
End: 2023-03-31

## 2023-03-31 ENCOUNTER — ANCILLARY PROCEDURE (OUTPATIENT)
Dept: GENERAL RADIOLOGY | Facility: CLINIC | Age: 58
End: 2023-03-31
Attending: STUDENT IN AN ORGANIZED HEALTH CARE EDUCATION/TRAINING PROGRAM
Payer: COMMERCIAL

## 2023-03-31 ENCOUNTER — OFFICE VISIT (OUTPATIENT)
Dept: ORTHOPEDICS | Facility: CLINIC | Age: 58
End: 2023-03-31
Attending: INTERNAL MEDICINE
Payer: COMMERCIAL

## 2023-03-31 VITALS — BODY MASS INDEX: 30.07 KG/M2 | SYSTOLIC BLOOD PRESSURE: 128 MMHG | WEIGHT: 218.6 LBS | DIASTOLIC BLOOD PRESSURE: 82 MMHG

## 2023-03-31 DIAGNOSIS — M65.4 DE QUERVAIN'S TENOSYNOVITIS, RIGHT: ICD-10-CM

## 2023-03-31 DIAGNOSIS — M25.531 RIGHT WRIST PAIN: ICD-10-CM

## 2023-03-31 DIAGNOSIS — M16.11 PRIMARY OSTEOARTHRITIS OF RIGHT HIP: Primary | ICD-10-CM

## 2023-03-31 DIAGNOSIS — M19.039 WRIST ARTHRITIS: ICD-10-CM

## 2023-03-31 DIAGNOSIS — M25.551 HIP PAIN, RIGHT: ICD-10-CM

## 2023-03-31 PROCEDURE — 99204 OFFICE O/P NEW MOD 45 MIN: CPT | Performed by: STUDENT IN AN ORGANIZED HEALTH CARE EDUCATION/TRAINING PROGRAM

## 2023-03-31 PROCEDURE — 73502 X-RAY EXAM HIP UNI 2-3 VIEWS: CPT | Mod: RT | Performed by: RADIOLOGY

## 2023-03-31 PROCEDURE — 73110 X-RAY EXAM OF WRIST: CPT | Mod: RT | Performed by: RADIOLOGY

## 2023-03-31 ASSESSMENT — PAIN SCALES - GENERAL: PAINLEVEL: NO PAIN (0)
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
## 2023-03-31 NOTE — PATIENT INSTRUCTIONS
Aldo Dimitrios Goldberg ,     A copy of your assessment and our treatment plan that we discussed together is included below, as written in your medical chart.   If you have any questions, please feel free to call the clinic.     --------------------------------------------------  Dimitrios Goldberg is a 58 year old male that presents with right hip pain that is worse with hip internal rotation, walking, activities.  History and physical exam appear most consistent with intra-articular hip pathology, most likely a flare of mild osteoarthritis.  We also discussed right wrist pain that occurs with particular wrist motions.  History and physical exam appear most consistent with de Quervain's tenosynovitis and mild wrist arthritis.    Discussed the nature of the conditions and treatment options and mutually agreed upon the following plan:    - Imaging:          - Reviewed relevant imaging in the chart.  -XR R hip and XR R wrist ordered today pre-clinic and independently interpreted by myself.   - Reviewed results and images with patient.   - Medications:          - Discussed pharmacologic options for pain relief.   - May use Acetaminophen (Tylenol) as needed for pain control.  Continue to avoid NSAIDs for the effect on the kidney.  - May also use topical medications such as lidocaine, IcyHot, BioFreeze, or Voltaren gel for the wrist as needed for pain control.  Voltaren gel should be okay to use due to very low systemic absorption.  - Injections:          - Discussed possible injection options and alternatives.    - Options include corticosteroid injections for the intra-articular right hip, right wrist, de Quervain's tendons.  Discussed optimal timing for right hip injection, which would be about 3 weeks prior to an important vacation in July.  - Therapy:          - Discussed the benefits of physical therapy vs home exercise program for optimization of range of motion, flexibility, strength, stability and function.   -  Preference is for physical therapy.   - Physical Therapy referral placed today and instructed to call [phone] to schedule appointments.   - Modalities:          - May use ice, heat, massage or other modalities as needed.   - Bracing:          - Discussed bracing options and recommend using a thumb spica wrist brace during exacerbating activities.  - Options presented in clinic and choice given to take our brace from clinic or purchase an equivalent brace from an outside source.   - Activity:          - Encouraged to remain active and participate in regular activities as symptoms allow.  Avoid exacerbating activities.   - Follow up:          - In the summer for consideration of a steroid injection, for re-evaluation and update to treatment plan, or sooner for new/worsening symptoms.  - Patient has clinic contact information for questions or concerns.   --------------------------------------------------    It was a pleasure seeing you today. Thank you for choosing [facility] for your care.       Matthew Sosa DO, MYAM  [facility] - Sports Medicine  [facility] - Department of Orthopedic Surgery     Disclaimer:  This note was prepared and written using Dragon Medical dictation software. As a result, there may be errors in the script that have gone undetected. Please consider this when interpreting the information in this note.

## 2023-04-06 ENCOUNTER — TELEPHONE (OUTPATIENT)
Dept: ONCOLOGY | Facility: CLINIC | Age: 58
End: 2023-04-06

## 2023-04-06 ENCOUNTER — LAB (OUTPATIENT)
Dept: LAB | Facility: CLINIC | Age: 58
End: 2023-04-06
Attending: INTERNAL MEDICINE
Payer: COMMERCIAL

## 2023-04-06 ENCOUNTER — ANCILLARY PROCEDURE (OUTPATIENT)
Dept: CT IMAGING | Facility: CLINIC | Age: 58
End: 2023-04-06
Attending: INTERNAL MEDICINE
Payer: COMMERCIAL

## 2023-04-06 DIAGNOSIS — N18.4 CKD (CHRONIC KIDNEY DISEASE) STAGE 4, GFR 15-29 ML/MIN (H): ICD-10-CM

## 2023-04-06 DIAGNOSIS — C64.1 RENAL CELL CARCINOMA, RIGHT (H): ICD-10-CM

## 2023-04-06 DIAGNOSIS — E03.4 HYPOTHYROIDISM DUE TO ACQUIRED ATROPHY OF THYROID: ICD-10-CM

## 2023-04-06 LAB
ALBUMIN SERPL BCG-MCNC: 4.3 G/DL (ref 3.5–5.2)
ALP SERPL-CCNC: 87 U/L (ref 40–129)
ALT SERPL W P-5'-P-CCNC: 15 U/L (ref 10–50)
ANION GAP SERPL CALCULATED.3IONS-SCNC: 10 MMOL/L (ref 7–15)
AST SERPL W P-5'-P-CCNC: 25 U/L (ref 10–50)
BASOPHILS # BLD AUTO: 0 10E3/UL (ref 0–0.2)
BASOPHILS NFR BLD AUTO: 0 %
BILIRUB SERPL-MCNC: 0.3 MG/DL
BUN SERPL-MCNC: 17.1 MG/DL (ref 6–20)
CALCIUM SERPL-MCNC: 9.7 MG/DL (ref 8.6–10)
CHLORIDE SERPL-SCNC: 104 MMOL/L (ref 98–107)
CREAT BLD-MCNC: 1.6 MG/DL (ref 0.7–1.3)
CREAT SERPL-MCNC: 1.6 MG/DL (ref 0.67–1.17)
DEPRECATED HCO3 PLAS-SCNC: 26 MMOL/L (ref 22–29)
EOSINOPHIL # BLD AUTO: 0.2 10E3/UL (ref 0–0.7)
EOSINOPHIL NFR BLD AUTO: 3 %
ERYTHROCYTE [DISTWIDTH] IN BLOOD BY AUTOMATED COUNT: 14.5 % (ref 10–15)
GFR SERPL CREATININE-BSD FRML MDRD: 50 ML/MIN/1.73M2
GFR SERPL CREATININE-BSD FRML MDRD: 50 ML/MIN/1.73M2
GLUCOSE SERPL-MCNC: 89 MG/DL (ref 70–99)
HCT VFR BLD AUTO: 40.4 % (ref 40–53)
HGB BLD-MCNC: 13.4 G/DL (ref 13.3–17.7)
IMM GRANULOCYTES # BLD: 0 10E3/UL
IMM GRANULOCYTES NFR BLD: 0 %
LYMPHOCYTES # BLD AUTO: 2.5 10E3/UL (ref 0.8–5.3)
LYMPHOCYTES NFR BLD AUTO: 44 %
MCH RBC QN AUTO: 29.5 PG (ref 26.5–33)
MCHC RBC AUTO-ENTMCNC: 33.2 G/DL (ref 31.5–36.5)
MCV RBC AUTO: 89 FL (ref 78–100)
MONOCYTES # BLD AUTO: 0.4 10E3/UL (ref 0–1.3)
MONOCYTES NFR BLD AUTO: 6 %
NEUTROPHILS # BLD AUTO: 2.7 10E3/UL (ref 1.6–8.3)
NEUTROPHILS NFR BLD AUTO: 47 %
NRBC # BLD AUTO: 0 10E3/UL
NRBC BLD AUTO-RTO: 0 /100
PLATELET # BLD AUTO: 285 10E3/UL (ref 150–450)
POTASSIUM SERPL-SCNC: 4 MMOL/L (ref 3.4–5.3)
PROT SERPL-MCNC: 7.2 G/DL (ref 6.4–8.3)
RBC # BLD AUTO: 4.55 10E6/UL (ref 4.4–5.9)
SODIUM SERPL-SCNC: 140 MMOL/L (ref 136–145)
T4 FREE SERPL-MCNC: 1.49 NG/DL (ref 0.9–1.7)
TSH SERPL DL<=0.005 MIU/L-ACNC: 4.74 UIU/ML (ref 0.3–4.2)
WBC # BLD AUTO: 5.7 10E3/UL (ref 4–11)

## 2023-04-06 PROCEDURE — 36415 COLL VENOUS BLD VENIPUNCTURE: CPT

## 2023-04-06 PROCEDURE — 85025 COMPLETE CBC W/AUTO DIFF WBC: CPT

## 2023-04-06 PROCEDURE — 74177 CT ABD & PELVIS W/CONTRAST: CPT | Performed by: RADIOLOGY

## 2023-04-06 PROCEDURE — 71260 CT THORAX DX C+: CPT | Performed by: RADIOLOGY

## 2023-04-06 PROCEDURE — 80053 COMPREHEN METABOLIC PANEL: CPT

## 2023-04-06 PROCEDURE — 84443 ASSAY THYROID STIM HORMONE: CPT

## 2023-04-06 PROCEDURE — 84439 ASSAY OF FREE THYROXINE: CPT

## 2023-04-06 RX ORDER — IOPAMIDOL 755 MG/ML
120 INJECTION, SOLUTION INTRAVASCULAR ONCE
Status: COMPLETED | OUTPATIENT
Start: 2023-04-06 | End: 2023-04-06

## 2023-04-06 RX ADMIN — IOPAMIDOL 120 ML: 755 INJECTION, SOLUTION INTRAVASCULAR at 16:04

## 2023-04-06 NOTE — NURSING NOTE
Chief Complaint   Patient presents with     Blood Draw     Labs drawn via PIV start by RN.     Labs drawn with PIV start by rn.  Pt tolerated well.  VS taken and pt checked in for next appt.    Gerald Dasilva RN

## 2023-04-06 NOTE — TELEPHONE ENCOUNTER
Oral Chemotherapy Monitoring Program- lab review from 4/6/23 1/20/2023     2:00 PM 1/24/2023     1:00 PM 1/30/2023     2:00 PM 2/22/2023     4:00 PM 3/6/2023     1:00 PM 3/20/2023    11:00 AM 4/6/2023     4:00 PM   ORAL CHEMOTHERAPY   Assessment Type Refill Other Other Other Lab Monitoring Refill Lab Monitoring   Diagnosis Code Renal Cell Cancer Renal Cell Cancer Renal Cell Cancer Renal Cell Cancer Renal Cell Cancer Renal Cell Cancer Renal Cell Cancer   Providers Dr. Beth Campos   [facility]   Drug Name Cabometyx (cabozantinib) Cabometyx (cabozantinib) Cabometyx (cabozantinib) Cabometyx (cabozantinib) Cabometyx (cabozantinib) Cabometyx (cabozantinib) Cabometyx (cabozantinib)   Dose 20 mg 20 mg 20 mg 20 mg 20 mg 20 mg 20 mg   Current Schedule Every Other Day Every Other Day Every Other Day Every Other Day Every Other Day Every Other Day Every Other Day   Cycle Details Continuous Continuous Continuous Continuous Continuous Continuous Continuous   Start Date of Last Cycle  1/17/2023        Doses missed in last 2 weeks  0        Adherence Assessment  Adherent        Adverse Effects  No AE identified during assessment Palmar-plantar Erythrodysethesia Syndrome Palmar-plantar Erythrodysethesia Syndrome      Palmar-plantar Erythrodysethesia syndrome[hand-foot syndrome]   Grade 1 Grade 1      Pharmacist Intervention(Palmar-plantar)   Yes No      Intervention(s)   Patient education       Any new drug interactions?  No        Is the dose as ordered appropriate for the patient?  Yes        Since the last time we talked, have you been hospitalized or used the emergency room?  No            Vitals:  BP:   BP Readings from Last 1 Encounters:   03/31/23 128/82     Wt Readings from Last 1 Encounters:   03/31/23 99.2 kg (218 lb 9.6 oz)     Estimated body surface area is 2.24 meters squared as calculated from the  "following:    Height as of 3/16/23: 1.816 m (5' 11.5\").    Weight as of 3/31/23: 99.2 kg (218 lb 9.6 oz).    Labs:  _  Result Component Current Result Ref Range   Sodium 140 (4/6/2023) 136 - 145 mmol/L     _  Result Component Current Result Ref Range   Potassium 4.0 (4/6/2023) 3.4 - 5.3 mmol/L     _  Result Component Current Result Ref Range   Calcium 9.7 (4/6/2023) 8.6 - 10.0 mg/dL     No results found for Mag within last 30 days.     No results found for Phos within last 30 days.     _  Result Component Current Result Ref Range   Albumin 4.3 (4/6/2023) 3.5 - 5.2 g/dL     _  Result Component Current Result Ref Range   Urea Nitrogen 17.1 (4/6/2023) 6.0 - 20.0 mg/dL     _  Result Component Current Result Ref Range   Creatinine POCT 1.6 (H) (4/6/2023) 0.7 - 1.3 mg/dL       _  Result Component Current Result Ref Range   AST 25 (4/6/2023) 10 - 50 U/L     _  Result Component Current Result Ref Range   ALT 15 (4/6/2023) 10 - 50 U/L     _  Result Component Current Result Ref Range   Bilirubin Total 0.3 (4/6/2023) <=1.2 mg/dL       _  Result Component Current Result Ref Range   WBC Count 5.7 (4/6/2023) 4.0 - 11.0 10e3/uL     _  Result Component Current Result Ref Range   Hemoglobin 13.4 (4/6/2023) 13.3 - 17.7 g/dL     _  Result Component Current Result Ref Range   Platelet Count 285 (4/6/2023) 150 - 450 10e3/uL     ANC 2.7     Assessment/Plan:  Labs are acceptable for Cabometyx- continue same dose as ordered    Follow-Up:  4/11/23 appt with Dr Beth Oshea, PharmD, BCOP  April 6, 2023    "

## 2023-04-10 ENCOUNTER — THERAPY VISIT (OUTPATIENT)
Dept: PHYSICAL THERAPY | Facility: CLINIC | Age: 58
End: 2023-04-10
Attending: STUDENT IN AN ORGANIZED HEALTH CARE EDUCATION/TRAINING PROGRAM
Payer: COMMERCIAL

## 2023-04-10 DIAGNOSIS — M16.11 PRIMARY OSTEOARTHRITIS OF RIGHT HIP: ICD-10-CM

## 2023-04-10 DIAGNOSIS — M25.551 ACUTE PAIN OF RIGHT HIP: ICD-10-CM

## 2023-04-10 PROCEDURE — 97140 MANUAL THERAPY 1/> REGIONS: CPT | Mod: GP | Performed by: PHYSICAL THERAPIST

## 2023-04-10 PROCEDURE — 97110 THERAPEUTIC EXERCISES: CPT | Mod: GP | Performed by: PHYSICAL THERAPIST

## 2023-04-10 PROCEDURE — 97161 PT EVAL LOW COMPLEX 20 MIN: CPT | Mod: GP | Performed by: PHYSICAL THERAPIST

## 2023-04-10 ASSESSMENT — ACTIVITIES OF DAILY LIVING (ADL)
WALKING_UP_STEEP_HILLS: MODERATE DIFFICULTY
HOW_WOULD_YOU_RATE_YOUR_CURRENT_LEVEL_OF_FUNCTION_DURING_YOUR_USUAL_ACTIVITIES_OF_DAILY_LIVING_FROM_0_TO_100_WITH_100_BEING_YOUR_LEVEL_OF_FUNCTION_PRIOR_TO_YOUR_HIP_PROBLEM_AND_0_BEING_THE_INABILITY_TO_PERFORM_ANY_OF_YOUR_USUAL_DAILY_ACTIVITIES?: 60
DEEP_SQUATTING: NO DIFFICULTY AT ALL
ROLLING_OVER_IN_BED: SLIGHT DIFFICULTY
RECREATIONAL_ACTIVITIES: MODERATE DIFFICULTY
STANDING_FOR_15_MINUTES: SLIGHT DIFFICULTY
HOS_ADL_COUNT: 17
GOING_UP_1_FLIGHT_OF_STAIRS: SLIGHT DIFFICULTY
GETTING_INTO_AND_OUT_OF_AN_AVERAGE_CAR: MODERATE DIFFICULTY
WALKING_INITIALLY: SLIGHT DIFFICULTY
WALKING_15_MINUTES_OR_GREATER: EXTREME DIFFICULTY
SITTING_FOR_15_MINUTES: SLIGHT DIFFICULTY
GOING_DOWN_1_FLIGHT_OF_STAIRS: NO DIFFICULTY AT ALL
WALKING_DOWN_STEEP_HILLS: SLIGHT DIFFICULTY
HOS_ADL_SCORE(%): 69.12
STEPPING_UP_AND_DOWN_CURBS: MODERATE DIFFICULTY
HEAVY_WORK: MODERATE DIFFICULTY
GETTING_INTO_AND_OUT_OF_A_BATHTUB: NO DIFFICULTY AT ALL
TWISTING/PIVOTING_ON_INVOLVED_LEG: NO DIFFICULTY AT ALL
LIGHT_TO_MODERATE_WORK: MODERATE DIFFICULTY
WALKING_APPROXIMATELY_10_MINUTES: SLIGHT DIFFICULTY
HOS_ADL_HIGHEST_POTENTIAL_SCORE: 68
PUTTING_ON_SOCKS_AND_SHOES: MODERATE DIFFICULTY
HOS_ADL_ITEM_SCORE_TOTAL: 47

## 2023-04-10 NOTE — PROGRESS NOTES
Physical Therapy Initial Evaluation  Subjective:  The history is provided by the patient. No  was used.   Patient Health History  Dimitrios Goldberg being seen for Right hip pain.     Problem began: 2/10/2023.   Problem occurred: unknown   Pain is reported as 4/10 on pain scale.  General health as reported by patient is fair.  Pertinent medical history includes: cancer, high blood pressure and migraines/headaches.            Current medications:  High blood pressure medication and thyroid medication.    Current occupation is .   Primary job tasks include:  Computer work, driving and repetitive tasks.                  Therapist Generated HPI Evaluation  Problem details: The patient is a pleasant 58 year old male who presents to the clinic with complaints of R hip pain over the last 2 months. He notes that his pain feels like it is deep within his right hip joint, grabbing his whole hip with his hand in a C shape. Upon therapist evaluation the patient's presentation appears to be consistent with a flare up of osteoarthritis, the patient is able to perform basic hip strengthening and stretching without an increase in pain, however with forceful overpressure the patient notes familiar hip pain. The patient's presentation is consistent with the referring medical provider's medical diagnosis. .         Type of problem:  Right hip.    This is a new condition.  Condition occurred with:  Insidious onset.  Where condition occurred: for unknown reasons.  Patient reports pain:  Anterior, posterior, lateral and joint.  Pain is described as aching and is intermittent.  Pain radiates to:  Thigh. Pain is worse in the P.M..  Since onset symptoms are unchanged.  Associated symptoms:  Loss of motion/stiffness and loss of strength. Symptoms are exacerbated by walking, ascending stairs, descending stairs and bending/squatting  and relieved by rest.  Special tests included:  X-ray.    Restrictions due to  condition include:  Working in normal job without restrictions.  Barriers include:  None as reported by patient.                        Objective:    Gait:  Lacks full hip extension on R during gait leading to shortened step length, antalgic gait during first few steps, improves with continued ambulation.    Gait Type:  Antalgic                                                      Hip Evaluation  HIP AROM:  : Long axis traction, hip distraction @ 90/90 non provoking for pain.  Flexion: Left:   Right:  92, non painful, tight    Extension: Left:   Right:  Mod limitation, non-painful, stretching sensation  Abduction: Left:    Right:  24, non painful      Internal Rotation: Left:   Right: 4, sharp pain  External Rotation: Left:   Right: 41, pain with end range overpressure        Hip Strength:    Flexion:   Left: 5/5   Pain:  Right: 4/5   +  Pain:                    Extension:  Left: 5/5  Pain:Right: 4/5    +  Pain:    Abduction:  Left: 5/5     Pain:Right: 2+/5   +   Pain:    Internal Rotation:  Left: 5/5    Pain:Right: 4/5   +  Pain:  External Rotation:  Left: 5/5   Pain:  Right: 5/5   -  Pain:            Hip Special Testing:   Special tests hip not assessed: R hip KARAN (+) FADIR (+) Scour (+)     Right hip positive for the following special tests:  Perla                 General     ROS    Assessment/Plan:    Patient is a 58 year old male with right side hip complaints.    Patient has the following significant findings with corresponding treatment plan.                Diagnosis 1:  Right hip pain  Pain -  hot/cold therapy, US, electric stimulation, manual therapy, splint/taping/bracing/orthotics, self management, education and home program  Decreased ROM/flexibility - manual therapy and therapeutic exercise  Decreased joint mobility - manual therapy and therapeutic exercise  Decreased strength - therapeutic exercise and therapeutic activities  Impaired gait - gait training  Impaired muscle performance - neuro  re-education  Decreased function - therapeutic activities    Therapy Evaluation Codes:   1) History comprised of:   Personal factors that impact the plan of care:      Profession.    Comorbidity factors that impact the plan of care are:      Cancer, High blood pressure and Migraines/headaches.     Medications impacting care: Pain.  2) Examination of Body Systems comprised of:   Body structures and functions that impact the plan of care:      Hip.   Activity limitations that impact the plan of care are:      Bending, Lifting, Squatting/kneeling, Stairs, Standing, Walking and Working.  3) Clinical presentation characteristics are:   Stable/Uncomplicated.  4) Decision-Making    Low complexity using standardized patient assessment instrument and/or measureable assessment of functional outcome.  Cumulative Therapy Evaluation is: Low complexity.    Previous and current functional limitations:  (See Goal Flow Sheet for this information)    Short term and Long term goals: (See Goal Flow Sheet for this information)     Communication ability:  Patient appears to be able to clearly communicate and understand verbal and written communication and follow directions correctly.  Treatment Explanation - The following has been discussed with the patient:   RX ordered/plan of care  Anticipated outcomes  Possible risks and side effects  This patient would benefit from PT intervention to resume normal activities.   Rehab potential is good.    Frequency:  1 X week, once daily  Duration:  for 8 weeks  Discharge Plan:  Achieve all LTG.  Independent in home treatment program.  Reach maximal therapeutic benefit.    Please refer to the daily flowsheet for treatment today, total treatment time and time spent performing 1:1 timed codes.

## 2023-04-11 ENCOUNTER — ONCOLOGY VISIT (OUTPATIENT)
Dept: ONCOLOGY | Facility: CLINIC | Age: 58
End: 2023-04-11
Attending: INTERNAL MEDICINE
Payer: COMMERCIAL

## 2023-04-11 VITALS
HEART RATE: 89 BPM | TEMPERATURE: 98.5 F | BODY MASS INDEX: 30.7 KG/M2 | DIASTOLIC BLOOD PRESSURE: 78 MMHG | RESPIRATION RATE: 16 BRPM | WEIGHT: 223.2 LBS | OXYGEN SATURATION: 98 % | SYSTOLIC BLOOD PRESSURE: 113 MMHG

## 2023-04-11 DIAGNOSIS — E03.4 HYPOTHYROIDISM DUE TO ACQUIRED ATROPHY OF THYROID: ICD-10-CM

## 2023-04-11 DIAGNOSIS — N18.4 CKD (CHRONIC KIDNEY DISEASE) STAGE 4, GFR 15-29 ML/MIN (H): ICD-10-CM

## 2023-04-11 DIAGNOSIS — C64.1 RENAL CELL CARCINOMA, RIGHT (H): Primary | ICD-10-CM

## 2023-04-11 PROCEDURE — G0463 HOSPITAL OUTPT CLINIC VISIT: HCPCS | Performed by: INTERNAL MEDICINE

## 2023-04-11 PROCEDURE — 99215 OFFICE O/P EST HI 40 MIN: CPT | Performed by: INTERNAL MEDICINE

## 2023-04-11 RX ORDER — FERROUS GLUCONATE 324(38)MG
TABLET ORAL
Status: ON HOLD | COMMUNITY
Start: 2023-03-07 | End: 2023-08-11

## 2023-04-11 ASSESSMENT — PAIN SCALES - GENERAL: PAINLEVEL: NO PAIN (0)

## 2023-04-11 NOTE — NURSING NOTE
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    Weight as of this encounter: 101.2 kg (223 lb 3.2 oz). Body surface area is 2.26 meters squared.  No Pain (0) Comment: Data Unavailable   No LMP for male patient.  Allergies reviewed: Yes  Medications reviewed: Yes    Medications: Medication refills not needed today.  Pharmacy name entered into [employer]:    [id number] [place] [facility], [place] - [address], [place] - [address]    Clinical concerns: No new clinical concerns other than reason for visit today.     Radha Matos, EMT    "
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
## 2023-04-17 ENCOUNTER — THERAPY VISIT (OUTPATIENT)
Dept: PHYSICAL THERAPY | Facility: CLINIC | Age: 58
End: 2023-04-17
Attending: STUDENT IN AN ORGANIZED HEALTH CARE EDUCATION/TRAINING PROGRAM
Payer: COMMERCIAL

## 2023-04-17 DIAGNOSIS — M25.551 ACUTE PAIN OF RIGHT HIP: Primary | ICD-10-CM

## 2023-04-17 PROCEDURE — 97110 THERAPEUTIC EXERCISES: CPT | Mod: GP | Performed by: PHYSICAL THERAPIST

## 2023-04-18 DIAGNOSIS — E03.4 HYPOTHYROIDISM DUE TO ACQUIRED ATROPHY OF THYROID: ICD-10-CM

## 2023-04-18 DIAGNOSIS — K25.9 GASTRIC EROSION, UNSPECIFIED ULCER CHRONICITY: ICD-10-CM

## 2023-04-18 NOTE — TELEPHONE ENCOUNTER
Medication: Omeprazole DR 40MG capsule and Levothyroxine 112 MCG tablet    Last prescribing provider: Krystal Valenzuela    Last clinic visit date: 04/11/23 w/    Any missed appointments or no-shows since last clinic visit?: No    Recommendations for requested medication (if none, N/A): N/A    Next clinic visit date: None scheduled    Any other pertinent information (if none, N/A): N/A    Pended and Routed to Provider.

## 2023-04-19 ENCOUNTER — TELEPHONE (OUTPATIENT)
Dept: FAMILY MEDICINE | Facility: CLINIC | Age: 58
End: 2023-04-19

## 2023-04-19 NOTE — TELEPHONE ENCOUNTER
Pt calling to schedule a visit with the provider to discuss medication.    Assisted in scheduling visit.      April Nicole RN  [facility]

## 2023-04-20 DIAGNOSIS — C64.1 RENAL CELL CARCINOMA, RIGHT (H): Primary | ICD-10-CM

## 2023-04-20 RX ORDER — OMEPRAZOLE 40 MG/1
40 CAPSULE, DELAYED RELEASE ORAL DAILY
Qty: 60 CAPSULE | Refills: 1 | Status: SHIPPED | OUTPATIENT
Start: 2023-04-20 | End: 2023-05-08

## 2023-04-20 RX ORDER — LEVOTHYROXINE SODIUM 112 UG/1
112 TABLET ORAL DAILY
Qty: 30 TABLET | Refills: 3 | Status: SHIPPED | OUTPATIENT
Start: 2023-04-20 | End: 2023-06-28

## 2023-04-24 ENCOUNTER — THERAPY VISIT (OUTPATIENT)
Dept: PHYSICAL THERAPY | Facility: CLINIC | Age: 58
End: 2023-04-24
Attending: STUDENT IN AN ORGANIZED HEALTH CARE EDUCATION/TRAINING PROGRAM
Payer: COMMERCIAL

## 2023-04-24 ENCOUNTER — VIRTUAL VISIT (OUTPATIENT)
Dept: FAMILY MEDICINE | Facility: CLINIC | Age: 58
End: 2023-04-24
Payer: COMMERCIAL

## 2023-04-24 DIAGNOSIS — M25.551 ACUTE PAIN OF RIGHT HIP: Primary | ICD-10-CM

## 2023-04-24 DIAGNOSIS — C64.1 RENAL CELL CARCINOMA, RIGHT (H): Primary | ICD-10-CM

## 2023-04-24 DIAGNOSIS — B00.9 RECURRENT HERPES SIMPLEX: ICD-10-CM

## 2023-04-24 PROCEDURE — 99213 OFFICE O/P EST LOW 20 MIN: CPT | Mod: 93 | Performed by: INTERNAL MEDICINE

## 2023-04-24 PROCEDURE — 97110 THERAPEUTIC EXERCISES: CPT | Mod: GP | Performed by: PHYSICAL THERAPIST

## 2023-04-24 RX ORDER — ACYCLOVIR 400 MG/1
400 TABLET ORAL EVERY 8 HOURS
Qty: 30 TABLET | Refills: 11 | Status: SHIPPED | OUTPATIENT
Start: 2023-04-24 | End: 2024-06-03

## 2023-04-24 NOTE — PROGRESS NOTES
Dimitrios is a 58 year old who is being evaluated via a billable telephone visit.      What phone number would you like to be contacted at? [phone]  How would you like to obtain your AVS? Jeanmarieharlindsay    Distant Location (provider location):  Off-site    Assessment & Plan     Recurrent herpes simplex  He has history of recurrent herpes  This is genital herpes  Whenever he gets a recurrence he takes acyclovir  I have renally adjusted the dose when I saw him in March  We will continue with 400 mg 3 times a day for 5 days with every recurrence  This dose is working for him  - acyclovir (ZOVIRAX) 400 MG tablet; Take 1 tablet (400 mg) by mouth every 8 hours    Renal cell carcinoma, right (H)  He follows up with oncologist and takes  Cabometyx        30 minutes spent by me on the date of the encounter doing chart review, history and exam, documentation and further activities per the note           Christopher Ribeiro MD  [facility]   Dimitrios is a 58 year old, presenting for the following health issues:  Recheck Medication        4/24/2023    10:02 AM   Additional Questions   Roomed by Omer     History of Present Illness       Reason for visit:  Recheck medication    He eats 2-3 servings of fruits and vegetables daily.He consumes 0 sweetened beverage(s) daily.He exercises with enough effort to increase his heart rate 30 to 60 minutes per day.  He exercises with enough effort to increase his heart rate 3 or less days per week.   He is taking medications regularly.               Review of Systems   Constitutional, HEENT, cardiovascular, pulmonary, gi and gu systems are negative, except as otherwise noted.      Objective           Vitals:  No vitals were obtained today due to virtual visit.    Physical Exam   healthy, alert and no distress  PSYCH: Alert and oriented times 3; coherent speech, normal   rate and volume, able to articulate logical thoughts, able   to abstract reason, no tangential  thoughts, no hallucinations   or delusions  His affect is normal  RESP: No cough, no audible wheezing, able to talk in full sentences  Remainder of exam unable to be completed due to telephone visits                Phone call duration: 10 minutes

## 2023-04-27 ENCOUNTER — PATIENT OUTREACH (OUTPATIENT)
Dept: CARE COORDINATION | Facility: CLINIC | Age: 58
End: 2023-04-27
Payer: COMMERCIAL

## 2023-04-27 NOTE — PROGRESS NOTES
Clinical Product Navigator RN reviewed chart; patient on payer product coverage.  Review results:   CPN Initial Information Gathering  Referral Source: Health Plan    -PCP with [facility] Dr. Ribeiro at [place]   -Last [facility] 3/16/2023  -Follows with Oncology & Urology  through [facility]  -1 Inpatient visit in last 12 months 0 ED visits   -Active on MyChart  -Care Coordination through Oncology , on oral Chemo treatment and post nephrectomy     No further outreaches from CPN team to avoid duplication of services.    Jessica Zapata RN 04/27/23 4:36 PM  RN Clinical Product Navigator  [facility] - Ambulatory Care Management

## 2023-05-04 DIAGNOSIS — K25.9 GASTRIC EROSION, UNSPECIFIED ULCER CHRONICITY: ICD-10-CM

## 2023-05-04 NOTE — TELEPHONE ENCOUNTER
Medication: Omeprazole    Last prescribing provider:  04/20/23    Last clinic visit date: 04/11/23 w/    Any missed appointments or no-shows since last clinic visit?: No    Recommendations for requested medication (if none, N/A): N/A    Next clinic visit date: 08/01/23 w/    Any other pertinent information (if none, N/A): N/A    Pended and Routed to Provider.

## 2023-05-05 ENCOUNTER — THERAPY VISIT (OUTPATIENT)
Dept: PHYSICAL THERAPY | Facility: CLINIC | Age: 58
End: 2023-05-05
Payer: COMMERCIAL

## 2023-05-05 DIAGNOSIS — M25.551 ACUTE PAIN OF RIGHT HIP: Primary | ICD-10-CM

## 2023-05-05 PROCEDURE — 97110 THERAPEUTIC EXERCISES: CPT | Mod: GP | Performed by: PHYSICAL THERAPIST

## 2023-05-08 RX ORDER — OMEPRAZOLE 40 MG/1
CAPSULE, DELAYED RELEASE ORAL
Qty: 90 CAPSULE | Refills: 1 | Status: SHIPPED | OUTPATIENT
Start: 2023-05-08 | End: 2024-03-06

## 2023-05-10 DIAGNOSIS — C64.1 RENAL CELL CARCINOMA, RIGHT (H): Primary | ICD-10-CM

## 2023-05-16 ENCOUNTER — TELEPHONE (OUTPATIENT)
Dept: ONCOLOGY | Facility: CLINIC | Age: 58
End: 2023-05-16
Payer: COMMERCIAL

## 2023-05-16 NOTE — TELEPHONE ENCOUNTER
Oral Chemotherapy Monitoring Program    Subjective/Objective:  Dimitrios Goldberg is a 58 year old male contacted by phone for a follow-up visit for oral chemotherapy. Dimitrios confirmed he is taking cabozantinib 20 mg every other day and reports an instance of a self-hold last week for a couple of days. He reported that he feels he took 2 doses too close together which resulted in exacerbation of HFS symptoms, notably increased pain on feet and blister development. He felt that he was nearing skin breakage. After holding for a couple days, the symptoms completely resolved and he since resumed therapy with no issues. He continues to use urea cream and clobetasol daily for HFS. Patient reports mucositis localized on the tongue that he notices following a meal. He is not currently using his salt water rinse and feels this is not very effective for him. He states that this is a side effect that he has learned to deal with and he is able to tolerate it. Patient reports his blood pressure averages 120's/90ish. He also notes that his bilateral lower extremity rash has resolved and is not currently an issue. He denies nausea, vomiting, diarrhea, and constipation.         2/22/2023     4:00 PM 3/6/2023     1:00 PM 3/20/2023    11:00 AM 4/6/2023     4:00 PM 4/20/2023     1:00 PM 5/10/2023     7:00 AM 5/16/2023     2:00 PM   ORAL CHEMOTHERAPY   Assessment Type Other Lab Monitoring Refill Lab Monitoring Refill Refill Monthly Follow up   Diagnosis Code Renal Cell Cancer Renal Cell Cancer Renal Cell Cancer Renal Cell Cancer Renal Cell Cancer Renal Cell Cancer Renal Cell Cancer   Providers Dr. Beth Campos   [facility]   Drug Name Cabometyx (cabozantinib) Cabometyx (cabozantinib) Cabometyx (cabozantinib) Cabometyx (cabozantinib) Cabometyx (cabozantinib) Cabometyx (cabozantinib) Cabometyx (cabozantinib)   Dose 20 mg 20 mg 20 mg 20  "mg 20 mg 20 mg 20 mg   Current Schedule Every Other Day Every Other Day Every Other Day Every Other Day Every Other Day Every Other Day Every Other Day   Cycle Details Continuous Continuous Continuous Continuous Continuous Continuous Continuous   Doses missed in last 2 weeks       2   Adherence Assessment       Adherent   Adverse Effects Palmar-plantar Erythrodysethesia Syndrome      Oral Mucositis   Palmar-plantar Erythrodysethesia syndrome[hand-foot syndrome] Grade 1      Grade 1   Pharmacist Intervention(Palmar-plantar) No      Yes   Intervention(s)       Patient education   Oral Mucositis       Grade 1   Pharmacist Intervention(oral mucositis)       Yes   Intervention(s)       Patient education       Last PHQ-2 Score on record:       3/9/2023    11:33 AM 3/9/2023    11:32 AM   PHQ-2 ( 1999 [employer])   Q1: Little interest or pleasure in doing things 0 0   Q2: Feeling down, depressed or hopeless 0 0   PHQ-2 Score 0 0   Q1: Little interest or pleasure in doing things Not at all Not at all   Q2: Feeling down, depressed or hopeless Not at all Not at all   PHQ-2 Score 0 0       Vitals:  BP:   BP Readings from Last 1 Encounters:   04/11/23 113/78     Wt Readings from Last 1 Encounters:   04/11/23 101.2 kg (223 lb 3.2 oz)     Estimated body surface area is 2.26 meters squared as calculated from the following:    Height as of 3/16/23: 1.816 m (5' 11.5\").    Weight as of 4/11/23: 101.2 kg (223 lb 3.2 oz).       Assessment/Plan:  Patient is susceptible to HFS despite proper supportive care. Educated on non-pharmacologic strategies such as sock wearing, well fitting shoes, and avoiding hot/cold temperatures. Patient was receptive and seems to know how to handle this side effect. Based on patient description, HFS has completely resolved and is not currently an issue.     Oral mucositis remains an ongoing issue but is currently tolerable.    Continue cabozantinib as planned.    Follow-Up:  Review labs pending scheduling, " ideally 5/18/2023 or 5/19/2023.      Trevin Lyons, Pharmacy Intern  Oral Chemotherapy Monitoring Program  [phone]

## 2023-05-18 ENCOUNTER — LAB (OUTPATIENT)
Dept: LAB | Facility: CLINIC | Age: 58
End: 2023-05-18
Attending: INTERNAL MEDICINE
Payer: COMMERCIAL

## 2023-05-18 DIAGNOSIS — E03.4 HYPOTHYROIDISM DUE TO ACQUIRED ATROPHY OF THYROID: ICD-10-CM

## 2023-05-18 DIAGNOSIS — N18.4 CKD (CHRONIC KIDNEY DISEASE) STAGE 4, GFR 15-29 ML/MIN (H): ICD-10-CM

## 2023-05-18 DIAGNOSIS — C64.1 RENAL CELL CARCINOMA, RIGHT (H): ICD-10-CM

## 2023-05-18 LAB
ALBUMIN SERPL BCG-MCNC: 4.3 G/DL (ref 3.5–5.2)
ALP SERPL-CCNC: 75 U/L (ref 40–129)
ALT SERPL W P-5'-P-CCNC: 17 U/L (ref 10–50)
ANION GAP SERPL CALCULATED.3IONS-SCNC: 8 MMOL/L (ref 7–15)
AST SERPL W P-5'-P-CCNC: 22 U/L (ref 10–50)
BASOPHILS # BLD AUTO: 0 10E3/UL (ref 0–0.2)
BASOPHILS NFR BLD AUTO: 1 %
BILIRUB SERPL-MCNC: 0.3 MG/DL
BUN SERPL-MCNC: 17.5 MG/DL (ref 6–20)
CALCIUM SERPL-MCNC: 9.4 MG/DL (ref 8.6–10)
CHLORIDE SERPL-SCNC: 103 MMOL/L (ref 98–107)
CREAT SERPL-MCNC: 1.62 MG/DL (ref 0.67–1.17)
DEPRECATED HCO3 PLAS-SCNC: 28 MMOL/L (ref 22–29)
EOSINOPHIL # BLD AUTO: 0.2 10E3/UL (ref 0–0.7)
EOSINOPHIL NFR BLD AUTO: 3 %
ERYTHROCYTE [DISTWIDTH] IN BLOOD BY AUTOMATED COUNT: 15.2 % (ref 10–15)
GFR SERPL CREATININE-BSD FRML MDRD: 49 ML/MIN/1.73M2
GLUCOSE SERPL-MCNC: 95 MG/DL (ref 70–99)
HCT VFR BLD AUTO: 42.5 % (ref 40–53)
HGB BLD-MCNC: 13.7 G/DL (ref 13.3–17.7)
IMM GRANULOCYTES # BLD: 0 10E3/UL
IMM GRANULOCYTES NFR BLD: 0 %
LYMPHOCYTES # BLD AUTO: 2 10E3/UL (ref 0.8–5.3)
LYMPHOCYTES NFR BLD AUTO: 35 %
MCH RBC QN AUTO: 29 PG (ref 26.5–33)
MCHC RBC AUTO-ENTMCNC: 32.2 G/DL (ref 31.5–36.5)
MCV RBC AUTO: 90 FL (ref 78–100)
MONOCYTES # BLD AUTO: 0.4 10E3/UL (ref 0–1.3)
MONOCYTES NFR BLD AUTO: 7 %
NEUTROPHILS # BLD AUTO: 3 10E3/UL (ref 1.6–8.3)
NEUTROPHILS NFR BLD AUTO: 54 %
NRBC # BLD AUTO: 0 10E3/UL
NRBC BLD AUTO-RTO: 0 /100
PLATELET # BLD AUTO: 269 10E3/UL (ref 150–450)
POTASSIUM SERPL-SCNC: 4 MMOL/L (ref 3.4–5.3)
PROT SERPL-MCNC: 7 G/DL (ref 6.4–8.3)
RBC # BLD AUTO: 4.72 10E6/UL (ref 4.4–5.9)
SODIUM SERPL-SCNC: 139 MMOL/L (ref 136–145)
T4 FREE SERPL-MCNC: 1.29 NG/DL (ref 0.9–1.7)
TSH SERPL DL<=0.005 MIU/L-ACNC: 8.51 UIU/ML (ref 0.3–4.2)
WBC # BLD AUTO: 5.6 10E3/UL (ref 4–11)

## 2023-05-18 PROCEDURE — 36415 COLL VENOUS BLD VENIPUNCTURE: CPT | Performed by: PATHOLOGY

## 2023-05-18 PROCEDURE — 84439 ASSAY OF FREE THYROXINE: CPT | Performed by: PATHOLOGY

## 2023-05-18 PROCEDURE — 80050 GENERAL HEALTH PANEL: CPT | Performed by: PATHOLOGY

## 2023-05-19 ENCOUNTER — MYC MEDICAL ADVICE (OUTPATIENT)
Dept: ONCOLOGY | Facility: CLINIC | Age: 58
End: 2023-05-19

## 2023-05-19 ENCOUNTER — THERAPY VISIT (OUTPATIENT)
Dept: PHYSICAL THERAPY | Facility: CLINIC | Age: 58
End: 2023-05-19
Payer: COMMERCIAL

## 2023-05-19 DIAGNOSIS — M25.551 ACUTE PAIN OF RIGHT HIP: Primary | ICD-10-CM

## 2023-05-19 PROCEDURE — 97110 THERAPEUTIC EXERCISES: CPT | Mod: GP | Performed by: PHYSICAL THERAPIST

## 2023-05-19 PROCEDURE — 97140 MANUAL THERAPY 1/> REGIONS: CPT | Mod: GP | Performed by: PHYSICAL THERAPIST

## 2023-05-19 NOTE — TELEPHONE ENCOUNTER
Oral Chemotherapy Monitoring Program  Lab Follow Up    Reviewed lab results from 5/18/23.        3/6/2023     1:00 PM 3/20/2023    11:00 AM 4/6/2023     4:00 PM 4/20/2023     1:00 PM 5/10/2023     7:00 AM 5/16/2023     2:00 PM 5/19/2023     3:00 PM   ORAL CHEMOTHERAPY   Assessment Type Lab Monitoring Refill Lab Monitoring Refill Refill Monthly Follow up Lab Monitoring   Diagnosis Code Renal Cell Cancer Renal Cell Cancer Renal Cell Cancer Renal Cell Cancer Renal Cell Cancer Renal Cell Cancer Renal Cell Cancer   Providers Dr. Beth Campos   [facility]   Drug Name Cabometyx (cabozantinib) Cabometyx (cabozantinib) Cabometyx (cabozantinib) Cabometyx (cabozantinib) Cabometyx (cabozantinib) Cabometyx (cabozantinib) Cabometyx (cabozantinib)   Dose 20 mg 20 mg 20 mg 20 mg 20 mg 20 mg 20 mg   Current Schedule Every Other Day Every Other Day Every Other Day Every Other Day Every Other Day Every Other Day Every Other Day   Cycle Details Continuous Continuous Continuous Continuous Continuous Continuous Continuous   Doses missed in last 2 weeks      2    Adherence Assessment      Adherent    Adverse Effects      Oral Mucositis    Palmar-plantar Erythrodysethesia syndrome[hand-foot syndrome]      Grade 1    Pharmacist Intervention(Palmar-plantar)      Yes    Intervention(s)      Patient education    Oral Mucositis      Grade 1    Pharmacist Intervention(oral mucositis)      Yes    Intervention(s)      Patient education        Labs:  _  Result Component Current Result Ref Range   Sodium 139 (5/18/2023) 136 - 145 mmol/L     _  Result Component Current Result Ref Range   Potassium 4.0 (5/18/2023) 3.4 - 5.3 mmol/L     _  Result Component Current Result Ref Range   Calcium 9.4 (5/18/2023) 8.6 - 10.0 mg/dL     No results found for Mag within last 30 days.     No results found for Phos within last 30 days.     _  Result Component  Current Result Ref Range   Albumin 4.3 (5/18/2023) 3.5 - 5.2 g/dL     _  Result Component Current Result Ref Range   Urea Nitrogen 17.5 (5/18/2023) 6.0 - 20.0 mg/dL     _  Result Component Current Result Ref Range   Creatinine 1.62 (H) (5/18/2023) 0.67 - 1.17 mg/dL     _  Result Component Current Result Ref Range   AST 22 (5/18/2023) 10 - 50 U/L     _  Result Component Current Result Ref Range   ALT 17 (5/18/2023) 10 - 50 U/L     _  Result Component Current Result Ref Range   Bilirubin Total 0.3 (5/18/2023) <=1.2 mg/dL     _  Result Component Current Result Ref Range   WBC Count 5.6 (5/18/2023) 4.0 - 11.0 10e3/uL     _  Result Component Current Result Ref Range   Hemoglobin 13.7 (5/18/2023) 13.3 - 17.7 g/dL     _  Result Component Current Result Ref Range   Platelet Count 269 (5/18/2023) 150 - 450 10e3/uL     No results found for ANC within last 30 days.     _  Result Component Current Result Ref Range   Absolute Neutrophils 3.0 (5/18/2023) 1.6 - 8.3 10e3/uL        Assessment & Plan:  No concerning abnormalities.  No changes with Cabometyx needed at this time.    [employer] message sent to patient.    Jessica Cedeño, PharmD, BCPS, BCOP  Oncology Clinical Pharmacy Specialist  [facility]  [phone]

## 2023-06-06 ENCOUNTER — THERAPY VISIT (OUTPATIENT)
Dept: PHYSICAL THERAPY | Facility: CLINIC | Age: 58
End: 2023-06-06
Payer: COMMERCIAL

## 2023-06-06 ENCOUNTER — OFFICE VISIT (OUTPATIENT)
Dept: OPTOMETRY | Facility: CLINIC | Age: 58
End: 2023-06-06
Payer: COMMERCIAL

## 2023-06-06 DIAGNOSIS — H52.13 MYOPIA OF BOTH EYES: ICD-10-CM

## 2023-06-06 DIAGNOSIS — H50.53 HYPERPHORIA: ICD-10-CM

## 2023-06-06 DIAGNOSIS — H04.123 DRY EYE SYNDROME OF BOTH EYES: ICD-10-CM

## 2023-06-06 DIAGNOSIS — Z01.00 EXAMINATION OF EYES AND VISION: Primary | ICD-10-CM

## 2023-06-06 DIAGNOSIS — H35.373 EPIRETINAL MEMBRANE (ERM) OF BOTH EYES: ICD-10-CM

## 2023-06-06 DIAGNOSIS — H43.391 VITREOUS FLOATERS OF RIGHT EYE: ICD-10-CM

## 2023-06-06 DIAGNOSIS — H52.4 PRESBYOPIA: ICD-10-CM

## 2023-06-06 DIAGNOSIS — M25.551 ACUTE PAIN OF RIGHT HIP: Primary | ICD-10-CM

## 2023-06-06 DIAGNOSIS — H52.223 REGULAR ASTIGMATISM OF BOTH EYES: ICD-10-CM

## 2023-06-06 DIAGNOSIS — Z98.890 HISTORY OF VITRECTOMY: ICD-10-CM

## 2023-06-06 DIAGNOSIS — Z96.1 PSEUDOPHAKIA OF BOTH EYES: ICD-10-CM

## 2023-06-06 PROCEDURE — 97110 THERAPEUTIC EXERCISES: CPT | Mod: GP | Performed by: PHYSICAL THERAPIST

## 2023-06-06 PROCEDURE — 92004 COMPRE OPH EXAM NEW PT 1/>: CPT | Performed by: OPTOMETRIST

## 2023-06-06 PROCEDURE — 92015 DETERMINE REFRACTIVE STATE: CPT | Performed by: OPTOMETRIST

## 2023-06-06 RX ORDER — LIFITEGRAST 50 MG/ML
1 SOLUTION/ DROPS OPHTHALMIC 2 TIMES DAILY
Qty: 12 EACH | Refills: 11 | Status: ON HOLD | OUTPATIENT
Start: 2023-06-06 | End: 2023-08-11

## 2023-06-06 ASSESSMENT — EXTERNAL EXAM - RIGHT EYE: OD_EXAM: NORMAL

## 2023-06-06 ASSESSMENT — TONOMETRY
IOP_METHOD: TONOPEN
OS_IOP_MMHG: 18
OD_IOP_MMHG: 19

## 2023-06-06 ASSESSMENT — REFRACTION_WEARINGRX
OD_SPHERE: -1.50
OS_CYLINDER: +0.75
OS_AXIS: 040
OS_ADD: +2.50
SPECS_TYPE: PAL
OD_CYLINDER: +1.00
OD_AXIS: 140
OS_SPHERE: -1.00
OD_ADD: +2.50

## 2023-06-06 ASSESSMENT — VISUAL ACUITY
OD_PH_CC: 20/30
OS_SC+: -1
OS_CC: 20/20
OD_CC: 20/20-1
CORRECTION_TYPE: GLASSES
OS_SC: 20/70
METHOD: SNELLEN - LINEAR
OD_PH_CC+: -1
OD_SC: 20/70
OS_CC+: -1
OD_CC: 20/50
OD_SC+: -1
OS_CC: 20/25

## 2023-06-06 ASSESSMENT — CONF VISUAL FIELD
OS_NORMAL: 1
OS_SUPERIOR_NASAL_RESTRICTION: 0
OS_INFERIOR_NASAL_RESTRICTION: 0
OD_INFERIOR_NASAL_RESTRICTION: 0
OD_SUPERIOR_TEMPORAL_RESTRICTION: 0
OD_SUPERIOR_NASAL_RESTRICTION: 0
OS_INFERIOR_TEMPORAL_RESTRICTION: 0
OS_SUPERIOR_TEMPORAL_RESTRICTION: 0
OD_INFERIOR_TEMPORAL_RESTRICTION: 0
OD_NORMAL: 1

## 2023-06-06 ASSESSMENT — REFRACTION_MANIFEST
OD_AXIS: 104
OD_SPHERE: -2.25
OD_ADD: +2.50
METHOD_AUTOREFRACTION: 1
OS_AXIS: 040
OS_ADD: +2.50
OS_SPHERE: -1.50
OD_CYLINDER: +1.00
OS_CYLINDER: +1.00

## 2023-06-06 ASSESSMENT — KERATOMETRY
OS_K1POWER_DIOPTERS: 42.25
OD_K2POWER_DIOPTERS: 43.75
OD_AXISANGLE2_DEGREES: 013
OD_K1POWER_DIOPTERS: 42.25
OS_K2POWER_DIOPTERS: 43.25
OS_AXISANGLE_DEGREES: 063
OD_AXISANGLE_DEGREES: 103
OS_AXISANGLE2_DEGREES: 153

## 2023-06-06 ASSESSMENT — REFRACTION_FINALRX
OD_VPRISM: 0.5
OS_VPRISM: 0.5

## 2023-06-06 ASSESSMENT — SLIT LAMP EXAM - LIDS
COMMENTS: PTOSIS, MEIBOMIAN GLAND DYSFUNCTION
COMMENTS: PTOSIS, MEIBOMIAN GLAND DYSFUNCTION

## 2023-06-06 ASSESSMENT — CUP TO DISC RATIO
OS_RATIO: 0.3
OD_RATIO: 0.3

## 2023-06-06 ASSESSMENT — EXTERNAL EXAM - LEFT EYE: OS_EXAM: NORMAL

## 2023-06-06 NOTE — PROGRESS NOTES
Chief Complaint   Patient presents with     Annual Eye Exam       Has been seen for many years at [facility]    Last Eye Exam: 1 year  Dilated Previously: Yes    What are you currently using to see?  glasses       Distance Vision Acuity: Noticed gradual change in right eye,ghosting image     Near Vision Acuity: Satisfied with vision while reading  with glasses    Eye Comfort: dry od eye x ? Since last surgery  Do you use eye drops? : Yes: systane ultra  Occupation or Hobbies: labor     7/19/2022 copied from exam- Epic- Jassi Farhana, OD    - Floaterectomy OU ([place] 2021 OS, ? 2019 OD)  - CEIOL OU  - IOL exchange OD (2/2 symfony tolerance)    History of retinal hole left eye /retinoschisis - evaluated by retina and monitored.      1/2/3. Astigmatism, both eyes, s/p PCIOL BE. Hx of Symfony IOL RE which he did not tolerate. S/p IOL exchange. S/p YAG BE. S/p PPV/ posterior capsular enlargement LE.   - Visual symptoms of ghosting may be c/w change in axis. 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   - Spectacle Rx given for FT wear. With change in Rx.   - Increase artificial tears to QID BE    4/5. Vitreous floaters RE. S/p PPV. Noted previously. Seen by retina. No retinal tear or detachment with careful peripheral dilated exam 360 degrees BE today.   - Call promptly with any new or worsening symptoms such as flashes, floaters, curtains, or shadows.    6. ERM BE. Not visually significant.   - Monitor.       Elva Andersen Optometric Assistant, A.B.O.C.      Medical, surgical and family histories reviewed and updated 6/6/2023.       OBJECTIVE: See Ophthalmology exam    ASSESSMENT:    ICD-10-CM    1. Examination of eyes and vision  Z01.00 EYE EXAM (SIMPLE-NONBILLABLE)      2. Myopia of both eyes  H52.13 REFRACTION      3. Regular astigmatism of both eyes  H52.223 REFRACTION      4. Presbyopia  H52.4 REFRACTION      5. Hyperphoria  H50.53       6. Pseudophakia of both eyes  Z96.1  EYE EXAM (SIMPLE-NONBILLABLE)      7. Vitreous floaters of right eye  H43.391       8. History of vitrectomy  Z98.890 EYE EXAM (SIMPLE-NONBILLABLE)      9. Epiretinal membrane (ERM) of both eyes  H35.373 EYE EXAM (SIMPLE-NONBILLABLE)      10. Dry eye syndrome of both eyes  H04.123 EYE EXAM (SIMPLE-NONBILLABLE)     lifitegrast (XIIDRA) 5 % opthalmic solution          PLAN:     Patient Instructions   Recommend new glasses.  Give the glasses 1-2 weeks to adjust to the new prescription.  You may get headaches or eyestrain as your eyes get used to the new prescription.  Sometimes the symptoms get worse before it gets better.  If any problems after 1-2 weeks schedule an appointment for a recheck.      The signs of a retinal detachment are flashes of light or a curtain covering the vision.  If you should notice any of these changes let me know right away.  If I am not available you should be seen immediately for an eye evaluation. f I am not available this is an emergency type situation that you would need to be seen. I recommend the [facility] Emergency Room or call [phone].    .  You have an epiretinal membrane.  As we grow older, the thick vitreous gel in the middle of our eyes begins to shrink and pull away from the macula. As the vitreous pulls away, scar tissue may develop on the macula. Sometimes the scar tissue can warp and contract, causing the retina to wrinkle or become swollen or distorted.    Heat to the eyes daily for 10-15 minutes nightly with warm washcloth or reusable gel masks from the pharmacy or  Heroku heat masks can be purchased at Amazon.    Xiidra- (Liftegras ophthalmic solution 5 %) 1 drop both eyes 2 x day.    Arificial tears- 1 drop both eyes 2-4 x daily.    Ocusoft Hypochlor- spray solution onto cotton pad.  Close eyes and gently apply to eyelids and eyelashes using side to side motion.  Use morning and evening.    Return in 1 year for a complete eye exam or sooner if  needed.    James Toledo, OD

## 2023-06-06 NOTE — PATIENT INSTRUCTIONS
Recommend new glasses.  Give the glasses 1-2 weeks to adjust to the new prescription.  You may get headaches or eyestrain as your eyes get used to the new prescription.  Sometimes the symptoms get worse before it gets better.  If any problems after 1-2 weeks schedule an appointment for a recheck.      The signs of a retinal detachment are flashes of light or a curtain covering the vision.  If you should notice any of these changes let me know right away.  If I am not available you should be seen immediately for an eye evaluation. f I am not available this is an emergency type situation that you would need to be seen. I recommend the [facility] Emergency Room or call [phone].    .  You have an epiretinal membrane.  As we grow older, the thick vitreous gel in the middle of our eyes begins to shrink and pull away from the macula. As the vitreous pulls away, scar tissue may develop on the macula. Sometimes the scar tissue can warp and contract, causing the retina to wrinkle or become swollen or distorted.    Heat to the eyes daily for 10-15 minutes nightly with warm washcloth or reusable gel masks from the pharmacy or  Pluralsight heat masks can be purchased at Amazon.    Xiidra- (Liftegras ophthalmic solution 5 %) 1 drop both eyes 2 x day.    Arificial tears- 1 drop both eyes 2-4 x daily.    Ocusoft Hypochlor- spray solution onto cotton pad.  Close eyes and gently apply to eyelids and eyelashes using side to side motion.  Use morning and evening.    Return in 1 year for a complete eye exam or sooner if needed.    James Toledo, OD    The affects of the dilating drops last for 4- 6 hours.  You will be more sensitive to light and vision will be blurry up close.  Do not drive if you do not feel comfortable.  Mydriatic sunglasses were given if needed.      Optometry Providers       Clinic Locations                                 Telephone Number   Dr. Sabra Moreno  Rishi Null   [facility]  [place] [phone]     [facility] Hours:                [facility] Hours:       [place] [facility] Hours:   [address]   [address] [facility]     [address] [place] [address]   [place] [address]    [place] [address]  Phone: [phone]                    Phone: [phone]     Phone: [phone]                      Monday 8:00-6:00                          Monday 8:00-6:00                          Monday 8:00-6:00              Tuesday 8:00-6:00                          Tuesday 8:00-6:00                          Tuesday 8:00-6:00              Wednesday 8:00-6:00                  Wednesday 8:00-6:00                   Wednesday 8:00-6:00      Thursday 8:00-6:00                        Thursday 8:00-6:00                         Thursday 8:00-6:00            Friday 8:00-5:00                              Friday 8:00-5:00                              Friday 8:00-5:00    [facility] Hours:   [address] [place], [place] [address] [phone]    Monday 9:00-6:00  Tuesday 9:00-6:00  Wednesday 9:00-6:00  Thursday 9:00-6:00  Friday 9:00-5:00  As always, Thank you for trusting us with your health care needs!    There is a combination of three treatments which can greatly improve symptoms of dry eyes.     Artificial tears  Heat (eyes closed)  Eyelid and eyelash cleansing (eyes closed)     Use one drop of artificial tears both eyes 4 x daily.  Once in the morning, lunch, dinner and bedtime. Continue to use the drops regardless if your eyes are comfortable or not.  Artificial tears work best as a preventative and not as well after your eyes are starting to bother you.  It may take 4- 6 weeks of using the drops before you notice improvement.  If after that time you are still having problems schedule an appointment for an evaluation and discussion of  different treatments such as Restasis or Xiidra.  Dry eyes are a chronic condition and you may have more symptoms at certain times of the year.    Excess tearing can be due to the right tears not working properly or a blockage in the tear drainage system.  You can try using artificial tears 1 drop both eyes 4 x day.  If the excess tearing is bothersome after 4-6 weeks of treatment then we can send you for further testing.  This would entail a referral to our oculoplastic specialist Dr. Renato Wayne at the [facility]-[phone].    Recommended brands are:    Systane Complete  Systane Ultra  Systane Balance  Refresh Advanced Optive  Refresh Relieva  Blink    Recommended brands for contact lens wearers are:    Systane contacts  Refresh contacts  Blink contacts    If you are using drops more than 4 x day or have sensitivities to preservatives I recommend non preserved artificial tears.  These come in 1 use vials.  They can be used every 1-2 hours.  Do not reuse the vials.    Recommended brands are:    Refresh Optive [place]-3  Systane- preservative free  Refresh-  preservative free  Blink- preservative free    Gels or ointment can be used at night.    Recommended brands are:    Systane Gel  Refresh Gel  Blink Gel  Genteal Gel    Systane night time (ointment)  Refresh Celluvisc  Refresh PM (ointment)      Visine, Clear Eyes or Murine (drops that get the red out) can irritate the eyes and cause a rebound effect where the eyes become more red and you end up using more drops.  Avoid drops containing tetrahydrozoline, naphazoline, phenylephrine, oxymetazoline.      OTC Lumify is a newer product that gives immediate redness relief without the rebound effect.  Use as needed to take the redness out.    Artificial tears may be used with other drops (such as allergy, glaucoma, antibiotics) around the same time.  Be sure to wait 5 minutes in between drops.    Heat to the eyelids can also improve your symptoms of dry eyes.   Aubrey heat masks can be purchased at Amazon to be used nightly for 10-15 minutes.  Other options are gel masks that can be put in the microwave and purchased at most pharmacies.      Tea Tree Oil eyelid cleansers recommended are Ocusoft Oust foam cleanser to cleanse eyelids/lashes at night and in the am. Other options are Blephadex or Cliradex eyelid wipes.  KEEP EYES CLOSED when using these products.  These can be purchased on amazon.com   A good product for make up remover with tea tree oil is WeLoveEyes.  This can be found at www.[employer] or [employer].    Other good eyelid cleansers have hypochlorous which removes excess bacteria and is safe around the eyes. Products are Avenova, Ocusoft Hypochlor or Heyedrate. Spray solution onto cotton pad, close eyes and gently apply to eyelids and eyelashes using side to side motion.  You can also KEEP EYES CLOSED spray and rub into eyelashes.  You do not need to rinse it off. Use morning and evening. These products can be found on Amazon.  You can check with your local pharmacy and see if they can order if for you if they don't have it.    Other brands of eyelid cleansing wipes are:    Ocusoft wipes  Systane wipes    A great eye make up line is [email or web address]

## 2023-06-06 NOTE — LETTER
6/6/2023         RE: Dimitrios Goldberg  [address]  [facility] [address]        Dear Colleague,    Thank you for referring your patient, Dimitrios Goldberg, to the [facility]. Please see a copy of my visit note below.    Chief Complaint   Patient presents with     Annual Eye Exam       Has been seen for many years at [facility]    Last Eye Exam: 1 year  Dilated Previously: Yes    What are you currently using to see?  glasses       Distance Vision Acuity: Noticed gradual change in right eye,ghosting image     Near Vision Acuity: Satisfied with vision while reading  with glasses    Eye Comfort: dry od eye x ? Since last surgery  Do you use eye drops? : Yes: systane ultra  Occupation or Hobbies: labor     7/19/2022 copied from exam- Epic- Jassi Farhana, OD    - Floaterectomy OU ([place] 2021 OS, ? 2019 OD)  - CEIOL OU  - IOL exchange OD (2/2 symfony tolerance)    History of retinal hole left eye /retinoschisis - evaluated by retina and monitored.      1/2/3. Astigmatism, both eyes, s/p PCIOL BE. Hx of Symfony IOL RE which he did not tolerate. S/p IOL exchange. S/p YAG BE. S/p PPV/ posterior capsular enlargement LE.   - Visual symptoms of ghosting may be c/w change in axis. 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   - Spectacle Rx given for FT wear. With change in Rx.   - Increase artificial tears to QID BE    4/5. Vitreous floaters RE. S/p PPV. Noted previously. Seen by retina. No retinal tear or detachment with careful peripheral dilated exam 360 degrees BE today.   - Call promptly with any new or worsening symptoms such as flashes, floaters, curtains, or shadows.    6. ERM BE. Not visually significant.   - Monitor.       Elva Andersen Optometric Assistant, A.B.O.C.      Medical, surgical and family histories reviewed and updated 6/6/2023.       OBJECTIVE: See Ophthalmology exam    ASSESSMENT:    ICD-10-CM    1. Examination of eyes and  vision  Z01.00 EYE EXAM (SIMPLE-NONBILLABLE)      2. Myopia of both eyes  H52.13 REFRACTION      3. Regular astigmatism of both eyes  H52.223 REFRACTION      4. Presbyopia  H52.4 REFRACTION      5. Hyperphoria  H50.53       6. Pseudophakia of both eyes  Z96.1 EYE EXAM (SIMPLE-NONBILLABLE)      7. Vitreous floaters of right eye  H43.391       8. History of vitrectomy  Z98.890 EYE EXAM (SIMPLE-NONBILLABLE)      9. Epiretinal membrane (ERM) of both eyes  H35.373 EYE EXAM (SIMPLE-NONBILLABLE)      10. Dry eye syndrome of both eyes  H04.123 EYE EXAM (SIMPLE-NONBILLABLE)     lifitegrast (XIIDRA) 5 % opthalmic solution          PLAN:     Patient Instructions   Recommend new glasses.  Give the glasses 1-2 weeks to adjust to the new prescription.  You may get headaches or eyestrain as your eyes get used to the new prescription.  Sometimes the symptoms get worse before it gets better.  If any problems after 1-2 weeks schedule an appointment for a recheck.      The signs of a retinal detachment are flashes of light or a curtain covering the vision.  If you should notice any of these changes let me know right away.  If I am not available you should be seen immediately for an eye evaluation. f I am not available this is an emergency type situation that you would need to be seen. I recommend the [facility] Emergency Room or call [phone].    .  You have an epiretinal membrane.  As we grow older, the thick vitreous gel in the middle of our eyes begins to shrink and pull away from the macula. As the vitreous pulls away, scar tissue may develop on the macula. Sometimes the scar tissue can warp and contract, causing the retina to wrinkle or become swollen or distorted.    Heat to the eyes daily for 10-15 minutes nightly with warm washcloth or reusable gel masks from the pharmacy or  [employer] heat masks can be purchased at Amazon.    Xiidra- (Liftegras ophthalmic solution 5 %) 1 drop both eyes 2 x day.    Arificial  tears- 1 drop both eyes 2-4 x daily.    Ocusoft Hypochlor- spray solution onto cotton pad.  Close eyes and gently apply to eyelids and eyelashes using side to side motion.  Use morning and evening.    Return in 1 year for a complete eye exam or sooner if needed.    James Toledo, OD                 Again, thank you for allowing me to participate in the care of your patient.        Sincerely,        James Toledo, OD

## 2023-06-07 NOTE — PROGRESS NOTES
PLAN  Continue therapy per current plan of care.    Beginning/End Dates of Progress Note Reporting Period:  06/06/23 to 06/06/2023    Referring Provider:  Matthew Sosa       06/06/23 0500   Appointment Info   Signing clinician's name / credentials Michael Houston DPT   Total/Authorized Visits 8 per PT POC   Visits Used 6   Medical Diagnosis Primary osteoarthritis of right hip   PT Tx Diagnosis R hip pain   Progress Note/Certification   Onset of illness/injury or Date of Surgery 02/10/23   Therapy Frequency 1x daily/week   Predicted Duration 8 weeks   Progress Note Due Date 06/06/23   Progress Note Completed Date 06/06/23   PT Goal 1   Goal Identifier Ambulation   Goal Description the patient will be able to walk for 60 minutes noting an increase in pain of no greater than 2/10   Rationale   (for safe community ambulation;for safe work place ambulation;to maintain proper body mechanics/posture while ambulating to avoid additional compensatory injury due to improper gait mechanics;to promote a healthy and active lifestyle)   Goal Progress Goal met, continue monitoring for consistency   Target Date 06/05/23   Subjective Report   Subjective Report The patient presents to the clinic stating he has been able to bowl more consistently without pain, and that his pain is very inconssitent, however he was hoping to have more improvement in the quality of his pain so he is planning to move forward with his hip injection. Notes he has been limping more lately and would like to have his gait assessed at today's session   Objective Measures   Objective Measures Objective Measure 1;Objective Measure 2   Objective Measure 1   Objective Measure Hip AROM   Details R hip arom continues to be limited when compared to L hip. significant pain noted with R hip flexion and internal rotation. (+) scour test   Objective Measure 2   Objective Measure R hip MMT   Details flexion 4/5, all others 5/5   Treatment Interventions (PT)    Interventions Therapeutic Procedure/Exercise;Gait Training   Therapeutic Procedure/Exercise   Therapeutic Procedures: strength, endurance, ROM, flexibillity minutes (74349) 33   Therapeutic Procedures Ther Proc 2;Ther Proc 3   Ther Proc 1 Prone on elbows   Ther Proc 1 - Details x60s   Ther Proc 2 Prone press up   Ther Proc 2 - Details x60s   Ther Proc 3 Prone press up with knee flexion   Ther Proc 3 - Details 2x20 AG   PTRx Ther Proc 1 Bridging #1   PTRx Ther Proc 1 - Details x15   PTRx Ther Proc 2 Standing Adductor Stretch   PTRx Ther Proc 2 - Details x60s   PTRx Ther Proc 3 Standing Extension   PTRx Ther Proc 3 - Details x15   PTRx Ther Proc 4 Repeated Hip External Rotation with Overpressure in Sitting   PTRx Ther Proc 4 - Details x60s   PTRx Ther Proc 5 Sidelying Hip Abduction   PTRx Ther Proc 5 - Details x15   PTRx Ther Proc 6 Clamshell Feet together   PTRx Ther Proc 6 - Details x15   PTRx Ther Proc 7 Reverse Clamshell   PTRx Ther Proc 7 - Details x15   PTRx Ther Proc 8 Standing Hip Flexion   PTRx Ther Proc 8 - Details isometric hold on 8in box, [id number] holds, noted to be pain free   Skilled Intervention PT applied overpressure into knee flexion with al hip flexor exercises   Patient Response/Progress Patient notes a significant stretch with all hip flexor stretches   PTRx Ther Proc 9 Hip Flexor Stretch Zen Test Position   PTRx Ther Proc 9 - Details 2x60s   PTRx Ther Proc 10 nustep warmup   PTRx Ther Proc 10 - Details 5 min   Gait Training   Gait Training Minutes, includes stair climbing (47186) 2   Gait 1 Retro gait   Gait 1 - Details 2x2 min   Skilled Intervention SBAx1   Patient Response/Progress patient notes a significant improvement in walking, decreased limping   Plan   Plan for next session reassess gait   Comments   Comments 11 minutes spent on progress note assessment   Total Session Time   Timed Code Treatment Minutes 35   Total Treatment Time (sum of timed and untimed services) 35

## 2023-06-08 ENCOUNTER — OFFICE VISIT (OUTPATIENT)
Dept: URGENT CARE | Facility: URGENT CARE | Age: 58
End: 2023-06-08
Payer: COMMERCIAL

## 2023-06-08 ENCOUNTER — MYC MEDICAL ADVICE (OUTPATIENT)
Dept: ONCOLOGY | Facility: CLINIC | Age: 58
End: 2023-06-08

## 2023-06-08 ENCOUNTER — NURSE TRIAGE (OUTPATIENT)
Dept: FAMILY MEDICINE | Facility: CLINIC | Age: 58
End: 2023-06-08

## 2023-06-08 VITALS
HEART RATE: 95 BPM | BODY MASS INDEX: 30.2 KG/M2 | SYSTOLIC BLOOD PRESSURE: 130 MMHG | OXYGEN SATURATION: 97 % | WEIGHT: 219.6 LBS | TEMPERATURE: 97 F | RESPIRATION RATE: 20 BRPM | DIASTOLIC BLOOD PRESSURE: 100 MMHG

## 2023-06-08 DIAGNOSIS — Z85.528 HISTORY OF RENAL CELL CANCER: ICD-10-CM

## 2023-06-08 DIAGNOSIS — R20.2 ARM PARESTHESIA, LEFT: Primary | ICD-10-CM

## 2023-06-08 PROCEDURE — 93000 ELECTROCARDIOGRAM COMPLETE: CPT | Performed by: PHYSICIAN ASSISTANT

## 2023-06-08 PROCEDURE — 99214 OFFICE O/P EST MOD 30 MIN: CPT | Performed by: PHYSICIAN ASSISTANT

## 2023-06-08 RX ORDER — PREDNISONE 20 MG/1
40 TABLET ORAL DAILY
Qty: 14 TABLET | Refills: 0 | Status: SHIPPED | OUTPATIENT
Start: 2023-06-08 | End: 2023-06-15

## 2023-06-08 ASSESSMENT — ENCOUNTER SYMPTOMS
PARESTHESIAS: 1
TREMORS: 0
COLOR CHANGE: 0
ARTHRALGIAS: 0
FEVER: 0
MYALGIAS: 0
JOINT SWELLING: 0
NECK PAIN: 0
NECK STIFFNESS: 0
NUMBNESS: 1
COUGH: 0
DIZZINESS: 0
SHORTNESS OF BREATH: 0
WOUND: 0
CHILLS: 0
WEAKNESS: 0
HEADACHES: 0
FATIGUE: 0
CHEST TIGHTNESS: 0
BACK PAIN: 0
WHEEZING: 0
CONSTITUTIONAL NEGATIVE: 1
PALPITATIONS: 0
SPEECH DIFFICULTY: 0
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
## 2023-06-08 NOTE — TELEPHONE ENCOUNTER
Pt notified via phone of provider advice to be seen in urgent care now. RN reviewed the likely nearest [facility] to pt may be at [address] [facility] location in [place]. Pt indicates understanding of issues and agrees with the plan. Pt states he is just down the road from the [place] urgent care location.    COTY GoldsteinN, RN

## 2023-06-08 NOTE — TELEPHONE ENCOUNTER
He  should go to urgent care now  If he does not want to go there he can see me in the office tomorrow  My advice would be to go to urgent care now but if you are does not want to do that he can see me in the clinic tomorrow

## 2023-06-09 DIAGNOSIS — C64.1 RENAL CELL CARCINOMA, RIGHT (H): ICD-10-CM

## 2023-06-09 DIAGNOSIS — M54.12 CERVICAL RADICULOPATHY: Primary | ICD-10-CM

## 2023-06-09 NOTE — TELEPHONE ENCOUNTER
Return call from pt. Writer informed of recommendations for CT of cervical spine to further evaluate.   [id number] request sent to scheduling to call pt to set up CT.   Writer recommended if tingling sensation worsens or spreads, advised pt to go to ED for eval. Pt voiced understanding.

## 2023-06-09 NOTE — TELEPHONE ENCOUNTER
"[address] [facility] for pt requesting call back to triage at [phone] option 5, option 2 to discuss MyC message.     Oncology Nurse Triage - Reporting Symptoms  Situation:   Dimitrios reporting the following symptoms: numbness down L side     Background:   Treating Provider: Dr. Campos    Date of last office visit: 4/11/23 w/ Dr. Campos    Recent treatments: Yes: Cabozantinib 20mg QOD    Assessment  Onset of symptoms:     Pt went to [facility] on 6/8/23 (see encounter for more details) and recommended plan of:   \"Arm paresthesia, left:  EKG was stable without acute changes.  ?pinched nerve vs neuropathy vs mass/tumor with his hx of renal cell carcinoma.  Recommend RICE and will give [id number].  Recommend he discuss symptoms with his oncologist as well.  Will also send to orthopedics for further evaluation and management.  Recheck in clinic if symptoms worsen or if symptoms do not improve.   -     EKG 12-lead complete w/read - Clinics  -     Spine  Referral  -     predniSONE (DELTASONE) 20 MG tablet; Take 2 tablets (40 mg) by mouth daily for 7 days\"    Recommendations:             "

## 2023-06-09 NOTE — TELEPHONE ENCOUNTER
Attempted to contact pt to inform him provider Krystal Valenzuela is recommending he get a CT scan of the cervical spine to further evaluate.     LVM requesting pt return call.

## 2023-06-10 ENCOUNTER — ANCILLARY PROCEDURE (OUTPATIENT)
Dept: CT IMAGING | Facility: CLINIC | Age: 58
End: 2023-06-10
Payer: COMMERCIAL

## 2023-06-10 DIAGNOSIS — C64.1 RENAL CELL CARCINOMA, RIGHT (H): ICD-10-CM

## 2023-06-10 DIAGNOSIS — M54.12 CERVICAL RADICULOPATHY: ICD-10-CM

## 2023-06-10 PROCEDURE — 72126 CT NECK SPINE W/DYE: CPT | Mod: GC | Performed by: RADIOLOGY

## 2023-06-10 RX ORDER — IOPAMIDOL 755 MG/ML
75 INJECTION, SOLUTION INTRAVASCULAR ONCE
Status: COMPLETED | OUTPATIENT
Start: 2023-06-10 | End: 2023-06-10

## 2023-06-10 RX ADMIN — IOPAMIDOL 75 ML: 755 INJECTION, SOLUTION INTRAVASCULAR at 11:01

## 2023-06-12 ENCOUNTER — TELEPHONE (OUTPATIENT)
Dept: ONCOLOGY | Facility: CLINIC | Age: 58
End: 2023-06-12

## 2023-06-12 NOTE — TELEPHONE ENCOUNTER
Oral Chemotherapy Monitoring Program    Subjective/Objective:  Dimitrios Goldberg is a 58 year old male contacted by phone for a follow-up visit for oral chemotherapy. Dimitrios was in good spirits today and did not report any new side-effects or concerns regarding safety/efficacy of their Cabometyx (cabozantinib). They have been adherent with the dose and regimen and did not report starting any new medications as of recently. Furthermore, they report getting adequate nutrition/ water intake, and daily exercise in their routine.        3/20/2023    11:00 AM 4/6/2023     4:00 PM 4/20/2023     1:00 PM 5/10/2023     7:00 AM 5/16/2023     2:00 PM 5/19/2023     3:00 PM 6/12/2023    12:00 PM   ORAL CHEMOTHERAPY   Assessment Type Refill Lab Monitoring Refill Refill Monthly Follow up Lab Monitoring Monthly Follow up   Diagnosis Code Renal Cell Cancer Renal Cell Cancer Renal Cell Cancer Renal Cell Cancer Renal Cell Cancer Renal Cell Cancer Renal Cell Cancer   Providers Dr. Beth Campos   [facility]   Drug Name Cabometyx (cabozantinib) Cabometyx (cabozantinib) Cabometyx (cabozantinib) Cabometyx (cabozantinib) Cabometyx (cabozantinib) Cabometyx (cabozantinib) Cabometyx (cabozantinib)   Dose 20 mg 20 mg 20 mg 20 mg 20 mg 20 mg 20 mg   Current Schedule Every Other Day Every Other Day Every Other Day Every Other Day Every Other Day Every Other Day Every Other Day   Cycle Details Continuous Continuous Continuous Continuous Continuous Continuous Continuous   Doses missed in last 2 weeks     2  0   Adherence Assessment     Adherent  Adherent   Adverse Effects     Oral Mucositis  No AE identified during assessment   Palmar-plantar Erythrodysethesia syndrome[hand-foot syndrome]     Grade 1     Pharmacist Intervention(Palmar-plantar)     Yes     Intervention(s)     Patient education     Oral Mucositis     Grade 1     Pharmacist  "Intervention(oral mucositis)     Yes     Intervention(s)     Patient education     Any new drug interactions?       No   Is the dose as ordered appropriate for the patient?       Yes   Is the patient currently in pain?       No   Has the patient been assessed within the past 6 months for depression?       No   Has the patient missed any days of school, work, or other routine activity?       No   Since the last time we talked, have you been hospitalized or used the emergency room?       No       Last PHQ-2 Score on record:       3/9/2023    11:33 AM 3/9/2023    11:32 AM   PHQ-2 ( 1999 [employer])   Q1: Little interest or pleasure in doing things 0 0   Q2: Feeling down, depressed or hopeless 0 0   PHQ-2 Score 0 0   Q1: Little interest or pleasure in doing things Not at all Not at all   Q2: Feeling down, depressed or hopeless Not at all Not at all   PHQ-2 Score 0 0       Vitals:  BP:   BP Readings from Last 1 Encounters:   06/08/23 (!) 130/100     Wt Readings from Last 1 Encounters:   06/08/23 99.6 kg (219 lb 9.6 oz)     Estimated body surface area is 2.24 meters squared as calculated from the following:    Height as of 3/16/23: 1.816 m (5' 11.5\").    Weight as of 6/8/23: 99.6 kg (219 lb 9.6 oz).    Labs:  _  Result Component Current Result Ref Range   Sodium 139 (5/18/2023) 136 - 145 mmol/L     _  Result Component Current Result Ref Range   Potassium 4.0 (5/18/2023) 3.4 - 5.3 mmol/L     _  Result Component Current Result Ref Range   Calcium 9.4 (5/18/2023) 8.6 - 10.0 mg/dL     _  Result Component Current Result Ref Range   Albumin 4.3 (5/18/2023) 3.5 - 5.2 g/dL     _  Result Component Current Result Ref Range   Urea Nitrogen 17.5 (5/18/2023) 6.0 - 20.0 mg/dL     _  Result Component Current Result Ref Range   Creatinine 1.62 (H) (5/18/2023) 0.67 - 1.17 mg/dL     _  Result Component Current Result Ref Range   AST 22 (5/18/2023) 10 - 50 U/L     _  Result Component Current Result Ref Range   ALT 17 (5/18/2023) 10 - 50 U/L "     _  Result Component Current Result Ref Range   Bilirubin Total 0.3 (5/18/2023) <=1.2 mg/dL     _  Result Component Current Result Ref Range   WBC Count 5.6 (5/18/2023) 4.0 - 11.0 10e3/uL     _  Result Component Current Result Ref Range   Hemoglobin 13.7 (5/18/2023) 13.3 - 17.7 g/dL     _  Result Component Current Result Ref Range   Platelet Count 269 (5/18/2023) 150 - 450 10e3/uL     _  Result Component Current Result Ref Range   Absolute Neutrophils 3.0 (5/18/2023) 1.6 - 8.3 10e3/uL      Assessment/Plan:  Because there were no adverse drug events or concerning findings today during our conversation, Dimitrios should continue to take their Cabometyx (cabozantinib) as scheduled. Briefly discussed how he had a pinched nerve recently in his neck, but was seen by his provider about it and given a short duration of prednisone, which has helped with decreasing the pain.  Reminded patient of upcoming appointments and encouraged them to call us if any questions come up.    Follow-Up:  CMP/CBC labs on 6/21 @ 7:15AM    Refill Due:  6/19/23    Aashish Jo  Pharmacy Intern  Oral Chemotherapy Monitoring Program  [facility]  [phone]

## 2023-06-19 ENCOUNTER — TELEPHONE (OUTPATIENT)
Dept: NEPHROLOGY | Facility: CLINIC | Age: 58
End: 2023-06-19

## 2023-06-19 DIAGNOSIS — C64.1 RENAL CELL CARCINOMA, RIGHT (H): Primary | ICD-10-CM

## 2023-06-20 ENCOUNTER — THERAPY VISIT (OUTPATIENT)
Dept: PHYSICAL THERAPY | Facility: CLINIC | Age: 58
End: 2023-06-20
Payer: COMMERCIAL

## 2023-06-20 DIAGNOSIS — M25.551 ACUTE PAIN OF RIGHT HIP: Primary | ICD-10-CM

## 2023-06-20 PROCEDURE — 97110 THERAPEUTIC EXERCISES: CPT | Mod: GP | Performed by: PHYSICAL THERAPIST

## 2023-06-20 PROCEDURE — 97140 MANUAL THERAPY 1/> REGIONS: CPT | Mod: GP | Performed by: PHYSICAL THERAPIST

## 2023-06-20 NOTE — PROGRESS NOTES
PLAN  Continue therapy per current plan of care.    Beginning/End Dates of Progress Note Reporting Period:  06/06/23 to 06/20/2023    Referring Provider:  Matthew Sosa       06/20/23 0500   Appointment Info   Signing clinician's name / credentials Michael Houston DPT   Total/Authorized Visits 8 per PT POC   Visits Used 7   Medical Diagnosis Primary osteoarthritis of right hip   PT Tx Diagnosis R hip pain   Progress Note/Certification   Onset of illness/injury or Date of Surgery 02/10/23   Therapy Frequency 1x daily/week   Predicted Duration 8 weeks   Progress Note Due Date 06/06/23   Progress Note Completed Date 06/06/23   PT Goal 1   Goal Identifier Ambulation   Goal Description the patient will be able to walk for 60 minutes noting an increase in pain of no greater than 2/10   Rationale   (for safe community ambulation;for safe work place ambulation;to maintain proper body mechanics/posture while ambulating to avoid additional compensatory injury due to improper gait mechanics;to promote a healthy and active lifestyle)   Goal Progress Goal met, continue monitoring for consistency   Target Date 06/05/23   Subjective Report   Subjective Report The patient presents to the clinic stating his hip has been feeling looser, notes that he has not limped since his last physical therapy appointment and feels decreased pain along his anterior hip, but he does continue to note upper anterior thigh pain. Notes that he would like to continue with physical therapy for another session and is planning to meet with a doctor to discuss the next steps for his hip pain.   Objective Measures   Objective Measures Objective Measure 1;Objective Measure 2   Objective Measure 1   Objective Measure Hip AROM   Details R hip flexion 112, abduction 34, IR 21, ER 46   Objective Measure 2   Objective Measure R hip MMT   Details flexion 4/5, all others 5/5   Treatment Interventions (PT)   Interventions Therapeutic Procedure/Exercise;Manual  Therapy   Therapeutic Procedure/Exercise   Therapeutic Procedures: strength, endurance, ROM, flexibillity minutes (99639) 29   Therapeutic Procedures Ther Proc 2;Ther Proc 3   Ther Proc 2 Prone press up   Ther Proc 2 - Details x60s   Ther Proc 3 Prone press up with knee flexion   Ther Proc 3 - Details 2x20 AG   PTRx Ther Proc 1 Bridging #1   PTRx Ther Proc 1 - Details x15   PTRx Ther Proc 2 Standing Adductor Stretch   PTRx Ther Proc 2 - Details x60s   PTRx Ther Proc 3 Standing Extension   PTRx Ther Proc 3 - Details x15   PTRx Ther Proc 4 Repeated Hip External Rotation with Overpressure in Sitting   PTRx Ther Proc 4 - Details x60s   PTRx Ther Proc 5 Sidelying Hip Abduction   PTRx Ther Proc 5 - Details x15   PTRx Ther Proc 6 Clamshell Feet together   PTRx Ther Proc 6 - Details x15   PTRx Ther Proc 7 Reverse Clamshell   PTRx Ther Proc 7 - Details x15   PTRx Ther Proc 8 Standing Hip Flexion   PTRx Ther Proc 8 - Details isometric hold on 8in box, [id number] holds, noted to be pain free   PTRx Ther Proc 9 standing quadriceps stretch   PTRx Ther Proc 9 - Details 2x60s   PTRx Ther Proc 10 nustep warmup   PTRx Ther Proc 10 - Details 5 min   Skilled Intervention quadriceps stretch progressed by combining knee flexion with hip extension   Patient Response/Progress Patient notes stretching sensation with each exercise, no pinching pain   Manual Therapy   Manual Therapy: Mobilization, MFR, MLD, friction massage minutes (32130) 10   Manual Therapy 1 Supine hip long axis traction, AP, distraction @ 90/90   Manual Therapy 1 - Details grade III->IV   Skilled Intervention hypomobility noted, improves with mobilization   Patient Response/Progress patient notes no pain during mobilization   Plan   Plan for next session Assess potential for discharge   Total Session Time   Timed Code Treatment Minutes 39   Total Treatment Time (sum of timed and untimed services) 39

## 2023-06-28 ENCOUNTER — OFFICE VISIT (OUTPATIENT)
Dept: NEPHROLOGY | Facility: CLINIC | Age: 58
End: 2023-06-28
Attending: INTERNAL MEDICINE
Payer: COMMERCIAL

## 2023-06-28 ENCOUNTER — LAB (OUTPATIENT)
Dept: LAB | Facility: CLINIC | Age: 58
End: 2023-06-28
Attending: INTERNAL MEDICINE
Payer: COMMERCIAL

## 2023-06-28 ENCOUNTER — TELEPHONE (OUTPATIENT)
Dept: ONCOLOGY | Facility: CLINIC | Age: 58
End: 2023-06-28

## 2023-06-28 VITALS
SYSTOLIC BLOOD PRESSURE: 128 MMHG | DIASTOLIC BLOOD PRESSURE: 88 MMHG | HEIGHT: 72 IN | HEART RATE: 82 BPM | WEIGHT: 223.6 LBS | BODY MASS INDEX: 30.28 KG/M2

## 2023-06-28 DIAGNOSIS — I12.9 HYPERTENSION, RENAL: ICD-10-CM

## 2023-06-28 DIAGNOSIS — Z13.220 SCREENING FOR HYPERLIPIDEMIA: ICD-10-CM

## 2023-06-28 DIAGNOSIS — C64.1 RENAL CELL CARCINOMA, RIGHT (H): ICD-10-CM

## 2023-06-28 DIAGNOSIS — E03.4 HYPOTHYROIDISM DUE TO ACQUIRED ATROPHY OF THYROID: ICD-10-CM

## 2023-06-28 DIAGNOSIS — E78.5 HYPERLIPIDEMIA, UNSPECIFIED HYPERLIPIDEMIA TYPE: ICD-10-CM

## 2023-06-28 LAB
ALBUMIN MFR UR ELPH: 8.8 MG/DL
ALBUMIN SERPL BCG-MCNC: 4.4 G/DL (ref 3.5–5.2)
ALBUMIN UR-MCNC: NEGATIVE MG/DL
ALP SERPL-CCNC: 73 U/L (ref 40–129)
ALT SERPL W P-5'-P-CCNC: 23 U/L (ref 0–70)
ANION GAP SERPL CALCULATED.3IONS-SCNC: 7 MMOL/L (ref 7–15)
APPEARANCE UR: CLEAR
AST SERPL W P-5'-P-CCNC: 19 U/L (ref 0–45)
BASOPHILS # BLD AUTO: 0 10E3/UL (ref 0–0.2)
BASOPHILS NFR BLD AUTO: 0 %
BILIRUB SERPL-MCNC: 0.4 MG/DL
BILIRUB UR QL STRIP: NEGATIVE
BUN SERPL-MCNC: 16.1 MG/DL (ref 6–20)
CALCIUM SERPL-MCNC: 9.6 MG/DL (ref 8.6–10)
CHLORIDE SERPL-SCNC: 102 MMOL/L (ref 98–107)
CHOLEST SERPL-MCNC: 156 MG/DL
COLOR UR AUTO: ABNORMAL
CREAT SERPL-MCNC: 1.81 MG/DL (ref 0.67–1.17)
CREAT UR-MCNC: 146 MG/DL
CREAT UR-MCNC: 147 MG/DL
DEPRECATED CALCIDIOL+CALCIFEROL SERPL-MC: 31 UG/L (ref 20–75)
DEPRECATED HCO3 PLAS-SCNC: 30 MMOL/L (ref 22–29)
EOSINOPHIL # BLD AUTO: 0.1 10E3/UL (ref 0–0.7)
EOSINOPHIL NFR BLD AUTO: 2 %
ERYTHROCYTE [DISTWIDTH] IN BLOOD BY AUTOMATED COUNT: 15.9 % (ref 10–15)
GFR SERPL CREATININE-BSD FRML MDRD: 43 ML/MIN/1.73M2
GLUCOSE SERPL-MCNC: 103 MG/DL (ref 70–99)
GLUCOSE UR STRIP-MCNC: NEGATIVE MG/DL
HCT VFR BLD AUTO: 43 % (ref 40–53)
HDLC SERPL-MCNC: 42 MG/DL
HGB BLD-MCNC: 13.9 G/DL (ref 13.3–17.7)
HGB UR QL STRIP: ABNORMAL
IMM GRANULOCYTES # BLD: 0 10E3/UL
IMM GRANULOCYTES NFR BLD: 0 %
KETONES UR STRIP-MCNC: NEGATIVE MG/DL
LDLC SERPL CALC-MCNC: 92 MG/DL
LEUKOCYTE ESTERASE UR QL STRIP: NEGATIVE
LYMPHOCYTES # BLD AUTO: 1.8 10E3/UL (ref 0.8–5.3)
LYMPHOCYTES NFR BLD AUTO: 34 %
MCH RBC QN AUTO: 28.9 PG (ref 26.5–33)
MCHC RBC AUTO-ENTMCNC: 32.3 G/DL (ref 31.5–36.5)
MCV RBC AUTO: 89 FL (ref 78–100)
MICROALBUMIN UR-MCNC: <12 MG/L
MICROALBUMIN/CREAT UR: NORMAL MG/G{CREAT}
MONOCYTES # BLD AUTO: 0.4 10E3/UL (ref 0–1.3)
MONOCYTES NFR BLD AUTO: 8 %
MUCOUS THREADS #/AREA URNS LPF: PRESENT /LPF
NEUTROPHILS # BLD AUTO: 3 10E3/UL (ref 1.6–8.3)
NEUTROPHILS NFR BLD AUTO: 56 %
NITRATE UR QL: NEGATIVE
NONHDLC SERPL-MCNC: 114 MG/DL
NRBC # BLD AUTO: 0 10E3/UL
NRBC BLD AUTO-RTO: 0 /100
PH UR STRIP: 6 [PH] (ref 5–7)
PLATELET # BLD AUTO: 203 10E3/UL (ref 150–450)
POTASSIUM SERPL-SCNC: 4 MMOL/L (ref 3.4–5.3)
PROT SERPL-MCNC: 7 G/DL (ref 6.4–8.3)
PROT/CREAT 24H UR: 0.06 MG/MG CR (ref 0–0.2)
RBC # BLD AUTO: 4.81 10E6/UL (ref 4.4–5.9)
RBC URINE: 2 /HPF
SODIUM SERPL-SCNC: 139 MMOL/L (ref 136–145)
SP GR UR STRIP: 1.01 (ref 1–1.03)
TRIGL SERPL-MCNC: 110 MG/DL
TSH SERPL DL<=0.005 MIU/L-ACNC: 7.02 UIU/ML (ref 0.3–4.2)
UROBILINOGEN UR STRIP-MCNC: NORMAL MG/DL
WBC # BLD AUTO: 5.3 10E3/UL (ref 4–11)
WBC URINE: <1 /HPF

## 2023-06-28 PROCEDURE — 82570 ASSAY OF URINE CREATININE: CPT | Performed by: INTERNAL MEDICINE

## 2023-06-28 PROCEDURE — G0463 HOSPITAL OUTPT CLINIC VISIT: HCPCS | Performed by: INTERNAL MEDICINE

## 2023-06-28 PROCEDURE — 80061 LIPID PANEL: CPT | Performed by: PATHOLOGY

## 2023-06-28 PROCEDURE — 82306 VITAMIN D 25 HYDROXY: CPT | Performed by: INTERNAL MEDICINE

## 2023-06-28 PROCEDURE — 80050 GENERAL HEALTH PANEL: CPT | Performed by: PATHOLOGY

## 2023-06-28 PROCEDURE — 84156 ASSAY OF PROTEIN URINE: CPT | Performed by: PATHOLOGY

## 2023-06-28 PROCEDURE — 99000 SPECIMEN HANDLING OFFICE-LAB: CPT | Performed by: PATHOLOGY

## 2023-06-28 PROCEDURE — 36415 COLL VENOUS BLD VENIPUNCTURE: CPT | Performed by: PATHOLOGY

## 2023-06-28 PROCEDURE — 99214 OFFICE O/P EST MOD 30 MIN: CPT | Performed by: INTERNAL MEDICINE

## 2023-06-28 PROCEDURE — 81001 URINALYSIS AUTO W/SCOPE: CPT | Performed by: PATHOLOGY

## 2023-06-28 RX ORDER — METOPROLOL SUCCINATE 50 MG/1
50 TABLET, EXTENDED RELEASE ORAL DAILY
Qty: 90 TABLET | Refills: 1 | Status: SHIPPED | OUTPATIENT
Start: 2023-06-28 | End: 2023-12-20

## 2023-06-28 RX ORDER — METOPROLOL SUCCINATE 50 MG/1
50 TABLET, EXTENDED RELEASE ORAL DAILY
Qty: 15 TABLET | Refills: 3 | Status: SHIPPED | OUTPATIENT
Start: 2023-06-28 | End: 2023-06-28

## 2023-06-28 RX ORDER — LEVOTHYROXINE SODIUM 137 UG/1
137 TABLET ORAL DAILY
Qty: 90 TABLET | Refills: 1 | Status: SHIPPED | OUTPATIENT
Start: 2023-06-28 | End: 2023-12-22

## 2023-06-28 ASSESSMENT — PAIN SCALES - GENERAL: PAINLEVEL: NO PAIN (0)

## 2023-06-28 NOTE — PROGRESS NOTES
Dimitrios Goldberg  :  1965  DOS: 2023  MRN: [id number]    Sports Medicine Clinic Procedure    Ultrasound Guided Right Intra-Articular Hip Injection    Clinical History: Right hip anterior pain. Recently was on Prednisone for his left shoulder and notes that he had great relief in pain. Once prescription was out, hip pain increased.    Diagnosis:   1. Primary osteoarthritis of right hip        Large Joint Injection/Arthocentesis: R hip joint    Date/Time: 2023 3:39 PM    Performed by: Matthew Sosa DO  Authorized by: Matthew Sosa DO    Indications:  Pain  Needle Size:  22 G  Guidance: ultrasound    Approach:  Anterior  Location:  Hip      Site:  R hip joint  Medications:  40 mg triamcinolone 40 MG/ML; 2 mL bupivacaine (PF) 0.5 %; 2 mL lidocaine 1 %  Outcome:  Tolerated well, no immediate complications  Procedure discussed: discussed risks, benefits, and alternatives    Consent Given by:  Patient  Prep: patient was prepped and draped in usual sterile fashion     3 mL 1% Lidocaine used for anesthetic     Ultrasound images of procedure were permanently stored.         Intraarticular Hip Injection - Ultrasound Guided  The patient was informed of the risks and the benefits of the procedure and a written consent was signed.  The patient s right hip was prepped with chlorhexidine in sterile fashion.   Local anesthesia was performed using a 25-gauge 1.5-inch needle to administer 3 mL of 1% lidocaine without epinephrine.  1 mL (40 mg/mL) of triamcinolone suspension was drawn up into a 10 mL syringe with 3 mL of 1% lidocaine and 3 mL of 0.5% bupivacaine.   Injection was performed using sterile technique.  Under ultrasound guidance a 3.5-inch 22-gauge needle was used to enter the right femoracetabular joint.  Anterior approach was used, needle placement was visualized and documented with ultrasound.  Ultrasound visualization was necessary due to decreased joint space in the setting of osteoarthritis.   Injection performed in-plane to the probe.  Injection solution visualized within the joint space.  Images were permanently stored for the patient's record.  There were no complications. The patient tolerated the procedure well. There was negligible bleeding.         Impression:  Successful Right intra-articular hip injection.    Plan:  Follow up as needed in at least 3 months for reevaluation and updated treatment plan.  Expectations and goals of CSI reviewed  Often 2-3 days for steroid effect, and can take up to two weeks for maximum effect  We discussed modified progressive pain-free activity as tolerated  Do not overuse in first two weeks if feeling better due to concern for vulnerability while steroid is working  Supportive care reviewed  All questions were answered today  Contact us with additional questions or concerns  Signs and sx of concern reviewed      Matthew Sosa DO, FELIX  [facility] - Sports Medicine  [facility] - Department of Orthopedic Surgery

## 2023-06-28 NOTE — NURSING NOTE
Chief Complaint   Patient presents with     RECHECK     Follow up with CKD     /88   Pulse 82   Ht 1.829 m (6')   Wt 101.4 kg (223 lb 9.6 oz)   BMI 30.33 kg/m    Lisbet Khanna CMA on 6/28/2023 at 8:46 AM

## 2023-06-28 NOTE — PATIENT INSTRUCTIONS
-Please increase metoprolol succinate to 50 mg daily  -Please increase levothyroxine to 137 mcg daily and do a repeat TSH in one month  -Return to clinic in 4 months with repeat labs
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
## 2023-06-28 NOTE — TELEPHONE ENCOUNTER
Oral Chemotherapy Monitoring Program  Lab Follow Up    Patient currently on Cabometyx therapy for Renal Cell Carcinoma.    Reviewed lab results from 6/28/23    Labs:  _  Result Component Current Result Ref Range   Sodium 139 (6/28/2023) 136 - 145 mmol/L     _  Result Component Current Result Ref Range   Potassium 4.0 (6/28/2023) 3.4 - 5.3 mmol/L     _  Result Component Current Result Ref Range   Calcium 9.6 (6/28/2023) 8.6 - 10.0 mg/dL     No results found for Mag within last 30 days.     No results found for Phos within last 30 days.     _  Result Component Current Result Ref Range   Albumin 4.4 (6/28/2023) 3.5 - 5.2 g/dL     _  Result Component Current Result Ref Range   Urea Nitrogen 16.1 (6/28/2023) 6.0 - 20.0 mg/dL     _  Result Component Current Result Ref Range   Creatinine 1.81 (H) (6/28/2023) 0.67 - 1.17 mg/dL       _  Result Component Current Result Ref Range   AST 19 (6/28/2023) 0 - 45 U/L     _  Result Component Current Result Ref Range   ALT 23 (6/28/2023) 0 - 70 U/L     _  Result Component Current Result Ref Range   Bilirubin Total 0.4 (6/28/2023) <=1.2 mg/dL       _  Result Component Current Result Ref Range   WBC Count 5.3 (6/28/2023) 4.0 - 11.0 10e3/uL     _  Result Component Current Result Ref Range   Hemoglobin 13.9 (6/28/2023) 13.3 - 17.7 g/dL     _  Result Component Current Result Ref Range   Platelet Count 203 (6/28/2023) 150 - 450 10e3/uL     _  Result Component Current Result Ref Range   Absolute Neutrophils 3.0 (6/28/2023) 1.6 - 8.3 10e3/uL       Assessment & Plan:  Results are concerning for elevated Scr- this is not new for Dimitrios and he is being followed by nephrology. No other concerning abnormalities.      Follow-Up:  Labs 7/27/23    Sai GuD, BCOP  6/28/2023

## 2023-06-30 ENCOUNTER — OFFICE VISIT (OUTPATIENT)
Dept: ORTHOPEDICS | Facility: CLINIC | Age: 58
End: 2023-06-30
Payer: COMMERCIAL

## 2023-06-30 DIAGNOSIS — M16.11 PRIMARY OSTEOARTHRITIS OF RIGHT HIP: Primary | ICD-10-CM

## 2023-06-30 PROCEDURE — 99207 PR DROP WITH A PROCEDURE: CPT | Mod: 25 | Performed by: STUDENT IN AN ORGANIZED HEALTH CARE EDUCATION/TRAINING PROGRAM

## 2023-06-30 PROCEDURE — 20611 DRAIN/INJ JOINT/BURSA W/US: CPT | Mod: RT | Performed by: STUDENT IN AN ORGANIZED HEALTH CARE EDUCATION/TRAINING PROGRAM

## 2023-06-30 RX ADMIN — LIDOCAINE HYDROCHLORIDE 2 ML: 10 INJECTION, SOLUTION INFILTRATION; PERINEURAL at 15:39

## 2023-06-30 RX ADMIN — TRIAMCINOLONE ACETONIDE 40 MG: 40 INJECTION, SUSPENSION INTRA-ARTICULAR; INTRAMUSCULAR at 15:39

## 2023-06-30 RX ADMIN — BUPIVACAINE HYDROCHLORIDE 2 ML: 5 INJECTION, SOLUTION EPIDURAL; INTRACAUDAL at 15:39

## 2023-06-30 NOTE — LETTER
2023         RE: Dimitrios Goldberg  [address]  [place] [address]        Dear Colleague,    Thank you for referring your patient, Dimitrios Goldberg, to the [facility]ORTS [facility]IC[facility] [place]. Please see a copy of my visit note below.    Dimitrios Goldberg  :  1965  DOS: 2023  MRN: [id number]    Sports Medicine Clinic Procedure    Ultrasound Guided Right Intra-Articular Hip Injection    Clinical History: Right hip anterior pain. Recently was on Prednisone for his left shoulder and notes that he had great relief in pain. Once prescription was out, hip pain increased.    Diagnosis:   1. Primary osteoarthritis of right hip        Large Joint Injection/Arthocentesis: R hip joint    Date/Time: 2023 3:39 PM    Performed by: Matthew Sosa DO  Authorized by: Matthew Sosa DO    Indications:  Pain  Needle Size:  22 G  Guidance: ultrasound    Approach:  Anterior  Location:  Hip      Site:  R hip joint  Medications:  40 mg triamcinolone 40 MG/ML; 2 mL bupivacaine (PF) 0.5 %; 2 mL lidocaine 1 %  Outcome:  Tolerated well, no immediate complications  Procedure discussed: discussed risks, benefits, and alternatives    Consent Given by:  Patient  Prep: patient was prepped and draped in usual sterile fashion     3 mL 1% Lidocaine used for anesthetic     Ultrasound images of procedure were permanently stored.         Intraarticular Hip Injection - Ultrasound Guided  The patient was informed of the risks and the benefits of the procedure and a written consent was signed.  The patient s right hip was prepped with chlorhexidine in sterile fashion.   Local anesthesia was performed using a 25-gauge 1.5-inch needle to administer 3 mL of 1% lidocaine without epinephrine.  1 mL (40 mg/mL) of triamcinolone suspension was drawn up into a 10 mL syringe with 3 mL of 1% lidocaine and 3 mL of 0.5% bupivacaine.   Injection was performed using sterile technique.  Under ultrasound guidance a  3.5-inch 22-gauge needle was used to enter the right femoracetabular joint.  Anterior approach was used, needle placement was visualized and documented with ultrasound.  Ultrasound visualization was necessary due to decreased joint space in the setting of osteoarthritis.  Injection performed in-plane to the probe.  Injection solution visualized within the joint space.  Images were permanently stored for the patient's record.  There were no complications. The patient tolerated the procedure well. There was negligible bleeding.         Impression:  Successful Right intra-articular hip injection.    Plan:  Follow up as needed in at least 3 months for reevaluation and updated treatment plan.  Expectations and goals of CSI reviewed  Often 2-3 days for steroid effect, and can take up to two weeks for maximum effect  We discussed modified progressive pain-free activity as tolerated  Do not overuse in first two weeks if feeling better due to concern for vulnerability while steroid is working  Supportive care reviewed  All questions were answered today  Contact us with additional questions or concerns  Signs and sx of concern reviewed      Matthew Sosa DO, CAQSM  [facility]  [facility] - Department of Orthopedic Surgery               Again, thank you for allowing me to participate in the care of your patient.        Sincerely,        Matthew Sosa DO

## 2023-07-07 ENCOUNTER — THERAPY VISIT (OUTPATIENT)
Dept: PHYSICAL THERAPY | Facility: CLINIC | Age: 58
End: 2023-07-07
Payer: COMMERCIAL

## 2023-07-07 DIAGNOSIS — C64.1 RENAL CELL CARCINOMA, RIGHT (H): ICD-10-CM

## 2023-07-07 DIAGNOSIS — M25.551 ACUTE PAIN OF RIGHT HIP: Primary | ICD-10-CM

## 2023-07-07 PROCEDURE — 97110 THERAPEUTIC EXERCISES: CPT | Mod: GP | Performed by: PHYSICAL THERAPIST

## 2023-07-07 ASSESSMENT — ACTIVITIES OF DAILY LIVING (ADL)
PUTTING_ON_SOCKS_AND_SHOES: NO DIFFICULTY AT ALL
GETTING_INTO_AND_OUT_OF_AN_AVERAGE_CAR: NO DIFFICULTY AT ALL
STEPPING_UP_AND_DOWN_CURBS: NO DIFFICULTY AT ALL
DEEP_SQUATTING: NO DIFFICULTY AT ALL
HOS_ADL_HIGHEST_POTENTIAL_SCORE: 68
HEAVY_WORK: NO DIFFICULTY AT ALL
HOS_ADL_COUNT: 17
HOW_WOULD_YOU_RATE_YOUR_CURRENT_LEVEL_OF_FUNCTION_DURING_YOUR_USUAL_ACTIVITIES_OF_DAILY_LIVING_FROM_0_TO_100_WITH_100_BEING_YOUR_LEVEL_OF_FUNCTION_PRIOR_TO_YOUR_HIP_PROBLEM_AND_0_BEING_THE_INABILITY_TO_PERFORM_ANY_OF_YOUR_USUAL_DAILY_ACTIVITIES?: 90
ROLLING_OVER_IN_BED: NO DIFFICULTY AT ALL
LIGHT_TO_MODERATE_WORK: NO DIFFICULTY AT ALL
TWISTING/PIVOTING_ON_INVOLVED_LEG: NO DIFFICULTY AT ALL
RECREATIONAL_ACTIVITIES: NO DIFFICULTY AT ALL
GOING_DOWN_1_FLIGHT_OF_STAIRS: NO DIFFICULTY AT ALL
SITTING_FOR_15_MINUTES: NO DIFFICULTY AT ALL
STANDING_FOR_15_MINUTES: NO DIFFICULTY AT ALL
WALKING_DOWN_STEEP_HILLS: NO DIFFICULTY AT ALL
WALKING_15_MINUTES_OR_GREATER: NO DIFFICULTY AT ALL
HOS_ADL_ITEM_SCORE_TOTAL: 68
WALKING_APPROXIMATELY_10_MINUTES: NO DIFFICULTY AT ALL
HOS_ADL_SCORE(%): 100
WALKING_UP_STEEP_HILLS: NO DIFFICULTY AT ALL
GOING_UP_1_FLIGHT_OF_STAIRS: NO DIFFICULTY AT ALL
WALKING_INITIALLY: NO DIFFICULTY AT ALL
GETTING_INTO_AND_OUT_OF_A_BATHTUB: NO DIFFICULTY AT ALL

## 2023-07-08 RX ORDER — BUPIVACAINE HYDROCHLORIDE 5 MG/ML
2 INJECTION, SOLUTION EPIDURAL; INTRACAUDAL
Status: DISCONTINUED | OUTPATIENT
Start: 2023-06-30 | End: 2023-08-11

## 2023-07-08 RX ORDER — LIDOCAINE HYDROCHLORIDE 10 MG/ML
2 INJECTION, SOLUTION INFILTRATION; PERINEURAL
Status: DISCONTINUED | OUTPATIENT
Start: 2023-06-30 | End: 2023-08-11

## 2023-07-08 RX ORDER — TRIAMCINOLONE ACETONIDE 40 MG/ML
40 INJECTION, SUSPENSION INTRA-ARTICULAR; INTRAMUSCULAR
Status: DISCONTINUED | OUTPATIENT
Start: 2023-06-30 | End: 2023-08-11

## 2023-07-09 PROBLEM — M25.551 ACUTE PAIN OF RIGHT HIP: Status: RESOLVED | Noted: 2023-04-10 | Resolved: 2023-07-09

## 2023-07-09 NOTE — PROGRESS NOTES
DISCHARGE  Reason for Discharge: Patient has met all goals.  Patient chooses to discontinue therapy.    Equipment Issued: home exercise program    Discharge Plan: Patient to continue home program, reach out to the clinic with any concerns    Referring Provider:  Matthew Sosa       07/07/23 0500   Appointment Info   Signing clinician's name / credentials Michael Houston DPT   Total/Authorized Visits 8 per PT POC   Visits Used 8   Medical Diagnosis Primary osteoarthritis of right hip   PT Tx Diagnosis R hip pain   Progress Note/Certification   Onset of illness/injury or Date of Surgery 02/10/23   Therapy Frequency 1x daily/week   Predicted Duration 8 weeks   Progress Note Due Date 06/06/23   Progress Note Completed Date 06/06/23   PT Goal 1   Goal Identifier Ambulation   Goal Description the patient will be able to walk for 60 minutes noting an increase in pain of no greater than 2/10   Rationale   (for safe community ambulation;for safe work place ambulation;to maintain proper body mechanics/posture while ambulating to avoid additional compensatory injury due to improper gait mechanics;to promote a healthy and active lifestyle)   Goal Progress GOAL MET   Target Date 06/05/23   Date Met 07/07/23   Subjective Report   Subjective Report The patient presents to the clinic noting no hip pain since his injection, only notes slight thigh pain. Notes that at this time he feels he is appropriate to be discharged from skilled therapy services and will reach out to the clinic if he has any questions or concerns.   Objective Measures   Objective Measures Objective Measure 1;Objective Measure 2;Objective Measure 3   Objective Measure 1   Objective Measure Hip AROM   Details R hip flexion 121, abduction 41, IR 26, ER 44   Objective Measure 2   Objective Measure R hip MMT   Details Globally 5/5 pain free   Objective Measure 3   Objective Measure Zen test   Details stretching pain, no familiar pinching pain   Treatment  Interventions (PT)   Interventions Therapeutic Procedure/Exercise;Manual Therapy   Therapeutic Procedure/Exercise   Therapeutic Procedures: strength, endurance, ROM, flexibillity minutes (34404) 24   Therapeutic Procedures Ther Proc 2;Ther Proc 3   Ther Proc 2 Prone press up   Ther Proc 2 - Details x30s   Ther Proc 3 Prone press up with knee flexion   Ther Proc 3 - Details x10   PTRx Ther Proc 1 Bridging #1   PTRx Ther Proc 1 - Details x10   PTRx Ther Proc 2 Standing Adductor Stretch   PTRx Ther Proc 2 - Details x60s   PTRx Ther Proc 3 Standing Extension   PTRx Ther Proc 3 - Details x10   PTRx Ther Proc 4 Repeated Hip External Rotation with Overpressure in Sitting   PTRx Ther Proc 4 - Details x30s   PTRx Ther Proc 5 Sidelying Hip Abduction   PTRx Ther Proc 5 - Details x10   PTRx Ther Proc 6 Clamshell Feet together   PTRx Ther Proc 6 - Details x10   PTRx Ther Proc 7 Reverse Clamshell   PTRx Ther Proc 7 - Details x10   PTRx Ther Proc 8 Standing Hip Flexion   PTRx Ther Proc 8 - Details isometric hold on 8in box, 3x5s holds   PTRx Ther Proc 9 standing quadriceps stretch   PTRx Ther Proc 9 - Details x30s   PTRx Ther Proc 10 nustep warmup   PTRx Ther Proc 10 - Details 5 min   Skilled Intervention Low intensity review of home exercise program, lower intensity performed to protect hip post injection   Patient Response/Progress Patient verbalizes he will be able to resume his workout routine independently 2-3 weeks after his injection   Total Session Time   Timed Code Treatment Minutes 24   Total Treatment Time (sum of timed and untimed services) 24

## 2023-07-14 DIAGNOSIS — C64.1 RENAL CELL CARCINOMA, RIGHT (H): Primary | ICD-10-CM

## 2023-07-14 DIAGNOSIS — B00.9 RECURRENT HERPES SIMPLEX: ICD-10-CM

## 2023-07-14 RX ORDER — ACYCLOVIR 400 MG/1
400 TABLET ORAL EVERY 8 HOURS
Qty: 30 TABLET | Refills: 11 | OUTPATIENT
Start: 2023-07-14

## 2023-07-21 ENCOUNTER — TRANSFERRED RECORDS (OUTPATIENT)
Dept: HEALTH INFORMATION MANAGEMENT | Facility: CLINIC | Age: 58
End: 2023-07-21
Payer: COMMERCIAL

## 2023-07-21 ENCOUNTER — NURSE TRIAGE (OUTPATIENT)
Dept: ONCOLOGY | Facility: CLINIC | Age: 58
End: 2023-07-21
Payer: COMMERCIAL

## 2023-07-21 NOTE — TELEPHONE ENCOUNTER
"Caroline, spouse, calling to report pt is currently in emergency surgery in [facility]. Pt was airlifted from [facility]. Pt was not feeling well last night, thought it was just gas. He took some tums and gas x but by 0300 he was in \"excruciating pain\" and he went to a 24hrs clinic. They completed some imaging studies and  informed pt he had a blockage and was advised to have emergency surgery.     Caroline wanted to make sure pt oncology team is aware.  "

## 2023-07-26 ENCOUNTER — PATIENT OUTREACH (OUTPATIENT)
Dept: ONCOLOGY | Facility: CLINIC | Age: 58
End: 2023-07-26
Payer: COMMERCIAL

## 2023-07-26 NOTE — PROGRESS NOTES
"[facility]: Cancer Care                                                                                      Patient's wife Britta called today to report that on Thursday (7/20) the patient had what felt like gas, but when he took gas-x it did not improve and he began to get increasingly worse abdominal pains at which point he was \"curled up into a ball on the bed.\"  They went to an emergency clinic and had a CT scan which showed a large bowel obstruction.  Patient was flown by medical helicopter to [facility] where they did an emergency surgery to remove the obstruction.      The [place] team did not think it was related to the patient's cancer, but wife is concerned because of something she remembers Dr. Agrawal saying after a surgery with him (something about it being close to the tumor).    Patient had some complications coming out of anesthesia and is currently in the ICU.      The CT scan that was scheduled at our facility for tomorrow has been postponed given the circumstances.     Patient's wife is unsure of when he will be able to discharge from the hospital and fly home.    She questions whether Dr. Campos would need to communicate with the doctor in [place] about the patient's care.  Advised her that most likely he would not need to talk to that physician, but took the physician's information in case.    Physician \"Sai\" international number: [phone]. Will route to Dr. Campos.    Frances Ferris, RN, BSN  Oncology RN Care Coordinator  [facility]  "

## 2023-07-27 ENCOUNTER — TRANSFERRED RECORDS (OUTPATIENT)
Dept: HEALTH INFORMATION MANAGEMENT | Facility: CLINIC | Age: 58
End: 2023-07-27

## 2023-08-02 ENCOUNTER — APPOINTMENT (OUTPATIENT)
Dept: CT IMAGING | Facility: CLINIC | Age: 58
DRG: 862 | End: 2023-08-02
Attending: EMERGENCY MEDICINE
Payer: COMMERCIAL

## 2023-08-02 ENCOUNTER — HOSPITAL ENCOUNTER (INPATIENT)
Facility: CLINIC | Age: 58
LOS: 10 days | Discharge: HOME | DRG: 862 | End: 2023-08-12
Attending: EMERGENCY MEDICINE | Admitting: INTERNAL MEDICINE
Payer: COMMERCIAL

## 2023-08-02 DIAGNOSIS — K65.1 INTRA-ABDOMINAL ABSCESS (H): Primary | ICD-10-CM

## 2023-08-02 LAB
ALBUMIN SERPL BCG-MCNC: 3.7 G/DL (ref 3.5–5.2)
ALBUMIN UR-MCNC: 20 MG/DL
ALP SERPL-CCNC: 60 U/L (ref 40–129)
ALT SERPL W P-5'-P-CCNC: 35 U/L (ref 0–70)
ANION GAP SERPL CALCULATED.3IONS-SCNC: 19 MMOL/L (ref 7–15)
APPEARANCE UR: CLEAR
AST SERPL W P-5'-P-CCNC: ABNORMAL U/L
BASOPHILS # BLD AUTO: 0 10E3/UL (ref 0–0.2)
BASOPHILS # BLD AUTO: 0 10E3/UL (ref 0–0.2)
BASOPHILS NFR BLD AUTO: 1 %
BASOPHILS NFR BLD AUTO: 1 %
BILIRUB SERPL-MCNC: 0.4 MG/DL
BILIRUB UR QL STRIP: NEGATIVE
BUN SERPL-MCNC: 13.5 MG/DL (ref 6–20)
CALCIUM SERPL-MCNC: 9.5 MG/DL (ref 8.6–10)
CHLORIDE SERPL-SCNC: 93 MMOL/L (ref 98–107)
COLOR UR AUTO: ABNORMAL
CREAT SERPL-MCNC: 1.68 MG/DL (ref 0.67–1.17)
CRP SERPL-MCNC: 159 MG/L
DEPRECATED HCO3 PLAS-SCNC: 25 MMOL/L (ref 22–29)
EOSINOPHIL # BLD AUTO: 0.1 10E3/UL (ref 0–0.7)
EOSINOPHIL # BLD AUTO: 0.1 10E3/UL (ref 0–0.7)
EOSINOPHIL NFR BLD AUTO: 1 %
EOSINOPHIL NFR BLD AUTO: 1 %
ERYTHROCYTE [DISTWIDTH] IN BLOOD BY AUTOMATED COUNT: 15.2 % (ref 10–15)
ERYTHROCYTE [DISTWIDTH] IN BLOOD BY AUTOMATED COUNT: 15.4 % (ref 10–15)
ERYTHROCYTE [DISTWIDTH] IN BLOOD BY AUTOMATED COUNT: 15.5 % (ref 10–15)
GFR SERPL CREATININE-BSD FRML MDRD: 47 ML/MIN/1.73M2
GLUCOSE SERPL-MCNC: 94 MG/DL (ref 70–99)
GLUCOSE UR STRIP-MCNC: NEGATIVE MG/DL
HCT VFR BLD AUTO: 26.4 % (ref 40–53)
HCT VFR BLD AUTO: 28.4 % (ref 40–53)
HCT VFR BLD AUTO: 29.3 % (ref 40–53)
HGB BLD-MCNC: 8.1 G/DL (ref 13.3–17.7)
HGB BLD-MCNC: 8.9 G/DL (ref 13.3–17.7)
HGB BLD-MCNC: 8.9 G/DL (ref 13.3–17.7)
HGB BLD-MCNC: 9.5 G/DL (ref 13.3–17.7)
HGB UR QL STRIP: ABNORMAL
HOLD SPECIMEN: NORMAL
IMM GRANULOCYTES # BLD: 0.3 10E3/UL
IMM GRANULOCYTES # BLD: 0.4 10E3/UL
IMM GRANULOCYTES NFR BLD: 4 %
IMM GRANULOCYTES NFR BLD: 4 %
KETONES UR STRIP-MCNC: 20 MG/DL
LACTATE SERPL-SCNC: 1.3 MMOL/L (ref 0.7–2)
LDH SERPL L TO P-CCNC: 349 U/L (ref 0–250)
LEUKOCYTE ESTERASE UR QL STRIP: NEGATIVE
LIPASE SERPL-CCNC: 479 U/L (ref 13–60)
LYMPHOCYTES # BLD AUTO: 1 10E3/UL (ref 0.8–5.3)
LYMPHOCYTES # BLD AUTO: 1.1 10E3/UL (ref 0.8–5.3)
LYMPHOCYTES NFR BLD AUTO: 13 %
LYMPHOCYTES NFR BLD AUTO: 15 %
MCH RBC QN AUTO: 28.1 PG (ref 26.5–33)
MCH RBC QN AUTO: 28.7 PG (ref 26.5–33)
MCH RBC QN AUTO: 29.2 PG (ref 26.5–33)
MCHC RBC AUTO-ENTMCNC: 30.7 G/DL (ref 31.5–36.5)
MCHC RBC AUTO-ENTMCNC: 31.3 G/DL (ref 31.5–36.5)
MCHC RBC AUTO-ENTMCNC: 32.4 G/DL (ref 31.5–36.5)
MCV RBC AUTO: 90 FL (ref 78–100)
MCV RBC AUTO: 92 FL (ref 78–100)
MCV RBC AUTO: 92 FL (ref 78–100)
MONOCYTES # BLD AUTO: 0.7 10E3/UL (ref 0–1.3)
MONOCYTES # BLD AUTO: 0.7 10E3/UL (ref 0–1.3)
MONOCYTES NFR BLD AUTO: 10 %
MONOCYTES NFR BLD AUTO: 9 %
NEUTROPHILS # BLD AUTO: 4.9 10E3/UL (ref 1.6–8.3)
NEUTROPHILS # BLD AUTO: 6.2 10E3/UL (ref 1.6–8.3)
NEUTROPHILS NFR BLD AUTO: 69 %
NEUTROPHILS NFR BLD AUTO: 72 %
NITRATE UR QL: NEGATIVE
NRBC # BLD AUTO: 0 10E3/UL
NRBC # BLD AUTO: 0 10E3/UL
NRBC BLD AUTO-RTO: 0 /100
NRBC BLD AUTO-RTO: 0 /100
PH UR STRIP: 6 [PH] (ref 5–7)
PLATELET # BLD AUTO: 365 10E3/UL (ref 150–450)
PLATELET # BLD AUTO: 386 10E3/UL (ref 150–450)
PLATELET # BLD AUTO: 389 10E3/UL (ref 150–450)
POTASSIUM SERPL-SCNC: 3.5 MMOL/L (ref 3.4–5.3)
PROT SERPL-MCNC: 7 G/DL (ref 6.4–8.3)
RBC # BLD AUTO: 2.88 10E6/UL (ref 4.4–5.9)
RBC # BLD AUTO: 3.1 10E6/UL (ref 4.4–5.9)
RBC # BLD AUTO: 3.25 10E6/UL (ref 4.4–5.9)
RBC URINE: 2 /HPF
RETICS # AUTO: 0.05 10E6/UL (ref 0.03–0.1)
RETICS/RBC NFR AUTO: 1.6 % (ref 0.5–2)
SODIUM SERPL-SCNC: 137 MMOL/L (ref 136–145)
SP GR UR STRIP: >1.05 (ref 1–1.03)
SQUAMOUS EPITHELIAL: <1 /HPF
TRANSITIONAL EPI: <1 /HPF
UROBILINOGEN UR STRIP-MCNC: NORMAL MG/DL
WBC # BLD AUTO: 6.8 10E3/UL (ref 4–11)
WBC # BLD AUTO: 7.1 10E3/UL (ref 4–11)
WBC # BLD AUTO: 8.5 10E3/UL (ref 4–11)
WBC URINE: 1 /HPF

## 2023-08-02 PROCEDURE — 71260 CT THORAX DX C+: CPT | Mod: 26 | Performed by: RADIOLOGY

## 2023-08-02 PROCEDURE — 83690 ASSAY OF LIPASE: CPT | Performed by: EMERGENCY MEDICINE

## 2023-08-02 PROCEDURE — 85018 HEMOGLOBIN: CPT | Performed by: PHYSICIAN ASSISTANT

## 2023-08-02 PROCEDURE — 83010 ASSAY OF HAPTOGLOBIN QUANT: CPT | Performed by: PHYSICIAN ASSISTANT

## 2023-08-02 PROCEDURE — 99223 1ST HOSP IP/OBS HIGH 75: CPT | Mod: FS | Performed by: INTERNAL MEDICINE

## 2023-08-02 PROCEDURE — 36415 COLL VENOUS BLD VENIPUNCTURE: CPT | Performed by: PHYSICIAN ASSISTANT

## 2023-08-02 PROCEDURE — 99285 EMERGENCY DEPT VISIT HI MDM: CPT | Mod: 25

## 2023-08-02 PROCEDURE — 96374 THER/PROPH/DIAG INJ IV PUSH: CPT

## 2023-08-02 PROCEDURE — 85045 AUTOMATED RETICULOCYTE COUNT: CPT | Performed by: PHYSICIAN ASSISTANT

## 2023-08-02 PROCEDURE — 87040 BLOOD CULTURE FOR BACTERIA: CPT | Performed by: INTERNAL MEDICINE

## 2023-08-02 PROCEDURE — 250N000011 HC RX IP 250 OP 636: Mod: JZ | Performed by: EMERGENCY MEDICINE

## 2023-08-02 PROCEDURE — 85027 COMPLETE CBC AUTOMATED: CPT | Performed by: PHYSICIAN ASSISTANT

## 2023-08-02 PROCEDURE — 96361 HYDRATE IV INFUSION ADD-ON: CPT

## 2023-08-02 PROCEDURE — 120N000002 HC R&B MED SURG/OB UMMC

## 2023-08-02 PROCEDURE — 250N000011 HC RX IP 250 OP 636: Performed by: EMERGENCY MEDICINE

## 2023-08-02 PROCEDURE — 258N000003 HC RX IP 258 OP 636: Performed by: EMERGENCY MEDICINE

## 2023-08-02 PROCEDURE — 85060 BLOOD SMEAR INTERPRETATION: CPT | Performed by: PATHOLOGY

## 2023-08-02 PROCEDURE — 86140 C-REACTIVE PROTEIN: CPT | Performed by: PHYSICIAN ASSISTANT

## 2023-08-02 PROCEDURE — 83605 ASSAY OF LACTIC ACID: CPT | Performed by: EMERGENCY MEDICINE

## 2023-08-02 PROCEDURE — 258N000003 HC RX IP 258 OP 636: Performed by: PHYSICIAN ASSISTANT

## 2023-08-02 PROCEDURE — 99207 PR APP CREDIT; MD BILLING SHARED VISIT: CPT | Performed by: PHYSICIAN ASSISTANT

## 2023-08-02 PROCEDURE — 74177 CT ABD & PELVIS W/CONTRAST: CPT

## 2023-08-02 PROCEDURE — 85025 COMPLETE CBC W/AUTO DIFF WBC: CPT | Performed by: EMERGENCY MEDICINE

## 2023-08-02 PROCEDURE — 81001 URINALYSIS AUTO W/SCOPE: CPT | Performed by: EMERGENCY MEDICINE

## 2023-08-02 PROCEDURE — 36415 COLL VENOUS BLD VENIPUNCTURE: CPT | Performed by: EMERGENCY MEDICINE

## 2023-08-02 PROCEDURE — 99233 SBSQ HOSP IP/OBS HIGH 50: CPT | Mod: 25 | Performed by: SURGERY

## 2023-08-02 PROCEDURE — 84155 ASSAY OF PROTEIN SERUM: CPT | Performed by: EMERGENCY MEDICINE

## 2023-08-02 PROCEDURE — 99255 IP/OBS CONSLTJ NEW/EST HI 80: CPT | Performed by: INTERNAL MEDICINE

## 2023-08-02 PROCEDURE — 83615 LACTATE (LD) (LDH) ENZYME: CPT | Performed by: PHYSICIAN ASSISTANT

## 2023-08-02 PROCEDURE — 250N000013 HC RX MED GY IP 250 OP 250 PS 637: Performed by: PHYSICIAN ASSISTANT

## 2023-08-02 PROCEDURE — 36415 COLL VENOUS BLD VENIPUNCTURE: CPT | Performed by: INTERNAL MEDICINE

## 2023-08-02 PROCEDURE — 96375 TX/PRO/DX INJ NEW DRUG ADDON: CPT

## 2023-08-02 PROCEDURE — 74177 CT ABD & PELVIS W/CONTRAST: CPT | Mod: 26 | Performed by: RADIOLOGY

## 2023-08-02 PROCEDURE — 99285 EMERGENCY DEPT VISIT HI MDM: CPT | Performed by: EMERGENCY MEDICINE

## 2023-08-02 RX ORDER — ONDANSETRON 4 MG/1
4 TABLET, ORALLY DISINTEGRATING ORAL EVERY 6 HOURS PRN
Status: DISCONTINUED | OUTPATIENT
Start: 2023-08-02 | End: 2023-08-12 | Stop reason: HOSPADM

## 2023-08-02 RX ORDER — ACETAMINOPHEN 500 MG
500-1000 TABLET ORAL EVERY 8 HOURS PRN
Status: DISCONTINUED | OUTPATIENT
Start: 2023-08-02 | End: 2023-08-12 | Stop reason: HOSPADM

## 2023-08-02 RX ORDER — IOPAMIDOL 755 MG/ML
135 INJECTION, SOLUTION INTRAVASCULAR ONCE
Status: COMPLETED | OUTPATIENT
Start: 2023-08-02 | End: 2023-08-02

## 2023-08-02 RX ORDER — NORTRIPTYLINE HCL 10 MG
10 CAPSULE ORAL AT BEDTIME
Status: DISCONTINUED | OUTPATIENT
Start: 2023-08-02 | End: 2023-08-12 | Stop reason: HOSPADM

## 2023-08-02 RX ORDER — SODIUM CHLORIDE, SODIUM LACTATE, POTASSIUM CHLORIDE, CALCIUM CHLORIDE 600; 310; 30; 20 MG/100ML; MG/100ML; MG/100ML; MG/100ML
INJECTION, SOLUTION INTRAVENOUS CONTINUOUS
Status: DISCONTINUED | OUTPATIENT
Start: 2023-08-02 | End: 2023-08-08

## 2023-08-02 RX ORDER — ATORVASTATIN CALCIUM 20 MG/1
20 TABLET, FILM COATED ORAL DAILY
Status: DISCONTINUED | OUTPATIENT
Start: 2023-08-02 | End: 2023-08-12 | Stop reason: HOSPADM

## 2023-08-02 RX ORDER — FERROUS GLUCONATE 324(38)MG
324 TABLET ORAL
Status: DISCONTINUED | OUTPATIENT
Start: 2023-08-02 | End: 2023-08-12 | Stop reason: HOSPADM

## 2023-08-02 RX ORDER — LIDOCAINE 40 MG/G
CREAM TOPICAL
Status: DISCONTINUED | OUTPATIENT
Start: 2023-08-02 | End: 2023-08-12 | Stop reason: HOSPADM

## 2023-08-02 RX ORDER — METOPROLOL SUCCINATE 50 MG/1
50 TABLET, EXTENDED RELEASE ORAL DAILY
Status: DISCONTINUED | OUTPATIENT
Start: 2023-08-02 | End: 2023-08-12 | Stop reason: HOSPADM

## 2023-08-02 RX ORDER — AMOXICILLIN 250 MG
1 CAPSULE ORAL 2 TIMES DAILY PRN
Status: DISCONTINUED | OUTPATIENT
Start: 2023-08-02 | End: 2023-08-12 | Stop reason: HOSPADM

## 2023-08-02 RX ORDER — MORPHINE SULFATE 4 MG/ML
4 INJECTION, SOLUTION INTRAMUSCULAR; INTRAVENOUS ONCE
Status: COMPLETED | OUTPATIENT
Start: 2023-08-02 | End: 2023-08-02

## 2023-08-02 RX ORDER — LEVOTHYROXINE SODIUM 137 UG/1
137 TABLET ORAL
Status: DISCONTINUED | OUTPATIENT
Start: 2023-08-02 | End: 2023-08-12 | Stop reason: HOSPADM

## 2023-08-02 RX ORDER — RIZATRIPTAN BENZOATE 10 MG/1
10 TABLET, ORALLY DISINTEGRATING ORAL
Status: COMPLETED | OUTPATIENT
Start: 2023-08-02 | End: 2023-08-09

## 2023-08-02 RX ORDER — CLOBETASOL PROPIONATE 0.5 MG/G
CREAM TOPICAL 2 TIMES DAILY
Status: DISCONTINUED | OUTPATIENT
Start: 2023-08-02 | End: 2023-08-12 | Stop reason: HOSPADM

## 2023-08-02 RX ORDER — PANTOPRAZOLE SODIUM 40 MG/1
40 TABLET, DELAYED RELEASE ORAL 2 TIMES DAILY
Status: DISCONTINUED | OUTPATIENT
Start: 2023-08-02 | End: 2023-08-12 | Stop reason: HOSPADM

## 2023-08-02 RX ORDER — PIPERACILLIN SODIUM, TAZOBACTAM SODIUM 3; .375 G/15ML; G/15ML
3.38 INJECTION, POWDER, LYOPHILIZED, FOR SOLUTION INTRAVENOUS EVERY 6 HOURS
Status: DISCONTINUED | OUTPATIENT
Start: 2023-08-02 | End: 2023-08-11

## 2023-08-02 RX ORDER — OXYCODONE HYDROCHLORIDE 5 MG/1
5 TABLET ORAL EVERY 4 HOURS PRN
Status: DISCONTINUED | OUTPATIENT
Start: 2023-08-02 | End: 2023-08-10

## 2023-08-02 RX ORDER — ONDANSETRON 2 MG/ML
4 INJECTION INTRAMUSCULAR; INTRAVENOUS EVERY 6 HOURS PRN
Status: DISCONTINUED | OUTPATIENT
Start: 2023-08-02 | End: 2023-08-12 | Stop reason: HOSPADM

## 2023-08-02 RX ORDER — HYDROMORPHONE HYDROCHLORIDE 1 MG/ML
0.3 INJECTION, SOLUTION INTRAMUSCULAR; INTRAVENOUS; SUBCUTANEOUS
Status: DISCONTINUED | OUTPATIENT
Start: 2023-08-02 | End: 2023-08-12 | Stop reason: HOSPADM

## 2023-08-02 RX ORDER — ACYCLOVIR 400 MG/1
400 TABLET ORAL EVERY 8 HOURS SCHEDULED
Status: DISCONTINUED | OUTPATIENT
Start: 2023-08-02 | End: 2023-08-12 | Stop reason: HOSPADM

## 2023-08-02 RX ORDER — AMOXICILLIN 250 MG
2 CAPSULE ORAL 2 TIMES DAILY PRN
Status: DISCONTINUED | OUTPATIENT
Start: 2023-08-02 | End: 2023-08-12 | Stop reason: HOSPADM

## 2023-08-02 RX ADMIN — PIPERACILLIN AND TAZOBACTAM 3.38 G: 3; .375 INJECTION, POWDER, LYOPHILIZED, FOR SOLUTION INTRAVENOUS at 23:55

## 2023-08-02 RX ADMIN — PIPERACILLIN AND TAZOBACTAM 3.38 G: 3; .375 INJECTION, POWDER, LYOPHILIZED, FOR SOLUTION INTRAVENOUS at 06:40

## 2023-08-02 RX ADMIN — FERROUS GLUCONATE 324 MG: 324 TABLET ORAL at 09:52

## 2023-08-02 RX ADMIN — METOPROLOL SUCCINATE 50 MG: 25 TABLET, EXTENDED RELEASE ORAL at 09:52

## 2023-08-02 RX ADMIN — OXYCODONE HYDROCHLORIDE 5 MG: 5 TABLET ORAL at 18:14

## 2023-08-02 RX ADMIN — OXYCODONE HYDROCHLORIDE 5 MG: 5 TABLET ORAL at 23:55

## 2023-08-02 RX ADMIN — PIPERACILLIN AND TAZOBACTAM 3.38 G: 3; .375 INJECTION, POWDER, LYOPHILIZED, FOR SOLUTION INTRAVENOUS at 18:15

## 2023-08-02 RX ADMIN — LEVOTHYROXINE SODIUM 137 MCG: 0.14 TABLET ORAL at 15:37

## 2023-08-02 RX ADMIN — PIPERACILLIN AND TAZOBACTAM 3.38 G: 3; .375 INJECTION, POWDER, LYOPHILIZED, FOR SOLUTION INTRAVENOUS at 12:09

## 2023-08-02 RX ADMIN — SODIUM CHLORIDE 1000 ML: 9 INJECTION, SOLUTION INTRAVENOUS at 02:20

## 2023-08-02 RX ADMIN — ACYCLOVIR 400 MG: 400 TABLET ORAL at 14:11

## 2023-08-02 RX ADMIN — PANTOPRAZOLE SODIUM 40 MG: 40 TABLET, DELAYED RELEASE ORAL at 20:13

## 2023-08-02 RX ADMIN — MORPHINE SULFATE 4 MG: 4 INJECTION INTRAVENOUS at 02:21

## 2023-08-02 RX ADMIN — HYDROMORPHONE HYDROCHLORIDE 1 MG: 1 INJECTION, SOLUTION INTRAMUSCULAR; INTRAVENOUS; SUBCUTANEOUS at 03:15

## 2023-08-02 RX ADMIN — ATORVASTATIN CALCIUM 20 MG: 20 TABLET, FILM COATED ORAL at 09:52

## 2023-08-02 RX ADMIN — IOPAMIDOL 135 ML: 755 INJECTION, SOLUTION INTRAVENOUS at 03:24

## 2023-08-02 RX ADMIN — ACETAMINOPHEN 1000 MG: 500 TABLET ORAL at 11:56

## 2023-08-02 RX ADMIN — PANTOPRAZOLE SODIUM 40 MG: 40 TABLET, DELAYED RELEASE ORAL at 09:52

## 2023-08-02 RX ADMIN — NORTRIPTYLINE HYDROCHLORIDE 10 MG: 10 CAPSULE ORAL at 23:08

## 2023-08-02 RX ADMIN — ACYCLOVIR 400 MG: 400 TABLET ORAL at 23:08

## 2023-08-02 RX ADMIN — SODIUM CHLORIDE, POTASSIUM CHLORIDE, SODIUM LACTATE AND CALCIUM CHLORIDE: 600; 310; 30; 20 INJECTION, SOLUTION INTRAVENOUS at 20:44

## 2023-08-02 RX ADMIN — OXYCODONE HYDROCHLORIDE 5 MG: 5 TABLET ORAL at 11:56

## 2023-08-02 RX ADMIN — SODIUM CHLORIDE, POTASSIUM CHLORIDE, SODIUM LACTATE AND CALCIUM CHLORIDE: 600; 310; 30; 20 INJECTION, SOLUTION INTRAVENOUS at 09:52

## 2023-08-02 ASSESSMENT — ACTIVITIES OF DAILY LIVING (ADL)
ADLS_ACUITY_SCORE: 37
ADLS_ACUITY_SCORE: 35
ADLS_ACUITY_SCORE: 37
ADLS_ACUITY_SCORE: 35
ADLS_ACUITY_SCORE: 35
ADLS_ACUITY_SCORE: 37

## 2023-08-02 NOTE — MEDICATION SCRIBE - ADMISSION MEDICATION HISTORY
Medication Scribe Admission Medication History    Admission medication history is complete. The information provided in this note is only as accurate as the sources available at the time of the update.    Medication reconciliation/reorder completed by provider prior to medication history? No    Information Source(s): Family member via phone    Pertinent Information: Spoke with pts wife about medications. She indicated pt last took his medications on Thursday 7/27 due to him being hospitalized for a small bowel obstruction in [facility]. She is unsure what medications he received while at the hospital in [facility] but believes he received his hypertension medications and cabozantinib.     Changes made to PTA medication list:  Added: None  Deleted: None  Changed: None    Medication Affordability:       Allergies reviewed with patient and updates made in EHR: no    Medication History Completed By: Kaila Florez 8/2/2023 8:29 AM    Prior to Admission medications    Medication Sig Last Dose Taking? Auth Provider Long Term End Date   acetaminophen (TYLENOL) 500 MG tablet Take 500-1,000 mg by mouth every 8 hours as needed for mild pain  Yes Unknown, Entered By History     acyclovir (ZOVIRAX) 400 MG tablet Take 1 tablet (400 mg) by mouth every 8 hours Past Week Yes Christopher Ribeiro MD Yes    amLODIPine (NORVASC) 5 MG tablet Take 2 tablets (10 mg) by mouth daily Past Week Yes Krystal Valenzuela PA-C Yes    aspirin (ASA) 81 MG chewable tablet Take 1 tablet (81 mg) by mouth daily Past Week Yes Krystal Valenzuela PA-C     atorvastatin (LIPITOR) 20 MG tablet Take 20 mg by mouth daily Past Week Yes Reported, Patient Yes    Cabozantinib S-Malate (CABOMETYX) 20 MG Take 1 tablet (20 mg) by mouth every other day Take on an empty stomach 1 hour before or 2 hours after a meal. Avoid grapefruit and grapefruit juice. Past Week Yes Nick Campos MD     clobetasol (TEMOVATE) 0.05 % external cream Apply topically 2 times daily  Yes Beth  Nick Jett MD     ferrous gluconate (FERGON) 324 (38 Fe) MG tablet TAKE 1 TABLET (324 MG) BY MOUTH DAILY (WITH BREAKFAST)  DAYS Past Week Yes Reported, Patient     levothyroxine (SYNTHROID/LEVOTHROID) 137 MCG tablet Take 1 tablet (137 mcg) by mouth daily for 180 days Past Week Yes Mariah Alan MD Yes 12/25/23   lifitegrast (XIIDRA) 5 % opthalmic solution Place 1 drop into both eyes 2 times daily Past Week Yes James Toledo, OD  6/5/24   metoprolol succinate ER (TOPROL XL) 50 MG 24 hr tablet Take 1 tablet (50 mg) by mouth daily for 180 days Past Week Yes Mariah Alan MD Yes 12/25/23   nortriptyline (PAMELOR) 10 MG capsule Take 10 mg by mouth At Bedtime  Past Week Yes Reported, Patient     omeprazole (PRILOSEC) 40 MG DR capsule TAKE 1 CAPSULE BY MOUTH EVERY DAY Past Week Yes Nick Campos MD     rizatriptan (MAXALT-MLT) 10 MG ODT Take 1 tablet (10 mg) by mouth as needed for migraine symptoms persist or return, may repeat dose after 2 hours. The 's labeling recommends a maximum daily dose of 30 mg per 24 hours;  Yes Christopher Ribeiro MD     sildenafil (VIAGRA) 100 MG tablet Take 50 mg by mouth as needed  Yes Reported, Patient Yes    tadalafil (CIALIS) 10 MG tablet Take 1 tablet (10 mg) by mouth daily as needed (ED) 10 mg as a single dose 30 minutes prior to anticipated sexual activity; do not take more than once daily.  Yes Christopher Ribeiro MD Yes    urea (GORMEL) 20 % external cream Apply topically as needed (for hand foot syndrome)  Yes Nick Campos MD
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
## 2023-08-02 NOTE — LETTER
[facility] 5B ONCOLOGY  [address] [address]  [phone]      2023    Dimitrios Goldberg  [address]  [place] [address]  [phone] (home)     : 1965      To Whom it may concern:    Dimitrios Goldberg was admitted to our hospital on 2023 and remains hospitalized for an ongoing medical condition.  He is unable to work due to this condition at present and will likely have work restrictions after discharge from the hospital.      Sincerely,      Serena Warner MD

## 2023-08-02 NOTE — CONSULTS
"    Interventional Radiology  [facility] Consult Service Note  08/02/23   9:39 AM    Case and imaging discussed with IR attending, Dr. Wei and Dr. Nicole. Recommendations were relayed with requesting surgical team.    Patient is a 58 yr old male with PMHx of RCC s/p emergent ex lap for large bowel obstruction in [place] 10 days ago. Requesting team is concerned about patient's scrotal pain he is complaining of and is wondering if the multiple fluid collections concerning for pelvic abscesses found on recent CT imaging is causing this pain. Patient is without leukocytosis and afebrile. Physical exam is benign except for lower quadrant tenderness.    Consult Requested: \"Intra-abdominal fluid requiring drainage\"    Recommendations/Plan:    -IR does not offer/recommend a drain placement at this time.  -Re-consult if patient's clinical status changes.      Abbi Donovan PA-C  Interventional Radiology  Pager: [phone]    "

## 2023-08-02 NOTE — LETTER
[facility] 7C MED SURG  [address] [facility] [address]  Phone: [phone]    August 10, 2023        Dimitrios Goldberg  [address]  [place] [facility] [address]          To whom it may concern:    RE: Dimitrios Goldberg has been admitted at our hospital since 8/2/23. I anticipate that he will remain hospitalized for at least the next week and possible significantly longer.     Though it is difficult to full prognosticate at this point, due to the severity and complication of his illness and in anticipation of a prolonged recovery, I anticipate that he will not be able to return to full activities until 12/31/23.    Please contact me for questions or concerns.      Sincerely,      Vijay Redding MD  Associate Professor of Medicine  [facility]  [email or web address]
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
## 2023-08-02 NOTE — ED TRIAGE NOTES
Patient presents to the ED with complaints of lethargy/weakness and a sore throat post abd surgery for a bowel obstruction in [place].      Triage Assessment       Row Name 08/02/23 0136       Triage Assessment (Adult)    Airway WDL WDL       Respiratory WDL    Respiratory WDL WDL       Skin Circulation/Temperature WDL    Skin Circulation/Temperature WDL WDL       Cardiac WDL    Cardiac WDL WDL       Peripheral/Neurovascular WDL    Peripheral Neurovascular WDL WDL       Cognitive/Neuro/Behavioral WDL    Cognitive/Neuro/Behavioral WDL WDL
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
## 2023-08-02 NOTE — PROGRESS NOTES
Resident/Fellow Attestation   I, Duncan Sifuentes MD, was present with the medical student who participated in the service and in the documentation of the note. I have verified the history and personally performed the physical exam and medical decision making. I agree with the assessment and plan of care as documented in the note.      Patient s/p ex-lap in [place] while on vacation now presenting with multiple fluid collections suggestive of intra-abdominal abscesses. Spoke with IR this morning, did not recommend drain placement, will continue IV antibiotics and review operative notes for understanding of surgical course and future treatment planning.   - Obtain op notes  - Continue IV Abx  - Ok for diet from surgical perspective  - EGS will follow    Duncan Sifuentes MD  PGY1  Date of Service (when I saw the patient): 08/02/23    [facility]    Progress Note - EGS Service       Date of Admission:  8/2/2023    Assessment & Plan: Surgery   Dimitrios Goldberg is a 58 year old male with RCC s/p radical right nephrectomy on oral cabometyx, and POD #10 from a reported ex-lap and BAYLEE for large bowel obstruction in [place] who presents with abdominal and scrotal pain. Patient not acutely ill at this time. Exam revealed lower quadrant tenderness. Labs notable for elevated lipase, normal WBC, and imaging with multiple fluid collections suggestive of intra-abdominal abscesses. CT w/ contrast 8/2 traversed entirety of small bowel and colon indicating no current obstruction, likely adynamic ileus in setting of presumed abdominal abscess.     Spoke with IR 8/2 about placing a drain and obtaining abdominal fluid cultures, they feel this is not recommended at this time given his low WBC, benign exam and the presence of multiple fluid collection. Agree with continuing IV antibiotics and follow clinical exams. Will obtain operative reports from [facility] for better understanding of surgical  "course. Of note, patient reports a hoarse voice and elevated pitch since her surgery, spoke with ENT this morning for vocal chord evaluation.  - Will review operative notes  - Diet per primary  - Continue IV Abx  - mIVF  - Recommend ENT evaluation for vocal chords  - EGS will continue to follow       Diet:  NPO  DVT Prophylaxis: Pneumatic Compression Devices  White Catheter: Not present  Lines: None     Drains: None     Cardiac Monitoring: None  Code Status:  Full Code    Clinically Significant Risk Factors Present on Admission             # Anion Gap Metabolic Acidosis: Highest Anion Gap = 19 mmol/L in last 2 days, will monitor and treat as appropriate    # Drug Induced Platelet Defect: home medication list includes an antiplatelet medication        # Overweight: Estimated body mass index is 28.63 kg/m  as calculated from the following:    Height as of this encounter: 1.88 m (6' 2\").    Weight as of this encounter: 101.2 kg (223 lb).            Disposition Plan      Expected Discharge Date: 08/04/2023                 The patient's care was discussed with the  chief resident, Dr. Laurent, who discussed the care plan with staff .    Nazanin Blackwell, MS4        [facility]  Non-urgent messages: Securely message with [employer] (more info)  Text page via [facility] Paging/Directory     ______________________________________________________________________    Interval History   No acute events since arrival. Afebrile, tachycardic to the 110s, otherwise vital signs stable, on room air. Patient states he underwent two surgical procedures while in [place]; however, reports obtained from OSH indicate he only had one surgical procedure. Last BM yesterday. Endorses pain in his lower abdomen and scrotum. Notes voice changes/hoarseness that started post procedure. Denies fevers or chills.     Physical Exam   Vital Signs: Temp: 98.5  F (36.9  C) Temp src: Oral BP: 115/79 Pulse: 111   Resp: 18 SpO2: " 97 % O2 Device: None (Room air)    Weight: 223 lbs 0 ozNo intake or output data in the 24 hours ending 08/02/23 0750  Constitutional: awake, alert, cooperative, no apparent distress, and appears stated age  HEENT: Voice hoarseness, elevated pitch  Respiratory: no increased work of breathing, good air exchange, and no retractions  Cardiovascular: extremities well perfused  GI: large prior abdominal surgical scar, soft, moderately distended, and tenderness noted in bilateral lower quadrants.   : no scrotal tenderness to palpation, no scrotal skin changes  Incisions: Midline laparotomy incision clean, dry, intact, closed with staples. No cellulitis or drainage.        Data   Imaging results reviewed over the past 24 hrs:   Recent Results (from the past 24 hour(s))   CT Chest/Abdomen/Pelvis w Contrast    Narrative    EXAM: CT CHEST/ABDOMEN/PELVIS WITH CONTRAST  LOCATION: [facility]  DATE/TIME: 08/02/2023 ,3:41 AM CDT    INDICATION: History of CA, recent laparoscopy for large bowel obstruction.  COMPARISON: CT chest, abdomen and pelvis on 04/06/2023.  TECHNIQUE: CT scan of the chest, abdomen, and pelvis was performed after the injection of Iopamidol (Isovue 370) solution 135 mL intravenously. Multiplanar reformats were obtained. Dose reduction techniques were used.     FINDINGS:    MEDIASTINUM/AXILLAE: Heterogenous thyroid gland. No cardiomegaly or significant pericardial effusion. No significant mediastinal, hilar or axillary lymphadenopathy.    LUNGS AND PLEURA: No pleural effusion or pneumothorax. Bibasilar pulmonary opacities, likely atelectasis.    CORONARY ARTERY CALCIFICATION: Mild.    ABDOMEN/PELVIS:    HEPATOBILIARY: No suspicious focal hepatic lesion. The gallbladder is surgically absent.    PANCREAS: No main pancreatic ductal dilatation or definite solid pancreatic mass.    SPLEEN: No splenomegaly.    ADRENAL GLANDS: No adrenal nodules.    KIDNEYS/BLADDER:  Postsurgical changes of right nephrectomy. Again noted area of soft tissue thickening near the nephrectomy bed. No radiodense kidney/ureteral stones or hydronephrosis in the left kidney.    BOWEL: Multiple mildly prominent small bowel loops in the upper abdomen, could represent ileus related to the presence of the loculated collections. Mild distal colonic wall thickening, indeterminate, could be due to underdistention.    PERITONEUM: Multiple loculated collections in the lower abdomen and pelvis, the largest measures approximately 10.4 x 6.5 x 10.2 cm in the left lower quadrant (series 4 image 485), and 9.3 x 5.3 cm collection in the deep pelvis (series 4 image 552). Many   of these collections appear to be communicating with each other.    PELVIC ORGANS: Unremarkable.    VASCULATURE: Unremarkable.    LYMPH NODES: Unremarkable.    MUSCULOSKELETAL: No suspicious osseous lesion.      Impression    IMPRESSION:   1.  Multiple loculated collections in the lower abdomen and pelvis, many of which appear to be communicating with each other. Findings worrisome for multiple abdominal abscesses.  2.  Multiple mildly prominent small bowel loops in the abdomen, nonspecific, can represent ileus related to presence of the abscesses.  3.  Stable postsurgical changes of right nephrectomy with area of soft tissue thickening near the nephrectomy bed, not significantly changed as compared to 04/06/2023.         Recent Labs   Lab 08/02/23  0147   WBC 8.5   HGB 9.5*   MCV 90         POTASSIUM 3.5   CHLORIDE 93*   CO2 25   BUN 13.5   CR 1.68*   ANIONGAP 19*   BETSY 9.5   GLC 94   ALBUMIN 3.7   PROTTOTAL 7.0   BILITOTAL 0.4   ALKPHOS 60   ALT 35   LIPASE 479*

## 2023-08-03 ENCOUNTER — APPOINTMENT (OUTPATIENT)
Dept: SPEECH THERAPY | Facility: CLINIC | Age: 58
DRG: 862 | End: 2023-08-03
Attending: INTERNAL MEDICINE
Payer: COMMERCIAL

## 2023-08-03 LAB
ALBUMIN UR-MCNC: 50 MG/DL
ANION GAP SERPL CALCULATED.3IONS-SCNC: 15 MMOL/L (ref 7–15)
APPEARANCE UR: CLEAR
BILIRUB UR QL STRIP: NEGATIVE
BUN SERPL-MCNC: 14.5 MG/DL (ref 6–20)
CALCIUM SERPL-MCNC: 8.6 MG/DL (ref 8.6–10)
CHLORIDE SERPL-SCNC: 102 MMOL/L (ref 98–107)
COLOR UR AUTO: ABNORMAL
CREAT SERPL-MCNC: 2.3 MG/DL (ref 0.67–1.17)
CREAT UR-MCNC: 216 MG/DL
CRP SERPL-MCNC: 160 MG/L
DEPRECATED HCO3 PLAS-SCNC: 24 MMOL/L (ref 22–29)
ERYTHROCYTE [DISTWIDTH] IN BLOOD BY AUTOMATED COUNT: 15.7 % (ref 10–15)
FRACT EXCRET NA UR+SERPL-RTO: 0.2 %
GFR SERPL CREATININE-BSD FRML MDRD: 32 ML/MIN/1.73M2
GLUCOSE SERPL-MCNC: 82 MG/DL (ref 70–99)
GLUCOSE UR STRIP-MCNC: NEGATIVE MG/DL
HAPTOGLOB SERPL-MCNC: 3 MG/DL (ref 32–197)
HCT VFR BLD AUTO: 25.6 % (ref 40–53)
HGB BLD-MCNC: 8.1 G/DL (ref 13.3–17.7)
HGB BLD-MCNC: 8.1 G/DL (ref 13.3–17.7)
HGB BLD-MCNC: 8.7 G/DL (ref 13.3–17.7)
HGB UR QL STRIP: ABNORMAL
HOLD SPECIMEN: NORMAL
HOLD SPECIMEN: NORMAL
KETONES UR STRIP-MCNC: 10 MG/DL
LEUKOCYTE ESTERASE UR QL STRIP: NEGATIVE
MCH RBC QN AUTO: 29.1 PG (ref 26.5–33)
MCHC RBC AUTO-ENTMCNC: 31.6 G/DL (ref 31.5–36.5)
MCV RBC AUTO: 92 FL (ref 78–100)
MUCOUS THREADS #/AREA URNS LPF: PRESENT /LPF
NITRATE UR QL: NEGATIVE
PATH REPORT.COMMENTS IMP SPEC: NORMAL
PATH REPORT.COMMENTS IMP SPEC: NORMAL
PATH REPORT.FINAL DX SPEC: NORMAL
PATH REPORT.MICROSCOPIC SPEC OTHER STN: NORMAL
PATH REPORT.MICROSCOPIC SPEC OTHER STN: NORMAL
PATH REPORT.RELEVANT HX SPEC: NORMAL
PH UR STRIP: 6 [PH] (ref 5–7)
PLATELET # BLD AUTO: 397 10E3/UL (ref 150–450)
POTASSIUM SERPL-SCNC: 3.5 MMOL/L (ref 3.4–5.3)
RBC # BLD AUTO: 2.78 10E6/UL (ref 4.4–5.9)
RBC URINE: 1 /HPF
SODIUM SERPL-SCNC: 141 MMOL/L (ref 136–145)
SODIUM UR-SCNC: 20 MMOL/L
SP GR UR STRIP: 1.03 (ref 1–1.03)
UROBILINOGEN UR STRIP-MCNC: NORMAL MG/DL
WBC # BLD AUTO: 8.4 10E3/UL (ref 4–11)
WBC URINE: 2 /HPF

## 2023-08-03 PROCEDURE — 85018 HEMOGLOBIN: CPT | Performed by: PHYSICIAN ASSISTANT

## 2023-08-03 PROCEDURE — 99233 SBSQ HOSP IP/OBS HIGH 50: CPT | Performed by: INTERNAL MEDICINE

## 2023-08-03 PROCEDURE — 250N000013 HC RX MED GY IP 250 OP 250 PS 637: Performed by: PHYSICIAN ASSISTANT

## 2023-08-03 PROCEDURE — 87040 BLOOD CULTURE FOR BACTERIA: CPT | Performed by: PHYSICIAN ASSISTANT

## 2023-08-03 PROCEDURE — 84300 ASSAY OF URINE SODIUM: CPT | Performed by: INTERNAL MEDICINE

## 2023-08-03 PROCEDURE — 258N000003 HC RX IP 258 OP 636: Performed by: INTERNAL MEDICINE

## 2023-08-03 PROCEDURE — 86140 C-REACTIVE PROTEIN: CPT | Performed by: INTERNAL MEDICINE

## 2023-08-03 PROCEDURE — 92610 EVALUATE SWALLOWING FUNCTION: CPT | Mod: GN

## 2023-08-03 PROCEDURE — 120N000002 HC R&B MED SURG/OB UMMC

## 2023-08-03 PROCEDURE — 36415 COLL VENOUS BLD VENIPUNCTURE: CPT | Performed by: PHYSICIAN ASSISTANT

## 2023-08-03 PROCEDURE — 250N000011 HC RX IP 250 OP 636: Performed by: PHYSICIAN ASSISTANT

## 2023-08-03 PROCEDURE — 99233 SBSQ HOSP IP/OBS HIGH 50: CPT | Mod: 25 | Performed by: SURGERY

## 2023-08-03 PROCEDURE — 258N000003 HC RX IP 258 OP 636: Performed by: PHYSICIAN ASSISTANT

## 2023-08-03 PROCEDURE — 85027 COMPLETE CBC AUTOMATED: CPT | Performed by: PHYSICIAN ASSISTANT

## 2023-08-03 PROCEDURE — 81001 URINALYSIS AUTO W/SCOPE: CPT | Performed by: INTERNAL MEDICINE

## 2023-08-03 PROCEDURE — 80048 BASIC METABOLIC PNL TOTAL CA: CPT | Performed by: PHYSICIAN ASSISTANT

## 2023-08-03 PROCEDURE — 250N000011 HC RX IP 250 OP 636: Mod: JZ | Performed by: EMERGENCY MEDICINE

## 2023-08-03 PROCEDURE — 92526 ORAL FUNCTION THERAPY: CPT | Mod: GN

## 2023-08-03 RX ADMIN — SODIUM CHLORIDE, POTASSIUM CHLORIDE, SODIUM LACTATE AND CALCIUM CHLORIDE 1000 ML: 600; 310; 30; 20 INJECTION, SOLUTION INTRAVENOUS at 13:29

## 2023-08-03 RX ADMIN — HYDROMORPHONE HYDROCHLORIDE 0.3 MG: 1 INJECTION, SOLUTION INTRAMUSCULAR; INTRAVENOUS; SUBCUTANEOUS at 08:27

## 2023-08-03 RX ADMIN — ACYCLOVIR 400 MG: 400 TABLET ORAL at 13:29

## 2023-08-03 RX ADMIN — ACETAMINOPHEN 1000 MG: 500 TABLET ORAL at 20:04

## 2023-08-03 RX ADMIN — HYDROMORPHONE HYDROCHLORIDE 0.3 MG: 1 INJECTION, SOLUTION INTRAMUSCULAR; INTRAVENOUS; SUBCUTANEOUS at 03:36

## 2023-08-03 RX ADMIN — ACYCLOVIR 400 MG: 400 TABLET ORAL at 22:17

## 2023-08-03 RX ADMIN — PIPERACILLIN AND TAZOBACTAM 3.38 G: 3; .375 INJECTION, POWDER, LYOPHILIZED, FOR SOLUTION INTRAVENOUS at 18:07

## 2023-08-03 RX ADMIN — PIPERACILLIN AND TAZOBACTAM 3.38 G: 3; .375 INJECTION, POWDER, LYOPHILIZED, FOR SOLUTION INTRAVENOUS at 12:29

## 2023-08-03 RX ADMIN — FERROUS GLUCONATE 324 MG: 324 TABLET ORAL at 08:13

## 2023-08-03 RX ADMIN — ATORVASTATIN CALCIUM 20 MG: 20 TABLET, FILM COATED ORAL at 08:13

## 2023-08-03 RX ADMIN — ACYCLOVIR 400 MG: 400 TABLET ORAL at 06:12

## 2023-08-03 RX ADMIN — SODIUM CHLORIDE, POTASSIUM CHLORIDE, SODIUM LACTATE AND CALCIUM CHLORIDE: 600; 310; 30; 20 INJECTION, SOLUTION INTRAVENOUS at 18:46

## 2023-08-03 RX ADMIN — PANTOPRAZOLE SODIUM 40 MG: 40 TABLET, DELAYED RELEASE ORAL at 08:13

## 2023-08-03 RX ADMIN — OXYCODONE HYDROCHLORIDE 5 MG: 5 TABLET ORAL at 12:15

## 2023-08-03 RX ADMIN — PANTOPRAZOLE SODIUM 40 MG: 40 TABLET, DELAYED RELEASE ORAL at 20:05

## 2023-08-03 RX ADMIN — METOPROLOL SUCCINATE 50 MG: 25 TABLET, EXTENDED RELEASE ORAL at 08:13

## 2023-08-03 RX ADMIN — NORTRIPTYLINE HYDROCHLORIDE 10 MG: 10 CAPSULE ORAL at 22:18

## 2023-08-03 RX ADMIN — PIPERACILLIN AND TAZOBACTAM 3.38 G: 3; .375 INJECTION, POWDER, LYOPHILIZED, FOR SOLUTION INTRAVENOUS at 06:13

## 2023-08-03 RX ADMIN — OXYCODONE HYDROCHLORIDE 5 MG: 5 TABLET ORAL at 05:18

## 2023-08-03 ASSESSMENT — ACTIVITIES OF DAILY LIVING (ADL)
ADLS_ACUITY_SCORE: 38
ADLS_ACUITY_SCORE: 37
ADLS_ACUITY_SCORE: 38
ADLS_ACUITY_SCORE: 37
ADLS_ACUITY_SCORE: 38

## 2023-08-03 NOTE — PROGRESS NOTES
[facility]    Medicine Progress Note - Hospitalist Service, GOLD TEAM 2    Date of Admission:  8/2/2023    Assessment & Plan   Dimitrios Goldberg is a 58 year old male with history of R kidney clear cell cancer, IVC tumor thrombus, HTN, CKD, hypothyroidism, ANUPAMA, HSV 2, depression, and GERD who was initially admitted to a hospital while on vacation in [facility] with SBO. Underwent Austrian (7/21/2023) and flew home to [place]. Developed increased weakness, lethargy while in transit prompting presentation to [facility] 8/2/2023 and was found to have multiple intraabdominal abscesses.     Today   - remove staples    - continue antibiotics    - urine studies, IVF bolus + ADAT, monitor renal function     - if no procedures planned would start DVT ppx tomorrow     Multiple intraabdominal abscesses, recent h/o SBO s/p Austrian in [facility]: Developed symptoms while in [facility], CT noted SBO and was airlifted to [place]. Underwent Austrian of the small intestine. Increased fatigue, lethargy on journey home 7/30 to 7/31. CT CAP 8/2 multiple loculated collections in lower abdomen and pelvis, many communicating with each other, multiple mildly prominent small bowel loops in abdomen which can represent ileus vs presence of abscesses. WBC, LA normal, abdominal exam overall with minimal tenderness, passing gas   - IR consult for possible drain placement, do not think indicated at this time   - General surgery following: no surgical intervention at this time, plan to remove staples today   - ID consulted, concern that antibiotics alone will be insufficient   - Continue Zosyn  - BCx x2, NGTD  - cleared by SLP, will ADAT   - trend CRP intermittently, will discuss with speciality teams the timing of reimaging  - Pain management: Tylenol and oxycodone prn with IV dilaudid for breakthrough  - Per RN, general surgery has the paper records from hospital in [facility]. Will need to ensure they get back into the  chart as they may be the only copy of records.     Elevated lipase: Lipase 479 in the setting of the above. CT CAP no acute findings, no epigastric tenderness or focal sx suggestive of pancreatitis   - fluids as above, advancing diet slowly     R kidney clear cell cancer s/p nephrectomy with c/f recurrence, CKD III  Increased Creatinine   Right clear cell carcinoma Dx 7/2021, s/p radical nephrectomy 8/02021 and immunotherapy (pembrolizumub) 1/2022. CT 7/2022 tumor recurrence, s/p ex lap and Guatem[place] with open resection of RP mass and lateral mass, primary mass not resectable given involvement with vena cava and duodenum. Not a candidate for radiation due to proximity to bowel. Active nephritis req steroids on pembro, was started on Cabozantinub (dose reduced due to hand-foot syndrome). CT CAP 8/2/23 stable postsurgical changes of R nephrectomy, stable from 4/6/2023 imaging. Last saw nephrology 6/28.   - Per d/w oncology, hold PTA Cabozantinub for now. No need to formal consult at this time  - Cr up to 2.3 8/3 - urine studies, IVF bolus today, does have some continued trace blood and inc proteinuria, low threshold to consider renal consult given has needed steroids in the past     Acute on chronic anemia   ANUPAMA: BL hgb 13-14, low to 9.5 on presentation and then ~8 after fluids. No e/o active bleeding, melena, though after admission CT findings discussed with radiology must consider that fluid collections are quite dense and could be superimposed bleeding (no active arterial bleeding noted but c/f slow venous bleed)  - initial concern for hemolysis w/haptoglobin <3, LDH high, smear not diagnostic. Will repeat coags again tomorrow, overall lower suspicion with Hgb stable vs malignancy-related   - hold ferrous gluconate (PTA med)     Vocal cord paresis   Seen by ENT 8/2, vocal cord paresis is likely related to his intubation given timing of onset and no prior history of voice issues. It is possible that he had a traumatic  intubation, and given the slight displacement of the arytenoid would like to further evaluate for arytenoid subluxation/dislocation with imaging.   - plan for CT w/ contrast prior to discharge (skull base though chest along the course of the recurrent laryngeal nerve, special attention to the larynx to evaluate for vocal cord height disparity). Holding off due to non-urgent study, contrast load on single kidney   - Outpatient ENT follow up in laryngology clinic - ENT will arrange       Other Medical Issues:  HTN: PTA on amlodipine, metoprolol. BP stable. Hold amlodipine, continue metoprolol with hold parameters   HLD: Continue statin. Hold ASA  H/o IVC tumor thrombus: S/p IVC tumor thrombectomy with reconstruction 8/2021 at time of nephrectomy  Migraines: Continue PTA Nortriptyline and prn Rizatriptan   Hypothyroidism (2/2 pembro): TSH 8.51, T4 1.29 5/18/23. Continue synthroid. Repeat TFTs per nephrology recs 6/2023  GERD: Continue PPI   H/o HSV 2: Continue PTA Acyclovir        Diet: Regular Diet Adult    DVT Prophylaxis: holding pending potential procedures, start 8/4 if none planned   White Catheter: Not present  Lines: None     Cardiac Monitoring: None  Code Status: Full Code      Clinically Significant Risk Factors             # Anion Gap Metabolic Acidosis: Highest Anion Gap = 19 mmol/L in last 2 days, will monitor and treat as appropriate     # Acute Kidney Injury, unspecified: based on a >150% or 0.3 mg/dL increase in last creatinine compared to past 90 day average, will monitor renal function         # Overweight: Estimated body mass index is 29.84 kg/m  as calculated from the following:    Height as of this encounter: 1.829 m (6').    Weight as of this encounter: 99.8 kg (220 lb)., PRESENT ON ADMISSION          Disposition Plan     Expected Discharge Date: 08/04/2023                  Payal Murray MD  Hospitalist Service, [facility] TEAM [address] [place] [address]  Securely  message with Debbie (more info)  Text page via [facility] Paging/Directory   See signed in provider for up to date coverage information  ______________________________________________________________________    Interval History   No acute events overnight, nursing notes reviewed.     Feeling overall a little better today, abdominal pain is stable and pretty minimal, despite an episode of RLQ pain when nurse evaluating scabbed wound, now resolved. Tolerating liquids, has an appetite - no nausea, vomiting. Passing gas, able to walk in [place] this am, voice remains hoarse.    5-pt ROS otherwise negative       Physical Exam   Vital Signs: Temp: 97.4  F (36.3  C) Temp src: Oral BP: 117/81 Pulse: 109   Resp: 18 SpO2: 96 % O2 Device: None (Room air)    Weight: 220 lbs 0 oz    Gen: alert, interactive and pleasant, lying in bed in no acute distress  HEENT: Normocephalic/atraumatic, sclera clear, oropharynx with mildly dry mucous membranes, whispering voice   Resp: Clear bilaterally, easy work of breathing on room air  CV: Regular rate and rhythm, no murmurs noted   Abd: soft, midline longitudinal incision site CDI with staples in place. RLQ port incision site with crusted blood, no overlying erythema. Mild diffuse tenderness throughout but worse in lower abdomen, no focal epigastric tenderness, no  guarding or rebound.  +BS   Ext: no lower extremity edema, warm and well-perfused with brisk capillary refill   Neuro: Alert and oriented x4, grossly non-focal, moving all extremities equally      Medical Decision Making       55 MINUTES SPENT BY ME on the date of service doing chart review, history, exam, documentation & further activities per the note.  MANAGEMENT DISCUSSED with the following over the past 24 hours: surgery, ID       Data     I have personally reviewed the following data over the past 24 hrs:    8.4  \   8.1 (L)   / 397     141 102 14.5 /  82   3.5 24 2.30 (H) \     Procal: N/A CRP: 160.00 (H) Lactic Acid: N/A        Ferritin:  N/A % Retic:  1.6 LDH:  N/A       Imaging results reviewed over the past 24 hrs:   No results found for this or any previous visit (from the past 24 hour(s)).

## 2023-08-03 NOTE — PROGRESS NOTES
"   08/03/23 0946   Appointment Info   Signing Clinician's Name / Credentials (SLP) Julita Ramirez MA CCC-SLP   General Information   Onset of Illness/Injury or Date of Surgery 08/02/23   Referring Physician Maria M Javier MD   Patient/Family Therapy Goal Statement (SLP) None stated   Pertinent History of Current Problem Pt is a 58 year old male with history of R kidney clear cell cancer, IVC tumor thrombus, HTN, CKD, hypothyroidism, ANUPAMA, HSV 2, depression, and GERD who was [facility]. Underwent English (7/21/2023) and flew home to [place]. Developed increased weakness, lethargy while in transit prompting presentation to [facility] 8/2/2023 and was found to have multiple intraabdominal abscesses. Pt scoped by ENT yesterday, which revealed \"left vocal cord paresis with slight prolapse of the left arytenoid over the glottis. Vocal cord paresis is likely related to his intubation given timing of onset and no prior history of voice issues. It is possible that he had a traumatic intubation, and given the slight displacement of the arytenoid would like to further evaluate for arytenoid subluxation/dislocation with imaging.\" Clinical swallow eval completed per ENT request.   General Observations Alert   Type of Evaluation   Type of Evaluation Swallow Evaluation   Oral Motor   Oral Musculature generally intact   Structural Abnormalities none present   Mucosal Quality adequate   Dentition (Oral Motor)   Dentition (Oral Motor) natural dentition   Facial Symmetry (Oral Motor)   Facial Symmetry (Oral Motor) WNL   Lip Function (Oral Motor)   Lip Range of Motion (Oral Motor) WNL   Tongue Function (Oral Motor)   Tongue ROM (Oral Motor) WNL   Jaw Function (Oral Motor)   Jaw Function (Oral Motor) WNL   Cough/Swallow/Gag Reflex (Oral Motor)   Comment, Cough/Swallow/Gag Reflex (Oral Motor) Adequate   Vocal Quality/Secretion Management (Oral Motor)   Vocal Quality (Oral " Motor) dysphonic   Secretion Management (Oral Motor) WNL   General Swallowing Observations   Past History of Dysphagia None per pt or chart review   Respiratory Support (General Swallowing Observations) none   Current Diet/Method of Nutritional Intake (General Swallowing Observations, NIS) NPO   Swallowing Evaluation Clinical swallow evaluation   Clinical Swallow Evaluation   Feeding Assistance no assistance needed   Clinical Swallow Evaluation Textures Trialed thin liquids;pureed;solid foods   Clinical Swallow Eval: Thin Liquid Texture Trial   Mode of Presentation, Thin Liquids straw;self-fed   Volume of Liquid or Food Presented 4oz thin water   Oral Phase of Swallow WFL   Pharyngeal Phase of Swallow intact   Diagnostic Statement No overt s/sx of aspiration   Clinical Swallow Evaluation: Puree Solid Texture Trial   Mode of Presentation, Puree self-fed;spoon   Volume of Puree Presented 2oz applesauce   Oral Phase, Puree WFL   Pharyngeal Phase, Puree intact   Diagnostic Statement No overt s/sx of aspiration   Clinical Swallow Evaluation: Solid Food Texture Trial   Mode of Presentation self-fed   Volume Presented 1 cracker   Oral Phase WFL   Pharyngeal Phase intact   Diagnostic Statement No overt s/sx of aspiration   Esophageal Phase of Swallow   Patient reports or presents with symptoms of esophageal dysphagia No   Swallowing Recommendations   Diet Consistency Recommendations regular diet;thin liquids (level 0)   Supervision Level for Intake patient independent   Mode of Delivery Recommendations slow rate of intake   Monitoring/Assistance Required (Eating/Swallowing) stop eating activities when fatigue is present   Recommended Feeding/Eating Techniques (Swallow Eval) maintain upright sitting position for eating   Medication Administration Recommendations, Swallowing (SLP) as tolerated   Instrumental Assessment Recommendations instrumental evaluation not recommended at this time   Clinical Impression   Criteria for  Skilled Therapeutic Interventions Met (SLP Eval) No problems identified which require skilled intervention   SLP Diagnosis Functional swallow mechanism   Risks & Benefits of therapy have been explained evaluation/treatment results reviewed;care plan/treatment goals reviewed;risks/benefits reviewed;current/potential barriers reviewed;participants voiced agreement with care plan;participants included;patient   Clinical Impression Comments   Clinical swallow eval completed per MD order. Pt presents with a functional swallow mechanism despite L TVF paresis and subsequent dysphonia. Oral mech exam otherwise unremarkable. Pt assessed with thin liquids, applesauce, and cracker. Mastication WFL, no overt s/sx of aspiration with any PO. Recommend regular diet/thin liquids. Ensure the pt is upright for all PO. No additional IP SLP services indicated. Agree with ENT rec for OP f/u given vocal cord paresis.     SLP Total Evaluation Time   Eval: oral/pharyngeal swallow function, clinical swallow Minutes (42322) 10   SLP Rationale for DC Rec Functional swallow mechanism, dysphonia (ENT following)   SLP Brief overview of current status    Recommend regular diet/thin liquids. Ensure the pt is upright for all PO. No additional IP SLP services indicated. Agree with ENT rec for OP f/u given vocal cord paresis.     Total Session Time   Total Session Time (sum of timed and untimed services) 15

## 2023-08-03 NOTE — PROGRESS NOTES
Resident/Fellow Attestation   I, Duncan Sifuentes MD, was present with the medical student who participated in the service and in the documentation of the note. I have verified the history and personally performed the physical exam and medical decision making. I agree with the assessment and plan of care as documented in the note.      No lab, physical exam, or imaging findings requiring surgical intervention at this time. Discussed with IR 8/2 for potential drain placement to which they determined patient is not appropriate. Recommending continuing antibiotic course. EGS will sign off, please call back for any questions or concerns.   - No surgical intervention required  - Recommend continue IV Abx   - Possible ID consult pending cultures  - Advance diet as tolerated  - EGS will sign off    Duncan Sifuentes MD  PGY1  Date of Service (when I saw the patient): 08/03/23    [facility]    Progress Note - EGS Service       Date of Admission:  8/2/2023    Assessment & Plan: Surgery   Dimitrios Goldberg is a 58 year old male with RCC s/p radical right nephrectomy on oral cabometyx, and POD #13 from a reported ex-lap and lysis of adhesions for a SBO in [facility] who presented with abdominal and scrotal pain. Patient not acutely ill at this time. Exam revealed lower quadrant tenderness. Labs notable for elevated lipase, normal WBC, and imaging with multiple fluid collections suggestive of intra-abdominal abscesses. CT w/ contrast 8/2 traversed entirety of small bowel and colon indicating no current obstruction and no clear evidence of colectomy, likely adynamic ileus in setting of presumed abdominal abscess. Medical records obtained from patient only indicate lysis of adhesions, without colectomy performed at OSH, these records were then given to ED HUC 8/2 to create copy and for uploading into chart per medical record staff.     Spoke with IR 8/2 about placing a drain and obtaining  "abdominal fluid cultures, they feel this is not recommended at this time given his low WBC, benign exam and the presence of multiple fluid collection. ENT evaluated patient 8/2 have put in their recommendations. Patient not requiring surgical intervention for multiple abdominal abscesses, EGS will sign off.   - No indication for surgical intervention at this time  - Recommend staples ok to be removed from prior surgery  - Recommend ID consult if needed for antibiotics  - Diet per primary  - Continue IV Abx  - mIVF  - ENT on board  - EGS will sign off on this patient, please call with any questions       Diet: NPO for Medical/Clinical Reasons Except for: Meds    DVT Prophylaxis: Pneumatic Compression Devices  White Catheter: Not present  Lines: None     Drains: None     Cardiac Monitoring: None  Code Status: Full Code      Clinically Significant Risk Factors             # Anion Gap Metabolic Acidosis: Highest Anion Gap = 19 mmol/L in last 2 days, will monitor and treat as appropriate     # Acute Kidney Injury, unspecified: based on a >150% or 0.3 mg/dL increase in last creatinine compared to past 90 day average, will monitor renal function         # Overweight: Estimated body mass index is 28.63 kg/m  as calculated from the following:    Height as of this encounter: 1.88 m (6' 2\").    Weight as of this encounter: 101.2 kg (223 lb)., PRESENT ON ADMISSION          Disposition Plan     Expected Discharge Date: 08/04/2023                 The patient's care was discussed with the  chief resident, Dr. Laurent, who discussed care plan with staff .  Nazanin Blackwell, MS4      [facility]  Non-urgent messages: Securely message with [employer] (more info)  Text page via [facility] Paging/Directory     ______________________________________________________________________    Interval History   No acute events overnight. Afebrile, tachycardic briefly yesterday to 122, normotensive, saturating " appropriately on room air. Today patient reports improved abdominal and scrotal pain with new back pain. Passing gas, has not passed stool still 8/1.    Physical Exam   Vital Signs: Temp: 98.4  F (36.9  C)   BP: 119/87 Pulse: 101   Resp: 18 SpO2: 98 % O2 Device: None (Room air)    Weight: 223 lbs 0 ozNo intake or output data in the 24 hours ending 08/03/23 0732  Physical Exam:  General apperance: Awake, NAD, laying in bed  CV: extremities well perfused  Resp: non-labored breathing on room air  GI: prior large surgical scar, soft, non-tender, distended, no rigidity or guarding  Incision: c/d/l midline abdominal incision, stapled      Data   Imaging results reviewed over the past 24 hrs:   No results found for this or any previous visit (from the past 24 hour(s)).  Recent Labs   Lab 08/03/23  0543 08/02/23  2330 08/02/23  1821 08/02/23  1557 08/02/23  1000 08/02/23  0147   WBC 8.4  --   --  7.1 6.8 8.5   HGB 8.1* 8.7* 8.9* 8.1* 8.9* 9.5*   MCV 92  --   --  92 92 90     --   --  389 386 365     --   --   --   --  137   POTASSIUM 3.5  --   --   --   --  3.5   CHLORIDE 102  --   --   --   --  93*   CO2 24  --   --   --   --  25   BUN 14.5  --   --   --   --  13.5   CR 2.30*  --   --   --   --  1.68*   ANIONGAP 15  --   --   --   --  19*   BETSY 8.6  --   --   --   --  9.5   GLC 82  --   --   --   --  94   ALBUMIN  --   --   --   --   --  3.7   PROTTOTAL  --   --   --   --   --  7.0   BILITOTAL  --   --   --   --   --  0.4   ALKPHOS  --   --   --   --   --  60   ALT  --   --   --   --   --  35   LIPASE  --   --   --   --   --  479*

## 2023-08-03 NOTE — TELEPHONE ENCOUNTER
FUTURE VISIT INFORMATION      FUTURE VISIT INFORMATION:  Date: 9/5/23  Time: 12:30pm  [place] [facility] [place]ERRAL INFORMATION:  Referring provider:    Referring providers clinic:    Reason for visit/diagnosis  vocal cord paresis following intubation. He was seen by ENT inpatient. Should follow up within 1 month.     RECORDS REQUESTED FROM:       Clinic name Comments Records Status Imaging Status   Fv imaging  8/2/23- 4/15/22- CT CHEST  [facility]  [place]    [facility] [place]- EGD [facility] 8/2/23- ED VISIT WITH Compa Ferris MD  EPIC
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
## 2023-08-03 NOTE — UTILIZATION REVIEW
Admission Status; Secondary Review Determination       Under the authority of the Utilization Management Committee, the utilization review process indicated a secondary review on the above patient. The review outcome is based on review of the medical records, discussions with staff, and applying clinical experience noted on the date of the review.     (x) Inpatient Status Appropriate - This patient's medical care is consistent with medical management for inpatient care and reasonable inpatient medical practice.     RATIONALE FOR DETERMINATION     Patient requires inpatient admission versus short stay observation or outpatient treatment for the following reasons:  58 year old male with history of R kidney clear cell cancer, IVC tumor thrombus, HTN, CKD, hypothyroidism, ANUPAMA, HSV 2, depression, and GERD who was initially admitted to a hospital while on vacation in [facility] with SBO. Underwent HELENA (7/21/2023) and flew home to [place]. Developed increased weakness, lethargy while in transit prompting presentation to [facility] 8/2/2023 and was found to have multiple intraabdominal abscesses. IR rec no drain and surgery recommends no surgery currently. On IV antibiotics including IV zosyn and ID thinks that things are unlikely to improve with antibiotics alone. Some of the abscess are > 10 cm in some areas. Given IV antibiotics requiring multiple midnights and potential source control failure if not able to drained, agree with inpatient management.     The expected length of stay at the time of admission was more than 2 nights because of the severity of illness, intensity of service provided, and risk for adverse outcome. Inpatient admission is appropriate.         This document was produced using voice recognition software       The information on this document is developed by the utilization review team in order for the business office to ensure compliance. This only denotes the appropriateness of proper admission status  and does not reflect the quality of care rendered.   The definitions of Inpatient Status and Observation Status used in making the determination above are those provided in the CMS Coverage Manual, Chapter 1 and Chapter 6, section 70.4.   Sincerely,   Darrell Woody DO  Physician Advisor  [facility].

## 2023-08-04 ENCOUNTER — APPOINTMENT (OUTPATIENT)
Dept: OCCUPATIONAL THERAPY | Facility: CLINIC | Age: 58
DRG: 862 | End: 2023-08-04
Attending: PHYSICIAN ASSISTANT
Payer: COMMERCIAL

## 2023-08-04 LAB
ANION GAP SERPL CALCULATED.3IONS-SCNC: 17 MMOL/L (ref 7–15)
BUN SERPL-MCNC: 14.3 MG/DL (ref 6–20)
CALCIUM SERPL-MCNC: 8.6 MG/DL (ref 8.6–10)
CHLORIDE SERPL-SCNC: 101 MMOL/L (ref 98–107)
CREAT SERPL-MCNC: 2.15 MG/DL (ref 0.67–1.17)
DEPRECATED HCO3 PLAS-SCNC: 21 MMOL/L (ref 22–29)
ERYTHROCYTE [DISTWIDTH] IN BLOOD BY AUTOMATED COUNT: 15.6 % (ref 10–15)
FIBRINOGEN PPP-MCNC: 646 MG/DL (ref 170–490)
GFR SERPL CREATININE-BSD FRML MDRD: 35 ML/MIN/1.73M2
GLUCOSE SERPL-MCNC: 69 MG/DL (ref 70–99)
HCT VFR BLD AUTO: 24.2 % (ref 40–53)
HGB BLD-MCNC: 7.5 G/DL (ref 13.3–17.7)
INR PPP: 1.23 (ref 0.85–1.15)
MCH RBC QN AUTO: 28.6 PG (ref 26.5–33)
MCHC RBC AUTO-ENTMCNC: 31 G/DL (ref 31.5–36.5)
MCV RBC AUTO: 92 FL (ref 78–100)
PLATELET # BLD AUTO: 423 10E3/UL (ref 150–450)
POTASSIUM SERPL-SCNC: 3.5 MMOL/L (ref 3.4–5.3)
RBC # BLD AUTO: 2.62 10E6/UL (ref 4.4–5.9)
SODIUM SERPL-SCNC: 139 MMOL/L (ref 136–145)
WBC # BLD AUTO: 8.2 10E3/UL (ref 4–11)

## 2023-08-04 PROCEDURE — 99233 SBSQ HOSP IP/OBS HIGH 50: CPT | Performed by: PEDIATRICS

## 2023-08-04 PROCEDURE — 36415 COLL VENOUS BLD VENIPUNCTURE: CPT | Performed by: INTERNAL MEDICINE

## 2023-08-04 PROCEDURE — 85384 FIBRINOGEN ACTIVITY: CPT | Performed by: INTERNAL MEDICINE

## 2023-08-04 PROCEDURE — 250N000011 HC RX IP 250 OP 636: Mod: JZ | Performed by: EMERGENCY MEDICINE

## 2023-08-04 PROCEDURE — 85610 PROTHROMBIN TIME: CPT | Performed by: INTERNAL MEDICINE

## 2023-08-04 PROCEDURE — 250N000013 HC RX MED GY IP 250 OP 250 PS 637: Performed by: PHYSICIAN ASSISTANT

## 2023-08-04 PROCEDURE — 258N000003 HC RX IP 258 OP 636: Performed by: PHYSICIAN ASSISTANT

## 2023-08-04 PROCEDURE — 97165 OT EVAL LOW COMPLEX 30 MIN: CPT | Mod: GO

## 2023-08-04 PROCEDURE — 99233 SBSQ HOSP IP/OBS HIGH 50: CPT | Performed by: INTERNAL MEDICINE

## 2023-08-04 PROCEDURE — 120N000002 HC R&B MED SURG/OB UMMC

## 2023-08-04 PROCEDURE — 97535 SELF CARE MNGMENT TRAINING: CPT | Mod: GO

## 2023-08-04 PROCEDURE — 85014 HEMATOCRIT: CPT | Performed by: INTERNAL MEDICINE

## 2023-08-04 PROCEDURE — 80048 BASIC METABOLIC PNL TOTAL CA: CPT | Performed by: INTERNAL MEDICINE

## 2023-08-04 RX ORDER — NALOXONE HYDROCHLORIDE 0.4 MG/ML
0.4 INJECTION, SOLUTION INTRAMUSCULAR; INTRAVENOUS; SUBCUTANEOUS
Status: DISCONTINUED | OUTPATIENT
Start: 2023-08-04 | End: 2023-08-12 | Stop reason: HOSPADM

## 2023-08-04 RX ORDER — NALOXONE HYDROCHLORIDE 0.4 MG/ML
0.2 INJECTION, SOLUTION INTRAMUSCULAR; INTRAVENOUS; SUBCUTANEOUS
Status: DISCONTINUED | OUTPATIENT
Start: 2023-08-04 | End: 2023-08-12 | Stop reason: HOSPADM

## 2023-08-04 RX ADMIN — PIPERACILLIN AND TAZOBACTAM 3.38 G: 3; .375 INJECTION, POWDER, LYOPHILIZED, FOR SOLUTION INTRAVENOUS at 06:05

## 2023-08-04 RX ADMIN — OXYCODONE HYDROCHLORIDE 5 MG: 5 TABLET ORAL at 00:46

## 2023-08-04 RX ADMIN — PIPERACILLIN AND TAZOBACTAM 3.38 G: 3; .375 INJECTION, POWDER, LYOPHILIZED, FOR SOLUTION INTRAVENOUS at 12:37

## 2023-08-04 RX ADMIN — PIPERACILLIN AND TAZOBACTAM 3.38 G: 3; .375 INJECTION, POWDER, LYOPHILIZED, FOR SOLUTION INTRAVENOUS at 18:25

## 2023-08-04 RX ADMIN — ACYCLOVIR 400 MG: 400 TABLET ORAL at 22:11

## 2023-08-04 RX ADMIN — LEVOTHYROXINE SODIUM 137 MCG: 0.14 TABLET ORAL at 08:15

## 2023-08-04 RX ADMIN — ATORVASTATIN CALCIUM 20 MG: 20 TABLET, FILM COATED ORAL at 08:15

## 2023-08-04 RX ADMIN — SODIUM CHLORIDE, POTASSIUM CHLORIDE, SODIUM LACTATE AND CALCIUM CHLORIDE: 600; 310; 30; 20 INJECTION, SOLUTION INTRAVENOUS at 19:44

## 2023-08-04 RX ADMIN — METOPROLOL SUCCINATE 50 MG: 25 TABLET, EXTENDED RELEASE ORAL at 08:15

## 2023-08-04 RX ADMIN — OXYCODONE HYDROCHLORIDE 5 MG: 5 TABLET ORAL at 06:17

## 2023-08-04 RX ADMIN — ACYCLOVIR 400 MG: 400 TABLET ORAL at 14:09

## 2023-08-04 RX ADMIN — OXYCODONE HYDROCHLORIDE 5 MG: 5 TABLET ORAL at 22:10

## 2023-08-04 RX ADMIN — OXYCODONE HYDROCHLORIDE 5 MG: 5 TABLET ORAL at 10:33

## 2023-08-04 RX ADMIN — PIPERACILLIN AND TAZOBACTAM 3.38 G: 3; .375 INJECTION, POWDER, LYOPHILIZED, FOR SOLUTION INTRAVENOUS at 01:08

## 2023-08-04 RX ADMIN — SENNOSIDES AND DOCUSATE SODIUM 1 TABLET: 50; 8.6 TABLET ORAL at 00:46

## 2023-08-04 RX ADMIN — NORTRIPTYLINE HYDROCHLORIDE 10 MG: 10 CAPSULE ORAL at 22:11

## 2023-08-04 RX ADMIN — ACETAMINOPHEN 1000 MG: 500 TABLET ORAL at 14:09

## 2023-08-04 RX ADMIN — PANTOPRAZOLE SODIUM 40 MG: 40 TABLET, DELAYED RELEASE ORAL at 19:34

## 2023-08-04 RX ADMIN — PANTOPRAZOLE SODIUM 40 MG: 40 TABLET, DELAYED RELEASE ORAL at 08:15

## 2023-08-04 RX ADMIN — ACYCLOVIR 400 MG: 400 TABLET ORAL at 06:08

## 2023-08-04 ASSESSMENT — ACTIVITIES OF DAILY LIVING (ADL)
ADLS_ACUITY_SCORE: 25
COMMUNICATION: DIFFICULTY SPEAKING
ADLS_ACUITY_SCORE: 25
DOING_ERRANDS_INDEPENDENTLY_DIFFICULTY: NO
CONCENTRATING,_REMEMBERING_OR_MAKING_DECISIONS_DIFFICULTY: NO
ADLS_ACUITY_SCORE: 25
ADLS_ACUITY_SCORE: 25
DIFFICULTY_EATING/SWALLOWING: NO
WEAR_GLASSES_OR_BLIND: YES
WALKING_OR_CLIMBING_STAIRS_DIFFICULTY: NO
CHANGE_IN_FUNCTIONAL_STATUS_SINCE_ONSET_OF_CURRENT_ILLNESS/INJURY: NO
HEARING_DIFFICULTY_OR_DEAF: NO
TOILETING_ISSUES: NO
ADLS_ACUITY_SCORE: 25
FALL_HISTORY_WITHIN_LAST_SIX_MONTHS: NO
ADLS_ACUITY_SCORE: 25
DRESSING/BATHING_DIFFICULTY: NO
DIFFICULTY_COMMUNICATING: YES
ADLS_ACUITY_SCORE: 25
VISION_MANAGEMENT: GLASSES
ADLS_ACUITY_SCORE: 25

## 2023-08-04 NOTE — PLAN OF CARE
Goal Outcome Evaluation:       0700 - 1930:   /61 (BP Location: Right arm)   Pulse 105   Temp 98.3  F (36.8  C) (Oral)   Resp 18   Ht 1.829 m (6')   Wt 99.3 kg (218 lb 14.4 oz)   SpO2 93%   BMI 29.69 kg/m      Tmax 101.1 (provider paged/notified), blood culture done last night, gave tylenol per provider, temp came down to 98.3  A&O x 4, whispers d/t vocal cord paresis r/t previous intubation.  Gave oxycodone 5 mg x 1 with effect, prn iv dilaudid available for breakthrough pain.  Abd midline incision RAQUEL, small lap site x 2, left site dressing changed with small amount of drainage.  Up sba, showered with OT, voiding spontaneously, last bm 8/2.  Continue with poc...
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
## 2023-08-04 NOTE — PROGRESS NOTES
[facility]    Medicine Progress Note - Hospitalist Service, GOLD TEAM 9    Date of Admission:  8/2/2023    Assessment & Plan   Dimitrios Goldberg is a 58 year old male with history of R kidney clear cell cancer, IVC tumor thrombus, HTN, CKD, hypothyroidism, ANUPAMA, HSV 2, depression, and GERD who was initially admitted to a hospital while on vacation in [facility] with SBO. Underwent Malagasy (7/21/2023) and flew home to [place]. Developed increased weakness, lethargy while in transit prompting presentation to [facility] 8/2/2023 and was found to have multiple intraabdominal abscesses.     Today  - Nephrology consultation  - Continue antibiotics  - Monitor cultures post fever     Multiple intraabdominal abscesses, recent h/o SBO s/p Malagasy in [facility]: Developed symptoms while in [facility], CT noted SBO and was airlifted to [place]. Underwent Malagasy of the small intestine. Increased fatigue, lethargy on journey home 7/30 to 7/31. CT CAP 8/2 multiple loculated collections in lower abdomen and pelvis, many communicating with each other, multiple mildly prominent small bowel loops in abdomen which can represent ileus vs presence of abscesses. WBC, LA normal, abdominal exam overall with minimal tenderness, passing gas   - IR consult for possible drain placement, do not think indicated at this time   - General surgery following  - ID consulted, concern that antibiotics alone will be insufficient   - Continue Zosyn  - BCx x2, NGTD   - trend CRP intermittently, will discuss with speciality teams the timing of reimaging  - Pain management: Tylenol and oxycodone prn with IV dilaudid for breakthrough  - Per RN, general surgery has the paper records from hospital in [facility]. Will need to ensure they get back into the chart as they may be the only copy of records.      Elevated lipase: Lipase 479 in the setting of the above. CT CAP no acute findings, no epigastric tenderness or focal sx suggestive of  pancreatitis   - fluids as above     R kidney clear cell cancer s/p nephrectomy with c/f recurrence, CKD III  Increased Creatinine   Right clear cell carcinoma Dx 7/2021, s/p radical nephrectomy 8/02021 and immunotherapy (pembrolizumub) 1/2022. CT 7/2022 tumor recurrence, s/p ex lap and Syriac with open resection of RP mass and lateral mass, primary mass not resectable given involvement with vena cava and duodenum. Not a candidate for radiation due to proximity to bowel. Active nephritis req steroids on pembro, was started on Cabozantinub (dose reduced due to hand-foot syndrome). CT CAP 8/2/23 stable postsurgical changes of R nephrectomy, stable from 4/6/2023 imaging. Last saw nephrology 6/28.   - Per d/w oncology, hold PTA Cabozantinub for now. No need to formal consult at this time  - Nephrology consult     Acute on chronic anemia   ANUPAMA: BL hgb 13-14, low to 9.5 on presentation and then ~8 after fluids. No e/o active bleeding, melena, though after admission CT findings discussed with radiology must consider that fluid collections are quite dense and could be superimposed bleeding (no active arterial bleeding noted but c/f slow venous bleed)  - initial concern for hemolysis w/haptoglobin <3, LDH high, smear not diagnostic. Will repeat coags again tomorrow, overall lower suspicion with Hgb stable vs malignancy-related   - hold ferrous gluconate (PTA med)     Vocal cord paresis   Seen by ENT 8/2, vocal cord paresis is likely related to his intubation given timing of onset and no prior history of voice issues. It is possible that he had a traumatic intubation, and given the slight displacement of the arytenoid would like to further evaluate for arytenoid subluxation/dislocation with imaging.   - plan for CT w/ contrast prior to discharge (skull base though chest along the course of the recurrent laryngeal nerve, special attention to the larynx to evaluate for vocal cord height disparity). Holding off due to non-urgent  study, contrast load on single kidney   - Outpatient ENT follow up in laryngology clinic - ENT will arrange         Other Medical Issues:  HTN: PTA on amlodipine, metoprolol. BP stable. Hold amlodipine, continue metoprolol with hold parameters   HLD: Continue statin. Hold ASA  H/o IVC tumor thrombus: S/p IVC tumor thrombectomy with reconstruction 8/2021 at time of nephrectomy  Migraines: Continue PTA Nortriptyline and prn Rizatriptan   Hypothyroidism (2/2 pembro): TSH 8.51, T4 1.29 5/18/23. Continue synthroid. Repeat TFTs per nephrology recs 6/2023  GERD: Continue PPI   H/o HSV 2: Continue PTA Acyclovir        Diet: Regular Diet Adult  Snacks/Supplements Adult: Other; Allow PRN snacks/supplements.; With Meals    DVT Prophylaxis: ambulate, holding with new fevers given possible need for procedure/surgery  White Catheter: Not present  Lines: None     Cardiac Monitoring: None  Code Status: Full Code      Clinically Significant Risk Factors               # Coagulation Defect: INR = 1.23 (Ref range: 0.85 - 1.15) and/or PTT = N/A, will monitor for bleeding           # Overweight: Estimated body mass index is 29.69 kg/m  as calculated from the following:    Height as of this encounter: 1.829 m (6').    Weight as of this encounter: 99.3 kg (218 lb 14.4 oz)., PRESENT ON ADMISSION          Disposition Plan      Expected Discharge Date: 08/07/2023                  Serena Warner MD  Hospitalist Service, GOLD TEAM 9  [facility]  Securely message with [facility] (more info)  Text page via [facility] Paging/Directory   See signed in provider for up to date coverage information  ______________________________________________________________________    Interval History   Dimitrios was noted to have a fever overnight.  Blood cultures were obtained and he is overall feeling the same today.  He notes he still has abdominal pain but feels it is adequately controlled with current regimen.  He is  amenable to nephrology consultation and has no other questions at this time.  Declines provider call to family at this time and will update them himself.    Physical Exam   Vital Signs: Temp: 98.3  F (36.8  C) Temp src: Oral BP: 105/61 Pulse: 105   Resp: 18 SpO2: 93 % O2 Device: None (Room air)    Weight: 218 lbs 14.4 oz    Gen: Awake, alert, sitting up in chair in no acute distress but tired appearing  HEENT: NC/AT, sclera anicteric  Resp: LCTAB, no increased WOB on RA  CV: Tachycardic with regular rhythm  Abd: Soft, distended with c/d/i dressing in place and diffuse tenderness to palpation  Extrem: Warm and well perfused with no LE edema    Medical Decision Making             Data   NOTE: Data reviewed over the past 24 hrs contributes toward MDM complexity

## 2023-08-04 NOTE — PLAN OF CARE
/67 (BP Location: Right arm)   Pulse 109   Temp 98.6  F (37  C) (Oral)   Resp 14   Ht 1.829 m (6')   Wt 99.8 kg (220 lb)   SpO2 94%   BMI 29.84 kg/m      Shift events: Arrived on unit ~0030. No acute events. VSS. Severe pain to scrotum/lower abdomen; oxycodone x1. Slept intermittently.    Neuro: A&Ox4; whispers d/t vocal cord paresis r/t previous intubation.  CV: BP stable. Tachy -110  Resp: RA; denies dyspnea.  GI: No BM since admission. BS active. Endorses flatus.  : Voids spontaneously; using commode.  Skin: Midline abd incision RAQUEL; no drainage. Small lap sites x2; left site with small amount of drainage; dressing changed.  LDA: PIV x1.  Gtts: LR at 100 mL/hr

## 2023-08-04 NOTE — PROGRESS NOTES
Shift: 8053-5325     VS: /79 (BP Location: Right arm, Patient Position: Semi-Black's, Cuff Size: Adult Regular)   Pulse 106   Temp (!) 100.7  F (38.2  C)   Resp 16   Ht 1.829 m (6')   Wt 99.8 kg (220 lb)   SpO2 94%       Pain:  Well controlled with PRNs  Neuro:WDL  Cardiac: Tachy  Respiratory:  RA - WDL until last hour (mostly asleep), O2 into high 90s, resolved with deep breaths  Diet/Appetite: Minimal appetitie after NPO removed and regular diet added  GI/: WDL  LDAs:  L Forearm infusing LR @ 100 mL/hr  Skin: multiple scars, abdominal incision with scant bleeding following staple removal, two abdominal wounds from prior drains (left is covered with ABD, right open to air)  Activity: Up ad trinh, short walk 2x  Test/Procedures:  Labs:     Shift Summary: Staples removed from abd incision, pain in LRQ relieved with PRNs, febrile last hour of shift (101 and 100.7) - team notified, LR running at 100 ml/hr, abx well tolerated, med compliant, discussion about draining abscess in abd to culture - currently on hold (see other notes)

## 2023-08-04 NOTE — PROGRESS NOTES
08/04/23 1117   Appointment Info   Signing Clinician's Name / Credentials (OT) Krystal Hernandez OTR/L   Rehab Comments (OT) abd prec   Living Environment   People in Home spouse   Current Living Arrangements house   Home Accessibility stairs to enter home;stairs within home   Number of Stairs, Main Entrance 2   Number of Stairs, Within Home, Primary eight   Stair Railings, Within Home, Primary railings safe and in good condition   Transportation Anticipated family or friend will provide   Living Environment Comments Pt lives in a split level home with his wife with 2 KANDIS. Pt must go down ~8 steps from entry to access kitchen/living room and up 8 steps to access bed/bathroom. Pt has both walk-in and tub/shower combo.   Self-Care   Usual Activity Tolerance good   Current Activity Tolerance fair   Regular Exercise No   Equipment Currently Used at Home none   Fall history within last six months no   Activity/Exercise/Self-Care Comment Pt reports he is IND in all ADLs at functional mobility. Was on vacation in [facility] where he was walking a lot when he went to hospital and had surgery for SBO   Instrumental Activities of Daily Living (IADL)   Previous Responsibilities meal prep;housekeeping;shopping;laundry;finances;medication management;driving;work   IADL Comments Pt and spouse share responsibility for IADLs. Pt works full time for [employer].   General Information   Onset of Illness/Injury or Date of Surgery 08/02/23   Referring Physician Miri Rios PA-C   Patient/Family Therapy Goal Statement (OT) return home   Additional Occupational Profile Info/Pertinent History of Current Problem Dimitrios Golbderg is a 58 year old male with history of R kidney clear cell cancer, IVC tumor thrombus, HTN, CKD, hypothyroidism, ANUPAMA, HSV 2, depression, and GERD who was initially admitted to a hospital while on vacation in [facility] with SBO. Underwent HELENA (7/21/2023) and flew home to [place]. Developed  increased weakness, lethargy while in transit prompting presentation to [facility] 8/2/2023 and was found to have multiple intraabdominal abscesses.   Existing Precautions/Restrictions abdominal   Left Upper Extremity (Weight-bearing Status) other (see comments)  (10#)   Right Upper Extremity (Weight-bearing Status) other (see comments)  (10#)   Left Lower Extremity (Weight-bearing Status) full weight-bearing (FWB)   Right Lower Extremity (Weight-bearing Status) full weight-bearing (FWB)   General Observations and Info activity: up with assist   Cognitive Status Examination   Orientation Status orientation to person, place and time   Affect/Mental Status (Cognitive) WFL   Follows Commands WFL   Visual Perception   Visual Impairment/Limitations corrective lenses for distance  (not present in hospital)   Sensory   Sensory Quick Adds sensation intact   Sensory Comments denies n/t   Pain Assessment   Patient Currently in Pain Yes, see Vital Sign flowsheet   Posture   Posture not impaired   Range of Motion Comprehensive   General Range of Motion bilateral upper extremity ROM WFL   Strength Comprehensive (MMT)   Comment, General Manual Muscle Testing (MMT) Assessment generalized weakness; not formally tested 2/2 abdominal precautions   Muscle Tone Assessment   Muscle Tone Quick Adds No deficits were identified   Coordination   Upper Extremity Coordination No deficits were identified   Gross Motor Coordination No deficits were identified   Fine Motor Coordination No deficits were identified   Bed Mobility   Bed Mobility supine-sit   Supine-Sit [place] (Bed Mobility) verbal cues;contact guard   Assistive Device (Bed Mobility) bed rails  (HOB fully raised)   Transfers   Transfers sit-stand transfer   Sit-Stand Transfer   Sit-Stand [place] (Transfers) supervision;verbal cues   Balance   Balance Assessment standing balance: dynamic   Balance Comments SBA   Activities of Daily Living   BADL Assessment/Intervention upper  body dressing;lower body dressing;grooming;toileting   Upper Body Dressing Assessment/Training   [place] Level (Upper Body Dressing) set up   Lower Body Dressing Assessment/Training   Position (Lower Body Dressing) supported sitting   [place] Level (Lower Body Dressing) moderate assist (50% patient effort)   Grooming Assessment/Training   Position (Grooming) supported sitting   [place] Level (Grooming) set up   Toileting   [place] Level (Toileting) supervision   Clinical Impression   Criteria for Skilled Therapeutic Interventions Met (OT) Yes, treatment indicated   OT Diagnosis impaired I/ADLs   OT Problem List-Impairments impacting ADL problems related to;activity tolerance impaired;strength;pain;post-surgical precautions   Assessment of Occupational Performance 3-5 Performance Deficits   Identified Performance Deficits bathing, dressing, g/h, home mgmt   Planned Therapy Interventions (OT) ADL retraining;IADL retraining;bed mobility training;strengthening;transfer training;home program guidelines;risk factor education;progressive activity/exercise   Clinical Decision Making Complexity (OT) low complexity   Anticipated Equipment Needs Upon Discharge (OT) other (see comments)  (TBD)   Risk & Benefits of therapy have been explained evaluation/treatment results reviewed;care plan/treatment goals reviewed;risks/benefits reviewed;current/potential barriers reviewed;participants voiced agreement with care plan;participants included;patient   Clinical Impression Comments Pt below baseline level of function, limited by activity tolerance, weakness, and post-surgical precautions. Pt will benefit from skilled OT to progress safety/IND with I/ADLs within precautions.   OT Total Evaluation Time   OT Eval, Low Complexity Minutes (75510) 10   OT Goals   Therapy Frequency (OT) 6 times/wk   OT Predicted Duration/Target Date for Goal Attainment 09/01/23   OT Goals Hygiene/Grooming;Lower Body Dressing;Upper Body  Bathing;Lower Body Bathing;Aerobic Activity;Bed Mobility   OT: Hygiene/Grooming modified independent;while standing;within precautions   OT: Lower Body Dressing Modified independent;within precautions   OT: Upper Body Bathing Modified independent;within precautions   OT: Lower Body Bathing Modified independent;with precautions   OT: Bed Mobility Modified independent;within precautions  (bed flat)   OT Discharge Planning   OT Plan review precautions, standing g/h, flat bed mobility, progress activity tolerance, trial stairs   OT Discharge Recommendation (DC Rec) home with assist   OT Rationale for DC Rec Pt below baseline, limited by activity tolerance and weakness. Pt motivated to participate with therapy and progress. Anticipate once medically ready for d/c, pt will be safe to d/c with assist for heavier I/ADLs, pending pt's ability to complete stairs, as pt must be able to complete 10 steps up to access bed/bath.   OT Brief overview of current status SBA

## 2023-08-04 NOTE — PROGRESS NOTES
"Admission          8/2/2023  1:52 AM  -----------------------------------------------------------  Reason for admission: Intraabdominal abscess    Primary team notified of pt arrival.    Admitted from: ED  Via: Stretcher  Accompanied by: ED staff  Belongings: Remain at bedside  Admission Profile: Complete  Teaching: Oriented to call light and unit routine. Emphasized safety and \"call don't fall.\"  Access: PIVx1  Code Status verified on armband: Yes  2 RN Skin Assessment Completed: Yes   Med Rec completed: Yes  Is patient having diarrhea upon admission- if YES fill out testing algorithm : No    C. Diff Testing Algorithm (MUST be marked YES)   3 or more loose stools in 24 hrs. [] Yes [] No       Additional symptoms:(At least ONE must be marked yes)   Abdominal pain/discomfort [] Yes [] No   Fever at least 38C (100.4 F) [] Yes [] No   Elevated WBC(>11,000) [] Yes [] No       Exclusion Criteria:  (MUST be marked YES)   Off laxatives for at least 48 hrs. [] Yes [] No       Pt status:    Temp:  [97.4  F (36.3  C)-101  F (38.3  C)] 98.8  F (37.1  C)  Pulse:  [103-109] 103  Resp:  [14-18] 14  BP: (111-120)/(77-81) 111/77  SpO2:  [94 %-96 %] 94 %   "

## 2023-08-04 NOTE — PROGRESS NOTES
"CLINICAL NUTRITION SERVICES - ASSESSMENT NOTE     Nutrition Prescription    RECOMMENDATIONS FOR MDs/PROVIDERS TO ORDER:   Recommend starting discussions of nutrition support within 2-3 days if intake does not improve.     Malnutrition Status:   Patient does not meet two of the established criteria necessary for diagnosing malnutrition but is at risk for malnutrition    Recommendations already ordered by Registered Dietitian (RD):   Will place order for PRN snacks/supplements.      Future/Additional Recommendations:   Monitor weight and intake trends.      REASON FOR ASSESSMENT   Dimitrios Goldberg is a/an 58 year old male assessed by the dietitian for positive Admission Nutrition Risk Screen for reported unsure of weight loss with reported decreased appetite.     PMHx   Per H&P:   \"history of R kidney clear cell cancer, IVC tumor thrombus, HTN, CKD, hypothyroidism, ANUPAMA, HSV 2, depression, and GERD who was initially admitted to a hospital while on vacation in [place] with SBO. Underwent Uzbek (7/21/2023) and flew home to [place]. Developed increased weakness, lethargy while in transit prompting presentation to [facility] 8/2/2023 and was found to have multiple intraabdominal abscesses.\"    NUTRITION HISTORY   Visited with patient in room. Pt responded only with head nods. Pt reports still having a decreased appetite and when asked how many meals he was able to eat, he shook his head \"no\". Pt shook his head \"no\" when asked about nutrition supplements to help with getting any kind of intake.     Skin: René score 20, nutrition marked as probably inadequate. 1 surgical incision noted in flowsheets.     CURRENT NUTRITION ORDERS   Diet: Regular    Intake/Tolerance: No intake documentation in flowsheets.     LABS   Labs Reviewed   08/03/23 05:43 08/03/23 11:51 08/04/23 05:42   Sodium 141  139   Potassium 3.5  3.5   Creatinine 2.30 (H)  2.15 (H)   GFR Estimate 32 (L)  35 (L)   Calcium 8.6  8.6   Anion Gap 15  17 (H)   CRP " "Inflammation 160.00 (H)     Glucose 82  69 (L)   WBC 8.4  8.2   Hemoglobin 8.1 (L) 8.1 (L) 7.5 (L)   MCV 92  92      08/03/23 05:43 08/04/23 05:42   Glucose 82 69 (L)     MEDICATIONS   Medications reviewed  - Lipitor  - Ferrous gluconate  - Levothyroxine  - Protonix  - Zosyn  - LR infusion @ 100 mL/hr  - Dilaudid PRN  - Oxycodone PRN  - Senokot PRN    ANTHROPOMETRICS  Height: 182.9 cm (6' 0\")  Most Recent Weight: 99.8 kg (220 lb)    IBW: 80.9 kg  BMI: 29.84 - Overweight BMI 25-29.9  Weight History: Weight loss does not meet ASPEN criteria for malnutrition. Weight loss of 4.5 lbs (3%) in 2 months.   Wt Readings from Last Encounters:   08/04/23 99.3 kg (218 lb 14.4 oz) - standing scale   08/03/23 99.8 kg (220 lb)   06/28/23 101.4 kg (223 lb 9.6 oz)   06/08/23 99.6 kg (219 lb 9.6 oz)   04/11/23 101.2 kg (223 lb 3.2 oz)   03/31/23 99.2 kg (218 lb 9.6 oz)   03/16/23 99.2 kg (218 lb 9.6 oz)   03/01/23 103 kg (227 lb)   02/06/23 102.1 kg (225 lb)   01/03/23 102.4 kg (225 lb 11.2 oz)   12/13/22 102.1 kg (225 lb)   12/07/22 102.2 kg (225 lb 3.2 oz)   12/02/22 103.1 kg (227 lb 4.8 oz)   11/28/22 102.2 kg (225 lb 6.4 oz)   11/09/22 103.7 kg (228 lb 11.2 oz)   10/31/22 100.4 kg (221 lb 6.4 oz)   10/24/22 99.7 kg (219 lb 12.8 oz)   09/27/22 100.7 kg (222 lb)   09/20/22 100.2 kg (221 lb)   09/13/22 102.1 kg (225 lb)   09/01/22 106.9 kg (235 lb 11.2 oz)   08/18/22 106.5 kg (234 lb 11.2 oz)   08/08/22 99.8 kg (220 lb)   07/12/22 103.4 kg (227 lb 14.4 oz)     Dosing Weight: 86 kg - Adjusted    ASSESSED NUTRITION NEEDS   Estimated Energy Needs: 2927-9019 kcals/day (30 - 35 kcals/kg )  Justification: Increased needs  Estimated Protein Needs: 70-85 grams protein/day (0.8 - 1 grams of pro/kg)  Justification: CKD vs Increased needs  Estimated Fluid Needs: 0745-3032 mL/day (25 - 30 mL/kg)   Justification: Maintenance    PHYSICAL FINDINGS   See malnutrition section below.    MALNUTRITION   % Intake: </= 50% for >/= 5 days (severe)  % " Weight Loss: Weight loss does not meet criteria  Subcutaneous Fat Loss: None visually observed  Muscle Loss: None visually observed  Fluid Accumulation/Edema: None noted  Malnutrition Diagnosis: Patient does not meet two of the established criteria necessary for diagnosing malnutrition but is at risk for malnutrition    NUTRITION DIAGNOSIS   Inadequate oral intake related to abdominal pain impacting intake as evidenced by patient self report of minimal to no intake recently.       INTERVENTIONS   Implementation   Will place order for PRN snacks/supplements.      Goals  Intake to advance vs nutrition support within 2-3 days.     Monitoring/Evaluation   Progress toward goals will be monitored and evaluated per protocol.  Kristy Presley RD, LD   5B/6A pager [phone]  Weekend pager [phone]

## 2023-08-05 ENCOUNTER — APPOINTMENT (OUTPATIENT)
Dept: OCCUPATIONAL THERAPY | Facility: CLINIC | Age: 58
DRG: 862 | End: 2023-08-05
Payer: COMMERCIAL

## 2023-08-05 LAB
ANION GAP SERPL CALCULATED.3IONS-SCNC: 14 MMOL/L (ref 7–15)
BUN SERPL-MCNC: 10.8 MG/DL (ref 6–20)
CALCIUM SERPL-MCNC: 8.3 MG/DL (ref 8.6–10)
CHLORIDE SERPL-SCNC: 101 MMOL/L (ref 98–107)
CREAT SERPL-MCNC: 1.98 MG/DL (ref 0.67–1.17)
CRP SERPL-MCNC: 208 MG/L
DEPRECATED HCO3 PLAS-SCNC: 23 MMOL/L (ref 22–29)
ERYTHROCYTE [DISTWIDTH] IN BLOOD BY AUTOMATED COUNT: 15.7 % (ref 10–15)
GFR SERPL CREATININE-BSD FRML MDRD: 38 ML/MIN/1.73M2
GLUCOSE BLDC GLUCOMTR-MCNC: 78 MG/DL (ref 70–99)
GLUCOSE BLDC GLUCOMTR-MCNC: 84 MG/DL (ref 70–99)
GLUCOSE SERPL-MCNC: 81 MG/DL (ref 70–99)
HCT VFR BLD AUTO: 24.4 % (ref 40–53)
HGB BLD-MCNC: 7.7 G/DL (ref 13.3–17.7)
HOLD SPECIMEN: NORMAL
LACTATE SERPL-SCNC: 0.7 MMOL/L (ref 0.7–2)
MCH RBC QN AUTO: 28.7 PG (ref 26.5–33)
MCHC RBC AUTO-ENTMCNC: 31.6 G/DL (ref 31.5–36.5)
MCV RBC AUTO: 91 FL (ref 78–100)
PLATELET # BLD AUTO: 500 10E3/UL (ref 150–450)
POTASSIUM SERPL-SCNC: 3.3 MMOL/L (ref 3.4–5.3)
PROCALCITONIN SERPL IA-MCNC: 0.22 NG/ML
RBC # BLD AUTO: 2.68 10E6/UL (ref 4.4–5.9)
SODIUM SERPL-SCNC: 138 MMOL/L (ref 136–145)
WBC # BLD AUTO: 7.3 10E3/UL (ref 4–11)

## 2023-08-05 PROCEDURE — 250N000013 HC RX MED GY IP 250 OP 250 PS 637: Performed by: PHYSICIAN ASSISTANT

## 2023-08-05 PROCEDURE — 250N000011 HC RX IP 250 OP 636: Mod: JZ | Performed by: EMERGENCY MEDICINE

## 2023-08-05 PROCEDURE — 86140 C-REACTIVE PROTEIN: CPT | Performed by: INTERNAL MEDICINE

## 2023-08-05 PROCEDURE — 120N000002 HC R&B MED SURG/OB UMMC

## 2023-08-05 PROCEDURE — 250N000013 HC RX MED GY IP 250 OP 250 PS 637: Performed by: PEDIATRICS

## 2023-08-05 PROCEDURE — 258N000003 HC RX IP 258 OP 636: Performed by: PHYSICIAN ASSISTANT

## 2023-08-05 PROCEDURE — 999N000248 HC STATISTIC IV INSERT WITH US BY RN

## 2023-08-05 PROCEDURE — 84145 PROCALCITONIN (PCT): CPT | Performed by: PHYSICIAN ASSISTANT

## 2023-08-05 PROCEDURE — 250N000011 HC RX IP 250 OP 636: Performed by: PEDIATRICS

## 2023-08-05 PROCEDURE — 99233 SBSQ HOSP IP/OBS HIGH 50: CPT | Performed by: PEDIATRICS

## 2023-08-05 PROCEDURE — 258N000003 HC RX IP 258 OP 636: Performed by: PEDIATRICS

## 2023-08-05 PROCEDURE — 36415 COLL VENOUS BLD VENIPUNCTURE: CPT | Performed by: INTERNAL MEDICINE

## 2023-08-05 PROCEDURE — 999N000147 HC STATISTIC PT IP EVAL DEFER

## 2023-08-05 PROCEDURE — 83605 ASSAY OF LACTIC ACID: CPT | Performed by: PHYSICIAN ASSISTANT

## 2023-08-05 PROCEDURE — 80048 BASIC METABOLIC PNL TOTAL CA: CPT | Performed by: INTERNAL MEDICINE

## 2023-08-05 PROCEDURE — 97530 THERAPEUTIC ACTIVITIES: CPT | Mod: GO

## 2023-08-05 PROCEDURE — 97535 SELF CARE MNGMENT TRAINING: CPT | Mod: GO

## 2023-08-05 PROCEDURE — 87040 BLOOD CULTURE FOR BACTERIA: CPT | Performed by: PHYSICIAN ASSISTANT

## 2023-08-05 PROCEDURE — 85027 COMPLETE CBC AUTOMATED: CPT | Performed by: INTERNAL MEDICINE

## 2023-08-05 PROCEDURE — 36415 COLL VENOUS BLD VENIPUNCTURE: CPT | Performed by: PHYSICIAN ASSISTANT

## 2023-08-05 RX ORDER — POTASSIUM CHLORIDE 750 MG/1
20 TABLET, EXTENDED RELEASE ORAL ONCE
Status: COMPLETED | OUTPATIENT
Start: 2023-08-05 | End: 2023-08-05

## 2023-08-05 RX ADMIN — PANTOPRAZOLE SODIUM 40 MG: 40 TABLET, DELAYED RELEASE ORAL at 09:51

## 2023-08-05 RX ADMIN — PANTOPRAZOLE SODIUM 40 MG: 40 TABLET, DELAYED RELEASE ORAL at 21:11

## 2023-08-05 RX ADMIN — ACYCLOVIR 400 MG: 400 TABLET ORAL at 21:11

## 2023-08-05 RX ADMIN — PIPERACILLIN AND TAZOBACTAM 3.38 G: 3; .375 INJECTION, POWDER, LYOPHILIZED, FOR SOLUTION INTRAVENOUS at 06:34

## 2023-08-05 RX ADMIN — POTASSIUM CHLORIDE 20 MEQ: 750 TABLET, EXTENDED RELEASE ORAL at 17:14

## 2023-08-05 RX ADMIN — PIPERACILLIN AND TAZOBACTAM 3.38 G: 3; .375 INJECTION, POWDER, LYOPHILIZED, FOR SOLUTION INTRAVENOUS at 23:37

## 2023-08-05 RX ADMIN — ACETAMINOPHEN 1000 MG: 500 TABLET ORAL at 17:14

## 2023-08-05 RX ADMIN — ACYCLOVIR 400 MG: 400 TABLET ORAL at 06:35

## 2023-08-05 RX ADMIN — SODIUM CHLORIDE, POTASSIUM CHLORIDE, SODIUM LACTATE AND CALCIUM CHLORIDE 500 ML: 600; 310; 30; 20 INJECTION, SOLUTION INTRAVENOUS at 17:36

## 2023-08-05 RX ADMIN — PIPERACILLIN AND TAZOBACTAM 3.38 G: 3; .375 INJECTION, POWDER, LYOPHILIZED, FOR SOLUTION INTRAVENOUS at 00:21

## 2023-08-05 RX ADMIN — VANCOMYCIN HYDROCHLORIDE 1250 MG: 10 INJECTION, POWDER, LYOPHILIZED, FOR SOLUTION INTRAVENOUS at 20:34

## 2023-08-05 RX ADMIN — SENNOSIDES AND DOCUSATE SODIUM 2 TABLET: 50; 8.6 TABLET ORAL at 14:43

## 2023-08-05 RX ADMIN — PIPERACILLIN AND TAZOBACTAM 3.38 G: 3; .375 INJECTION, POWDER, LYOPHILIZED, FOR SOLUTION INTRAVENOUS at 18:07

## 2023-08-05 RX ADMIN — ACYCLOVIR 400 MG: 400 TABLET ORAL at 14:42

## 2023-08-05 RX ADMIN — OXYCODONE HYDROCHLORIDE 5 MG: 5 TABLET ORAL at 05:50

## 2023-08-05 RX ADMIN — OXYCODONE HYDROCHLORIDE 5 MG: 5 TABLET ORAL at 02:03

## 2023-08-05 RX ADMIN — PIPERACILLIN AND TAZOBACTAM 3.38 G: 3; .375 INJECTION, POWDER, LYOPHILIZED, FOR SOLUTION INTRAVENOUS at 12:06

## 2023-08-05 RX ADMIN — METOPROLOL SUCCINATE 50 MG: 25 TABLET, EXTENDED RELEASE ORAL at 09:51

## 2023-08-05 RX ADMIN — LEVOTHYROXINE SODIUM 137 MCG: 0.14 TABLET ORAL at 06:35

## 2023-08-05 RX ADMIN — NORTRIPTYLINE HYDROCHLORIDE 10 MG: 10 CAPSULE ORAL at 21:11

## 2023-08-05 RX ADMIN — ATORVASTATIN CALCIUM 20 MG: 20 TABLET, FILM COATED ORAL at 09:51

## 2023-08-05 RX ADMIN — OXYCODONE HYDROCHLORIDE 5 MG: 5 TABLET ORAL at 09:51

## 2023-08-05 ASSESSMENT — ACTIVITIES OF DAILY LIVING (ADL)
ADLS_ACUITY_SCORE: 27
ADLS_ACUITY_SCORE: 26
ADLS_ACUITY_SCORE: 25

## 2023-08-05 NOTE — PLAN OF CARE
Physical Therapy defer - Orders received, chart reviewed, discussed with care team. No IP PT needs indicated at this time. Per OT, pt mobilizing primarily with SBA, appropriate for 1 discipline to follow while IP. Plan for OT to continue to follow to address functional mobility as needed. Will complete consult and defer evaluation, please reconsult as appropriate if patient has decline in functional mobility requiring further skilled inpatient PT needs. Defer Discharge recommendations to OT/medical team.

## 2023-08-05 NOTE — PROGRESS NOTES
Brief Medicine Cross Cover Note     Contacted by RN regarding patient with Tmax to 101.5, tachycardia to 110. Stable BP. Ongoing fevers despite zosyn- will obtain BCx2 and start Vancomycin. LA 0.7. Bedside evaluation reveals soft spoken male lying in bed. Reports that he has no increase in abdomin pain, though is fatigued. No rebound or guarding on exam. BS+.   - Please give IVF bolus  - Start vancomycin  - Blood culture x2  - Monitor  - Imaging of abdomen if with change in clinical status    /74 (BP Location: Right arm)   Pulse 111   Temp 98.7  F (37.1  C)   Resp 18   Ht 1.829 m (6')   Wt 100.6 kg (221 lb 11.2 oz)   SpO2 93%   BMI 30.07 kg/m      Serena Bueno PA-C  Internal Medicine Hospitalist Service  [facility]  Pager: [phone]

## 2023-08-05 NOTE — PLAN OF CARE
Goal Outcome Evaluation:  Time 9658-1586    /77 (BP Location: Right arm)   Pulse 110   Temp 99.1  F (37.3  C) (Oral)   Resp 18   Ht 1.829 m (6')   Wt 99.3 kg (218 lb 14.4 oz)   SpO2 91%   BMI 29.69 kg/m      Reason for admission: intra-abdominal abscess, renal cell carcinoma  Activity: SBA - calls appropriately  Pain: Rating pain 6/10 max; taking Oxycodone PRN  Neuro: WNL  Cardiac: WNL  Respiratory: WNL  GI/: LBM 8/2  Diet: Regular - poor PO intake, Blood glucose 84 overnight  Lines: left FA PIV - LR @100  Wounds: Midline abdominal incision, two lap sites - some leaking. No new drainage noted overnight  Labs/imaging: AM labs pending      New changes this shift: No fevers. Left AC PIV leaking so new FA PIV started. No BM for three days.     Plan: PRN bowel meds. Pain control. Fall prevention      Continue to monitor and follow POC     Plan of Care Reviewed With: patient    Overall Patient Progress: improvingOverall Patient Progress: improving    Outcome Evaluation: Patient reporting mild to moderate pain. Not requiring IV medication for severe pain

## 2023-08-05 NOTE — PHARMACY-VANCOMYCIN DOSING SERVICE
"Pharmacy Vancomycin Initial Note  Date of Service 2023  Patient's  1965  58 year old, male    Indication: Sepsis    Current estimated CrCl = Estimated Creatinine Clearance: 49.9 mL/min (A) (based on SCr of 1.98 mg/dL (H)).    Creatinine for last 3 days  8/3/2023:  5:43 AM Creatinine 2.30 mg/dL  2023:  5:42 AM Creatinine 2.15 mg/dL  2023:  6:15 AM Creatinine 1.98 mg/dL    Recent Vancomycin Level(s) for last 3 days  No results found for requested labs within last 3 days.      Vancomycin IV Administrations (past 72 hours)        No vancomycin orders with administrations in past 72 hours.                    Nephrotoxins and other renal medications (From now, onward)      Start     Dose/Rate Route Frequency Ordered Stop    23 1400  acyclovir (ZOVIRAX) tablet 400 mg        Note to Pharmacy: PTA Sig:Take 1 tablet (400 mg) by mouth every 8 hours      400 mg Oral EVERY 8 HOURS SCHEDULED 23 0901      23 0630  piperacillin-tazobactam (ZOSYN) 3.375 g vial to attach to  mL bag        Note to Pharmacy: For [facility], [facility] and [facility]: For Zosyn-naive patients, use the \"Zosyn initial dose + extended infusion\" order panel.    3.375 g  over 30 Minutes Intravenous EVERY 6 HOURS 23 0552              Contrast Orders - past 72 hours (72h ago, onward)      None            [employer] Prediction of Planned Initial Vancomycin Regimen  Regimen: 1250 mg IV every 24 hours.  Start time: 17:42 on 2023  Exposure target: AUC24 (range)400-600 mg/L.hr   AUC24,ss: 471 mg/L.hr  Probability of AUC24 > 400: 69 %  Ctrough,ss: 14.9 mg/L  Probability of Ctrough,ss > 20: 22 %  Probability of nephrotoxicity (Lodise BEATRIS ): 10 %          Plan:  Start vancomycin  1250 mg IV q24h.   Vancomycin monitoring method: AUC  Vancomycin therapeutic monitoring goal: 400-600 mg*h/L  Pharmacy will check vancomycin levels as appropriate in 1-3 Days.    Serum creatinine levels will be ordered daily for the first week of " therapy and at least twice weekly for subsequent weeks.      Zofia Mcnair, RPH

## 2023-08-05 NOTE — PROGRESS NOTES
[facility]    Medicine Progress Note - Hospitalist Service, GOLD TEAM 9    Date of Admission:  8/2/2023    Assessment & Plan   Dimitrios Goldberg is a 58 year old male with history of R kidney clear cell cancer, IVC tumor thrombus, HTN, CKD, hypothyroidism, ANUPAMA, HSV 2, depression, and GERD who was initially admitted to a hospital while on vacation in [facility] with SBO. Underwent Gabonese (7/21/2023) and flew home to [place]. Developed increased weakness, lethargy while in transit prompting presentation to [facility] 8/2/2023 and was found to have multiple intraabdominal abscesses.     Today  - Continue antibiotics  - Monitor serial labs  - Follow blood cultures  - Work note provided   - Billing records from prior hospitalization returned (family accidentally left with records provided this admission)     Multiple intraabdominal abscesses, recent h/o SBO s/p [place] in [facility]: Developed symptoms while in [facility], CT noted SBO and was airlifted to [place]. Underwent Gabonese of the small intestine. Increased fatigue, lethargy on journey home 7/30 to 7/31. CT CAP 8/2 multiple loculated collections in lower abdomen and pelvis, many communicating with each other, multiple mildly prominent small bowel loops in abdomen which can represent ileus vs presence of abscesses. WBC, LA normal, abdominal exam overall with minimal tenderness, passing gas   - IR consult for possible drain placement, do not think indicated at this time   - General surgery following  - ID consulted, concern that antibiotics alone will be insufficient   - Continue Zosyn  - BCx x2, NGTD   - trend CRP intermittently, will discuss with speciality teams the timing of reimaging  - Pain management: Tylenol and oxycodone prn with IV dilaudid for breakthrough  - Per RN, general surgery has the paper records from hospital in [facility]. Will need to ensure they get back into the chart as they may be the only copy of records.       Elevated lipase: Lipase 479 in the setting of the above. CT CAP no acute findings, no epigastric tenderness or focal sx suggestive of pancreatitis   - fluids as above     R kidney clear cell cancer s/p nephrectomy with c/f recurrence, CKD III  Increased Creatinine   Right clear cell carcinoma Dx 7/2021, s/p radical nephrectomy 8/02021 and immunotherapy (pembrolizumub) 1/2022. CT 7/2022 tumor recurrence, s/p ex lap and HELENA with open resection of RP mass and lateral mass, primary mass not resectable given involvement with vena cava and duodenum. Not a candidate for radiation due to proximity to bowel. Active nephritis req steroids on pembro, was started on Cabozantinub (dose reduced due to hand-foot syndrome). CT CAP 8/2/23 stable postsurgical changes of R nephrectomy, stable from 4/6/2023 imaging. Last saw nephrology 6/28.   - Per d/w oncology, hold PTA Cabozantinub for now. No need to formal consult at this time  - Nephrology consulted     Acute on chronic anemia   ANUPAMA: BL hgb 13-14, low to 9.5 on presentation and then ~8 after fluids. No e/o active bleeding, melena, though after admission CT findings discussed with radiology must consider that fluid collections are quite dense and could be superimposed bleeding (no active arterial bleeding noted but c/f slow venous bleed). Initial concern for hemolysis w/haptoglobin <3 but hemoglobin stabilizing.  - hold ferrous gluconate (PTA med)   - Hemoglobin daily    Vocal cord paresis   Neck Pain  Seen by ENT 8/2, vocal cord paresis is likely related to his intubation given timing of onset and no prior history of voice issues. It is possible that he had a traumatic intubation, and given the slight displacement of the arytenoid would like to further evaluate for arytenoid subluxation/dislocation with imaging by CT with contrast.  Neck pain over past 2 months evaluated with CT in June with no evidence of lesion but MRI recommended.  Holding additional imaging at present  given no acute decompensation and allowing renal function to improve.  - Outpatient ENT follow up in laryngology clinic - ENT will arrange         Other Medical Issues:  HTN: PTA on amlodipine, metoprolol. BP stable. Hold amlodipine, continue metoprolol with hold parameters   HLD: Continue statin. Hold ASA  H/o IVC tumor thrombus: S/p IVC tumor thrombectomy with reconstruction 8/2021 at time of nephrectomy  Migraines: Continue PTA Nortriptyline and prn Rizatriptan   Hypothyroidism (2/2 pembro): TSH 8.51, T4 1.29 5/18/23. Continue synthroid. Repeat TFTs per nephrology recs 6/2023  GERD: Continue PPI   H/o HSV 2: Continue PTA Acyclovir        Diet: Regular Diet Adult  Snacks/Supplements Adult: Other; Allow PRN snacks/supplements.; With Meals    DVT Prophylaxis: ambulate  White Catheter: Not present  Lines: None     Cardiac Monitoring: None  Code Status: Full Code      Clinically Significant Risk Factors        # Hypokalemia: Lowest K = 3.3 mmol/L in last 2 days, will replace as needed        # Coagulation Defect: INR = 1.23 (Ref range: 0.85 - 1.15) and/or PTT = N/A, will monitor for bleeding           # Overweight: Estimated body mass index is 29.69 kg/m  as calculated from the following:    Height as of this encounter: 1.829 m (6').    Weight as of this encounter: 99.3 kg (218 lb 14.4 oz)., PRESENT ON ADMISSION          Disposition Plan     Expected Discharge Date: 08/07/2023                  Serena Warner MD  Hospitalist Service, GOLD TEAM 9  [facility]  Securely message with "[employer] (more info)  Text page via [facility] Paging/Directory   See signed in provider for up to date coverage information  ______________________________________________________________________    Interval History   Dimitrios did well overnight with no acute issues.  Today he reports he overall feels the same though he does note that his left sided neck pain with radiation down the arm which has been  present intermittently for the past 2 months continues to bother him.  He notes no changes in this but is frustrated that it is not being addressed or evaluated.  We discussed possible imaging studies along with risks and benefits of contrast as well as timing of studies and he is in agreement with the above.  Family present at bedside and note that they had accidentally left billing statements from his care in [place] in the paper records they provided.  These were found in his chart and returned to family along with a doctors note for ongoing hospitalization as requested.    Physical Exam   Vital Signs: Temp: 99.1  F (37.3  C) Temp src: Oral BP: 112/77 Pulse: 110   Resp: 18 SpO2: 91 % O2 Device: None (Room air)    Weight: 218 lbs 14.4 oz    Gen: Awake, alert, sitting up in chair in no acute distress but tired appearing  HEENT: NC/AT, sclera anicteric, voice hoarse  Resp: LCTAB, no increased WOB on RA  CV: Tachycardic with regular rhythm  Abd: Soft, distended with c/d/i dressing in place and diffuse tenderness to palpation  Extrem: Warm and well perfused with no LE edema       Medical Decision Making             Data   NOTE: Data reviewed over the past 24 hrs contributes toward MDM complexity

## 2023-08-05 NOTE — PLAN OF CARE
Goal Outcome Evaluation:      Plan of Care Reviewed With: patient    Overall Patient Progress: improving         Tmax 98 this shift. A&Ox4, whispers d/t vocal cord paresis r/t previous intubation. Gave oxycodone 5 mg x1 for abd/incisional pain. Abd midline incision RAQUEL, small lap site x2 - left dressing in place. Up SBA. Last BM 8/2, passing gas. Continue with current poc.

## 2023-08-05 NOTE — PLAN OF CARE
Goal Outcome Evaluation:    6779-0175:  Tmax 101.5, -110s, pt feeling chilled/shaky this evening. Notified provider: Blood cultures ordered, checking procalcitonin, lactic acid was 0.7. 500cc bolus infusing, vancomycin added. Continues w/ zosyn. Continues w/ LR infusion. Tylenol given x1.    Continues w/ slight abdominal pain, oxycodone given x1 w/ good relief, pt declined further interventions. Notified provider of 3.3 potassium, 1 x order given w/ recheck w/ AM labs. Poor PO intake, spot check BG 78. Pt reports passing gas, no BMs, senna x2 given this afternoon. Continue to monitor & w/ POC.

## 2023-08-06 ENCOUNTER — APPOINTMENT (OUTPATIENT)
Dept: CT IMAGING | Facility: CLINIC | Age: 58
DRG: 862 | End: 2023-08-06
Attending: PEDIATRICS
Payer: COMMERCIAL

## 2023-08-06 LAB
ANION GAP SERPL CALCULATED.3IONS-SCNC: 14 MMOL/L (ref 7–15)
BUN SERPL-MCNC: 8.4 MG/DL (ref 6–20)
CALCIUM SERPL-MCNC: 8.4 MG/DL (ref 8.6–10)
CHLORIDE SERPL-SCNC: 105 MMOL/L (ref 98–107)
CREAT SERPL-MCNC: 1.82 MG/DL (ref 0.67–1.17)
DEPRECATED HCO3 PLAS-SCNC: 22 MMOL/L (ref 22–29)
ERYTHROCYTE [DISTWIDTH] IN BLOOD BY AUTOMATED COUNT: 15.8 % (ref 10–15)
GFR SERPL CREATININE-BSD FRML MDRD: 43 ML/MIN/1.73M2
GLUCOSE BLDC GLUCOMTR-MCNC: 70 MG/DL (ref 70–99)
GLUCOSE SERPL-MCNC: 102 MG/DL (ref 70–99)
HCT VFR BLD AUTO: 24.8 % (ref 40–53)
HGB BLD-MCNC: 7.8 G/DL (ref 13.3–17.7)
MCH RBC QN AUTO: 28.8 PG (ref 26.5–33)
MCHC RBC AUTO-ENTMCNC: 31.5 G/DL (ref 31.5–36.5)
MCV RBC AUTO: 92 FL (ref 78–100)
PLATELET # BLD AUTO: 513 10E3/UL (ref 150–450)
POTASSIUM SERPL-SCNC: 3.6 MMOL/L (ref 3.4–5.3)
RBC # BLD AUTO: 2.71 10E6/UL (ref 4.4–5.9)
SODIUM SERPL-SCNC: 141 MMOL/L (ref 136–145)
WBC # BLD AUTO: 6.9 10E3/UL (ref 4–11)

## 2023-08-06 PROCEDURE — 258N000003 HC RX IP 258 OP 636: Performed by: PHYSICIAN ASSISTANT

## 2023-08-06 PROCEDURE — 85027 COMPLETE CBC AUTOMATED: CPT | Performed by: INTERNAL MEDICINE

## 2023-08-06 PROCEDURE — 258N000003 HC RX IP 258 OP 636: Performed by: PEDIATRICS

## 2023-08-06 PROCEDURE — 250N000011 HC RX IP 250 OP 636: Mod: JZ | Performed by: EMERGENCY MEDICINE

## 2023-08-06 PROCEDURE — 80048 BASIC METABOLIC PNL TOTAL CA: CPT | Performed by: INTERNAL MEDICINE

## 2023-08-06 PROCEDURE — 120N000002 HC R&B MED SURG/OB UMMC

## 2023-08-06 PROCEDURE — 99233 SBSQ HOSP IP/OBS HIGH 50: CPT | Performed by: PEDIATRICS

## 2023-08-06 PROCEDURE — 74177 CT ABD & PELVIS W/CONTRAST: CPT

## 2023-08-06 PROCEDURE — 258N000003 HC RX IP 258 OP 636: Performed by: INTERNAL MEDICINE

## 2023-08-06 PROCEDURE — 36415 COLL VENOUS BLD VENIPUNCTURE: CPT | Performed by: INTERNAL MEDICINE

## 2023-08-06 PROCEDURE — 250N000013 HC RX MED GY IP 250 OP 250 PS 637: Performed by: PHYSICIAN ASSISTANT

## 2023-08-06 PROCEDURE — 250N000011 HC RX IP 250 OP 636: Performed by: PEDIATRICS

## 2023-08-06 PROCEDURE — 250N000009 HC RX 250: Performed by: PEDIATRICS

## 2023-08-06 PROCEDURE — 74177 CT ABD & PELVIS W/CONTRAST: CPT | Mod: 26 | Performed by: STUDENT IN AN ORGANIZED HEALTH CARE EDUCATION/TRAINING PROGRAM

## 2023-08-06 RX ORDER — IOPAMIDOL 755 MG/ML
135 INJECTION, SOLUTION INTRAVASCULAR ONCE
Status: COMPLETED | OUTPATIENT
Start: 2023-08-06 | End: 2023-08-06

## 2023-08-06 RX ORDER — NICOTINE POLACRILEX 4 MG
15-30 LOZENGE BUCCAL
Status: DISCONTINUED | OUTPATIENT
Start: 2023-08-06 | End: 2023-08-12 | Stop reason: HOSPADM

## 2023-08-06 RX ORDER — DEXTROSE MONOHYDRATE 50 MG/ML
INJECTION, SOLUTION INTRAVENOUS CONTINUOUS
Status: DISCONTINUED | OUTPATIENT
Start: 2023-08-06 | End: 2023-08-06

## 2023-08-06 RX ORDER — DEXTROSE MONOHYDRATE 25 G/50ML
25-50 INJECTION, SOLUTION INTRAVENOUS
Status: DISCONTINUED | OUTPATIENT
Start: 2023-08-06 | End: 2023-08-12 | Stop reason: HOSPADM

## 2023-08-06 RX ADMIN — ACYCLOVIR 400 MG: 400 TABLET ORAL at 21:08

## 2023-08-06 RX ADMIN — OXYCODONE HYDROCHLORIDE 5 MG: 5 TABLET ORAL at 04:36

## 2023-08-06 RX ADMIN — ATORVASTATIN CALCIUM 20 MG: 20 TABLET, FILM COATED ORAL at 08:36

## 2023-08-06 RX ADMIN — PIPERACILLIN AND TAZOBACTAM 3.38 G: 3; .375 INJECTION, POWDER, LYOPHILIZED, FOR SOLUTION INTRAVENOUS at 17:37

## 2023-08-06 RX ADMIN — PIPERACILLIN AND TAZOBACTAM 3.38 G: 3; .375 INJECTION, POWDER, LYOPHILIZED, FOR SOLUTION INTRAVENOUS at 11:57

## 2023-08-06 RX ADMIN — VANCOMYCIN HYDROCHLORIDE 1250 MG: 10 INJECTION, POWDER, LYOPHILIZED, FOR SOLUTION INTRAVENOUS at 18:07

## 2023-08-06 RX ADMIN — PIPERACILLIN AND TAZOBACTAM 3.38 G: 3; .375 INJECTION, POWDER, LYOPHILIZED, FOR SOLUTION INTRAVENOUS at 06:25

## 2023-08-06 RX ADMIN — LEVOTHYROXINE SODIUM 137 MCG: 0.14 TABLET ORAL at 08:36

## 2023-08-06 RX ADMIN — DEXTROSE MONOHYDRATE: 50 INJECTION, SOLUTION INTRAVENOUS at 04:36

## 2023-08-06 RX ADMIN — SODIUM CHLORIDE, POTASSIUM CHLORIDE, SODIUM LACTATE AND CALCIUM CHLORIDE: 600; 310; 30; 20 INJECTION, SOLUTION INTRAVENOUS at 05:46

## 2023-08-06 RX ADMIN — ACYCLOVIR 400 MG: 400 TABLET ORAL at 06:25

## 2023-08-06 RX ADMIN — IOPAMIDOL 135 ML: 755 INJECTION, SOLUTION INTRAVENOUS at 18:47

## 2023-08-06 RX ADMIN — NORTRIPTYLINE HYDROCHLORIDE 10 MG: 10 CAPSULE ORAL at 21:08

## 2023-08-06 RX ADMIN — PANTOPRAZOLE SODIUM 40 MG: 40 TABLET, DELAYED RELEASE ORAL at 08:36

## 2023-08-06 RX ADMIN — PANTOPRAZOLE SODIUM 40 MG: 40 TABLET, DELAYED RELEASE ORAL at 21:08

## 2023-08-06 RX ADMIN — METOPROLOL SUCCINATE 50 MG: 25 TABLET, EXTENDED RELEASE ORAL at 08:36

## 2023-08-06 RX ADMIN — PIPERACILLIN AND TAZOBACTAM 3.38 G: 3; .375 INJECTION, POWDER, LYOPHILIZED, FOR SOLUTION INTRAVENOUS at 23:40

## 2023-08-06 RX ADMIN — SODIUM CHLORIDE, PRESERVATIVE FREE 84 ML: 5 INJECTION INTRAVENOUS at 18:47

## 2023-08-06 RX ADMIN — SODIUM CHLORIDE, POTASSIUM CHLORIDE, SODIUM LACTATE AND CALCIUM CHLORIDE: 600; 310; 30; 20 INJECTION, SOLUTION INTRAVENOUS at 17:37

## 2023-08-06 ASSESSMENT — ACTIVITIES OF DAILY LIVING (ADL)
ADLS_ACUITY_SCORE: 26

## 2023-08-06 NOTE — PROGRESS NOTES
[facility]    Medicine Progress Note - Hospitalist Service, GOLD TEAM 9    Date of Admission:  8/2/2023    Assessment & Plan   Dimitrios Goldberg is a 58 year old male with history of R kidney clear cell cancer, IVC tumor thrombus, HTN, CKD, hypothyroidism, ANUPAMA, HSV 2, depression, and GERD who was initially admitted to a hospital while on vacation in [facility] with SBO. Underwent Kuwaiti (7/21/2023) and flew home to [place]. Developed increased weakness, lethargy while in transit prompting presentation to [facility] 8/2/2023 and was found to have multiple intraabdominal abscesses.     Today  - Continue antibiotics  - Repeat CT Abd/Pelvis with contrast  - Reviewed with ID  - Surgical evaluation requested, will see in AM  - Calorie counts     Multiple intraabdominal abscesses, recent h/o SBO s/p [place] in [facility]: Developed symptoms while in [facility], CT noted SBO and was airlifted to [place]. Underwent [place]i of the small intestine. Increased fatigue, lethargy on journey home 7/30 to 7/31. CT CAP 8/2 multiple loculated collections in lower abdomen and pelvis, many communicating with each other, multiple mildly prominent small bowel loops in abdomen which can represent ileus vs presence of abscesses. WBC, LA normal, abdominal exam overall with minimal tenderness, passing gas   - IR consult for possible drain placement, do not think indicated at this time   - General surgery following  - ID consulted, concern that antibiotics alone will be insufficient   - Continue Zosyn  - BCx x2, NGTD   - Pain management: Tylenol and oxycodone prn with IV dilaudid for breakthrough  - CT Abd/Pelvis given persistent fevers and inflammatory markers despite IV antibiotics     Elevated lipase: Lipase 479 in the setting of the above. CT CAP no acute findings, no epigastric tenderness or focal sx suggestive of pancreatitis   - fluids as above     R kidney clear cell cancer s/p nephrectomy with c/f  recurrence, CKD III  Increased Creatinine   Right clear cell carcinoma Dx 7/2021, s/p radical nephrectomy 8/02021 and immunotherapy (pembrolizumub) 1/2022. CT 7/2022 tumor recurrence, s/p ex lap and HELENA with open resection of RP mass and lateral mass, primary mass not resectable given involvement with vena cava and duodenum. Not a candidate for radiation due to proximity to bowel. Active nephritis req steroids on pembro, was started on Cabozantinub (dose reduced due to hand-foot syndrome). CT CAP 8/2/23 stable postsurgical changes of R nephrectomy, stable from 4/6/2023 imaging. Last saw nephrology 6/28.   - Per d/w oncology, hold PTA Cabozantinub for now. No need to formal consult at this time  - Nephrology consulted     Acute on chronic anemia   ANUPAMA: BL hgb 13-14, low to 9.5 on presentation and then ~8 after fluids. No e/o active bleeding, melena, though after admission CT findings discussed with radiology must consider that fluid collections are quite dense and could be superimposed bleeding (no active arterial bleeding noted but c/f slow venous bleed). Initial concern for hemolysis w/haptoglobin <3 but hemoglobin stabilizing.  - hold ferrous gluconate (PTA med)   - Hemoglobin daily    Vocal cord paresis   Neck Pain  Seen by ENT 8/2, vocal cord paresis is likely related to his intubation given timing of onset and no prior history of voice issues. It is possible that he had a traumatic intubation, and given the slight displacement of the arytenoid would like to further evaluate for arytenoid subluxation/dislocation with imaging by CT with contrast.  Neck pain over past 2 months evaluated with CT in June with no evidence of lesion but MRI recommended.  Holding additional imaging at present given no acute decompensation and allowing renal function to improve.  - Outpatient ENT follow up in laryngology clinic - ENT will arrange         Other Medical Issues:  HTN: PTA on amlodipine, metoprolol. BP stable. Hold  amlodipine, continue metoprolol with hold parameters   HLD: Continue statin. Hold ASA  H/o IVC tumor thrombus: S/p IVC tumor thrombectomy with reconstruction 8/2021 at time of nephrectomy  Migraines: Continue PTA Nortriptyline and prn Rizatriptan   Hypothyroidism (2/2 pembro): TSH 8.51, T4 1.29 5/18/23. Continue synthroid. Repeat TFTs per nephrology recs 6/2023  GERD: Continue PPI   H/o HSV 2: Continue PTA Acyclovir          Diet: Regular Diet Adult  Snacks/Supplements Adult: Other; Allow PRN snacks/supplements.; With Meals    DVT Prophylaxis: Holding pharmacologic pending imaging studies  White Catheter: Not present  Lines: None     Cardiac Monitoring: None  Code Status: Full Code      Clinically Significant Risk Factors        # Hypokalemia: Lowest K = 3.3 mmol/L in last 2 days, will replace as needed        # Coagulation Defect: INR = 1.23 (Ref range: 0.85 - 1.15) and/or PTT = N/A, will monitor for bleeding           # Obesity: Estimated body mass index is 30.07 kg/m  as calculated from the following:    Height as of this encounter: 1.829 m (6').    Weight as of this encounter: 100.6 kg (221 lb 11.2 oz).           Disposition Plan     Expected Discharge Date: 08/11/2023        Discharge Comments: Continues to be febrile despite IV abx.          Serena Warner MD  Hospitalist Service, GOLD TEAM 9  [facility]  Securely message with [employer] (more info)  Text page via [facility] Paging/Directory   See signed in provider for up to date coverage information  ______________________________________________________________________    Interval History   Dimitrios had another fever overnight but overall feels the same today.  He continues to have low appetite.  Family present at bedside and updated.  We discussed risks and benefits of contrast administration with imaging and they are amenable to plans as noted above.    Physical Exam   Vital Signs: Temp: 98.7  F (37.1  C) Temp src: Oral  BP: 123/86 Pulse: 110   Resp: 20 SpO2: 93 % O2 Device: None (Room air)    Weight: 221 lbs 11.2 oz    Gen: Awake, alert, laying in bed in NAD  HEENT: NC/AT, sclera anicteric, voice hoarse  Resp: LCTAB, no increased WOB on RA  CV: Tachycardic with regular rhythm  Abd: Soft, distended with c/d/i dressing in place and diffuse tenderness to palpation  Extrem: Warm and well perfused with trace LE edema       Medical Decision Making             Data   NOTE: Data reviewed over the past 24 hrs contributes toward MDM complexity

## 2023-08-06 NOTE — PLAN OF CARE
Goal Outcome Evaluation:  Time: 3369-5208  Alert and oriented x4, VSS on RA. Back pain managed with prn oxy x1. Pt needs encouragement to take something for pain as he tends to decline most of the time. Denies nausea or vomiting. Up to the bathroom, voiding adequately. 2 loose stools overnight as per pt report. Continues on iv vanco and zosyn. BS spot check at 0200 was 70, started on Dextrose 5% infusing at 50 ml/hr. Apple juice given. Continue with POC.

## 2023-08-06 NOTE — PLAN OF CARE
Goal Outcome Evaluation:    9689-7673:  Tmax 99.7, -110s, OVSS. Denies pain, offered pain medications throughout shift. Denies nausea/SOB. Poor PO intake, plan is to start calorie counts tonight @ 0000. Pt had been on D5 IV fluids throughout shift, discontinued, monitor BGs. Pt declined changing abdominal gauze dressing. Reports adequate UOP. BM x1. Continue to monitor & w/ POC.    -Abd/pelvis CT scan ordered, to happen this evening.

## 2023-08-06 NOTE — PROVIDER NOTIFICATION
"Amcom page to OLIVIA MCINTOSH.   \"[id number].: Pt BG 70, drinking some apple juice now. No appetite over days, can you order IV D5W maintenance? Thank you #[phone]\"  "

## 2023-08-07 ENCOUNTER — APPOINTMENT (OUTPATIENT)
Dept: OCCUPATIONAL THERAPY | Facility: CLINIC | Age: 58
DRG: 862 | End: 2023-08-07
Payer: COMMERCIAL

## 2023-08-07 LAB
ANION GAP SERPL CALCULATED.3IONS-SCNC: 13 MMOL/L (ref 7–15)
BACTERIA BLD CULT: NO GROWTH
BACTERIA BLD CULT: NO GROWTH
BUN SERPL-MCNC: 5.3 MG/DL (ref 6–20)
CALCIUM SERPL-MCNC: 8.6 MG/DL (ref 8.6–10)
CHLORIDE SERPL-SCNC: 103 MMOL/L (ref 98–107)
CREAT SERPL-MCNC: 1.77 MG/DL (ref 0.67–1.17)
CRP SERPL-MCNC: 240 MG/L
DEPRECATED HCO3 PLAS-SCNC: 23 MMOL/L (ref 22–29)
ERYTHROCYTE [DISTWIDTH] IN BLOOD BY AUTOMATED COUNT: 15.7 % (ref 10–15)
GFR SERPL CREATININE-BSD FRML MDRD: 44 ML/MIN/1.73M2
GLUCOSE BLDC GLUCOMTR-MCNC: 106 MG/DL (ref 70–99)
GLUCOSE BLDC GLUCOMTR-MCNC: 67 MG/DL (ref 70–99)
GLUCOSE BLDC GLUCOMTR-MCNC: 75 MG/DL (ref 70–99)
GLUCOSE BLDC GLUCOMTR-MCNC: 75 MG/DL (ref 70–99)
GLUCOSE BLDC GLUCOMTR-MCNC: 83 MG/DL (ref 70–99)
GLUCOSE BLDC GLUCOMTR-MCNC: 87 MG/DL (ref 70–99)
GLUCOSE SERPL-MCNC: 81 MG/DL (ref 70–99)
HCT VFR BLD AUTO: 26.8 % (ref 40–53)
HGB BLD-MCNC: 8.3 G/DL (ref 13.3–17.7)
MCH RBC QN AUTO: 28.5 PG (ref 26.5–33)
MCHC RBC AUTO-ENTMCNC: 31 G/DL (ref 31.5–36.5)
MCV RBC AUTO: 92 FL (ref 78–100)
PLATELET # BLD AUTO: 633 10E3/UL (ref 150–450)
POTASSIUM SERPL-SCNC: 3.3 MMOL/L (ref 3.4–5.3)
RBC # BLD AUTO: 2.91 10E6/UL (ref 4.4–5.9)
SODIUM SERPL-SCNC: 139 MMOL/L (ref 136–145)
WBC # BLD AUTO: 7.8 10E3/UL (ref 4–11)

## 2023-08-07 PROCEDURE — 250N000013 HC RX MED GY IP 250 OP 250 PS 637: Performed by: PEDIATRICS

## 2023-08-07 PROCEDURE — 97530 THERAPEUTIC ACTIVITIES: CPT | Mod: GO

## 2023-08-07 PROCEDURE — 250N000011 HC RX IP 250 OP 636: Performed by: PEDIATRICS

## 2023-08-07 PROCEDURE — 97535 SELF CARE MNGMENT TRAINING: CPT | Mod: GO

## 2023-08-07 PROCEDURE — 999N000128 HC STATISTIC PERIPHERAL IV START W/O US GUIDANCE

## 2023-08-07 PROCEDURE — 99233 SBSQ HOSP IP/OBS HIGH 50: CPT | Performed by: PEDIATRICS

## 2023-08-07 PROCEDURE — 85027 COMPLETE CBC AUTOMATED: CPT | Performed by: INTERNAL MEDICINE

## 2023-08-07 PROCEDURE — 36415 COLL VENOUS BLD VENIPUNCTURE: CPT | Performed by: INTERNAL MEDICINE

## 2023-08-07 PROCEDURE — 99233 SBSQ HOSP IP/OBS HIGH 50: CPT | Performed by: INTERNAL MEDICINE

## 2023-08-07 PROCEDURE — 258N000003 HC RX IP 258 OP 636: Performed by: PEDIATRICS

## 2023-08-07 PROCEDURE — 258N000003 HC RX IP 258 OP 636: Performed by: PHYSICIAN ASSISTANT

## 2023-08-07 PROCEDURE — 82310 ASSAY OF CALCIUM: CPT | Performed by: INTERNAL MEDICINE

## 2023-08-07 PROCEDURE — 250N000011 HC RX IP 250 OP 636: Mod: JZ | Performed by: EMERGENCY MEDICINE

## 2023-08-07 PROCEDURE — 250N000013 HC RX MED GY IP 250 OP 250 PS 637: Performed by: PHYSICIAN ASSISTANT

## 2023-08-07 PROCEDURE — 86140 C-REACTIVE PROTEIN: CPT | Performed by: INTERNAL MEDICINE

## 2023-08-07 PROCEDURE — 120N000002 HC R&B MED SURG/OB UMMC

## 2023-08-07 RX ORDER — POTASSIUM CHLORIDE 750 MG/1
20 TABLET, EXTENDED RELEASE ORAL ONCE
Status: COMPLETED | OUTPATIENT
Start: 2023-08-07 | End: 2023-08-07

## 2023-08-07 RX ADMIN — PIPERACILLIN AND TAZOBACTAM 3.38 G: 3; .375 INJECTION, POWDER, LYOPHILIZED, FOR SOLUTION INTRAVENOUS at 23:55

## 2023-08-07 RX ADMIN — METOPROLOL SUCCINATE 50 MG: 25 TABLET, EXTENDED RELEASE ORAL at 07:45

## 2023-08-07 RX ADMIN — PIPERACILLIN AND TAZOBACTAM 3.38 G: 3; .375 INJECTION, POWDER, LYOPHILIZED, FOR SOLUTION INTRAVENOUS at 11:36

## 2023-08-07 RX ADMIN — SODIUM CHLORIDE, POTASSIUM CHLORIDE, SODIUM LACTATE AND CALCIUM CHLORIDE: 600; 310; 30; 20 INJECTION, SOLUTION INTRAVENOUS at 06:34

## 2023-08-07 RX ADMIN — PANTOPRAZOLE SODIUM 40 MG: 40 TABLET, DELAYED RELEASE ORAL at 07:45

## 2023-08-07 RX ADMIN — PIPERACILLIN AND TAZOBACTAM 3.38 G: 3; .375 INJECTION, POWDER, LYOPHILIZED, FOR SOLUTION INTRAVENOUS at 17:34

## 2023-08-07 RX ADMIN — ACYCLOVIR 400 MG: 400 TABLET ORAL at 21:02

## 2023-08-07 RX ADMIN — ACETAMINOPHEN 1000 MG: 500 TABLET ORAL at 11:55

## 2023-08-07 RX ADMIN — ACYCLOVIR 400 MG: 400 TABLET ORAL at 05:54

## 2023-08-07 RX ADMIN — PANTOPRAZOLE SODIUM 40 MG: 40 TABLET, DELAYED RELEASE ORAL at 21:02

## 2023-08-07 RX ADMIN — ATORVASTATIN CALCIUM 20 MG: 20 TABLET, FILM COATED ORAL at 07:45

## 2023-08-07 RX ADMIN — VANCOMYCIN HYDROCHLORIDE 1250 MG: 10 INJECTION, POWDER, LYOPHILIZED, FOR SOLUTION INTRAVENOUS at 18:17

## 2023-08-07 RX ADMIN — NORTRIPTYLINE HYDROCHLORIDE 10 MG: 10 CAPSULE ORAL at 21:02

## 2023-08-07 RX ADMIN — OXYCODONE HYDROCHLORIDE 5 MG: 5 TABLET ORAL at 07:45

## 2023-08-07 RX ADMIN — POTASSIUM CHLORIDE 20 MEQ: 750 TABLET, EXTENDED RELEASE ORAL at 11:36

## 2023-08-07 RX ADMIN — PIPERACILLIN AND TAZOBACTAM 3.38 G: 3; .375 INJECTION, POWDER, LYOPHILIZED, FOR SOLUTION INTRAVENOUS at 05:54

## 2023-08-07 RX ADMIN — LEVOTHYROXINE SODIUM 137 MCG: 0.14 TABLET ORAL at 07:45

## 2023-08-07 ASSESSMENT — ACTIVITIES OF DAILY LIVING (ADL)
ADLS_ACUITY_SCORE: 27
ADLS_ACUITY_SCORE: 26
ADLS_ACUITY_SCORE: 27

## 2023-08-07 NOTE — PROGRESS NOTES
[facility]    Medicine Progress Note - Hospitalist Service, GOLD TEAM 9    Date of Admission:  8/2/2023    Assessment & Plan   Dimitrios Goldberg is a 58 year old male with history of R kidney clear cell cancer, IVC tumor thrombus, HTN, CKD, hypothyroidism, ANUPAMA, HSV 2, depression, and GERD who was initially admitted to a hospital while on vacation in [place] [facility] with SBO. Underwent Taiwanese (7/21/2023) and flew home to [place]. Developed increased weakness, lethargy while in transit prompting presentation to [facility] 8/2/2023 and was found to have multiple intraabdominal abscesses.     Today  - Continue antibiotics, monitoring cultures and fever curve  - Surgery reviewing today   Per discussion with surgery, would defer to IR for intervention, will request re-evaluation on 8/8  - Calorie counts started     Multiple intraabdominal abscesses, recent h/o SBO s/p [place]ese in [facility]: Developed symptoms while in [facility], CT noted SBO and was airlifted to [place]. Underwent Taiwanese of the small intestine. Increased fatigue, lethargy on journey home 7/30 to 7/31. CT CAP 8/2 multiple loculated collections in lower abdomen and pelvis, many communicating with each other, multiple mildly prominent small bowel loops in abdomen which can represent ileus vs presence of abscesses. WBC, LA normal, abdominal exam overall with minimal tenderness, passing gas, repeat CT on 8/6 showed no change/improvement  - IR consulted previously  - General surgery following  - ID consulted, concern that antibiotics alone will be insufficient   - Continue Zosyn  - BCx x2, NGTD   - Pain management: Tylenol and oxycodone prn with IV dilaudid for breakthrough     Elevated lipase: Lipase 479 in the setting of the above. CT CAP no acute findings, no epigastric tenderness or focal sx suggestive of pancreatitis   - fluids as above     R kidney clear cell cancer s/p nephrectomy with c/f recurrence, CKD III  Increased  Creatinine   Right clear cell carcinoma Dx 7/2021, s/p radical nephrectomy 8/02021 and immunotherapy (pembrolizumub) 1/2022. CT 7/2022 tumor recurrence, s/p ex lap and HELENA with open resection of RP mass and lateral mass, primary mass not resectable given involvement with vena cava and duodenum. Not a candidate for radiation due to proximity to bowel. Active nephritis req steroids on pembro, was started on Cabozantinub (dose reduced due to hand-foot syndrome). CT CAP 8/2/23 stable postsurgical changes of R nephrectomy, stable from 4/6/2023 imaging. Last saw nephrology 6/28.   - Per d/w oncology, hold PTA Cabozantinub for now. No need to formal consult at this time  - Nephrology consulted     Acute on chronic anemia   ANUPAMA: BL hgb 13-14, low to 9.5 on presentation and then ~8 after fluids. No e/o active bleeding, melena, though after admission CT findings discussed with radiology must consider that fluid collections are quite dense and could be superimposed bleeding (no active arterial bleeding noted but c/f slow venous bleed). Initial concern for hemolysis w/haptoglobin <3 but hemoglobin stabilizing.  - hold ferrous gluconate (PTA med)   - Hemoglobin daily    Vocal cord paresis   Neck Pain  Seen by ENT 8/2, vocal cord paresis is likely related to his intubation given timing of onset and no prior history of voice issues. It is possible that he had a traumatic intubation, and given the slight displacement of the arytenoid would like to further evaluate for arytenoid subluxation/dislocation with imaging by CT with contrast.  Neck pain over past 2 months evaluated with CT in June with no evidence of lesion but MRI recommended.  Holding additional imaging at present given no acute decompensation and allowing renal function to improve.  - Outpatient ENT follow up in laryngology clinic - ENT will arrange         Other Medical Issues:  HTN: PTA on amlodipine, metoprolol. BP stable. Hold amlodipine, continue metoprolol with  hold parameters   HLD: Continue statin. Hold ASA  H/o IVC tumor thrombus: S/p IVC tumor thrombectomy with reconstruction 8/2021 at time of nephrectomy  Migraines: Continue PTA Nortriptyline and prn Rizatriptan   Hypothyroidism (2/2 pembro): TSH 8.51, T4 1.29 5/18/23. Continue synthroid. Repeat TFTs per nephrology recs 6/2023  GERD: Continue PPI   H/o HSV 2: Continue PTA Acyclovir per ID given high potential for outbreak if stopped with current systemic stress         Diet: Regular Diet Adult  Snacks/Supplements Adult: Other; Allow PRN snacks/supplements.; With Meals  Calorie Counts    DVT Prophylaxis: holding pending surgical/IR re-evaluation  White Catheter: Not present  Lines: None     Cardiac Monitoring: None  Code Status: Full Code      Clinically Significant Risk Factors        # Hypokalemia: Lowest K = 3.3 mmol/L in last 2 days, will replace as needed                  # Obesity: Estimated body mass index is 30.53 kg/m  as calculated from the following:    Height as of this encounter: 1.829 m (6').    Weight as of this encounter: 102.1 kg (225 lb 1.6 oz).           Disposition Plan     Expected Discharge Date: 08/11/2023        Discharge Comments: Continues to be febrile despite IV abx.          Serena Warner MD  Hospitalist Service, GOLD TEAM [address] [place] [address]  Securely message with [email or web address] (more info)  Text page via [facility] Paging/Directory   See signed in provider for up to date coverage information  ______________________________________________________________________    Interval History   Dimitrios was stable overnight with no acute events.  Today they are amenable to review per surgical team.  Dimitrios notes he is passing flatus and BMs.  Pain is adequately controlled.  No other complaints or concerns at present.    Physical Exam   Vital Signs: Temp: 99.8  F (37.7  C) Temp src: Oral BP: (!) 132/91 Pulse: 116   Resp: 18 SpO2: 97 % O2 Device: None (Room air)    Weight: 225  lbs 1.6 oz    Gen: Awake, alert, laying in bed in NAD  HEENT: NC/AT, sclera anicteric, voice hoarse  Resp: LCTAB, no increased WOB on RA  CV: Tachycardic with regular rhythm  Abd: Soft, distended with c/d/i dressing in place and diffuse tenderness to palpation  Extrem: Warm and well perfused with trace LE edema    Medical Decision Making             Data   NOTE: Data reviewed over the past 24 hrs contributes toward MDM complexity
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
## 2023-08-07 NOTE — PLAN OF CARE
Goal Outcome Evaluation:  Time: 5037-0611    /88 (BP Location: Right arm)   Pulse 112   Temp 99.8  F (37.7  C) (Oral)   Resp 18   Ht 1.829 m (6')   Wt 102.1 kg (225 lb 1.6 oz)   SpO2 94%   BMI 30.53 kg/m      Reason for admission: Admitted for weakness and lethargy, found to have multiple intraabdominal abscesses.    Activity: SBA  Pain: Back and abdominal pain, declined medication interventions, reports pain under control this morning  Neuro: A&Ox4, intact  Cardiac: Tachy within parameters  Respiratory: SHINE  GI/: Denies N/V. Voiding spontaneously. 1  soft BM this shift  Diet: Regular: starting calorie counts today  Lines: L PIV infusing MIVF LR at 100 ml/hr  Wounds: Midline incision, RAQUEL. Gauze dressing to LLQ, CDI.   Labs/imaging: BS at 0200 was 83. CT of the abdomen done, showed Lobulated communicating fluid collections concerning for ileus versus obstruction.          Plan:  Surgical evaluation today      Continue to monitor and follow POC

## 2023-08-08 LAB
ANION GAP SERPL CALCULATED.3IONS-SCNC: 13 MMOL/L (ref 7–15)
BACTERIA BLD CULT: NO GROWTH
BACTERIA BLD CULT: NO GROWTH
BUN SERPL-MCNC: 4 MG/DL (ref 6–20)
CALCIUM SERPL-MCNC: 8.3 MG/DL (ref 8.6–10)
CHLORIDE SERPL-SCNC: 106 MMOL/L (ref 98–107)
CREAT SERPL-MCNC: 1.7 MG/DL (ref 0.67–1.17)
CRP SERPL-MCNC: 210 MG/L
DEPRECATED HCO3 PLAS-SCNC: 21 MMOL/L (ref 22–29)
ERYTHROCYTE [DISTWIDTH] IN BLOOD BY AUTOMATED COUNT: 15.8 % (ref 10–15)
GFR SERPL CREATININE-BSD FRML MDRD: 46 ML/MIN/1.73M2
GLUCOSE BLDC GLUCOMTR-MCNC: 135 MG/DL (ref 70–99)
GLUCOSE BLDC GLUCOMTR-MCNC: 78 MG/DL (ref 70–99)
GLUCOSE BLDC GLUCOMTR-MCNC: 81 MG/DL (ref 70–99)
GLUCOSE BLDC GLUCOMTR-MCNC: 84 MG/DL (ref 70–99)
GLUCOSE BLDC GLUCOMTR-MCNC: 95 MG/DL (ref 70–99)
GLUCOSE BLDC GLUCOMTR-MCNC: 96 MG/DL (ref 70–99)
GLUCOSE SERPL-MCNC: 87 MG/DL (ref 70–99)
HCT VFR BLD AUTO: 23.9 % (ref 40–53)
HGB BLD-MCNC: 7.3 G/DL (ref 13.3–17.7)
MAGNESIUM SERPL-MCNC: 1.6 MG/DL (ref 1.7–2.3)
MCH RBC QN AUTO: 28.1 PG (ref 26.5–33)
MCHC RBC AUTO-ENTMCNC: 30.5 G/DL (ref 31.5–36.5)
MCV RBC AUTO: 92 FL (ref 78–100)
PHOSPHATE SERPL-MCNC: 2.8 MG/DL (ref 2.5–4.5)
PLATELET # BLD AUTO: 703 10E3/UL (ref 150–450)
POTASSIUM SERPL-SCNC: 3.4 MMOL/L (ref 3.4–5.3)
POTASSIUM SERPL-SCNC: 3.5 MMOL/L (ref 3.4–5.3)
RBC # BLD AUTO: 2.6 10E6/UL (ref 4.4–5.9)
SODIUM SERPL-SCNC: 140 MMOL/L (ref 136–145)
VANCOMYCIN SERPL-MCNC: 10.7 UG/ML
WBC # BLD AUTO: 6.3 10E3/UL (ref 4–11)

## 2023-08-08 PROCEDURE — 36415 COLL VENOUS BLD VENIPUNCTURE: CPT | Performed by: PEDIATRICS

## 2023-08-08 PROCEDURE — 80048 BASIC METABOLIC PNL TOTAL CA: CPT | Performed by: INTERNAL MEDICINE

## 2023-08-08 PROCEDURE — 99233 SBSQ HOSP IP/OBS HIGH 50: CPT | Performed by: INTERNAL MEDICINE

## 2023-08-08 PROCEDURE — 84132 ASSAY OF SERUM POTASSIUM: CPT | Performed by: PEDIATRICS

## 2023-08-08 PROCEDURE — 250N000011 HC RX IP 250 OP 636: Performed by: PEDIATRICS

## 2023-08-08 PROCEDURE — 120N000002 HC R&B MED SURG/OB UMMC

## 2023-08-08 PROCEDURE — 83735 ASSAY OF MAGNESIUM: CPT | Performed by: PEDIATRICS

## 2023-08-08 PROCEDURE — 87040 BLOOD CULTURE FOR BACTERIA: CPT | Performed by: STUDENT IN AN ORGANIZED HEALTH CARE EDUCATION/TRAINING PROGRAM

## 2023-08-08 PROCEDURE — 999N000248 HC STATISTIC IV INSERT WITH US BY RN

## 2023-08-08 PROCEDURE — 85014 HEMATOCRIT: CPT | Performed by: INTERNAL MEDICINE

## 2023-08-08 PROCEDURE — 99233 SBSQ HOSP IP/OBS HIGH 50: CPT | Performed by: STUDENT IN AN ORGANIZED HEALTH CARE EDUCATION/TRAINING PROGRAM

## 2023-08-08 PROCEDURE — 250N000011 HC RX IP 250 OP 636: Performed by: STUDENT IN AN ORGANIZED HEALTH CARE EDUCATION/TRAINING PROGRAM

## 2023-08-08 PROCEDURE — 80202 ASSAY OF VANCOMYCIN: CPT | Performed by: PEDIATRICS

## 2023-08-08 PROCEDURE — 250N000011 HC RX IP 250 OP 636: Mod: JZ | Performed by: EMERGENCY MEDICINE

## 2023-08-08 PROCEDURE — 36415 COLL VENOUS BLD VENIPUNCTURE: CPT | Performed by: STUDENT IN AN ORGANIZED HEALTH CARE EDUCATION/TRAINING PROGRAM

## 2023-08-08 PROCEDURE — 36415 COLL VENOUS BLD VENIPUNCTURE: CPT | Performed by: INTERNAL MEDICINE

## 2023-08-08 PROCEDURE — 84100 ASSAY OF PHOSPHORUS: CPT | Performed by: PEDIATRICS

## 2023-08-08 PROCEDURE — 250N000013 HC RX MED GY IP 250 OP 250 PS 637: Performed by: PHYSICIAN ASSISTANT

## 2023-08-08 PROCEDURE — 86140 C-REACTIVE PROTEIN: CPT | Performed by: INTERNAL MEDICINE

## 2023-08-08 PROCEDURE — 258N000003 HC RX IP 258 OP 636: Performed by: PEDIATRICS

## 2023-08-08 RX ORDER — DEXTROSE, SODIUM CHLORIDE, SODIUM LACTATE, POTASSIUM CHLORIDE, AND CALCIUM CHLORIDE 5; .6; .31; .03; .02 G/100ML; G/100ML; G/100ML; G/100ML; G/100ML
INJECTION, SOLUTION INTRAVENOUS CONTINUOUS
Status: DISCONTINUED | OUTPATIENT
Start: 2023-08-08 | End: 2023-08-12 | Stop reason: HOSPADM

## 2023-08-08 RX ORDER — POTASSIUM CHLORIDE 7.45 MG/ML
10 INJECTION INTRAVENOUS
Status: COMPLETED | OUTPATIENT
Start: 2023-08-08 | End: 2023-08-08

## 2023-08-08 RX ADMIN — SODIUM CHLORIDE, SODIUM LACTATE, POTASSIUM CHLORIDE, CALCIUM CHLORIDE, AND DEXTROSE MONOHYDRATE: 600; 310; 30; 20; 5 INJECTION, SOLUTION INTRAVENOUS at 11:13

## 2023-08-08 RX ADMIN — PIPERACILLIN AND TAZOBACTAM 3.38 G: 3; .375 INJECTION, POWDER, LYOPHILIZED, FOR SOLUTION INTRAVENOUS at 06:20

## 2023-08-08 RX ADMIN — ATORVASTATIN CALCIUM 20 MG: 20 TABLET, FILM COATED ORAL at 09:45

## 2023-08-08 RX ADMIN — PIPERACILLIN AND TAZOBACTAM 3.38 G: 3; .375 INJECTION, POWDER, LYOPHILIZED, FOR SOLUTION INTRAVENOUS at 12:43

## 2023-08-08 RX ADMIN — POTASSIUM CHLORIDE 10 MEQ: 7.46 INJECTION, SOLUTION INTRAVENOUS at 11:14

## 2023-08-08 RX ADMIN — ACYCLOVIR 400 MG: 400 TABLET ORAL at 21:50

## 2023-08-08 RX ADMIN — VANCOMYCIN HYDROCHLORIDE 1500 MG: 10 INJECTION, POWDER, LYOPHILIZED, FOR SOLUTION INTRAVENOUS at 17:36

## 2023-08-08 RX ADMIN — PANTOPRAZOLE SODIUM 40 MG: 40 TABLET, DELAYED RELEASE ORAL at 09:45

## 2023-08-08 RX ADMIN — LEVOTHYROXINE SODIUM 137 MCG: 0.14 TABLET ORAL at 09:51

## 2023-08-08 RX ADMIN — POTASSIUM CHLORIDE 10 MEQ: 7.46 INJECTION, SOLUTION INTRAVENOUS at 14:28

## 2023-08-08 RX ADMIN — METOPROLOL SUCCINATE 50 MG: 25 TABLET, EXTENDED RELEASE ORAL at 09:45

## 2023-08-08 RX ADMIN — ACETAMINOPHEN 1000 MG: 500 TABLET ORAL at 22:41

## 2023-08-08 RX ADMIN — ACYCLOVIR 400 MG: 400 TABLET ORAL at 05:33

## 2023-08-08 RX ADMIN — ACYCLOVIR 400 MG: 400 TABLET ORAL at 14:28

## 2023-08-08 RX ADMIN — PANTOPRAZOLE SODIUM 40 MG: 40 TABLET, DELAYED RELEASE ORAL at 19:02

## 2023-08-08 RX ADMIN — SODIUM CHLORIDE, SODIUM LACTATE, POTASSIUM CHLORIDE, CALCIUM CHLORIDE, AND DEXTROSE MONOHYDRATE: 600; 310; 30; 20; 5 INJECTION, SOLUTION INTRAVENOUS at 22:44

## 2023-08-08 RX ADMIN — PIPERACILLIN AND TAZOBACTAM 3.38 G: 3; .375 INJECTION, POWDER, LYOPHILIZED, FOR SOLUTION INTRAVENOUS at 18:58

## 2023-08-08 RX ADMIN — NORTRIPTYLINE HYDROCHLORIDE 10 MG: 10 CAPSULE ORAL at 21:50

## 2023-08-08 ASSESSMENT — ACTIVITIES OF DAILY LIVING (ADL)
ADLS_ACUITY_SCORE: 27
DEPENDENT_IADLS:: INDEPENDENT

## 2023-08-08 NOTE — PROVIDER NOTIFICATION
Paged Dr. Zepeda via [employer]    Pt spiked a fever - 100.8 and . Repeat Blood cultures? further imaging? thanks.     Provider ordered blood cultures and came to assess patient.

## 2023-08-08 NOTE — PROVIDER NOTIFICATION
"Paged Dexter Mathew PA-C (#[phone]) @ [phone]    Spot checked pts BG and it is 75. On LR MIVF. Pt refusing apple juice, saying it \"hurts his stomach\". Can we change MIVF? Thanks.     Plan: start q4 BG checks   "

## 2023-08-08 NOTE — PLAN OF CARE
Goal Outcome Evaluation:  1900-0700: Pt A&Ox4 with VSS on RA. No complaints of N/V or SOB. Some pain noted in L arm where old PIV was removed. Noted to still be a little swollen and firm, no interventions needed initially but removed and replaced at end of shift due to increased discomfort and placement of IV. LR running at 100mL/hr. Continues on IV abx. Spot checked BG of 75, given apple juice and rechecked at 84. New order for q4 BG checks to monitor. BG checks of 96 and 84. On calos counts. Sleeping between cares and able to make needs known. SBA, calling appropriately. Continue with POC.

## 2023-08-08 NOTE — CONSULTS
Care Management Initial Consult    General Information  Assessment completed with: Dimitrios Ramires  Type of CM/SW Visit: Initial Assessment    Primary Care Provider verified and updated as needed: Yes   Readmission within the last 30 days:     Reason for Consult: discharge planning  Advance Care Planning:          Communication Assessment  Patient's communication style: spoken language (English or Bilingual)    Hearing Difficulty or Deaf: no   Wear Glasses or Blind: yes    Cognitive  Cognitive/Neuro/Behavioral: WDL  Level of Consciousness: alert  Arousal Level: opens eyes spontaneously  Orientation: oriented x 4  Mood/Behavior: calm, cooperative  Best Language: 0 - No aphasia  Speech: whispers    Living Environment:   People in home: spouse, child(jennifer), adult     Current living Arrangements: house      Able to return to prior arrangements: no     Family/Social Support:  Care provided by: self  Provides care for: no one  Marital Status:   Wife, Children, Other (specify) (family/friends near by)  Caroline       Description of Support System: Involved, Supportive    Support Assessment: Adequate family and caregiver support    Current Resources:   Patient receiving home care services: No     Community Resources: None  Equipment currently used at home: none  Supplies currently used at home: None    Employment/Financial:  Employment Status: employed full-time       Functional Status:  Prior to admission patient needed assistance:   Dependent ADLs:: Independent  Dependent IADLs:: Independent    Additional Information:  Met with patient to complete initial care management assessment in response to elevated readmission risk score. Patient currently lives in a house with his wife and daughter in [place]. Patient denies any assistive devices, is independent with ADLs/IADLs at baseline. Currently works full time for [employer]. Patient denies current home, community or outpatient services. States  he has additional family and friends nearby for support as needed.     CM will continue to follow and assist with discharge planning as needed.     Corrine Ward RN, BSN  6A RN Care Coordinator  Ph: [phone]   Pager: [phone]

## 2023-08-08 NOTE — PROGRESS NOTES
[facility]    Medicine Progress Note - Hospitalist Service, GOLD TEAM 9    Date of Admission:  8/2/2023    Assessment & Plan   Dimitrios Goldberg is a 58 year old male with history of R kidney clear cell cancer, IVC tumor thrombus, HTN, CKD, hypothyroidism, ANUPAMA, HSV 2, depression, and GERD who was initially admitted to a hospital while on vacation in [facility] with SBO. Underwent Macanese (7/21/2023 - clarified with wife it was lysis of adhesions and sent out with Augmentin for pneumonia) and flew home to [place]. Developed increased weakness, lethargy while in transit prompting presentation to [facility] 8/2/2023 and was found to have multiple intraabdominal abscesses.     Today  - Fever and tachycardia - repeat blood cultures ordered  - Appreciate IR LLQ Abscess FNA vs drain placement likely 8/9 (no room on schedule for 8/8) - Gram stain, aerobic, anaerobic, and fungal cultures ordered   - Restarted diet for this evening. NPO at midnight  - Continue abx, follow-up cultures  - Glucose 78 in AM - LR changed to D5 LR stil at 100 ml/hr  - Continue Calorie counts   - K 3.4 - replaced per RN protocol - repeat in AM     Multiple intraabdominal abscesses, recent h/o SBO s/p Macanese in [facility]: Developed symptoms while in [facility], CT noted SBO and was airlifted to [place]. Underwent Macanese of the small intestine. Clarified with wife Cre per her English report that it was lysis of adhesions and sent out with Augmentin for pneumonia) Increased fatigue, lethargy on journey home 7/30 to 7/31. CT CAP 8/2 multiple loculated collections in lower abdomen and pelvis, many communicating with each other, multiple mildly prominent small bowel loops in abdomen which can represent ileus vs presence of abscesses. WBC, LA normal, abdominal exam overall with minimal tenderness, passing gas, repeat CT on 8/6 showed no change/improvement  - Fever and tachycardia - repeat blood cultures pending  - General  surgery - 8/7 - no intervention recommended  - Appreciate IR LLQ Abscess aspiration vs drain placement 8/8   - ID - Zosyn and vancomycin - for abscess aspiration, rec gram stain, aerobic, anaerobic, and fungal cultures   - Pain management: Tylenol and oxycodone prn with IV dilaudid for breakthrough     Elevated lipase: Lipase 479 in the setting of the above. CT CAP no acute findings, no epigastric tenderness or focal sx suggestive of pancreatitis   - Fluids      R kidney clear cell cancer s/p nephrectomy with c/f recurrence, CKD III  Increased Creatinine   Right clear cell carcinoma Dx 7/2021, s/p radical nephrectomy 8/02021 and immunotherapy (pembrolizumub) 1/2022. CT 7/2022 tumor recurrence, s/p ex lap and Guyanese with open resection of RP mass and lateral mass, primary mass not resectable given involvement with vena cava and duodenum. Not a candidate for radiation due to proximity to bowel. Active nephritis req steroids on pembro, was started on Cabozantinub (dose reduced due to hand-foot syndrome). CT CAP 8/2/23 stable postsurgical changes of R nephrectomy, stable from 4/6/2023 imaging. Last saw nephrology 6/28.   - Per d/w oncology, hold PTA Cabozantinub for now. No need to formal consult at this time  - Nephrology consulted     Acute on chronic anemia   ANUPAMA: BL hgb 13-14, low to 9.5 on presentation and then ~8 after fluids. No e/o active bleeding, melena, though after admission CT findings discussed with radiology must consider that fluid collections are quite dense and could be superimposed bleeding (no active arterial bleeding noted but c/f slow venous bleed). Initial concern for hemolysis w/haptoglobin <3 but hemoglobin stabilizing.  - Hold ferrous gluconate (PTA med)   - Hemoglobin daily    Vocal cord paresis   Neck Pain  Seen by ENT 8/2, vocal cord paresis is likely related to his intubation given timing of onset and no prior history of voice issues. It is possible that he had a traumatic intubation, and  given the slight displacement of the arytenoid would like to further evaluate for arytenoid subluxation/dislocation with imaging by CT with contrast.  Neck pain over past 2 months evaluated with CT in June with no evidence of lesion but MRI recommended.  Holding additional imaging at present given no acute decompensation and allowing renal function to improve.  - Outpatient ENT follow up in laryngology clinic - ENT will arrange         Other Medical Issues:  HTN: PTA on amlodipine, metoprolol. BP stable. Hold amlodipine, continue metoprolol with hold parameters   HLD: Continue statin. Hold ASA  H/o IVC tumor thrombus: S/p IVC tumor thrombectomy with reconstruction 8/2021 at time of nephrectomy  Migraines: Continue PTA Nortriptyline and prn Rizatriptan   Hypothyroidism (2/2 pembro): TSH 8.51, T4 1.29 5/18/23. Continue synthroid. Repeat TFTs per nephrology recs 6/2023  GERD: Continue PPI   H/o HSV 2: Continue PTA Acyclovir per ID given high potential for outbreak if stopped with current systemic stress         Diet: Snacks/Supplements Adult: Other; Allow PRN snacks/supplements.; With Meals  Calorie Counts  Regular Diet Adult  NPO per Anesthesia Guidelines for Procedure/Surgery Except for: Meds, Ice Chips    DVT Prophylaxis: holding pending surgical/IR re-evaluation  White Catheter: Not present  Lines: None     Cardiac Monitoring: None  Code Status: Full Code      Clinically Significant Risk Factors        # Hypokalemia: Lowest K = 3.3 mmol/L in last 2 days, will replace as needed     # Hypomagnesemia: Lowest Mg = 1.6 mg/dL in last 2 days, will replace as needed              # Obesity: Estimated body mass index is 30.05 kg/m  as calculated from the following:    Height as of this encounter: 1.829 m (6').    Weight as of this encounter: 100.5 kg (221 lb 9.6 oz).             Disposition Plan      Expected Discharge Date: 08/11/2023      Destination: home  Discharge Comments: Continues to be febrile despite IV abx.           Catarino Zepeda MD  Hospitalist Service, GOLD TEAM 9  [facility]  Securely message with [employer] (more info)  Text page via [employer] Paging/Directory   See signed in provider for up to date coverage information  ______________________________________________________________________    Interval History   No acute events overnight. Temp 100.8 deg F and HR in 120s. Blood cultures x2 ordered. Seen and examined at bedside with wife Cre over the phone. Updated that IR will eval Dimitrios for intervention and noted that right now, there may be more harm and than benefit for surgical intervention and also consoled them both that Dimitrios is in a tough position. Appetite has been minimal. Denies headache, chest pain, dyspnea, myalgias, and urinary issues. Passing flatus.    Physical Exam   Vital Signs: Temp: 99.3  F (37.4  C) Temp src: Oral BP: 134/79 Pulse: 105   Resp: 18 SpO2: 96 % O2 Device: None (Room air)    Weight: 221 lbs 9.6 oz  Gen: Awake, alert, laying in bed in NAD  HEENT: NC/AT, sclera anicteric, voice hoarse  Resp: LCTAB, no increased WOB on RA  CV: Tachycardic with regular rhythm  Abd: Soft, distended with c/d/i dressing in place and diffusely tender to palpation  Extrem: Warm and well perfused with trace LE edema    Medical Decision Making             Data   NOTE: Data reviewed over the past 24 hrs contributes toward MDM complexity
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
## 2023-08-08 NOTE — PROGRESS NOTES
Calorie Count  Intake recorded for: 8/7  Total Kcals: 275 Total Protein: 1g  Kcals from Hospital Food: 275  Protein: 1g  Kcals from Outside Food (average):0 Protein: 0g  # Meals Ordered from Kitchen: 1 meal + food from nursing unit   # Meals Recorded: 2 meals (First - less than 25% cheese pizza)       (Second - 100% 4 small apple juices, less than 25% ice cream - from nursing unit)   # Supplements Recorded: 0

## 2023-08-08 NOTE — PLAN OF CARE
Time 0580-1685    /82 (BP Location: Left arm)   Pulse 109   Temp (!) 100.9  F (38.3  C) (Oral)   Resp 18   Ht 1.829 m (6')   Wt 100.5 kg (221 lb 9.6 oz)   SpO2 96%   BMI 30.05 kg/m      Reason for admission: Intra-abdominal abscesses  Activity: Assist of one  Pain: Denies  Neuro: WDL  Cardiac: Tachy cardia   Respiratory: WDL  GI/: Voiding not saving   Diet: Regular  Lines: PIV  Wounds: none      New changes this shift: none     Plan: Continue to monitor and follow POC

## 2023-08-08 NOTE — PLAN OF CARE
Goal Outcome Evaluation:    [id number]:  Tmax 99.8, -110s, OVSS. Complained of back pain, oxycodone given x1 w/ some relief. Reported migraine pains, tylenol given x1 w/ relief, other PRN medication available if needed. Continues w/ poor PO intake, continues calorie counts. BG check 67, apple juice given x3, rechecks of 75 & 106; continue to monitor BGs. Abdominal gauze dressing changed, minimal drainage. 1 x order of potassium given for level of 3.3. Reports adequate UOP. Continue to monitor & w/ POC.

## 2023-08-08 NOTE — PHARMACY-VANCOMYCIN DOSING SERVICE
"Pharmacy Vancomycin Note  Date of Service 2023  Patient's  1965   58 year old, male    Indication: Sepsis  Day of Therapy: 4  Current vancomycin regimen:  1250 mg IV q24h  Current vancomycin monitoring method: AUC  Current vancomycin therapeutic monitoring goal: 400-600 mg*h/L    [employer] Prediction of Current Vancomycin Regimen    Regimen: 1250 mg IV every 24 hours.  Start time: 18:17 on 2023  Exposure target: AUC24 (range)400-600 mg/L.hr   AUC24,ss: 383 mg/L.hr  Probability of AUC24 > 400: 42 %  Ctrough,ss: 11.5 mg/L  Probability of Ctrough,ss > 20: 5 %  Probability of nephrotoxicity (Lodise BEATRIS ): 7 %    Current estimated CrCl = Estimated Creatinine Clearance: 58.2 mL/min (A) (based on SCr of 1.7 mg/dL (H)).    Creatinine for last 3 days  2023:  5:46 AM Creatinine 1.82 mg/dL  2023:  5:41 AM Creatinine 1.77 mg/dL  2023:  5:17 AM Creatinine 1.70 mg/dL    Recent Vancomycin Levels (past 3 days)  2023: 10:18 AM Vancomycin 10.7 ug/mL    Vancomycin IV Administrations (past 72 hours)                     vancomycin (VANCOCIN) 1,250 mg in 0.9% NaCl 250 mL intermittent infusion (mg) 1,250 mg New Bag 23 1817     1,250 mg New Bag 23 1807     1,250 mg New Bag 23                    Nephrotoxins and other renal medications (From now, onward)      Start     Dose/Rate Route Frequency Ordered Stop    23 1800  vancomycin (VANCOCIN) 1,250 mg in 0.9% NaCl 250 mL intermittent infusion         1,250 mg  over 90 Minutes Intravenous EVERY 24 HOURS 23 1741      23 1400  acyclovir (ZOVIRAX) tablet 400 mg        Note to Pharmacy: PTA Sig:Take 1 tablet (400 mg) by mouth every 8 hours      400 mg Oral EVERY 8 HOURS SCHEDULED 23 0901      23 0630  piperacillin-tazobactam (ZOSYN) 3.375 g vial to attach to  mL bag        Note to Pharmacy: For [facility], [facility] and [facility]: For Zosyn-naive patients, use the \"Zosyn initial dose + extended infusion\" order " panel.    3.375 g  over 30 Minutes Intravenous EVERY 6 HOURS 08/02/23 0552                 Contrast Orders - past 72 hours (72h ago, onward)      Start     Dose/Rate Route Frequency Stop    08/06/23 1830  iopamidol (ISOVUE-370) solution 135 mL         135 mL Intravenous ONCE 08/06/23 1847            Interpretation of levels and current regimen:  Vancomycin level is reflective of AUC less than 400    Has serum creatinine changed greater than 50% in last 72 hours: No    Urine output:  good urine output    Renal Function: Improving    [employer] Prediction of Planned New Vancomycin Regimen    Regimen: 1500 mg IV every 24 hours.  Start time: 18:17 on 08/08/2023  Exposure target: AUC24 (range)400-600 mg/L.hr   AUC24,ss: 456 mg/L.hr  Probability of AUC24 > 400: 74 %  Ctrough,ss: 13.7 mg/L  Probability of Ctrough,ss > 20: 12 %  Probability of nephrotoxicity (Lodise BEATRIS 2009): 9 %    Plan:  Increase Dose to 1500 mg q24h  Vancomycin monitoring method: AUC  Vancomycin therapeutic monitoring goal: 400-600 mg*h/L  Pharmacy will check vancomycin levels as appropriate in 1-3 Days.  Serum creatinine levels will be ordered daily for the first week of therapy and at least twice weekly for subsequent weeks.    MAHESH MORROW RPH

## 2023-08-09 ENCOUNTER — HOME INFUSION (PRE-WILLOW HOME INFUSION) (OUTPATIENT)
Dept: PHARMACY | Facility: CLINIC | Age: 58
End: 2023-08-09

## 2023-08-09 ENCOUNTER — APPOINTMENT (OUTPATIENT)
Dept: OCCUPATIONAL THERAPY | Facility: CLINIC | Age: 58
DRG: 862 | End: 2023-08-09
Payer: COMMERCIAL

## 2023-08-09 ENCOUNTER — APPOINTMENT (OUTPATIENT)
Dept: INTERVENTIONAL RADIOLOGY/VASCULAR | Facility: CLINIC | Age: 58
DRG: 862 | End: 2023-08-09
Attending: NURSE PRACTITIONER
Payer: COMMERCIAL

## 2023-08-09 LAB
ALBUMIN SERPL BCG-MCNC: 3 G/DL (ref 3.5–5.2)
ALP SERPL-CCNC: 62 U/L (ref 40–129)
ALT SERPL W P-5'-P-CCNC: 19 U/L (ref 0–70)
ANION GAP SERPL CALCULATED.3IONS-SCNC: 13 MMOL/L (ref 7–15)
AST SERPL W P-5'-P-CCNC: 36 U/L (ref 0–45)
BASOPHILS # BLD AUTO: 0 10E3/UL (ref 0–0.2)
BASOPHILS NFR BLD AUTO: 0 %
BILIRUB SERPL-MCNC: 0.4 MG/DL
BUN SERPL-MCNC: 3.2 MG/DL (ref 6–20)
CALCIUM SERPL-MCNC: 8.6 MG/DL (ref 8.6–10)
CHLORIDE SERPL-SCNC: 106 MMOL/L (ref 98–107)
CREAT SERPL-MCNC: 1.72 MG/DL (ref 0.67–1.17)
CRP SERPL-MCNC: 204 MG/L
DEPRECATED HCO3 PLAS-SCNC: 22 MMOL/L (ref 22–29)
EOSINOPHIL # BLD AUTO: 0.1 10E3/UL (ref 0–0.7)
EOSINOPHIL NFR BLD AUTO: 1 %
ERYTHROCYTE [DISTWIDTH] IN BLOOD BY AUTOMATED COUNT: 15.9 % (ref 10–15)
GFR SERPL CREATININE-BSD FRML MDRD: 46 ML/MIN/1.73M2
GLUCOSE BLDC GLUCOMTR-MCNC: 100 MG/DL (ref 70–99)
GLUCOSE BLDC GLUCOMTR-MCNC: 113 MG/DL (ref 70–99)
GLUCOSE BLDC GLUCOMTR-MCNC: 120 MG/DL (ref 70–99)
GLUCOSE BLDC GLUCOMTR-MCNC: 78 MG/DL (ref 70–99)
GLUCOSE BLDC GLUCOMTR-MCNC: 81 MG/DL (ref 70–99)
GLUCOSE SERPL-MCNC: 121 MG/DL (ref 70–99)
HCT VFR BLD AUTO: 25.4 % (ref 40–53)
HGB BLD-MCNC: 8 G/DL (ref 13.3–17.7)
IMM GRANULOCYTES # BLD: 0.1 10E3/UL
IMM GRANULOCYTES NFR BLD: 2 %
LYMPHOCYTES # BLD AUTO: 1.3 10E3/UL (ref 0.8–5.3)
LYMPHOCYTES NFR BLD AUTO: 20 %
MAGNESIUM SERPL-MCNC: 1.6 MG/DL (ref 1.7–2.3)
MCH RBC QN AUTO: 29.1 PG (ref 26.5–33)
MCHC RBC AUTO-ENTMCNC: 31.5 G/DL (ref 31.5–36.5)
MCV RBC AUTO: 92 FL (ref 78–100)
MONOCYTES # BLD AUTO: 0.9 10E3/UL (ref 0–1.3)
MONOCYTES NFR BLD AUTO: 13 %
NEUTROPHILS # BLD AUTO: 4.3 10E3/UL (ref 1.6–8.3)
NEUTROPHILS NFR BLD AUTO: 64 %
NRBC # BLD AUTO: 0 10E3/UL
NRBC BLD AUTO-RTO: 0 /100
PHOSPHATE SERPL-MCNC: 2.7 MG/DL (ref 2.5–4.5)
PLATELET # BLD AUTO: 733 10E3/UL (ref 150–450)
POTASSIUM SERPL-SCNC: 3.4 MMOL/L (ref 3.4–5.3)
POTASSIUM SERPL-SCNC: 3.6 MMOL/L (ref 3.4–5.3)
POTASSIUM SERPL-SCNC: 3.7 MMOL/L (ref 3.4–5.3)
PROT SERPL-MCNC: 6.2 G/DL (ref 6.4–8.3)
RBC # BLD AUTO: 2.75 10E6/UL (ref 4.4–5.9)
SODIUM SERPL-SCNC: 141 MMOL/L (ref 136–145)
WBC # BLD AUTO: 6.7 10E3/UL (ref 4–11)

## 2023-08-09 PROCEDURE — 99232 SBSQ HOSP IP/OBS MODERATE 35: CPT | Performed by: INTERNAL MEDICINE

## 2023-08-09 PROCEDURE — 36415 COLL VENOUS BLD VENIPUNCTURE: CPT | Performed by: INTERNAL MEDICINE

## 2023-08-09 PROCEDURE — 10005 FNA BX W/US GDN 1ST LES: CPT | Performed by: RADIOLOGY

## 2023-08-09 PROCEDURE — 120N000002 HC R&B MED SURG/OB UMMC

## 2023-08-09 PROCEDURE — 76942 ECHO GUIDE FOR BIOPSY: CPT

## 2023-08-09 PROCEDURE — 87102 FUNGUS ISOLATION CULTURE: CPT | Performed by: STUDENT IN AN ORGANIZED HEALTH CARE EDUCATION/TRAINING PROGRAM

## 2023-08-09 PROCEDURE — 99233 SBSQ HOSP IP/OBS HIGH 50: CPT | Performed by: STUDENT IN AN ORGANIZED HEALTH CARE EDUCATION/TRAINING PROGRAM

## 2023-08-09 PROCEDURE — 99152 MOD SED SAME PHYS/QHP 5/>YRS: CPT | Performed by: RADIOLOGY

## 2023-08-09 PROCEDURE — 99152 MOD SED SAME PHYS/QHP 5/>YRS: CPT

## 2023-08-09 PROCEDURE — 250N000011 HC RX IP 250 OP 636: Performed by: RADIOLOGY

## 2023-08-09 PROCEDURE — 36415 COLL VENOUS BLD VENIPUNCTURE: CPT | Performed by: PEDIATRICS

## 2023-08-09 PROCEDURE — 250N000011 HC RX IP 250 OP 636: Performed by: STUDENT IN AN ORGANIZED HEALTH CARE EDUCATION/TRAINING PROGRAM

## 2023-08-09 PROCEDURE — 85025 COMPLETE CBC W/AUTO DIFF WBC: CPT | Performed by: STUDENT IN AN ORGANIZED HEALTH CARE EDUCATION/TRAINING PROGRAM

## 2023-08-09 PROCEDURE — 250N000013 HC RX MED GY IP 250 OP 250 PS 637: Performed by: PEDIATRICS

## 2023-08-09 PROCEDURE — 83735 ASSAY OF MAGNESIUM: CPT | Performed by: PEDIATRICS

## 2023-08-09 PROCEDURE — 87205 SMEAR GRAM STAIN: CPT | Performed by: STUDENT IN AN ORGANIZED HEALTH CARE EDUCATION/TRAINING PROGRAM

## 2023-08-09 PROCEDURE — 84132 ASSAY OF SERUM POTASSIUM: CPT | Performed by: PEDIATRICS

## 2023-08-09 PROCEDURE — 97530 THERAPEUTIC ACTIVITIES: CPT | Mod: GO

## 2023-08-09 PROCEDURE — 272N000500 HC NEEDLE CR2

## 2023-08-09 PROCEDURE — 250N000009 HC RX 250: Performed by: RADIOLOGY

## 2023-08-09 PROCEDURE — 250N000011 HC RX IP 250 OP 636: Mod: JZ | Performed by: EMERGENCY MEDICINE

## 2023-08-09 PROCEDURE — 99153 MOD SED SAME PHYS/QHP EA: CPT

## 2023-08-09 PROCEDURE — 87070 CULTURE OTHR SPECIMN AEROBIC: CPT | Performed by: STUDENT IN AN ORGANIZED HEALTH CARE EDUCATION/TRAINING PROGRAM

## 2023-08-09 PROCEDURE — 84100 ASSAY OF PHOSPHORUS: CPT | Performed by: PEDIATRICS

## 2023-08-09 PROCEDURE — 250N000011 HC RX IP 250 OP 636: Performed by: PEDIATRICS

## 2023-08-09 PROCEDURE — 86140 C-REACTIVE PROTEIN: CPT | Performed by: INTERNAL MEDICINE

## 2023-08-09 PROCEDURE — 80053 COMPREHEN METABOLIC PANEL: CPT | Performed by: STUDENT IN AN ORGANIZED HEALTH CARE EDUCATION/TRAINING PROGRAM

## 2023-08-09 PROCEDURE — 87075 CULTR BACTERIA EXCEPT BLOOD: CPT | Performed by: STUDENT IN AN ORGANIZED HEALTH CARE EDUCATION/TRAINING PROGRAM

## 2023-08-09 PROCEDURE — 97535 SELF CARE MNGMENT TRAINING: CPT | Mod: GO

## 2023-08-09 PROCEDURE — 250N000013 HC RX MED GY IP 250 OP 250 PS 637: Performed by: PHYSICIAN ASSISTANT

## 2023-08-09 PROCEDURE — 258N000003 HC RX IP 258 OP 636: Performed by: PEDIATRICS

## 2023-08-09 PROCEDURE — 0W9G3ZZ DRAINAGE OF PERITONEAL CAVITY, PERCUTANEOUS APPROACH: ICD-10-PCS | Performed by: RADIOLOGY

## 2023-08-09 RX ORDER — FLUMAZENIL 0.1 MG/ML
0.2 INJECTION, SOLUTION INTRAVENOUS
Status: ACTIVE | OUTPATIENT
Start: 2023-08-09 | End: 2023-08-09

## 2023-08-09 RX ORDER — NALOXONE HYDROCHLORIDE 0.4 MG/ML
0.2 INJECTION, SOLUTION INTRAMUSCULAR; INTRAVENOUS; SUBCUTANEOUS
Status: DISCONTINUED | OUTPATIENT
Start: 2023-08-09 | End: 2023-08-09

## 2023-08-09 RX ORDER — POTASSIUM CHLORIDE 750 MG/1
40 TABLET, EXTENDED RELEASE ORAL ONCE
Status: COMPLETED | OUTPATIENT
Start: 2023-08-09 | End: 2023-08-09

## 2023-08-09 RX ORDER — NALOXONE HYDROCHLORIDE 0.4 MG/ML
0.4 INJECTION, SOLUTION INTRAMUSCULAR; INTRAVENOUS; SUBCUTANEOUS
Status: DISCONTINUED | OUTPATIENT
Start: 2023-08-09 | End: 2023-08-09

## 2023-08-09 RX ORDER — ONDANSETRON 2 MG/ML
4 INJECTION INTRAMUSCULAR; INTRAVENOUS
Status: DISCONTINUED | OUTPATIENT
Start: 2023-08-09 | End: 2023-08-09

## 2023-08-09 RX ORDER — SALIVA STIMULANT COMB. NO.3
2 SPRAY, NON-AEROSOL (ML) MUCOUS MEMBRANE 4 TIMES DAILY PRN
Status: DISCONTINUED | OUTPATIENT
Start: 2023-08-09 | End: 2023-08-10

## 2023-08-09 RX ORDER — FENTANYL CITRATE 50 UG/ML
25-50 INJECTION, SOLUTION INTRAMUSCULAR; INTRAVENOUS EVERY 5 MIN PRN
Status: DISCONTINUED | OUTPATIENT
Start: 2023-08-09 | End: 2023-08-09

## 2023-08-09 RX ADMIN — PANTOPRAZOLE SODIUM 40 MG: 40 TABLET, DELAYED RELEASE ORAL at 19:32

## 2023-08-09 RX ADMIN — OXYCODONE HYDROCHLORIDE 5 MG: 5 TABLET ORAL at 14:53

## 2023-08-09 RX ADMIN — PIPERACILLIN AND TAZOBACTAM 3.38 G: 3; .375 INJECTION, POWDER, LYOPHILIZED, FOR SOLUTION INTRAVENOUS at 19:32

## 2023-08-09 RX ADMIN — NORTRIPTYLINE HYDROCHLORIDE 10 MG: 10 CAPSULE ORAL at 21:11

## 2023-08-09 RX ADMIN — PIPERACILLIN AND TAZOBACTAM 3.38 G: 3; .375 INJECTION, POWDER, LYOPHILIZED, FOR SOLUTION INTRAVENOUS at 06:08

## 2023-08-09 RX ADMIN — VANCOMYCIN HYDROCHLORIDE 1500 MG: 10 INJECTION, POWDER, LYOPHILIZED, FOR SOLUTION INTRAVENOUS at 17:33

## 2023-08-09 RX ADMIN — MIDAZOLAM 1 MG: 1 INJECTION INTRAMUSCULAR; INTRAVENOUS at 10:05

## 2023-08-09 RX ADMIN — POTASSIUM CHLORIDE 40 MEQ: 750 TABLET, EXTENDED RELEASE ORAL at 08:37

## 2023-08-09 RX ADMIN — ATORVASTATIN CALCIUM 20 MG: 20 TABLET, FILM COATED ORAL at 08:37

## 2023-08-09 RX ADMIN — METOPROLOL SUCCINATE 50 MG: 25 TABLET, EXTENDED RELEASE ORAL at 08:37

## 2023-08-09 RX ADMIN — PIPERACILLIN AND TAZOBACTAM 3.38 G: 3; .375 INJECTION, POWDER, LYOPHILIZED, FOR SOLUTION INTRAVENOUS at 14:01

## 2023-08-09 RX ADMIN — ACYCLOVIR 400 MG: 400 TABLET ORAL at 06:08

## 2023-08-09 RX ADMIN — MIDAZOLAM 1 MG: 1 INJECTION INTRAMUSCULAR; INTRAVENOUS at 10:15

## 2023-08-09 RX ADMIN — FENTANYL CITRATE 50 MCG: 50 INJECTION, SOLUTION INTRAMUSCULAR; INTRAVENOUS at 10:05

## 2023-08-09 RX ADMIN — ACYCLOVIR 400 MG: 400 TABLET ORAL at 14:01

## 2023-08-09 RX ADMIN — MIDAZOLAM 0.5 MG: 1 INJECTION INTRAMUSCULAR; INTRAVENOUS at 10:29

## 2023-08-09 RX ADMIN — ACYCLOVIR 400 MG: 400 TABLET ORAL at 21:11

## 2023-08-09 RX ADMIN — RIZATRIPTAN BENZOATE 10 MG: 10 TABLET, ORALLY DISINTEGRATING ORAL at 02:43

## 2023-08-09 RX ADMIN — LEVOTHYROXINE SODIUM 137 MCG: 0.14 TABLET ORAL at 08:37

## 2023-08-09 RX ADMIN — LIDOCAINE HYDROCHLORIDE 5 ML: 10 INJECTION, SOLUTION EPIDURAL; INFILTRATION; INTRACAUDAL; PERINEURAL at 10:33

## 2023-08-09 RX ADMIN — PIPERACILLIN AND TAZOBACTAM 3.38 G: 3; .375 INJECTION, POWDER, LYOPHILIZED, FOR SOLUTION INTRAVENOUS at 00:25

## 2023-08-09 RX ADMIN — FENTANYL CITRATE 25 MCG: 50 INJECTION, SOLUTION INTRAMUSCULAR; INTRAVENOUS at 10:28

## 2023-08-09 RX ADMIN — SODIUM CHLORIDE, SODIUM LACTATE, POTASSIUM CHLORIDE, CALCIUM CHLORIDE, AND DEXTROSE MONOHYDRATE: 600; 310; 30; 20; 5 INJECTION, SOLUTION INTRAVENOUS at 11:04

## 2023-08-09 RX ADMIN — PANTOPRAZOLE SODIUM 40 MG: 40 TABLET, DELAYED RELEASE ORAL at 08:37

## 2023-08-09 RX ADMIN — FENTANYL CITRATE 50 MCG: 50 INJECTION, SOLUTION INTRAMUSCULAR; INTRAVENOUS at 10:14

## 2023-08-09 ASSESSMENT — ACTIVITIES OF DAILY LIVING (ADL)
ADLS_ACUITY_SCORE: 27

## 2023-08-09 NOTE — PRE-PROCEDURE
GENERAL PRE-PROCEDURE:   Procedure:  Left lower quadrant fluid aspiration possible drainage catheter placement  Date/Time:  8/9/2023 10:03 AM    Verbal consent obtained?: Yes    Written consent obtained?: Yes    Risks and benefits: Risks, benefits and alternatives were discussed    Consent given by:  Patient  Patient states understanding of procedure being performed: Yes    Patient's understanding of procedure matches consent: Yes    Procedure consent matches procedure scheduled: Yes    Appropriately NPO:  Yes  Mallampati  :  Grade 2- soft palate, base of uvula, tonsillar pillars, and portion of posterior pharyngeal wall visible  Lungs:  Lungs clear with good breath sounds bilaterally  Heart:  Normal heart sounds and rate  History & Physical reviewed:  History and physical reviewed and no updates needed  Statement of review:  I have reviewed the lab findings, diagnostic data, medications, and the plan for sedation

## 2023-08-09 NOTE — PROGRESS NOTES
Care Management Follow Up    Length of Stay (days): 7    Expected Discharge Date: 08/11/2023     Concerns to be Addressed: discharge planning     Patient plan of care discussed at interdisciplinary rounds: Yes    Anticipated Discharge Disposition: Home     Anticipated Discharge Services: None  Anticipated Discharge DME: None    Patient/family educated on Medicare website which has current facility and service quality ratings: no  Education Provided on the Discharge Plan:  yes  Patient/Family in Agreement with the Plan: yes    Referrals Placed by CM/SW:  [facility]  Private pay costs discussed: insurance costs writer sent referral to [facility] for benefit check, if OOP costs, will discuss with patient    Additional Information:  Patient is currently on 3.375g IV zosyn Q6H, and 1500mg IV vancomycin Q24H in the hospital.  Writer sent referral and benefits check to [facility].  RNCC will continue to follow for discharge planning.      Benefits check back from [facility], see below:         Karen Horton RN, BSN  RN Care Coordinator    [address] [address]  [email or web address]  Gender pronouns: she/her  Pager: [phone]

## 2023-08-09 NOTE — PLAN OF CARE
Occupational Therapy Discharge Summary    Reason for therapy discharge:    All goals and outcomes met, no further needs identified.    Progress towards therapy goal(s). See goals on Care Plan in [facility] electronic health record for goal details.  Goals met    Therapy recommendation(s):    Continue home exercise program. Ambulate 3-4x per day

## 2023-08-09 NOTE — PROGRESS NOTES
Therapy: IV abx   Insurance: [employer]  Ded: $2000  Met: $2000  Co-Insurance: 80/20  Max Out of Pocket: $3000  Met: $3000    In reference to admission on 8/2/23 to check IV abx coverage    Please contact Intake with any questions, [phone] or In Basket pool, [facility] ([phone]).

## 2023-08-09 NOTE — PROVIDER NOTIFICATION
"Paged Krystal Galvan NP at [phone]  \"FYI, pt has temp of 100.8. Tylenol given. Blood cultures done this AM. Thanks\"  "

## 2023-08-09 NOTE — IR NOTE
Patient Name: Dimitrios Goldberg  Medical Record Number: [id number]  Today's Date: 8/9/2023    Procedure: Left lower quadrant fine needle aspiration  Proceduralist: Dr. Lawrence  Pathology present: NA    Procedure Start: 1028  Procedure end: 1034  Sedation medications administered: 125 mcg fentanyl and 2.5 mg versed (sedation time 30 mins)    Report given to: Trinh ALLEN 5B  : WALDEMAR    Other Notes: Pt arrived to IR room 5 from . Consent reviewed. Pt denies any questions or concerns regarding procedure. Pt positioned supine and monitored per protocol. Pt tolerated procedure without any noted complications. Pt transferred back to .     Labs sent for culture.  About 50 ml of dark bloody fluid aspirated.

## 2023-08-09 NOTE — PROGRESS NOTES
Calorie Count  Intake recorded for: 8/8  Total Kcals: 250 Total Protein: 13g  Kcals from Hospital Food: 60   Protein: 0g  Kcals from Outside Food (average):190 Protein: 13g  # Meals Ordered from Kitchen: food from nursing unit & outside the hospital   # Meals Recorded: 100% small apple juice from nursing unit; 50% 6 inch steak and cheese sandwich from [employer]   # Supplements Recorded: 0

## 2023-08-09 NOTE — PLAN OF CARE
Goal Outcome Evaluation:      Plan of Care Reviewed With: patient    Overall Patient Progress: no change    Outcome evaluation: Intermittently tachy, febrile w/ temp of 100.8. OVSS on RA. Tylenol given x1 for generalized body aches. Reports pain in L arm where old IV removed and pain in R arm with potassium infusing. Slowed infusion rate, but pt unable to tolerate IV potassium replacement, received 2/4 doses. Refused oral replacement. Recheck K after 2 bags 3.5. Hot packs placed on arms with some effectiveness. Denies N/V, SOB. PIV infusing D5LR @ 100mL/hr. Up SBA, ambulated halls. Voiding spontaneously, BM this shift. Unable to have fine needle aspiration in IR today, switched back to regular diet. Bud counts maintained. Plan for IR biopsy tomorrow, NPO at midnight. BGs 78, 135. Continue w/ POC.

## 2023-08-09 NOTE — PLAN OF CARE
Goal Outcome Evaluation:      Plan of Care Reviewed With: patient    Overall Patient Progress: no changeOverall Patient Progress: no change    Outcome Evaluation: intermittently tachy, OVSS, RA, AxO4, IR for abscess drain    Afebrile, VSS, Denies sob, numbness or tingling.   Pain rating 7/10 after procedure on left side/flank radiating to abd - oxy prn 5 mg x1   BS Q4 - 8am 78, 1200 - 100. Regular diet, PIV infusing cont D5LR at 100 ml/hr    Potassium replacement PO - k lab recheck 3.7.     IR procedure to drain abscess - pt tolerated well  IV zosyn continued     No other acute events during this shift.     Plan:  Continue fluids, monitor PO intake -calorie counting   SCDs on while in bed   Encourage ambulation  Continue to monitor and follow POC
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
## 2023-08-09 NOTE — PLAN OF CARE
Status 4504-9954    /70 (BP Location: Left arm)   Pulse 103   Temp 98.4  F (36.9  C) (Oral)   Resp 18   Ht 1.829 m (6')   Wt 100.5 kg (221 lb 9.6 oz)   SpO2 95%   BMI 30.05 kg/m      A&O x4. VSS, afebrile this shift. SBA in room. Rizatriptan given for c/o headache. D5LR infusing at 100mL/hr, blood sugars stable. Remains on IV abx. NPO since 0000 for IR procedure today.     Continue with plan of care, report changes to Gold 9.

## 2023-08-09 NOTE — PROCEDURES
[facility]    Procedure: IR Procedure Note    Date/Time: 8/9/2023 10:40 AM    Performed by: Elvis Lawrence MD  Authorized by: Elvis Lawrence MD      UNIVERSAL PROTOCOL   Site Marked: NA  Prior Images Obtained and Reviewed:  Yes  Required items: Required blood products, implants, devices and special equipment available    Patient identity confirmed:  Verbally with patient, arm band, provided demographic data and hospital-assigned identification number  Patient was reevaluated immediately before administering moderate or deep sedation or anesthesia  Confirmation Checklist:  Patient's identity using two indicators, relevant allergies, procedure was appropriate and matched the consent or emergent situation and correct equipment/implants were available  Time out: Immediately prior to the procedure a time out was called    Universal Protocol: the Joint Commission Universal Protocol was followed    Preparation: Patient was prepped and draped in usual sterile fashion    ESBL (mL):  2     ANESTHESIA    Anesthesia: Local infiltration  Local Anesthetic:  Lidocaine 1% without epinephrine  Anesthetic Total (mL):  10      SEDATION  Patient Sedated: Yes    Sedation Type:  Moderate (conscious) sedation  Vital signs: Vital signs monitored during sedation    See dictated procedure note for full details.  Findings: 5 Zambian Moobia Yueh centesis catheter needle placed in left lower quadrant collection under ultrasound guidance. 50 mL sanguinous fluid able to be aspirated. No drainage catheter placed. Sterile dressing applied.    Specimens: none    Complications: None    Condition: Stable      PROCEDURE    Patient Tolerance:  Patient tolerated the procedure well with no immediate complications  Length of time physician/provider present for 1:1 monitoring during sedation: 30

## 2023-08-10 LAB
ALBUMIN SERPL BCG-MCNC: 2.8 G/DL (ref 3.5–5.2)
ALP SERPL-CCNC: 55 U/L (ref 40–129)
ALT SERPL W P-5'-P-CCNC: 15 U/L (ref 0–70)
ANION GAP SERPL CALCULATED.3IONS-SCNC: 10 MMOL/L (ref 7–15)
AST SERPL W P-5'-P-CCNC: 29 U/L (ref 0–45)
BACTERIA BLD CULT: NO GROWTH
BACTERIA BLD CULT: NO GROWTH
BASOPHILS # BLD AUTO: 0 10E3/UL (ref 0–0.2)
BASOPHILS NFR BLD AUTO: 1 %
BILIRUB SERPL-MCNC: 0.3 MG/DL
BUN SERPL-MCNC: 2.6 MG/DL (ref 6–20)
CALCIUM SERPL-MCNC: 8.2 MG/DL (ref 8.6–10)
CHLORIDE SERPL-SCNC: 108 MMOL/L (ref 98–107)
CREAT SERPL-MCNC: 1.82 MG/DL (ref 0.67–1.17)
DEPRECATED HCO3 PLAS-SCNC: 23 MMOL/L (ref 22–29)
EOSINOPHIL # BLD AUTO: 0.1 10E3/UL (ref 0–0.7)
EOSINOPHIL NFR BLD AUTO: 1 %
ERYTHROCYTE [DISTWIDTH] IN BLOOD BY AUTOMATED COUNT: 16 % (ref 10–15)
GFR SERPL CREATININE-BSD FRML MDRD: 43 ML/MIN/1.73M2
GLUCOSE BLDC GLUCOMTR-MCNC: 103 MG/DL (ref 70–99)
GLUCOSE BLDC GLUCOMTR-MCNC: 116 MG/DL (ref 70–99)
GLUCOSE BLDC GLUCOMTR-MCNC: 84 MG/DL (ref 70–99)
GLUCOSE BLDC GLUCOMTR-MCNC: 88 MG/DL (ref 70–99)
GLUCOSE SERPL-MCNC: 112 MG/DL (ref 70–99)
HCT VFR BLD AUTO: 22.5 % (ref 40–53)
HGB BLD-MCNC: 7.1 G/DL (ref 13.3–17.7)
IMM GRANULOCYTES # BLD: 0.1 10E3/UL
IMM GRANULOCYTES NFR BLD: 1 %
LYMPHOCYTES # BLD AUTO: 1.2 10E3/UL (ref 0.8–5.3)
LYMPHOCYTES NFR BLD AUTO: 21 %
MAGNESIUM SERPL-MCNC: 1.5 MG/DL (ref 1.7–2.3)
MAGNESIUM SERPL-MCNC: 2.5 MG/DL (ref 1.7–2.3)
MCH RBC QN AUTO: 28.4 PG (ref 26.5–33)
MCHC RBC AUTO-ENTMCNC: 31.6 G/DL (ref 31.5–36.5)
MCV RBC AUTO: 90 FL (ref 78–100)
MONOCYTES # BLD AUTO: 0.8 10E3/UL (ref 0–1.3)
MONOCYTES NFR BLD AUTO: 13 %
NEUTROPHILS # BLD AUTO: 3.6 10E3/UL (ref 1.6–8.3)
NEUTROPHILS NFR BLD AUTO: 63 %
NRBC # BLD AUTO: 0 10E3/UL
NRBC BLD AUTO-RTO: 0 /100
PHOSPHATE SERPL-MCNC: 2.8 MG/DL (ref 2.5–4.5)
PLATELET # BLD AUTO: 723 10E3/UL (ref 150–450)
POTASSIUM SERPL-SCNC: 3.4 MMOL/L (ref 3.4–5.3)
POTASSIUM SERPL-SCNC: 3.7 MMOL/L (ref 3.4–5.3)
PROT SERPL-MCNC: 5.7 G/DL (ref 6.4–8.3)
RBC # BLD AUTO: 2.5 10E6/UL (ref 4.4–5.9)
SODIUM SERPL-SCNC: 141 MMOL/L (ref 136–145)
WBC # BLD AUTO: 5.8 10E3/UL (ref 4–11)

## 2023-08-10 PROCEDURE — 84132 ASSAY OF SERUM POTASSIUM: CPT | Performed by: INTERNAL MEDICINE

## 2023-08-10 PROCEDURE — 250N000011 HC RX IP 250 OP 636: Performed by: STUDENT IN AN ORGANIZED HEALTH CARE EDUCATION/TRAINING PROGRAM

## 2023-08-10 PROCEDURE — 99232 SBSQ HOSP IP/OBS MODERATE 35: CPT | Performed by: INTERNAL MEDICINE

## 2023-08-10 PROCEDURE — 999N000248 HC STATISTIC IV INSERT WITH US BY RN

## 2023-08-10 PROCEDURE — 36415 COLL VENOUS BLD VENIPUNCTURE: CPT | Performed by: INTERNAL MEDICINE

## 2023-08-10 PROCEDURE — 84100 ASSAY OF PHOSPHORUS: CPT | Performed by: PEDIATRICS

## 2023-08-10 PROCEDURE — 250N000013 HC RX MED GY IP 250 OP 250 PS 637: Performed by: PHYSICIAN ASSISTANT

## 2023-08-10 PROCEDURE — 250N000011 HC RX IP 250 OP 636: Mod: JZ | Performed by: EMERGENCY MEDICINE

## 2023-08-10 PROCEDURE — 120N000002 HC R&B MED SURG/OB UMMC

## 2023-08-10 PROCEDURE — 250N000013 HC RX MED GY IP 250 OP 250 PS 637: Performed by: INTERNAL MEDICINE

## 2023-08-10 PROCEDURE — 250N000011 HC RX IP 250 OP 636: Mod: JZ | Performed by: INTERNAL MEDICINE

## 2023-08-10 PROCEDURE — 83735 ASSAY OF MAGNESIUM: CPT | Performed by: PEDIATRICS

## 2023-08-10 PROCEDURE — 250N000011 HC RX IP 250 OP 636: Performed by: PEDIATRICS

## 2023-08-10 PROCEDURE — 83735 ASSAY OF MAGNESIUM: CPT | Performed by: INTERNAL MEDICINE

## 2023-08-10 PROCEDURE — 258N000003 HC RX IP 258 OP 636: Performed by: PEDIATRICS

## 2023-08-10 PROCEDURE — 36415 COLL VENOUS BLD VENIPUNCTURE: CPT | Performed by: STUDENT IN AN ORGANIZED HEALTH CARE EDUCATION/TRAINING PROGRAM

## 2023-08-10 PROCEDURE — 80053 COMPREHEN METABOLIC PANEL: CPT | Performed by: STUDENT IN AN ORGANIZED HEALTH CARE EDUCATION/TRAINING PROGRAM

## 2023-08-10 PROCEDURE — 85025 COMPLETE CBC W/AUTO DIFF WBC: CPT | Performed by: STUDENT IN AN ORGANIZED HEALTH CARE EDUCATION/TRAINING PROGRAM

## 2023-08-10 RX ORDER — POTASSIUM CHLORIDE 1.5 G/1.58G
40 POWDER, FOR SOLUTION ORAL ONCE
Status: COMPLETED | OUTPATIENT
Start: 2023-08-10 | End: 2023-08-10

## 2023-08-10 RX ORDER — RIZATRIPTAN BENZOATE 5 MG/1
5 TABLET, ORALLY DISINTEGRATING ORAL
Status: COMPLETED | OUTPATIENT
Start: 2023-08-10 | End: 2023-08-10

## 2023-08-10 RX ORDER — MAGNESIUM SULFATE HEPTAHYDRATE 40 MG/ML
4 INJECTION, SOLUTION INTRAVENOUS ONCE
Status: COMPLETED | OUTPATIENT
Start: 2023-08-10 | End: 2023-08-10

## 2023-08-10 RX ORDER — OXYCODONE HYDROCHLORIDE 5 MG/1
5 TABLET ORAL EVERY 4 HOURS PRN
Status: DISCONTINUED | OUTPATIENT
Start: 2023-08-10 | End: 2023-08-12 | Stop reason: HOSPADM

## 2023-08-10 RX ORDER — SALIVA STIMULANT COMB. NO.3
1 SPRAY, NON-AEROSOL (ML) MUCOUS MEMBRANE
Status: DISCONTINUED | OUTPATIENT
Start: 2023-08-10 | End: 2023-08-12 | Stop reason: HOSPADM

## 2023-08-10 RX ORDER — RIZATRIPTAN BENZOATE 5 MG/1
5 TABLET, ORALLY DISINTEGRATING ORAL DAILY PRN
Status: COMPLETED | OUTPATIENT
Start: 2023-08-10 | End: 2023-08-11

## 2023-08-10 RX ADMIN — MAGNESIUM SULFATE IN WATER 4 G: 40 INJECTION, SOLUTION INTRAVENOUS at 08:36

## 2023-08-10 RX ADMIN — RIZATRIPTAN BENZOATE 5 MG: 5 TABLET, ORALLY DISINTEGRATING ORAL at 10:14

## 2023-08-10 RX ADMIN — PIPERACILLIN AND TAZOBACTAM 3.38 G: 3; .375 INJECTION, POWDER, LYOPHILIZED, FOR SOLUTION INTRAVENOUS at 00:02

## 2023-08-10 RX ADMIN — SODIUM CHLORIDE, SODIUM LACTATE, POTASSIUM CHLORIDE, CALCIUM CHLORIDE, AND DEXTROSE MONOHYDRATE: 600; 310; 30; 20; 5 INJECTION, SOLUTION INTRAVENOUS at 17:53

## 2023-08-10 RX ADMIN — NORTRIPTYLINE HYDROCHLORIDE 10 MG: 10 CAPSULE ORAL at 21:17

## 2023-08-10 RX ADMIN — SODIUM CHLORIDE, SODIUM LACTATE, POTASSIUM CHLORIDE, CALCIUM CHLORIDE, AND DEXTROSE MONOHYDRATE: 600; 310; 30; 20; 5 INJECTION, SOLUTION INTRAVENOUS at 02:19

## 2023-08-10 RX ADMIN — ACETAMINOPHEN 1000 MG: 500 TABLET ORAL at 00:02

## 2023-08-10 RX ADMIN — PIPERACILLIN AND TAZOBACTAM 3.38 G: 3; .375 INJECTION, POWDER, LYOPHILIZED, FOR SOLUTION INTRAVENOUS at 12:57

## 2023-08-10 RX ADMIN — POTASSIUM CHLORIDE 40 MEQ: 1.5 POWDER, FOR SOLUTION ORAL at 08:35

## 2023-08-10 RX ADMIN — PIPERACILLIN AND TAZOBACTAM 3.38 G: 3; .375 INJECTION, POWDER, LYOPHILIZED, FOR SOLUTION INTRAVENOUS at 06:59

## 2023-08-10 RX ADMIN — ACYCLOVIR 400 MG: 400 TABLET ORAL at 14:44

## 2023-08-10 RX ADMIN — ACYCLOVIR 400 MG: 400 TABLET ORAL at 21:16

## 2023-08-10 RX ADMIN — METOPROLOL SUCCINATE 50 MG: 25 TABLET, EXTENDED RELEASE ORAL at 08:35

## 2023-08-10 RX ADMIN — LEVOTHYROXINE SODIUM 137 MCG: 0.14 TABLET ORAL at 08:35

## 2023-08-10 RX ADMIN — ATORVASTATIN CALCIUM 20 MG: 20 TABLET, FILM COATED ORAL at 08:35

## 2023-08-10 RX ADMIN — ACYCLOVIR 400 MG: 400 TABLET ORAL at 06:11

## 2023-08-10 RX ADMIN — PANTOPRAZOLE SODIUM 40 MG: 40 TABLET, DELAYED RELEASE ORAL at 21:17

## 2023-08-10 RX ADMIN — PANTOPRAZOLE SODIUM 40 MG: 40 TABLET, DELAYED RELEASE ORAL at 08:35

## 2023-08-10 RX ADMIN — PIPERACILLIN AND TAZOBACTAM 3.38 G: 3; .375 INJECTION, POWDER, LYOPHILIZED, FOR SOLUTION INTRAVENOUS at 17:53

## 2023-08-10 RX ADMIN — ACETAMINOPHEN 1000 MG: 500 TABLET ORAL at 10:14

## 2023-08-10 RX ADMIN — VANCOMYCIN HYDROCHLORIDE 1500 MG: 10 INJECTION, POWDER, LYOPHILIZED, FOR SOLUTION INTRAVENOUS at 18:43

## 2023-08-10 ASSESSMENT — ACTIVITIES OF DAILY LIVING (ADL)
ADLS_ACUITY_SCORE: 27
ADLS_ACUITY_SCORE: 24
ADLS_ACUITY_SCORE: 27
ADLS_ACUITY_SCORE: 27
ADLS_ACUITY_SCORE: 24
ADLS_ACUITY_SCORE: 24
ADLS_ACUITY_SCORE: 27

## 2023-08-10 NOTE — PROGRESS NOTES
[facility]    Medicine Progress Note - Hospitalist Service, GOLD TEAM 9    Date of Admission:  8/2/2023    Assessment & Plan   Dimitrios Goldberg is a 58 year old man with history of R kidney clear cell cancer, IVC tumor thrombus, HTN, CKD, hypothyroidism, ANUPAMA, HSV 2, depression, and GERD who was initially admitted to a hospital while on vacation in [facility] with SBO. Underwent lysis of adhesions 7/21 and flew home to [place]. Developed increased weakness, lethargy while in transit prompting presentation to [facility] 8/2/2023 and was found to have multiple intraabdominal abscesses.     Today  - Awaiting culture results  - Rizatriptan for migraine     Multiple intraabdominal abscesses, recent h/o SBO s/p [place] in [facility]: Developed symptoms while in [facility]. CT noted SBO and was airlifted to [place]. Underwent lysis of adhesions of the small intestine. Clarified with wife Cre per her English report that it was lysis of adhesions and sent out with Augmentin for pneumonia) Increased fatigue, lethargy on journey home 7/30 to 7/31. CT CAP 8/2 multiple loculated collections in lower abdomen and pelvis, many communicating with each other, multiple mildly prominent small bowel loops in abdomen which can represent ileus vs presence of abscesses. WBC, LA normal, abdominal exam overall with minimal tenderness, passing gas, repeat CT on 8/6 showed no change/improvement  - Blood cultures pending  - General surgery consulted. no surgical intervention recommended due to stability and recent surgery  - IR consulted. Underwent aspiration of largest LLQ Abscess 8/9. Gram stain showed GPCs. Cultures pending.   - ID consulted. Antimicrobials as below while awaiting culture results  - Trend CBC and CRP  - Pain management: Tylenol and oxycodone prn with IV dilaudid for breakthrough    Cultures:   Blood cultures:  8/2, 8/3, 8/5, 8/8: NGTD  Abscess drainage culture 8/9:  NGTD    Antimicrobials:  - Pip/Tazo 8/2 - present  - Vancomycin 8/5 - present    R kidney clear cell cancer s/p nephrectomy with c/f recurrence, CKD III  Increased Creatinine   Right clear cell carcinoma Dx 7/2021, s/p radical nephrectomy 8/02021 and immunotherapy (pembrolizumub) 1/2022. CT 7/2022 tumor recurrence, s/p ex lap and Syriac with open resection of RP mass and lateral mass, primary mass not resectable given involvement with vena cava and duodenum. Not a candidate for radiation due to proximity to bowel. Active nephritis req steroids on pembro, was started on Cabozantinub (dose reduced due to hand-foot syndrome). CT CAP 8/2/23 stable postsurgical changes of R nephrectomy, stable from 4/6/2023 imaging. Last saw nephrology 6/28.   - Per d/w oncology, hold PTA Cabozantinub for now. No need to formal consult at this time  - Nephrology consulted     Elevated lipase:   Lipase 479 in the setting of the above. CT CAP no acute findings, no epigastric tenderness or focal sx suggestive of pancreatitis      Acute on chronic anemia   ANUPAMA  BL hgb 13-14, low to 9.5 on presentation and then ~8 after fluids. No e/o active bleeding, melena, though after admission CT findings discussed with radiology must consider that fluid collections are quite dense and could be superimposed bleeding (no active arterial bleeding noted but c/f slow venous bleed). Initial concern for hemolysis w/haptoglobin <3 but hemoglobin stabilizing.  - Hold ferrous gluconate (PTA med)   - Hemoglobin daily    Vocal cord paresis   Neck Pain  Seen by ENT 8/2, vocal cord paresis is likely related to his intubation given timing of onset and no prior history of voice issues. It is possible that he had a traumatic intubation, and given the slight displacement of the arytenoid would like to further evaluate for arytenoid subluxation/dislocation with imaging by CT with contrast.  Neck pain over past 2 months evaluated with CT in June with no evidence of lesion but  MRI recommended.  Holding additional imaging at present given no acute decompensation and allowing renal function to improve.  - Outpatient ENT follow up in laryngology clinic - ENT will arrange         Other Medical Issues:  HTN: PTA on amlodipine, metoprolol. BP stable. Hold amlodipine, continue metoprolol with hold parameters   HLD: Continue statin. Hold ASA  H/o IVC tumor thrombus: S/p IVC tumor thrombectomy with reconstruction 8/2021 at time of nephrectomy  Migraines: Continue PTA Nortriptyline and prn Rizatriptan   Hypothyroidism (2/2 pembro): TSH 8.51, T4 1.29 5/18/23. Continue synthroid. Repeat TFTs per nephrology recs 6/2023  GERD: Continue PPI   H/o HSV 2: Continue PTA Acyclovir per ID given high potential for outbreak if stopped with current systemic stress         Diet: Snacks/Supplements Adult: Other; Allow PRN snacks/supplements.; With Meals  Regular Diet Adult    DVT Prophylaxis: holding pending surgical/IR re-evaluation  White Catheter: Not present  Lines: None     Cardiac Monitoring: None  Code Status: Full Code      Clinically Significant Risk Factors            # Hypomagnesemia: Lowest Mg = 1.5 mg/dL in last 2 days, will replace as needed   # Hypoalbuminemia: Lowest albumin = 2.8 g/dL at 8/10/2023  5:55 AM, will monitor as appropriate            # Overweight: Estimated body mass index is 29.97 kg/m  as calculated from the following:    Height as of this encounter: 1.829 m (6').    Weight as of this encounter: 100.2 kg (221 lb).             Disposition Plan     Expected Discharge Date: 08/11/2023      Destination: home  Discharge Comments: Continues to be febrile despite IV abx.          Vijay Redding MD  Hospitalist Service, GOLD TEAM [address] [place] [address]  Securely message with [employer] (more info)  Text page via [facility] Paging/Directory   See signed in provider for up to date coverage  information  ______________________________________________________________________    Interval History   Night sweats throughout night. One elevated temp to 101.7 but room was very hot and fever resolved within 10 min, so may have been environmental. Severe HA this am. Continued poor PO intake.     Physical Exam   Vital Signs: Temp: 98  F (36.7  C) Temp src: Oral BP: (!) 125/93 Pulse: 103   Resp: 16 SpO2: 95 % O2 Device: None (Room air) Oxygen Delivery: 2 LPM  Weight: 221 lbs 0 oz  Gen: Awake, alert, sitting in hospital chair  HEENT: NC/AT, sclera anicteric, voice hoarse  Resp: LCTAB, no increased WOB on RA  CV: Tachycardic with regular rhythm  Abd: Soft, distended, scars visible. Modest lower tenderness but no guarding.   Extrem: Warm and well perfused with trace LE edema    Medical Decision Making             Data   NOTE: Data reviewed over the past 24 hrs contributes toward MDM complexity

## 2023-08-10 NOTE — PROVIDER NOTIFICATION
Darion Coleman MD (#[id number]) @ 7501    Pt PIV causing pain, MIVF stopped. RN noting firm bump above where IV was, platelet count 733. Do we want to get an US? Thanks.    Plan: No US, use hot pack and watch    Darion Coleman MD (#[id number]) @ 8374    Also, pt requesting rizatriptan for migraine HA, no tylenol available rn. Has gotten one time previously in admission with affect. Thanks.     Plan: Not ordered, offered oxycodone, pt refused.

## 2023-08-10 NOTE — PLAN OF CARE
Goal Outcome Evaluation: [id number]      Plan of Care Reviewed With: patient    Overall Patient Progress: improving    Outcome Evaluation: Intermittently tachy, OVSS on RA. A&Ox4. Denies pain, N/V, SOB. Dressing in place from IR drainage. PIV infusing D5LR at 100 mL/hr. Up SBA, ambulating in room. Voiding spontaneously. Regular diet, did not eat dinner. Bgs 120, 81.     Plan: Encourage ambulation and PO intake    Continue w/ POC

## 2023-08-10 NOTE — PROGRESS NOTES
[facility]    Medicine Progress Note - Hospitalist Service, GOLD TEAM 9    Date of Admission:  8/2/2023    Assessment & Plan   Dimitrios Goldberg is a 58 year old male with history of R kidney clear cell cancer, IVC tumor thrombus, HTN, CKD, hypothyroidism, ANUPAMA, HSV 2, depression, and GERD who was initially admitted to a hospital while on vacation in [facility] with SBO. Underwent Bulgarian (7/21/2023 - clarified with wife it was lysis of adhesions and sent out with Augmentin for pneumonia) and flew home to [place]. Developed increased weakness, lethargy while in transit prompting presentation to [facility] 8/2/2023 and was found to have multiple intraabdominal abscesses.     Today  - Underwent IR drainage of abscess. Cultures and sensitivities pending. Continues on Zosyn and vancomycin. ID following.   - Oral intake remains poor. Encourage oral intake, continue IV fluids for now.   - Creatinine stable today, near baseline     Multiple intraabdominal abscesses, recent h/o SBO s/p Bulgarian in [facility]: Developed symptoms while in [facility], CT noted SBO and was airlifted to [place]. Underwent lysis of adhesions of the small intestine. Clarified with wife Cre per her English report that it was lysis of adhesions and sent out with Augmentin for pneumonia) Increased fatigue, lethargy on journey home 7/30 to 7/31. CT CAP 8/2 multiple loculated collections in lower abdomen and pelvis, many communicating with each other, multiple mildly prominent small bowel loops in abdomen which can represent ileus vs presence of abscesses. WBC, LA normal, abdominal exam overall with minimal tenderness, passing gas, repeat CT on 8/6 showed no change/improvement  - Blood cultures pending  - General surgery - 8/7 - no intervention recommended  - Appreciate IR LLQ Abscess aspiration 8/9. Cultures pending.   - ID - Zosyn and vancomycin for now, will follow culture results   - Pain management: Tylenol and  oxycodone prn with IV dilaudid for breakthrough     Elevated lipase: Lipase 479 in the setting of the above. CT CAP no acute findings, no epigastric tenderness or focal sx suggestive of pancreatitis   - Fluids      R kidney clear cell cancer s/p nephrectomy with c/f recurrence, CKD III  Increased Creatinine   Right clear cell carcinoma Dx 7/2021, s/p radical nephrectomy 8/02021 and immunotherapy (pembrolizumub) 1/2022. CT 7/2022 tumor recurrence, s/p ex lap and [place] with open resection of RP mass and lateral mass, primary mass not resectable given involvement with vena cava and duodenum. Not a candidate for radiation due to proximity to bowel. Active nephritis req steroids on pembro, was started on Cabozantinub (dose reduced due to hand-foot syndrome). CT CAP 8/2/23 stable postsurgical changes of R nephrectomy, stable from 4/6/2023 imaging. Last saw nephrology 6/28.   - Per d/w oncology, hold PTA Cabozantinub for now. No need to formal consult at this time  - Nephrology consulted     Acute on chronic anemia   ANUPAMA: BL hgb 13-14, low to 9.5 on presentation and then ~8 after fluids. No e/o active bleeding, melena, though after admission CT findings discussed with radiology must consider that fluid collections are quite dense and could be superimposed bleeding (no active arterial bleeding noted but c/f slow venous bleed). Initial concern for hemolysis w/haptoglobin <3 but hemoglobin stabilizing.  - Hold ferrous gluconate (PTA med)   - Hemoglobin daily    Vocal cord paresis   Neck Pain  Seen by ENT 8/2, vocal cord paresis is likely related to his intubation given timing of onset and no prior history of voice issues. It is possible that he had a traumatic intubation, and given the slight displacement of the arytenoid would like to further evaluate for arytenoid subluxation/dislocation with imaging by CT with contrast.  Neck pain over past 2 months evaluated with CT in June with no evidence of lesion but MRI recommended.   Holding additional imaging at present given no acute decompensation and allowing renal function to improve.  - Outpatient ENT follow up in laryngology clinic - ENT will arrange         Other Medical Issues:  HTN: PTA on amlodipine, metoprolol. BP stable. Hold amlodipine, continue metoprolol with hold parameters   HLD: Continue statin. Hold ASA  H/o IVC tumor thrombus: S/p IVC tumor thrombectomy with reconstruction 8/2021 at time of nephrectomy  Migraines: Continue PTA Nortriptyline and prn Rizatriptan   Hypothyroidism (2/2 pembro): TSH 8.51, T4 1.29 5/18/23. Continue synthroid. Repeat TFTs per nephrology recs 6/2023  GERD: Continue PPI   H/o HSV 2: Continue PTA Acyclovir per ID given high potential for outbreak if stopped with current systemic stress         Diet: Snacks/Supplements Adult: Other; Allow PRN snacks/supplements.; With Meals  Calorie Counts  Regular Diet Adult    DVT Prophylaxis: holding pending surgical/IR re-evaluation  White Catheter: Not present  Lines: None     Cardiac Monitoring: None  Code Status: Full Code      Clinically Significant Risk Factors            # Hypomagnesemia: Lowest Mg = 1.6 mg/dL in last 2 days, will replace as needed   # Hypoalbuminemia: Lowest albumin = 3 g/dL at 8/9/2023  5:32 AM, will monitor as appropriate            # Overweight: Estimated body mass index is 29.97 kg/m  as calculated from the following:    Height as of this encounter: 1.829 m (6').    Weight as of this encounter: 100.2 kg (221 lb).             Disposition Plan      Expected Discharge Date: 08/11/2023      Destination: home  Discharge Comments: Continues to be febrile despite IV abx.          Samreen George MD  Hospitalist Service, GOLD TEAM 9  [facility]  Securely message with [employer] (more info)  Text page via [facility] Paging/Directory   See signed in provider for up to date coverage  information  ______________________________________________________________________    Interval History   No acute events overnight. He underwent drainage of abdominal fluid collection with IR today. He tolerated the procedure well and reports no significant pain. Appetite remains very poor. No vomiting. Not febrile today but was 100.3F yesterday evening. He and his wife express frustration with the duration of his hospital stay but understand the need for culture results to guide antibiotic therapy.     Physical Exam   Vital Signs: Temp: 99.2  F (37.3  C) Temp src: Oral BP: 120/75 Pulse: 105   Resp: 18 SpO2: 99 % O2 Device: None (Room air) Oxygen Delivery: 2 LPM  Weight: 221 lbs 0 oz  Gen: Awake, alert, sitting in hospital chair  HEENT: NC/AT, sclera anicteric, voice hoarse  Resp: LCTAB, no increased WOB on RA  CV: Tachycardic with regular rhythm  Abd: Soft, distended, scars visible. Modest lower tenderness but no guarding.   Extrem: Warm and well perfused with trace LE edema    Medical Decision Making             Data   NOTE: Data reviewed over the past 24 hrs contributes toward MDM complexity

## 2023-08-10 NOTE — PROGRESS NOTES
Calorie Count  Intake recorded for: 8/9  Total Kcals: 475 Total Protein: 17g  Kcals from Hospital Food: 0   Protein: 0g  Kcals from Outside Food (average):475 Protein: 17g  # Meals Ordered from Kitchen: food from outside the hospital only  # Meals Recorded: 100% cheeseburger, 75% small french fries, 50% lemonade - from [employer]  # Supplements Recorded: 0

## 2023-08-10 NOTE — PLAN OF CARE
Goal Outcome Evaluation:  [id number]: Pt A&Ox4 with VSS on RA. Complaining of on and off night sweats and chills throughout the night without presence of fever. Watching temperature closely. 101.7 at 0000, but rechecked 10 mins later of 98.9. Considering false due to increased temp of room. HA/migraine complained of throughout shift. Given PRN tylenol x1 with minimal relief. Requesting other medication, paged but no response. Stimuli minimized. BG checks q4 maintained. 116 at 2am, 6am taken with AM labs, not yet resulted. D5LR infusing at 100mL/hr, continued decreased PO intake. Noted around 6am, pt complaining of pain at PIV site. RN assessed and can feel hard bump above causing pain. MIVF stopped, IV abx not given, provider paged for US but no response. Continuing to monitor site and manage pain as best as possible. Continue with POC.
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
## 2023-08-11 LAB
ALBUMIN SERPL BCG-MCNC: 2.8 G/DL (ref 3.5–5.2)
ALP SERPL-CCNC: 60 U/L (ref 40–129)
ALT SERPL W P-5'-P-CCNC: 12 U/L (ref 0–70)
ANION GAP SERPL CALCULATED.3IONS-SCNC: 11 MMOL/L (ref 7–15)
AST SERPL W P-5'-P-CCNC: 24 U/L (ref 0–45)
BASOPHILS # BLD AUTO: 0 10E3/UL (ref 0–0.2)
BASOPHILS NFR BLD AUTO: 0 %
BILIRUB SERPL-MCNC: 0.3 MG/DL
BUN SERPL-MCNC: 3.3 MG/DL (ref 6–20)
CALCIUM SERPL-MCNC: 8.5 MG/DL (ref 8.6–10)
CHLORIDE SERPL-SCNC: 107 MMOL/L (ref 98–107)
CREAT SERPL-MCNC: 1.75 MG/DL (ref 0.67–1.17)
CRP SERPL-MCNC: 141 MG/L
DEPRECATED HCO3 PLAS-SCNC: 22 MMOL/L (ref 22–29)
EOSINOPHIL # BLD AUTO: 0.1 10E3/UL (ref 0–0.7)
EOSINOPHIL NFR BLD AUTO: 1 %
ERYTHROCYTE [DISTWIDTH] IN BLOOD BY AUTOMATED COUNT: 16 % (ref 10–15)
GFR SERPL CREATININE-BSD FRML MDRD: 45 ML/MIN/1.73M2
GLUCOSE BLDC GLUCOMTR-MCNC: 101 MG/DL (ref 70–99)
GLUCOSE BLDC GLUCOMTR-MCNC: 103 MG/DL (ref 70–99)
GLUCOSE BLDC GLUCOMTR-MCNC: 106 MG/DL (ref 70–99)
GLUCOSE BLDC GLUCOMTR-MCNC: 112 MG/DL (ref 70–99)
GLUCOSE BLDC GLUCOMTR-MCNC: 90 MG/DL (ref 70–99)
GLUCOSE SERPL-MCNC: 105 MG/DL (ref 70–99)
HCT VFR BLD AUTO: 23.7 % (ref 40–53)
HGB BLD-MCNC: 7.5 G/DL (ref 13.3–17.7)
IMM GRANULOCYTES # BLD: 0.1 10E3/UL
IMM GRANULOCYTES NFR BLD: 1 %
LYMPHOCYTES # BLD AUTO: 1.2 10E3/UL (ref 0.8–5.3)
LYMPHOCYTES NFR BLD AUTO: 19 %
MAGNESIUM SERPL-MCNC: 2 MG/DL (ref 1.7–2.3)
MCH RBC QN AUTO: 28.7 PG (ref 26.5–33)
MCHC RBC AUTO-ENTMCNC: 31.6 G/DL (ref 31.5–36.5)
MCV RBC AUTO: 91 FL (ref 78–100)
MONOCYTES # BLD AUTO: 0.7 10E3/UL (ref 0–1.3)
MONOCYTES NFR BLD AUTO: 11 %
NEUTROPHILS # BLD AUTO: 4.1 10E3/UL (ref 1.6–8.3)
NEUTROPHILS NFR BLD AUTO: 68 %
NRBC # BLD AUTO: 0 10E3/UL
NRBC BLD AUTO-RTO: 0 /100
PHOSPHATE SERPL-MCNC: 2.9 MG/DL (ref 2.5–4.5)
PLATELET # BLD AUTO: 773 10E3/UL (ref 150–450)
POTASSIUM SERPL-SCNC: 3.6 MMOL/L (ref 3.4–5.3)
PROT SERPL-MCNC: 5.9 G/DL (ref 6.4–8.3)
RBC # BLD AUTO: 2.61 10E6/UL (ref 4.4–5.9)
SODIUM SERPL-SCNC: 140 MMOL/L (ref 136–145)
VANCOMYCIN SERPL-MCNC: 16.7 UG/ML
WBC # BLD AUTO: 6.1 10E3/UL (ref 4–11)

## 2023-08-11 PROCEDURE — 250N000011 HC RX IP 250 OP 636: Mod: JZ | Performed by: EMERGENCY MEDICINE

## 2023-08-11 PROCEDURE — 99233 SBSQ HOSP IP/OBS HIGH 50: CPT | Performed by: INTERNAL MEDICINE

## 2023-08-11 PROCEDURE — 250N000011 HC RX IP 250 OP 636: Performed by: STUDENT IN AN ORGANIZED HEALTH CARE EDUCATION/TRAINING PROGRAM

## 2023-08-11 PROCEDURE — 258N000003 HC RX IP 258 OP 636: Performed by: INTERNAL MEDICINE

## 2023-08-11 PROCEDURE — 120N000002 HC R&B MED SURG/OB UMMC

## 2023-08-11 PROCEDURE — 250N000013 HC RX MED GY IP 250 OP 250 PS 637: Performed by: PHYSICIAN ASSISTANT

## 2023-08-11 PROCEDURE — 36415 COLL VENOUS BLD VENIPUNCTURE: CPT | Performed by: INTERNAL MEDICINE

## 2023-08-11 PROCEDURE — 250N000013 HC RX MED GY IP 250 OP 250 PS 637: Performed by: INTERNAL MEDICINE

## 2023-08-11 PROCEDURE — 86140 C-REACTIVE PROTEIN: CPT | Performed by: INTERNAL MEDICINE

## 2023-08-11 PROCEDURE — 80053 COMPREHEN METABOLIC PANEL: CPT | Performed by: STUDENT IN AN ORGANIZED HEALTH CARE EDUCATION/TRAINING PROGRAM

## 2023-08-11 PROCEDURE — 83735 ASSAY OF MAGNESIUM: CPT | Performed by: INTERNAL MEDICINE

## 2023-08-11 PROCEDURE — 84100 ASSAY OF PHOSPHORUS: CPT | Performed by: INTERNAL MEDICINE

## 2023-08-11 PROCEDURE — 250N000011 HC RX IP 250 OP 636: Mod: JZ | Performed by: INTERNAL MEDICINE

## 2023-08-11 PROCEDURE — 999N000128 HC STATISTIC PERIPHERAL IV START W/O US GUIDANCE

## 2023-08-11 PROCEDURE — 85025 COMPLETE CBC W/AUTO DIFF WBC: CPT | Performed by: STUDENT IN AN ORGANIZED HEALTH CARE EDUCATION/TRAINING PROGRAM

## 2023-08-11 PROCEDURE — 80202 ASSAY OF VANCOMYCIN: CPT | Performed by: INTERNAL MEDICINE

## 2023-08-11 RX ORDER — AMOXICILLIN 250 MG
1 CAPSULE ORAL 2 TIMES DAILY PRN
Qty: 20 TABLET | Refills: 1 | Status: SHIPPED | OUTPATIENT
Start: 2023-08-11 | End: 2023-10-23

## 2023-08-11 RX ORDER — LIDOCAINE 40 MG/G
CREAM TOPICAL
Status: DISCONTINUED | OUTPATIENT
Start: 2023-08-11 | End: 2023-08-12 | Stop reason: HOSPADM

## 2023-08-11 RX ORDER — POLYETHYLENE GLYCOL 3350 17 G/17G
1 POWDER, FOR SOLUTION ORAL 2 TIMES DAILY PRN
Qty: 510 G | Refills: 1 | Status: SHIPPED | OUTPATIENT
Start: 2023-08-11 | End: 2023-10-23

## 2023-08-11 RX ORDER — OXYCODONE HYDROCHLORIDE 5 MG/1
5-10 TABLET ORAL EVERY 4 HOURS PRN
Qty: 20 TABLET | Refills: 0 | Status: SHIPPED | OUTPATIENT
Start: 2023-08-11 | End: 2023-10-23

## 2023-08-11 RX ORDER — CEFTRIAXONE 2 G/1
2 INJECTION, POWDER, FOR SOLUTION INTRAMUSCULAR; INTRAVENOUS EVERY 24 HOURS
Status: DISCONTINUED | OUTPATIENT
Start: 2023-08-11 | End: 2023-08-12 | Stop reason: HOSPADM

## 2023-08-11 RX ORDER — METRONIDAZOLE 500 MG/1
500 TABLET ORAL 3 TIMES DAILY
Qty: 84 TABLET | Refills: 0 | Status: SHIPPED | OUTPATIENT
Start: 2023-08-12 | End: 2023-09-09

## 2023-08-11 RX ORDER — METRONIDAZOLE 500 MG/1
500 TABLET ORAL 3 TIMES DAILY
Status: DISCONTINUED | OUTPATIENT
Start: 2023-08-11 | End: 2023-08-12 | Stop reason: HOSPADM

## 2023-08-11 RX ORDER — SALIVA STIMULANT COMB. NO.3
1 SPRAY, NON-AEROSOL (ML) MUCOUS MEMBRANE
Qty: 44.3 ML | Refills: 1 | Status: SHIPPED | OUTPATIENT
Start: 2023-08-11 | End: 2023-10-23

## 2023-08-11 RX ORDER — ONDANSETRON 4 MG/1
4 TABLET, ORALLY DISINTEGRATING ORAL EVERY 6 HOURS PRN
Qty: 20 TABLET | Refills: 1 | Status: SHIPPED | OUTPATIENT
Start: 2023-08-11 | End: 2023-10-23

## 2023-08-11 RX ORDER — CEFTRIAXONE 2 G/1
2 INJECTION, POWDER, FOR SOLUTION INTRAMUSCULAR; INTRAVENOUS EVERY 24 HOURS
Status: ACTIVE
Start: 2023-08-12 | End: 2023-09-09

## 2023-08-11 RX ADMIN — CEFTRIAXONE SODIUM 2 G: 2 INJECTION, POWDER, FOR SOLUTION INTRAMUSCULAR; INTRAVENOUS at 13:10

## 2023-08-11 RX ADMIN — PIPERACILLIN AND TAZOBACTAM 3.38 G: 3; .375 INJECTION, POWDER, LYOPHILIZED, FOR SOLUTION INTRAVENOUS at 00:39

## 2023-08-11 RX ADMIN — METOPROLOL SUCCINATE 50 MG: 25 TABLET, EXTENDED RELEASE ORAL at 08:32

## 2023-08-11 RX ADMIN — METRONIDAZOLE 500 MG: 500 TABLET ORAL at 20:39

## 2023-08-11 RX ADMIN — ACYCLOVIR 400 MG: 400 TABLET ORAL at 13:10

## 2023-08-11 RX ADMIN — ACYCLOVIR 400 MG: 400 TABLET ORAL at 06:36

## 2023-08-11 RX ADMIN — LEVOTHYROXINE SODIUM 137 MCG: 0.14 TABLET ORAL at 08:32

## 2023-08-11 RX ADMIN — RIZATRIPTAN BENZOATE 5 MG: 5 TABLET, ORALLY DISINTEGRATING ORAL at 09:03

## 2023-08-11 RX ADMIN — PANTOPRAZOLE SODIUM 40 MG: 40 TABLET, DELAYED RELEASE ORAL at 20:39

## 2023-08-11 RX ADMIN — PIPERACILLIN AND TAZOBACTAM 3.38 G: 3; .375 INJECTION, POWDER, LYOPHILIZED, FOR SOLUTION INTRAVENOUS at 06:32

## 2023-08-11 RX ADMIN — ATORVASTATIN CALCIUM 20 MG: 20 TABLET, FILM COATED ORAL at 08:32

## 2023-08-11 RX ADMIN — METRONIDAZOLE 500 MG: 500 TABLET ORAL at 13:11

## 2023-08-11 RX ADMIN — SODIUM CHLORIDE, SODIUM LACTATE, POTASSIUM CHLORIDE, CALCIUM CHLORIDE, AND DEXTROSE MONOHYDRATE: 600; 310; 30; 20; 5 INJECTION, SOLUTION INTRAVENOUS at 22:40

## 2023-08-11 RX ADMIN — ACYCLOVIR 400 MG: 400 TABLET ORAL at 22:31

## 2023-08-11 RX ADMIN — PANTOPRAZOLE SODIUM 40 MG: 40 TABLET, DELAYED RELEASE ORAL at 08:32

## 2023-08-11 RX ADMIN — NORTRIPTYLINE HYDROCHLORIDE 10 MG: 10 CAPSULE ORAL at 22:31

## 2023-08-11 RX ADMIN — ACETAMINOPHEN 1000 MG: 500 TABLET ORAL at 09:03

## 2023-08-11 RX ADMIN — VANCOMYCIN HYDROCHLORIDE 1500 MG: 10 INJECTION, POWDER, LYOPHILIZED, FOR SOLUTION INTRAVENOUS at 17:59

## 2023-08-11 ASSESSMENT — ACTIVITIES OF DAILY LIVING (ADL)
ADLS_ACUITY_SCORE: 24

## 2023-08-11 NOTE — PLAN OF CARE
Goal Outcome Evaluation:    Able to tolerate fair po intake, had 1/2 protein shake and 2 large chipotle tacos for dinner, plan for PICC line placement and home on IV abx tomorrow, up in recliner, ind in room, transitioned to different IV and oral abx

## 2023-08-11 NOTE — PROGRESS NOTES
Home Infusion  Dimitrios is expected to discharge tomorrow and will be going home on IV abx (daily rocephin vs vanco per CC report pending final susceptibilities).   He has never done home IV therapy before and likely will not have the opportunity to attend the [facility] for some initial teaching.  Benefits for home IV abx therapy have been checked and pt will have 100% through his [employer] policy for the drug, supplies and home nursing since he has met his deductible and OOP expenses.    Met with Dimitrios and his wife Britta at bedside to discuss discharge plans, inform them of his coverage and offer choice of agency for the home infusion services.   Mariama stated he would like to remain in the [employer] network so will use [facility].  Provided them with information about IV abx therapy with [facility] services.  Explained about administration method which will be via IVP for rocephin,  showed them the teaching materials and explained that a home nurse can provide teaching if needed for CG or self-administration.  Informed them about supplies and delivery of supplies, storage of medication, dosing schedule, plan for SNV and 24/7 availability of [facility] staff while on IV therapy.       Dimitrios and Britta verbalized understanding of all information given.   They are willing and able to learn and manage home IV therapy and are agreeable with the plan.  Questions answered.  [facility] nurse will reach out to pt or spouse tomorrow once orders are complete to provide details for delivery and SNV.  Would like pt to get his IV abx dosed before he goes.    Neelam AYALA  Nurse Liaison  [facility] I [email or web address]  [address] [place] [address]  [email or web address]  [phone] M-F I [facility] main: [phone]

## 2023-08-11 NOTE — PLAN OF CARE
Goal Outcome Evaluation:      Plan of Care Reviewed With: patient    Overall Patient Progress: no changeOverall Patient Progress: no change    Outcome Evaluation: VSS on RA. A&OX4. Denies pain. Up ind in room. PIV continuous D5&LR 100ML/HR. Refusing to get another PIV, need to disconnect pt to receive IV ABX as incompatible. Urgency incontience episodex1. Reports loose watery stools overnight, declines interventions. Pt requsting temp check 99.1, declines tylenol at this time. BG stable. Continue to monitor.

## 2023-08-11 NOTE — PROGRESS NOTES
Care Management Discharge Note    Discharge Date: 08/13/2023       Discharge Disposition: Home    Discharge Services: Home infusion    Discharge DME: None    Discharge Transportation: family or friend will provide    Private pay costs discussed: Not applicable    Does the patient's insurance plan have a 3 day qualifying hospital stay waiver?  No    PAS Confirmation Code:  N/A  Patient/family educated on Medicare website which has current facility and service quality ratings: no    Education Provided on the Discharge Plan:  yes  Persons Notified of Discharge Plans: Patient, patient's wife, [facility], provider, nursing staff, SW  Patient/Family in Agreement with the Plan: yes    Handoff Referral Completed: Yes    Additional Information:  Patient will discharge home with home infusions through [facility]. Patient's wife will provide transportation home.    ALEXYS Meyer  [address]   Office: [phone]  Pager: [phone]  [email or web address]

## 2023-08-11 NOTE — PROGRESS NOTES
CLINICAL NUTRITION SERVICES - REASSESSMENT NOTE     Nutrition Prescription    RECOMMENDATIONS FOR MDs/PROVIDERS TO ORDER:  -Rec TFs due to inadequate oral intake. See future/additional rec below.   -Rec appetite stimulant (if not contraindicated) if avoiding TFs.   -Monitor lytes (Phos, Mg++, and K+) for refeeding syndrome. Lytes (Phos, Mg++, and K+) WNL 8/11. If lytes trend low, aggressively replace. Ensure the lyte Replacement ADULT order set/s is/are implemented and select the option for high replacement. Order thiamine x5-7 days if refeeding. Adjust IVFs to non-dextrose-containing if refeeding.     Malnutrition Status:    Patient does not meet two of the established criteria necessary for diagnosing malnutrition but is at risk for malnutrition    Recommendations already ordered by Registered Dietitian (RD):  Modified oral supplements    Future/Additional Recommendations:  -Continue regular diet, liberalized to help encourage oral intake. Encourage outside food and oral supplements. Also, encouraged family to bring oral supplements from home that he likes. Rec foods with added sauces (avoid dry foods). Pt to self-select tolerated foods/beverages. Monitor need to reorder kcal counts (once eating more).   -Rec multivitamin with minerals to help ensure micronutrient needs are met.   -Continue biotene.Trial alternate therapies if pt without relief.   -Adjust IVFs as needed.   -Monitor BG control. Hgb A1c of 5.8 (8/10/21). BG of 105 (8/11).  -If TF becomes nutrition plan of care, would rec place feeding tube (FT) and initiate TFs, Osmolite 1.5 (concentrated, maintenance TF formula, or equivalent). Initiate TFs at 15 mL/hr, advancing rate by 10 mL Q 8 hrs (or per provider discretion, pending hemodynamic stability) to goal rate of 70 mL/hr. Osmolite 1.5 Bud (or equivalent) @ goal of  70ml/hr  (1680ml/day) provides: 2520 kcals, 105 g PRO, 1280 ml free H20, 342 g CHO, and 0 g fiber daily. Adjust IVFs to non-dextrose  "containing and adjust rate     If begin tube feeds:    - Flush FT with 30 mL water Q 4 hrs for patency. Rec provider adjust free water flushes as needed, pending fluid status.   - Ensure K+/Mg++/Phos labs are ordered daily until TFs advance to goal infusion to evaluate for sx of refeeding with nutrition received. If lytes trend low, aggressively replace lytes. Do not advance TF greater than 30 mL/hr unless K+ is = or > 3, Mg++ is = or > 1.5, and Phos is = or > 1.9.   - If not already ordered, order a multivitamin/mineral (certavite or liquid multivitamin/minerals 15 mL/day via FT) to help ensure micronutrient needs being met with suspected hypermetabolic demands and potential interruptions to TF infusions.   - Monitor TF and possible need to adjust nutrition support regimen if necessary, pending medical course and nutrition status.       - Send a nutrition consult for \"Registered Dietitian to Order TF per Medical Nutrition Therapy Guidelines\" if desire RD to order TFs.     EVALUATION OF THE PROGRESS TOWARD GOALS   Diet: Regular since 8/9. Ensure Enlive with meals (scheduled since 8/10) and prn oral supplement order. NPO at times for tests/procedures. Per SLP note 8/3, \"Recommend regular diet/thin liquids. Ensure the pt is upright for all PO. No additional IP SLP services indicated. Agree with ENT rec for OP f/u given vocal cord paresis.\"  Intake/Tolerance: Tolerating diet. Per nursing flowsheets (% intake), pt consuming 25% with a poor appetite 8/5-8/7, 0-75% with a fair appetite 8/8, 0-100% with a poor to fair appetite 8/9, and 0% with a poor appetite 8/10. Most often eating outside food. Dry mouth and lack of appetite are factors affecting oral intake. Per prior RD note, \"Pt shook his head 'no\"' when asked about nutrition supplements to help with getting any kind of intake.\" Per discussion with pt (8/11), he tried but disliked the Ensure Enlive (although does not appear to have been sent from room service). He " likes Boost and Premier Protein (prefers chocolate). States the hospital food all tastes the same. Pt remarked that he ate half a sub one day recently but then it became too dry.   Kcal counts:  8/7         Total Kcals: 275       Total Protein: 1g. # Meals Ordered from Kitchen: 1 meal + food from nursing unit   # Meals Recorded: 2 meals (First - less than 25% cheese pizza) (Second - 100% 4 small apple juices, less than 25% ice cream - from nursing unit)  # Supplements Recorded: 0  8/8         Total Kcals: 250       Total Protein: 13g.# Meals Ordered from Kitchen: food from nursing unit & outside the hospital.# Meals Recorded: 100% small apple juice from nursing unit; 50% 6 inch steak and cheese sandwich from [employer]   # Supplements Recorded: 0  8/9         Total Kcals: 475       Total Protein: 17g. # Meals Ordered from Kitchen: food from outside the hospital only  # Meals Recorded: 100% cheeseburger, 75% small french fries, 50% lemonade - from [employer] # Supplements Recorded: 0  * Pt consumed a three-day average of 333 kcals and 10 g protein daily. Not meeting estimated needs.    Nutrition Support: None this admission.    IVFs: Note, on D5 LR at 100 mL/hr. Provides 408 kcals/day     NEW FINDINGS   HEENT: Dry mouth (biotene ordered 8/10)  Resp: No O2  WOC: Not following at this time  GI: Having one to four stools daily on average. Last Bowel Movement: 08/10/23 (08/10/23 2126). Per 8/6 CT abd/pelvis: Increased dilatation of small bowel with air-fluid levels without definite transition point. Findings suggest ileus versus obstruction.  Wt Hx: 103.4 kg (7/12/2022), 102.1 kg (2/6/2023), 101.2 kg (4/11/23), 101.4 kg (6/28),101.2 kg (8/2, admit), 100.2 kg (8/9)-  No significant/severe wt loss     ASSESSED NUTRITION NEEDS (for inpatient hospital stay)  Dosing Weight: 86 kg (adjusted, based on lowest wt this admission of 99.3 kg on 8/4)  Estimated Energy Needs: 7067-5777 kcals/day (30 - 35 kcals/kg )  Justification:  Increased needs  Estimated Protein Needs:  grams protein/day (0.8 - 1.2 grams of pro/kg)  Justification: CKD vs Increased needs  Estimated Fluid Needs: 4343-5012 mL/day (25 - 30 mL/kg)   Justification: Maintenance needs or per provider, pending fluid status    MALNUTRITION  % Intake: </= 50% for >/= 5 days (severe)  % Weight Loss: Weight loss does not meet criteria  Subcutaneous Fat Loss: None visually observed per prior RD note  Muscle Loss: None visually observed per prior RD note  Fluid Accumulation/Edema: Does not meet criteria  Malnutrition Diagnosis: Patient does not meet two of the established criteria necessary for diagnosing malnutrition but is at risk for malnutrition    Previous Goals   Intake to advance vs nutrition support within 2-3 days.  Evaluation: Met    Previous Nutrition Diagnosis  Inadequate oral intake related to abdominal pain impacting intake as evidenced by patient self report of minimal to no intake recently.   Evaluation: Declining. Modified below    CURRENT NUTRITION DIAGNOSIS  Inadequate oral intake related to decreased appetite and dry mouth as evidenced by three-day average of 333 kcals and 10 g protein daily while estimated needs are 8566-5403 kcals/day (30 - 35 kcals/kg ) and  grams protein/day (0.8 - 1.2 grams of pro/kg).    INTERVENTIONS  Implementation  Collaboration with other providers: Paged provider to rec monitor lytes for refeeding risk and TF recs in if POC. Notified of RD interventions  Medical food supplement therapy: Discussed oral supplements with pt. Modified Ensure Enlive to chocolate Ensure Max (similar to Premier Protein) at 10:00, 14:00, and HS. Pt agreeable to trying Magic Cup. Ordered a chocolate Magic Cup one time today (8/11). Pt aware he may request this if desired.   Nutrition education for nutrition relationship to health/disease: Encouraged intake at meals. Encouraged outside food and rec pt's family bring oral supplements to the hospital that  he likes. Discussed dry mouth. Encouraged him to try biotene (pt aware and states he will try). Rec foods that are not dry or adding broth/gravies to dry foods to better tolerate with his dry mouth.     Goals  Patient to consume % of nutritionally adequate meal trays TID, or the equivalent with supplements/snacks.    Monitoring/Evaluation  Progress toward goals will be monitored and evaluated per protocol.     Nutrition will continue to follow.     Tiffanie Mason, MS, RD, LD, [facility]   6C/7C(beds 1510 - 9509) RD pgr: [phone]

## 2023-08-11 NOTE — PLAN OF CARE
Goal Outcome Evaluation:      Plan of Care Reviewed With: patient    Overall Patient Progress: decliningOverall Patient Progress: declining    Outcome Evaluation: Continue regular diet, liberalized to help encourage oral intake. Encourage outside food and oral supplements. Also, encouraged family to bring oral supplements from home that he likes. Rec foods with added sauces (avoid dry foods). Pt to self-select tolerated foods/beverages. Consider TFs, appetite stimulant. Monitor for refeeding syndrome.

## 2023-08-11 NOTE — PROGRESS NOTES
[facility]    Medicine Progress Note - Hospitalist Service, GOLD TEAM 9    Date of Admission:  8/2/2023    Assessment & Plan   Dimitrios Goldberg is a 58 year old man with history of R kidney clear cell cancer, IVC tumor thrombus, HTN, CKD, hypothyroidism, ANUPAMA, HSV 2, depression, and GERD who was initially admitted to a hospital while on vacation in [facility] with SBO. Underwent lysis of adhesions 7/21 and flew home to [place]. Developed increased weakness, lethargy while in transit prompting presentation to [facility] 8/2/2023 and was found to have multiple intraabdominal abscesses.     Today  - Awaiting 8/9 aspiration culture results  - Place PICC  - Discontinue zosyn. Start IV ceftriaxone and oral flagyl  - Continue vancomycin  - Nutrition consult for help with improved PO intake     Multiple intraabdominal abscesses, recent h/o SBO s/p lysis of adhesions n [facility]: Developed symptoms while in [facility]. CT noted SBO and was airlifted to [place]. Underwent lysis of adhesions of the small intestine. Clarified with wife Cre per her English report that it was lysis of adhesions and sent out with Augmentin for pneumonia) Increased fatigue, lethargy on journey home 7/30 to 7/31. CT CAP 8/2 multiple loculated collections in lower abdomen and pelvis, many communicating with each other, multiple mildly prominent small bowel loops in abdomen which can represent ileus vs presence of abscesses. WBC, LA normal, abdominal exam overall with minimal tenderness, passing gas, repeat CT on 8/6 showed no change/improvement  - Blood cultures pending  - General surgery consulted. no surgical intervention recommended due to stability and recent surgery  - IR consulted. Underwent aspiration of largest LLQ Abscess 8/9. Gram stain showed GPCs. Cultures pending.   - ID consulted. Initially on vanco/zosyn. Initially planned to de-escalate to a more convenient home regimen of IV ceftriaxone and  flagyl, but fluid cultures from aspiration grew staph epi on 8/11. Current antibiotic regimen below  - Trend CBC and CRP. CRP trending down as of 8/11  - Pain management: Tylenol and oxycodone prn with IV dilaudid for breakthrough  - PICC placed 8/11  - Anticipated follow-up plan:   final antibiotic plan currently unclear   Duration likely 4 weeks or more depending on labs and imaging (through at least 9/5/23)  Will need weekly CBC, CMP, CRP.   Repeat CT abdomen and follow up with Dr. Mccrary in 4 weeks at [facility] in [place]    Cultures:   Blood cultures:  8/2, 8/3, 8/5, 8/8: NGTD  Abscess drainage culture 8/9: growing staph epi as of 8/11    Antimicrobials:  - Pip/Tazo 8/2 - 8/11  - Vancomycin 8/5 - 8/11  - Ceftriaxone 2000 mg IV daily 8/11 - present  - Metronidazole 500 mg PO TID 8/11 - present     Poor PO intake  At risk for malnutriton  Very poor po intake. Complains of constant dry mouth.   - Supplements ordered  - Nutrition consult 8/11  - Lack of PO intake impediment to discharge    R kidney clear cell cancer s/p nephrectomy with c/f recurrence, CKD III  Increased Creatinine   Right clear cell carcinoma Dx 7/2021, s/p radical nephrectomy 8/02021 and immunotherapy (pembrolizumub) 1/2022. CT 7/2022 tumor recurrence, s/p ex lap and [place] with open resection of RP mass and lateral mass, primary mass not resectable given involvement with vena cava and duodenum. Not a candidate for radiation due to proximity to bowel. Active nephritis req steroids on pembro, was started on Cabozantinub (dose reduced due to hand-foot syndrome). CT CAP 8/2/23 stable postsurgical changes of R nephrectomy, stable from 4/6/2023 imaging. Last saw nephrology 6/28.   - Per d/w oncology, hold PTA Cabozantinub for now. No need to formal consult at this time  - Nephrology consulted     Elevated lipase:   Lipase 479 in the setting of the above. CT CAP no acute findings, no epigastric tenderness or focal sx suggestive of  pancreatitis      Acute on chronic anemia   ANUPAMA  BL hgb 13-14, low to 9.5 on presentation and then ~8 after fluids. No e/o active bleeding, melena, though after admission CT findings discussed with radiology must consider that fluid collections are quite dense and could be superimposed bleeding (no active arterial bleeding noted but c/f slow venous bleed). Initial concern for hemolysis w/haptoglobin <3 but hemoglobin stabilizing.  - Hold ferrous gluconate (PTA med)   - Hemoglobin daily    Vocal cord paresis   Neck Pain  Seen by ENT 8/2, vocal cord paresis is likely related to his intubation given timing of onset and no prior history of voice issues. It is possible that he had a traumatic intubation, and given the slight displacement of the arytenoid would like to further evaluate for arytenoid subluxation/dislocation with imaging by CT with contrast.  Neck pain over past 2 months evaluated with CT in June with no evidence of lesion but MRI recommended.  Holding additional imaging at present given no acute decompensation and allowing renal function to improve.  - Outpatient ENT follow up in laryngology clinic - ENT will arrange         Other Medical Issues:  HTN: PTA on amlodipine, metoprolol. BP stable. Hold amlodipine, continue metoprolol with hold parameters   HLD: Continue statin. Hold ASA  H/o IVC tumor thrombus: S/p IVC tumor thrombectomy with reconstruction 8/2021 at time of nephrectomy  Migraines: Continue PTA Nortriptyline and prn Rizatriptan   Hypothyroidism (2/2 pembro): TSH 8.51, T4 1.29 5/18/23. Continue synthroid. Repeat TFTs per nephrology recs 6/2023  GERD: Continue PPI   H/o HSV 2: Continue PTA Acyclovir per ID given high potential for outbreak if stopped with current systemic stress         Diet: Snacks/Supplements Adult: Other; Allow PRN snacks/supplements.; With Meals  Regular Diet Adult  Snacks/Supplements Adult: Ensure Enlive; With Meals    DVT Prophylaxis: holding pending surgical/IR  re-evaluation  White Catheter: Not present  Lines: None     Cardiac Monitoring: None  Code Status: Full Code      Clinically Significant Risk Factors            # Hypomagnesemia: Lowest Mg = 1.5 mg/dL in last 2 days, will replace as needed   # Hypoalbuminemia: Lowest albumin = 2.8 g/dL at 8/10/2023  5:55 AM, will monitor as appropriate            # Overweight: Estimated body mass index is 29.97 kg/m  as calculated from the following:    Height as of this encounter: 1.829 m (6').    Weight as of this encounter: 100.2 kg (221 lb).             Disposition Plan     Expected Discharge Date: 08/13/2023      Destination: home  Discharge Comments: Continues to be febrile despite IV abx.          Vijay Redding MD  Hospitalist Service, GOLD TEAM [address] [place] [address]  Securely message with [employer] (more info)  Text page via [facility] Paging/Directory   See signed in provider for up to date coverage information  ______________________________________________________________________    Interval History   No fevers. Pain well controlled. Still very poor PO intake and lack of appetite. Dry mouth an issue.     Physical Exam   Vital Signs: Temp: 98.8  F (37.1  C) Temp src: Oral BP: 119/82 Pulse: 80   Resp: 18 SpO2: 94 % O2 Device: None (Room air)    Weight: 221 lbs 0 oz  Gen: Awake, alert, sitting in hospital chair  HEENT: NC/AT, sclera anicteric, voice hoarse  Resp: LCTAB, no increased WOB on RA  CV: Tachycardic with regular rhythm  Abd: Soft, distended, scars visible. Modest lower tenderness but no guarding.   Extrem: Warm and well perfused with trace LE edema    Medical Decision Making       50 MINUTES SPENT BY ME on the date of service doing chart review, history, exam, documentation & further activities per the note.                Data   NOTE: Data reviewed over the past 24 hrs contributes toward MDM complexity

## 2023-08-11 NOTE — PHARMACY-VANCOMYCIN DOSING SERVICE
Drug Level Evaluation  Patient was  receiving Vancomycin .  A level was drawn at 0646 on 2023. The measured level result is 16.7 mg/L  This medication level is invalid because the medication was discontinued. No further assessment can be made at this time.      Pharmacy team will continue to follow.       +ADDENDUM 23 @ 14:00: vancomycin consult re-ordered. No missed doses yet from prior regimen, so will restart uninterrupted (see note below for AM vancomycin level evaluation and plan)    Pharmacy Vancomycin Note  Date of Service 2023  Patient's  1965   58 year old, male    Indication: Intra-abdominal abscesses (awaiting Staphylococcus epidermidis sensitivities)  Day of Therapy: 7 (vancomycin order momentarily discontinued , but no otherwise scheduled doses missed)  Current vancomycin regimen: 1500 mg IV q24h  Current vancomycin monitoring method: AUC  Current vancomycin therapeutic monitoring goal: 400-600 mg*h/L    [employer] Prediction of Current Vancomycin Regimen      Current estimated CrCl = Estimated Creatinine Clearance: 56.4 mL/min (A) (based on SCr of 1.75 mg/dL (H)).    Creatinine for last 3 days  2023:  5:32 AM Creatinine 1.72 mg/dL  8/10/2023:  5:55 AM Creatinine 1.82 mg/dL  2023:  6:46 AM Creatinine 1.75 mg/dL    Recent Vancomycin Levels (past 3 days)  2023:  6:46 AM Vancomycin 16.7 ug/mL    Vancomycin IV Administrations (past 72 hours)                     vancomycin (VANCOCIN) 1,500 mg in 0.9% NaCl 250 mL intermittent infusion (mg) 1,500 mg New Bag 08/10/23 1843     1,500 mg New Bag 23 1733     1,500 mg New Bag 23 1736                    Nephrotoxins and other renal medications (From now, onward)      Start     Dose/Rate Route Frequency Ordered Stop    23 1400  acyclovir (ZOVIRAX) tablet 400 mg        Note to Pharmacy: PTA Sig:Take 1 tablet (400 mg) by mouth every 8 hours      400 mg Oral EVERY 8 HOURS SCHEDULED 23 0901                  Contrast Orders - past 72 hours (72h ago, onward)      None            Interpretation of levels and current regimen:  Vancomycin level is reflective of -600    Has serum creatinine changed greater than 50% in last 72 hours: No    Urine output:  unable to determine - volumes not measured    Renal Function: Stable      Plan:  Restart vancomycin 1500 mg IV every 24 hours at 18:00 today (24 hours after last 1500 mg dose yesterday)   Vancomycin monitoring method: AUC  Vancomycin therapeutic monitoring goal: 400-600 mg*h/L  Pharmacy will check vancomycin levels as appropriate in 2-5 days.  Serum creatinine levels will be ordered a minimum of twice weekly.    Scott Chaney RP
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
## 2023-08-12 VITALS
HEART RATE: 96 BPM | DIASTOLIC BLOOD PRESSURE: 96 MMHG | WEIGHT: 221 LBS | SYSTOLIC BLOOD PRESSURE: 136 MMHG | OXYGEN SATURATION: 99 % | TEMPERATURE: 98.1 F | HEIGHT: 72 IN | BODY MASS INDEX: 29.93 KG/M2 | RESPIRATION RATE: 16 BRPM

## 2023-08-12 LAB
ALBUMIN SERPL BCG-MCNC: 2.9 G/DL (ref 3.5–5.2)
ALP SERPL-CCNC: 60 U/L (ref 40–129)
ALT SERPL W P-5'-P-CCNC: 10 U/L (ref 0–70)
ANION GAP SERPL CALCULATED.3IONS-SCNC: 12 MMOL/L (ref 7–15)
AST SERPL W P-5'-P-CCNC: 23 U/L (ref 0–45)
BASOPHILS # BLD AUTO: 0 10E3/UL (ref 0–0.2)
BASOPHILS NFR BLD AUTO: 0 %
BILIRUB SERPL-MCNC: 0.2 MG/DL
BUN SERPL-MCNC: 3.9 MG/DL (ref 6–20)
CALCIUM SERPL-MCNC: 8.5 MG/DL (ref 8.6–10)
CHLORIDE SERPL-SCNC: 108 MMOL/L (ref 98–107)
CREAT SERPL-MCNC: 1.69 MG/DL (ref 0.67–1.17)
CRP SERPL-MCNC: 122 MG/L
DEPRECATED HCO3 PLAS-SCNC: 21 MMOL/L (ref 22–29)
EOSINOPHIL # BLD AUTO: 0.1 10E3/UL (ref 0–0.7)
EOSINOPHIL NFR BLD AUTO: 1 %
ERYTHROCYTE [DISTWIDTH] IN BLOOD BY AUTOMATED COUNT: 15.9 % (ref 10–15)
GFR SERPL CREATININE-BSD FRML MDRD: 46 ML/MIN/1.73M2
GLUCOSE BLDC GLUCOMTR-MCNC: 105 MG/DL (ref 70–99)
GLUCOSE BLDC GLUCOMTR-MCNC: 110 MG/DL (ref 70–99)
GLUCOSE BLDC GLUCOMTR-MCNC: 126 MG/DL (ref 70–99)
GLUCOSE SERPL-MCNC: 113 MG/DL (ref 70–99)
HCT VFR BLD AUTO: 22.9 % (ref 40–53)
HGB BLD-MCNC: 7.4 G/DL (ref 13.3–17.7)
IMM GRANULOCYTES # BLD: 0.1 10E3/UL
IMM GRANULOCYTES NFR BLD: 1 %
LYMPHOCYTES # BLD AUTO: 1.4 10E3/UL (ref 0.8–5.3)
LYMPHOCYTES NFR BLD AUTO: 21 %
MAGNESIUM SERPL-MCNC: 1.8 MG/DL (ref 1.7–2.3)
MCH RBC QN AUTO: 29.4 PG (ref 26.5–33)
MCHC RBC AUTO-ENTMCNC: 32.3 G/DL (ref 31.5–36.5)
MCV RBC AUTO: 91 FL (ref 78–100)
MONOCYTES # BLD AUTO: 0.7 10E3/UL (ref 0–1.3)
MONOCYTES NFR BLD AUTO: 10 %
NEUTROPHILS # BLD AUTO: 4.4 10E3/UL (ref 1.6–8.3)
NEUTROPHILS NFR BLD AUTO: 67 %
NRBC # BLD AUTO: 0 10E3/UL
NRBC BLD AUTO-RTO: 0 /100
PHOSPHATE SERPL-MCNC: 4.3 MG/DL (ref 2.5–4.5)
PLATELET # BLD AUTO: 708 10E3/UL (ref 150–450)
POTASSIUM SERPL-SCNC: 3.4 MMOL/L (ref 3.4–5.3)
PROT SERPL-MCNC: 6 G/DL (ref 6.4–8.3)
RBC # BLD AUTO: 2.52 10E6/UL (ref 4.4–5.9)
SODIUM SERPL-SCNC: 141 MMOL/L (ref 136–145)
WBC # BLD AUTO: 6.6 10E3/UL (ref 4–11)

## 2023-08-12 PROCEDURE — 250N000013 HC RX MED GY IP 250 OP 250 PS 637: Performed by: INTERNAL MEDICINE

## 2023-08-12 PROCEDURE — 84100 ASSAY OF PHOSPHORUS: CPT | Performed by: INTERNAL MEDICINE

## 2023-08-12 PROCEDURE — 80053 COMPREHEN METABOLIC PANEL: CPT | Performed by: STUDENT IN AN ORGANIZED HEALTH CARE EDUCATION/TRAINING PROGRAM

## 2023-08-12 PROCEDURE — 258N000003 HC RX IP 258 OP 636: Performed by: INTERNAL MEDICINE

## 2023-08-12 PROCEDURE — 999N000044 HC STATISTIC CVC DRESSING CHANGE

## 2023-08-12 PROCEDURE — 85025 COMPLETE CBC W/AUTO DIFF WBC: CPT | Performed by: STUDENT IN AN ORGANIZED HEALTH CARE EDUCATION/TRAINING PROGRAM

## 2023-08-12 PROCEDURE — 99233 SBSQ HOSP IP/OBS HIGH 50: CPT | Performed by: INTERNAL MEDICINE

## 2023-08-12 PROCEDURE — 36415 COLL VENOUS BLD VENIPUNCTURE: CPT | Performed by: STUDENT IN AN ORGANIZED HEALTH CARE EDUCATION/TRAINING PROGRAM

## 2023-08-12 PROCEDURE — 250N000011 HC RX IP 250 OP 636: Mod: JZ | Performed by: INTERNAL MEDICINE

## 2023-08-12 PROCEDURE — 250N000013 HC RX MED GY IP 250 OP 250 PS 637: Performed by: PHYSICIAN ASSISTANT

## 2023-08-12 PROCEDURE — 272N000450 HC KIT 4FR POWER PICC SINGLE LUMEN

## 2023-08-12 PROCEDURE — 86140 C-REACTIVE PROTEIN: CPT | Performed by: INTERNAL MEDICINE

## 2023-08-12 PROCEDURE — 83735 ASSAY OF MAGNESIUM: CPT | Performed by: INTERNAL MEDICINE

## 2023-08-12 RX ORDER — HEPARIN SODIUM,PORCINE 10 UNIT/ML
5-20 VIAL (ML) INTRAVENOUS EVERY 24 HOURS
Status: DISCONTINUED | OUTPATIENT
Start: 2023-08-12 | End: 2023-08-12 | Stop reason: HOSPADM

## 2023-08-12 RX ORDER — POTASSIUM CHLORIDE 750 MG/1
40 TABLET, EXTENDED RELEASE ORAL ONCE
Status: COMPLETED | OUTPATIENT
Start: 2023-08-12 | End: 2023-08-12

## 2023-08-12 RX ORDER — HEPARIN SODIUM,PORCINE 10 UNIT/ML
5-20 VIAL (ML) INTRAVENOUS
Status: DISCONTINUED | OUTPATIENT
Start: 2023-08-12 | End: 2023-08-12 | Stop reason: HOSPADM

## 2023-08-12 RX ORDER — RIZATRIPTAN BENZOATE 10 MG/1
10 TABLET ORAL
Status: COMPLETED | OUTPATIENT
Start: 2023-08-12 | End: 2023-08-12

## 2023-08-12 RX ADMIN — CEFTRIAXONE SODIUM 2 G: 2 INJECTION, POWDER, FOR SOLUTION INTRAMUSCULAR; INTRAVENOUS at 11:29

## 2023-08-12 RX ADMIN — METRONIDAZOLE 500 MG: 500 TABLET ORAL at 08:14

## 2023-08-12 RX ADMIN — METOPROLOL SUCCINATE 50 MG: 25 TABLET, EXTENDED RELEASE ORAL at 08:14

## 2023-08-12 RX ADMIN — PANTOPRAZOLE SODIUM 40 MG: 40 TABLET, DELAYED RELEASE ORAL at 08:14

## 2023-08-12 RX ADMIN — ACYCLOVIR 400 MG: 400 TABLET ORAL at 06:01

## 2023-08-12 RX ADMIN — METRONIDAZOLE 500 MG: 500 TABLET ORAL at 14:36

## 2023-08-12 RX ADMIN — VANCOMYCIN HYDROCHLORIDE 1500 MG: 10 INJECTION, POWDER, LYOPHILIZED, FOR SOLUTION INTRAVENOUS at 14:36

## 2023-08-12 RX ADMIN — ATORVASTATIN CALCIUM 20 MG: 20 TABLET, FILM COATED ORAL at 08:14

## 2023-08-12 RX ADMIN — ACYCLOVIR 400 MG: 400 TABLET ORAL at 14:35

## 2023-08-12 RX ADMIN — POTASSIUM CHLORIDE 40 MEQ: 750 TABLET, EXTENDED RELEASE ORAL at 11:30

## 2023-08-12 RX ADMIN — LEVOTHYROXINE SODIUM 137 MCG: 0.14 TABLET ORAL at 08:13

## 2023-08-12 RX ADMIN — RIZATRIPTAN 10 MG: 10 TABLET, FILM COATED ORAL at 05:12

## 2023-08-12 RX ADMIN — ACETAMINOPHEN 1000 MG: 500 TABLET ORAL at 03:43

## 2023-08-12 ASSESSMENT — ACTIVITIES OF DAILY LIVING (ADL)
ADLS_ACUITY_SCORE: 24

## 2023-08-12 NOTE — PLAN OF CARE
Goal Outcome Evaluation:      Plan of Care Reviewed With: patient    Overall Patient Progress: improvingOverall Patient Progress: improving    Outcome Evaluation: A/Ox4. VSS on RA. Q4 Blood sugars stable. C/o migraine overnight, x1 riztriptan and tylenol given with some relief. Up ab trinh. RFA PIV extravastated with vancomycin, MD aware and flowsheet completed with baseline photo. Mild swelling noted and outlined and warm compress applied. New PIV placed in L hand with D5+LR running @ 100ml/hr. Plan for PICC line placement today. Will be going home on IV and PO antbiotics.
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
## 2023-08-12 NOTE — PLAN OF CARE
VSS on RA, alert, orientated, ind in room, denies bowel or bladder issues, no c.o pain, discharge home this stefan on IV abx with PICC line, understands discharge orders and followup, IV vanco and rocephin doses given prior to discharge, home infusion will deliver abx and follow up teaching after discharge, pt will discharge home with wife

## 2023-08-12 NOTE — PROGRESS NOTES
8/12/2023  3:40 AM    HOSPITAL MEDICINE NOTE:  Patient complaining of severe 8/10 migraine headache.  Takes Maxalt 10mg at home.  Plan:  I have ordered X 1 dose.  Janes Franco MD  [phone] pager  [phone] mobile/cell

## 2023-08-12 NOTE — PROCEDURES
[facility]    Single Lumen PICC Placement    Date/Time: 8/12/2023 10:53 AM    Performed by: Miya Humphries RN  Authorized by: Radha Bautista MD  Indications: vascular access      UNIVERSAL PROTOCOL   Site Marked: Yes  Prior Images Obtained and Reviewed:  Yes  Required items: Required blood products, implants, devices and special equipment available    Patient identity confirmed:  Verbally with patient, arm band and hospital-assigned identification number  NA - No sedation, light sedation, or local anesthesia  Confirmation Checklist:  Patient's identity using two indicators, relevant allergies, procedure was appropriate and matched the consent or emergent situation and correct equipment/implants were available  Time out: Immediately prior to the procedure a time out was called    Universal Protocol: the Joint Commission Universal Protocol was followed    Preparation: Patient was prepped and draped in usual sterile fashion    ESBL (mL):  1     ANESTHESIA    Anesthesia:  Local infiltration  Local Anesthetic:  Lidocaine 1% without epinephrine  Anesthetic Total (mL):  2      SEDATION    Patient Sedated: No        Preparation: skin prepped with 2% chlorhexidine and skin prepped with ChloraPrep  Skin prep agent: skin prep agent completely dried prior to procedure  Sterile barriers: maximum sterile barriers were used: cap, mask, sterile gown, sterile gloves, and large sterile sheet  Hand hygiene: hand hygiene performed prior to central venous catheter insertion  Type of line used: PICC  Catheter type: single lumen  Lumen type: power PICC  Catheter size: 4 Fr  Brand: Bard  Lot number: [id number]  Placement method: venipuncture, MST, ultrasound and tip navigation system  Number of attempts: 1  Difficulty threading catheter: no  Successful placement: yes  Orientation: right  : VD:7.0mm.  Location: basilic vein  Tip Location: SVC/RA Junction  Arm circumference: adults 15  cm  Extremity circumference: 34  Visible catheter length: 1  Total catheter length: 40  Dressing and securement: adhesive securement device, statlock, chlorhexidine patch applied and transparent dressing  Post procedure assessment: blood return through all ports, free fluid flow and placement verified by 3CG technology  PROCEDURE   Patient Tolerance:  Patient tolerated the procedure well with no immediate complications  Disposal: sharps and needle count correct at the end of procedure, needles and guidewire disposed in sharps container

## 2023-08-12 NOTE — DISCHARGE SUMMARY
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    Weight as of this encounter: 100.2 kg (221 lb).       Follow-ups Needed After Discharge   Follow-up Appointments     Adult [facility] Follow-up and recommended labs and tests      Follow up with primary care provider, Christopher Ribeiro, within 7 days for   hospital follow- up.   Follow up with Dr. Mccrary with Infectious Diseases in 4 weeks.   You should have weekly labs and a CT scan prior to your visit with her.   Follow up with oncology as directed.   Follow up with ENT as outpatient to address vocal cord injury. They should   contact you with this appointment    Appointments on [place] and/or [facility] (with [facility] or [facility]   provider or service). Call [phone] if you haven't heard regarding   these appointments within 7 days of discharge.        Adult [facility] Follow-up and recommended labs and tests      Follow up with Dr. Lepe on 08/18/2023 via video visit. The   following labs/tests are recommended: CBC with diff, CMP, CRP, Vanco   Trough.    Appointments on [place] and/or [facility] (with [facility] or [facility]   provider or service). Call [phone] if you haven't heard regarding   these appointments within 7  days of discharge.            Unresulted Labs Ordered in the Past 30 Days of this Admission       Date and Time Order Name Status Description    8/12/2023  1:17 PM CRP inflammation In process     8/8/2023  2:23 PM Fungal or Yeast Culture Routine Preliminary     8/8/2023  2:23 PM Anaerobic Bacterial Culture Routine Preliminary     8/8/2023  2:23 PM Abscess Aerobic Bacterial Culture Routine with Gram Stain Preliminary     8/8/2023  9:25 AM Blood Culture Hand, Right Preliminary     8/8/2023  9:25 AM Blood Culture Hand, Left Preliminary         These results will be followed up by ID    Discharge Disposition   Discharged to home  Condition at discharge: Stable    Hospital Course        Consultations This Hospital Stay   INTERVENTIONAL RADIOLOGY ADULT/PEDS IP CONSULT  INTERVENTIONAL RADIOLOGY ADULT/PEDS IP CONSULT  OCCUPATIONAL THERAPY ADULT IP CONSULT  PHYSICAL THERAPY ADULT IP CONSULT  INFECTIOUS DISEASE GENERAL ADULT IP CONSULT  SPEECH LANGUAGE PATH ADULT IP CONSULT  NURSING TO CONSULT FOR VASCULAR ACCESS CARE IP CONSULT  NEPHROLOGY GENERAL ADULT IP CONSULT  NURSING TO CONSULT FOR VASCULAR ACCESS CARE IP CONSULT  PHARMACY TO DOSE VANCO  NURSING TO CONSULT FOR VASCULAR ACCESS CARE IP CONSULT  NURSING TO CONSULT FOR VASCULAR ACCESS CARE IP CONSULT  INTERVENTIONAL RADIOLOGY ADULT/PEDS IP CONSULT  CARE MANAGEMENT / SOCIAL WORK IP CONSULT  NURSING TO CONSULT FOR VASCULAR ACCESS CARE IP CONSULT  NURSING TO CONSULT FOR VASCULAR ACCESS CARE IP CONSULT  VASCULAR ACCESS FOR PICC PLACEMENT ADULT IP CONSULT  PHARMACY TO DOSE VANCO  PHARMACY TO DOSE VANCO  NURSING TO CONSULT FOR VASCULAR ACCESS CARE IP CONSULT    Code Status   Full Code    Time Spent on this Encounter   I, Radha Bautista MD, personally saw the patient today and spent greater than 30 minutes discharging this patient.       Radha Bautista MD  [facility] 7C MED SURG  [address] [place] [id number]  Phone:  [phone]  ______________________________________________________________________    Physical Exam   Vital Signs: Temp: 98.1  F (36.7  C) Temp src: Oral BP: (!) 136/96 Pulse: 96   Resp: 16 SpO2: 99 % O2 Device: None (Room air)    Weight: 221 lbs 0 oz    Gen: Awake, alert, sitting in hospital chair  HEENT: NC/AT, sclera anicteric, voice hoarse  Resp: LCTAB, no increased WOB on RA  CV: Tachycardic with regular rhythm  Abd: Soft, distended, scars visible. Modest lower tenderness but no guarding.   Extrem: Warm and well perfused with trace LE edema       Primary Care Physician   Christopher Ribeiro    Discharge Orders      CT Abdomen pelvis w contrast*     CRP inflammation     Comprehensive metabolic panel     Infectious Disease Referral      OPAT enrollment and ID Clinic Referral      Primary Care - Care Coordination Referral      Home Infusion Referral      Primary Care - Care Coordination Referral      Reason for your hospital stay    You were admitted to the hospital for an infection in your abdomen. You underwent drainage on 8/9 and IV antibiotics.     Activity    Your activity upon discharge: activity as tolerated and no driving within 24 hours of taking opioid pain medication     Adult [facility] Follow-up and recommended labs and tests    Follow up with primary care provider, Christopher Ribeiro, within 7 days for hospital follow- up.   Follow up with Dr. Mccrary with Infectious Diseases in 4 weeks. You should have weekly labs and a CT scan prior to your visit with her.   Follow up with oncology as directed.   Follow up with ENT as outpatient to address vocal cord injury. They should contact you with this appointment    Appointments on [place] and/or [facility] (with [facility] or [facility] provider or service). Call [phone] if you haven't heard regarding these appointments within 7 days of discharge.     Reason for your hospital stay    Abdominal Abscess     Activity    Your activity upon discharge:  activity as tolerated     Adult [facility] Follow-up and recommended labs and tests    Follow up with Dr. Lepe on 08/18/2023 via video visit. The following labs/tests are recommended: CBC with diff, CMP, CRP, Vanco Trough.    Appointments on [place] and/or [facility] (with [facility] or [facility] provider or service). Call [phone] if you haven't heard regarding these appointments within 7 days of discharge.     Diet    Follow this diet upon discharge: Orders Placed This Encounter      Calorie Counts      Snacks/Supplements Adult: Ensure Max Protein (bariatric); Between Meals      Snacks/Supplements Adult: Other; If pt asks for a snack or supplement, please send. Send chocolate Magic Cup if pt requests.; With Meals      Regular Diet Adult     CBC with Platelets & Differential       Significant Results and Procedures   Most Recent 3 CBC's:  Recent Labs   Lab Test 08/12/23  0820 08/11/23  0646 08/10/23  0555   WBC 6.6 6.1 5.8   HGB 7.4* 7.5* 7.1*   MCV 91 91 90   * 773* 723*     Most Recent 3 BMP's:  Recent Labs   Lab Test 08/12/23  0820 08/12/23  0812 08/12/23  0435 08/11/23  0808 08/11/23  0646 08/10/23  1716 08/10/23  1301 08/10/23  1021 08/10/23  0555     --   --   --  140  --   --   --  141   POTASSIUM 3.4  --   --   --  3.6  --  3.7  --  3.4   CHLORIDE 108*  --   --   --  107  --   --   --  108*   CO2 21*  --   --   --  22  --   --   --  23   BUN 3.9*  --   --   --  3.3*  --   --   --  2.6*   CR 1.69*  --   --   --  1.75*  --   --   --  1.82*   ANIONGAP 12  --   --   --  11  --   --   --  10   BESTY 8.5*  --   --   --  8.5*  --   --   --  8.2*   * 126* 110*   < > 105*   < >  --    < > 112*    < > = values in this interval not displayed.     Most Recent 2 LFT's:  Recent Labs   Lab Test 08/12/23  0820 08/11/23  0646   AST 23 24   ALT 10 12   ALKPHOS 60 60   BILITOTAL 0.2 0.3   ,   Results for orders placed or performed during the hospital encounter of 08/02/23   CT  Chest/Abdomen/Pelvis w Contrast    Narrative    EXAM: CT CHEST/ABDOMEN/PELVIS WITH CONTRAST  LOCATION: [facility]  DATE/TIME: 08/02/2023 ,3:41 AM CDT    INDICATION: History of CA, recent laparoscopy for large bowel obstruction.  COMPARISON: CT chest, abdomen and pelvis on 04/06/2023.  TECHNIQUE: CT scan of the chest, abdomen, and pelvis was performed after the injection of Iopamidol (Isovue 370) solution 135 mL intravenously. Multiplanar reformats were obtained. Dose reduction techniques were used.     FINDINGS:    MEDIASTINUM/AXILLAE: Heterogenous thyroid gland. No cardiomegaly or significant pericardial effusion. No significant mediastinal, hilar or axillary lymphadenopathy.    LUNGS AND PLEURA: No pleural effusion or pneumothorax. Bibasilar pulmonary opacities, likely atelectasis.    CORONARY ARTERY CALCIFICATION: Mild.    ABDOMEN/PELVIS:    HEPATOBILIARY: No suspicious focal hepatic lesion. The gallbladder is surgically absent.    PANCREAS: No main pancreatic ductal dilatation or definite solid pancreatic mass.    SPLEEN: No splenomegaly.    ADRENAL GLANDS: No adrenal nodules.    KIDNEYS/BLADDER: Postsurgical changes of right nephrectomy. Again noted area of soft tissue thickening near the nephrectomy bed. No radiodense kidney/ureteral stones or hydronephrosis in the left kidney.    BOWEL: Multiple mildly prominent small bowel loops in the upper abdomen, could represent ileus related to the presence of the loculated collections. Mild distal colonic wall thickening, indeterminate, could be due to underdistention.    PERITONEUM: Multiple loculated collections in the lower abdomen and pelvis, the largest measures approximately 10.4 x 6.5 x 10.2 cm in the left lower quadrant (series 4 image 485), and 9.3 x 5.3 cm collection in the deep pelvis (series 4 image 552). Many   of these collections appear to be communicating with each other.    PELVIC ORGANS:  Unremarkable.    VASCULATURE: Unremarkable.    LYMPH NODES: Unremarkable.    MUSCULOSKELETAL: No suspicious osseous lesion.      Impression    IMPRESSION:   1.  Multiple loculated collections in the lower abdomen and pelvis, many of which appear to be communicating with each other. Findings worrisome for multiple abdominal abscesses.  2.  Multiple mildly prominent small bowel loops in the abdomen, nonspecific, can represent ileus related to presence of the abscesses.  3.  Stable postsurgical changes of right nephrectomy with area of soft tissue thickening near the nephrectomy bed, not significantly changed as compared to 04/06/2023.       CT Abdomen Pelvis w Contrast    Narrative    EXAMINATION: CT ABDOMEN PELVIS W CONTRAST, 8/6/2023 6:58 PM    INDICATION: Patient with known multiple intra-abdominal abscesses with  persistent fevers and increasing inflammatory markers despite IV  antibiotics. Assess for evolution of infection or other acute changes    COMPARISON STUDY: CT 8/2/2023    TECHNIQUE: CT scan of the abdomen and pelvis was performed on  multidetector CT scanner using volumetric acquisition technique and  images were reconstructed in multiple planes with variable thickness  and reviewed on dedicated workstations.     CONTRAST: 135 cc Isovue-370 injected IV without oral contrast    CT scan radiation dose is optimized to minimum requisite dose using  automated dose modulation techniques.    FINDINGS:    Lower thorax: Bibasilar dependent subsegmental atelectasis.    Liver: No mass. No intrahepatic biliary ductal dilation.    Biliary System: Gallbladder surgically absent.    Spleen: Normal.    Pancreas: No mass or pancreatic ductal dilation.    Adrenal glands: No mass or nodules    Kidneys: Status post right nephrectomy. No suspicious mass in the  nephrectomy bed. Left kidney is unremarkable. No hydronephrosis.    Gastrointestinal tract :Normal appendix. Prominent loops of small  bowel with air-fluid levels  measuring up to 3.5 cm in diameter. No  discrete transition point.  Large peripherally enhancing lobulated  fluid collections in the lower abdomen and pelvis are not  significantly changed . The largest component of these collections  measures approximately 12 x 8 x 5 cm.    Retroperitoneum: Patent major abdominal vasculature. No significant  lymphadenopathy.    Pelvis: Urinary bladder is unremarkable.  Prostate and seminal  vesicles are within normal limits.    Osseous structures: No aggressive or acute osseous lesion.      Soft tissues: Postsurgical changes in the anterior abdominal wall.      Impression    IMPRESSION:  1.  Lobulated communicating peripherally enhancing fluid collections  in the lower abdomen and pelvis are not significant changed from  8/2/2023 and remain concerning for abscess.  2.  Increased dilatation of small bowel with air-fluid levels without  definite transition point. Findings suggest ileus versus obstruction.    NIMO TIRADO DO         SYSTEM ID:  [id number]   IR Fine Needle Aspiration w Ultrasound    Narrative    PROCEDURES 8/9/2023 10:42 AM:  1. Ultrasound guided left lower quadrant fluid collection aspiration.    CLINICAL HISTORY: image guided LLQ fluid collection aspiration  possible drain placement if frankly purulent.     COMPARISONS: CT 8/6/2023    ATTENDING RADIOLOGIST: TICO Barr MD    I, SHANNA BARR MD, attest that I was present in the procedure room for  the entire procedure.    MEDICATIONS: The patient was placed on continuous monitoring. Vital  signs and sedation were monitored by the Interventional Radiology  nurse under the Attending Physician's supervision. 2.5 mg Versed and  125 mcg Fentanyl IV. The patient remained stable throughout the  procedure.    ATTENDING FACE-TO-FACE SEDATION TIME: 30 minutes    PROCEDURE: The patient understood the limitations, alternatives, and  risks of the procedure and requested the procedure be performed. Both  written and verbal consent were  obtained.    The left lower quadrant was prepped and draped in the usual sterile  fashion. 1% lidocaine without epinephrine was used for local  anesthesia.    Under ultrasound guidance, a 5 [employer] centesis  catheter needle was advanced into the left lower quadrant collection.  Ultrasound image documenting placement was saved in the patient's  record.    Needle removed. 50 mL sanguinous fluid was able to be aspirated and  submitted for the requested laboratory studies. Fluid did not appear  grossly purulent so no drainage catheter was placed.    Catheter removed.     Limited postprocedural scan demonstrated no immediate complication. No  significant change in the size of the left lower quadrant collection.  Representative ultrasound images were saved in the patient's record.    Sterile dressing applied.      Impression    IMPRESSION:  1. Ultrasound guided left lower quadrant fluid collection aspiration.  50 mL sanguinous fluid was able to be aspirated. No drainage catheter  placed.    2. Post procedural care and observation in the patient's inpatient  care unit.    SHANNA BARR MD         SYSTEM ID:  [id number]       Discharge Medications   Current Discharge Medication List        START taking these medications    Details   artificial saliva (BIOTENE MT) SOLN solution Swish and spit 1 mL (1 spray) in mouth every hour as needed for dry mouth  Qty: 44.3 mL, Refills: 1    Associated Diagnoses: Intra-abdominal abscess (H)      cefTRIAXone (ROCEPHIN) 2 GM vial Inject 2 g into the vein every 24 hours for 28 days    Associated Diagnoses: Intra-abdominal abscess (H)      metroNIDAZOLE (FLAGYL) 500 MG tablet Take 1 tablet (500 mg) by mouth 3 times daily for 28 days  Qty: 84 tablet, Refills: 0    Associated Diagnoses: Intra-abdominal abscess (H)      ondansetron (ZOFRAN ODT) 4 MG ODT tab Take 1 tablet (4 mg) by mouth every 6 hours as needed for nausea or vomiting  Qty: 20 tablet, Refills: 1    Associated  Diagnoses: Intra-abdominal abscess (H)      oxyCODONE (ROXICODONE) 5 MG tablet Take 1-2 tablets (5-10 mg) by mouth every 4 hours as needed for moderate pain  Qty: 20 tablet, Refills: 0    Associated Diagnoses: Intra-abdominal abscess (H)      polyethylene glycol (MIRALAX) 17 GM/Dose powder Take 17 g (1 Capful) by mouth 2 times daily as needed for constipation  Qty: 510 g, Refills: 1    Associated Diagnoses: Intra-abdominal abscess (H)      senna-docusate (SENOKOT-S/PERICOLACE) 8.6-50 MG tablet Take 1 tablet by mouth 2 times daily as needed for constipation  Qty: 20 tablet, Refills: 1    Associated Diagnoses: Intra-abdominal abscess (H)      vancomycin (VANCOCIN) 1500 mg/250 mL IVPB Inject 1,500 mg into the vein every 24 hours    Associated Diagnoses: Intra-abdominal abscess (H)           CONTINUE these medications which have NOT CHANGED    Details   acetaminophen (TYLENOL) 500 MG tablet Take 500-1,000 mg by mouth every 8 hours as needed for mild pain      acyclovir (ZOVIRAX) 400 MG tablet Take 1 tablet (400 mg) by mouth every 8 hours  Qty: 30 tablet, Refills: 11    Associated Diagnoses: Recurrent herpes simplex      atorvastatin (LIPITOR) 20 MG tablet Take 20 mg by mouth daily      clobetasol (TEMOVATE) 0.05 % external cream Apply topically 2 times daily  Qty: 60 g, Refills: 11    Associated Diagnoses: Renal cell carcinoma, right (H); Hand foot syndrome      levothyroxine (SYNTHROID/LEVOTHROID) 137 MCG tablet Take 1 tablet (137 mcg) by mouth daily for 180 days  Qty: 90 tablet, Refills: 1    Associated Diagnoses: Hypothyroidism due to acquired atrophy of thyroid      metoprolol succinate ER (TOPROL XL) 50 MG 24 hr tablet Take 1 tablet (50 mg) by mouth daily for 180 days  Qty: 90 tablet, Refills: 1    Associated Diagnoses: Hypertension, renal      nortriptyline (PAMELOR) 10 MG capsule Take 10 mg by mouth At Bedtime       omeprazole (PRILOSEC) 40 MG DR capsule TAKE 1 CAPSULE BY MOUTH EVERY DAY  Qty: 90 capsule,  Refills: 1    Associated Diagnoses: Gastric erosion, unspecified ulcer chronicity      rizatriptan (MAXALT-MLT) 10 MG ODT Take 1 tablet (10 mg) by mouth as needed for migraine symptoms persist or return, may repeat dose after 2 hours. The 's labeling recommends a maximum daily dose of 30 mg per 24 hours;  Qty: 30 tablet, Refills: 0    Associated Diagnoses: Migraine without status migrainosus, not intractable, unspecified migraine type      tadalafil (CIALIS) 10 MG tablet Take 1 tablet (10 mg) by mouth daily as needed (ED) 10 mg as a single dose 30 minutes prior to anticipated sexual activity; do not take more than once daily.  Qty: 30 tablet, Refills: 0    Associated Diagnoses: Erectile dysfunction, unspecified erectile dysfunction type           STOP taking these medications       amLODIPine (NORVASC) 5 MG tablet Comments:   Reason for Stopping:         aspirin (ASA) 81 MG chewable tablet Comments:   Reason for Stopping:         Cabozantinib S-Malate (CABOMETYX) 20 MG Comments:   Reason for Stopping:         ferrous gluconate (FERGON) 324 (38 Fe) MG tablet Comments:   Reason for Stopping:         lifitegrast (XIIDRA) 5 % opthalmic solution Comments:   Reason for Stopping:         sildenafil (VIAGRA) 100 MG tablet Comments:   Reason for Stopping:         urea (GORMEL) 20 % external cream Comments:   Reason for Stopping:             Allergies   Allergies   Allergen Reactions    Morphine Unknown     Due to kidney cancer

## 2023-08-12 NOTE — PROGRESS NOTES
Care Management Discharge Note    Discharge Date: 08/13/2023       Discharge Disposition: Home with services    Discharge Services: Home Infusion    Discharge DME: None    Discharge Transportation: family or friend will provide    Private pay costs discussed: Not applicable    Does the patient's insurance plan have a 3 day qualifying hospital stay waiver?  NA    PAS Confirmation Code: NA   Patient/family educated on Medicare website which has current facility and service quality ratings: no    Education Provided on the Discharge Plan: yes   Persons Notified of Discharge Plans: patient  Patient/Family in Agreement with the Plan: yes    Handoff Referral Completed: Yes    Additional Information:  Notified by RN that pt discharging to home today.  Pt will discharge on IV ceftriaxone.  [facility] updated.  Pt will need to get dose at hospital today prior to discharge.  [facility] will plan for a nurse visit at pt's home tomorrow to review education.  [facility] will deliver medication and supplies to his home this evening.  No other needs identified.  RNCC will remain available if further needs arise.       [facility])  Phone: [phone]  Fax: [phone]    Geovanna Resendez RNCC  Phone: [phone]  Pager: [phone]    SEARCHABLE in [facility] - search CARE COORDINATOR     [place] [facility] [place] ([id number]) Saturday & Sunday; ([phone]) [facility] Recognized Holidays     Units: 5A, 5B & 5C  Pager: [phone]    Units: 6B, 6C & 6D    Pager: [phone]    Units: 7A, 7B, 7C & 7D    Pager: [phone]    Units: 6A & ICU   Pager: [phone]    Units: 5 Ortho, 5MS & WB ED Pager: [phone]    Units: 6MS, 8A & 10 ICU  Pager [phone]

## 2023-08-12 NOTE — PROGRESS NOTES
"Prolonged Parenteral/Oral Antibiotic Recommendations and ID Follow up  This template provides final ID recommendations as of this date. If there are clinical changes or questions please call the ID team.     Infectious Diseases Indication: intra abdominal abscesses     Antibiotic Information  Name of Antibiotic Dose of Antibiotic1 Pharmacy to assist with dosing Y/N Anticipated duration Effective start date2 End date   Ceftriaxone   2 gram IV daily  N at least 2-4 weeks but TBD  8/9/23  TBD   Vancomycin IV  1500 mg IV daily  Y at least 2-4 weeks but TBD 8/9/23 TBD   metronidazole 500 mg PO q8hr  N  2-4 weeeks but TBD  8/9/23 TBD    1.Dose of antibiotic will need to be renally adjusted if creatinine clearance changes  2.Effective start date is the date of therapy with appropriate spectrum    Method of antibiotic delivery:PICC line. At the end of therapy should the line be removed? No. Selecting \"yes\" will function as written order to remove PICC line at the end of therapy.    Tentative plans for disposition: Home    Weekly labs required: CBC with diff, CMP, CRP, and Vancomycin level. Dr. Lepe will follow labs at discharge until ID follow up. Please have labs faxed to ID clinic.    Appointment to be scheduled: video follow-up with Dr Lepe on 8/18/23 at 2 PM       ID provider to route this note to the appropriate Epic pools: [facility] INFECTIOUS DISEASE ADULT CSC, PANDA, [facility]    [facility] Emergency Department/ID Clinic Information:  [address] [facility], Clinic [address] [place] [address]  [address]  Phone: [phone]  Fax: [phone] (Attention Miladys Shaver)      "

## 2023-08-13 LAB
BACTERIA ABSC ANAEROBE+AEROBE CULT: ABNORMAL
BACTERIA BLD CULT: NO GROWTH
BACTERIA BLD CULT: NO GROWTH
GRAM STAIN RESULT: ABNORMAL
GRAM STAIN RESULT: ABNORMAL

## 2023-08-13 NOTE — RESULT ENCOUNTER NOTE
Results reviewed by SOC triage MD. Blood cultures are no growth (FINAL). No further action needed for this. Culture from intraabdominal abscess growing staph epi. Per chart review, patient was discharged on ceftriaxone, vanc, and flagyl for treatment of this infection with plan for ID follow up outpatient on 8/18/2023. Notified Dr. Lepe of results, who will follow up with patient at appointment on 8/18/2023 to determine if any antibiotic changes are needed.

## 2023-08-14 ENCOUNTER — TELEPHONE (OUTPATIENT)
Dept: FAMILY MEDICINE | Facility: CLINIC | Age: 58
End: 2023-08-14
Payer: COMMERCIAL

## 2023-08-14 ENCOUNTER — PATIENT OUTREACH (OUTPATIENT)
Dept: CARE COORDINATION | Facility: CLINIC | Age: 58
End: 2023-08-14
Payer: COMMERCIAL

## 2023-08-14 NOTE — TELEPHONE ENCOUNTER
Reason for Call:  Appointment Request    Patient requesting this type of appt:  Hospital/ED Follow-Up     Requested provider: Christopher Ribeiro    Reason patient unable to be scheduled: Not within requested timeframe    When does patient want to be seen/preferred time: 3-7 days    Comments: Hospital f/u- [facility]- Due to stomach infection from surgery [place] 07[place]. Admitted 085/02/23 Discharged from [facility] on 08/12/23. ( Multiple doctor, Unknown- Needs to be seen within 7 days. Please call to discuss.     Could we send this information to you in [employer] or would you prefer to receive a phone call?:   Patient would prefer a phone call   Okay to leave a detailed message?: Yes at Cell number on file:    Telephone Information:   Mobile [phone]       Call taken on 8/14/2023 at 2:08 PM by KIERSTEN VASQUEZ

## 2023-08-14 NOTE — PHARMACY
[facility]  Parenteral ANtibiotic Review at Departure from Acute [facility] Note     Patient: Dimitrios Goldberg  MRN: [id number]  Allergies: Morphine    Current Location: [facility]  OPAT to be provided by: [facility]       Line Type: PICC    Diagnosis/Indications: intraabdominal abscess post-surgery  Organism(s): Staphylococcus epidermidis  MRDO? Yes - other  Pending Cultures/Microbiological Tests: no      Inpatient ID involved in developing OPAT plan: Yes - discharge OPAT plan has no changes from ID provider, Dr. Nick Lepe, OPAT plan charted on 8/12/2023    Outpatient ID Follow-up: ID OPAT Clinic Referral Placed ([facility] Ph: [phone] and Fax: [phone]) - appointment scheduled  Designated Provider: Dr. Nick Lepe    Antimicrobial Regimen / Route Anticipated  Duration Start Date Stop /  Reassess Date   Ceftriaxone 2 gm every 24 hours/IV At least 2-4 weeks 8/9/2023 Defintive end date to be determined by ID provider   Vancomycin IV 1500 mg every 24 hours/IV At least 2-4 weeks 8/9/2023 Defintive end date to be determined by ID provider   Metronidazole 500 mg every 8 hours/PO At least 2-4 weeks 8/9/2023 Defintive end date to be determined by ID provider     Laboratory Tests and Monitoring Frequency: CBC with Diff, CMP, CRP Once Weekly    Therapeutic Drug Monitoring: Vancomycin   - Drug: Vancomycin   - Goal(s): -600   - Level Type & Frequency: Please obtain serum vancomycin level at least once weekly; may increase serum vancomycin monitoring frequency (e.g., twice weekly) with regimen changes, labile renal function, and/or addition of nephrotoxic medications   - Level(s) last checked date: 8/11/2023    Imaging/Miscellaneous Monitoring: None    ID Pharmacist Interventions: None                          Michelle Song, PharmD, BCIDP  Pager: [phone]

## 2023-08-14 NOTE — PROGRESS NOTES
"Clinic Care Coordination Contact  [facility]: Post-Discharge Note  SITUATION                                                      Admission:    Admission Date: 08/02/23   Reason for Admission: You were admitted to the hospital for an infection in your abdomen. You underwent drainage on 8/9 and IV antibiotics.  Discharge:   Discharge Date: 08/12/23  Discharge Diagnosis: Intra-abdominal abscess (H)    BACKGROUND                                                      Per hospital discharge summary and inpatient provider notes:  \"Hospital course\" not available in discharge summary.     ASSESSMENT       Discharge Assessment  How are you doing now that you are home?: CC RN contacted patient, introduced self, role, care coordination program and reason for call.  Call was held via speaker phone with patient, and then patient called for his wife to also be part of the conversation. Overall, patient reports doing \"Better\" at home.  How are your symptoms? (Red Flag symptoms escalate to triage hotline per guidelines): Improved  Do you feel your condition is stable enough to be safe at home until your provider visit?: Yes  Does the patient have their discharge instructions? : Yes  Does the patient have questions regarding their discharge instructions? : No  Were you started on any new medications or were there changes to any of your previous medications? : Yes  Does the patient have all of their medications?: Yes  Do you have questions regarding any of your medications? : No (CC RN offered to place MTM referral, pt declined. pt managing his medications.)  Discharge follow-up appointment scheduled within 14 calendar days? : Yes  Discharge Follow Up Appointment Date: 08/18/23  Discharge Follow Up Appointment Scheduled with?: Specialty Care Provider (Pt also needed a PCP f/u appt 7 days from discharge. CC RN warm transfered his call to our priority scheduling line to make this visit.)    Post-op ([facility] CTA Only)  If the patient " had a surgery or procedure, do they have any questions for a nurse?: No    Post-op (Clinicians Only)  Did the patient have surgery or a procedure: No  Fever: No  Chills: No  Eating & Drinking: eating and drinking without complaints/concerns  PO Intake: regular diet  Bowel Function: normal  Date of last BM: 08/14/23  Urinary Status: voiding without complaint/concerns  Patient confirmed that [facility] was out to see him the day after discharge.     PLAN                                                      Outpatient Plan:  1. Patient and wife whom is on patients consent to communicate verbalized good understanding of care plans and will follow recommendations. Writer warm transferred to main scheduling line to make PCP post hospital follow up appointment.   2. Care Coordination goals not identified at this time. Patient may be referred to Care Coordination in the future if additional needs arise.    3. Patient encouraged to contact the care team if situation changes and assistance is needed.   4. Patient and wife to follow IP AVS.     Future Appointments   Date Time Provider Department Center   8/18/2023  2:00 PM Nick Lepe MD [facility]   9/5/2023 12:30 PM Provider,  [facility]   9/5/2023 12:30 PM Joe Espinoza MD [facility]         For any urgent concerns, please contact our 24 hour nurse triage line: [phone] ([phone])         Nallely Pastrana RN

## 2023-08-14 NOTE — CONFIDENTIAL NOTE
Please schedule patient with a same-day appointment slot  He can come to [place] or [place]  You can take the slots available for me on next Monday or Wednesday

## 2023-08-15 ENCOUNTER — LAB REQUISITION (OUTPATIENT)
Dept: LAB | Facility: CLINIC | Age: 58
End: 2023-08-15
Payer: COMMERCIAL

## 2023-08-15 DIAGNOSIS — K65.1 PERITONEAL ABSCESS (H): ICD-10-CM

## 2023-08-15 LAB
ALBUMIN SERPL BCG-MCNC: 3.1 G/DL (ref 3.5–5.2)
ALP SERPL-CCNC: 64 U/L (ref 40–129)
ALT SERPL W P-5'-P-CCNC: 10 U/L (ref 0–70)
ANION GAP SERPL CALCULATED.3IONS-SCNC: 11 MMOL/L (ref 7–15)
AST SERPL W P-5'-P-CCNC: 16 U/L (ref 0–45)
BASOPHILS # BLD AUTO: 0 10E3/UL (ref 0–0.2)
BASOPHILS NFR BLD AUTO: 0 %
BILIRUB SERPL-MCNC: <0.2 MG/DL
BUN SERPL-MCNC: 9.4 MG/DL (ref 6–20)
CALCIUM SERPL-MCNC: 9 MG/DL (ref 8.6–10)
CHLORIDE SERPL-SCNC: 104 MMOL/L (ref 98–107)
CREAT SERPL-MCNC: 1.58 MG/DL (ref 0.67–1.17)
CRP SERPL-MCNC: 109.06 MG/L
DEPRECATED HCO3 PLAS-SCNC: 26 MMOL/L (ref 22–29)
EOSINOPHIL # BLD AUTO: 0.1 10E3/UL (ref 0–0.7)
EOSINOPHIL NFR BLD AUTO: 1 %
ERYTHROCYTE [DISTWIDTH] IN BLOOD BY AUTOMATED COUNT: 15.9 % (ref 10–15)
GFR SERPL CREATININE-BSD FRML MDRD: 50 ML/MIN/1.73M2
GLUCOSE SERPL-MCNC: 80 MG/DL (ref 70–99)
HCT VFR BLD AUTO: 24.8 % (ref 40–53)
HGB BLD-MCNC: 8 G/DL (ref 13.3–17.7)
IMM GRANULOCYTES # BLD: 0.1 10E3/UL
IMM GRANULOCYTES NFR BLD: 1 %
LYMPHOCYTES # BLD AUTO: 1.6 10E3/UL (ref 0.8–5.3)
LYMPHOCYTES NFR BLD AUTO: 23 %
MCH RBC QN AUTO: 28.8 PG (ref 26.5–33)
MCHC RBC AUTO-ENTMCNC: 32.3 G/DL (ref 31.5–36.5)
MCV RBC AUTO: 89 FL (ref 78–100)
MONOCYTES # BLD AUTO: 0.6 10E3/UL (ref 0–1.3)
MONOCYTES NFR BLD AUTO: 9 %
NEUTROPHILS # BLD AUTO: 4.6 10E3/UL (ref 1.6–8.3)
NEUTROPHILS NFR BLD AUTO: 66 %
NRBC # BLD AUTO: 0 10E3/UL
NRBC BLD AUTO-RTO: 0 /100
PLATELET # BLD AUTO: 658 10E3/UL (ref 150–450)
POTASSIUM SERPL-SCNC: 3.9 MMOL/L (ref 3.4–5.3)
PROT SERPL-MCNC: 6.4 G/DL (ref 6.4–8.3)
RBC # BLD AUTO: 2.78 10E6/UL (ref 4.4–5.9)
SODIUM SERPL-SCNC: 141 MMOL/L (ref 136–145)
VANCOMYCIN SERPL-MCNC: 13.8 UG/ML
WBC # BLD AUTO: 7.1 10E3/UL (ref 4–11)

## 2023-08-15 PROCEDURE — 80053 COMPREHEN METABOLIC PANEL: CPT | Performed by: INTERNAL MEDICINE

## 2023-08-15 PROCEDURE — 85025 COMPLETE CBC W/AUTO DIFF WBC: CPT | Performed by: INTERNAL MEDICINE

## 2023-08-15 PROCEDURE — 80202 ASSAY OF VANCOMYCIN: CPT | Performed by: INTERNAL MEDICINE

## 2023-08-15 PROCEDURE — 86140 C-REACTIVE PROTEIN: CPT | Performed by: INTERNAL MEDICINE

## 2023-08-16 LAB — BACTERIA ABSC ANAEROBE+AEROBE CULT: NORMAL
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
## 2023-08-18 ENCOUNTER — VIRTUAL VISIT (OUTPATIENT)
Dept: INFECTIOUS DISEASES | Facility: CLINIC | Age: 58
End: 2023-08-18
Attending: INTERNAL MEDICINE
Payer: COMMERCIAL

## 2023-08-18 DIAGNOSIS — N18.31 CHRONIC KIDNEY DISEASE, STAGE 3A (H): Primary | ICD-10-CM

## 2023-08-18 DIAGNOSIS — C64.1 PRIMARY MALIGNANT NEOPLASM OF RIGHT KIDNEY WITH METASTASIS FROM KIDNEY TO OTHER SITE (H): ICD-10-CM

## 2023-08-18 DIAGNOSIS — K65.1 INTRA-ABDOMINAL ABSCESS (H): ICD-10-CM

## 2023-08-18 PROCEDURE — 99214 OFFICE O/P EST MOD 30 MIN: CPT | Mod: VID | Performed by: INTERNAL MEDICINE

## 2023-08-20 ENCOUNTER — LAB REQUISITION (OUTPATIENT)
Dept: LAB | Facility: CLINIC | Age: 58
End: 2023-08-20
Payer: COMMERCIAL

## 2023-08-20 DIAGNOSIS — K65.1 PERITONEAL ABSCESS (H): ICD-10-CM

## 2023-08-20 LAB
CREAT SERPL-MCNC: 1.48 MG/DL (ref 0.67–1.17)
GFR SERPL CREATININE-BSD FRML MDRD: 55 ML/MIN/1.73M2
VANCOMYCIN SERPL-MCNC: 15.6 UG/ML

## 2023-08-20 PROCEDURE — 80202 ASSAY OF VANCOMYCIN: CPT | Performed by: INTERNAL MEDICINE

## 2023-08-20 PROCEDURE — 82565 ASSAY OF CREATININE: CPT | Performed by: INTERNAL MEDICINE

## 2023-08-21 ENCOUNTER — OFFICE VISIT (OUTPATIENT)
Dept: FAMILY MEDICINE | Facility: CLINIC | Age: 58
End: 2023-08-21
Payer: COMMERCIAL

## 2023-08-21 VITALS
HEART RATE: 106 BPM | OXYGEN SATURATION: 98 % | TEMPERATURE: 98.8 F | WEIGHT: 196 LBS | RESPIRATION RATE: 24 BRPM | HEIGHT: 72 IN | BODY MASS INDEX: 26.55 KG/M2 | DIASTOLIC BLOOD PRESSURE: 81 MMHG | SYSTOLIC BLOOD PRESSURE: 112 MMHG

## 2023-08-21 DIAGNOSIS — Z16.29 METHICILLIN RESISTANT STAPHYLOCOCCUS EPIDERMIDIS INFECTION: ICD-10-CM

## 2023-08-21 DIAGNOSIS — D49.511 NEOPLASM OF RIGHT KIDNEY WITH THROMBUS OF INFERIOR VENA CAVA (H): ICD-10-CM

## 2023-08-21 DIAGNOSIS — D75.839 THROMBOCYTHEMIA: ICD-10-CM

## 2023-08-21 DIAGNOSIS — N18.31 CHRONIC KIDNEY DISEASE, STAGE 3A (H): ICD-10-CM

## 2023-08-21 DIAGNOSIS — K65.1 INTRA-ABDOMINAL ABSCESS (H): Primary | ICD-10-CM

## 2023-08-21 DIAGNOSIS — D64.9 ANEMIA, UNSPECIFIED TYPE: ICD-10-CM

## 2023-08-21 DIAGNOSIS — I82.220 NEOPLASM OF RIGHT KIDNEY WITH THROMBUS OF INFERIOR VENA CAVA (H): ICD-10-CM

## 2023-08-21 DIAGNOSIS — J38.00 VOCAL CORD PARESIS: ICD-10-CM

## 2023-08-21 DIAGNOSIS — A49.8 METHICILLIN RESISTANT STAPHYLOCOCCUS EPIDERMIDIS INFECTION: ICD-10-CM

## 2023-08-21 PROCEDURE — 99495 TRANSJ CARE MGMT MOD F2F 14D: CPT | Performed by: INTERNAL MEDICINE

## 2023-08-21 NOTE — PROGRESS NOTES
Assessment & Plan     Intra-abdominal abscess (H)  He developed these after surgery in [facility]  He had surgery for small bowel obstruction and after that he started having abdominal discomfort and abdominal pain and lethargy  [place] and [place] had the symptoms  He had exploratory laparotomy in [place] and was intubated after that for pneumonia and treated for the same  In any event he had IR guided aspiration of the fluid and this is growing MARCELINA  He is currently on ceftriaxone/vancomycin/oral Flagyl  He is getting weekly labs  He is tolerating the medications well and is making a slow recovery    Methicillin resistant Staphylococcus epidermidis infection  He follows up with ID and he is currently on ceftriaxone/vancomycin/oral Flagyl    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)  Status post surgery  He never had radiation  He is currently Cabometyx which has been on hold since discharge from hospital  Following up with oncology tomorrow    Vocal cord paresis  He developed vocal cord paresis after discharge from the hospital in [place]  He developed this after intubation  His voice is slowly getting better  Going to follow-up with ENT in September    Chronic kidney disease, stage 3a (H)  Follows up with renal  His baseline creatinine is around 1.7-1.8  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 and his creatinine is stabilized at 1.7-1.8    Anemia, unspecified type  He is anemic currently in which I think is multifactorial from phlebotomy/poor nutritional status  There could also be some iron deficiency   He was taking iron in the past which is on hold  He is going to follow-up with hematology tomorrow    Thrombocythemia  This is from  underlying inflammation    He also has a history of blood pressure and was on amlodipine which is on hold currently because his blood pressure is doing very good and he has lost weight      30 minutes  "spent by me on the date of the encounter doing chart review, history and exam, documentation and further activities per the note     MED REC REQUIRED  Post Medication Reconciliation Status: discharge medications reconciled, continue medications without change      Christopher Ribeiro MD  [facility] HARIS Plunkett is a 58 year old, presenting for the following health issues:  Hospital F/U        8/21/2023    10:01 AM   Additional Questions   Roomed by Rachel         8/21/2023    10:01 AM   Patient Reported Additional Medications   Patient reports taking the following new medications None       HPI         8/14/2023     2:00 PM   Post Discharge Outreach   Admission Date 8/2/2023   Reason for Admission You were admitted to the hospital for an infection in your abdomen. You underwent drainage on 8/9 and IV antibiotics.   Discharge Date 8/12/2023   Discharge Diagnosis Intra-abdominal abscess (H)   How are you doing now that you are home? CC RN contacted patient, introduced self, role, care coordination program and reason for call.  Call was held via speaker phone with patient, and then patient called for his wife to also be part of the conversation. Overall, patient reports doing \"Better\" at home.   How are your symptoms? (Red Flag symptoms escalate to triage hotline per guidelines) Improved   Do you feel your condition is stable enough to be safe at home until your provider visit? Yes   Does the patient have their discharge instructions?  Yes   Does the patient have questions regarding their discharge instructions?  No   Were you started on any new medications or were there changes to any of your previous medications?  Yes   Does the patient have all of their medications? Yes   Do you have questions regarding any of your medications?  No   Discharge follow-up appointment scheduled within 14 calendar days?  Yes   Discharge Follow Up Appointment Date 8/18/2023   Discharge Follow Up Appointment " "Scheduled with? Specialty Care Provider     Hospital Follow-up Visit:    Hospital/Nursing Home/IP Rehab Facility: [facility]  Date of Admission: 08/02/2023  Date of Discharge: 08/12/2023  Reason(s) for Admission: Multiple intraabdominal abscesses    Was your hospitalization related to COVID-19? No   Problems taking medications regularly:  None  Medication changes since discharge: None  Problems adhering to non-medication therapy:  None    Summary of hospitalization:  [facility] discharge summary reviewed  Diagnostic Tests/Treatments reviewed.  Follow up needed:  with multiple providers  Other Healthcare Providers Involved in Patient s Care:         Specialist appointment -    Update since discharge: improved.         Plan of care communicated with patient                 Review of Systems   Constitutional, HEENT, cardiovascular, pulmonary, gi and gu systems are negative, except as otherwise noted.      Objective    /81 (BP Location: Left arm, Patient Position: Sitting, Cuff Size: Adult Regular)   Pulse 106   Temp 98.8  F (37.1  C) (Oral)   Resp 24   Ht 1.84 m (6' 0.44\")   Wt 88.9 kg (196 lb)   SpO2 98%   BMI 26.26 kg/m    Body mass index is 26.26 kg/m .  Physical Exam   GENERAL: healthy, alert and no distress  NECK: no adenopathy, no asymmetry, masses, or scars and thyroid normal to palpation  RESP: Decreased air entry at both bases with crackles at right base  CV: regular rate and rhythm, normal S1 S2, no S3 or S4, no murmur, click or rub, no peripheral edema and peripheral pulses strong  ABDOMEN: Multiple scars seen  There are well-healed  Bowel sounds active  Slight discomfort on palpation of the epigastric region  MS: no gross musculoskeletal defects noted, no edema                      "

## 2023-08-22 ENCOUNTER — LAB REQUISITION (OUTPATIENT)
Dept: LAB | Facility: CLINIC | Age: 58
End: 2023-08-22
Payer: COMMERCIAL

## 2023-08-22 ENCOUNTER — ONCOLOGY VISIT (OUTPATIENT)
Dept: ONCOLOGY | Facility: CLINIC | Age: 58
End: 2023-08-22
Payer: COMMERCIAL

## 2023-08-22 VITALS
OXYGEN SATURATION: 99 % | BODY MASS INDEX: 26.93 KG/M2 | TEMPERATURE: 98.4 F | WEIGHT: 201 LBS | SYSTOLIC BLOOD PRESSURE: 122 MMHG | HEART RATE: 102 BPM | DIASTOLIC BLOOD PRESSURE: 87 MMHG | RESPIRATION RATE: 16 BRPM

## 2023-08-22 DIAGNOSIS — I82.220 NEOPLASM OF RIGHT KIDNEY WITH THROMBUS OF INFERIOR VENA CAVA (H): Primary | ICD-10-CM

## 2023-08-22 DIAGNOSIS — K65.1 PERITONEAL ABSCESS (H): ICD-10-CM

## 2023-08-22 DIAGNOSIS — J38.00 VOCAL CORD PARESIS: ICD-10-CM

## 2023-08-22 DIAGNOSIS — D49.511 NEOPLASM OF RIGHT KIDNEY WITH THROMBUS OF INFERIOR VENA CAVA (H): Primary | ICD-10-CM

## 2023-08-22 LAB
ALBUMIN SERPL BCG-MCNC: 3.3 G/DL (ref 3.5–5.2)
ALP SERPL-CCNC: 58 U/L (ref 40–129)
ALT SERPL W P-5'-P-CCNC: 6 U/L (ref 0–70)
ANION GAP SERPL CALCULATED.3IONS-SCNC: 10 MMOL/L (ref 7–15)
AST SERPL W P-5'-P-CCNC: 14 U/L (ref 0–45)
BASOPHILS # BLD AUTO: 0.1 10E3/UL (ref 0–0.2)
BASOPHILS NFR BLD AUTO: 1 %
BILIRUB SERPL-MCNC: <0.2 MG/DL
BUN SERPL-MCNC: 17.4 MG/DL (ref 6–20)
CALCIUM SERPL-MCNC: 9.3 MG/DL (ref 8.6–10)
CHLORIDE SERPL-SCNC: 103 MMOL/L (ref 98–107)
CREAT SERPL-MCNC: 1.48 MG/DL (ref 0.67–1.17)
CRP SERPL-MCNC: 35.05 MG/L
DEPRECATED HCO3 PLAS-SCNC: 26 MMOL/L (ref 22–29)
EOSINOPHIL # BLD AUTO: 0.1 10E3/UL (ref 0–0.7)
EOSINOPHIL NFR BLD AUTO: 1 %
ERYTHROCYTE [DISTWIDTH] IN BLOOD BY AUTOMATED COUNT: 16.9 % (ref 10–15)
GFR SERPL CREATININE-BSD FRML MDRD: 55 ML/MIN/1.73M2
GLUCOSE SERPL-MCNC: 86 MG/DL (ref 70–99)
HCT VFR BLD AUTO: 29 % (ref 40–53)
HGB BLD-MCNC: 9.1 G/DL (ref 13.3–17.7)
IMM GRANULOCYTES # BLD: 0.1 10E3/UL
IMM GRANULOCYTES NFR BLD: 1 %
LYMPHOCYTES # BLD AUTO: 1.7 10E3/UL (ref 0.8–5.3)
LYMPHOCYTES NFR BLD AUTO: 21 %
MCH RBC QN AUTO: 28.9 PG (ref 26.5–33)
MCHC RBC AUTO-ENTMCNC: 31.4 G/DL (ref 31.5–36.5)
MCV RBC AUTO: 92 FL (ref 78–100)
MONOCYTES # BLD AUTO: 0.5 10E3/UL (ref 0–1.3)
MONOCYTES NFR BLD AUTO: 7 %
NEUTROPHILS # BLD AUTO: 5.5 10E3/UL (ref 1.6–8.3)
NEUTROPHILS NFR BLD AUTO: 69 %
NRBC # BLD AUTO: 0 10E3/UL
NRBC BLD AUTO-RTO: 0 /100
PLATELET # BLD AUTO: 430 10E3/UL (ref 150–450)
POTASSIUM SERPL-SCNC: 4 MMOL/L (ref 3.4–5.3)
PROT SERPL-MCNC: 6.7 G/DL (ref 6.4–8.3)
RBC # BLD AUTO: 3.15 10E6/UL (ref 4.4–5.9)
SODIUM SERPL-SCNC: 139 MMOL/L (ref 136–145)
VANCOMYCIN SERPL-MCNC: 16 UG/ML
WBC # BLD AUTO: 7.9 10E3/UL (ref 4–11)

## 2023-08-22 PROCEDURE — 99495 TRANSJ CARE MGMT MOD F2F 14D: CPT

## 2023-08-22 PROCEDURE — 80053 COMPREHEN METABOLIC PANEL: CPT | Performed by: INTERNAL MEDICINE

## 2023-08-22 PROCEDURE — 86140 C-REACTIVE PROTEIN: CPT | Performed by: INTERNAL MEDICINE

## 2023-08-22 PROCEDURE — 85025 COMPLETE CBC W/AUTO DIFF WBC: CPT | Performed by: INTERNAL MEDICINE

## 2023-08-22 PROCEDURE — 80202 ASSAY OF VANCOMYCIN: CPT | Performed by: INTERNAL MEDICINE

## 2023-08-22 ASSESSMENT — PAIN SCALES - GENERAL: PAINLEVEL: NO PAIN (0)
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
## 2023-08-22 NOTE — NURSING NOTE
"Oncology Rooming Note    August 22, 2023 8:55 AM   Dimitrios Goldberg is a 58 year old male who presents for:    Chief Complaint   Patient presents with    Oncology Clinic Visit     RCC     Initial Vitals: /87   Pulse 102   Temp 98.4  F (36.9  C) (Oral)   Resp 16   Wt 91.2 kg (201 lb)   SpO2 99%   BMI 26.93 kg/m   Estimated body mass index is 26.93 kg/m  as calculated from the following:    Height as of 8/21/23: 1.84 m (6' 0.44\").    Weight as of this encounter: 91.2 kg (201 lb). Body surface area is 2.16 meters squared.  No Pain (0) Comment: Data Unavailable   No LMP for male patient.  Allergies reviewed: Yes  Medications reviewed: Yes    Medications: Medication refills not needed today.  Pharmacy name entered into [employer]:    [facility] [id number] [place] [facility] [address]    Clinical concerns:        Khalida Miles CMA              "
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
## 2023-08-23 ENCOUNTER — TELEPHONE (OUTPATIENT)
Dept: FAMILY MEDICINE | Facility: CLINIC | Age: 58
End: 2023-08-23
Payer: COMMERCIAL

## 2023-08-23 ENCOUNTER — PATIENT OUTREACH (OUTPATIENT)
Dept: ONCOLOGY | Facility: CLINIC | Age: 58
End: 2023-08-23
Payer: COMMERCIAL

## 2023-08-23 NOTE — TELEPHONE ENCOUNTER
Received FMLA forms and patient signed Authorization to Release Health Information Leave of Absence and placed in Dr Ribeiro's box at the [facility].  Mona Denis MA  [facility]   Primary Care

## 2023-08-23 NOTE — TELEPHONE ENCOUNTER
What type of form? FMLA  What day did you drop off your forms? 8/23/2023  Is there a due date? ASAP (7-10 business day to compete forms)   How would you like to receive these forms? Please fax to [employer] @ [phone]  Which clinic was the [facility] dropped off at? [facility]    What is the best number to contact you? Home [phone]  What time works best to contact you with in 4 hrs? Anytime  Is it okay to leave a message? Yes    Patient would like a phone call when form is faxed.    Daniela Cagle

## 2023-08-23 NOTE — PROGRESS NOTES
[facility]: Cancer Care                                                                                      Handicapped parking permit form signed by Krystal Valenzuela mailed to patient.  [employer] message sent with instructions.    Frances Ferris, RN, BSN  Oncology RN Care Coordinator  [facility]

## 2023-08-24 NOTE — TELEPHONE ENCOUNTER
Form faxed to [employer] @ [phone]  on 8/24/23 at 4:41pm. Copy placed in abstract and original in tc bin. Copy mailed to pt's home.

## 2023-08-29 ENCOUNTER — LAB REQUISITION (OUTPATIENT)
Dept: LAB | Facility: CLINIC | Age: 58
End: 2023-08-29
Payer: COMMERCIAL

## 2023-08-29 DIAGNOSIS — K65.1 PERITONEAL ABSCESS (H): ICD-10-CM

## 2023-08-29 LAB
ALBUMIN SERPL BCG-MCNC: 3.6 G/DL (ref 3.5–5.2)
ALP SERPL-CCNC: 57 U/L (ref 40–129)
ALT SERPL W P-5'-P-CCNC: 6 U/L (ref 0–70)
ANION GAP SERPL CALCULATED.3IONS-SCNC: 9 MMOL/L (ref 7–15)
AST SERPL W P-5'-P-CCNC: 14 U/L (ref 0–45)
BASOPHILS # BLD AUTO: 0 10E3/UL (ref 0–0.2)
BASOPHILS NFR BLD AUTO: 0 %
BILIRUB SERPL-MCNC: 0.2 MG/DL
BUN SERPL-MCNC: 14 MG/DL (ref 6–20)
CALCIUM SERPL-MCNC: 9.6 MG/DL (ref 8.6–10)
CHLORIDE SERPL-SCNC: 100 MMOL/L (ref 98–107)
CREAT SERPL-MCNC: 1.37 MG/DL (ref 0.67–1.17)
CRP SERPL-MCNC: 19.07 MG/L
DEPRECATED HCO3 PLAS-SCNC: 28 MMOL/L (ref 22–29)
EOSINOPHIL # BLD AUTO: 0.1 10E3/UL (ref 0–0.7)
EOSINOPHIL NFR BLD AUTO: 1 %
ERYTHROCYTE [DISTWIDTH] IN BLOOD BY AUTOMATED COUNT: 17.5 % (ref 10–15)
GFR SERPL CREATININE-BSD FRML MDRD: 60 ML/MIN/1.73M2
GLUCOSE SERPL-MCNC: 79 MG/DL (ref 70–99)
HCT VFR BLD AUTO: 31.2 % (ref 40–53)
HGB BLD-MCNC: 9.9 G/DL (ref 13.3–17.7)
IMM GRANULOCYTES # BLD: 0 10E3/UL
IMM GRANULOCYTES NFR BLD: 1 %
LYMPHOCYTES # BLD AUTO: 2.6 10E3/UL (ref 0.8–5.3)
LYMPHOCYTES NFR BLD AUTO: 32 %
MCH RBC QN AUTO: 29.4 PG (ref 26.5–33)
MCHC RBC AUTO-ENTMCNC: 31.7 G/DL (ref 31.5–36.5)
MCV RBC AUTO: 93 FL (ref 78–100)
MONOCYTES # BLD AUTO: 0.6 10E3/UL (ref 0–1.3)
MONOCYTES NFR BLD AUTO: 7 %
NEUTROPHILS # BLD AUTO: 4.8 10E3/UL (ref 1.6–8.3)
NEUTROPHILS NFR BLD AUTO: 59 %
NRBC # BLD AUTO: 0 10E3/UL
NRBC BLD AUTO-RTO: 0 /100
PLATELET # BLD AUTO: 373 10E3/UL (ref 150–450)
POTASSIUM SERPL-SCNC: 3.5 MMOL/L (ref 3.4–5.3)
PROT SERPL-MCNC: 7.2 G/DL (ref 6.4–8.3)
RBC # BLD AUTO: 3.37 10E6/UL (ref 4.4–5.9)
SODIUM SERPL-SCNC: 137 MMOL/L (ref 136–145)
VANCOMYCIN SERPL-MCNC: 13 UG/ML
WBC # BLD AUTO: 8.1 10E3/UL (ref 4–11)

## 2023-08-29 PROCEDURE — 85025 COMPLETE CBC W/AUTO DIFF WBC: CPT | Performed by: INTERNAL MEDICINE

## 2023-08-29 PROCEDURE — 80202 ASSAY OF VANCOMYCIN: CPT | Performed by: INTERNAL MEDICINE

## 2023-08-29 PROCEDURE — 86140 C-REACTIVE PROTEIN: CPT | Performed by: INTERNAL MEDICINE

## 2023-08-29 PROCEDURE — 80053 COMPREHEN METABOLIC PANEL: CPT | Performed by: INTERNAL MEDICINE

## 2023-08-30 ENCOUNTER — TELEPHONE (OUTPATIENT)
Dept: INFECTIOUS DISEASES | Facility: CLINIC | Age: 58
End: 2023-08-30
Payer: COMMERCIAL

## 2023-08-30 DIAGNOSIS — N18.31 CHRONIC KIDNEY DISEASE, STAGE 3A (H): ICD-10-CM

## 2023-08-30 DIAGNOSIS — C64.1 PRIMARY MALIGNANT NEOPLASM OF RIGHT KIDNEY WITH METASTASIS FROM KIDNEY TO OTHER SITE (H): ICD-10-CM

## 2023-08-30 DIAGNOSIS — K65.1 INTRA-ABDOMINAL ABSCESS (H): Primary | ICD-10-CM

## 2023-08-30 NOTE — TELEPHONE ENCOUNTER
Dr Manish Delong,     Please advise. Pt is on his 3rd week of treatment for abscess. Tentatively treat for 2-4 weeks with Vanco, Ceftriaxone and PO metronidazole.  CRP continues to be elevated, but remainder of labs ok.       Spoke with Pt today. Noticed the change when he left the hospital.   He reports taste changes, BUT cannot describe it exactly. This causes him to lose his appetite and when he does eat/drink it tastes different.   Also has a yellow coating on tongue like he had yellow candy and stays that way. Not described like thrush with yellow coloring.     Notes from [facility] sent to writer.     Pt reporting a yellow tongue and taste disturbance. LFTs WNL. Taste disturbances possible with vanco infusions. Pt reporting this started while inpatient after starting IV abx. No othser se or sx of toxicity.

## 2023-09-01 NOTE — TELEPHONE ENCOUNTER
Dr. Manish Delong,     Spoke with Pt today, he is really struggling with the bad taste. Make it difficult to want to eat of drink anything.     He also reported he is experiencing headaches in the evening or when he goes to bed often waking with headaches.     All of the symptoms have been since he started the antibiotics in the hospital.

## 2023-09-01 NOTE — TELEPHONE ENCOUNTER
Dr. Manish Delong,     Pt will have US done on 9/5 in the morning. He will also stop Metronidazole today.   He did not take yet this morning and told him it can take some time for metronidazole to clear, but hopefully will feel better after the weekend and not taking.
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
## 2023-09-05 ENCOUNTER — ONCOLOGY VISIT (OUTPATIENT)
Dept: ONCOLOGY | Facility: CLINIC | Age: 58
End: 2023-09-05
Attending: INTERNAL MEDICINE
Payer: COMMERCIAL

## 2023-09-05 ENCOUNTER — LAB REQUISITION (OUTPATIENT)
Dept: LAB | Facility: CLINIC | Age: 58
End: 2023-09-05
Payer: COMMERCIAL

## 2023-09-05 ENCOUNTER — PRE VISIT (OUTPATIENT)
Dept: OTOLARYNGOLOGY | Facility: CLINIC | Age: 58
End: 2023-09-05

## 2023-09-05 ENCOUNTER — OFFICE VISIT (OUTPATIENT)
Dept: OTOLARYNGOLOGY | Facility: CLINIC | Age: 58
End: 2023-09-05
Payer: COMMERCIAL

## 2023-09-05 ENCOUNTER — ANCILLARY PROCEDURE (OUTPATIENT)
Dept: ULTRASOUND IMAGING | Facility: CLINIC | Age: 58
End: 2023-09-05
Attending: INTERNAL MEDICINE
Payer: COMMERCIAL

## 2023-09-05 VITALS
WEIGHT: 201.5 LBS | DIASTOLIC BLOOD PRESSURE: 80 MMHG | BODY MASS INDEX: 27.33 KG/M2 | TEMPERATURE: 97.7 F | SYSTOLIC BLOOD PRESSURE: 120 MMHG | HEART RATE: 92 BPM | RESPIRATION RATE: 16 BRPM | OXYGEN SATURATION: 100 %

## 2023-09-05 VITALS
HEIGHT: 72 IN | BODY MASS INDEX: 26.82 KG/M2 | HEART RATE: 85 BPM | SYSTOLIC BLOOD PRESSURE: 120 MMHG | WEIGHT: 198 LBS | OXYGEN SATURATION: 99 % | DIASTOLIC BLOOD PRESSURE: 88 MMHG

## 2023-09-05 DIAGNOSIS — J38.00 VOCAL CORD PARESIS: Primary | ICD-10-CM

## 2023-09-05 DIAGNOSIS — R49.0 DYSPHONIA: ICD-10-CM

## 2023-09-05 DIAGNOSIS — N18.31 CHRONIC KIDNEY DISEASE, STAGE 3A (H): ICD-10-CM

## 2023-09-05 DIAGNOSIS — C64.1 PRIMARY MALIGNANT NEOPLASM OF RIGHT KIDNEY WITH METASTASIS FROM KIDNEY TO OTHER SITE (H): ICD-10-CM

## 2023-09-05 DIAGNOSIS — K65.1 PERITONEAL ABSCESS (H): ICD-10-CM

## 2023-09-05 DIAGNOSIS — D49.511 NEOPLASM OF RIGHT KIDNEY WITH THROMBUS OF INFERIOR VENA CAVA (H): Primary | ICD-10-CM

## 2023-09-05 DIAGNOSIS — I82.220 NEOPLASM OF RIGHT KIDNEY WITH THROMBUS OF INFERIOR VENA CAVA (H): Primary | ICD-10-CM

## 2023-09-05 DIAGNOSIS — K65.1 INTRA-ABDOMINAL ABSCESS (H): ICD-10-CM

## 2023-09-05 LAB
ALBUMIN SERPL BCG-MCNC: 3.7 G/DL (ref 3.5–5.2)
ALP SERPL-CCNC: 60 U/L (ref 40–129)
ALT SERPL W P-5'-P-CCNC: 7 U/L (ref 0–70)
ANION GAP SERPL CALCULATED.3IONS-SCNC: 12 MMOL/L (ref 7–15)
AST SERPL W P-5'-P-CCNC: 13 U/L (ref 0–45)
BASOPHILS # BLD AUTO: 0 10E3/UL (ref 0–0.2)
BASOPHILS NFR BLD AUTO: 0 %
BILIRUB SERPL-MCNC: <0.2 MG/DL
BUN SERPL-MCNC: 11.6 MG/DL (ref 6–20)
CALCIUM SERPL-MCNC: 9.3 MG/DL (ref 8.6–10)
CHLORIDE SERPL-SCNC: 103 MMOL/L (ref 98–107)
CREAT SERPL-MCNC: 1.39 MG/DL (ref 0.67–1.17)
CRP SERPL-MCNC: 12.54 MG/L
DEPRECATED HCO3 PLAS-SCNC: 25 MMOL/L (ref 22–29)
EOSINOPHIL # BLD AUTO: 0.1 10E3/UL (ref 0–0.7)
EOSINOPHIL NFR BLD AUTO: 2 %
ERYTHROCYTE [DISTWIDTH] IN BLOOD BY AUTOMATED COUNT: 17.4 % (ref 10–15)
GFR SERPL CREATININE-BSD FRML MDRD: 59 ML/MIN/1.73M2
GLUCOSE SERPL-MCNC: 75 MG/DL (ref 70–99)
HCT VFR BLD AUTO: 30.9 % (ref 40–53)
HGB BLD-MCNC: 9.6 G/DL (ref 13.3–17.7)
IMM GRANULOCYTES # BLD: 0 10E3/UL
IMM GRANULOCYTES NFR BLD: 0 %
LYMPHOCYTES # BLD AUTO: 1.5 10E3/UL (ref 0.8–5.3)
LYMPHOCYTES NFR BLD AUTO: 27 %
MCH RBC QN AUTO: 28.7 PG (ref 26.5–33)
MCHC RBC AUTO-ENTMCNC: 31.1 G/DL (ref 31.5–36.5)
MCV RBC AUTO: 93 FL (ref 78–100)
MONOCYTES # BLD AUTO: 0.5 10E3/UL (ref 0–1.3)
MONOCYTES NFR BLD AUTO: 8 %
NEUTROPHILS # BLD AUTO: 3.6 10E3/UL (ref 1.6–8.3)
NEUTROPHILS NFR BLD AUTO: 63 %
NRBC # BLD AUTO: 0 10E3/UL
NRBC BLD AUTO-RTO: 0 /100
PLATELET # BLD AUTO: 411 10E3/UL (ref 150–450)
POTASSIUM SERPL-SCNC: 3.8 MMOL/L (ref 3.4–5.3)
PROT SERPL-MCNC: 6.9 G/DL (ref 6.4–8.3)
RBC # BLD AUTO: 3.34 10E6/UL (ref 4.4–5.9)
SODIUM SERPL-SCNC: 140 MMOL/L (ref 136–145)
VANCOMYCIN SERPL-MCNC: 12 UG/ML
WBC # BLD AUTO: 5.8 10E3/UL (ref 4–11)

## 2023-09-05 PROCEDURE — 76705 ECHO EXAM OF ABDOMEN: CPT

## 2023-09-05 PROCEDURE — 99207 PR NO CHARGE LOS: CPT

## 2023-09-05 PROCEDURE — 99213 OFFICE O/P EST LOW 20 MIN: CPT | Performed by: INTERNAL MEDICINE

## 2023-09-05 PROCEDURE — 80202 ASSAY OF VANCOMYCIN: CPT | Performed by: INTERNAL MEDICINE

## 2023-09-05 PROCEDURE — 99203 OFFICE O/P NEW LOW 30 MIN: CPT | Mod: 25 | Performed by: OTOLARYNGOLOGY

## 2023-09-05 PROCEDURE — 99214 OFFICE O/P EST MOD 30 MIN: CPT | Performed by: INTERNAL MEDICINE

## 2023-09-05 PROCEDURE — 80053 COMPREHEN METABOLIC PANEL: CPT | Performed by: INTERNAL MEDICINE

## 2023-09-05 PROCEDURE — 85025 COMPLETE CBC W/AUTO DIFF WBC: CPT | Performed by: INTERNAL MEDICINE

## 2023-09-05 PROCEDURE — 31575 DIAGNOSTIC LARYNGOSCOPY: CPT | Performed by: OTOLARYNGOLOGY

## 2023-09-05 PROCEDURE — 86140 C-REACTIVE PROTEIN: CPT | Performed by: INTERNAL MEDICINE

## 2023-09-05 ASSESSMENT — PAIN SCALES - GENERAL
PAINLEVEL: NO PAIN (0)
PAINLEVEL: NO PAIN (0)
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
## 2023-09-05 NOTE — NURSING NOTE
Oncology Rooming Note    September 5, 2023 4:01 PM   Dimitrios Goldberg is a 58 year old male who presents for:    Chief Complaint   Patient presents with    Oncology Clinic Visit     Renal Cell Carcinoma      Initial Vitals: /80 (BP Location: Left arm, Patient Position: Sitting, Cuff Size: Adult Regular)   Pulse 92   Temp 97.7  F (36.5  C) (Oral)   Resp 16   Wt 91.4 kg (201 lb 8 oz)   SpO2 100%   BMI 27.33 kg/m   Estimated body mass index is 27.33 kg/m  as calculated from the following:    Height as of an earlier encounter on 9/5/23: 1.829 m (6').    Weight as of this encounter: 91.4 kg (201 lb 8 oz). Body surface area is 2.15 meters squared.  No Pain (0) Comment: Data Unavailable   No LMP for male patient.  Allergies reviewed: Yes  Medications reviewed: Yes    Medications: Medication refills not needed today.  Pharmacy name entered into [place]:    [facility] [place] [address]    Clinical concerns: Patient reports having a yellow tongue. Patient noticed the color a few weeks ago.   Dr. Campos  was NOT notified.      Darrion Blackwell

## 2023-09-05 NOTE — PATIENT INSTRUCTIONS
1.  You were seen in the ENT Clinic today by . If you have any questions or concerns after your appointment, please call [phone]. Press option #1 for scheduling related needs. Press option #3 for Nurse advice.     2. Plan is to return to clinic as needed.        Jennifer Sampson LPN  [phone]  [facility] Otolaryngology

## 2023-09-05 NOTE — PROGRESS NOTES
[facility] VOICE [facility]    Clinician: Jessica Tong M.M. (voice), M.A., CCC-SLP  Seen in conjunction with: Dr. Espinoza  Patient: Dimitrios Goldberg  Date of Visit: 9/5/2023    HISTORY  PATIENT INFORMATION  Dimitrios Goldberg was seen for brief consultation today.  Please refer to Dr. Espinoza s dictation for a more complete history and impressions.      Evaluation was begun, but has not been completed, as he reports that his voice quality has significantly improved and therefore no skilled services were warranted.     DIAGNOSIS/REASON FOR REFERRAL  Dysphonia/ Evaluate, perform laryngeal exam, treat as appropriate    No charge for today s session; charges will be billed at the completion of the evaluation  NO CHARGE FACILITY FEE (34032)    ICD-10 code (s): R49.0 (Dysphonia)     Time: 5 min    Jessica Tong M.M. (voice), M.A., CCC/SLP  Speech-Language Pathologist  [facility]  [phone]

## 2023-09-06 LAB — BACTERIA ABSC ANAEROBE+AEROBE CULT: NO GROWTH

## 2023-09-08 DIAGNOSIS — C64.1 PRIMARY MALIGNANT NEOPLASM OF RIGHT KIDNEY WITH METASTASIS FROM KIDNEY TO OTHER SITE (H): ICD-10-CM

## 2023-09-08 DIAGNOSIS — K65.1 INTRA-ABDOMINAL ABSCESS (H): Primary | ICD-10-CM

## 2023-09-08 DIAGNOSIS — N18.31 CHRONIC KIDNEY DISEASE, STAGE 3A (H): ICD-10-CM

## 2023-09-12 ENCOUNTER — LAB REQUISITION (OUTPATIENT)
Dept: LAB | Facility: CLINIC | Age: 58
End: 2023-09-12
Payer: COMMERCIAL

## 2023-09-12 DIAGNOSIS — K65.1 PERITONEAL ABSCESS (H): ICD-10-CM

## 2023-09-12 LAB
ALBUMIN SERPL BCG-MCNC: 3.8 G/DL (ref 3.5–5.2)
ALP SERPL-CCNC: 73 U/L (ref 40–129)
ALT SERPL W P-5'-P-CCNC: 12 U/L (ref 0–70)
ANION GAP SERPL CALCULATED.3IONS-SCNC: 9 MMOL/L (ref 7–15)
AST SERPL W P-5'-P-CCNC: 16 U/L (ref 0–45)
BASOPHILS # BLD AUTO: 0 10E3/UL (ref 0–0.2)
BASOPHILS NFR BLD AUTO: 0 %
BILIRUB SERPL-MCNC: <0.2 MG/DL
BUN SERPL-MCNC: 11.4 MG/DL (ref 6–20)
CALCIUM SERPL-MCNC: 9.4 MG/DL (ref 8.6–10)
CHLORIDE SERPL-SCNC: 103 MMOL/L (ref 98–107)
CREAT SERPL-MCNC: 1.37 MG/DL (ref 0.67–1.17)
CRP SERPL-MCNC: 12.21 MG/L
DEPRECATED HCO3 PLAS-SCNC: 28 MMOL/L (ref 22–29)
EGFRCR SERPLBLD CKD-EPI 2021: 60 ML/MIN/1.73M2
EOSINOPHIL # BLD AUTO: 0.1 10E3/UL (ref 0–0.7)
EOSINOPHIL NFR BLD AUTO: 2 %
ERYTHROCYTE [DISTWIDTH] IN BLOOD BY AUTOMATED COUNT: 16.4 % (ref 10–15)
GLUCOSE SERPL-MCNC: 108 MG/DL (ref 70–99)
HCT VFR BLD AUTO: 33.5 % (ref 40–53)
HGB BLD-MCNC: 10.4 G/DL (ref 13.3–17.7)
IMM GRANULOCYTES # BLD: 0 10E3/UL
IMM GRANULOCYTES NFR BLD: 0 %
LYMPHOCYTES # BLD AUTO: 1.6 10E3/UL (ref 0.8–5.3)
LYMPHOCYTES NFR BLD AUTO: 29 %
MCH RBC QN AUTO: 28.7 PG (ref 26.5–33)
MCHC RBC AUTO-ENTMCNC: 31 G/DL (ref 31.5–36.5)
MCV RBC AUTO: 92 FL (ref 78–100)
MONOCYTES # BLD AUTO: 0.3 10E3/UL (ref 0–1.3)
MONOCYTES NFR BLD AUTO: 6 %
NEUTROPHILS # BLD AUTO: 3.5 10E3/UL (ref 1.6–8.3)
NEUTROPHILS NFR BLD AUTO: 63 %
NRBC # BLD AUTO: 0 10E3/UL
NRBC BLD AUTO-RTO: 0 /100
PLATELET # BLD AUTO: 437 10E3/UL (ref 150–450)
POTASSIUM SERPL-SCNC: 4 MMOL/L (ref 3.4–5.3)
PROT SERPL-MCNC: 6.9 G/DL (ref 6.4–8.3)
RBC # BLD AUTO: 3.63 10E6/UL (ref 4.4–5.9)
SODIUM SERPL-SCNC: 140 MMOL/L (ref 136–145)
VANCOMYCIN SERPL-MCNC: 14.4 UG/ML
WBC # BLD AUTO: 5.6 10E3/UL (ref 4–11)

## 2023-09-12 PROCEDURE — 85025 COMPLETE CBC W/AUTO DIFF WBC: CPT | Performed by: INTERNAL MEDICINE

## 2023-09-12 PROCEDURE — 86140 C-REACTIVE PROTEIN: CPT | Performed by: INTERNAL MEDICINE

## 2023-09-12 PROCEDURE — 80202 ASSAY OF VANCOMYCIN: CPT | Performed by: INTERNAL MEDICINE

## 2023-09-12 PROCEDURE — 80053 COMPREHEN METABOLIC PANEL: CPT | Performed by: INTERNAL MEDICINE

## 2023-09-14 ENCOUNTER — TRANSCRIBE ORDERS (OUTPATIENT)
Dept: INFECTIOUS DISEASES | Facility: CLINIC | Age: 58
End: 2023-09-14
Payer: COMMERCIAL

## 2023-09-14 DIAGNOSIS — N18.31 CHRONIC KIDNEY DISEASE, STAGE 3A (H): ICD-10-CM

## 2023-09-14 DIAGNOSIS — K65.1 INTRA-ABDOMINAL ABSCESS (H): Primary | ICD-10-CM

## 2023-09-14 DIAGNOSIS — C64.1 PRIMARY MALIGNANT NEOPLASM OF RIGHT KIDNEY WITH METASTASIS FROM KIDNEY TO OTHER SITE (H): ICD-10-CM

## 2023-09-15 ENCOUNTER — OFFICE VISIT (OUTPATIENT)
Dept: INFECTIOUS DISEASES | Facility: CLINIC | Age: 58
End: 2023-09-15
Payer: COMMERCIAL

## 2023-09-15 ENCOUNTER — TELEPHONE (OUTPATIENT)
Dept: UROLOGY | Facility: CLINIC | Age: 58
End: 2023-09-15

## 2023-09-15 VITALS
OXYGEN SATURATION: 100 % | SYSTOLIC BLOOD PRESSURE: 127 MMHG | HEART RATE: 110 BPM | DIASTOLIC BLOOD PRESSURE: 89 MMHG | WEIGHT: 199.8 LBS | BODY MASS INDEX: 27.1 KG/M2

## 2023-09-15 DIAGNOSIS — K65.1 INTRA-ABDOMINAL ABSCESS (H): ICD-10-CM

## 2023-09-15 DIAGNOSIS — K65.1 INTRA-ABDOMINAL ABSCESS (H): Primary | ICD-10-CM

## 2023-09-15 PROCEDURE — 99215 OFFICE O/P EST HI 40 MIN: CPT | Performed by: INTERNAL MEDICINE

## 2023-09-15 RX ORDER — DOXYCYCLINE 100 MG/1
100 CAPSULE ORAL 2 TIMES DAILY
Qty: 14 CAPSULE | Refills: 0 | Status: SHIPPED | OUTPATIENT
Start: 2023-09-15 | End: 2023-09-21

## 2023-09-15 NOTE — CONSULTS
Outpatient IR Drain Referral  09/15/23    Referring Provider: Dr. Manish Delong ID  IR Referral Request: intraabdominal abscess s/p 50 ml fluid aspiration by IR on 8/9/23 ,currently, pt is doing well but repeat US still shows 7.7 cm abscess ( decreased from 12 cm) continued antibiotic therapy by ID    Procedure Approved for:       8/9/23 Fluid aspiration in IR LLQ    IMPRESSION:  1. Ultrasound guided left lower quadrant fluid collection aspiration.  50 mL sanguinous fluid was able to be aspirated. No drainage catheter  placed. GM positive epidermitis.       Brief History:    Dimitrios Goldberg is a 58 year old male with history of RCC (s/p right nephrectomy and cholecystectomy and IVC tumor thrombectomy and BAYLEE 8/10/2021, on cabozantinib), also s/p ex-lap BAYLEE with RP mass resection (8/29/22), CKD, SBO, w intraabd abscess s/p surgery     Pertinent Imaging Reviewed:      US 9/5/23 FINDINGS: Targeted ultrasound of the left lower quadrant demonstrates  a complex fluid collection measuring 7.7 x 4.5 x 6.9 cm. This is smaller and more contained than on the prior imaging.    IMPRESSION: Residual 7.7 cm complex fluid collection in the left lower quadrant at the site of prior drained abscess.    Case and imaging US 9/5/23  and CT 8/6/23 was reviewed with Dr. Nicole from IR who recommends an updated CT with contrast as the fluid collection is very complex. Imaging ordered, IR asks referring team to assist with scheduling to expedite review.     CT 9/19/23 No intra-abdominal free air or free fluid. Significantly decreased  size of a peripherally enhancing fluid collection along the left  pelvic sidewall measuring 5.4 x 2.0 cm (series 4, image 170),  previously 12.0 x 4.6 cm on 8/6/2023 at this location but also  previously extending across the pelvis to the right. No abnormally  dilated loops of bowel. Mild colonic diverticulosis. The appendix is  prominent but without adjacent inflammatory change. The  major  abdominal vasculature is patent. No lymphadenopathy in the abdomen or  pelvis.    IMPRESSION:   Decreased size of a small residual abscess along the left pelvic  sidewall.     Case and images CT 9/19/23, US 9/5/23, CT 8/6/23 reviewed with Dr. Bah who recommends current treatment, the patient is not symptomatic, fluid collections are decreasing in size, there is not a good window to access the fluid multiple fluid collections due to the bowel, vessels, structures. If fluid collection increases in size or patient becomes symptomatic, IR could re evaluate for a safe window. IR does not recommend fluid aspiration, drain placement at this time.     Requesting team, Dr. Manish EASTMAN, made aware of IR recommendations via Epic messaging.    PILY Schumacher CNP  Interventional Radiolog

## 2023-09-15 NOTE — TELEPHONE ENCOUNTER
Health Call Center    Phone Message    May a detailed message be left on voicemail: yes     Reason for Call: Appointment Intake    Referring Provider Name: Nick Lepe MD in  INFECTIOUS DISEASE  Diagnosis and/or Symptoms: Intra-abdominal abscess (H) [K65.1]  Primary malignant neoplasm of right kidney with metastasis from kidney to other ...  Chronic kidney disease, stage 3a     s/p right nephrectomy 8/10/21, on cabozantinib (on hold) ex-lap with BAYLEE and RP mass resection (8/29/22), SBO and s/p ex-lap with BAYLEE take down (7/21/23) c/b intraabdominal [id number] x 8 x 5 cm. on abx ,US shows complex fluid 7.7 x 4.5 x 7 cm. pls advise  original referral placed for general surg, but gen surg ruled out not appropriate and patient needs to see original surgeons, Dr. Crowell or Dr. Hogue    I was unsure under what Dx to put under in protocols. Please review and call pt to schedule. Thanks     Action Taken: Message routed to:  [facility] ([facility]    Travel Screening: Not Applicable
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
## 2023-09-15 NOTE — NURSING NOTE
Dimitrios Goldberg's goals for this visit include:   Chief Complaint   Patient presents with    Abscess     Follow-up on Peritoneal abscess, labs completed on 9/12/23       PCP: Christopher Ribeiro    Referring Provider:  No referring provider defined for this encounter.      Initial /89 (BP Location: Left arm, Patient Position: Sitting, Cuff Size: Adult Regular)   Pulse 110   Wt 90.6 kg (199 lb 12.8 oz)   SpO2 100%   BMI 27.10 kg/m   Estimated body mass index is 27.1 kg/m  as calculated from the following:    Height as of 9/5/23: 1.829 m (6').    Weight as of this encounter: 90.6 kg (199 lb 12.8 oz).    Medication Reconciliation: complete    Do you need any medication refills at today's visit? none    CHEYENNE Smith  Rheumatology/Infectious disease  [facility]   [phone]

## 2023-09-18 DIAGNOSIS — K65.1 INTRA-ABDOMINAL ABSCESS (H): Primary | ICD-10-CM

## 2023-09-19 ENCOUNTER — ANCILLARY PROCEDURE (OUTPATIENT)
Dept: CT IMAGING | Facility: CLINIC | Age: 58
End: 2023-09-19
Attending: INTERNAL MEDICINE
Payer: COMMERCIAL

## 2023-09-19 DIAGNOSIS — K65.1 INTRA-ABDOMINAL ABSCESS (H): ICD-10-CM

## 2023-09-19 PROCEDURE — 74177 CT ABD & PELVIS W/CONTRAST: CPT | Performed by: RADIOLOGY

## 2023-09-19 RX ORDER — IOPAMIDOL 755 MG/ML
109 INJECTION, SOLUTION INTRAVASCULAR ONCE
Status: COMPLETED | OUTPATIENT
Start: 2023-09-19 | End: 2023-09-19

## 2023-09-19 RX ADMIN — IOPAMIDOL 109 ML: 755 INJECTION, SOLUTION INTRAVASCULAR at 12:41

## 2023-09-20 ENCOUNTER — TELEPHONE (OUTPATIENT)
Dept: FAMILY MEDICINE | Facility: CLINIC | Age: 58
End: 2023-09-20

## 2023-09-20 ENCOUNTER — VIRTUAL VISIT (OUTPATIENT)
Dept: FAMILY MEDICINE | Facility: CLINIC | Age: 58
End: 2023-09-20
Payer: COMMERCIAL

## 2023-09-20 DIAGNOSIS — C64.1 RENAL CELL CARCINOMA, RIGHT (H): ICD-10-CM

## 2023-09-20 DIAGNOSIS — N18.31 CHRONIC KIDNEY DISEASE, STAGE 3A (H): ICD-10-CM

## 2023-09-20 DIAGNOSIS — J38.00 VOCAL CORD PARESIS: ICD-10-CM

## 2023-09-20 DIAGNOSIS — K65.1 INTRA-ABDOMINAL ABSCESS (H): Primary | ICD-10-CM

## 2023-09-20 PROCEDURE — 99214 OFFICE O/P EST MOD 30 MIN: CPT | Mod: VID | Performed by: INTERNAL MEDICINE

## 2023-09-20 NOTE — TELEPHONE ENCOUNTER
Patient saw Dr Ribeiro for a virtual visit today 9/20 but forgot to ask about what type of weight restrictions should he have for returning to work . Please call patient with any questions

## 2023-09-20 NOTE — TELEPHONE ENCOUNTER
What type of form?  Insurance  What day did you drop off your forms? 09/20/23  Is there a due date? Asap (7-10 business day to compete forms)   How would you like to receive these forms? Please mail to Fax to [phone] & Mail to home address.  Which clinic was the [facility] off at? [facility]    What is the best number to contact you? Cell [phone]  What time works best to contact you with in 4 hrs? Anytime  Is it okay to leave a message? Yes    Form in [facility], thank you.  Suzanne Cagle

## 2023-09-20 NOTE — TELEPHONE ENCOUNTER
Received Return to work Authorization form. Patient's last visit today, 9/20/2023. Placed in Dr Ribeiro's box at [place].  Mona Denis [facility] [place]   Primary Care

## 2023-09-20 NOTE — PROGRESS NOTES
Dimitrios is a 58 year old who is being evaluated via a billable video visit.      How would you like to obtain your AVS? MyChart  If the video visit is dropped, the invitation should be resent by: Text to cell phone: [phone]  Will anyone else be joining your video visit? No          Assessment & Plan     Intra-abdominal abscess (H)  He developed these after surgery in [facility]  He had surgery for small bowel obstruction and after that he started having abdominal discomfort and abdominal pain and lethargy  [place] and he had the symptoms  He had exploratory laparotomy in [place] and was intubated after that for pneumonia and treated for the same  In any event he had IR guided aspiration of the fluid and this is growing MARCELINA  He is being followed by infectious disease specialist  He finished IV antibiotics  PICC line has been removed  He is currently  only on oral antibiotics    Vocal cord paresis  This happened after intubation increase  He saw ENT and had a laryngoscopy  His voice is improving    Renal cell carcinoma, right (H)  Status post surgery  He never had radiation  He is currently Cabometyx which has been on hold since discharge from hospital    Chronic kidney disease, stage 3a (H)  Baseline creatinine is around 1.4-1.5 and is stable    Discussed with patient about getting back to work  His appetite is good  He wants to return to work  He wants to return from next week initially working 5 hours a day for 5 days and then after that if he does well he wants to return to full-time work of 40 hours a week      30 minutes spent by me on the date of the encounter doing chart review, history and exam, documentation and further activities per the note     MED REC REQUIRED  Post Medication Reconciliation Status: discharge medications reconciled, continue medications without change  BMI:   Estimated body mass index is 27.1 kg/m  as calculated from the following:    Height as of 9/5/23: 1.829 m (6').     Weight as of 9/15/23: 90.6 kg (199 lb 12.8 oz).           Christopher Ribeiro MD  [facility] HARIS Plunkett is a 58 year old, presenting for the following health issues:  No chief complaint on file.      [facility]         Hospital Follow-up Visit:    Hospital/Nursing Home/IP Rehab Facility: [facility]  Date of Admission: 8/2/2023  Date of Discharge: 8/12/2023  Reason(s) for Admission: Intra-abdominal abscess    Was your hospitalization related to COVID-19? No   Problems taking medications regularly:  None  Medication changes since discharge: None  Problems adhering to non-medication therapy:  None    Summary of hospitalization:  [facility] discharge summary reviewed  Diagnostic Tests/Treatments reviewed.  Follow up needed: With infectious disease  Other Healthcare Providers Involved in Patient s Care:          With infectious disease and oncology  Update since discharge: improved.         Plan of care communicated with patient                   Review of Systems   Constitutional, HEENT, cardiovascular, pulmonary, gi and gu systems are negative, except as otherwise noted.      Objective           Vitals:  No vitals were obtained today due to virtual visit.    Physical Exam   GENERAL: Healthy, alert and no distress  EYES: Eyes grossly normal to inspection.  No discharge or erythema, or obvious scleral/conjunctival abnormalities.  RESP: No audible wheeze, cough, or visible cyanosis.  No visible retractions or increased work of breathing.    SKIN: Visible skin clear. No significant rash, abnormal pigmentation or lesions.  NEURO: Cranial nerves grossly intact.  Mentation and speech appropriate for age.  PSYCH: Mentation appears normal, affect normal/bright, judgement and insight intact, normal speech and appearance well-groomed.                Video-Visit Details    Type of service:  Video Visit     Originating Location (pt. Location):  Home    Distant Location (provider location):  Off-site  Platform used for Video Visit: Brandee

## 2023-09-21 ENCOUNTER — DOCUMENTATION ONLY (OUTPATIENT)
Dept: INFECTIOUS DISEASES | Facility: CLINIC | Age: 58
End: 2023-09-21

## 2023-09-21 DIAGNOSIS — K65.1 INTRA-ABDOMINAL ABSCESS (H): ICD-10-CM

## 2023-09-21 RX ORDER — DOXYCYCLINE 100 MG/1
100 CAPSULE ORAL 2 TIMES DAILY
Qty: 14 CAPSULE | Refills: 0 | Status: SHIPPED | OUTPATIENT
Start: 2023-09-22 | End: 2023-09-29

## 2023-09-21 NOTE — PROGRESS NOTES
ID brief note    called and discussed CT findings and IR recommendations.  will refill doxycycline for another 1 week then monitor CRP. and repeat US around 1 month , earlier imaging if symptomatic    At this time, he is doing well, tolerates doxycycline well.      video f/u on 9/29/23 at 2.30 PM

## 2023-09-21 NOTE — TELEPHONE ENCOUNTER
Form faxed to the [employer] [phone] on 9/21/23 at 12:31pm. Copy placed in abstract and original in tc bin. Copy mailed to pt's home address.

## 2023-09-28 ENCOUNTER — DOCUMENTATION ONLY (OUTPATIENT)
Dept: INFECTIOUS DISEASES | Facility: CLINIC | Age: 58
End: 2023-09-28
Payer: COMMERCIAL

## 2023-09-29 ENCOUNTER — VIRTUAL VISIT (OUTPATIENT)
Dept: INFECTIOUS DISEASES | Facility: CLINIC | Age: 58
End: 2023-09-29
Attending: INTERNAL MEDICINE
Payer: COMMERCIAL

## 2023-09-29 ENCOUNTER — NURSE TRIAGE (OUTPATIENT)
Dept: FAMILY MEDICINE | Facility: CLINIC | Age: 58
End: 2023-09-29

## 2023-09-29 DIAGNOSIS — C64.1 PRIMARY MALIGNANT NEOPLASM OF RIGHT KIDNEY WITH METASTASIS FROM KIDNEY TO OTHER SITE (H): ICD-10-CM

## 2023-09-29 DIAGNOSIS — K65.1 INTRA-ABDOMINAL ABSCESS (H): Primary | ICD-10-CM

## 2023-09-29 DIAGNOSIS — N18.31 CHRONIC KIDNEY DISEASE, STAGE 3A (H): ICD-10-CM

## 2023-09-29 PROCEDURE — 99214 OFFICE O/P EST MOD 30 MIN: CPT | Mod: VID | Performed by: INTERNAL MEDICINE

## 2023-09-29 ASSESSMENT — PAIN SCALES - GENERAL: PAINLEVEL: NO PAIN (0)

## 2023-09-29 NOTE — TELEPHONE ENCOUNTER
"S-(situation): Pt has been having Left shoulder pain for months and it is causing tingling/numbness of thumb.     B-(background): Pt was seen in  on 6/8 for concerns. Comes and goes.     A-(assessment): pain 4/10. Affects mobility of arm because he gets pain.   Pain/tingling goes from top of shoulder to thumb.   Certain movements worsen the pain.    R-(recommendations): Advised pt to be seen. Said he will come in to  today or tomorrow.      Reason for Disposition   Numbness (i.e., loss of sensation) in hand or fingers    Additional Information   Negative: Shock suspected (e.g., cold/pale/clammy skin, too weak to stand, low BP, rapid pulse)   Negative: Similar pain previously and it was from 'heart attack'   Negative: Similar pain previously and it was from 'angina' and not relieved by nitroglycerin   Negative: Sounds like a life-threatening emergency to the triager   Negative: Chest pain   Negative: Followed an injury to shoulder   Negative: Difficulty breathing or unusual sweating (e.g., sweating without exertion)   Negative: Pain lasting > 5 minutes and pain also present in chest  (Exception: Pain is clearly made worse by movement.)   Negative: Age > 40 and no obvious cause and pain even when not moving the arm  (Exception: Pain is clearly made worse by moving arm or bending neck.)   Negative: Red area or streak and fever   Negative: Swollen joint and fever   Negative: Entire arm is swollen   Negative: Patient sounds very sick or weak to the triager   Negative: SEVERE pain (e.g., excruciating, unable to do any normal activities)   Negative: Shoulder pains with exertion (e.g., walking) and pain goes away on resting and not present now    Answer Assessment - Initial Assessment Questions  1. ONSET: \"When did the pain start?\"      Has been having the pain for a few months.  2. LOCATION: \"Where is the pain located?\"      Let shoulder. Radiated from top of shoulder/neck down to arm and thumb.  3. PAIN: \"How bad is " "the pain?\" (Scale 1-10; or mild, moderate, severe)    - MILD (1-3): doesn't interfere with normal activities    - MODERATE (4-7): interferes with normal activities (e.g., work or school) or awakens from sleep    - SEVERE (8-10): excruciating pain, unable to do any normal activities, unable to move arm at all due to pain      4/10  4. WORK OR EXERCISE: \"Has there been any recent work or exercise that involved this part of the body?\"      no  5. CAUSE: \"What do you think is causing the shoulder pain?\"      Not sure  6. OTHER SYMPTOMS: \"Do you have any other symptoms?\" (e.g., neck pain, swelling, rash, fever, numbness, weakness)      Pain comes and goes. \"Pins and needles\" \"tingling\".  7. PREGNANCY: \"Is there any chance you are pregnant?\" \"When was your last menstrual period?\"      *No Answer*    Protocols used: Shoulder Pain-A-OH      April Nicole RN  [facility]    "
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
## 2023-09-29 NOTE — NURSING NOTE
Chief Complaint   Patient presents with    Follow Up     Follow up for intra-abdominal abscess     He requests these members of his care team be copied on today's visit information:  PCP: Christopher Ribeiro    There were no vitals filed for this visit.  There is no height or weight on file to calculate BMI.    Medications were reconciled.    Does patient need any medication refills at today's visit? None        Deanna Godwin, [facility]

## 2023-10-02 ENCOUNTER — OFFICE VISIT (OUTPATIENT)
Dept: FAMILY MEDICINE | Facility: CLINIC | Age: 58
End: 2023-10-02
Payer: COMMERCIAL

## 2023-10-02 VITALS
DIASTOLIC BLOOD PRESSURE: 93 MMHG | WEIGHT: 203 LBS | TEMPERATURE: 98.1 F | SYSTOLIC BLOOD PRESSURE: 134 MMHG | BODY MASS INDEX: 27.5 KG/M2 | HEIGHT: 72 IN | HEART RATE: 86 BPM | OXYGEN SATURATION: 98 %

## 2023-10-02 DIAGNOSIS — A49.8 METHICILLIN RESISTANT STAPHYLOCOCCUS EPIDERMIDIS INFECTION: ICD-10-CM

## 2023-10-02 DIAGNOSIS — K65.1 INTRA-ABDOMINAL ABSCESS (H): ICD-10-CM

## 2023-10-02 DIAGNOSIS — J38.00 VOCAL CORD PARESIS: ICD-10-CM

## 2023-10-02 DIAGNOSIS — Z23 HIGH PRIORITY FOR 2019-NCOV VACCINE: ICD-10-CM

## 2023-10-02 DIAGNOSIS — Z16.29 METHICILLIN RESISTANT STAPHYLOCOCCUS EPIDERMIDIS INFECTION: ICD-10-CM

## 2023-10-02 DIAGNOSIS — M54.12 CERVICAL RADICULOPATHY: Primary | ICD-10-CM

## 2023-10-02 DIAGNOSIS — Z23 NEED FOR PROPHYLACTIC VACCINATION AND INOCULATION AGAINST INFLUENZA: ICD-10-CM

## 2023-10-02 PROCEDURE — 90682 RIV4 VACC RECOMBINANT DNA IM: CPT | Performed by: INTERNAL MEDICINE

## 2023-10-02 PROCEDURE — 99214 OFFICE O/P EST MOD 30 MIN: CPT | Mod: 25 | Performed by: INTERNAL MEDICINE

## 2023-10-02 PROCEDURE — 91320 SARSCV2 VAC 30MCG TRS-SUC IM: CPT | Performed by: INTERNAL MEDICINE

## 2023-10-02 PROCEDURE — 90480 ADMN SARSCOV2 VAC 1/ONLY CMP: CPT | Performed by: INTERNAL MEDICINE

## 2023-10-02 PROCEDURE — 90471 IMMUNIZATION ADMIN: CPT | Performed by: INTERNAL MEDICINE

## 2023-10-02 NOTE — PROGRESS NOTES
Assessment & Plan     Intra-abdominal abscess (H)  He has completed IV antibiotics and currently on oral antibiotics  We will finish oral antibiotics today    Vocal cord paresis  He has seen ENT  This developed after intubation  His voice is almost back to normal    Cervical radiculopathy  Complaining of pain in the left shoulder and there is then tingling and numbness which radiates down the left arm to the left thumb  The tingling and numbness radiates around the left lateral border of the forearm and arm and finally goes into the left thumb  No history of any trauma  The tingling and numbness comes upon if he lays on the left side in bed or if he is holding  the steering with his left arm  Today on examination certainly I could make the tingling and numbness come upon by extending his neck and also by turning his head to the left side  He has pain on extension of his neck  The pain radiates to left shoulder  He had a CT scan of the neck in June and the results are as below  C6-7:  Uncinate hypertrophy bilaterally. Posterior disc osteophyte  complex. Mild spinal canal stenosis. Mild neural foraminal stenosis  bilaterally.     C7-T1:  Facet arthropathy bilaterally. No spinal canal or neural  foraminal stenosis.    I suspect he has got cervical radiculopathy    - Spine  Referral; Future    Methicillin resistant Staphylococcus epidermidis infection  He will be finishing antibiotics today    Need for prophylactic vaccination and inoculation against influenza      High priority for 2019-nCoV vaccine        30 minutes spent by me on the date of the encounter doing chart review, history and exam, documentation and further activities per the note       BMI:   Estimated body mass index is 27.53 kg/m  as calculated from the following:    Height as of this encounter: 1.829 m (6').    Weight as of this encounter: 92.1 kg (203 lb).           Christopher Ribeiro MD  [facility]    Dimitrios is a 58 year old, presenting for the following health issues:  Shoulder, Imm/Inj (Flu Shot), and Imm/Inj (COVID-19 VACCINE)        10/2/2023    10:16 AM   Additional Questions   Roomed by Rachel   Accompanied by Self         10/2/2023    10:16 AM   Patient Reported Additional Medications   Patient reports taking the following new medications None       History of Present Illness       Reason for visit:  Numbness in shoulder and arm.He consumes 1 sweetened beverage(s) daily. He exercises with enough effort to increase his heart rate 7 days per week.   He is taking medications regularly.         Concern - left shoulder   Onset: months   Description: tingling from neck to arm   Intensity: severe  Progression of Symptoms:  worsening  Accompanying Signs & Symptoms: none  Previous history of similar problem: none  Precipitating factors:        Worsened by: none  Alleviating factors:        Improved by: none  Therapies tried and outcome:  none         Review of Systems   Constitutional, HEENT, cardiovascular, pulmonary, gi and gu systems are negative, except as otherwise noted.      Objective    BP (!) 134/93 (BP Location: Right arm, Patient Position: Sitting, Cuff Size: Adult Regular)   Pulse 86   Temp 98.1  F (36.7  C) (Oral)   Ht 1.829 m (6')   Wt 92.1 kg (203 lb)   SpO2 98%   BMI 27.53 kg/m    Body mass index is 27.53 kg/m .  Physical Exam   GENERAL: healthy, alert and no distress  NECK: Some pain on extension of the neck  Tender on palpation of the C7 and C8 areas  Tingling and numbness came upon on  extension of the neck and they radiate to the left thumb  RESP: lungs clear to auscultation - no rales, rhonchi or wheezes  CV: regular rate and rhythm, normal S1 S2, no S3 or S4, no murmur, click or rub, no peripheral edema and peripheral pulses strong  ABDOMEN: Well-healed scars  MS: no gross musculoskeletal defects noted, no edema

## 2023-10-02 NOTE — NURSING NOTE
Prior to immunization administration, verified patients identity using patient s name and date of birth. Please see Immunization Activity for additional information.     Screening Questionnaire for Adult Immunization    Are you sick today?   No   Do you have allergies to medications, food, a vaccine component or latex?   No   Have you ever had a serious reaction after receiving a vaccination?   No   Do you have a long-term health problem with heart, lung, kidney, or metabolic disease (e.g., diabetes), asthma, a blood disorder, no spleen, complement component deficiency, a cochlear implant, or a spinal fluid leak?  Are you on long-term aspirin therapy?   No   Do you have cancer, leukemia, HIV/AIDS, or any other immune system problem?   No   Do you have a parent, brother, or sister with an immune system problem?   No   In the past 3 months, have you taken medications that affect  your immune system, such as prednisone, other steroids, or anticancer drugs; drugs for the treatment of rheumatoid arthritis, Crohn s disease, or psoriasis; or have you had radiation treatments?   No   Have you had a seizure, or a brain or other nervous system problem?   No   During the past year, have you received a transfusion of blood or blood    products, or been given immune (gamma) globulin or antiviral drug?   No   For women: Are you pregnant or is there a chance you could become       pregnant during the next month?   No   Have you received any vaccinations in the past 4 weeks?   No     Immunization questionnaire answers were all negative.      Patient instructed to remain in clinic for 15 minutes afterwards, and to report any adverse reactions.     Screening performed by Rachel Londono MA on 10/2/2023 at 10:56 AM.

## 2023-10-03 ENCOUNTER — PRE VISIT (OUTPATIENT)
Dept: NEUROSURGERY | Facility: CLINIC | Age: 58
End: 2023-10-03

## 2023-10-03 ENCOUNTER — OFFICE VISIT (OUTPATIENT)
Dept: NEUROSURGERY | Facility: CLINIC | Age: 58
End: 2023-10-03
Payer: COMMERCIAL

## 2023-10-03 VITALS
BODY MASS INDEX: 27.14 KG/M2 | WEIGHT: 200.4 LBS | HEIGHT: 72 IN | HEART RATE: 84 BPM | DIASTOLIC BLOOD PRESSURE: 83 MMHG | SYSTOLIC BLOOD PRESSURE: 127 MMHG

## 2023-10-03 DIAGNOSIS — M54.12 CERVICAL RADICULOPATHY: ICD-10-CM

## 2023-10-03 DIAGNOSIS — C64.1 RENAL CELL CARCINOMA, RIGHT (H): Primary | ICD-10-CM

## 2023-10-03 PROCEDURE — 99243 OFF/OP CNSLTJ NEW/EST LOW 30: CPT | Performed by: NURSE PRACTITIONER

## 2023-10-03 RX ORDER — METHYLPREDNISOLONE 4 MG
TABLET, DOSE PACK ORAL
Qty: 21 TABLET | Refills: 0 | Status: SHIPPED | OUTPATIENT
Start: 2023-10-03 | End: 2023-10-23

## 2023-10-03 ASSESSMENT — PAIN SCALES - GENERAL: PAINLEVEL: MILD PAIN (3)
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
## 2023-10-03 NOTE — PATIENT INSTRUCTIONS
-Physical therapy ordered. They will contact you to schedule.  -Cervical MRI ordered. They will contact you to schedule. I will contact you with the results and further recommendations.  -Please contact our clinic with questions or concerns at [phone].
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
## 2023-10-03 NOTE — TELEPHONE ENCOUNTER
NEUROSURGERY- NEW PREVISIT PLANNING       Record Status/Location     Referring Provider Referral Christopher Ribeiro MD    Diagnosis Referral Cervical radiculopathy    MRI (HEAD, NECK, SPINE) Na    CT Pacs Cervical 6/10/23 [facility] [place]    X-ray Na    INJECTION Na    PHYSICAL THERAPY Encounters  2022 x7   SURGERY Na

## 2023-10-03 NOTE — NURSING NOTE
Dimitrios Goldberg is a 58 year old male who presents for:  Chief Complaint   Patient presents with    Neurologic Problem     Neck pain. CT scan 6/10/23. Onset: on and off at least a  year. Pain is more constant now. Pain is in the left shoulder and neck and radiates into the left arm. He will have some N/T in the arm. No tx.         Vitals:    Vitals:    10/03/23 1256   BP: 127/83   Pulse: 84   Weight: 200 lb 6.4 oz (90.9 kg)   Height: 6' (1.829 m)       BMI:  Estimated body mass index is 27.18 kg/m  as calculated from the following:    Height as of this encounter: 6' (1.829 m).    Weight as of this encounter: 200 lb 6.4 oz (90.9 kg).    Pain Score:  Mild Pain (3)        Cici Ng, CMA

## 2023-10-09 ENCOUNTER — TELEPHONE (OUTPATIENT)
Dept: ONCOLOGY | Facility: CLINIC | Age: 58
End: 2023-10-09
Payer: COMMERCIAL

## 2023-10-09 NOTE — TELEPHONE ENCOUNTER
Prior Authorization Not Needed per Insurance    Medication: CABOMETYX 20 MG PO TABS  Insurance Company: [employer] - Phone [phone] Fax [phone]  Expected CoPay: $    Pharmacy Filling the Rx:    Pharmacy Notified: No  Patient Notified: No     An active PA is already on file with expiration date of 09/22/2025.

## 2023-10-12 ENCOUNTER — THERAPY VISIT (OUTPATIENT)
Dept: PHYSICAL THERAPY | Facility: CLINIC | Age: 58
End: 2023-10-12
Attending: NURSE PRACTITIONER
Payer: COMMERCIAL

## 2023-10-12 DIAGNOSIS — M54.12 CERVICAL RADICULOPATHY: ICD-10-CM

## 2023-10-12 DIAGNOSIS — M54.2 NECK PAIN: Primary | ICD-10-CM

## 2023-10-12 PROCEDURE — 97161 PT EVAL LOW COMPLEX 20 MIN: CPT | Mod: GP | Performed by: PHYSICAL THERAPIST

## 2023-10-12 PROCEDURE — 97140 MANUAL THERAPY 1/> REGIONS: CPT | Mod: GP | Performed by: PHYSICAL THERAPIST

## 2023-10-12 PROCEDURE — 97110 THERAPEUTIC EXERCISES: CPT | Mod: GP | Performed by: PHYSICAL THERAPIST

## 2023-10-12 NOTE — PROGRESS NOTES
PHYSICAL THERAPY EVALUATION  Type of Visit: Evaluation    See electronic medical record for Abuse and Falls Screening details.    Subjective       Presenting condition or subjective complaint: L neck/ UT pain that travels to thumb on occasion.  Sx have been more frequent the past few months, but have had some sx for longer than that.  Date of onset: 07/12/23    Relevant medical history:     Dates & types of surgery:      Prior diagnostic imaging/testing results: -- (scheduled for MRI next week)     Prior therapy history for the same diagnosis, illness or injury:        Prior Level of Function  Transfers: Independent  Ambulation: Independent  ADL: Independent  IADL:     Living Environment  Social support: With a significant other or spouse   Type of home: House   Stairs to enter the home: Yes   Is there a railing: Yes   Ramp: No   Stairs inside the home: Yes       Help at home:    Equipment owned:       Employment: Yes    Hobbies/Interests:      Patient goals for therapy:      Pain assessment:      Objective   CERVICAL SPINE EVALUATION  PAIN: Pain Level at Rest: 2/10  Pain Level with Use: 5/10  Pain Location: cervical spine  Pain Quality: Aching and Dull  Pain Frequency: constant  Pain is Worst: daytime or nighttime  Pain is Exacerbated By: driving, lay on L,  occasionally with sitting  Pain is Relieved By: none  Pain Progression: Unchanged  INTEGUMENTARY (edema, incisions): WNL  POSTURE: Standing Posture: Rounded shoulders, Forward head  GAIT:   Weightbearing Status:   Assistive Device(s):   Gait Deviations:   BALANCE/PROPRIOCEPTION:   WEIGHTBEARING ALIGNMENT:   ROM:   (Degrees) Left AROM Right AROM    Cervical Flexion full    Cervical Extension Decreased 50% with pain    Cervical Side bend      Cervical Rotation Decreased 50% with pain full    Cervical Protrusion     Cervical Retraction Increases sx    Thoracic Flexion     Thoracic Extension     Thoracic Rotation       Left AROM Left PROM Right AROM Right PROM    Shoulder Flexion       Shoulder Extension       Shoulder Abduction       Shoulder Adduction       Shoulder IR       Shoulder ER       Shoulder Horiz Abduction       Shoulder Horiz Adduction       Pain:   End Feel:     MYOTOMES: WNL  DTR S: WNL  CORD SIGNS:   DERMATOMES: WNL  NEURAL TENSION:   FLEXIBILITY:    SPECIAL TESTS:    Left Right   Alar Ligament    Cervical Flexion-Rotation     Cervical Rot/Lateral Flex     Compression Positive    Distraction     Spurling s Positive    Thoracic Outlet Screen (Darnell Aguilar)     Transverse Ligament     Vertebral Artery     Cotton Roll Test     Craniocervical Flexor Endurance Test     Mannheimer Test            PALPATION:   + Tenderness At Location Left Right   Sternocleidomastoid     Scalenes     Rhomboids     Facet     Upper Trap +    Levator +    Erector Spinae +    Suboccipitals       SPINAL SEGMENTAL CONCLUSIONS:  hypo throughout c-spine      Assessment & Plan   CLINICAL IMPRESSIONS  Medical Diagnosis: cervical radic    Treatment Diagnosis: L cervical radic   Impression/Assessment: Patient is a 58 year old male with L neck complaints.  The following significant findings have been identified: Pain, Decreased ROM/flexibility, Decreased joint mobility, Decreased strength, Impaired muscle performance, Decreased activity tolerance, and Impaired posture. These impairments interfere with their ability to perform self care tasks, work tasks, recreational activities, household chores, driving , household mobility, and community mobility as compared to previous level of function.     Clinical Decision Making (Complexity):  Clinical Presentation: Stable/Uncomplicated  Clinical Presentation Rationale: based on medical and personal factors listed in PT evaluation  Clinical Decision Making (Complexity): Low complexity    PLAN OF CARE  Treatment Interventions:  Interventions: Manual Therapy, Neuromuscular Re-education, Therapeutic Activity, Therapeutic Exercise    Long Term Goals     PT  Goal 1  Goal Identifier: neck  Goal Description: Pt able to drive without neck/ arm sx  Rationale: to maximize safety and independence with performance of ADLs and functional tasks;to maximize safety and independence within the home;to maximize safety and independence with transportation;to maximize safety and independence within the community;to maximize safety and independence with self cares  Goal Progress: currently reports neck/ arm pain with driving at work  Target Date: 11/23/23      Frequency of Treatment: 1x/ week  Duration of Treatment: 6 week    Recommended Referrals to Other Professionals:   Education Assessment:   Learner/Method: Patient;Demonstration;Pictures/Video    Risks and benefits of evaluation/treatment have been explained.   Patient/Family/caregiver agrees with Plan of Care.     Evaluation Time:     PT Eval, Low Complexity Minutes (38349): 15       Signing Clinician: Zen Berkowitz PT

## 2023-10-18 ENCOUNTER — VIRTUAL VISIT (OUTPATIENT)
Dept: INFECTIOUS DISEASES | Facility: CLINIC | Age: 58
End: 2023-10-18
Attending: INTERNAL MEDICINE
Payer: COMMERCIAL

## 2023-10-18 DIAGNOSIS — K65.1 INTRA-ABDOMINAL ABSCESS (H): Primary | ICD-10-CM

## 2023-10-18 PROCEDURE — 99214 OFFICE O/P EST MOD 30 MIN: CPT | Mod: VID | Performed by: INTERNAL MEDICINE
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
## 2023-10-18 NOTE — NURSING NOTE
Is the patient currently in the state of [place]? YES    Visit mode:VIDEO    If the visit is dropped, the patient can be reconnected by: VIDEO VISIT: Text to cell phone:   Telephone Information:   Mobile [phone]       Will anyone else be joining the visit? Yes, wife will be part of call on the phone with patient  (If patient encounters technical issues they should call [phone])    How would you like to obtain your AVS? MyChart    Are changes needed to the allergy or medication list? N/A    Reason for visit: RECHECK    Keyana CASTILLOF
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
## 2023-10-19 ENCOUNTER — THERAPY VISIT (OUTPATIENT)
Dept: PHYSICAL THERAPY | Facility: CLINIC | Age: 58
End: 2023-10-19
Attending: NURSE PRACTITIONER
Payer: COMMERCIAL

## 2023-10-19 DIAGNOSIS — M54.2 NECK PAIN: Primary | ICD-10-CM

## 2023-10-19 PROCEDURE — 97140 MANUAL THERAPY 1/> REGIONS: CPT | Mod: GP | Performed by: PHYSICAL THERAPIST

## 2023-10-19 PROCEDURE — 97110 THERAPEUTIC EXERCISES: CPT | Mod: GP | Performed by: PHYSICAL THERAPIST

## 2023-10-21 ENCOUNTER — ANCILLARY PROCEDURE (OUTPATIENT)
Dept: MRI IMAGING | Facility: CLINIC | Age: 58
End: 2023-10-21
Attending: NURSE PRACTITIONER
Payer: COMMERCIAL

## 2023-10-21 DIAGNOSIS — M54.12 CERVICAL RADICULOPATHY: ICD-10-CM

## 2023-10-21 DIAGNOSIS — C64.1 RENAL CELL CARCINOMA, RIGHT (H): ICD-10-CM

## 2023-10-21 PROCEDURE — 72141 MRI NECK SPINE W/O DYE: CPT | Mod: 52 | Performed by: STUDENT IN AN ORGANIZED HEALTH CARE EDUCATION/TRAINING PROGRAM

## 2023-10-21 RX ORDER — GADOBUTROL 604.72 MG/ML
10 INJECTION INTRAVENOUS ONCE
Status: DISCONTINUED | OUTPATIENT
Start: 2023-10-21 | End: 2023-10-21

## 2023-10-23 ENCOUNTER — OFFICE VISIT (OUTPATIENT)
Dept: NEPHROLOGY | Facility: CLINIC | Age: 58
End: 2023-10-23
Attending: INTERNAL MEDICINE
Payer: COMMERCIAL

## 2023-10-23 ENCOUNTER — LAB (OUTPATIENT)
Dept: LAB | Facility: CLINIC | Age: 58
End: 2023-10-23
Payer: COMMERCIAL

## 2023-10-23 VITALS
WEIGHT: 211.1 LBS | DIASTOLIC BLOOD PRESSURE: 97 MMHG | OXYGEN SATURATION: 98 % | TEMPERATURE: 98.4 F | BODY MASS INDEX: 28.63 KG/M2 | HEART RATE: 86 BPM | SYSTOLIC BLOOD PRESSURE: 137 MMHG

## 2023-10-23 DIAGNOSIS — C64.1 RENAL CELL CARCINOMA, RIGHT (H): ICD-10-CM

## 2023-10-23 DIAGNOSIS — E03.4 HYPOTHYROIDISM DUE TO ACQUIRED ATROPHY OF THYROID: ICD-10-CM

## 2023-10-23 DIAGNOSIS — M54.12 CERVICAL RADICULOPATHY: Primary | ICD-10-CM

## 2023-10-23 DIAGNOSIS — D63.1 ANEMIA IN STAGE 3B CHRONIC KIDNEY DISEASE (H): Primary | ICD-10-CM

## 2023-10-23 DIAGNOSIS — I12.9 HYPERTENSION, RENAL: ICD-10-CM

## 2023-10-23 DIAGNOSIS — K65.1 INTRA-ABDOMINAL ABSCESS (H): ICD-10-CM

## 2023-10-23 DIAGNOSIS — F41.9 ANXIETY: ICD-10-CM

## 2023-10-23 DIAGNOSIS — N18.32 ANEMIA IN STAGE 3B CHRONIC KIDNEY DISEASE (H): Primary | ICD-10-CM

## 2023-10-23 DIAGNOSIS — E03.9 HYPOTHYROIDISM (ACQUIRED): ICD-10-CM

## 2023-10-23 DIAGNOSIS — N18.31 CHRONIC KIDNEY DISEASE, STAGE 3A (H): ICD-10-CM

## 2023-10-23 LAB
ALBUMIN MFR UR ELPH: <6 MG/DL
ALBUMIN SERPL BCG-MCNC: 4.2 G/DL (ref 3.5–5.2)
ALBUMIN UR-MCNC: NEGATIVE MG/DL
ALP SERPL-CCNC: 82 U/L (ref 40–129)
ALT SERPL W P-5'-P-CCNC: 5 U/L (ref 0–70)
ANION GAP SERPL CALCULATED.3IONS-SCNC: 8 MMOL/L (ref 7–15)
APPEARANCE UR: CLEAR
AST SERPL W P-5'-P-CCNC: 16 U/L (ref 0–45)
BASO+EOS+MONOS # BLD AUTO: ABNORMAL 10*3/UL
BASO+EOS+MONOS NFR BLD AUTO: ABNORMAL %
BASOPHILS # BLD AUTO: 0 10E3/UL (ref 0–0.2)
BASOPHILS NFR BLD AUTO: 0 %
BILIRUB SERPL-MCNC: 0.3 MG/DL
BILIRUB UR QL STRIP: NEGATIVE
BUN SERPL-MCNC: 13.7 MG/DL (ref 6–20)
CALCIUM SERPL-MCNC: 9.6 MG/DL (ref 8.6–10)
CHLORIDE SERPL-SCNC: 103 MMOL/L (ref 98–107)
COLOR UR AUTO: NORMAL
CREAT SERPL-MCNC: 1.6 MG/DL (ref 0.67–1.17)
CREAT UR-MCNC: 59.6 MG/DL
CREAT UR-MCNC: 60.2 MG/DL
CRP SERPL-MCNC: 9.27 MG/L
DEPRECATED HCO3 PLAS-SCNC: 28 MMOL/L (ref 22–29)
EGFRCR SERPLBLD CKD-EPI 2021: 50 ML/MIN/1.73M2
EOSINOPHIL # BLD AUTO: 0.2 10E3/UL (ref 0–0.7)
EOSINOPHIL NFR BLD AUTO: 3 %
ERYTHROCYTE [DISTWIDTH] IN BLOOD BY AUTOMATED COUNT: 15.7 % (ref 10–15)
GLUCOSE SERPL-MCNC: 83 MG/DL (ref 70–99)
GLUCOSE UR STRIP-MCNC: NEGATIVE MG/DL
HCT VFR BLD AUTO: 40.4 % (ref 40–53)
HGB BLD-MCNC: 12.4 G/DL (ref 13.3–17.7)
HGB UR QL STRIP: NEGATIVE
IMM GRANULOCYTES # BLD: 0 10E3/UL
IMM GRANULOCYTES NFR BLD: 0 %
KETONES UR STRIP-MCNC: NEGATIVE MG/DL
LEUKOCYTE ESTERASE UR QL STRIP: NEGATIVE
LYMPHOCYTES # BLD AUTO: 2.2 10E3/UL (ref 0.8–5.3)
LYMPHOCYTES NFR BLD AUTO: 33 %
MCH RBC QN AUTO: 27.3 PG (ref 26.5–33)
MCHC RBC AUTO-ENTMCNC: 30.7 G/DL (ref 31.5–36.5)
MCV RBC AUTO: 89 FL (ref 78–100)
MICROALBUMIN UR-MCNC: <12 MG/L
MICROALBUMIN/CREAT UR: NORMAL MG/G{CREAT}
MONOCYTES # BLD AUTO: 0.4 10E3/UL (ref 0–1.3)
MONOCYTES NFR BLD AUTO: 6 %
NEUTROPHILS # BLD AUTO: 3.9 10E3/UL (ref 1.6–8.3)
NEUTROPHILS NFR BLD AUTO: 58 %
NITRATE UR QL: NEGATIVE
NRBC # BLD AUTO: 0 10E3/UL
NRBC BLD AUTO-RTO: 0 /100
PH UR STRIP: 5.5 [PH] (ref 5–7)
PLATELET # BLD AUTO: 302 10E3/UL (ref 150–450)
POTASSIUM SERPL-SCNC: 4.2 MMOL/L (ref 3.4–5.3)
PROT SERPL-MCNC: 7.3 G/DL (ref 6.4–8.3)
PROT/CREAT 24H UR: NORMAL MG/G{CREAT}
RBC # BLD AUTO: 4.54 10E6/UL (ref 4.4–5.9)
SODIUM SERPL-SCNC: 139 MMOL/L (ref 135–145)
SP GR UR STRIP: 1.01 (ref 1–1.03)
TSH SERPL DL<=0.005 MIU/L-ACNC: 4.56 UIU/ML (ref 0.3–4.2)
UROBILINOGEN UR STRIP-MCNC: NORMAL MG/DL
WBC # BLD AUTO: 6.7 10E3/UL (ref 4–11)

## 2023-10-23 PROCEDURE — 99213 OFFICE O/P EST LOW 20 MIN: CPT | Performed by: INTERNAL MEDICINE

## 2023-10-23 PROCEDURE — 99214 OFFICE O/P EST MOD 30 MIN: CPT | Performed by: INTERNAL MEDICINE

## 2023-10-23 PROCEDURE — 36415 COLL VENOUS BLD VENIPUNCTURE: CPT | Performed by: PATHOLOGY

## 2023-10-23 PROCEDURE — 84156 ASSAY OF PROTEIN URINE: CPT | Performed by: PATHOLOGY

## 2023-10-23 PROCEDURE — 82570 ASSAY OF URINE CREATININE: CPT | Performed by: INTERNAL MEDICINE

## 2023-10-23 PROCEDURE — 84443 ASSAY THYROID STIM HORMONE: CPT | Performed by: PATHOLOGY

## 2023-10-23 PROCEDURE — 80053 COMPREHEN METABOLIC PANEL: CPT | Performed by: PATHOLOGY

## 2023-10-23 PROCEDURE — 85025 COMPLETE CBC W/AUTO DIFF WBC: CPT | Performed by: PATHOLOGY

## 2023-10-23 PROCEDURE — 99000 SPECIMEN HANDLING OFFICE-LAB: CPT | Performed by: PATHOLOGY

## 2023-10-23 PROCEDURE — 86140 C-REACTIVE PROTEIN: CPT | Performed by: PATHOLOGY

## 2023-10-23 PROCEDURE — 81003 URINALYSIS AUTO W/O SCOPE: CPT | Performed by: PATHOLOGY

## 2023-10-23 RX ORDER — AMLODIPINE BESYLATE 5 MG/1
5 TABLET ORAL DAILY
Qty: 90 TABLET | Refills: 1 | Status: SHIPPED | OUTPATIENT
Start: 2023-10-23 | End: 2024-01-24

## 2023-10-23 RX ORDER — DIAZEPAM 5 MG
5 TABLET ORAL SEE ADMIN INSTRUCTIONS
Qty: 2 TABLET | Refills: 0 | Status: SHIPPED | OUTPATIENT
Start: 2023-10-23 | End: 2024-03-21

## 2023-10-23 ASSESSMENT — PAIN SCALES - GENERAL: PAINLEVEL: NO PAIN (0)

## 2023-10-23 NOTE — TELEPHONE ENCOUNTER
Received message that patient was unable to complete his MRI study due to anxiety. Patient would like to try again with oral sedation. Informed he will need a new order and wants someone to reach out to him for prescription for oral sedation. OK per Rayne to prescribe Oral sedation prior to MRI.     New Cervical MRI order placed.     Called patient to discuss oral sedation prior to MRI. Verified pharmacy and medication instructions and MUST have a . Provided central scheduling number to r/s MRI. Understanding verbalized.     Pended Rx for valium and routed to care team for review and sign off.
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
## 2023-10-23 NOTE — NURSING NOTE
Chief Complaint   Patient presents with    RECHECK       BP (!) 137/97   Pulse 86   Temp 98.4  F (36.9  C) (Oral)   Wt 95.8 kg (211 lb 1.6 oz)   SpO2 98%   BMI 28.63 kg/m      Reji Blackwell on 10/23/2023 at 10:18 AM
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
## 2023-10-25 ENCOUNTER — HOSPITAL ENCOUNTER (OUTPATIENT)
Dept: MRI IMAGING | Facility: CLINIC | Age: 58
Discharge: HOME | End: 2023-10-25
Attending: NURSE PRACTITIONER | Admitting: NURSE PRACTITIONER
Payer: COMMERCIAL

## 2023-10-25 DIAGNOSIS — C64.1 RENAL CELL CARCINOMA, RIGHT (H): ICD-10-CM

## 2023-10-25 DIAGNOSIS — M54.12 CERVICAL RADICULOPATHY: ICD-10-CM

## 2023-10-25 PROCEDURE — 72141 MRI NECK SPINE W/O DYE: CPT | Mod: 26 | Performed by: RADIOLOGY

## 2023-10-25 PROCEDURE — 72141 MRI NECK SPINE W/O DYE: CPT

## 2023-10-26 ENCOUNTER — THERAPY VISIT (OUTPATIENT)
Dept: PHYSICAL THERAPY | Facility: CLINIC | Age: 58
End: 2023-10-26
Attending: NURSE PRACTITIONER
Payer: COMMERCIAL

## 2023-10-26 DIAGNOSIS — M54.2 NECK PAIN: Primary | ICD-10-CM

## 2023-10-26 PROCEDURE — 97140 MANUAL THERAPY 1/> REGIONS: CPT | Mod: GP | Performed by: PHYSICAL THERAPIST

## 2023-10-26 PROCEDURE — 97110 THERAPEUTIC EXERCISES: CPT | Mod: GP | Performed by: PHYSICAL THERAPIST

## 2023-10-27 ENCOUNTER — TELEPHONE (OUTPATIENT)
Dept: NEUROSURGERY | Facility: CLINIC | Age: 58
End: 2023-10-27
Payer: COMMERCIAL

## 2023-10-27 DIAGNOSIS — M54.12 CERVICAL RADICULOPATHY: Primary | ICD-10-CM

## 2023-10-27 NOTE — TELEPHONE ENCOUNTER
Contacted patient to discuss cervical MRI results. Cervical MRI reveals degeneration (arthritis) which is causing foraminal stenosis (narrowing of the nerve pathways) likely causing the pain symptoms in the arms. At this time, would recommend continuation of physical therapy and a cervical injection for the pain. Orders placed. He verbalized understanding and agreement.

## 2023-10-30 ENCOUNTER — TELEPHONE (OUTPATIENT)
Dept: PALLIATIVE MEDICINE | Facility: CLINIC | Age: 58
End: 2023-10-30
Payer: COMMERCIAL

## 2023-10-30 DIAGNOSIS — M54.12 CERVICAL RADICULOPATHY: Primary | ICD-10-CM

## 2023-10-30 NOTE — TELEPHONE ENCOUNTER
"Screening Questions for Radiology Injections:    Injection to be done at which interventional clinic site? [facility]    If choosing [facility] for location, please inform patient:  \"[facility] is a Hospital based clinic. Before your visit, you should check with your insurance about how it covers the charges for facility services in a hospital-based clinic.     Procedure ordered by     Procedure ordered? ZAID   Transforaminal Cervical REY - Send to [facility] ([facility]) - No [facility] Site providers perform this procedure    What insurance would patient like us to bill for this procedure? [employer]   IF SCHEDULING IN [place] PAIN OR SPINE PLEASE SCHEDULE AT LEAST 7-10 BUSINESS DAYS OUT SO A PA CAN BE OBTAINED  Worker's comp or MVA (motor vehicle accident) -Any injection DO NOT SCHEDULE and route to Bonifacio Landers.    [employer] insurance - For SI joint injections, DO NOT SCHEDULE and route to Stacy Olivia.     ALL BCBS, [employer] and [employer] - DO NOT SCHEDULE and route to Stacy Corwin  MEDICA- facet joint injections, route to Stacy Corwin    Is patient scheduled at [facility]?    If YES, route every encounter to [facility] SPINE CENTER CARE NAVIGATION POOL [[id number]]    Is an  needed? No     Patient has a  home? (Review Grid) YES: informed     Any chance of pregnancy? Not Applicable   If YES, do NOT schedule and route to RN pool  - Dr. Ferreira route to Geeta Caldwell and PM&R Nurse  [[id number]]      Is patient actively being treated for cancer or immunocompromised? Kidney   If YES, do NOT schedule and route to RN pool/ Dr. Ferreira's Team    Does the patient have a bleeding or clotting disorder? No   If YES, okay to schedule AND route to RN nurse / Dr. Ferreira's Team   (For any patients with platelet count <100, RN must forward to provider)    Is patient taking any Blood Thinners OR Antiplatelet medication?  No   If hold needed, do NOT schedule, route to RN " gopal/ Dr. Ferreira's Team  Examples:   Blood Thinners: (Coumadin, Warfarin, Jantoven, Pradaxa, Xarelto, Eliquis, Edoxaban, Enoxaparin, Lovenox, Heparin, Arixtra, Fondaparinux or Fragmin)  Antiplatelet Medications: (Plavix, Brilinta or Effient)     Is patient taking any aspirin products (includes Excedrin and Fiorinal)? No   If more than 325mg/day, OK to schedule; Instruct Pt to decrease to less than 325 mg for 7 days AND route to RN pool/ Dr. Ferreira's Team   For CERVICAL procedures, hold all aspirin products for 6 days.   Tell Pt that if aspirin product is not held for 6 days, the procedure WILL BE cancelled.     Any allergies to contrast dye, iodine, shellfish, or numbing and steroid medications? No  If YES, schedule and add allergy information to appointment notes AND route to the RN pool/ Dr. Ferreira's Team  If REY and Contrast Dye / Iodine Allergy? DO NOT SCHEDULE, route to RN pool/ Dr. Ferreira's Team  Allergies: Morphine     Does patient have an active infection or treated for one within the past week? No  Is patient currently taking any antibiotics or steroid medications?  No   For patients on chronic, preventative, or prophylactic antibiotics, procedures may be scheduled.   For patients on antibiotics for active or recent infection, schedule 4 days after completed.  For patients on steroid medications, schedule 4 days after completed.     Has the patient had a flu shot or any other vaccinations within the past 7 days? No  If yes, explain that for the vaccine to work best they need to:     wait 1 week before and 1 week after getting any Vaccine  wait 1 week before and 2 weeks after getting Covid Vaccine #2 or BOOSTER  If patient has concerns about the timing, send to RN pool/ Dr. Ferreira's Team    Does patient have an MRI/CT?  YES: 2023 Include Date and Check Procedure Scheduling Grid to see if required.  Was the MRI/CT done within the last 3 years?  Yes   If no route to RN Pool/ Dr. Ferreira's Team  If yes, where  was the MRI/CT done? [facility]    Refer to PACS Transmissions list for approved external locations and route to RN Pool High Priority/ Dr. Ferreira's Team  If MRI was not done at approved external location do NOT schedule and route to RN pool/ Dr. Ferreira's Team    If patient has an imaging disc, the injection MAY be scheduled but patient must bring disc to appt or appt will be cancelled.    Is patient able to transfer to a procedure table with minimal or no assistance? Yes   If no, do NOT schedule and route to RN Pool/ Dr. Ferreira's Team    Procedure Specific Instructions:  If celiac plexus block, informed patient NPO for 6 hours and that it is okay to take medications with sips of water, especially blood pressure medications Not Applicable       If this is for a cervical procedure, informed patient that aspirin needs to be held for 6 days.   Not Applicable    Sedation, If Sedation is ordered for any procedure, patient must be NPO for 6 hours prior to procedure Not Applicable    If IV needed:  Do not schedule procedures requiring IV placement in the first appointment of the day or first appointment after lunch. Do NOT schedule at 0745, 0815 or 1245.   Instructed patient to arrive 30 minutes early for IV start if required. (Check Procedure Scheduling Grid)  Not Applicable    Reminders:  If you are started on any steroids or antibiotics between now and your appointment, you must contact us because the procedure may need to be cancelled.  Yes    As a reminder, receiving steroids can decrease your body's ability to fight infection.   Would you still like to move forward with scheduling the injection?  Yes    IV Sedation is not provided for procedures. If oral anti-anxiety medication is needed, the patient should request this from their referring provider.    Instruct patient to arrive as directed prior to the scheduled appointment time:  If IV needed 30 minutes before appointment time     For patients 85 or older we recommend  having an adult stay w/ them for the remainder of the day.     If the patient is Diabetic, remind them to bring their glucometer.      Does the patient have any questions?  NO  [facility]

## 2023-10-30 NOTE — TELEPHONE ENCOUNTER
Is patient actively being treated for cancer or immunocompromised? Kidney      Chart reviewed: Patient diagnosed with CKD stage 3.     Okay to proceed or would you like clearance from nephrology?     Routing to provider for review.     Marsha Lopez RN  [facility]  [phone]

## 2023-10-31 NOTE — TELEPHONE ENCOUNTER
Okay to schedule.     Marsha Lopez RN  [facility]  [phone]

## 2023-11-09 ENCOUNTER — APPOINTMENT (OUTPATIENT)
Dept: GENERAL RADIOLOGY | Facility: CLINIC | Age: 58
End: 2023-11-09
Payer: COMMERCIAL

## 2023-11-09 ENCOUNTER — ANCILLARY PROCEDURE (OUTPATIENT)
Dept: CT IMAGING | Facility: CLINIC | Age: 58
End: 2023-11-09
Attending: INTERNAL MEDICINE
Payer: COMMERCIAL

## 2023-11-09 ENCOUNTER — LAB (OUTPATIENT)
Dept: LAB | Facility: CLINIC | Age: 58
End: 2023-11-09
Attending: INTERNAL MEDICINE
Payer: COMMERCIAL

## 2023-11-09 DIAGNOSIS — C64.1 PRIMARY MALIGNANT NEOPLASM OF RIGHT KIDNEY WITH METASTASIS FROM KIDNEY TO OTHER SITE (H): ICD-10-CM

## 2023-11-09 DIAGNOSIS — D49.511 NEOPLASM OF RIGHT KIDNEY WITH THROMBUS OF INFERIOR VENA CAVA (H): ICD-10-CM

## 2023-11-09 DIAGNOSIS — I82.220 NEOPLASM OF RIGHT KIDNEY WITH THROMBUS OF INFERIOR VENA CAVA (H): ICD-10-CM

## 2023-11-09 DIAGNOSIS — C64.1 RENAL CELL CARCINOMA, RIGHT (H): ICD-10-CM

## 2023-11-09 DIAGNOSIS — E03.4 HYPOTHYROIDISM DUE TO ACQUIRED ATROPHY OF THYROID: ICD-10-CM

## 2023-11-09 DIAGNOSIS — Z00.00 ROUTINE GENERAL MEDICAL EXAMINATION AT A HEALTH CARE FACILITY: ICD-10-CM

## 2023-11-09 LAB
ALBUMIN SERPL BCG-MCNC: 4.2 G/DL (ref 3.5–5.2)
ALP SERPL-CCNC: 92 U/L (ref 40–129)
ALT SERPL W P-5'-P-CCNC: <5 U/L (ref 0–70)
ANION GAP SERPL CALCULATED.3IONS-SCNC: 12 MMOL/L (ref 7–15)
AST SERPL W P-5'-P-CCNC: 19 U/L (ref 0–45)
BASOPHILS # BLD AUTO: ABNORMAL 10*3/UL
BASOPHILS # BLD MANUAL: 0 10E3/UL (ref 0–0.2)
BASOPHILS NFR BLD AUTO: ABNORMAL %
BASOPHILS NFR BLD MANUAL: 0 %
BILIRUB SERPL-MCNC: 0.2 MG/DL
BUN SERPL-MCNC: 15.7 MG/DL (ref 6–20)
CALCIUM SERPL-MCNC: 9.4 MG/DL (ref 8.6–10)
CHLORIDE SERPL-SCNC: 104 MMOL/L (ref 98–107)
CREAT SERPL-MCNC: 1.69 MG/DL (ref 0.67–1.17)
DEPRECATED HCO3 PLAS-SCNC: 25 MMOL/L (ref 22–29)
EGFRCR SERPLBLD CKD-EPI 2021: 46 ML/MIN/1.73M2
ELLIPTOCYTES BLD QL SMEAR: SLIGHT
EOSINOPHIL # BLD AUTO: ABNORMAL 10*3/UL
EOSINOPHIL # BLD MANUAL: 0.1 10E3/UL (ref 0–0.7)
EOSINOPHIL NFR BLD AUTO: ABNORMAL %
EOSINOPHIL NFR BLD MANUAL: 2 %
ERYTHROCYTE [DISTWIDTH] IN BLOOD BY AUTOMATED COUNT: 15.5 % (ref 10–15)
GLUCOSE SERPL-MCNC: 92 MG/DL (ref 70–99)
HCT VFR BLD AUTO: 40.5 % (ref 40–53)
HGB BLD-MCNC: 12.7 G/DL (ref 13.3–17.7)
IMM GRANULOCYTES # BLD: ABNORMAL 10*3/UL
IMM GRANULOCYTES NFR BLD: ABNORMAL %
LYMPHOCYTES # BLD AUTO: ABNORMAL 10*3/UL
LYMPHOCYTES # BLD MANUAL: 2.4 10E3/UL (ref 0.8–5.3)
LYMPHOCYTES NFR BLD AUTO: ABNORMAL %
LYMPHOCYTES NFR BLD MANUAL: 35 %
MCH RBC QN AUTO: 27.1 PG (ref 26.5–33)
MCHC RBC AUTO-ENTMCNC: 31.4 G/DL (ref 31.5–36.5)
MCV RBC AUTO: 86 FL (ref 78–100)
METAMYELOCYTES # BLD MANUAL: 0.1 10E3/UL
METAMYELOCYTES NFR BLD MANUAL: 1 %
MONOCYTES # BLD AUTO: ABNORMAL 10*3/UL
MONOCYTES # BLD MANUAL: 0.3 10E3/UL (ref 0–1.3)
MONOCYTES NFR BLD AUTO: ABNORMAL %
MONOCYTES NFR BLD MANUAL: 5 %
NEUTROPHILS # BLD AUTO: ABNORMAL 10*3/UL
NEUTROPHILS # BLD MANUAL: 3.9 10E3/UL (ref 1.6–8.3)
NEUTROPHILS NFR BLD AUTO: ABNORMAL %
NEUTROPHILS NFR BLD MANUAL: 57 %
NRBC # BLD AUTO: 0 10E3/UL
NRBC BLD AUTO-RTO: 0 /100
PLAT MORPH BLD: ABNORMAL
PLATELET # BLD AUTO: 320 10E3/UL (ref 150–450)
POTASSIUM SERPL-SCNC: 3.9 MMOL/L (ref 3.4–5.3)
PROT SERPL-MCNC: 7.4 G/DL (ref 6.4–8.3)
RBC # BLD AUTO: 4.69 10E6/UL (ref 4.4–5.9)
RBC MORPH BLD: ABNORMAL
SODIUM SERPL-SCNC: 141 MMOL/L (ref 135–145)
TSH SERPL DL<=0.005 MIU/L-ACNC: 2.25 UIU/ML (ref 0.3–4.2)
WBC # BLD AUTO: 6.8 10E3/UL (ref 4–11)

## 2023-11-09 PROCEDURE — 84443 ASSAY THYROID STIM HORMONE: CPT

## 2023-11-09 PROCEDURE — 74177 CT ABD & PELVIS W/CONTRAST: CPT | Mod: GC | Performed by: RADIOLOGY

## 2023-11-09 PROCEDURE — 80053 COMPREHEN METABOLIC PANEL: CPT

## 2023-11-09 PROCEDURE — 36415 COLL VENOUS BLD VENIPUNCTURE: CPT

## 2023-11-09 PROCEDURE — 85007 BL SMEAR W/DIFF WBC COUNT: CPT

## 2023-11-09 PROCEDURE — 71260 CT THORAX DX C+: CPT | Mod: GC | Performed by: RADIOLOGY

## 2023-11-09 PROCEDURE — 85027 COMPLETE CBC AUTOMATED: CPT

## 2023-11-09 RX ORDER — IOPAMIDOL 755 MG/ML
117 INJECTION, SOLUTION INTRAVASCULAR ONCE
Status: COMPLETED | OUTPATIENT
Start: 2023-11-09 | End: 2023-11-09

## 2023-11-09 RX ADMIN — IOPAMIDOL 117 ML: 755 INJECTION, SOLUTION INTRAVASCULAR at 08:28

## 2023-11-14 ENCOUNTER — ONCOLOGY VISIT (OUTPATIENT)
Dept: ONCOLOGY | Facility: CLINIC | Age: 58
End: 2023-11-14
Attending: INTERNAL MEDICINE
Payer: COMMERCIAL

## 2023-11-14 VITALS
DIASTOLIC BLOOD PRESSURE: 86 MMHG | BODY MASS INDEX: 29.17 KG/M2 | RESPIRATION RATE: 16 BRPM | SYSTOLIC BLOOD PRESSURE: 127 MMHG | TEMPERATURE: 98.3 F | OXYGEN SATURATION: 100 % | WEIGHT: 215.1 LBS | HEART RATE: 75 BPM

## 2023-11-14 DIAGNOSIS — C64.1 PRIMARY MALIGNANT NEOPLASM OF RIGHT KIDNEY WITH METASTASIS FROM KIDNEY TO OTHER SITE (H): ICD-10-CM

## 2023-11-14 DIAGNOSIS — D49.511 NEOPLASM OF RIGHT KIDNEY WITH THROMBUS OF INFERIOR VENA CAVA (H): Primary | ICD-10-CM

## 2023-11-14 DIAGNOSIS — E03.4 HYPOTHYROIDISM DUE TO ACQUIRED ATROPHY OF THYROID: ICD-10-CM

## 2023-11-14 DIAGNOSIS — I82.220 NEOPLASM OF RIGHT KIDNEY WITH THROMBUS OF INFERIOR VENA CAVA (H): Primary | ICD-10-CM

## 2023-11-14 PROCEDURE — 99213 OFFICE O/P EST LOW 20 MIN: CPT | Performed by: INTERNAL MEDICINE

## 2023-11-14 PROCEDURE — 99215 OFFICE O/P EST HI 40 MIN: CPT | Performed by: INTERNAL MEDICINE

## 2023-11-14 ASSESSMENT — PAIN SCALES - GENERAL: PAINLEVEL: NO PAIN (0)
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
## 2023-11-14 NOTE — NURSING NOTE
Oncology Rooming Note    November 14, 2023 10:42 AM   Dimitrios Goldberg is a 58 year old male who presents for:    Chief Complaint   Patient presents with    Oncology Clinic Visit     RTN for Renal Cell      Initial Vitals: /86   Pulse 75   Temp 98.3  F (36.8  C) (Oral)   Resp 16   Wt 97.6 kg (215 lb 1.6 oz)   SpO2 100%   BMI 29.17 kg/m   Estimated body mass index is 29.17 kg/m  as calculated from the following:    Height as of 10/3/23: 1.829 m (6').    Weight as of this encounter: 97.6 kg (215 lb 1.6 oz). Body surface area is 2.23 meters squared.  No Pain (0) Comment: Data Unavailable   No LMP for male patient.  Allergies reviewed: Yes  Medications reviewed: Yes    Medications: Medication refills not needed today.  Pharmacy name entered into [employer]:    [id number] [place] [address]    Clinical concerns: none       Rolanda Freeman MA
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
## 2023-11-16 ENCOUNTER — THERAPY VISIT (OUTPATIENT)
Dept: PHYSICAL THERAPY | Facility: CLINIC | Age: 58
End: 2023-11-16
Payer: COMMERCIAL

## 2023-11-16 DIAGNOSIS — M54.2 NECK PAIN: Primary | ICD-10-CM

## 2023-11-16 PROCEDURE — 97110 THERAPEUTIC EXERCISES: CPT | Mod: GP | Performed by: PHYSICAL THERAPIST

## 2023-11-16 PROCEDURE — 97140 MANUAL THERAPY 1/> REGIONS: CPT | Mod: GP | Performed by: PHYSICAL THERAPIST

## 2023-11-27 ENCOUNTER — THERAPY VISIT (OUTPATIENT)
Dept: PHYSICAL THERAPY | Facility: CLINIC | Age: 58
End: 2023-11-27
Payer: COMMERCIAL

## 2023-11-27 DIAGNOSIS — M54.2 NECK PAIN: Primary | ICD-10-CM

## 2023-11-27 PROCEDURE — 97140 MANUAL THERAPY 1/> REGIONS: CPT | Mod: GP | Performed by: PHYSICAL THERAPIST

## 2023-11-27 PROCEDURE — 97110 THERAPEUTIC EXERCISES: CPT | Mod: GP | Performed by: PHYSICAL THERAPIST

## 2023-11-28 NOTE — PROGRESS NOTES
PLAN  Continue therapy per current plan of care, assess response to injection.    Beginning/End Dates of Progress Note Reporting Period:    to 11/27/2023    Referring Provider:  Rayne Moffett NP       11/27/23 0500   Appointment Info   Signing clinician's name / credentials Delfino Houston DPT   Total/Authorized Visits 6   Visits Used 5   Medical Diagnosis cervical radic   PT Tx Diagnosis L cervical radic   Progress Note/Certification   Onset of illness/injury or Date of Surgery 07/12/23   Therapy Frequency 1x/ week   Predicted Duration 6 week   PT Goal 1   Goal Identifier neck   Goal Description Pt able to drive without neck/ arm sx   Rationale to maximize safety and independence with performance of ADLs and functional tasks;to maximize safety and independence within the home;to maximize safety and independence with transportation;to maximize safety and independence within the community;to maximize safety and independence with self cares   Goal Progress no changes   Target Date 11/23/23   Subjective Report   Subjective Report The patient presents to the clinic today noting that he continues to feel his familiar luca and shoulder pain. Notes that the numbness in his hand has been improving. Reports good adherence to his home exercise program. Is very motivated for his upcoming injection as he has experienced success with injections in the past.   Objective Measures   Objective Measures Objective Measure 1   Objective Measure 1   Objective Measure Cervical AROM   Details pain with end range left rotation, left side bend, extension. Stretching sensation with right rotation, right side bend, flexion   Treatment Interventions (PT)   Interventions Therapeutic Procedure/Exercise;Manual Therapy   Therapeutic Procedure/Exercise   Therapeutic Procedures: strength, endurance, ROM, flexibillity minutes (45790) 16   Therapeutic Procedures Ther Proc 2;Ther Proc 3;Ther Proc 4   Ther Proc 1 UBE x 4 min   Ther Proc 1 -  Details FW/ BW for warm up - L arm tingling with ex   Ther Proc 2 Upper trapezius stretch   Ther Proc 2 - Details 3x10s holds   Ther Proc 3 Scalenes stretch   Ther Proc 3 - Details 3x10s holds   PTRx Ther Proc 1 Shoulder Rolls   PTRx Ther Proc 1 - Details x 10 in clinic for review   PTRx Ther Proc 2 Scapular Retraction/Depression   PTRx Ther Proc 2 - Details x 10 in clinic - correct demonstration today without elevating shoulders   PTRx Ther Proc 3 Levator Scapulae Stretch   PTRx Ther Proc 3 - Details 3 x 10 sec   PTRx Ther Proc 4 Cervical Isometric Extension   PTRx Ther Proc 4 - Details [id number] x 5 sec ; also done x 10 sitting against PT resistance   Skilled Intervention review of HEP, verbal cuing given for form correction   Patient Response/Progress the patient notes he will perform his exercises until his injection   Ther Proc 4 Cross body horizontal adduction stretch   Ther Proc 4 - Details with combined trunk rotation and cervical flexion   Manual Therapy   Manual Therapy: Mobilization, MFR, MLD, friction massage minutes (51176) 15   Manual Therapy Manual Therapy 2   Manual Therapy 1 cervical distraction supine   Manual Therapy 1 - Details 30 sec holds with varying intensity for pain relief   Manual Therapy 2 STM L UT/ rhomboid   Manual Therapy 2 - Details area of hypertonicity in rhomboid for pain relief   Skilled Intervention MT for pain relief   Patient Response/Progress decrease in tonicity with consistent mobilization   Education   Learner/Method Patient;Demonstration   Plan   Home program continue with HEP   Plan for next session assess response to injection, adjust HEP as needed, assess readiness for discharge   Total Session Time   Timed Code Treatment Minutes 31   Total Treatment Time (sum of timed and untimed services) 31

## 2023-11-29 ENCOUNTER — OFFICE VISIT (OUTPATIENT)
Dept: INFECTIOUS DISEASES | Facility: CLINIC | Age: 58
End: 2023-11-29
Attending: INTERNAL MEDICINE
Payer: COMMERCIAL

## 2023-11-29 VITALS
OXYGEN SATURATION: 97 % | SYSTOLIC BLOOD PRESSURE: 117 MMHG | BODY MASS INDEX: 29.29 KG/M2 | DIASTOLIC BLOOD PRESSURE: 81 MMHG | TEMPERATURE: 98.3 F | HEART RATE: 89 BPM | WEIGHT: 216 LBS

## 2023-11-29 DIAGNOSIS — K65.1 INTRA-ABDOMINAL ABSCESS (H): Primary | ICD-10-CM

## 2023-11-29 PROCEDURE — 99214 OFFICE O/P EST MOD 30 MIN: CPT | Performed by: INTERNAL MEDICINE

## 2023-11-29 PROCEDURE — 99213 OFFICE O/P EST LOW 20 MIN: CPT | Performed by: INTERNAL MEDICINE
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
## 2023-11-30 ENCOUNTER — RADIOLOGY INJECTION OFFICE VISIT (OUTPATIENT)
Dept: PALLIATIVE MEDICINE | Facility: CLINIC | Age: 58
End: 2023-11-30
Attending: NURSE PRACTITIONER
Payer: COMMERCIAL

## 2023-11-30 VITALS — SYSTOLIC BLOOD PRESSURE: 114 MMHG | DIASTOLIC BLOOD PRESSURE: 82 MMHG | OXYGEN SATURATION: 99 % | HEART RATE: 71 BPM

## 2023-11-30 DIAGNOSIS — M54.12 CERVICAL RADICULOPATHY: ICD-10-CM

## 2023-11-30 PROCEDURE — 62321 NJX INTERLAMINAR CRV/THRC: CPT | Performed by: PAIN MEDICINE

## 2023-11-30 RX ORDER — DEXAMETHASONE SODIUM PHOSPHATE 10 MG/ML
10 INJECTION, SOLUTION INTRAMUSCULAR; INTRAVENOUS ONCE
Status: COMPLETED | OUTPATIENT
Start: 2023-11-30 | End: 2023-11-30

## 2023-11-30 RX ADMIN — DEXAMETHASONE SODIUM PHOSPHATE 10 MG: 10 INJECTION, SOLUTION INTRAMUSCULAR; INTRAVENOUS at 13:53

## 2023-11-30 ASSESSMENT — PAIN SCALES - GENERAL
PAINLEVEL: MILD PAIN (3)
PAINLEVEL: MILD PAIN (3)

## 2023-11-30 NOTE — NURSING NOTE
Discharge Information    IV Discontiued Time:  NA    Amount of Fluid Infused:  NA    Discharge Criteria = When patient returns to baseline or as per MD order    Consciousness:  Pt is fully awake    Circulation:  BP +/- 20% of pre-procedure level    Respiration:  Patient is able to breathe deeply    O2 Sat:  Patient is able to maintain O2 Sat >92% on room air    Activity:  Moves 4 extremities on command    Ambulation:  Patient is able to stand and walk or stand and pivot into wheelchair    Dressing:  Clean/dry or No Dressing    Notes:   Discharge instructions and AVS given to patient    Patient meets criteria for discharge?  YES    Admitted to PCU?  No    Responsible adult present to accompany patient home?  Yes    Signature/Title:    elias leyva RN  RN Care Coordinator  [facility]  Discharge Information    IV Discontiued Time:  NA    Amount of Fluid Infused:  NA    Discharge Criteria = When patient returns to baseline or as per MD order    Consciousness:  Pt is fully awake    Circulation:  BP +/- 20% of pre-procedure level    Respiration:  Patient is able to breathe deeply    O2 Sat:  Patient is able to maintain O2 Sat >92% on room air    Activity:  Moves 4 extremities on command    Ambulation:  Patient is able to stand and walk or stand and pivot into wheelchair    Dressing:  Clean/dry or No Dressing    Notes:   Discharge instructions and AVS given to patient    Patient meets criteria for discharge?  YES    Admitted to PCU?  No    Responsible adult present to accompany patient home?  Yes    Signature/Title:    elias leyva RN  RN Care Coordinator  [facility]

## 2023-11-30 NOTE — PROGRESS NOTES
[facility] - Procedure Note    Date of Visit: 11/30/2023    Procedure performed: C7-T1 interlaminar epidural steroid injection with fluoroscopic guidance  Diagnosis: Cervical radiculitis/radiculopathy  : Catarino Marsh MD  Anesthesia: none    Indications: Dimitrios Goldberg is a 58 year old male who is seen  for cervical epidural steroid injection. The patient describes neck pain rad to his UE L>R. The patient has been exhibiting symptoms consistent with cervical intraspinal inflammation and radiculopathy. Symptoms have been persistent, disabling, and intermittently severe. The patient reports minimal improvement with conservative treatment, including meds/pt.    Cervical MRI rev    Allergies:      Allergies   Allergen Reactions    Morphine Unknown     Due to kidney cancer        Vitals:  /82   Pulse 71   SpO2 99%     Review of Systems: The patient denies recent fever, chills, illness, use of antibiotics or anticoagulants. All other 10-point review of systems negative.     Procedure: The procedure and risks were explained, and informed written consent was obtained from the patient. Risks include but are not limited to: infection, bleeding, increased pain, and damage to soft tissue, nerve, muscle, and vasculature structures. After getting informed consent, patient was brought into the procedure suite and was placed in a prone position on the procedure table. A Pause for the Cause was performed. Patient was prepped and draped in sterile fashion.     The C7-T1 interspace was identified with use of fluoroscopy in AP view. A 25-gauge, 1.5 inch needle was used to anesthetize the skin and subcutaneous tissue entry site with a total of 2 ml of 1% lidocaine. Under fluoroscopic visualization, a 22-gauge, 3.5 inch Tuohy epidural needle was slowly advanced towards the epidural space a few millimeters right of midline. The latter part of the needle advancement was guided with fluoroscopy  in the lateral view. The epidural space was identified using loss of resistance technique. After negative aspiration for heme and cerebrospinal fluid, a total of 1 mL of Omnipaque was injected to confirm needle placement. 9 mL of contrast was wasted. Epidurogram confirmed spread within the posterior epidural space. 2 ml of  NS,1 ml 10mg/ml of dexamethasone, and 1 ml of preservative free 0.25% bupivacaine was injected. The needle was removed.  Images were saved to PACS.    The patient tolerated the procedure well, and there was no evidence of procedural complications. No new sensory or motor deficits were noted following the procedure. The patient was stable and able to ambulate on discharge home. Post-procedure instructions were provided.     Pre-procedure pain score: 3/10 in the neck, 3/10 in the arm  Post-procedure pain score: 3/10 in the neck, 3/10 in the arm    Assessment/Plan: Dimitrios Goldberg is a 58 year old male s/p cervical interlaminar epidural steroid injection today for cervical spondylosis and radiculitis/radiculopathy.     Following today's procedure, the patient was advised to contact the [facility] for any of the following:   Fever, chills, or night sweats   New onset of pain, numbness, or weakness   Any questions/concerns regarding the procedure  If unable to contact the Pain Center, the patient was instructed to go to a local Emergency Room for any complications.     Follow-up with provider in 2 weeks for post-procedure evaluation.    Catarino Marsh MD   Pain Management    [facility]

## 2023-11-30 NOTE — NURSING NOTE
Pre-procedure Intake  If YES to any questions or NO to having a   Please complete laminated checklist and leave on the computer keyboard for Provider, verbally inform provider if able.    For SCS Trial, RFA's or any sedation procedure:  Have you been fasting? NA  If yes, for how long?     Are you taking any any blood thinners such as Coumadin, Warfarin, Jantoven, Pradaxa Xarelto, Eliquis, Edoxaban, Enoxaparin, Lovenox, Heparin, Arixtra, Fondaparinux, or Fragmin? OR Antiplatelet medication such as Plavix, Brilinta, or Effient?   No   If yes, when did you take your last dose?     Do you take aspirin?  No  If cervical procedure, have you held aspirin for 6 days?       Do you have any allergies to contrast dye, iodine, steroid and/or numbing medications?  NO    Are you currently taking antibiotics or have an active infection?  NO    Have you had a fever/elevated temperature within the past week? NO    Are you currently taking oral steroids? NO    Do you have a ? Yes    Are you pregnant or breastfeeding?  NO    Have you received the COVID-19 vaccine? Yes  If yes, was it your 1st, 2nd or only dose needed?   Date of most recent vaccine: 10/2/23    Notify provider and RNs if systolic BP >170, diastolic BP >100, P >100 or O2 sats < 90%

## 2023-11-30 NOTE — PATIENT INSTRUCTIONS
[facility]   Procedure Discharge Instructions    Today you saw:  Dr. Catarino Marsh     You had an:  Epidural steroid injection   -cervical      Medications used:  Lidocaine   Bupivacaine   Dexamethasone Omnipaque   Normal saline        Be cautious as numbness and/or weakness in the upper extremities may occur for up to 6-8 hours after the procedure due to effect of the local anesthetic  Do not drive for 6 hours. The effect of the local anesthetic could slow your reflexes.   You may resume your regular activities after 24 hours  Avoid strenuous activity for the first 24 hours  You may shower, however avoid swimming, tub baths or hot tubs for 24 hours following your procedure  You may have a mild to moderate increase in pain for several days following the injection.  It may take up to 14 days for the steroid medication to start working although you may feel the effect as early as a few days after the procedure.     You may use ice packs for 10-15 minutes, 3 to 4 times a day at the injection site for comfort  Do not use heat to painful areas for 6 to 8 hours. This will give the local anesthetic time to wear off and prevent you from accidentally burning your skin.   Unless you have been directed to avoid the use of anti-inflammatory medications (NSAIDS), you may use medications such as ibuprofen, Aleve or Tylenol for pain control if needed.   If you were fasting, you may resume your normal diet and medications after the procedure  If you have diabetes, check your blood sugar more frequently than usual as your blood sugar may be higher than normal for 10-14 days following a steroid injection. Contact your doctor who manages your diabetes if your blood sugar is higher than usual  Possible side effects of steroids that you may experience include flushing, elevated blood pressure, increased appetite, mild headaches and restlessness.  All of these symptoms will get better with time.  If you  experience any of the following, call the Pain Clinic during work hours (Mon-Friday 8-4:30 pm) at [phone] or the Provider Line after hours at [phone]:  -Fever over 100 degree F  -Swelling, bleeding, redness, drainage, warmth at the injection site  -Progressive weakness or numbness in your legs or arms  -Loss of bowel or bladder function  -Unusual headache that is not relieved by Tylenol or other pain reliever  -Unusual new onset of pain that is not improving

## 2023-12-20 DIAGNOSIS — I12.9 HYPERTENSION, RENAL: ICD-10-CM

## 2023-12-20 RX ORDER — METOPROLOL SUCCINATE 50 MG/1
50 TABLET, EXTENDED RELEASE ORAL DAILY
Qty: 90 TABLET | Refills: 1 | Status: SHIPPED | OUTPATIENT
Start: 2023-12-20 | End: 2024-03-25

## 2023-12-20 NOTE — TELEPHONE ENCOUNTER
Medication refill request    Medication refill: metoprolol succinate ER 50  Last OV: 10/23/23  Next OV: 1/24/24  Current labs:    Latest Reference Range & Units 11/09/23 07:57   Sodium 135 - 145 mmol/L 141   Potassium 3.4 - 5.3 mmol/L 3.9   Chloride 98 - 107 mmol/L 104   Carbon Dioxide (CO2) 22 - 29 mmol/L 25   Urea Nitrogen 6.0 - 20.0 mg/dL 15.7   Creatinine 0.67 - 1.17 mg/dL 1.69 (H)   GFR Estimate >60 mL/min/1.73m2 46 (L)   Calcium 8.6 - 10.0 mg/dL 9.4   Anion Gap 7 - 15 mmol/L 12   (H): Data is abnormally high  (L): Data is abnormally low    Routed to be signed    Tamiko Sandra LPN  Nephrology  [phone]

## 2023-12-22 DIAGNOSIS — E03.4 HYPOTHYROIDISM DUE TO ACQUIRED ATROPHY OF THYROID: ICD-10-CM

## 2023-12-22 RX ORDER — LEVOTHYROXINE SODIUM 137 UG/1
137 TABLET ORAL DAILY
Qty: 90 TABLET | Refills: 1 | Status: SHIPPED | OUTPATIENT
Start: 2023-12-22 | End: 2024-03-25

## 2023-12-22 NOTE — TELEPHONE ENCOUNTER
Medication refill request    Medication refill: levothyroxine 137 mcg tab daily    Last OV: 10/23/23  Next OV: 1/24/24    Current labs:    Latest Reference Range & Units 11/09/23 07:57   TSH 0.30 - 4.20 uIU/mL 2.25         Routed to be signed      Tamiko Sandra LPN  Nephrology  [phone]

## 2024-01-01 DIAGNOSIS — E78.5 HYPERLIPIDEMIA, UNSPECIFIED HYPERLIPIDEMIA TYPE: Primary | ICD-10-CM

## 2024-01-02 RX ORDER — ATORVASTATIN CALCIUM 20 MG/1
20 TABLET, FILM COATED ORAL DAILY
Qty: 90 TABLET | Refills: 1 | Status: SHIPPED | OUTPATIENT
Start: 2024-01-02 | End: 2024-06-24

## 2024-01-10 DIAGNOSIS — G43.909 MIGRAINE WITHOUT STATUS MIGRAINOSUS, NOT INTRACTABLE, UNSPECIFIED MIGRAINE TYPE: Primary | ICD-10-CM

## 2024-01-10 RX ORDER — NORTRIPTYLINE HCL 10 MG
10 CAPSULE ORAL AT BEDTIME
Qty: 90 CAPSULE | Refills: 2 | Status: SHIPPED | OUTPATIENT
Start: 2024-01-10 | End: 2024-08-30

## 2024-01-19 DIAGNOSIS — H43.399 VITREOUS SYNERESIS: Primary | ICD-10-CM

## 2024-01-24 ENCOUNTER — OFFICE VISIT (OUTPATIENT)
Dept: NEPHROLOGY | Facility: CLINIC | Age: 59
End: 2024-01-24
Attending: INTERNAL MEDICINE
Payer: COMMERCIAL

## 2024-01-24 ENCOUNTER — LAB (OUTPATIENT)
Dept: LAB | Facility: CLINIC | Age: 59
End: 2024-01-24
Attending: INTERNAL MEDICINE
Payer: COMMERCIAL

## 2024-01-24 VITALS
OXYGEN SATURATION: 98 % | BODY MASS INDEX: 30.04 KG/M2 | SYSTOLIC BLOOD PRESSURE: 115 MMHG | DIASTOLIC BLOOD PRESSURE: 80 MMHG | HEART RATE: 72 BPM | WEIGHT: 221.5 LBS

## 2024-01-24 DIAGNOSIS — C64.1 PRIMARY MALIGNANT NEOPLASM OF RIGHT KIDNEY WITH METASTASIS FROM KIDNEY TO OTHER SITE (H): ICD-10-CM

## 2024-01-24 DIAGNOSIS — D63.1 ANEMIA IN STAGE 3B CHRONIC KIDNEY DISEASE (H): ICD-10-CM

## 2024-01-24 DIAGNOSIS — N18.32 ANEMIA IN STAGE 3B CHRONIC KIDNEY DISEASE (H): ICD-10-CM

## 2024-01-24 DIAGNOSIS — D49.511 NEOPLASM OF RIGHT KIDNEY WITH THROMBUS OF INFERIOR VENA CAVA (H): ICD-10-CM

## 2024-01-24 DIAGNOSIS — K65.1 INTRA-ABDOMINAL ABSCESS (H): ICD-10-CM

## 2024-01-24 DIAGNOSIS — E03.9 HYPOTHYROIDISM (ACQUIRED): ICD-10-CM

## 2024-01-24 DIAGNOSIS — I82.220 NEOPLASM OF RIGHT KIDNEY WITH THROMBUS OF INFERIOR VENA CAVA (H): ICD-10-CM

## 2024-01-24 LAB
ALBUMIN MFR UR ELPH: <6 MG/DL
ALBUMIN SERPL BCG-MCNC: 4.4 G/DL (ref 3.5–5.2)
ALBUMIN UR-MCNC: NEGATIVE MG/DL
ALP SERPL-CCNC: 91 U/L (ref 40–150)
ALT SERPL W P-5'-P-CCNC: 12 U/L (ref 0–70)
ANION GAP SERPL CALCULATED.3IONS-SCNC: 8 MMOL/L (ref 7–15)
APPEARANCE UR: CLEAR
AST SERPL W P-5'-P-CCNC: 18 U/L (ref 0–45)
BASOPHILS # BLD AUTO: 0 10E3/UL (ref 0–0.2)
BASOPHILS NFR BLD AUTO: 1 %
BILIRUB SERPL-MCNC: 0.4 MG/DL
BILIRUB UR QL STRIP: NEGATIVE
BUN SERPL-MCNC: 19.2 MG/DL (ref 6–20)
CALCIUM SERPL-MCNC: 9.7 MG/DL (ref 8.6–10)
CHLORIDE SERPL-SCNC: 103 MMOL/L (ref 98–107)
COLOR UR AUTO: NORMAL
CREAT SERPL-MCNC: 1.61 MG/DL (ref 0.67–1.17)
CREAT UR-MCNC: 86 MG/DL
CREAT UR-MCNC: 86.5 MG/DL
DEPRECATED HCO3 PLAS-SCNC: 29 MMOL/L (ref 22–29)
EGFRCR SERPLBLD CKD-EPI 2021: 49 ML/MIN/1.73M2
EOSINOPHIL # BLD AUTO: 0.2 10E3/UL (ref 0–0.7)
EOSINOPHIL NFR BLD AUTO: 3 %
ERYTHROCYTE [DISTWIDTH] IN BLOOD BY AUTOMATED COUNT: 17 % (ref 10–15)
GLUCOSE SERPL-MCNC: 101 MG/DL (ref 70–99)
GLUCOSE UR STRIP-MCNC: NEGATIVE MG/DL
HCT VFR BLD AUTO: 42.7 % (ref 40–53)
HGB BLD-MCNC: 13.6 G/DL (ref 13.3–17.7)
HGB UR QL STRIP: NEGATIVE
IMM GRANULOCYTES # BLD: 0 10E3/UL
IMM GRANULOCYTES NFR BLD: 0 %
KETONES UR STRIP-MCNC: NEGATIVE MG/DL
LEUKOCYTE ESTERASE UR QL STRIP: NEGATIVE
LYMPHOCYTES # BLD AUTO: 1.8 10E3/UL (ref 0.8–5.3)
LYMPHOCYTES NFR BLD AUTO: 28 %
MCH RBC QN AUTO: 26.2 PG (ref 26.5–33)
MCHC RBC AUTO-ENTMCNC: 31.9 G/DL (ref 31.5–36.5)
MCV RBC AUTO: 82 FL (ref 78–100)
MICROALBUMIN UR-MCNC: <12 MG/L
MICROALBUMIN/CREAT UR: NORMAL MG/G{CREAT}
MONOCYTES # BLD AUTO: 0.5 10E3/UL (ref 0–1.3)
MONOCYTES NFR BLD AUTO: 7 %
NEUTROPHILS # BLD AUTO: 3.9 10E3/UL (ref 1.6–8.3)
NEUTROPHILS NFR BLD AUTO: 61 %
NITRATE UR QL: NEGATIVE
NRBC # BLD AUTO: 0 10E3/UL
NRBC BLD AUTO-RTO: 0 /100
PH UR STRIP: 6 [PH] (ref 5–7)
PLATELET # BLD AUTO: 282 10E3/UL (ref 150–450)
POTASSIUM SERPL-SCNC: 4.4 MMOL/L (ref 3.4–5.3)
PROT SERPL-MCNC: 7.5 G/DL (ref 6.4–8.3)
PROT/CREAT 24H UR: NORMAL MG/G{CREAT}
RBC # BLD AUTO: 5.19 10E6/UL (ref 4.4–5.9)
SODIUM SERPL-SCNC: 140 MMOL/L (ref 135–145)
SP GR UR STRIP: 1.01 (ref 1–1.03)
TSH SERPL DL<=0.005 MIU/L-ACNC: 0.78 UIU/ML (ref 0.3–4.2)
UROBILINOGEN UR STRIP-MCNC: NORMAL MG/DL
WBC # BLD AUTO: 6.4 10E3/UL (ref 4–11)

## 2024-01-24 PROCEDURE — 36415 COLL VENOUS BLD VENIPUNCTURE: CPT | Performed by: PATHOLOGY

## 2024-01-24 PROCEDURE — 99000 SPECIMEN HANDLING OFFICE-LAB: CPT | Performed by: PATHOLOGY

## 2024-01-24 PROCEDURE — 99213 OFFICE O/P EST LOW 20 MIN: CPT | Performed by: INTERNAL MEDICINE

## 2024-01-24 PROCEDURE — 80053 COMPREHEN METABOLIC PANEL: CPT | Performed by: PATHOLOGY

## 2024-01-24 PROCEDURE — 82043 UR ALBUMIN QUANTITATIVE: CPT | Performed by: INTERNAL MEDICINE

## 2024-01-24 PROCEDURE — 99214 OFFICE O/P EST MOD 30 MIN: CPT | Performed by: INTERNAL MEDICINE

## 2024-01-24 PROCEDURE — 84443 ASSAY THYROID STIM HORMONE: CPT | Performed by: PATHOLOGY

## 2024-01-24 PROCEDURE — 85025 COMPLETE CBC W/AUTO DIFF WBC: CPT | Performed by: PATHOLOGY

## 2024-01-24 PROCEDURE — 81003 URINALYSIS AUTO W/O SCOPE: CPT | Performed by: PATHOLOGY

## 2024-01-24 PROCEDURE — 84156 ASSAY OF PROTEIN URINE: CPT | Performed by: PATHOLOGY

## 2024-01-24 RX ORDER — AMLODIPINE BESYLATE 5 MG/1
10 TABLET ORAL DAILY
Qty: 90 TABLET | Refills: 1 | Status: SHIPPED | OUTPATIENT
Start: 2024-01-24 | End: 2024-03-25
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
## 2024-01-24 NOTE — PATIENT INSTRUCTIONS
It was a pleasure taking care of you today.  I've included a brief summary of our discussion and care plan from today's visit below.  Please review this information with your primary care provider.  _______________________________________________________________________    My recommendations are summarized as follows:  -Keep the amount of sodium in your diet at 2.4 g/day (also see instructions attached in that regard)  -Keep a Blood Pressure diary by taking your blood pressure twice a day as instructed (also see instructions attached in that regard). Start in one week from now and take it for one week then send me the numbers via [employer]. Please make sure that you are using a validated blood pressure device by checking that it is the case at: https://www.validatebp.org/  -Avoid all NSAID's. Examples include Ibuprofen (Advil, Motrin), naprosyn (Aleve), diclofenac (Voltaren), celebrex among others. Acetaminophen (Tylenol) is ok with maximum dose in 24 hours of 3200 mg.  -Healthy lifestyle measures will keep your kidney's functioning at their current best. This includes regular exercise, weight loss and smoking cessation if you smoke.   -For tips on eating healthy:  -https://www.[facility].[employer].edu/nutritionsource/healthy-eating-pyramid/  -Do not exceed one alcoholic drink per day  -Please increase amlodipine to 10 mg daily    To schedule imaging please call [phone]     To schedule your lab appointment at the [facility], please call       Return to Nephrology Clinic in 3 months to review your progress.    _______________________________________________________________________    Who do I call with any questions after my visit?    Please be in touch if there are any further questions that arise following today's visit.  There are multiple ways to contact your nephrology care team.      During business hours, you may reach your Nephrology LPN Care Coordinator, Tamiko, at .       To schedule or reschedule an appointment, please call [phone].    You can always send a secure message through [employer].  [employer] messages are answered by your nurse or doctor typically within 24 hours.  Please allow extra time on weekends and holidays.      For urgent/emergent questions after business hours, you may reach the on-call Nephrology Fellow by contacting the [facility]  at [phone].     How will I get the results of any tests ordered?    You will receive all of your results.  If you have signed up for [email or web address], any tests ordered at your visit will be available to you after your physician reviews them.  Typically this takes 1-2 weeks.  If there are urgent results that require a change in your care plan, your physician or nurse will call you to discuss the next steps.      What is Foodzie?  Foodzie is a secure way for you to access all of your healthcare records from the [facility].  It is a web based computer program, so you can sign on to it from any location.  It also allows you to send secure messages to your care team.  I recommend signing up for Foodzie access if you have not already done so and are comfortable with using a computer.      How do I schedule a follow-up visit?  If you did not schedule a follow-up visit today, please call [phone] to schedule a follow-up office visit.        Sincerely,      Dr. [facility]  Division of Nephrology and Hypertension

## 2024-01-31 ENCOUNTER — OFFICE VISIT (OUTPATIENT)
Dept: URGENT CARE | Facility: URGENT CARE | Age: 59
End: 2024-01-31
Payer: COMMERCIAL

## 2024-01-31 ENCOUNTER — OFFICE VISIT (OUTPATIENT)
Dept: OPHTHALMOLOGY | Facility: CLINIC | Age: 59
End: 2024-01-31
Attending: OPHTHALMOLOGY
Payer: COMMERCIAL

## 2024-01-31 VITALS
SYSTOLIC BLOOD PRESSURE: 113 MMHG | HEART RATE: 89 BPM | TEMPERATURE: 98.3 F | BODY MASS INDEX: 29.8 KG/M2 | WEIGHT: 219.7 LBS | RESPIRATION RATE: 16 BRPM | OXYGEN SATURATION: 98 % | DIASTOLIC BLOOD PRESSURE: 78 MMHG

## 2024-01-31 DIAGNOSIS — M79.674 PAIN OF TOE OF RIGHT FOOT: Primary | ICD-10-CM

## 2024-01-31 DIAGNOSIS — H43.399 VITREOUS SYNERESIS: ICD-10-CM

## 2024-01-31 PROCEDURE — 92134 CPTRZ OPH DX IMG PST SGM RTA: CPT | Performed by: OPHTHALMOLOGY

## 2024-01-31 PROCEDURE — 99212 OFFICE O/P EST SF 10 MIN: CPT | Performed by: NURSE PRACTITIONER

## 2024-01-31 PROCEDURE — 99214 OFFICE O/P EST MOD 30 MIN: CPT | Performed by: OPHTHALMOLOGY

## 2024-01-31 PROCEDURE — 99204 OFFICE O/P NEW MOD 45 MIN: CPT | Mod: GC | Performed by: OPHTHALMOLOGY

## 2024-01-31 PROCEDURE — 92250 FUNDUS PHOTOGRAPHY W/I&R: CPT | Performed by: OPHTHALMOLOGY

## 2024-01-31 PROCEDURE — 99207 FUNDUS PHOTOS OU (BOTH EYES): CPT | Mod: 26 | Performed by: OPHTHALMOLOGY

## 2024-01-31 ASSESSMENT — VISUAL ACUITY
METHOD: SNELLEN - LINEAR
CORRECTION_TYPE: GLASSES
OD_CC: 20/20
OD_CC+: -1
OS_CC: 20/20

## 2024-01-31 ASSESSMENT — REFRACTION_WEARINGRX
OS_VBASE: DOWN
OS_VPRISM: 0.5
OD_ADD: +2.50
OD_CYLINDER: +1.00
SPECS_TYPE: PAL
OS_ADD: +2.50
OD_SPHERE: -2.25
OS_SPHERE: -1.50
OS_AXIS: 040
OD_AXIS: 104
OS_CYLINDER: +1.00
OD_VBASE: UP
OD_VPRISM: 0.5

## 2024-01-31 ASSESSMENT — CONF VISUAL FIELD
OD_SUPERIOR_NASAL_RESTRICTION: 0
OS_INFERIOR_TEMPORAL_RESTRICTION: 0
OS_SUPERIOR_NASAL_RESTRICTION: 0
OD_SUPERIOR_TEMPORAL_RESTRICTION: 0
OD_NORMAL: 1
OD_INFERIOR_TEMPORAL_RESTRICTION: 0
METHOD: COUNTING FINGERS
OS_NORMAL: 1
OD_INFERIOR_NASAL_RESTRICTION: 0
OS_SUPERIOR_TEMPORAL_RESTRICTION: 0
OS_INFERIOR_NASAL_RESTRICTION: 0

## 2024-01-31 ASSESSMENT — EXTERNAL EXAM - RIGHT EYE: OD_EXAM: NORMAL

## 2024-01-31 ASSESSMENT — TONOMETRY
OS_IOP_MMHG: 20
OD_IOP_MMHG: 21
IOP_METHOD: TONOPEN

## 2024-01-31 ASSESSMENT — SLIT LAMP EXAM - LIDS
COMMENTS: PTOSIS, MEIBOMIAN GLAND DYSFUNCTION
COMMENTS: PTOSIS, MEIBOMIAN GLAND DYSFUNCTION

## 2024-01-31 ASSESSMENT — CUP TO DISC RATIO
OD_RATIO: 0.3
OS_RATIO: 0.3

## 2024-01-31 ASSESSMENT — EXTERNAL EXAM - LEFT EYE: OS_EXAM: NORMAL

## 2024-01-31 NOTE — PATIENT INSTRUCTIONS
Trim any loose parts of nail as it grows out. File any rough edges when you get home.  Consider foot massages to aide in healing,  wife should help with that.       Monitor for any signs of infection.

## 2024-01-31 NOTE — PROGRESS NOTES
CC: new floater right eye   First appointment with me  Referred by: Self  HPI: Dimitrios Goldberg is a 58 year old year-old patient with history of HTN who presents for evaluation of vision changes. States he has seen a floater in his right eye for several years but no new flashes. Right eye sees a large floater like an amoeba that move when he moves his eyes.  Has also noticed some ghosting image in the right eye.  This persists when he covers the left eye.  He previously followed for his eyes at [facility] but had change of insurance so wanted to establish care here.    PMH: HTN  Past ocular history:   - Floaterectomy both eyes??? ([facility] 2021 OS, ? 2019 OD)  - CEIOL OU  - IOL exchange OD (2/2 symfony/multifocal intolerance)   FH:      Retinal Imaging:  OCT 01/31/24   RE: normal foveal contour, tr ERM, no IRF/SRF  LE: normal foveal contour, tr ERM, no IRF/SRF    Fundus photos 1/31/24: Consistent with exam  Autofluorescence: normal; peripheral hypoautofluorescence peripheral left eye     Assessment & Plan:    # Floaters right eye    Posterior vitreous detachment (PVD)   Floaters posterior to intraocular lens   Observe for now  Retina detachment precautions were discussed with the patient (presence or increased in flashes, floaters or a curtain in the visual field) and was asked to return if any of the those occur     # s/p PPV left eye   - per patient several yrs ago and gas was placed  - for possible history of Retinal detachment    - retina is attached   - monitor    # ERM, OU  - very mild   - not visually significant  - monitor    # Retinal tear/hole, left eye  - Surrounded by pigment vs old laser    # Pseudophakia, both eyes  - s/p IOL exchange OD (2/2 symfony/multifocal intolerance)   - s/p YAG OU  - good [facility]  - Monitor    # Dry eye both eyes  # Monocular diplopia, right eye  - Likely related to surface  - Using Systane rarely  - Recommend increasing ATs to QID more consistently       Follow up in 4  weeks with Optical Coherence Tomography; dilate both eyes   ; sooner as needed  Retina detachment precautions were discussed with the patient (presence or increased in flashes, floaters or a curtain in the visual field) and was asked to return if any of the those occur     Suzi Tsai MD  Resident Physician, PGY-3  Department of Ophthalmology      ~~~~~~~~~~~~~~~~~~~~~~~~~~~~~~~~~~   Complete documentation of historical and exam elements from today's encounter can be found in the full encounter summary report (not reduplicated in this progress note).  I personally obtained the chief complaint(s) and history of present illness.  I confirmed and edited as necessary the review of systems, past medical/surgical history, family history, social history, and examination findings as documented by others; and I examined the patient myself.  I personally reviewed the relevant tests, images, and reports as documented above.  I personally reviewed the ophthalmic test(s) associated with this encounter, agree with the interpretation(s) as documented by the resident/fellow, and have edited the corresponding report(s) as necessary.   I formulated and edited as necessary the assessment and plan and discussed the findings and management plan with the patient and family    Sisi Orozco MD  Professor of Ophthalmology.   Vitreo-retinal surgeon   Department of Ophthalmology and Visual Neurosciences   [facility]  Phone: [phone]   Fax: [phone]

## 2024-01-31 NOTE — NURSING NOTE
"Chief Complaints and History of Present Illnesses   Patient presents with    Retinal Evaluation     Vitreous syneresis       Chief Complaint(s) and History of Present Illness(es)       Retinal Evaluation              Comments: Vitreous syneresis                Comments    Pt states he has trouble with a \" gel thing\"\"that moves in the right eye    Floaters unsure of which eye  Also C/o glare right eye since cataract surgery in the right eye   No flashes, eye pain or tearing   Known dry eyes, uses AT's     Terri Barba COT 11:56 AM January 31, 2024                        "
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
## 2024-02-13 DIAGNOSIS — H43.393 VITREOUS SYNERESIS OF BOTH EYES: Primary | ICD-10-CM

## 2024-02-15 ENCOUNTER — PATIENT OUTREACH (OUTPATIENT)
Dept: CARE COORDINATION | Facility: CLINIC | Age: 59
End: 2024-02-15
Payer: COMMERCIAL

## 2024-02-26 ENCOUNTER — ANCILLARY PROCEDURE (OUTPATIENT)
Dept: CT IMAGING | Facility: CLINIC | Age: 59
End: 2024-02-26
Attending: INTERNAL MEDICINE
Payer: COMMERCIAL

## 2024-02-26 ENCOUNTER — LAB (OUTPATIENT)
Dept: LAB | Facility: CLINIC | Age: 59
End: 2024-02-26
Payer: COMMERCIAL

## 2024-02-26 DIAGNOSIS — C64.1 PRIMARY MALIGNANT NEOPLASM OF RIGHT KIDNEY WITH METASTASIS FROM KIDNEY TO OTHER SITE (H): ICD-10-CM

## 2024-02-26 DIAGNOSIS — I82.220 NEOPLASM OF RIGHT KIDNEY WITH THROMBUS OF INFERIOR VENA CAVA (H): ICD-10-CM

## 2024-02-26 DIAGNOSIS — D49.511 NEOPLASM OF RIGHT KIDNEY WITH THROMBUS OF INFERIOR VENA CAVA (H): ICD-10-CM

## 2024-02-26 DIAGNOSIS — E03.4 HYPOTHYROIDISM DUE TO ACQUIRED ATROPHY OF THYROID: ICD-10-CM

## 2024-02-26 LAB
ALBUMIN SERPL BCG-MCNC: 4.4 G/DL (ref 3.5–5.2)
ALP SERPL-CCNC: 97 U/L (ref 40–150)
ALT SERPL W P-5'-P-CCNC: 9 U/L (ref 0–70)
ANION GAP SERPL CALCULATED.3IONS-SCNC: 11 MMOL/L (ref 7–15)
AST SERPL W P-5'-P-CCNC: 19 U/L (ref 0–45)
BASOPHILS # BLD AUTO: 0 10E3/UL (ref 0–0.2)
BASOPHILS NFR BLD AUTO: 1 %
BILIRUB SERPL-MCNC: 0.2 MG/DL
BUN SERPL-MCNC: 17.1 MG/DL (ref 6–20)
CALCIUM SERPL-MCNC: 9.6 MG/DL (ref 8.6–10)
CHLORIDE SERPL-SCNC: 104 MMOL/L (ref 98–107)
CREAT BLD-MCNC: 1.9 MG/DL (ref 0.7–1.3)
CREAT SERPL-MCNC: 1.82 MG/DL (ref 0.67–1.17)
DEPRECATED HCO3 PLAS-SCNC: 28 MMOL/L (ref 22–29)
EGFRCR SERPLBLD CKD-EPI 2021: 40 ML/MIN/1.73M2
EGFRCR SERPLBLD CKD-EPI 2021: 43 ML/MIN/1.73M2
EOSINOPHIL # BLD AUTO: 0.2 10E3/UL (ref 0–0.7)
EOSINOPHIL NFR BLD AUTO: 3 %
ERYTHROCYTE [DISTWIDTH] IN BLOOD BY AUTOMATED COUNT: 17.4 % (ref 10–15)
GLUCOSE SERPL-MCNC: 126 MG/DL (ref 70–99)
HCT VFR BLD AUTO: 41.4 % (ref 40–53)
HGB BLD-MCNC: 12.8 G/DL (ref 13.3–17.7)
IMM GRANULOCYTES # BLD: 0 10E3/UL
IMM GRANULOCYTES NFR BLD: 1 %
LYMPHOCYTES # BLD AUTO: 1.9 10E3/UL (ref 0.8–5.3)
LYMPHOCYTES NFR BLD AUTO: 30 %
MCH RBC QN AUTO: 26.2 PG (ref 26.5–33)
MCHC RBC AUTO-ENTMCNC: 30.9 G/DL (ref 31.5–36.5)
MCV RBC AUTO: 85 FL (ref 78–100)
MONOCYTES # BLD AUTO: 0.4 10E3/UL (ref 0–1.3)
MONOCYTES NFR BLD AUTO: 6 %
NEUTROPHILS # BLD AUTO: 3.9 10E3/UL (ref 1.6–8.3)
NEUTROPHILS NFR BLD AUTO: 59 %
NRBC # BLD AUTO: 0 10E3/UL
NRBC BLD AUTO-RTO: 0 /100
PLATELET # BLD AUTO: 262 10E3/UL (ref 150–450)
POTASSIUM SERPL-SCNC: 4 MMOL/L (ref 3.4–5.3)
PROT SERPL-MCNC: 7.4 G/DL (ref 6.4–8.3)
RBC # BLD AUTO: 4.88 10E6/UL (ref 4.4–5.9)
SODIUM SERPL-SCNC: 143 MMOL/L (ref 135–145)
TSH SERPL DL<=0.005 MIU/L-ACNC: 1.18 UIU/ML (ref 0.3–4.2)
WBC # BLD AUTO: 6.4 10E3/UL (ref 4–11)

## 2024-02-26 PROCEDURE — 74177 CT ABD & PELVIS W/CONTRAST: CPT | Performed by: RADIOLOGY

## 2024-02-26 PROCEDURE — 85025 COMPLETE CBC W/AUTO DIFF WBC: CPT

## 2024-02-26 PROCEDURE — 36415 COLL VENOUS BLD VENIPUNCTURE: CPT

## 2024-02-26 PROCEDURE — 84443 ASSAY THYROID STIM HORMONE: CPT

## 2024-02-26 PROCEDURE — 71260 CT THORAX DX C+: CPT | Performed by: RADIOLOGY

## 2024-02-26 PROCEDURE — 82565 ASSAY OF CREATININE: CPT

## 2024-02-26 PROCEDURE — 80053 COMPREHEN METABOLIC PANEL: CPT

## 2024-02-26 RX ORDER — IOPAMIDOL 755 MG/ML
120 INJECTION, SOLUTION INTRAVASCULAR ONCE
Status: COMPLETED | OUTPATIENT
Start: 2024-02-26 | End: 2024-02-26

## 2024-02-26 RX ADMIN — IOPAMIDOL 120 ML: 755 INJECTION, SOLUTION INTRAVASCULAR at 09:07

## 2024-02-28 ENCOUNTER — OFFICE VISIT (OUTPATIENT)
Dept: OPHTHALMOLOGY | Facility: CLINIC | Age: 59
End: 2024-02-28
Attending: OPHTHALMOLOGY
Payer: COMMERCIAL

## 2024-02-28 DIAGNOSIS — H43.393 VITREOUS SYNERESIS OF BOTH EYES: ICD-10-CM

## 2024-02-28 PROCEDURE — 99214 OFFICE O/P EST MOD 30 MIN: CPT | Performed by: OPHTHALMOLOGY

## 2024-02-28 PROCEDURE — 92134 CPTRZ OPH DX IMG PST SGM RTA: CPT | Performed by: OPHTHALMOLOGY

## 2024-02-28 ASSESSMENT — CONF VISUAL FIELD
OS_INFERIOR_TEMPORAL_RESTRICTION: 0
OD_SUPERIOR_TEMPORAL_RESTRICTION: 0
OS_INFERIOR_NASAL_RESTRICTION: 0
OD_INFERIOR_TEMPORAL_RESTRICTION: 0
OD_NORMAL: 1
OS_SUPERIOR_TEMPORAL_RESTRICTION: 0
OS_SUPERIOR_NASAL_RESTRICTION: 0
OS_NORMAL: 1
OD_SUPERIOR_NASAL_RESTRICTION: 0
METHOD: COUNTING FINGERS
OD_INFERIOR_NASAL_RESTRICTION: 0

## 2024-02-28 ASSESSMENT — VISUAL ACUITY
METHOD: SNELLEN - LINEAR
OS_CC+: -1
OD_CC+: -2
OD_CC: 20/25
CORRECTION_TYPE: GLASSES
OS_CC: 20/20

## 2024-02-28 ASSESSMENT — EXTERNAL EXAM - LEFT EYE: OS_EXAM: NORMAL

## 2024-02-28 ASSESSMENT — SLIT LAMP EXAM - LIDS
COMMENTS: PTOSIS, MEIBOMIAN GLAND DYSFUNCTION
COMMENTS: PTOSIS, MEIBOMIAN GLAND DYSFUNCTION

## 2024-02-28 ASSESSMENT — TONOMETRY
OS_IOP_MMHG: 13
IOP_METHOD: ICARE
OD_IOP_MMHG: 16

## 2024-02-28 ASSESSMENT — EXTERNAL EXAM - RIGHT EYE: OD_EXAM: NORMAL

## 2024-02-28 ASSESSMENT — CUP TO DISC RATIO
OD_RATIO: 0.3
OS_RATIO: 0.3

## 2024-02-28 NOTE — PROGRESS NOTES
CC: follow up floater right eye   Interval: floater right eye is stable; have not noticing too much  HPI: Dimitrios Goldberg is a 58 year old year-old patient with history of HTN who presents for evaluation of vision changes. States he has seen a floater in his right eye for several years but no new flashes. Right eye sees a large floater like an amoeba that move when he moves his eyes.  Has also noticed some ghosting image in the right eye.  This persists when he covers the left eye.  He previously followed for his eyes at [facility] but had change of insurance so wanted to establish care here.    PMH: HTN  Past ocular history:   - Floaterectomy both eyes??? ([facility] 2021 OS, ? 2019 OD)  - CEIOL OU  - IOL exchange OD (2/2 symfony/multifocal intolerance)   FH:      Retinal Imaging:  OCT 02/28/24    RE: normal foveal contour, tr ERM, no IRF/SRF  LE: normal foveal contour, tr ERM, no IRF/SRF    Fundus photos 1/31/24: Consistent with exam  Autofluorescence: normal; peripheral hypoautofluorescence peripheral left eye     Assessment & Plan:    # Floaters right eye    Posterior vitreous detachment (PVD)   Floaters posterior to intraocular lens   Observe  Retina detachment precautions were discussed with the patient (presence or increased in flashes, floaters or a curtain in the visual field) and was asked to return if any of the those occur     # s/p PPV left eye   - per patient several yrs ago and gas was placed  - for possible history of Retinal detachment    - retina is attached   - monitor    # ERM, OU  - very mild   - not visually significant  - monitor    # Retinal tear/hole, left eye  - Surrounded by pigment vs old laser    # Pseudophakia, both eyes  - s/p IOL exchange OD (2/2 symfony/multifocal intolerance)   - s/p YAG OU  - good [facility]  - Monitor    # Dry eye both eyes  # Monocular diplopia, right eye  - Likely related to surface  - Using Systane rarely  - Recommend increasing ATs to QID more consistently        Follow up in 1 yrs with Optical Coherence Tomography and optos; dilate both eyes ; sooner as needed  Retina detachment precautions were discussed with the patient (presence or increased in flashes, floaters or a curtain in the visual field) and was asked to return if any of the those occur       ~~~~~~~~~~~~~~~~~~~~~~~~~~~~~~~~~~   Complete documentation of historical and exam elements from today's encounter can be found in the full encounter summary report (not reduplicated in this progress note).  I personally obtained the chief complaint(s) and history of present illness.  I confirmed and edited as necessary the review of systems, past medical/surgical history, family history, social history, and examination findings as documented by others; and I examined the patient myself.  I personally reviewed the relevant tests, images, and reports as documented above.  I formulated and edited as necessary the assessment and plan and discussed the findings and management plan with the patient and family    Sisi Orozco MD  Professor of Ophthalmology.   Vitreo-retinal surgeon   Department of Ophthalmology and Visual Neurosciences   [facility]  Phone: [phone]   Fax: [phone]

## 2024-02-28 NOTE — NURSING NOTE
Chief Complaints and History of Present Illnesses   Patient presents with    Follow Up     Pt here for 4 week follow up on Vitreous Syneresis each eye.      Chief Complaint(s) and History of Present Illness(es)       Follow Up              Comments: Pt here for 4 week follow up on Vitreous Syneresis each eye.               Comments    Pt has largely unchanged vision since last exam. Denies new floaters or flashes of lights. No new concerns.   Kaila Morillo, COA on 2/28/2024 at 1:12 PM

## 2024-02-29 ENCOUNTER — PATIENT OUTREACH (OUTPATIENT)
Dept: CARE COORDINATION | Facility: CLINIC | Age: 59
End: 2024-02-29
Payer: COMMERCIAL

## 2024-03-04 DIAGNOSIS — K25.9 GASTRIC EROSION, UNSPECIFIED ULCER CHRONICITY: ICD-10-CM

## 2024-03-05 ENCOUNTER — ONCOLOGY VISIT (OUTPATIENT)
Dept: ONCOLOGY | Facility: CLINIC | Age: 59
End: 2024-03-05
Attending: INTERNAL MEDICINE
Payer: COMMERCIAL

## 2024-03-05 VITALS
RESPIRATION RATE: 16 BRPM | TEMPERATURE: 98.1 F | HEART RATE: 85 BPM | DIASTOLIC BLOOD PRESSURE: 77 MMHG | WEIGHT: 222.9 LBS | BODY MASS INDEX: 30.23 KG/M2 | SYSTOLIC BLOOD PRESSURE: 119 MMHG | OXYGEN SATURATION: 99 %

## 2024-03-05 DIAGNOSIS — E03.4 HYPOTHYROIDISM DUE TO ACQUIRED ATROPHY OF THYROID: ICD-10-CM

## 2024-03-05 DIAGNOSIS — K25.9 GASTRIC EROSION, UNSPECIFIED ULCER CHRONICITY: ICD-10-CM

## 2024-03-05 DIAGNOSIS — C64.1 PRIMARY MALIGNANT NEOPLASM OF RIGHT KIDNEY WITH METASTASIS FROM KIDNEY TO OTHER SITE (H): ICD-10-CM

## 2024-03-05 DIAGNOSIS — I82.220 NEOPLASM OF RIGHT KIDNEY WITH THROMBUS OF INFERIOR VENA CAVA (H): Primary | ICD-10-CM

## 2024-03-05 DIAGNOSIS — D49.511 NEOPLASM OF RIGHT KIDNEY WITH THROMBUS OF INFERIOR VENA CAVA (H): Primary | ICD-10-CM

## 2024-03-05 DIAGNOSIS — N18.4 CKD (CHRONIC KIDNEY DISEASE) STAGE 4, GFR 15-29 ML/MIN (H): ICD-10-CM

## 2024-03-05 PROCEDURE — 99213 OFFICE O/P EST LOW 20 MIN: CPT | Performed by: INTERNAL MEDICINE

## 2024-03-05 PROCEDURE — 99215 OFFICE O/P EST HI 40 MIN: CPT | Performed by: INTERNAL MEDICINE

## 2024-03-05 ASSESSMENT — PAIN SCALES - GENERAL: PAINLEVEL: NO PAIN (0)

## 2024-03-05 NOTE — NURSING NOTE
Oncology Rooming Note    March 5, 2024 10:58 AM   Dimitrios Goldberg is a 58 year old male who presents for:    Chief Complaint   Patient presents with    Oncology Clinic Visit     Renal cell carcinoma     Initial Vitals: /77 (BP Location: Right arm, Patient Position: Sitting, Cuff Size: Adult Regular)   Pulse 85   Temp 98.1  F (36.7  C) (Oral)   Resp 16   Wt 101.1 kg (222 lb 14.4 oz)   SpO2 99%   BMI 30.23 kg/m   Estimated body mass index is 30.23 kg/m  as calculated from the following:    Height as of 10/3/23: 1.829 m (6').    Weight as of this encounter: 101.1 kg (222 lb 14.4 oz). Body surface area is 2.27 meters squared.  No Pain (0) Comment: Data Unavailable   No LMP for male patient.  Allergies reviewed: Yes  Medications reviewed: Yes    Medications: Medication refills not needed today.  Pharmacy name entered into [employer]:    [id number] [place] [address]    Frailty Screening:   Is the patient here for a new oncology consult visit in cancer care? 2. No      Clinical concerns: none       Jennyfer Berger
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
## 2024-03-06 RX ORDER — OMEPRAZOLE 40 MG/1
CAPSULE, DELAYED RELEASE ORAL
Qty: 90 CAPSULE | Refills: 1 | Status: SHIPPED | OUTPATIENT
Start: 2024-03-06 | End: 2024-08-27

## 2024-03-07 DIAGNOSIS — H04.123 DRY EYE SYNDROME OF BOTH EYES: ICD-10-CM

## 2024-03-07 RX ORDER — LIFITEGRAST 50 MG/ML
1 SOLUTION/ DROPS OPHTHALMIC 2 TIMES DAILY
Qty: 12 EACH | Refills: 11 | Status: SHIPPED | OUTPATIENT
Start: 2024-03-07

## 2024-03-14 NOTE — ORAL ONC MGMT
[facility] Oral Chemotherapy Monitoring Program    Thank you for the opportunity to be a part in the care of this patient's oral chemotherapy. The oncology pharmacy will no longer be following this patient for oral chemotherapy. If there are any questions or the plan changes, feel free to contact us.        5/16/2023     2:00 PM 5/19/2023     3:00 PM 6/12/2023    12:00 PM 6/19/2023     1:00 PM 6/28/2023    12:00 PM 7/14/2023    11:00 AM 3/14/2024     8:00 AM   ORAL CHEMOTHERAPY   Assessment Type Monthly Follow up Lab Monitoring Monthly Follow up Refill Lab Monitoring Refill Discontinuation   Reason for Discontinuation       Unacceptable toxicity   Diagnosis Code Renal Cell Cancer Renal Cell Cancer Renal Cell Cancer Renal Cell Cancer Renal Cell Cancer Renal Cell Cancer Renal Cell Cancer   Providers Dr. Beth Campos   [facility]   Is this patient followed by the [facility] OC team?      No No   Drug Name Cabometyx (cabozantinib) Cabometyx (cabozantinib) Cabometyx (cabozantinib) Cabometyx (cabozantinib) Cabometyx (cabozantinib) Cabometyx (cabozantinib) Cabometyx (cabozantinib)   Dose 20 mg 20 mg 20 mg  20 mg 20 mg    Current Schedule Every Other Day Every Other Day Every Other Day  Every Other Day Every Other Day    Cycle Details Continuous Continuous Continuous  Continuous Continuous    Doses missed in last 2 weeks 2  0  0     Adherence Assessment Adherent  Adherent       Adverse Effects Oral Mucositis  No AE identified during assessment       Palmar-plantar Erythrodysethesia syndrome[hand-foot syndrome] Grade 1         Pharmacist Intervention(Palmar-plantar) Yes         Intervention(s) Patient education         Oral Mucositis Grade 1         Pharmacist Intervention(oral mucositis) Yes         Intervention(s) Patient education         Any new drug interactions?   No       Is the  dose as ordered appropriate for the patient?   Yes       Is the patient currently in pain?   No       Has the patient been assessed within the past 6 months for depression?   No       Has the patient missed any days of school, work, or other routine activity?   No       Since the last time we talked, have you been hospitalized or used the emergency room?   No         Elisabet Jennings, PharmD, BCPS  Hematology/Oncology Clinical Pharmacist  Oral Chemotherapy Monitoring Program  [facility]  [phone]

## 2024-03-18 PROBLEM — M54.2 NECK PAIN: Status: RESOLVED | Noted: 2023-10-12 | Resolved: 2024-03-18

## 2024-03-18 NOTE — PROGRESS NOTES
DISCHARGE  Reason for Discharge: Patient has failed to schedule further appointments.    Equipment Issued: home exercise routine    Discharge Plan: Patient to continue home program.    Referring Provider:  Rayne Moffett NP       11/27/23 0500   Appointment Info   Signing clinician's name / credentials Delfino Houston DPT   Total/Authorized Visits 6   Visits Used 5   Medical Diagnosis cervical radic   PT Tx Diagnosis L cervical radic   Progress Note/Certification   Onset of illness/injury or Date of Surgery 07/12/23   Therapy Frequency 1x/ week   Predicted Duration 6 week   PT Goal 1   Goal Identifier neck   Goal Description Pt able to drive without neck/ arm sx   Rationale to maximize safety and independence with performance of ADLs and functional tasks;to maximize safety and independence within the home;to maximize safety and independence with transportation;to maximize safety and independence within the community;to maximize safety and independence with self cares   Goal Progress no changes   Target Date 11/23/23   Subjective Report   Subjective Report The patient presents to the clinic today noting that he continues to feel his familiar luca and shoulder pain. Notes that the numbness in his hand has been improving. Reports good adherence to his home exercise program. Is very motivated for his upcoming injection as he has experienced success with injections in the past.   Objective Measures   Objective Measures Objective Measure 1   Objective Measure 1   Objective Measure Cervical AROM   Details pain with end range left rotation, left side bend, extension. Stretching sensation with right rotation, right side bend, flexion   Treatment Interventions (PT)   Interventions Therapeutic Procedure/Exercise;Manual Therapy   Therapeutic Procedure/Exercise   Therapeutic Procedures: strength, endurance, ROM, flexibility minutes (50459) 16   Therapeutic Procedures Ther Proc 2;Ther Proc 3;Ther Proc 4   Ther Proc 1  UBE x 4 min   Ther Proc 1 - Details FW/ BW for warm up - L arm tingling with ex   Ther Proc 2 Upper trapezius stretch   Ther Proc 2 - Details 3x10s holds   Ther Proc 3 Scalenes stretch   Ther Proc 3 - Details 3x10s holds   PTRx Ther Proc 1 Shoulder Rolls   PTRx Ther Proc 1 - Details x 10 in clinic for review   PTRx Ther Proc 2 Scapular Retraction/Depression   PTRx Ther Proc 2 - Details x 10 in clinic - correct demonstration today without elevating shoulders   PTRx Ther Proc 3 Levator Scapulae Stretch   PTRx Ther Proc 3 - Details 3 x 10 sec   PTRx Ther Proc 4 Cervical Isometric Extension   PTRx Ther Proc 4 - Details [id number] x 5 sec ; also done x 10 sitting against PT resistance   Skilled Intervention review of HEP, verbal cuing given for form correction   Patient Response/Progress the patient notes he will perform his exercises until his injection   Ther Proc 4 Cross body horizontal adduction stretch   Ther Proc 4 - Details with combined trunk rotation and cervical flexion   Manual Therapy   Manual Therapy: Mobilization, MFR, MLD, friction massage minutes (25042) 15   Manual Therapy Manual Therapy 2   Manual Therapy 1 cervical distraction supine   Manual Therapy 1 - Details 30 sec holds with varying intensity for pain relief   Manual Therapy 2 STM L UT/ rhomboid   Manual Therapy 2 - Details area of hypertonicity in rhomboid for pain relief   Skilled Intervention MT for pain relief   Patient Response/Progress decrease in tonicity with consistent mobilization   Education   Learner/Method Patient;Demonstration   Plan   Home program continue with HEP   Plan for next session assess response to injection, adjust HEP as needed, assess readiness for discharge   Total Session Time   Timed Code Treatment Minutes 31   Total Treatment Time (sum of timed and untimed services) 31

## 2024-03-21 ENCOUNTER — OFFICE VISIT (OUTPATIENT)
Dept: FAMILY MEDICINE | Facility: CLINIC | Age: 59
End: 2024-03-21
Payer: COMMERCIAL

## 2024-03-21 VITALS
HEIGHT: 72 IN | HEART RATE: 76 BPM | BODY MASS INDEX: 29.93 KG/M2 | RESPIRATION RATE: 20 BRPM | SYSTOLIC BLOOD PRESSURE: 119 MMHG | DIASTOLIC BLOOD PRESSURE: 80 MMHG | WEIGHT: 221 LBS | TEMPERATURE: 97.6 F | OXYGEN SATURATION: 99 %

## 2024-03-21 DIAGNOSIS — Z12.5 SCREENING FOR PROSTATE CANCER: ICD-10-CM

## 2024-03-21 DIAGNOSIS — E78.5 HYPERLIPIDEMIA, UNSPECIFIED HYPERLIPIDEMIA TYPE: ICD-10-CM

## 2024-03-21 DIAGNOSIS — Z13.1 SCREENING FOR DIABETES MELLITUS: ICD-10-CM

## 2024-03-21 DIAGNOSIS — Z13.21 ENCOUNTER FOR VITAMIN DEFICIENCY SCREENING: ICD-10-CM

## 2024-03-21 DIAGNOSIS — Z00.00 ROUTINE GENERAL MEDICAL EXAMINATION AT A HEALTH CARE FACILITY: Primary | ICD-10-CM

## 2024-03-21 PROCEDURE — 99396 PREV VISIT EST AGE 40-64: CPT | Performed by: INTERNAL MEDICINE

## 2024-03-21 SDOH — HEALTH STABILITY: PHYSICAL HEALTH: ON AVERAGE, HOW MANY MINUTES DO YOU ENGAGE IN EXERCISE AT THIS LEVEL?: 30 MIN

## 2024-03-21 SDOH — HEALTH STABILITY: PHYSICAL HEALTH: ON AVERAGE, HOW MANY DAYS PER WEEK DO YOU ENGAGE IN MODERATE TO STRENUOUS EXERCISE (LIKE A BRISK WALK)?: 3 DAYS

## 2024-03-21 ASSESSMENT — SOCIAL DETERMINANTS OF HEALTH (SDOH): HOW OFTEN DO YOU GET TOGETHER WITH FRIENDS OR RELATIVES?: THREE TIMES A WEEK

## 2024-03-21 ASSESSMENT — PAIN SCALES - GENERAL: PAINLEVEL: NO PAIN (0)

## 2024-03-21 NOTE — PROGRESS NOTES
Preventive Care Visit  [facility] AD Ribeiro MD, Internal Medicine  Mar 21, 2024      Assessment & Plan     Routine general medical examination at a health care facility  He is up-to-date with colonoscopy  Discussed about shingles vaccine and he is going to look into it  Declined hepatitis B vaccine    Hyperlipidemia, unspecified hyperlipidemia type  Already on Lipitor  - Lipid panel reflex to direct LDL Fasting; Future    Screening for diabetes mellitus    - Hemoglobin A1c; Future    Encounter for vitamin deficiency screening  He is complaining of some nonspecific myalgias  Only started after the surgery last year  They are across the body not in the proximal muscles  Check vitamin D  - Vitamin D deficiency screening; Future    Screening for prostate cancer  No LUTS symptoms  - PSA, screen; Future        30 minutes spent by me on the date of the encounter doing chart review, history and exam, documentation and further activities per the note      BMI  Estimated body mass index is 29.97 kg/m  as calculated from the following:    Height as of this encounter: 1.829 m (6').    Weight as of this encounter: 100.2 kg (221 lb).   Weight management plan: Discussed healthy diet and exercise guidelines    Counseling  Appropriate preventive services were discussed with this patient, including applicable screening as appropriate for fall prevention, nutrition, physical activity, Tobacco-use cessation, weight loss and cognition.  Checklist reviewing preventive services available has been given to the patient.  Reviewed patient's diet, addressing concerns and/or questions.   He is at risk for lack of exercise and has been provided with information to increase physical activity for the benefit of his well-being.           Honorio Plunkett is a 59 year old, presenting for the following:  Physical        3/21/2024     2:13 PM   Additional Questions   Roomed by hailey dolan   Accompanied by ariella          3/21/2024     2:13 PM   Patient Reported Additional Medications   Patient reports taking the following new medications none        Health Care Directive  Patient does not have a Health Care Directive or Living Will: Discussed advance care planning with patient; however, patient declined at this time.    HPI  Here for annual physical            3/21/2024   General Health   How would you rate your overall physical health? Good   Feel stress (tense, anxious, or unable to sleep) Not at all         3/21/2024   Nutrition   Three or more servings of calcium each day? (!) NO   Diet: Regular (no restrictions)   How many servings of fruit and vegetables per day? (!) 2-3   How many sweetened beverages each day? 0-1         3/21/2024   Exercise   Days per week of moderate/strenous exercise 3 days   Average minutes spent exercising at this level 30 min         3/21/2024   Social Factors   Frequency of gathering with friends or relatives Three times a week   Worry food won't last until get money to buy more No   Food not last or not have enough money for food? No   Do you have housing?  Yes   Are you worried about losing your housing? No   Lack of transportation? No   Unable to get utilities (heat,electricity)? No         3/21/2024   Dental   Dentist two times every year? Yes         3/21/2024   TB Screening   Were you born outside of the [place]? No         Today's PHQ-2 Score:       3/21/2024     9:03 AM   PHQ-2 ( 1999 [employer])   Q1: Little interest or pleasure in doing things 0   Q2: Feeling down, depressed or hopeless 0   PHQ-2 Score 0   Q1: Little interest or pleasure in doing things Not at all   Q2: Feeling down, depressed or hopeless Not at all   PHQ-2 Score 0           3/21/2024   Substance Use   Alcohol more than 3/day or more than 7/wk No   Do you use any other substances recreationally? No     Social History     Tobacco Use    Smoking status: Former     Types: Cigarettes     Quit date: 2000     Years since quitting:  "24.2     Passive exposure: Past    Smokeless tobacco: Never   Vaping Use    Vaping Use: Never used   Substance Use Topics    Alcohol use: Not Currently    Drug use: Not Currently           3/21/2024   STI Screening   New sexual partner(s) since last STI/HIV test? No   Last PSA: No results found for: \"PSA\"  ASCVD Risk   The 10-year ASCVD risk score (Mayra KEMP, et al., 2019) is: 11.1%    Values used to calculate the score:      Age: 59 years      Sex: Male      Is Non- : Yes      Diabetic: No      Tobacco smoker: No      Systolic Blood Pressure: 119 mmHg      Is BP treated: Yes      HDL Cholesterol: 42 mg/dL      Total Cholesterol: 156 mg/dL           Reviewed and updated as needed this visit by Provider                          Review of Systems  Constitutional, neuro, ENT, endocrine, pulmonary, cardiac, gastrointestinal, genitourinary, musculoskeletal, integument and psychiatric systems are negative, except as otherwise noted.     Objective    Exam  /80   Pulse 76   Temp 97.6  F (36.4  C) (Oral)   Resp 20   Ht 1.829 m (6')   Wt 100.2 kg (221 lb)   SpO2 99%   BMI 29.97 kg/m     Estimated body mass index is 29.97 kg/m  as calculated from the following:    Height as of this encounter: 1.829 m (6').    Weight as of this encounter: 100.2 kg (221 lb).    Physical Exam  GENERAL: alert and no distress  EYES: Eyes grossly normal to inspection, PERRL and conjunctivae and sclerae normal  HENT: ear canals and TM's normal, nose and mouth without ulcers or lesions  NECK: no adenopathy, no asymmetry, masses, or scars  RESP: lungs clear to auscultation - no rales, rhonchi or wheezes  CV: regular rate and rhythm, normal S1 S2, no S3 or S4, no murmur, click or rub, no peripheral edema  ABDOMEN: Multiple well-healed scars on the abdomen  MS: no gross musculoskeletal defects noted, no edema  SKIN: no suspicious lesions or rashes  NEURO: Normal strength and tone, mentation intact and speech " normal  PSYCH: mentation appears normal, affect normal/bright        Signed Electronically by: Christopher Ribeiro MD

## 2024-03-25 DIAGNOSIS — N18.32 ANEMIA IN STAGE 3B CHRONIC KIDNEY DISEASE (H): ICD-10-CM

## 2024-03-25 DIAGNOSIS — I12.9 HYPERTENSION, RENAL: ICD-10-CM

## 2024-03-25 DIAGNOSIS — D63.1 ANEMIA IN STAGE 3B CHRONIC KIDNEY DISEASE (H): ICD-10-CM

## 2024-03-25 DIAGNOSIS — E03.4 HYPOTHYROIDISM DUE TO ACQUIRED ATROPHY OF THYROID: ICD-10-CM

## 2024-03-25 RX ORDER — METOPROLOL SUCCINATE 50 MG/1
50 TABLET, EXTENDED RELEASE ORAL DAILY
Qty: 90 TABLET | Refills: 3 | Status: SHIPPED | OUTPATIENT
Start: 2024-03-25

## 2024-03-25 RX ORDER — AMLODIPINE BESYLATE 10 MG/1
10 TABLET ORAL DAILY
Qty: 90 TABLET | Refills: 3 | Status: SHIPPED | OUTPATIENT
Start: 2024-03-25

## 2024-03-25 NOTE — TELEPHONE ENCOUNTER
Nephrology Note: Medication Refill Request    Medication Refill Request:     Medication/Dose/Frequency: Metoprolol 50mg and amlodipine 10mg  Preferred Pharmacy: [facility]  Provider:      Dr. Alan                     SITUATION/BACKROUND:                 Last office visit: 1/24/2024        Future office visit: 4/29/2024     ASSESSMENT:     Neph Assessments:    Recent Labs:  CBC Results:  Recent Labs   Lab Test 02/26/24  0824   WBC 6.4   RBC 4.88   HGB 12.8*   HCT 41.4   MCV 85   MCH 26.2*   MCHC 30.9*   RDW 17.4*        Last Renal Panel:  Sodium   Date Value Ref Range Status   02/26/2024 143 135 - 145 mmol/L Final     Comment:     Reference intervals for this test were updated on 09/26/2023 to more accurately reflect our healthy population. There may be differences in the flagging of prior results with similar values performed with this method. Interpretation of those prior results can be made in the context of the updated reference intervals.      Potassium   Date Value Ref Range Status   02/26/2024 4.0 3.4 - 5.3 mmol/L Final   01/02/2023 3.7 3.4 - 5.3 mmol/L Final     Chloride   Date Value Ref Range Status   02/26/2024 104 98 - 107 mmol/L Final   01/02/2023 109 94 - 109 mmol/L Final     Carbon Dioxide (CO2)   Date Value Ref Range Status   02/26/2024 28 22 - 29 mmol/L Final   01/02/2023 35 (H) 20 - 32 mmol/L Final     Anion Gap   Date Value Ref Range Status   02/26/2024 11 7 - 15 mmol/L Final   01/02/2023 <1 (L) 3 - 14 mmol/L Final     Glucose   Date Value Ref Range Status   02/26/2024 126 (H) 70 - 99 mg/dL Final   01/02/2023 96 70 - 99 mg/dL Final     GLUCOSE BY METER POCT   Date Value Ref Range Status   08/12/2023 126 (H) 70 - 99 mg/dL Final     Urea Nitrogen   Date Value Ref Range Status   02/26/2024 17.1 6.0 - 20.0 mg/dL Final   01/02/2023 16 7 - 30 mg/dL Final     Creatinine   Date Value Ref Range Status   02/26/2024 1.82 (H) 0.67 - 1.17 mg/dL Final     Creatinine POCT   Date Value Ref  Range Status   02/26/2024 1.9 (H) 0.7 - 1.3 mg/dL Final     GFR Estimate   Date Value Ref Range Status   02/26/2024 43 (L) >60 mL/min/1.73m2 Final     GFR, ESTIMATED POCT   Date Value Ref Range Status   02/26/2024 40 (L) >60 mL/min/1.73m2 Final     Calcium   Date Value Ref Range Status   02/26/2024 9.6 8.6 - 10.0 mg/dL Final     Phosphorus   Date Value Ref Range Status   08/12/2023 4.3 2.5 - 4.5 mg/dL Final     Albumin   Date Value Ref Range Status   02/26/2024 4.4 3.5 - 5.2 g/dL Final   01/02/2023 4.0 3.4 - 5.0 g/dL Final       Current Medication List:   Current Outpatient Medications (Antihypertensive, Cardiovascular, Diuretics, Beta blockers, Calcium blockers, Anticoagulants)   Medication Sig    amLODIPine (NORVASC) 5 MG tablet Take 2 tablets (10 mg) by mouth daily for 90 days    metoprolol succinate ER (TOPROL XL) 50 MG 24 hr tablet Take 1 tablet (50 mg) by mouth daily    tadalafil (CIALIS) 10 MG tablet Take 1 tablet (10 mg) by mouth daily as needed (ED) 10 mg as a single dose 30 minutes prior to anticipated sexual activity; do not take more than once daily.     Current Outpatient Medications (Other)   Medication Sig    acetaminophen (TYLENOL) 500 MG tablet Take 500-1,000 mg by mouth every 8 hours as needed for mild pain    acyclovir (ZOVIRAX) 400 MG tablet Take 1 tablet (400 mg) by mouth every 8 hours    atorvastatin (LIPITOR) 20 MG tablet TAKE 1 TABLET BY MOUTH EVERY DAY    levothyroxine (SYNTHROID/LEVOTHROID) 137 MCG tablet Take 1 tablet (137 mcg) by mouth daily    lifitegrast (XIIDRA) 5 % opthalmic solution Place 1 drop into both eyes 2 times daily    nortriptyline (PAMELOR) 10 MG capsule TAKE 1 CAPSULE BY MOUTH EVERYDAY AT BEDTIME    omeprazole (PRILOSEC) 40 MG DR capsule TAKE 1 CAPSULE BY MOUTH EVERY DAY    rizatriptan (MAXALT-MLT) 10 MG ODT Take 1 tablet (10 mg) by mouth as needed for migraine symptoms persist or return, may repeat dose after 2 hours. The 's labeling recommends a maximum daily  dose of 30 mg per 24 hours;       Has patient had kidney transplant in the prior 1 year: no.   Has patient been seen the last 12 months: Yes.  Associated labs reviewed for medication: N/A    PLAN:     Medication refilled per protocol: Yes    MARIBEL GARCIA RN

## 2024-03-26 RX ORDER — LEVOTHYROXINE SODIUM 137 UG/1
137 TABLET ORAL DAILY
Qty: 90 TABLET | Refills: 1 | Status: SHIPPED | OUTPATIENT
Start: 2024-03-26

## 2024-04-29 ENCOUNTER — OFFICE VISIT (OUTPATIENT)
Dept: NEPHROLOGY | Facility: CLINIC | Age: 59
End: 2024-04-29
Attending: INTERNAL MEDICINE
Payer: COMMERCIAL

## 2024-04-29 ENCOUNTER — LAB (OUTPATIENT)
Dept: LAB | Facility: CLINIC | Age: 59
End: 2024-04-29
Attending: INTERNAL MEDICINE
Payer: COMMERCIAL

## 2024-04-29 VITALS
SYSTOLIC BLOOD PRESSURE: 126 MMHG | HEART RATE: 72 BPM | BODY MASS INDEX: 29.89 KG/M2 | HEIGHT: 72 IN | DIASTOLIC BLOOD PRESSURE: 86 MMHG | WEIGHT: 220.7 LBS

## 2024-04-29 DIAGNOSIS — N18.32 ANEMIA IN STAGE 3B CHRONIC KIDNEY DISEASE (H): ICD-10-CM

## 2024-04-29 DIAGNOSIS — Z13.21 ENCOUNTER FOR VITAMIN DEFICIENCY SCREENING: ICD-10-CM

## 2024-04-29 DIAGNOSIS — Z12.5 SCREENING FOR PROSTATE CANCER: ICD-10-CM

## 2024-04-29 DIAGNOSIS — D63.1 ANEMIA IN STAGE 3B CHRONIC KIDNEY DISEASE (H): ICD-10-CM

## 2024-04-29 DIAGNOSIS — E78.5 HYPERLIPIDEMIA, UNSPECIFIED HYPERLIPIDEMIA TYPE: ICD-10-CM

## 2024-04-29 DIAGNOSIS — N18.4 CKD (CHRONIC KIDNEY DISEASE) STAGE 4, GFR 15-29 ML/MIN (H): Primary | ICD-10-CM

## 2024-04-29 DIAGNOSIS — Z13.1 SCREENING FOR DIABETES MELLITUS: ICD-10-CM

## 2024-04-29 LAB
ALBUMIN MFR UR ELPH: 16 MG/DL
ALBUMIN SERPL BCG-MCNC: 4.3 G/DL (ref 3.5–5.2)
ALBUMIN UR-MCNC: 10 MG/DL
ALP SERPL-CCNC: 106 U/L (ref 40–150)
ALT SERPL W P-5'-P-CCNC: 12 U/L (ref 0–70)
ANION GAP SERPL CALCULATED.3IONS-SCNC: 10 MMOL/L (ref 7–15)
APPEARANCE UR: CLEAR
AST SERPL W P-5'-P-CCNC: 20 U/L (ref 0–45)
BILIRUB SERPL-MCNC: 0.2 MG/DL
BILIRUB UR QL STRIP: NEGATIVE
BUN SERPL-MCNC: 14.8 MG/DL (ref 8–23)
CALCIUM SERPL-MCNC: 9.5 MG/DL (ref 8.6–10)
CHLORIDE SERPL-SCNC: 104 MMOL/L (ref 98–107)
CHOLEST SERPL-MCNC: 139 MG/DL
COLOR UR AUTO: ABNORMAL
CREAT SERPL-MCNC: 1.67 MG/DL (ref 0.67–1.17)
CREAT UR-MCNC: 237 MG/DL
CREAT UR-MCNC: 237 MG/DL
DEPRECATED HCO3 PLAS-SCNC: 27 MMOL/L (ref 22–29)
EGFRCR SERPLBLD CKD-EPI 2021: 47 ML/MIN/1.73M2
ERYTHROCYTE [DISTWIDTH] IN BLOOD BY AUTOMATED COUNT: 15.4 % (ref 10–15)
FASTING STATUS PATIENT QL REPORTED: NO
GLUCOSE SERPL-MCNC: 99 MG/DL (ref 70–99)
GLUCOSE UR STRIP-MCNC: NEGATIVE MG/DL
HBA1C MFR BLD: 6.3 %
HCT VFR BLD AUTO: 41.4 % (ref 40–53)
HDLC SERPL-MCNC: 36 MG/DL
HGB BLD-MCNC: 13.3 G/DL (ref 13.3–17.7)
HGB UR QL STRIP: ABNORMAL
KETONES UR STRIP-MCNC: NEGATIVE MG/DL
LDLC SERPL CALC-MCNC: 76 MG/DL
LEUKOCYTE ESTERASE UR QL STRIP: NEGATIVE
MCH RBC QN AUTO: 27.1 PG (ref 26.5–33)
MCHC RBC AUTO-ENTMCNC: 32.1 G/DL (ref 31.5–36.5)
MCV RBC AUTO: 84 FL (ref 78–100)
MICROALBUMIN UR-MCNC: <12 MG/L
MICROALBUMIN/CREAT UR: NORMAL MG/G{CREAT}
MUCOUS THREADS #/AREA URNS LPF: PRESENT /LPF
NITRATE UR QL: NEGATIVE
NONHDLC SERPL-MCNC: 103 MG/DL
PH UR STRIP: 6.5 [PH] (ref 5–7)
PLATELET # BLD AUTO: 275 10E3/UL (ref 150–450)
POTASSIUM SERPL-SCNC: 4.1 MMOL/L (ref 3.4–5.3)
PROT SERPL-MCNC: 7.2 G/DL (ref 6.4–8.3)
PROT/CREAT 24H UR: 0.07 MG/MG CR (ref 0–0.2)
PSA SERPL DL<=0.01 NG/ML-MCNC: 3.21 NG/ML (ref 0–3.5)
RBC # BLD AUTO: 4.91 10E6/UL (ref 4.4–5.9)
RBC URINE: 3 /HPF
SODIUM SERPL-SCNC: 141 MMOL/L (ref 135–145)
SP GR UR STRIP: 1.01 (ref 1–1.03)
TRIGL SERPL-MCNC: 137 MG/DL
UROBILINOGEN UR STRIP-MCNC: NORMAL MG/DL
VIT D+METAB SERPL-MCNC: 27 NG/ML (ref 20–50)
WBC # BLD AUTO: 6.5 10E3/UL (ref 4–11)
WBC URINE: 1 /HPF

## 2024-04-29 PROCEDURE — 84156 ASSAY OF PROTEIN URINE: CPT | Performed by: PATHOLOGY

## 2024-04-29 PROCEDURE — G0103 PSA SCREENING: HCPCS | Performed by: PATHOLOGY

## 2024-04-29 PROCEDURE — 99214 OFFICE O/P EST MOD 30 MIN: CPT | Performed by: INTERNAL MEDICINE

## 2024-04-29 PROCEDURE — 85027 COMPLETE CBC AUTOMATED: CPT | Performed by: PATHOLOGY

## 2024-04-29 PROCEDURE — 99000 SPECIMEN HANDLING OFFICE-LAB: CPT | Performed by: PATHOLOGY

## 2024-04-29 PROCEDURE — 83036 HEMOGLOBIN GLYCOSYLATED A1C: CPT | Performed by: INTERNAL MEDICINE

## 2024-04-29 PROCEDURE — 36415 COLL VENOUS BLD VENIPUNCTURE: CPT | Performed by: PATHOLOGY

## 2024-04-29 PROCEDURE — 82306 VITAMIN D 25 HYDROXY: CPT | Performed by: INTERNAL MEDICINE

## 2024-04-29 PROCEDURE — 81001 URINALYSIS AUTO W/SCOPE: CPT | Performed by: PATHOLOGY

## 2024-04-29 PROCEDURE — 80061 LIPID PANEL: CPT | Performed by: PATHOLOGY

## 2024-04-29 PROCEDURE — 99213 OFFICE O/P EST LOW 20 MIN: CPT | Performed by: INTERNAL MEDICINE

## 2024-04-29 PROCEDURE — 82570 ASSAY OF URINE CREATININE: CPT | Performed by: INTERNAL MEDICINE

## 2024-04-29 PROCEDURE — 80053 COMPREHEN METABOLIC PANEL: CPT | Performed by: PATHOLOGY

## 2024-04-29 ASSESSMENT — PAIN SCALES - GENERAL: PAINLEVEL: NO PAIN (0)
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
## 2024-04-29 NOTE — PATIENT INSTRUCTIONS
-Please start empagliflozin 10 mg daily and do labs two weeks later  -Please return to clinic in 4 months with repeat labs

## 2024-04-29 NOTE — NURSING NOTE
Chief Complaint   Patient presents with    RECHECK     Follow up with CKD       Ht 1.829 m (6')   Wt 100.1 kg (220 lb 11.2 oz)   BMI 29.93 kg/m    Lisbet Brunson, Lead CMA  4/29/2024 8:02 AM
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
## 2024-04-30 ENCOUNTER — TELEPHONE (OUTPATIENT)
Dept: NEPHROLOGY | Facility: CLINIC | Age: 59
End: 2024-04-30
Payer: COMMERCIAL

## 2024-04-30 NOTE — TELEPHONE ENCOUNTER
Prior Authorization Retail Medication Request    Medication/Dose: Jardiance 10 mg tablet through [facility] [address]  Diagnosis and ICD code (if different than what is on RX):  N18.4  New/renewal/insurance change PA/secondary ins. PA:  Previously Tried and Failed:  n/a  Rationale:  Kidney protective agent for chronic kidney disease    Insurance   Primary: [employer]  Insurance ID:  [id number]    Secondary (if applicable):n/a  Insurance ID:  n/a    Pharmacy Information (if different than what is on RX)  Name:  [facility]   Phone:  [phone]  Fax:[phone]

## 2024-05-09 ENCOUNTER — TELEPHONE (OUTPATIENT)
Dept: FAMILY MEDICINE | Facility: CLINIC | Age: 59
End: 2024-05-09
Payer: COMMERCIAL

## 2024-05-09 NOTE — TELEPHONE ENCOUNTER
Patient calling with follow up questions to his lab results from PCP message below.           RN and patient discussed what is A1c testing, does not need to be fasting, and that he is within the prediabetic range. Patient would like to know from provider more specific recommendations for his prediabetes.     Patient was told to watch his salt intake from nephrology.     What specific diet recommendations does PCP have to help lower his A1c?   What specific exercise recommendations to help lower his A1c?  Could provider elaborate how Jardiance will help his A1c? Was prescribed by nephrology doctor and he was told it may help him lose weight.     Routing to provider to review and advise.     COTY GarridoN, RN, PHN  [facility]

## 2024-05-09 NOTE — TELEPHONE ENCOUNTER
RN called patient and relayed message from provider below. He agrees with plan and is scheduled Monday, 5/13/24 at 5 pm for a telephone visit to further discuss labs and plan.    Michelle Sabillon RN, BSN, PHN  [facility]  Nurse Triage, [facility]

## 2024-05-09 NOTE — TELEPHONE ENCOUNTER
I am happy to discuss all these in detail  Please call patient and ask him to schedule a telephone appointment with me and we will discuss all this at length

## 2024-05-13 ENCOUNTER — VIRTUAL VISIT (OUTPATIENT)
Dept: FAMILY MEDICINE | Facility: CLINIC | Age: 59
End: 2024-05-13
Payer: COMMERCIAL

## 2024-05-13 DIAGNOSIS — R73.03 PREDIABETES: Primary | ICD-10-CM

## 2024-05-13 DIAGNOSIS — N18.31 CHRONIC KIDNEY DISEASE, STAGE 3A (H): ICD-10-CM

## 2024-05-13 PROCEDURE — 99213 OFFICE O/P EST LOW 20 MIN: CPT | Mod: 93 | Performed by: INTERNAL MEDICINE

## 2024-05-13 NOTE — PROGRESS NOTES
"    Instructions Relayed to Patient by Virtual Roomer:     Patient is active on Web Won[employer]:   Relayed following to patient: \"It looks like you are active on [employer] Won[employer], are you able to join the visit this way? If not, do you need us to send you a link now or would you like your provider to send a link via text or email when they are ready to initiate the visit?\"      Patient Confirmed they will join visit via: Provider to call patient for telephone visit   Reminded patient to ensure they were logged on to virtual visit by arrival time listed.   Asked if patient has flexibility to initiate visit sooner than arrival time: patient is unable to initiate visit earlier than arrival time     If pediatric virtual visit, ensured pediatric patient along with parent/guardian will be present for video visit.     Patient offered the website [email or web address] and/or phone number to Web Wonks Help line: [phone]    Dimitrios is a 59 year old who is being evaluated via a billable telephone visit.    What phone number would you like to be contacted at? [phone]  How would you like to obtain your AVS? Grows Up  Originating Location (pt. Location): Home    Distant Location (provider location):  Off-site    Assessment & Plan     Prediabetes  He was found to have prediabetes  A1c was 6.3  It was 5.8 in the past  I discussed about diet and exercise  In particular we discussed about reducing calories and increasing protein in the diet  Also discussed about aerobic exercise  He should do at least 180 minutes a week  I discussed with him that we can recheck A1c in 3 months again  Also informed him that he should do some strength building exercise and build up muscle as this will act as a sink for the glucose  Informed him that if his insurance covers for a nutritionist I can certainly make a referral  Of note he has been started on Jardiance for his CKD by the nephrologist and this is also help with his blood sugar  - " Hemoglobin A1c; Future    Chronic kidney disease, stage 3a (H)  Has been started on Jardiance for his CKD  Blood pressure is under good control      25 minutes spent by me on the date of the encounter doing chart review, history and exam, documentation and further activities per the note            Subjective   Dimitrios is a 59 year old, presenting for the following health issues:  Results      5/13/2024     4:00 PM   Additional Questions   Roomed by stefan   Accompanied by self     History of Present Illness       Reason for visit:  Follow up    He eats 2-3 servings of fruits and vegetables daily.He consumes 1 sweetened beverage(s) daily.He exercises with enough effort to increase his heart rate 60 or more minutes per day.  He exercises with enough effort to increase his heart rate 3 or less days per week.   He is taking medications regularly.     Pt wants to get results from lab work done and see what are the next steps after that            Review of Systems  Constitutional, HEENT, cardiovascular, pulmonary, gi and gu systems are negative, except as otherwise noted.      Objective           Vitals:  No vitals were obtained today due to virtual visit.    Physical Exam   General: Alert and no distress //Respiratory: No audible wheeze, cough, or shortness of breath // Psychiatric:  Appropriate affect, tone, and pace of words            Phone call duration: 10 minutes  Signed Electronically by: Christopher Ribeiro MD

## 2024-05-14 NOTE — TELEPHONE ENCOUNTER
PA Initiation    Medication: JARDIANCE 10 MG PO TABS  Insurance Company: [employer] Non-Specialty PA's - Phone [phone] Fax [phone]  Ph[facility]illing the [id number] [place] [facility] - [place], [place] - [address]  Filling Pharmacy Phone: [phone]  Filling Pharmacy Fax: [phone]  Start Date: 5/14/2024

## 2024-05-15 NOTE — TELEPHONE ENCOUNTER
Prior Authorization Not Needed per Insurance    Medication: JARDIANCE 10 MG PO TABS  Insurance Company: [employer]-Specialty PA's - Phone [phone] Fax [phone]  Expected CoPay: $    [id number] [place] [address]  Pharmacy Notified: YES  Patient Notified: **Instructed pharmacy to notify patient when script is ready to /ship.**

## 2024-06-02 DIAGNOSIS — B00.9 RECURRENT HERPES SIMPLEX: ICD-10-CM

## 2024-06-03 RX ORDER — ACYCLOVIR 400 MG/1
400 TABLET ORAL EVERY 8 HOURS
Qty: 90 TABLET | Refills: 3 | Status: SHIPPED | OUTPATIENT
Start: 2024-06-03

## 2024-06-06 ENCOUNTER — TELEPHONE (OUTPATIENT)
Dept: NEPHROLOGY | Facility: CLINIC | Age: 59
End: 2024-06-06
Payer: COMMERCIAL

## 2024-06-06 NOTE — TELEPHONE ENCOUNTER
Left Voicemail (1st Attempt) and Sent Mychart (1st Attempt) for the patient to schedule:    Appointment type: Return Nephrology  Provider: Dr. Alan   Return date: Approx. 8/29  Phone number: [phone]  Add'l appt(s) needed: Lab 1-hour prior to clinic visit (or 1-week if video visit)

## 2024-06-10 ENCOUNTER — VIRTUAL VISIT (OUTPATIENT)
Dept: FAMILY MEDICINE | Facility: CLINIC | Age: 59
End: 2024-06-10
Payer: COMMERCIAL

## 2024-06-10 DIAGNOSIS — N18.31 CHRONIC KIDNEY DISEASE, STAGE 3A (H): ICD-10-CM

## 2024-06-10 DIAGNOSIS — N52.9 ERECTILE DYSFUNCTION, UNSPECIFIED ERECTILE DYSFUNCTION TYPE: Primary | ICD-10-CM

## 2024-06-10 PROCEDURE — 99213 OFFICE O/P EST LOW 20 MIN: CPT | Mod: 95 | Performed by: INTERNAL MEDICINE

## 2024-06-10 RX ORDER — VARDENAFIL HYDROCHLORIDE 20 MG/1
10 TABLET ORAL DAILY PRN
Qty: 10 TABLET | Refills: 1 | Status: SHIPPED | OUTPATIENT
Start: 2024-06-10

## 2024-06-10 NOTE — PROGRESS NOTES
"    Instructions Relayed to Patient by Virtual Roomer:     Patient is active on OneDoc:   Relayed following to patient: \"It looks like you are active on OneDoc, are you able to join the visit this way? If not, do you need us to send you a link now or would you like your provider to send a link via text or email when they are ready to initiate the visit?\"      Patient Confirmed they will join visit via: TRANSCORP  Reminded patient to ensure they were logged on to virtual visit by arrival time listed.   Asked if patient has flexibility to initiate visit sooner than arrival time: patient is unable to initiate visit earlier than arrival time     If pediatric virtual visit, ensured pediatric patient along with parent/guardian will be present for video visit.     Patient offered the website [email or web address] and/or phone number to OneDoc Help line: [phone]      Dimitrios is a 59 year old who is being evaluated via a billable video visit.    How would you like to obtain your AVS? TRANSCORP  If the video visit is dropped, the invitation should be resent by: Text to cell phone: [phone]  Will anyone else be joining your video visit? No      Assessment & Plan     Erectile dysfunction, unspecified erectile dysfunction type  He is currently taking Cialis for this  However it is not working  He did take Viagra in the past but that caused him to have headache  He wants to explore  any other options  We discussed about ;Levitra  We discussed about taking 10 mg daily and increasing the dose to 20 mg if needed  - vardenafil (LEVITRA) 20 MG tablet; Take 0.5 tablets (10 mg) by mouth daily as needed (Erectile Dysfunction)    Chronic kidney disease, stage 3a (H)   baseline creatinine around 1.6-1.8  Blood pressure stable                Subjective   Dimitrios is a 59 year old, presenting for the following health issues:  No chief complaint on file.        6/10/2024    11:17 AM   Additional Questions   Roomed by Lulu AN"   Accompanied by Self     History of Present Illness       Reason for visit:  Follow up    He eats 2-3 servings of fruits and vegetables daily.He consumes 1 sweetened beverage(s) daily.He exercises with enough effort to increase his heart rate 30 to 60 minutes per day.  He exercises with enough effort to increase his heart rate 5 days per week.   He is taking medications regularly.       Medication Followup of Tadalafil   Taking Medication as prescribed: yes  Side Effects:  None  Medication Helping Symptoms:  NO-Medication is not helping with Erectile dysfunction and pt would like to switch back to Viagra        Review of Systems  Constitutional, HEENT, cardiovascular, pulmonary, gi and gu systems are negative, except as otherwise noted.      Objective           Vitals:  No vitals were obtained today due to virtual visit.    Physical Exam   GENERAL: alert and no distress  EYES: Eyes grossly normal to inspection.  No discharge or erythema, or obvious scleral/conjunctival abnormalities.  RESP: No audible wheeze, cough, or visible cyanosis.    SKIN: Visible skin clear. No significant rash, abnormal pigmentation or lesions.  NEURO: Cranial nerves grossly intact.  Mentation and speech appropriate for age.  PSYCH: Appropriate affect, tone, and pace of words          Video-Visit Details    Type of service:  Video Visit   Originating Location (pt. Location): Home    Distant Location (provider location):  Off-site  Platform used for Video Visit: Brandee  Signed Electronically by: Christopher Ribeiro MD

## 2024-06-13 ENCOUNTER — LAB (OUTPATIENT)
Dept: LAB | Facility: CLINIC | Age: 59
End: 2024-06-13
Payer: COMMERCIAL

## 2024-06-13 ENCOUNTER — ANCILLARY PROCEDURE (OUTPATIENT)
Dept: CT IMAGING | Facility: CLINIC | Age: 59
End: 2024-06-13
Attending: INTERNAL MEDICINE
Payer: COMMERCIAL

## 2024-06-13 DIAGNOSIS — C64.1 PRIMARY MALIGNANT NEOPLASM OF RIGHT KIDNEY WITH METASTASIS FROM KIDNEY TO OTHER SITE (H): ICD-10-CM

## 2024-06-13 DIAGNOSIS — E03.4 HYPOTHYROIDISM DUE TO ACQUIRED ATROPHY OF THYROID: ICD-10-CM

## 2024-06-13 DIAGNOSIS — I82.220 NEOPLASM OF RIGHT KIDNEY WITH THROMBUS OF INFERIOR VENA CAVA (H): ICD-10-CM

## 2024-06-13 DIAGNOSIS — K25.9 GASTRIC EROSION, UNSPECIFIED ULCER CHRONICITY: ICD-10-CM

## 2024-06-13 DIAGNOSIS — N18.4 CKD (CHRONIC KIDNEY DISEASE) STAGE 4, GFR 15-29 ML/MIN (H): ICD-10-CM

## 2024-06-13 DIAGNOSIS — D49.511 NEOPLASM OF RIGHT KIDNEY WITH THROMBUS OF INFERIOR VENA CAVA (H): ICD-10-CM

## 2024-06-13 LAB
ALBUMIN SERPL BCG-MCNC: 4.6 G/DL (ref 3.5–5.2)
ALP SERPL-CCNC: 107 U/L (ref 40–150)
ALT SERPL W P-5'-P-CCNC: 6 U/L (ref 0–70)
ANION GAP SERPL CALCULATED.3IONS-SCNC: 6 MMOL/L (ref 7–15)
AST SERPL W P-5'-P-CCNC: 18 U/L (ref 0–45)
BASOPHILS # BLD AUTO: 0 10E3/UL (ref 0–0.2)
BASOPHILS NFR BLD AUTO: 1 %
BILIRUB SERPL-MCNC: 0.2 MG/DL
BUN SERPL-MCNC: 19.5 MG/DL (ref 8–23)
CALCIUM SERPL-MCNC: 9.4 MG/DL (ref 8.6–10)
CHLORIDE SERPL-SCNC: 104 MMOL/L (ref 98–107)
CREAT SERPL-MCNC: 1.81 MG/DL (ref 0.67–1.17)
DEPRECATED HCO3 PLAS-SCNC: 29 MMOL/L (ref 22–29)
EGFRCR SERPLBLD CKD-EPI 2021: 43 ML/MIN/1.73M2
EOSINOPHIL # BLD AUTO: 0.2 10E3/UL (ref 0–0.7)
EOSINOPHIL NFR BLD AUTO: 3 %
ERYTHROCYTE [DISTWIDTH] IN BLOOD BY AUTOMATED COUNT: 15.2 % (ref 10–15)
GLUCOSE SERPL-MCNC: 99 MG/DL (ref 70–99)
HCT VFR BLD AUTO: 42.6 % (ref 40–53)
HGB BLD-MCNC: 13.5 G/DL (ref 13.3–17.7)
IMM GRANULOCYTES # BLD: 0 10E3/UL
IMM GRANULOCYTES NFR BLD: 0 %
LYMPHOCYTES # BLD AUTO: 1.8 10E3/UL (ref 0.8–5.3)
LYMPHOCYTES NFR BLD AUTO: 28 %
MCH RBC QN AUTO: 26.8 PG (ref 26.5–33)
MCHC RBC AUTO-ENTMCNC: 31.7 G/DL (ref 31.5–36.5)
MCV RBC AUTO: 85 FL (ref 78–100)
MONOCYTES # BLD AUTO: 0.4 10E3/UL (ref 0–1.3)
MONOCYTES NFR BLD AUTO: 7 %
NEUTROPHILS # BLD AUTO: 4 10E3/UL (ref 1.6–8.3)
NEUTROPHILS NFR BLD AUTO: 61 %
NRBC # BLD AUTO: 0 10E3/UL
NRBC BLD AUTO-RTO: 0 /100
PLATELET # BLD AUTO: 341 10E3/UL (ref 150–450)
POTASSIUM SERPL-SCNC: 4.2 MMOL/L (ref 3.4–5.3)
PROT SERPL-MCNC: 7.8 G/DL (ref 6.4–8.3)
RBC # BLD AUTO: 5.03 10E6/UL (ref 4.4–5.9)
SODIUM SERPL-SCNC: 139 MMOL/L (ref 135–145)
TSH SERPL DL<=0.005 MIU/L-ACNC: 0.52 UIU/ML (ref 0.3–4.2)
WBC # BLD AUTO: 6.5 10E3/UL (ref 4–11)

## 2024-06-13 PROCEDURE — 74176 CT ABD & PELVIS W/O CONTRAST: CPT | Performed by: STUDENT IN AN ORGANIZED HEALTH CARE EDUCATION/TRAINING PROGRAM

## 2024-06-13 PROCEDURE — 84443 ASSAY THYROID STIM HORMONE: CPT

## 2024-06-13 PROCEDURE — 85025 COMPLETE CBC W/AUTO DIFF WBC: CPT

## 2024-06-13 PROCEDURE — 80053 COMPREHEN METABOLIC PANEL: CPT

## 2024-06-13 PROCEDURE — 36415 COLL VENOUS BLD VENIPUNCTURE: CPT

## 2024-06-13 PROCEDURE — 71250 CT THORAX DX C-: CPT | Performed by: STUDENT IN AN ORGANIZED HEALTH CARE EDUCATION/TRAINING PROGRAM

## 2024-06-18 ENCOUNTER — OFFICE VISIT (OUTPATIENT)
Dept: ORTHOPEDICS | Facility: CLINIC | Age: 59
End: 2024-06-18
Payer: COMMERCIAL

## 2024-06-18 ENCOUNTER — ONCOLOGY VISIT (OUTPATIENT)
Dept: ONCOLOGY | Facility: CLINIC | Age: 59
End: 2024-06-18
Attending: INTERNAL MEDICINE
Payer: COMMERCIAL

## 2024-06-18 VITALS
BODY MASS INDEX: 29.16 KG/M2 | WEIGHT: 215 LBS | HEART RATE: 74 BPM | RESPIRATION RATE: 16 BRPM | TEMPERATURE: 98.4 F | SYSTOLIC BLOOD PRESSURE: 123 MMHG | OXYGEN SATURATION: 97 % | DIASTOLIC BLOOD PRESSURE: 80 MMHG

## 2024-06-18 DIAGNOSIS — D49.511 NEOPLASM OF RIGHT KIDNEY WITH THROMBUS OF INFERIOR VENA CAVA (H): ICD-10-CM

## 2024-06-18 DIAGNOSIS — I82.220 NEOPLASM OF RIGHT KIDNEY WITH THROMBUS OF INFERIOR VENA CAVA (H): ICD-10-CM

## 2024-06-18 DIAGNOSIS — C64.1 PRIMARY MALIGNANT NEOPLASM OF RIGHT KIDNEY WITH METASTASIS FROM KIDNEY TO OTHER SITE (H): Primary | ICD-10-CM

## 2024-06-18 DIAGNOSIS — E03.4 HYPOTHYROIDISM DUE TO ACQUIRED ATROPHY OF THYROID: ICD-10-CM

## 2024-06-18 DIAGNOSIS — M76.61 ACHILLES TENDINITIS, RIGHT LEG: Primary | ICD-10-CM

## 2024-06-18 DIAGNOSIS — N18.4 CKD (CHRONIC KIDNEY DISEASE) STAGE 4, GFR 15-29 ML/MIN (H): ICD-10-CM

## 2024-06-18 PROCEDURE — G2211 COMPLEX E/M VISIT ADD ON: HCPCS | Performed by: INTERNAL MEDICINE

## 2024-06-18 PROCEDURE — 99214 OFFICE O/P EST MOD 30 MIN: CPT | Performed by: INTERNAL MEDICINE

## 2024-06-18 PROCEDURE — 76882 US LMTD JT/FCL EVL NVASC XTR: CPT | Mod: RT | Performed by: FAMILY MEDICINE

## 2024-06-18 PROCEDURE — 99215 OFFICE O/P EST HI 40 MIN: CPT | Performed by: INTERNAL MEDICINE

## 2024-06-18 PROCEDURE — 99213 OFFICE O/P EST LOW 20 MIN: CPT | Mod: 25 | Performed by: FAMILY MEDICINE

## 2024-06-18 RX ORDER — METHYLPREDNISOLONE 4 MG
TABLET, DOSE PACK ORAL
Qty: 21 TABLET | Refills: 0 | Status: SHIPPED | OUTPATIENT
Start: 2024-06-18

## 2024-06-18 ASSESSMENT — PAIN SCALES - GENERAL: PAINLEVEL: NO PAIN (0)

## 2024-06-18 NOTE — PATIENT INSTRUCTIONS
WHAT IS AN ACHILLES TENDON INJURY?      An Achilles tendon injury is a problem with the tendon that connects your heel bone to the calf muscle of your lower leg. Tendons are strong bands of tissue that attach muscle to bone. You use the Achilles tendon when you point your toes up and down and when you walk, run, or jump.    Tendons can be injured suddenly or they may be slowly damaged over time. You can have tiny or partial tears in your tendon. If you have a complete tear of your tendon, it s called a rupture. Other tendon injuries may be called a strain, tendinosis, or tendonitis.    WHAT IS THE CAUSE?    Achilles tendon injuries can be caused by:    Overuse of the tendon, such as from lots of uphill running, intense exercise, or sports training or from doing a lot of work that causes you to bend at the knees and ankles  A sudden activity that twists or tears your tendon, such as jumping, starting to sprint, or falling    You are more likely to have an Achilles tendon problem if you:    Have tight calf muscles or a tight Achilles tendon  Change the type of running shoes you wear, or if you wear high heels most of the day and then switch to lower heeled shoes for exercise  Have a problem called over-pronation, which happens when your feet roll inward and flatten out more than normal when you walk or run  WHAT ARE THE SYMPTOMS?    Symptoms may include:    Pain, stiffness, weakness, or swelling in the back of your lower leg  Pain in the back of your leg or ankle when you rise up on your toes  Trouble moving your ankle in different directions  If the tendon is completely torn, you may have felt a pop at the time of the injury. You may not be able to lift your heel off the ground or point your toes.    HOW IS IT DIAGNOSED?    Your healthcare provider will ask about your symptoms, activities, and medical history and examine you. Your provider may ask to watch you walk or run to see if your feet flatten more than normal.  You may have tests such as X-rays or other scans.    HOW IS IT TREATED?    You will need to change or stop doing the activities that cause pain until your tendon has healed. For example, you may need to swim instead of run.    Your healthcare provider may recommend stretching and strengthening exercises to help you heal.    Special shoes or shoe inserts may help. If you have a severe injury, your healthcare provider may put your foot in a cast or splint for several weeks to keep it from moving while it heals.    If your tendon is torn, you may need surgery to repair the tendon.    The pain often gets better within a few weeks with self-care, but some injuries may take several months or longer to heal. It s important to follow all of your healthcare provider s instructions.    HOW CAN I TAKE CARE OF MYSELF?    To keep swelling down and help relieve pain:    Put an ice pack, gel pack, or package of frozen vegetables wrapped in a cloth on the injured area every 3 to 4 hours for up to 20 minutes at a time.  Do ice massage. To do this, freeze water in a Styrofoam cup, then peel the top of the cup away to expose the ice. Hold the bottom of the cup and rub the ice over the painful area for 5 to 10 minutes. Do this several times a day while you have pain.  Keep your foot up on pillows when you sit or lie down.    MEDROL PACK as PRESCRIBED       Moist heat may help relieve pain, relax your muscles, and make it easier to use your ankle. Put moist heat on the injured area for 10 to 15 minutes before you do warm-up and stretching exercises. Moist heat includes heat patches or moist heating pads that you can buy at most drugstores, a warm, wet washcloth, or a hot shower. To prevent burns to your skin, follow directions on the package and do not lie on any type of hot pad. Don t use heat if you have swelling.    HOW CAN I HELP PREVENT AN ACHILLES TENDON PROBLEM?     Warm-up exercises and stretching before activities can help  prevent injuries. If you have tight Achilles tendons or calf muscles, stretch them twice a day whether or not you are doing any activities that day. If your leg or ankle hurts after exercise, putting ice on it may help keep it from getting injured.    Avoid running uphill if you tend to have Achilles tendon injuries.    Follow the safety rules and use the protective equipment recommended for your work or sport.    Achilles Tendonitis Exercises    You can do the towel stretch right away. When the towel stretch is easy, try the standing calf stretch, soleus stretch, and leg lift. When you no longer have sharp pain in your calf or tendon, you can do the step-up, heel raises, and static and balance and reach exercises.    Towel stretch: Sit on a hard surface with your injured leg stretched out in front of you. Loop a towel around your toes and the ball of your foot and pull the towel toward your body keeping your leg straight. Hold this position for 15 to 30 seconds and then relax. Repeat 3 times.    Standing calf stretch: Stand facing a wall with your hands on the wall at about eye level. Keep your injured leg back with your heel on the floor. Keep the other leg forward with the knee bent. Turn your back foot slightly inward (as if you were pigeon-toed). Slowly lean into the wall until you feel a stretch in the back of your calf. Hold the stretch for 15 to 30 seconds. Return to the starting position. Repeat 3 times. Do this exercise several times each day.    Standing soleus stretch: Stand facing a wall with your hands on the wall at about chest height. Keep your injured leg back with your heel on the floor. Keep the other leg forward with the knee bent. Turn your back foot slightly inward (as if you were pigeon-toed). Bend your back knee slightly and gently lean into the wall until you feel a stretch in the lower calf of your injured leg. Hold the stretch for 15 to 30 seconds. Return to the starting position. Repeat 3  times.    Side-lying leg lift: Lie on your uninjured side. Tighten the front thigh muscles on your injured leg and lift that leg 8 to 10 inches (20 to 25 centimeters) away from the other leg. Keep the leg straight and lower it slowly. Do 2 sets of 15.    Step-up: Stand with the foot of your injured leg on a support 3 to 5 inches (8 to 13 centimeters) high --like a small step or block of wood. Keep your other foot flat on the floor. Shift your weight onto the injured leg on the support. Straighten your injured leg as the other leg comes off the floor. Return to the starting position by bending your injured leg and slowly lowering your uninjured leg back to the floor. Do 2 sets of 15.    Eccentric calf strengthening: Stand behind a chair or counter with your feet flat on the floor. Using the chair or counter as a support to help you, raise your body up onto your toes and hold for 5 seconds. Then slowly lower yourself down with your injured leg only. (It's OK to keep holding onto the support if you need to.) Repeat 15 times. Do 2 sets of 15. Rest 30 seconds between sets.    Single leg balance exercises: Stand next to a chair with your injured leg farther from the chair. The chair will provide support if you need it. Stand on the foot of your injured leg and bend your knee slightly. Try to raise the arch of this foot while keeping your big toe on the floor. Keep your foot in this position.    While keeping your arch raised, reach the hand that is farther away from the chair across your body toward the chair. The farther you reach, the more challenging the exercise. Do 2 sets of 15.    Published by [employer].  Copyright  2014 [employer] and/or one of its subsidiaries. All rights reserved.

## 2024-06-18 NOTE — NURSING NOTE
Oncology Rooming Note    June 18, 2024 8:53 AM   Dimitrios Goldberg is a 59 year old male who presents for:    Chief Complaint   Patient presents with    Oncology Clinic Visit     Renal call carcinoma, right      Initial Vitals: /80   Pulse 74   Temp 98.4  F (36.9  C)   Resp 16   Wt 97.5 kg (215 lb)   SpO2 97%   BMI 29.16 kg/m   Estimated body mass index is 29.16 kg/m  as calculated from the following:    Height as of 4/29/24: 1.829 m (6').    Weight as of this encounter: 97.5 kg (215 lb). Body surface area is 2.23 meters squared.  No Pain (0) Comment: Data Unavailable   No LMP for male patient.  Allergies reviewed: Yes  Medications reviewed: Yes    Medications: Medication refills not needed today.  Pharmacy name entered into OneMob[id number] [place] [address]    Frailty Screening:   Is the patient here for a new oncology consult visit in cancer care? 2. No      Clinical concerns: no other complaints      Marc Staley
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
## 2024-06-22 DIAGNOSIS — E78.5 HYPERLIPIDEMIA, UNSPECIFIED HYPERLIPIDEMIA TYPE: ICD-10-CM

## 2024-06-24 RX ORDER — ATORVASTATIN CALCIUM 20 MG/1
20 TABLET, FILM COATED ORAL DAILY
Qty: 90 TABLET | Refills: 1 | Status: SHIPPED | OUTPATIENT
Start: 2024-06-24

## 2024-08-26 ENCOUNTER — TELEPHONE (OUTPATIENT)
Dept: FAMILY MEDICINE | Facility: CLINIC | Age: 59
End: 2024-08-26
Payer: COMMERCIAL

## 2024-08-27 DIAGNOSIS — K25.9 GASTRIC EROSION, UNSPECIFIED ULCER CHRONICITY: ICD-10-CM

## 2024-08-27 RX ORDER — OMEPRAZOLE 40 MG/1
CAPSULE, DELAYED RELEASE ORAL
Qty: 90 CAPSULE | Refills: 1 | Status: SHIPPED | OUTPATIENT
Start: 2024-08-27

## 2024-08-27 NOTE — TELEPHONE ENCOUNTER
Pending Prescriptions:                       Disp   Refills    omeprazole (PRILOSEC) 40 MG DR capsule [P*90 cap*1            Sig: TAKE 1 CAPSULE BY MOUTH EVERY DAY    Last prescribing provider:     Last clinic visit date: 06/18/24 w/    Recommendations for requested medication (if none, N/A): N/A    Any other pertinent information (if none, N/A): N/A    Refilled: Y/N, if NO, why?

## 2024-08-30 DIAGNOSIS — G43.909 MIGRAINE WITHOUT STATUS MIGRAINOSUS, NOT INTRACTABLE, UNSPECIFIED MIGRAINE TYPE: ICD-10-CM

## 2024-08-30 RX ORDER — NORTRIPTYLINE HCL 10 MG
10 CAPSULE ORAL AT BEDTIME
Qty: 90 CAPSULE | Refills: 2 | Status: SHIPPED | OUTPATIENT
Start: 2024-08-30

## 2024-09-25 ENCOUNTER — ANCILLARY PROCEDURE (OUTPATIENT)
Dept: CT IMAGING | Facility: CLINIC | Age: 59
End: 2024-09-25
Attending: INTERNAL MEDICINE
Payer: COMMERCIAL

## 2024-09-25 ENCOUNTER — LAB (OUTPATIENT)
Dept: LAB | Facility: CLINIC | Age: 59
End: 2024-09-25
Payer: COMMERCIAL

## 2024-09-25 DIAGNOSIS — E03.4 HYPOTHYROIDISM DUE TO ACQUIRED ATROPHY OF THYROID: ICD-10-CM

## 2024-09-25 DIAGNOSIS — I82.220 NEOPLASM OF RIGHT KIDNEY WITH THROMBUS OF INFERIOR VENA CAVA (H): ICD-10-CM

## 2024-09-25 DIAGNOSIS — C64.1 PRIMARY MALIGNANT NEOPLASM OF RIGHT KIDNEY WITH METASTASIS FROM KIDNEY TO OTHER SITE (H): ICD-10-CM

## 2024-09-25 DIAGNOSIS — R73.03 PREDIABETES: ICD-10-CM

## 2024-09-25 DIAGNOSIS — N18.4 CKD (CHRONIC KIDNEY DISEASE) STAGE 4, GFR 15-29 ML/MIN (H): ICD-10-CM

## 2024-09-25 DIAGNOSIS — D49.511 NEOPLASM OF RIGHT KIDNEY WITH THROMBUS OF INFERIOR VENA CAVA (H): ICD-10-CM

## 2024-09-25 DIAGNOSIS — K25.9 GASTRIC EROSION, UNSPECIFIED ULCER CHRONICITY: ICD-10-CM

## 2024-09-25 LAB
ALBUMIN MFR UR ELPH: 9.1 MG/DL
ALBUMIN SERPL BCG-MCNC: 4.2 G/DL (ref 3.5–5.2)
ALBUMIN UR-MCNC: NEGATIVE MG/DL
ALP SERPL-CCNC: 103 U/L (ref 40–150)
ALT SERPL W P-5'-P-CCNC: 12 U/L (ref 0–70)
ANION GAP SERPL CALCULATED.3IONS-SCNC: 10 MMOL/L (ref 7–15)
APPEARANCE UR: CLEAR
AST SERPL W P-5'-P-CCNC: 16 U/L (ref 0–45)
BASOPHILS # BLD AUTO: 0 10E3/UL (ref 0–0.2)
BASOPHILS NFR BLD AUTO: 0 %
BILIRUB SERPL-MCNC: 0.4 MG/DL
BILIRUB UR QL STRIP: NEGATIVE
BUN SERPL-MCNC: 22 MG/DL (ref 8–23)
CALCIUM SERPL-MCNC: 9.6 MG/DL (ref 8.8–10.4)
CHLORIDE SERPL-SCNC: 105 MMOL/L (ref 98–107)
COLOR UR AUTO: ABNORMAL
CREAT SERPL-MCNC: 1.6 MG/DL (ref 0.67–1.17)
CREAT UR-MCNC: 112 MG/DL
CREAT UR-MCNC: 112 MG/DL
EGFRCR SERPLBLD CKD-EPI 2021: 49 ML/MIN/1.73M2
EOSINOPHIL # BLD AUTO: 0.3 10E3/UL (ref 0–0.7)
EOSINOPHIL NFR BLD AUTO: 4 %
ERYTHROCYTE [DISTWIDTH] IN BLOOD BY AUTOMATED COUNT: 15.6 % (ref 10–15)
EST. AVERAGE GLUCOSE BLD GHB EST-MCNC: 128 MG/DL
GLUCOSE SERPL-MCNC: 94 MG/DL (ref 70–99)
GLUCOSE UR STRIP-MCNC: >=1000 MG/DL
HBA1C MFR BLD: 6.1 %
HCO3 SERPL-SCNC: 25 MMOL/L (ref 22–29)
HCT VFR BLD AUTO: 43 % (ref 40–53)
HGB BLD-MCNC: 13.8 G/DL (ref 13.3–17.7)
HGB UR QL STRIP: NEGATIVE
IMM GRANULOCYTES # BLD: 0 10E3/UL
IMM GRANULOCYTES NFR BLD: 0 %
KETONES UR STRIP-MCNC: NEGATIVE MG/DL
LEUKOCYTE ESTERASE UR QL STRIP: NEGATIVE
LYMPHOCYTES # BLD AUTO: 1.8 10E3/UL (ref 0.8–5.3)
LYMPHOCYTES NFR BLD AUTO: 30 %
MCH RBC QN AUTO: 26.8 PG (ref 26.5–33)
MCHC RBC AUTO-ENTMCNC: 32.1 G/DL (ref 31.5–36.5)
MCV RBC AUTO: 84 FL (ref 78–100)
MICROALBUMIN UR-MCNC: <12 MG/L
MICROALBUMIN/CREAT UR: NORMAL MG/G{CREAT}
MONOCYTES # BLD AUTO: 0.4 10E3/UL (ref 0–1.3)
MONOCYTES NFR BLD AUTO: 7 %
NEUTROPHILS # BLD AUTO: 3.5 10E3/UL (ref 1.6–8.3)
NEUTROPHILS NFR BLD AUTO: 59 %
NITRATE UR QL: NEGATIVE
NRBC # BLD AUTO: 0 10E3/UL
NRBC BLD AUTO-RTO: 0 /100
PH UR STRIP: 5.5 [PH] (ref 5–7)
PLATELET # BLD AUTO: 311 10E3/UL (ref 150–450)
POTASSIUM SERPL-SCNC: 3.9 MMOL/L (ref 3.4–5.3)
PROT SERPL-MCNC: 7.1 G/DL (ref 6.4–8.3)
PROT/CREAT 24H UR: 0.08 MG/MG CR (ref 0–0.2)
RBC # BLD AUTO: 5.15 10E6/UL (ref 4.4–5.9)
SODIUM SERPL-SCNC: 140 MMOL/L (ref 135–145)
SP GR UR STRIP: 1.02 (ref 1–1.03)
TSH SERPL DL<=0.005 MIU/L-ACNC: 0.35 UIU/ML (ref 0.3–4.2)
UROBILINOGEN UR STRIP-MCNC: NORMAL MG/DL
WBC # BLD AUTO: 5.9 10E3/UL (ref 4–11)

## 2024-09-25 PROCEDURE — 99000 SPECIMEN HANDLING OFFICE-LAB: CPT | Performed by: PATHOLOGY

## 2024-09-25 PROCEDURE — 84156 ASSAY OF PROTEIN URINE: CPT | Performed by: PATHOLOGY

## 2024-09-25 PROCEDURE — 71250 CT THORAX DX C-: CPT | Mod: GC | Performed by: RADIOLOGY

## 2024-09-25 PROCEDURE — 74176 CT ABD & PELVIS W/O CONTRAST: CPT | Mod: GC | Performed by: RADIOLOGY

## 2024-09-25 PROCEDURE — 85025 COMPLETE CBC W/AUTO DIFF WBC: CPT | Performed by: PATHOLOGY

## 2024-09-25 PROCEDURE — 83036 HEMOGLOBIN GLYCOSYLATED A1C: CPT | Performed by: INTERNAL MEDICINE

## 2024-09-25 PROCEDURE — 84443 ASSAY THYROID STIM HORMONE: CPT | Performed by: PATHOLOGY

## 2024-09-25 PROCEDURE — 82043 UR ALBUMIN QUANTITATIVE: CPT | Performed by: INTERNAL MEDICINE

## 2024-09-25 PROCEDURE — 36415 COLL VENOUS BLD VENIPUNCTURE: CPT | Performed by: PATHOLOGY

## 2024-09-25 PROCEDURE — 80053 COMPREHEN METABOLIC PANEL: CPT | Performed by: PATHOLOGY

## 2024-09-25 PROCEDURE — 81003 URINALYSIS AUTO W/O SCOPE: CPT | Performed by: PATHOLOGY

## 2024-10-01 ENCOUNTER — ONCOLOGY VISIT (OUTPATIENT)
Dept: ONCOLOGY | Facility: CLINIC | Age: 59
End: 2024-10-01
Attending: INTERNAL MEDICINE
Payer: COMMERCIAL

## 2024-10-01 VITALS
SYSTOLIC BLOOD PRESSURE: 118 MMHG | BODY MASS INDEX: 28.68 KG/M2 | HEART RATE: 62 BPM | OXYGEN SATURATION: 99 % | RESPIRATION RATE: 16 BRPM | DIASTOLIC BLOOD PRESSURE: 83 MMHG | WEIGHT: 211.5 LBS | TEMPERATURE: 97.8 F

## 2024-10-01 DIAGNOSIS — N18.4 CKD (CHRONIC KIDNEY DISEASE) STAGE 4, GFR 15-29 ML/MIN (H): ICD-10-CM

## 2024-10-01 DIAGNOSIS — K25.9 GASTRIC EROSION, UNSPECIFIED ULCER CHRONICITY: ICD-10-CM

## 2024-10-01 DIAGNOSIS — E03.4 HYPOTHYROIDISM DUE TO ACQUIRED ATROPHY OF THYROID: ICD-10-CM

## 2024-10-01 DIAGNOSIS — C64.1 PRIMARY MALIGNANT NEOPLASM OF RIGHT KIDNEY WITH METASTASIS FROM KIDNEY TO OTHER SITE (H): Primary | ICD-10-CM

## 2024-10-01 PROCEDURE — 99213 OFFICE O/P EST LOW 20 MIN: CPT | Performed by: INTERNAL MEDICINE

## 2024-10-01 PROCEDURE — 99215 OFFICE O/P EST HI 40 MIN: CPT | Performed by: INTERNAL MEDICINE

## 2024-10-01 PROCEDURE — G2211 COMPLEX E/M VISIT ADD ON: HCPCS | Performed by: INTERNAL MEDICINE

## 2024-10-01 ASSESSMENT — PAIN SCALES - GENERAL: PAINLEVEL: NO PAIN (0)

## 2024-10-01 NOTE — NURSING NOTE
Oncology Rooming Note    October 1, 2024 9:33 AM   Dimitrios Goldberg is a 59 year old male who presents for:    Chief Complaint   Patient presents with    Oncology Clinic Visit     Renal cell carcinoma      Initial Vitals: /83 (BP Location: Right arm, Patient Position: Sitting, Cuff Size: Adult Large)   Pulse 62   Temp 97.8  F (36.6  C) (Oral)   Resp 16   Wt 95.9 kg (211 lb 8 oz)   SpO2 99%   BMI 28.68 kg/m   Estimated body mass index is 28.68 kg/m  as calculated from the following:    Height as of 4/29/24: 1.829 m (6').    Weight as of this encounter: 95.9 kg (211 lb 8 oz). Body surface area is 2.21 meters squared.  No Pain (0) Comment: Data Unavailable   No LMP for male patient.  Allergies reviewed: Yes  Medications reviewed: Yes    Medications: Medication refills not needed today.  Pharmacy name entered into [facility]: [facility] [id number] [place] [address]    Frailty Screening:   Is the patient here for a new oncology consult visit in cancer care? 2. No      Clinical concerns:  none    Ghada Solorzano
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
## 2024-10-02 ENCOUNTER — OFFICE VISIT (OUTPATIENT)
Dept: NEPHROLOGY | Facility: CLINIC | Age: 59
End: 2024-10-02
Payer: COMMERCIAL

## 2024-10-02 VITALS
DIASTOLIC BLOOD PRESSURE: 80 MMHG | BODY MASS INDEX: 28.21 KG/M2 | RESPIRATION RATE: 18 BRPM | HEART RATE: 68 BPM | WEIGHT: 208 LBS | SYSTOLIC BLOOD PRESSURE: 114 MMHG | OXYGEN SATURATION: 100 %

## 2024-10-02 DIAGNOSIS — N18.4 CKD (CHRONIC KIDNEY DISEASE) STAGE 4, GFR 15-29 ML/MIN (H): Primary | ICD-10-CM

## 2024-10-02 PROCEDURE — 99214 OFFICE O/P EST MOD 30 MIN: CPT | Performed by: INTERNAL MEDICINE

## 2024-10-02 NOTE — NURSING NOTE
Chief Complaint   Patient presents with    RECHECK     CKD (chronic kidney disease) stage 4, GFR 15-29 ml/min        Vitals:    10/02/24 0852   BP: 114/80   Pulse: 68   Resp: 18   SpO2: 100%   Weight: 94.3 kg (208 lb)       Body mass index is 28.21 kg/m .
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
## 2024-10-10 ENCOUNTER — ANCILLARY PROCEDURE (OUTPATIENT)
Dept: GENERAL RADIOLOGY | Facility: CLINIC | Age: 59
End: 2024-10-10
Attending: INTERNAL MEDICINE
Payer: COMMERCIAL

## 2024-10-10 ENCOUNTER — OFFICE VISIT (OUTPATIENT)
Dept: FAMILY MEDICINE | Facility: CLINIC | Age: 59
End: 2024-10-10
Payer: COMMERCIAL

## 2024-10-10 VITALS
SYSTOLIC BLOOD PRESSURE: 108 MMHG | HEART RATE: 74 BPM | TEMPERATURE: 97.2 F | HEIGHT: 72 IN | DIASTOLIC BLOOD PRESSURE: 73 MMHG | WEIGHT: 207.8 LBS | OXYGEN SATURATION: 97 % | BODY MASS INDEX: 28.15 KG/M2 | RESPIRATION RATE: 14 BRPM

## 2024-10-10 DIAGNOSIS — R42 LIGHTHEADEDNESS: ICD-10-CM

## 2024-10-10 DIAGNOSIS — E78.5 HYPERLIPIDEMIA, UNSPECIFIED HYPERLIPIDEMIA TYPE: ICD-10-CM

## 2024-10-10 DIAGNOSIS — R73.03 PREDIABETES: Primary | ICD-10-CM

## 2024-10-10 DIAGNOSIS — M16.12 ARTHRITIS OF LEFT HIP: ICD-10-CM

## 2024-10-10 DIAGNOSIS — G43.909 MIGRAINE WITHOUT STATUS MIGRAINOSUS, NOT INTRACTABLE, UNSPECIFIED MIGRAINE TYPE: ICD-10-CM

## 2024-10-10 DIAGNOSIS — D49.511 NEOPLASM OF RIGHT KIDNEY WITH THROMBUS OF INFERIOR VENA CAVA (H): ICD-10-CM

## 2024-10-10 DIAGNOSIS — I82.220 NEOPLASM OF RIGHT KIDNEY WITH THROMBUS OF INFERIOR VENA CAVA (H): ICD-10-CM

## 2024-10-10 DIAGNOSIS — N18.31 CHRONIC KIDNEY DISEASE, STAGE 3A (H): ICD-10-CM

## 2024-10-10 PROCEDURE — 90480 ADMN SARSCOV2 VAC 1/ONLY CMP: CPT | Performed by: INTERNAL MEDICINE

## 2024-10-10 PROCEDURE — 73502 X-RAY EXAM HIP UNI 2-3 VIEWS: CPT | Mod: TC | Performed by: RADIOLOGY

## 2024-10-10 PROCEDURE — 90471 IMMUNIZATION ADMIN: CPT | Performed by: INTERNAL MEDICINE

## 2024-10-10 PROCEDURE — 90673 RIV3 VACCINE NO PRESERV IM: CPT | Performed by: INTERNAL MEDICINE

## 2024-10-10 PROCEDURE — 99214 OFFICE O/P EST MOD 30 MIN: CPT | Mod: 25 | Performed by: INTERNAL MEDICINE

## 2024-10-10 PROCEDURE — 91320 SARSCV2 VAC 30MCG TRS-SUC IM: CPT | Performed by: INTERNAL MEDICINE

## 2024-10-10 RX ORDER — RIZATRIPTAN BENZOATE 10 MG/1
10 TABLET, ORALLY DISINTEGRATING ORAL PRN
Qty: 30 TABLET | Refills: 2 | Status: SHIPPED | OUTPATIENT
Start: 2024-10-10

## 2024-10-10 ASSESSMENT — PAIN SCALES - GENERAL: PAINLEVEL: MILD PAIN (2)

## 2024-10-10 NOTE — PROGRESS NOTES
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 The creatinine seems to have stabilized at 1.6-1.8 after he completed two courses of steroids and also remained stable since he has been on cabozantinib which has been on hold since July 2023.  Renal imaging  is unremarkable. No significant proteinuria. The progression is tributary of BP control and other LOUIS episodes. The kidney function and the proteinuria are stable on empagliflozin 10 mg daily . 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 No evidence for progression at the present time The patient is following closely with his oncologist     Migraine without status migrainosus, not intractable, unspecified migraine type    - rizatriptan (MAXALT-MLT) 10 MG ODT; Take 1 tablet (10 mg) by mouth as needed for migraine. symptoms persist or return, may repeat dose after 2 hours. The 's labeling recommends a maximum daily dose of 30 mg per 24 hours;    Arthritis of left hip  Complaining of pain in left hip  Has a history of arthritis of right hip  - XR Hip Left 2-3 Views; Future    Lightheadedness  Complain of some lightheadedness whenever he gets up from sitting posture or urinates  His blood pressure is 108/73 today  I advised him to see if his blood pressure drops when he gets up by monitoring it at home to see if he is having postural hypotension and if so we can  "consider decreasing his blood pressure medications          BMI  Estimated body mass index is 27.93 kg/m  as calculated from the following:    Height as of this encounter: 1.837 m (6' 0.32\").    Weight as of this encounter: 94.3 kg (207 lb 12.8 oz).             Honorio Plunkett is a 59 year old, presenting for the following health issues:  Follow Up (Cancer, kidneys)      10/10/2024     3:27 PM   Additional Questions   Roomed by vera   Accompanied by self         10/10/2024     3:27 PM   Patient Reported Additional Medications   Patient reports taking the following new medications no     History of Present Illness       Reason for visit:  FOLLOW UP    He eats 2-3 servings of fruits and vegetables daily.He consumes 1 sweetened beverage(s) daily.He exercises with enough effort to increase his heart rate 20 to 29 minutes per day.  He exercises with enough effort to increase his heart rate 3 or less days per week.   He is taking medications regularly.         Medication Followup of   45 minutes spent by me on the date of the encounter doing chart review, history and exam, documentation and further activities per the note  Taking Medication as prescribed: yes  Side Effects:  None  Medication Helping Symptoms:  yes        Review of Systems  Constitutional, HEENT, cardiovascular, pulmonary, gi and gu systems are negative, except as otherwise noted.      Objective    /73 (BP Location: Left arm, Patient Position: Sitting, Cuff Size: Adult Regular)   Pulse 74   Temp 97.2  F (36.2  C) (Temporal)   Resp 14   Ht 1.837 m (6' 0.32\")   Wt 94.3 kg (207 lb 12.8 oz)   SpO2 97%   BMI 27.93 kg/m    Body mass index is 27.93 kg/m .  Physical Exam   GENERAL: alert and no distress  EYES: Eyes grossly normal to inspection, PERRL and conjunctivae and sclerae normal  HENT: ear canals and TM's normal, nose and mouth without ulcers or lesions  NECK: no adenopathy, no asymmetry, masses, or scars  RESP: lungs clear to auscultation " - no rales, rhonchi or wheezes  CV: regular rate and rhythm, normal S1 S2, no S3 or S4, no murmur, click or rub, no peripheral edema  ABDOMEN: Well-healed scar noted  MS: Pain on flexion extension of left hip  SKIN: no suspicious lesions or rashes  NEURO: Normal strength and tone, mentation intact and speech normal  PSYCH: mentation appears normal, affect normal/bright            Signed Electronically by: Christopher Ribeiro MD

## 2024-10-10 NOTE — PATIENT INSTRUCTIONS
At [facility], we strive to deliver an exceptional experience to you, every time we see you. If you receive a survey, please let us know what we are doing well and/or what we could improve upon, as we do value your feedback.  If you have MyChart, you can expect to receive results automatically within 24 hours of their completion.  Your provider will send a note interpreting your results as well.   If you do not have MyChart, you should receive your results in about a week by mail.    Your care team:                            Family Medicine Internal Medicine   MD Yasmani Nunn, MD Mallory Bear, MD Christopher Ribeiro, MD Chrissy Madsen, PAAsiaC    Larry Pryor, MD Pediatrics   Bridgette Addison, MD Arabella Martinez, MD Nicole Mandujano, APRN CNP Tracee Martin APRN CNP   MD Taryn Alfred, MD Rosemary Singh, CNP     Eduin Newman, CNP Same-Day Provider (No follow-up visits)   PILY Espinoza, DNP Nilsa Aragon, PILY Galvez, FNP, BC SHAHEEN HigginsC     Clinic hours: Monday - Thursday 7 am-6 pm; Fridays 7 am-5 pm.   Urgent care: Monday - Friday 10 am- 8 pm; Saturday and Sunday 9 am-5 pm.    Clinic: [phone]       [facility]: Monday - Thursday 8 am - 7 pm; Friday 8 am - 6 pm  [facility]: [phone]

## 2024-10-11 ENCOUNTER — OFFICE VISIT (OUTPATIENT)
Dept: ORTHOPEDICS | Facility: CLINIC | Age: 59
End: 2024-10-11
Attending: INTERNAL MEDICINE
Payer: COMMERCIAL

## 2024-10-11 ENCOUNTER — PRE VISIT (OUTPATIENT)
Dept: ORTHOPEDICS | Facility: CLINIC | Age: 59
End: 2024-10-11

## 2024-10-11 ENCOUNTER — PATIENT OUTREACH (OUTPATIENT)
Dept: CARE COORDINATION | Facility: CLINIC | Age: 59
End: 2024-10-11
Payer: COMMERCIAL

## 2024-10-11 DIAGNOSIS — M16.12 ARTHRITIS OF LEFT HIP: ICD-10-CM

## 2024-10-11 PROCEDURE — 99214 OFFICE O/P EST MOD 30 MIN: CPT | Performed by: STUDENT IN AN ORGANIZED HEALTH CARE EDUCATION/TRAINING PROGRAM

## 2024-10-11 NOTE — CONFIDENTIAL NOTE
DIAGNOSIS: Arthritis of left hip    APPOINTMENT DATE: 10.11.2024   NOTES STATUS DETAILS   OFFICE NOTE from referring provider Internal 10.11.2024 Christopher Ribeiro MD    MEDICATION LIST Internal    XRAYS (IMAGES & REPORTS) Internal 10.10.2024 HIP TWO VIEWS LEFT

## 2024-10-11 NOTE — PATIENT INSTRUCTIONS
[facility] Outpatient Physical Therapy Locations    To schedule an appointment please call our scheduling department at [phone]    To fax a referral to be scheduled fax to [phone]    [place]: [address], [facility]: [address] [facility]: [address], [facility]: [address], [facility]: [address], [facility]: [address] [facility]: [address] [place], [place]  [place]: [address] [facility] , [address], [facility]: [address] [facility] , [address], [facility]: [address] [facility]: [address], [facility]: [address], [facility]: [address], [facility]: [address], [facility]: [address] [facility]: [address] [address], [facility]: [facility]: [address], [address], [facility]: [address], [facility]: [address], [place], [place]  [place]: [address] [facility]: [address] [place] [address]: [address], [facility]: [address], [place]  [place]: [address], [facility]: [address], [facility]: [address] [facility] [place], [place]  [facility]: [address] [place], [place]  [facility]: [address] [place] [address]  [place]: [address] [facility] [facility], [address], [facility] [address] [facility],  [facility]: [address] [facility] [place], [place]
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
## 2024-10-14 ENCOUNTER — PATIENT OUTREACH (OUTPATIENT)
Dept: CARE COORDINATION | Facility: CLINIC | Age: 59
End: 2024-10-14
Payer: COMMERCIAL

## 2024-10-18 ENCOUNTER — OFFICE VISIT (OUTPATIENT)
Dept: ORTHOPEDICS | Facility: CLINIC | Age: 59
End: 2024-10-18
Payer: COMMERCIAL

## 2024-10-18 DIAGNOSIS — M16.12 ARTHRITIS OF LEFT HIP: Primary | ICD-10-CM

## 2024-10-18 PROCEDURE — 99207 PR DROP WITH A PROCEDURE: CPT | Performed by: STUDENT IN AN ORGANIZED HEALTH CARE EDUCATION/TRAINING PROGRAM

## 2024-10-18 PROCEDURE — 20611 DRAIN/INJ JOINT/BURSA W/US: CPT | Mod: LT | Performed by: STUDENT IN AN ORGANIZED HEALTH CARE EDUCATION/TRAINING PROGRAM

## 2024-10-18 RX ORDER — TRIAMCINOLONE ACETONIDE 40 MG/ML
40 INJECTION, SUSPENSION INTRA-ARTICULAR; INTRAMUSCULAR
Status: COMPLETED | OUTPATIENT
Start: 2024-10-18 | End: 2024-10-18

## 2024-10-18 RX ORDER — LIDOCAINE HYDROCHLORIDE 10 MG/ML
4 INJECTION, SOLUTION EPIDURAL; INFILTRATION; INTRACAUDAL; PERINEURAL
Status: COMPLETED | OUTPATIENT
Start: 2024-10-18 | End: 2024-10-18

## 2024-10-18 RX ADMIN — LIDOCAINE HYDROCHLORIDE 4 ML: 10 INJECTION, SOLUTION EPIDURAL; INFILTRATION; INTRACAUDAL; PERINEURAL at 15:01

## 2024-10-18 RX ADMIN — TRIAMCINOLONE ACETONIDE 40 MG: 40 INJECTION, SUSPENSION INTRA-ARTICULAR; INTRAMUSCULAR at 15:01

## 2024-10-18 NOTE — PROGRESS NOTES
Large Joint Injection: L hip joint    Date/Time: 10/18/2024 3:01 PM    Performed by: Dick Adrian DO  Authorized by: Dick Adrian DO    Indications:  Pain and osteoarthritis  Needle Size:  22 G  Guidance: ultrasound    Approach:  Anterior  Location:  Hip      Site:  L hip joint  Medications:  40 mg triamcinolone 40 MG/ML; 4 mL lidocaine (PF) 1 %  Outcome:  Tolerated well, no immediate complications  Procedure discussed: discussed risks, benefits, and alternatives    Consent Given by:  Patient  Timeout: timeout called immediately prior to procedure    Prep: patient was prepped and draped in usual sterile fashion       Byron Thomson M.A., LAT, ATC  Certified Athletic Trainer

## 2024-10-18 NOTE — LETTER
10/18/2024      RE: Dimitrios Goldberg  [address]  [facility] [address]     Dear Colleague,    Thank you for referring your patient, Dimitrios Goldberg, to the [facility] SPORTS MEDICINE CLINIC [place]. Please see a copy of my visit note below.    PROCEDURE ENCOUNTER    [facility]  Dick Adrian, DO  2024     Name: Dimitrios Goldberg  MRN: [id number]  Age: 59 year old  : 1965    Referring provider:   Diagnosis: L Hip OA    10/11/24: Arthritis of left hip  -     Orthopedic  Referral  -     Physical Therapy  Referral; Future        59-year-old male with a past medical history of right osteoarthrosis of the hip, presenting with left inguinal pain, consistent with gout of osteoarthrosis as well.  X-rays obtained and reviewed with him today.  Physical exam is consistent.  I have recommended the patient embarking in physical therapy.  Order has been placed.  Have also recommended, given the pain, and intra-articular ultrasound-guided injection for the left hip.  Patient is amenable.  We have assisted in getting him scheduled.  We will see him back for planned injection.  All questions answered.  Return precautions advised      Procedure: Intraarticular Hip Injection - Ultrasound Guided  The patient was informed of the risks and the benefits of the procedure and a written consent was signed.  The patient s left hip was prepped with chlorhexidine in sterile fashion.   40 mg of triamcinolone suspension was drawn up into a 5 mL syringe with 4 mL of 1% lidocaine.  Injection was performed using sterile technique.  Under ultrasound guidance a 3.5-inch 22-gauge needle was used to enter the left femoracetabular joint.  Anterior approach was used, needle placement was visualized and documented with ultrasound.  Ultrasound visualization was necessary due to decreased joint space in the setting of osteoarthritis.  Injection performed long axis to the probe.  Injection  solution visualized within the joint space.  Images were permanently stored for the patient's record.  There were no complications. The patient tolerated the procedure well. There was negligible bleeding.   The patient was instructed to ice the hip upon leaving clinic and refrain from overuse over the next 3 days.   The patient was instructed to call or go to the emergency room with any unusual pain, swelling, redness, or if otherwise concerned.    Dick Adrian D.O., [facility]  , Sports Medicine  Department of Family [facility] and [facility]      Large Joint Injection: L hip joint    Date/Time: 10/18/2024 3:01 PM    Performed by: Dick Adrian DO  Authorized by: Dick Adrian DO    Indications:  Pain and osteoarthritis  Needle Size:  22 G  Guidance: ultrasound    Approach:  Anterior  Location:  Hip      Site:  L hip joint  Medications:  40 mg triamcinolone 40 MG/ML; 4 mL lidocaine (PF) 1 %  Outcome:  Tolerated well, no immediate complications  Procedure discussed: discussed risks, benefits, and alternatives    Consent Given by:  Patient  Timeout: timeout called immediately prior to procedure    Prep: patient was prepped and draped in usual sterile fashion       Byron Thomson M.A., LAT, ATC  Certified Athletic Trainer            Again, thank you for allowing me to participate in the care of your patient.      Sincerely,    Dick Adrian DO

## 2024-10-18 NOTE — PROGRESS NOTES
PROCEDURE ENCOUNTER    [facility]  Dick Adrian, DO  2024     Name: Dimitrios Goldberg  MRN: [id number]  Age: 59 year old  : 1965    Referring provider:   Diagnosis: L Hip OA    10/11/24: Arthritis of left hip  -     Orthopedic  Referral  -     Physical Therapy  Referral; Future        59-year-old male with a past medical history of right osteoarthrosis of the hip, presenting with left inguinal pain, consistent with gout of osteoarthrosis as well.  X-rays obtained and reviewed with him today.  Physical exam is consistent.  I have recommended the patient embarking in physical therapy.  Order has been placed.  Have also recommended, given the pain, and intra-articular ultrasound-guided injection for the left hip.  Patient is amenable.  We have assisted in getting him scheduled.  We will see him back for planned injection.  All questions answered.  Return precautions advised      Procedure: Intraarticular Hip Injection - Ultrasound Guided  The patient was informed of the risks and the benefits of the procedure and a written consent was signed.  The patient s left hip was prepped with chlorhexidine in sterile fashion.   40 mg of triamcinolone suspension was drawn up into a 5 mL syringe with 4 mL of 1% lidocaine.  Injection was performed using sterile technique.  Under ultrasound guidance a 3.5-inch 22-gauge needle was used to enter the left femoracetabular joint.  Anterior approach was used, needle placement was visualized and documented with ultrasound.  Ultrasound visualization was necessary due to decreased joint space in the setting of osteoarthritis.  Injection performed long axis to the probe.  Injection solution visualized within the joint space.  Images were permanently stored for the patient's record.  There were no complications. The patient tolerated the procedure well. There was negligible bleeding.   The patient was instructed to ice the hip upon leaving clinic and  refrain from overuse over the next 3 days.   The patient was instructed to call or go to the emergency room with any unusual pain, swelling, redness, or if otherwise concerned.    Dick Adrian D.O., [facility]  , Sports Medicine  Department of Family Medicine and [facility]

## 2024-10-18 NOTE — NURSING NOTE
[address] [address]  Dept: [phone]  ______________________________________________________________________________    Patient: Dimitrios Goldberg   : 1965   MRN: [id number]   2024    INVASIVE PROCEDURE SAFETY CHECKLIST    Date: 10/18/24   Procedure: Left hip IA USG CSI  Patient Name: Dimitrios Goldberg  MRN: [id number]  YOB: 1965    Action: Complete sections as appropriate. Any discrepancy results in a HARD COPY until resolved.     PRE PROCEDURE:  Patient ID verified with 2 identifiers (name and  or MRN): Yes  Procedure and site verified with patient/designee (when able): Yes  Accurate consent documentation in medical record: Yes  H&P (or appropriate assessment) documented in medical record: Yes  H&P must be up to 20 days prior to procedure and updates within 24 hours of procedure as applicable: NA  Relevant diagnostic and radiology test results appropriately labeled and displayed as applicable: Yes  Procedure site(s) marked with provider initials: NA    TIMEOUT:  Time-Out performed immediately prior to starting procedure, including verbal and active participation of all team members addressing the following:Yes  * Correct patient identify  * Confirmed that the correct side and site are marked  * An accurate procedure consent form  * Agreement on the procedure to be done  * Correct patient position  * Relevant images and results are properly labeled and appropriately displayed  * The need to administer antibiotics or fluids for irrigation purposes during the procedure as applicable   * Safety precautions based on patient history or medication use    DURING PROCEDURE: Verification of correct person, site, and procedures any time the responsibility for care of the patient is transferred to another member of the care team.       Prior to injection, verified patient identity using patient's name and date of  birth.  Due to injection administration, patient instructed to remain in clinic for 15 minutes  afterwards, and to report any adverse reaction to me immediately.    Joint injection was performed.      Drug Amount Wasted:  Yes: 1 mg/ml   Vial/Syringe: Multi dose vial  Expiration Date:  4/1/28      Byron Thomson, [facility]  October 18, 2024

## 2024-10-27 DIAGNOSIS — N52.9 ERECTILE DYSFUNCTION, UNSPECIFIED ERECTILE DYSFUNCTION TYPE: ICD-10-CM

## 2024-10-28 RX ORDER — VARDENAFIL HYDROCHLORIDE 20 MG/1
10 TABLET ORAL DAILY PRN
Qty: 6 TABLET | Refills: 3 | Status: SHIPPED | OUTPATIENT
Start: 2024-10-28

## 2024-10-31 DIAGNOSIS — N18.4 CKD (CHRONIC KIDNEY DISEASE) STAGE 4, GFR 15-29 ML/MIN (H): ICD-10-CM

## 2024-10-31 NOTE — TELEPHONE ENCOUNTER
"Patient is calling.    \"The pharmacy has been trying to get hold of you since last week for Jardiance refill.\"    Per chart review, no refill request found for Jardiance.  Patient is almost out of medication.    Kathy Day RN, BSN  [facility]        "
PAST MEDICAL HISTORY:  Cancer of lung     DM (diabetes mellitus)

## 2024-11-14 ENCOUNTER — THERAPY VISIT (OUTPATIENT)
Dept: PHYSICAL THERAPY | Facility: CLINIC | Age: 59
End: 2024-11-14
Attending: STUDENT IN AN ORGANIZED HEALTH CARE EDUCATION/TRAINING PROGRAM
Payer: COMMERCIAL

## 2024-11-14 DIAGNOSIS — M25.552 HIP PAIN, LEFT: Primary | ICD-10-CM

## 2024-11-14 PROCEDURE — 97161 PT EVAL LOW COMPLEX 20 MIN: CPT | Mod: GP | Performed by: PHYSICAL THERAPIST

## 2024-11-14 PROCEDURE — 97110 THERAPEUTIC EXERCISES: CPT | Mod: GP | Performed by: PHYSICAL THERAPIST

## 2024-11-14 ASSESSMENT — ACTIVITIES OF DAILY LIVING (ADL): PLEASE_INDICATE_YOR_PRIMARY_REASON_FOR_REFERRAL_TO_THERAPY:: HIP

## 2024-11-14 NOTE — PROGRESS NOTES
PHYSICAL THERAPY EVALUATION  Type of Visit: Evaluation       Fall Risk Screen:  Fall screen completed by: PT  Have you fallen 2 or more times in the past year?: No  Have you fallen and had an injury in the past year?: No  Is patient a fall risk?: No    Subjective         Presenting condition or subjective complaint:  L lateral hip pain.  Did have pain on R about 1 year ago.  Did have injection which did help on the L also.    Date of onset: 08/14/24 (approx)    Relevant medical history: (Patient-Rptd) Arthritis; Cancer; High blood pressure; Kidney disease; Migraines or headaches; Smoking; Vision problems   Dates & types of surgery:      Prior diagnostic imaging/testing results: (Patient-Rptd) CT scan; X-ray   No acute fracture or malalignment. Mild left hip joint  degenerative changes.  Prior therapy history for the same diagnosis, illness or injury: (Patient-Rptd) No      Prior Level of Function  Transfers: Independent  Ambulation: Independent  ADL: Independent  IADL:     Living Environment  Social support: (Patient-Rptd) With a significant other or spouse   Type of home: (Patient-Rptd) House   Stairs to enter the home: (Patient-Rptd) Yes (Patient-Rptd) 1 Is there a railing: (Patient-Rptd) No     Ramp: (Patient-Rptd) No   Stairs inside the home: (Patient-Rptd) Yes (Patient-Rptd) 14 Is there a railing: (Patient-Rptd) Yes     Help at home: (Patient-Rptd) None  Equipment owned:       Employment: (Patient-Rptd) Yes  -water   Hobbies/Interests: (Patient-Rptd) bowling    Patient goals for therapy:      Pain assessment:      Objective   HIP EVALUATION  PAIN: Pain Level at Rest: 0/10  Pain Level with Use: 5/10  Pain Location: hip  Pain Quality: Aching  Pain Frequency: intermittent  Pain is Worst: daytime  Pain is Exacerbated By: walking, stairs  Pain is Relieved By: rest  Pain Progression: sx come and go  INTEGUMENTARY (edema, incisions):   POSTURE: WNL  GAIT:   Weightbearing Status: WBAT  Assistive Device(s):    Gait Deviations: WNL  BALANCE/PROPRIOCEPTION: WNL  WEIGHTBEARING ALIGNMENT:   NON-WEIGHTBEARING ALIGNMENT:    ROM:   (Degrees) Left AROM Left PROM  Right AROM Right PROM   Hip Flexion  normal     Hip Extension  Normal      Hip Abduction  Normal      Hip Adduction  Normal      Hip Internal Rotation  Pain at end range     Hip External Rotation  Normal      Knee Flexion       Knee Extension       Lumbar Side glide     Lumbar Flexion    Lumbar Extension    Pain:   End feel:     PELVIC/SI SCREEN: WNL  STRENGTH:   Pain: - none + mild ++ moderate +++ severe  Strength Scale: 0-5/5 Left Right   Hip Flexion     Hip Extension     Hip Abduction 4+    Hip Adduction     Hip Internal Rotation     Hip External Rotation     Knee Flexion     Knee Extension       LE FLEXIBILITY:   SPECIAL TESTS:    Left Right   KARAN Positive    FADIR/Labrum/YISEL Negative     Femoral Nerve     Karina's     Piriformis     Quadrant Testing     SLR Negative     Slump Negative     Stork with Extension     Zen            FUNCTIONAL TESTS:   PALPATION:   + Tenderness At Location Left Right   Ischial Tuberosity     Greater Trochanter +    IT Band     Hip Flexors +    Piriformis     PSIS     ASIS     Adductors     Abductors     Iliac Crest     Glut Medius     Bursa     Pubis       JOINT MOBILITY:     Assessment & Plan   CLINICAL IMPRESSIONS  Medical Diagnosis: arthritis of L hip    Treatment Diagnosis: L hip pain   Impression/Assessment: Patient is a 59 year old male with L hip complaints.  The following significant findings have been identified: Pain, Decreased ROM/flexibility, Decreased strength, Impaired muscle performance, and Decreased activity tolerance. These impairments interfere with their ability to perform self care tasks, work tasks, recreational activities, household chores, driving , household mobility, and community mobility as compared to previous level of function.     Clinical Decision Making (Complexity):  Clinical Presentation:  Stable/Uncomplicated  Clinical Presentation Rationale: based on medical and personal factors listed in PT evaluation  Clinical Decision Making (Complexity): Low complexity    PLAN OF CARE  Treatment Interventions:  Interventions: Manual Therapy, Neuromuscular Re-education, Therapeutic Activity, Therapeutic Exercise    Long Term Goals     PT Goal 1  Goal Identifier: L hip  Goal Description: Pt able to ambulate x 1 hour without pain  Rationale: to maximize safety and independence with performance of ADLs and functional tasks;to maximize safety and independence within the home;to maximize safety and independence within the community;to maximize safety and independence with transportation;to maximize safety and independence with self cares  Target Date: 02/14/25      Frequency of Treatment: 2x/ month  Duration of Treatment: 3 month    Recommended Referrals to Other Professionals:   Education Assessment:   Learner/Method: Patient;Demonstration;Pictures/Video    Risks and benefits of evaluation/treatment have been explained.   Patient/Family/caregiver agrees with Plan of Care.     Evaluation Time:     PT Eval, Low Complexity Minutes (68095): 15       Signing Clinician: Zen Berkowitz PT

## 2024-11-18 ENCOUNTER — THERAPY VISIT (OUTPATIENT)
Dept: PHYSICAL THERAPY | Facility: CLINIC | Age: 59
End: 2024-11-18
Attending: STUDENT IN AN ORGANIZED HEALTH CARE EDUCATION/TRAINING PROGRAM
Payer: COMMERCIAL

## 2024-11-18 DIAGNOSIS — M25.552 HIP PAIN, LEFT: Primary | ICD-10-CM

## 2024-11-18 PROCEDURE — 97140 MANUAL THERAPY 1/> REGIONS: CPT | Mod: GP | Performed by: PHYSICAL THERAPIST

## 2024-11-18 PROCEDURE — 97110 THERAPEUTIC EXERCISES: CPT | Mod: GP | Performed by: PHYSICAL THERAPIST

## 2024-12-04 ENCOUNTER — MYC REFILL (OUTPATIENT)
Dept: NEPHROLOGY | Facility: CLINIC | Age: 59
End: 2024-12-04
Payer: COMMERCIAL

## 2024-12-04 DIAGNOSIS — E03.4 HYPOTHYROIDISM DUE TO ACQUIRED ATROPHY OF THYROID: ICD-10-CM

## 2024-12-05 RX ORDER — LEVOTHYROXINE SODIUM 137 UG/1
137 TABLET ORAL DAILY
Qty: 90 TABLET | Refills: 3 | Status: SHIPPED | OUTPATIENT
Start: 2024-12-05

## 2024-12-08 ENCOUNTER — OFFICE VISIT (OUTPATIENT)
Dept: URGENT CARE | Facility: URGENT CARE | Age: 59
End: 2024-12-08
Payer: COMMERCIAL

## 2024-12-08 VITALS
WEIGHT: 206 LBS | TEMPERATURE: 98 F | DIASTOLIC BLOOD PRESSURE: 77 MMHG | RESPIRATION RATE: 18 BRPM | OXYGEN SATURATION: 98 % | BODY MASS INDEX: 27.69 KG/M2 | HEART RATE: 70 BPM | SYSTOLIC BLOOD PRESSURE: 113 MMHG

## 2024-12-08 DIAGNOSIS — N18.31 CHRONIC KIDNEY DISEASE, STAGE 3A (H): ICD-10-CM

## 2024-12-08 DIAGNOSIS — S39.012A LOW BACK STRAIN, INITIAL ENCOUNTER: Primary | ICD-10-CM

## 2024-12-08 DIAGNOSIS — H93.11 TINNITUS, RIGHT: ICD-10-CM

## 2024-12-08 PROCEDURE — 99213 OFFICE O/P EST LOW 20 MIN: CPT | Performed by: PHYSICIAN ASSISTANT

## 2024-12-08 RX ORDER — CYCLOBENZAPRINE HCL 10 MG
10 TABLET ORAL 3 TIMES DAILY PRN
Qty: 30 TABLET | Refills: 0 | Status: SHIPPED | OUTPATIENT
Start: 2024-12-08

## 2024-12-08 ASSESSMENT — PAIN SCALES - GENERAL: PAINLEVEL_OUTOF10: WORST PAIN (10)

## 2024-12-08 ASSESSMENT — ENCOUNTER SYMPTOMS
CARDIOVASCULAR NEGATIVE: 1
BACK PAIN: 1
DYSURIA: 0
CHILLS: 0
CONSTITUTIONAL NEGATIVE: 1
HEMATURIA: 0
ABDOMINAL PAIN: 0
PALPITATIONS: 0
ALLERGIC/IMMUNOLOGIC NEGATIVE: 1
NEUROLOGICAL NEGATIVE: 1
HEADACHES: 0
SHORTNESS OF BREATH: 0
VOMITING: 0
MYALGIAS: 0
COUGH: 0
FEVER: 0
WHEEZING: 0
SORE THROAT: 0
CHEST TIGHTNESS: 0
FREQUENCY: 0
DIARRHEA: 0
GASTROINTESTINAL NEGATIVE: 1
RESPIRATORY NEGATIVE: 1
NAUSEA: 0
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
## 2024-12-12 ENCOUNTER — THERAPY VISIT (OUTPATIENT)
Dept: PHYSICAL THERAPY | Facility: CLINIC | Age: 59
End: 2024-12-12
Payer: COMMERCIAL

## 2024-12-12 DIAGNOSIS — M25.552 HIP PAIN, LEFT: Primary | ICD-10-CM

## 2024-12-23 ENCOUNTER — MYC REFILL (OUTPATIENT)
Dept: FAMILY MEDICINE | Facility: CLINIC | Age: 59
End: 2024-12-23
Payer: COMMERCIAL

## 2024-12-23 DIAGNOSIS — B00.9 RECURRENT HERPES SIMPLEX: ICD-10-CM

## 2024-12-24 ENCOUNTER — MYC REFILL (OUTPATIENT)
Dept: FAMILY MEDICINE | Facility: CLINIC | Age: 59
End: 2024-12-24
Payer: COMMERCIAL

## 2024-12-24 DIAGNOSIS — E78.5 HYPERLIPIDEMIA, UNSPECIFIED HYPERLIPIDEMIA TYPE: ICD-10-CM

## 2024-12-24 RX ORDER — ACYCLOVIR 400 MG/1
400 TABLET ORAL EVERY 8 HOURS
Qty: 270 TABLET | Refills: 2 | Status: SHIPPED | OUTPATIENT
Start: 2024-12-24

## 2024-12-24 RX ORDER — ATORVASTATIN CALCIUM 20 MG/1
20 TABLET, FILM COATED ORAL DAILY
Qty: 90 TABLET | Refills: 0 | Status: SHIPPED | OUTPATIENT
Start: 2024-12-24

## 2024-12-26 ENCOUNTER — THERAPY VISIT (OUTPATIENT)
Dept: PHYSICAL THERAPY | Facility: CLINIC | Age: 59
End: 2024-12-26
Payer: COMMERCIAL

## 2024-12-26 DIAGNOSIS — M25.552 HIP PAIN, LEFT: Primary | ICD-10-CM

## 2025-01-23 ENCOUNTER — ANCILLARY PROCEDURE (OUTPATIENT)
Dept: CT IMAGING | Facility: CLINIC | Age: 60
End: 2025-01-23
Attending: INTERNAL MEDICINE
Payer: COMMERCIAL

## 2025-01-23 ENCOUNTER — LAB (OUTPATIENT)
Dept: LAB | Facility: CLINIC | Age: 60
End: 2025-01-23
Payer: COMMERCIAL

## 2025-01-23 DIAGNOSIS — C64.1 PRIMARY MALIGNANT NEOPLASM OF RIGHT KIDNEY WITH METASTASIS FROM KIDNEY TO OTHER SITE (H): ICD-10-CM

## 2025-01-23 DIAGNOSIS — D49.511 NEOPLASM OF RIGHT KIDNEY WITH THROMBUS OF INFERIOR VENA CAVA (H): ICD-10-CM

## 2025-01-23 DIAGNOSIS — E03.4 HYPOTHYROIDISM DUE TO ACQUIRED ATROPHY OF THYROID: ICD-10-CM

## 2025-01-23 DIAGNOSIS — N18.4 CKD (CHRONIC KIDNEY DISEASE) STAGE 4, GFR 15-29 ML/MIN (H): ICD-10-CM

## 2025-01-23 DIAGNOSIS — K25.9 GASTRIC EROSION, UNSPECIFIED ULCER CHRONICITY: ICD-10-CM

## 2025-01-23 DIAGNOSIS — I82.220 NEOPLASM OF RIGHT KIDNEY WITH THROMBUS OF INFERIOR VENA CAVA (H): ICD-10-CM

## 2025-01-23 LAB
ALBUMIN SERPL BCG-MCNC: 4.3 G/DL (ref 3.5–5.2)
ALP SERPL-CCNC: 105 U/L (ref 40–150)
ALT SERPL W P-5'-P-CCNC: 12 U/L (ref 0–70)
ANION GAP SERPL CALCULATED.3IONS-SCNC: 8 MMOL/L (ref 7–15)
AST SERPL W P-5'-P-CCNC: 17 U/L (ref 0–45)
BASOPHILS # BLD AUTO: 0 10E3/UL (ref 0–0.2)
BASOPHILS NFR BLD AUTO: 1 %
BILIRUB SERPL-MCNC: 0.4 MG/DL
BUN SERPL-MCNC: 22.5 MG/DL (ref 8–23)
CALCIUM SERPL-MCNC: 9.6 MG/DL (ref 8.8–10.4)
CHLORIDE SERPL-SCNC: 104 MMOL/L (ref 98–107)
CREAT SERPL-MCNC: 1.74 MG/DL (ref 0.67–1.17)
EGFRCR SERPLBLD CKD-EPI 2021: 45 ML/MIN/1.73M2
EOSINOPHIL # BLD AUTO: 0.2 10E3/UL (ref 0–0.7)
EOSINOPHIL NFR BLD AUTO: 3 %
ERYTHROCYTE [DISTWIDTH] IN BLOOD BY AUTOMATED COUNT: 15.6 % (ref 10–15)
GLUCOSE SERPL-MCNC: 99 MG/DL (ref 70–99)
HCO3 SERPL-SCNC: 29 MMOL/L (ref 22–29)
HCT VFR BLD AUTO: 43.4 % (ref 40–53)
HGB BLD-MCNC: 13.8 G/DL (ref 13.3–17.7)
IMM GRANULOCYTES # BLD: 0 10E3/UL
IMM GRANULOCYTES NFR BLD: 0 %
LYMPHOCYTES # BLD AUTO: 1.8 10E3/UL (ref 0.8–5.3)
LYMPHOCYTES NFR BLD AUTO: 31 %
MCH RBC QN AUTO: 26.7 PG (ref 26.5–33)
MCHC RBC AUTO-ENTMCNC: 31.8 G/DL (ref 31.5–36.5)
MCV RBC AUTO: 84 FL (ref 78–100)
MONOCYTES # BLD AUTO: 0.4 10E3/UL (ref 0–1.3)
MONOCYTES NFR BLD AUTO: 8 %
NEUTROPHILS # BLD AUTO: 3.3 10E3/UL (ref 1.6–8.3)
NEUTROPHILS NFR BLD AUTO: 57 %
NRBC # BLD AUTO: 0 10E3/UL
NRBC BLD AUTO-RTO: 0 /100
PLATELET # BLD AUTO: 281 10E3/UL (ref 150–450)
POTASSIUM SERPL-SCNC: 4.2 MMOL/L (ref 3.4–5.3)
PROT SERPL-MCNC: 7.2 G/DL (ref 6.4–8.3)
RBC # BLD AUTO: 5.17 10E6/UL (ref 4.4–5.9)
SODIUM SERPL-SCNC: 141 MMOL/L (ref 135–145)
TSH SERPL DL<=0.005 MIU/L-ACNC: 0.48 UIU/ML (ref 0.3–4.2)
WBC # BLD AUTO: 5.7 10E3/UL (ref 4–11)

## 2025-01-23 PROCEDURE — 74176 CT ABD & PELVIS W/O CONTRAST: CPT | Performed by: RADIOLOGY

## 2025-01-23 PROCEDURE — 36415 COLL VENOUS BLD VENIPUNCTURE: CPT | Performed by: PATHOLOGY

## 2025-01-23 PROCEDURE — 84443 ASSAY THYROID STIM HORMONE: CPT | Performed by: PATHOLOGY

## 2025-01-23 PROCEDURE — 80053 COMPREHEN METABOLIC PANEL: CPT | Performed by: PATHOLOGY

## 2025-01-23 PROCEDURE — 85025 COMPLETE CBC W/AUTO DIFF WBC: CPT | Performed by: PATHOLOGY

## 2025-01-23 PROCEDURE — 71250 CT THORAX DX C-: CPT | Performed by: RADIOLOGY

## 2025-01-27 ENCOUNTER — MYC REFILL (OUTPATIENT)
Dept: FAMILY MEDICINE | Facility: CLINIC | Age: 60
End: 2025-01-27
Payer: COMMERCIAL

## 2025-01-27 DIAGNOSIS — N18.4 CKD (CHRONIC KIDNEY DISEASE) STAGE 4, GFR 15-29 ML/MIN (H): ICD-10-CM

## 2025-02-11 ENCOUNTER — ONCOLOGY VISIT (OUTPATIENT)
Dept: ONCOLOGY | Facility: CLINIC | Age: 60
End: 2025-02-11
Attending: INTERNAL MEDICINE
Payer: COMMERCIAL

## 2025-02-11 VITALS
BODY MASS INDEX: 28.48 KG/M2 | RESPIRATION RATE: 14 BRPM | SYSTOLIC BLOOD PRESSURE: 110 MMHG | WEIGHT: 211.9 LBS | TEMPERATURE: 98.6 F | OXYGEN SATURATION: 100 % | HEART RATE: 84 BPM | DIASTOLIC BLOOD PRESSURE: 74 MMHG

## 2025-02-11 DIAGNOSIS — N18.4 CKD (CHRONIC KIDNEY DISEASE) STAGE 4, GFR 15-29 ML/MIN (H): ICD-10-CM

## 2025-02-11 DIAGNOSIS — C64.1 PRIMARY MALIGNANT NEOPLASM OF RIGHT KIDNEY WITH METASTASIS FROM KIDNEY TO OTHER SITE (H): Primary | ICD-10-CM

## 2025-02-11 DIAGNOSIS — E03.4 HYPOTHYROIDISM DUE TO ACQUIRED ATROPHY OF THYROID: ICD-10-CM

## 2025-02-11 PROCEDURE — 99215 OFFICE O/P EST HI 40 MIN: CPT | Performed by: INTERNAL MEDICINE

## 2025-02-11 PROCEDURE — G2211 COMPLEX E/M VISIT ADD ON: HCPCS | Performed by: INTERNAL MEDICINE

## 2025-02-11 PROCEDURE — 99213 OFFICE O/P EST LOW 20 MIN: CPT | Performed by: INTERNAL MEDICINE

## 2025-02-11 ASSESSMENT — PAIN SCALES - GENERAL: PAINLEVEL_OUTOF10: NO PAIN (0)

## 2025-02-11 NOTE — NURSING NOTE
"Oncology Rooming Note    February 11, 2025 10:14 AM   Dimitrios Goldberg is a 59 year old male who presents for:    Chief Complaint   Patient presents with    Oncology Clinic Visit     Renal cell carcinoma, right     Initial Vitals: /74 (BP Location: Right arm, Patient Position: Sitting, Cuff Size: Adult Regular)   Pulse 84   Temp 98.6  F (37  C) (Oral)   Resp 14   Wt 96.1 kg (211 lb 14.4 oz)   SpO2 100%   BMI 28.48 kg/m   Estimated body mass index is 28.48 kg/m  as calculated from the following:    Height as of 10/10/24: 1.837 m (6' 0.32\").    Weight as of this encounter: 96.1 kg (211 lb 14.4 oz). Body surface area is 2.21 meters squared.  No Pain (0) Comment: Data Unavailable   No LMP for male patient.  Allergies reviewed: Yes  Medications reviewed: Yes    Medications: Medication refills not needed today.  Pharmacy name entered into [email or web address]: [id number] [place] [address]    Frailty Screening:   Is the patient here for a new oncology consult visit in cancer care? 2. No      Clinical concerns: Patient states no new concerns to discuss with provider.        Bhavesh Bates, EMT            "
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
## 2025-02-13 PROBLEM — M25.552 HIP PAIN, LEFT: Status: RESOLVED | Noted: 2024-11-14 | Resolved: 2025-02-13

## 2025-02-19 ENCOUNTER — PATIENT OUTREACH (OUTPATIENT)
Dept: CARE COORDINATION | Facility: CLINIC | Age: 60
End: 2025-02-19
Payer: COMMERCIAL

## 2025-02-23 DIAGNOSIS — K25.9 GASTRIC EROSION, UNSPECIFIED ULCER CHRONICITY: ICD-10-CM

## 2025-02-25 RX ORDER — OMEPRAZOLE 40 MG/1
CAPSULE, DELAYED RELEASE ORAL
Qty: 90 CAPSULE | Refills: 1 | Status: SHIPPED | OUTPATIENT
Start: 2025-02-25

## 2025-02-25 NOTE — TELEPHONE ENCOUNTER
Omeprazole Refill   Last prescribing provider: DR Campos     Last clinic visit date: 2/11/25 Dr Campos     Recommendations for requested medication (if none, N/A): Copied from chart note   omeprazole (PRILOSEC) 40 MG DR capsule TAKE 1 CAPSULE BY MOUTH EVERY DAY     Any other pertinent information (if none, N/A): N/A    Refilled: Y/N, if NO, why?

## 2025-02-27 ENCOUNTER — PATIENT OUTREACH (OUTPATIENT)
Dept: CARE COORDINATION | Facility: CLINIC | Age: 60
End: 2025-02-27
Payer: COMMERCIAL

## 2025-03-05 ENCOUNTER — PATIENT OUTREACH (OUTPATIENT)
Dept: CARE COORDINATION | Facility: CLINIC | Age: 60
End: 2025-03-05
Payer: COMMERCIAL

## 2025-03-22 NOTE — PROGRESS NOTES
PROCEDURE ENCOUNTER    [facility]  Dick Adrian, DO  2025     Name: Dimitrios Goldberg  MRN: [id number]  Age: 60 year old  : 1965    Referring provider:   Diagnosis:  10/11/24: Arthritis of left hip  -     Orthopedic  Referral  -     Physical Therapy  Referral; Future        59-year-old male with a past medical history of right osteoarthrosis of the hip, presenting with left inguinal pain, consistent with gout of osteoarthrosis as well.  X-rays obtained and reviewed with him today.  Physical exam is consistent.  I have recommended the patient embarking in physical therapy.  Order has been placed.  Have also recommended, given the pain, and intra-articular ultrasound-guided injection for the left hip.  Patient is amenable.  We have assisted in getting him scheduled.  We will see him back for planned injection.  All questions answered.  Return precautions advised    Study Result    Narrative & Impression   HIP TWO VIEWS LEFT 10/10/2024 4:25 PM      HISTORY: Arthritis of left hip  COMPARISON: 3/31/2023                                                                      IMPRESSION: No acute fracture or malalignment. Mild left hip joint  degenerative changes.       Interval hx: Dimitrios returns to clinic today for follow-up of the left hip.  Last injection was in October, lasted roughly a month.  Patient did return to bowling, roughly 5 days after the injection.  He is still having pain now in the anterior groin.  No instability of the hip.  He would like another injection today.  We have reviewed his previous x-rays, and discussed arthrosis in general.  Have discussed another injection today, and if the patient is having less than 3 months worth of pain relief I would recommend an MRI.  We can review that together, and then likely have to refer him to one of our orthopedic hip surgeons if he is failing conservative treatment options.  Patient is on board with this.  All questions  answered.  See below procedure note for injection today.      Procedure: Intraarticular Hip Injection - Ultrasound Guided  The patient was informed of the risks and the benefits of the procedure and a written consent was signed.  The patient s left hip was prepped with chlorhexidine in sterile fashion.   40 mg of triamcinolone suspension was drawn up into a 5 mL syringe with 4 mL of 1% lidocaine.  Injection was performed using sterile technique.  Under ultrasound guidance a 3.5-inch 22-gauge needle was used to enter the left femoracetabular joint.  Anterior approach was used, needle placement was visualized and documented with ultrasound.  Ultrasound visualization was necessary due to decreased joint space in the setting of osteoarthritis.  Injection performed long axis to the probe.  Injection solution visualized within the joint space.  Images were permanently stored for the patient's record.  There were no complications. The patient tolerated the procedure well. There was negligible bleeding.   The patient was instructed to ice the hip upon leaving clinic and refrain from overuse over the next 3 days.   The patient was instructed to call or go to the emergency room with any unusual pain, swelling, redness, or if otherwise concerned.  Dick Adrian D.O., [facility]  , Sports Medicine  Department of Family [facility] and [facility]    Large Joint Injection: L hip joint    Date/Time: 3/24/2025 9:42 AM    Performed by: Dick Adrian DO  Authorized by: Dick Adrian DO    Indications:  Pain  Needle Size:  22 G  Guidance: ultrasound    Approach:  Anterior  Location:  Hip      Site:  L hip joint  Medications:  40 mg triamcinolone 40 MG/ML; 4 mL lidocaine (PF) 1 %  Outcome:  Tolerated well, no immediate complications  Procedure discussed: discussed risks, benefits, and alternatives    Consent Given by:  Patient  Timeout: timeout called immediately prior to procedure     Prep: patient was prepped and draped in usual sterile fashion

## 2025-03-24 ENCOUNTER — OFFICE VISIT (OUTPATIENT)
Dept: ORTHOPEDICS | Facility: CLINIC | Age: 60
End: 2025-03-24
Payer: COMMERCIAL

## 2025-03-24 DIAGNOSIS — M16.12 ARTHRITIS OF LEFT HIP: Primary | ICD-10-CM

## 2025-03-24 PROCEDURE — 20611 DRAIN/INJ JOINT/BURSA W/US: CPT | Mod: LT | Performed by: STUDENT IN AN ORGANIZED HEALTH CARE EDUCATION/TRAINING PROGRAM

## 2025-03-24 PROCEDURE — 1125F AMNT PAIN NOTED PAIN PRSNT: CPT | Performed by: STUDENT IN AN ORGANIZED HEALTH CARE EDUCATION/TRAINING PROGRAM

## 2025-03-24 RX ORDER — TRIAMCINOLONE ACETONIDE 40 MG/ML
40 INJECTION, SUSPENSION INTRA-ARTICULAR; INTRAMUSCULAR
Status: COMPLETED | OUTPATIENT
Start: 2025-03-24 | End: 2025-03-24

## 2025-03-24 RX ORDER — LIDOCAINE HYDROCHLORIDE 10 MG/ML
4 INJECTION, SOLUTION EPIDURAL; INFILTRATION; INTRACAUDAL; PERINEURAL
Status: COMPLETED | OUTPATIENT
Start: 2025-03-24 | End: 2025-03-24

## 2025-03-24 RX ADMIN — LIDOCAINE HYDROCHLORIDE 4 ML: 10 INJECTION, SOLUTION EPIDURAL; INFILTRATION; INTRACAUDAL; PERINEURAL at 09:42

## 2025-03-24 RX ADMIN — TRIAMCINOLONE ACETONIDE 40 MG: 40 INJECTION, SUSPENSION INTRA-ARTICULAR; INTRAMUSCULAR at 09:42

## 2025-03-24 NOTE — LETTER
3/24/2025      RE: Dimitrios Goldberg  [address]  [place] [address]     Dear Colleague,    Thank you for referring your patient, Dimitrios Goldberg, to the [facility] [place]. Please see a copy of my visit note below.    PROCEDURE ENCOUNTER    [facility]  Dick DOSHI Adrian, DO  2025     Name: Dimitrois Goldberg  MRN: [id number]  Age: 60 year old  : 1965    Referring provider:   Diagnosis:  10/11/24: Arthritis of left hip  -     Orthopedic  Referral  -     Physical Therapy  Referral; Future        59-year-old male with a past medical history of right osteoarthrosis of the hip, presenting with left inguinal pain, consistent with gout of osteoarthrosis as well.  X-rays obtained and reviewed with him today.  Physical exam is consistent.  I have recommended the patient embarking in physical therapy.  Order has been placed.  Have also recommended, given the pain, and intra-articular ultrasound-guided injection for the left hip.  Patient is amenable.  We have assisted in getting him scheduled.  We will see him back for planned injection.  All questions answered.  Return precautions advised    Study Result    Narrative & Impression   HIP TWO VIEWS LEFT 10/10/2024 4:25 PM      HISTORY: Arthritis of left hip  COMPARISON: 3/31/2023                                                                      IMPRESSION: No acute fracture or malalignment. Mild left hip joint  degenerative changes.       Interval hx: Dimitrios returns to clinic today for follow-up of the left hip.  Last injection was in October, lasted roughly a month.  Patient did return to [place], roughly 5 days after the injection.  He is still having pain now in the anterior groin.  No instability of the hip.  He would like another injection today.  We have reviewed his previous x-rays, and discussed arthrosis in general.  Have discussed another injection today, and if the patient is  having less than 3 months worth of pain relief I would recommend an MRI.  We can review that together, and then likely have to refer him to one of our orthopedic hip surgeons if he is failing conservative treatment options.  Patient is on board with this.  All questions answered.  See below procedure note for injection today.      Procedure: Intraarticular Hip Injection - Ultrasound Guided  The patient was informed of the risks and the benefits of the procedure and a written consent was signed.  The patient s left hip was prepped with chlorhexidine in sterile fashion.   40 mg of triamcinolone suspension was drawn up into a 5 mL syringe with 4 mL of 1% lidocaine.  Injection was performed using sterile technique.  Under ultrasound guidance a 3.5-inch 22-gauge needle was used to enter the left femoracetabular joint.  Anterior approach was used, needle placement was visualized and documented with ultrasound.  Ultrasound visualization was necessary due to decreased joint space in the setting of osteoarthritis.  Injection performed long axis to the probe.  Injection solution visualized within the joint space.  Images were permanently stored for the patient's record.  There were no complications. The patient tolerated the procedure well. There was negligible bleeding.   The patient was instructed to ice the hip upon leaving clinic and refrain from overuse over the next 3 days.   The patient was instructed to call or go to the emergency room with any unusual pain, swelling, redness, or if otherwise concerned.  Dick Adrian D.O., [facility]  , Sports Medicine  Department of Family Medicine and [facility]    Large Joint Injection: L hip joint    Date/Time: 3/24/2025 9:42 AM    Performed by: Dick Adrian DO  Authorized by: Dick Adrian DO    Indications:  Pain  Needle Size:  22 G  Guidance: ultrasound    Approach:  Anterior  Location:  Hip      Site:  L hip  joint  Medications:  40 mg triamcinolone 40 MG/ML; 4 mL lidocaine (PF) 1 %  Outcome:  Tolerated well, no immediate complications  Procedure discussed: discussed risks, benefits, and alternatives    Consent Given by:  Patient  Timeout: timeout called immediately prior to procedure    Prep: patient was prepped and draped in usual sterile fashion            Again, thank you for allowing me to participate in the care of your patient.      Sincerely,    Dick Adrian, DO

## 2025-03-24 NOTE — NURSING NOTE
[address] [address]  Dept: [phone]  ______________________________________________________________________________    Patient: Dimitrios Goldberg   : 1965   MRN: [id number]   2025    INVASIVE PROCEDURE SAFETY CHECKLIST    Date: 2025   Procedure: Left hip IA USGI  Patient Name: Dimitrios Goldberg  MRN: [id number]  YOB: 1965    Action: Complete sections as appropriate. Any discrepancy results in a HARD COPY until resolved.     PRE PROCEDURE:  Patient ID verified with 2 identifiers (name and  or MRN): Yes  Procedure and site verified with patient/designee (when able): Yes  Accurate consent documentation in medical record: Yes  H&P (or appropriate assessment) documented in medical record: Yes  H&P must be up to 20 days prior to procedure and updates within 24 hours of procedure as applicable: NA  Relevant diagnostic and radiology test results appropriately labeled and displayed as applicable: Yes  Procedure site(s) marked with provider initials: NA    TIMEOUT:  Time-Out performed immediately prior to starting procedure, including verbal and active participation of all team members addressing the following:Yes  * Correct patient identify  * Confirmed that the correct side and site are marked  * An accurate procedure consent form  * Agreement on the procedure to be done  * Correct patient position  * Relevant images and results are properly labeled and appropriately displayed  * The need to administer antibiotics or fluids for irrigation purposes during the procedure as applicable   * Safety precautions based on patient history or medication use    DURING PROCEDURE: Verification of correct person, site, and procedures any time the responsibility for care of the patient is transferred to another member of the care team.       Prior to injection, verified patient identity using patient's name and date of  birth.  Due to injection administration, patient instructed to remain in clinic for 15 minutes  afterwards, and to report any adverse reaction to me immediately.    Joint injection was performed.      Drug Amount Wasted:  Yes: 1 mg/ml   Vial/Syringe: Single dose vial  Expiration Date:  07/2028      Marsha Parekh, ATC  March 24, 2025

## 2025-04-02 ENCOUNTER — TELEPHONE (OUTPATIENT)
Dept: OPHTHALMOLOGY | Facility: CLINIC | Age: 60
End: 2025-04-02
Payer: COMMERCIAL

## 2025-04-02 NOTE — TELEPHONE ENCOUNTER
[facility] Call Center    Phone Message    May a detailed message be left on voicemail: yes     Reason for Call: pt advised he has already seen Dr Orozco in retina in 2024 and pt was advised at those appts nothing was wrong with pts retina, this is why pt is scheduled with Dr Kay for the next step, pt advised he wants to be the one called to discuss this and not to call pts wife. Pt is wanting to keep his appt on 4/3/25 with Dr Kay Thank you     Action Taken: Message routed to:  [facility]    Travel Screening: Not Applicable     Date of Service:

## 2025-04-15 ENCOUNTER — OFFICE VISIT (OUTPATIENT)
Dept: OPHTHALMOLOGY | Facility: CLINIC | Age: 60
End: 2025-04-15
Attending: OPHTHALMOLOGY
Payer: COMMERCIAL

## 2025-04-15 DIAGNOSIS — H43.393 VITREOUS SYNERESIS OF BOTH EYES: ICD-10-CM

## 2025-04-15 DIAGNOSIS — Z96.1 PSEUDOPHAKIA OF BOTH EYES: Primary | ICD-10-CM

## 2025-04-15 DIAGNOSIS — H35.373 EPIRETINAL MEMBRANE (ERM) OF BOTH EYES: ICD-10-CM

## 2025-04-15 DIAGNOSIS — H04.123 BILATERAL DRY EYES: ICD-10-CM

## 2025-04-15 DIAGNOSIS — H52.211 IRREGULAR ASTIGMATISM OF RIGHT EYE: ICD-10-CM

## 2025-04-15 PROCEDURE — 92134 CPTRZ OPH DX IMG PST SGM RTA: CPT | Performed by: OPHTHALMOLOGY

## 2025-04-15 PROCEDURE — 99212 OFFICE O/P EST SF 10 MIN: CPT | Performed by: OPHTHALMOLOGY

## 2025-04-15 PROCEDURE — 92025 CPTRIZED CORNEAL TOPOGRAPHY: CPT | Performed by: OPHTHALMOLOGY

## 2025-04-15 PROCEDURE — 99214 OFFICE O/P EST MOD 30 MIN: CPT | Mod: GC | Performed by: OPHTHALMOLOGY

## 2025-04-15 RX ORDER — LIFITEGRAST 50 MG/ML
1 SOLUTION/ DROPS OPHTHALMIC 2 TIMES DAILY
Qty: 90 EACH | Refills: 3 | Status: SHIPPED | OUTPATIENT
Start: 2025-04-15

## 2025-04-15 ASSESSMENT — REFRACTION_WEARINGRX
OD_SPHERE: -2.25
OS_VBASE: DOWN
SPECS_TYPE: PAL
OD_VBASE: UP
OS_ADD: +2.50
OS_CYLINDER: +1.00
OS_VPRISM: 0.5
OD_ADD: +2.50
OS_SPHERE: -1.50
OS_AXIS: 040
OD_AXIS: 104
OD_CYLINDER: +1.00
OD_VPRISM: 0.5

## 2025-04-15 ASSESSMENT — SLIT LAMP EXAM - LIDS
COMMENTS: PTOSIS, MEIBOMIAN GLAND DYSFUNCTION
COMMENTS: PTOSIS, MEIBOMIAN GLAND DYSFUNCTION

## 2025-04-15 ASSESSMENT — CONF VISUAL FIELD
OS_SUPERIOR_TEMPORAL_RESTRICTION: 0
OS_INFERIOR_NASAL_RESTRICTION: 0
OS_NORMAL: 1
OS_SUPERIOR_NASAL_RESTRICTION: 0
OS_INFERIOR_TEMPORAL_RESTRICTION: 0

## 2025-04-15 ASSESSMENT — REFRACTION_MANIFEST
OD_AXIS: 145
OS_AXIS: 042
OS_CYLINDER: +1.00
OD_SPHERE: -2.00
OD_ADD: +2.50
OS_ADD: +2.50
OS_SPHERE: -1.50
OD_CYLINDER: +1.00

## 2025-04-15 ASSESSMENT — VISUAL ACUITY
OD_CC: 20/20
METHOD_MR: RED/GREEN BALANCE
METHOD: SNELLEN - LINEAR
OS_CC: 20/20
OD_CC+: -2

## 2025-04-15 ASSESSMENT — EXTERNAL EXAM - RIGHT EYE: OD_EXAM: NORMAL

## 2025-04-15 ASSESSMENT — TONOMETRY
IOP_METHOD: TONOPEN
OD_IOP_MMHG: 19
OS_IOP_MMHG: 17

## 2025-04-15 ASSESSMENT — EXTERNAL EXAM - LEFT EYE: OS_EXAM: NORMAL

## 2025-04-15 ASSESSMENT — CUP TO DISC RATIO
OD_RATIO: 0.3
OS_RATIO: 0.3

## 2025-04-15 NOTE — PROGRESS NOTES
"HPI       Annual Eye Exam    In both eyes.  Onset was gradual.  Associated symptoms include floaters.  Negative for dryness and eye pain.  Pain was noted as 0/10.             Comments    Dimitrios is here for a complete eye exam History of vitreous syneresis of both eyes. He states right eye sees a \"glob\" that interferes with vision when he adjusts vision. Also has glare with right eye. History of cataract surgery of both eyes. Current glasses don't seem to work well anymore.    Kofi Tsang COT 7:43 AM April 15, 2025             Last edited by Kofi Tsang on 4/15/2025  7:43 AM.          Review of systems for the eyes was negative other than the pertinent positives/negatives listed in the HPI.      Past ocular history:   - Pars plana vitrectomy (PPV) for floaters ([place] 2021 OS, ? 2019 OD)  - CEIOL Both eyes   - IOL exchange OD (2/2 symfony/multifocal intolerance)     Assessment & Plan      Dimitrios Goldberg is a 60 year old male with the following diagnoses:   1. Pseudophakia of both eyes    2. Vitreous syneresis of both eyes    3. Epiretinal membrane (ERM) of both eyes    4. Bilateral dry eyes    5. Irregular astigmatism of right eye         Start xiidra twice a day in both eyes   Continue artificial tears twice a day and as needed   Hold on glasses changes today    Recheck in 2-3 months with repeat refraction    No laser for floaters at this time  Can consider pars plana vitrectomy (PPV) v dilute atropine  RBA reviewed, will monitor at this time        Patient disposition:   Return in about 3 months (around 7/15/2025) for VT only, Refraction.           Attending Physician Attestation:  Complete documentation of historical and exam elements from today's encounter can be found in the full encounter summary report (not reduplicated in this progress note).  I personally obtained the chief complaint(s) and history of present illness.  I confirmed and edited as necessary the review of systems, past medical/surgical " history, family history, social history, and examination findings as documented by others; and I examined the patient myself.  I personally reviewed the relevant tests, images, and reports as documented above.  I formulated and edited as necessary the assessment and plan and discussed the findings and management plan with the patient and family. . - Navdeep Barba MD

## 2025-04-15 NOTE — NURSING NOTE
"Chief Complaints and History of Present Illnesses   Patient presents with    Annual Eye Exam     Chief Complaint(s) and History of Present Illness(es)       Annual Eye Exam              Laterality: both eyes    Onset: gradual    Associated symptoms: floaters.  Negative for dryness and eye pain    Pain scale: 0/10              Comments    Dimitrios is here for a complete eye exam History of vitreous syneresis of both eyes. He states right eye sees a \"glob\" that interferes with vision when he adjusts vision. Also has glare with right eye. History of cataract surgery of both eyes. Current glasses don't seem to work well anymore.    Kofi Tsang COT 7:43 AM April 15, 2025                      "

## 2025-04-15 NOTE — PROGRESS NOTES
"HPI       Annual Eye Exam    In both eyes.  Onset was gradual.  Associated symptoms include floaters.  Negative for dryness and eye pain.  Pain was noted as 0/10.             Comments    Dimitrios is here for a complete eye exam History of vitreous syneresis of both eyes. He states right eye sees a \"glob\" that interferes with vision when he adjusts vision. Also has glare with right eye. History of cataract surgery of both eyes. Current glasses don't seem to work well anymore.    Kofi Tsang COT 7:43 AM April 15, 2025             Last edited by Koif Tsang on 4/15/2025  7:43 AM.          Review of systems for the eyes was negative other than the pertinent positives/negatives listed in the HPI.      Past ocular history:   - Floaterectomy both eyes??? ([place] 2021 OS, ? 2019 OD)  - CEIOL OU  - IOL exchange OD (2/2 symfony/multifocal intolerance)   -S/p YAG each eye     Assessment & Plan      Dimitrios Goldberg is a 60 year old male with the following diagnoses:   1. Pseudophakia of both eyes    2. Vitreous syneresis of both eyes     Va 20/20 with new Mrx, IOP 19/17. OCT macula with trace ERM with no traction otherwise no acute pathology in either eye. Corneal topography with mild astigmatism both eyes, otherwise unremarkable.    Patient's symptoms appear to be most likely 2/2 to vitreous floaters. Describes the visual obscuration as \"amoeba floating in my vision\".    PLAN:  -Continue artificial tear use, both eyes      Patient disposition:   No follow-ups on file.    Bing Rousseau MD  Ophthalmology PGY-2   [facility]           @ELÍAS@   "

## 2025-04-17 ENCOUNTER — OFFICE VISIT (OUTPATIENT)
Dept: URGENT CARE | Facility: URGENT CARE | Age: 60
End: 2025-04-17
Payer: COMMERCIAL

## 2025-04-17 VITALS
OXYGEN SATURATION: 98 % | TEMPERATURE: 97.3 F | BODY MASS INDEX: 28.29 KG/M2 | DIASTOLIC BLOOD PRESSURE: 79 MMHG | HEART RATE: 80 BPM | SYSTOLIC BLOOD PRESSURE: 113 MMHG | RESPIRATION RATE: 16 BRPM | WEIGHT: 210.5 LBS

## 2025-04-17 DIAGNOSIS — M25.552 HIP PAIN, LEFT: Primary | ICD-10-CM

## 2025-04-17 RX ORDER — PREDNISONE 20 MG/1
20 TABLET ORAL 2 TIMES DAILY
Qty: 10 TABLET | Refills: 0 | Status: SHIPPED | OUTPATIENT
Start: 2025-04-17 | End: 2025-04-22

## 2025-04-17 RX ORDER — CYCLOBENZAPRINE HCL 10 MG
10 TABLET ORAL 2 TIMES DAILY PRN
Qty: 14 TABLET | Refills: 0 | Status: SHIPPED | OUTPATIENT
Start: 2025-04-17 | End: 2025-04-24

## 2025-04-17 ASSESSMENT — PAIN SCALES - GENERAL: PAINLEVEL_OUTOF10: SEVERE PAIN (10)

## 2025-04-17 NOTE — Clinical Note
April 17, 2025      Dimitrios Veganor  [address]  [place] [facility] [address]        To Whom It May Concern:    Dimitrios Veganor  was seen on ***.  Please excuse him  until *** due to {WORK EXCUSE:286308}.        Sincerely,        PILY Pedersen CNP    Electronically signed

## 2025-04-17 NOTE — PROGRESS NOTES
Urgent Care Clinic Visit    Chief Complaint   Patient presents with    Hip Pain     C/o right hip pain onset Tuesday - constant pain, pain worsen when walking or standing. Has not taken any medication for pain. Pain 10/10                4/17/2025    10:29 AM   Additional Questions   Roomed by milagro sylvester   Accompanied by n/a

## 2025-04-17 NOTE — PROGRESS NOTES
"Assessment & Plan      Diagnosis Comments   1. Hip pain, left  predniSONE (DELTASONE) 20 MG tablet, cyclobenzaprine (FLEXERIL) 10 MG tablet   Acute onset of hip pain with known left osteoarthritis and recent steroid injection. SI joint inflammation less likely given localization of pain, but cannot be eliminated. Given this presentation is similar to patient's known history of OA, most likely worsening of inflammation in the joint. Will have patient complete burst of prednisone, utilize flexeril for pain management, and follow up with orthopedics team about change in pain. Along with tylenol will avoid Nsaids due to history of CKD. Patient agreeable to this plan and stated understanding.             Lester Goldman DNP Student 4/17/2025 11:57 AM    I saw and evaluated the patient and agree with the findings and plan of care as documented in the note.    Ashly Jain [facility] ADMCKAY Plunkett is a 60 year old male who presents to clinic today for the following health issues:  Chief Complaint   Patient presents with    Hip Pain         4/17/2025    10:29 AM   Additional Questions   Roomed by milagro sylvester   Accompanied by n/a     HPI    Pt with history of left hip OA, most recent steroid injection was on 03/24/2025. He has been doing well with his hip after this injection, but during bowling two days ago he felt a \"twinge\" after rolling his shot, and began to feel the same pain in the same area that he had been having prior to his steroid injection. Since then the pain has been gradually worsening, impairing his ability to walk. Pain is sharp, worst going from sitting to standing, 9-10/10, and 3/10 at rest, which is challenging with work as he frequently gets in and out of cars and walks. Patient describes pain location by pointing directly to the front of his hip and lateral hip and saying if he could drill two holes at those two spots into his hip, that is where " "the pain is located. Occasionally patient experiences pain in his left gluteus, but no pain in his back/ radiating pain. He does affirm having some tingling beginning in his lateral left hip and radiating to his medial left knee. He does report his knee \"gives out,\" but only with severe pain. No headache, fever, chills, loss of control of bowel and bladder. He denies falls or recent trauma to the area.      Review of Systems  Constitutional, HEENT, cardiovascular, pulmonary, gi and gu systems are negative, except as otherwise noted.      Objective    /79 (BP Location: Left arm, Patient Position: Sitting, Cuff Size: Adult Large)   Pulse 80   Temp 97.3  F (36.3  C) (Tympanic)   Resp 16   Wt 95.5 kg (210 lb 8 oz)   SpO2 98%   BMI 28.29 kg/m    Physical Exam   GENERAL: alert and no distress  MS: RLE exam shows normal strength and muscle mass and ROM of hip is normal and LLE exam shows normal strength and muscle mass and ROM of hip is normal  ORTHO: Hip Exam: Palpation:   Non-tender:  left greater trochanter, left iliac crest  Range of Motion:  Full ROM, both hips  Special tests: R KARAN negative for pain, L KARAN positive for pain in left hip.  NEURO: Normal strength and tone, mentation intact and speech normal. CMS intact in LLE.        "

## 2025-04-21 ENCOUNTER — TELEPHONE (OUTPATIENT)
Dept: ORTHOPEDICS | Facility: CLINIC | Age: 60
End: 2025-04-21
Payer: COMMERCIAL

## 2025-04-21 DIAGNOSIS — M16.12 ARTHRITIS OF LEFT HIP: Primary | ICD-10-CM

## 2025-04-21 NOTE — TELEPHONE ENCOUNTER
Called Dimitrios and he reports that he started to get anterior/lateral hip pain on 4/17/25. He reports no specific injury but UC note says pain started when he was bowling. Prior to this he was feeling great after his CSI on 3/24/25 with Dr. Adrian. He was given prednisone and a muscle relaxer by the urgent care. He states this helped with his pain for about 2 days. He is currently in a lot of pain and would like to know what is next steps. I did schedule patient a follow up with Dr. Adrian. I also told him I would pass along the message.

## 2025-04-21 NOTE — TELEPHONE ENCOUNTER
Other: Patient calling to speak to care team regarding pain, didn't get a response via [employer]      Could we send this information to you in [employer] or would you prefer to receive a phone call?:   Patient would prefer a phone call   Okay to leave a detailed message?: Yes at Cell number on file:    Telephone Information:   Mobile [phone]

## 2025-04-23 ENCOUNTER — OFFICE VISIT (OUTPATIENT)
Dept: NEPHROLOGY | Facility: CLINIC | Age: 60
End: 2025-04-23
Attending: INTERNAL MEDICINE
Payer: COMMERCIAL

## 2025-04-23 ENCOUNTER — OFFICE VISIT (OUTPATIENT)
Dept: ORTHOPEDICS | Facility: CLINIC | Age: 60
End: 2025-04-23
Payer: COMMERCIAL

## 2025-04-23 VITALS
DIASTOLIC BLOOD PRESSURE: 79 MMHG | BODY MASS INDEX: 28.87 KG/M2 | HEART RATE: 86 BPM | OXYGEN SATURATION: 95 % | WEIGHT: 214.8 LBS | SYSTOLIC BLOOD PRESSURE: 114 MMHG

## 2025-04-23 DIAGNOSIS — E03.4 HYPOTHYROIDISM DUE TO ACQUIRED ATROPHY OF THYROID: ICD-10-CM

## 2025-04-23 DIAGNOSIS — I12.9 HYPERTENSION, RENAL: ICD-10-CM

## 2025-04-23 DIAGNOSIS — E55.9 VITAMIN D DEFICIENCY: Primary | ICD-10-CM

## 2025-04-23 DIAGNOSIS — M16.12 ARTHRITIS OF LEFT HIP: Primary | ICD-10-CM

## 2025-04-23 PROCEDURE — 99214 OFFICE O/P EST MOD 30 MIN: CPT | Performed by: STUDENT IN AN ORGANIZED HEALTH CARE EDUCATION/TRAINING PROGRAM

## 2025-04-23 PROCEDURE — 99213 OFFICE O/P EST LOW 20 MIN: CPT | Performed by: INTERNAL MEDICINE

## 2025-04-23 RX ORDER — ACETAMINOPHEN 500 MG
1000 TABLET ORAL 3 TIMES DAILY PRN
Qty: 90 TABLET | Refills: 1 | Status: SHIPPED | OUTPATIENT
Start: 2025-04-23

## 2025-04-23 RX ORDER — METHYLPREDNISOLONE 4 MG/1
TABLET ORAL
Qty: 21 TABLET | Refills: 0 | Status: SHIPPED | OUTPATIENT
Start: 2025-04-23

## 2025-04-23 RX ORDER — METOPROLOL SUCCINATE 50 MG/1
100 TABLET, EXTENDED RELEASE ORAL DAILY
Qty: 90 TABLET | Refills: 3 | Status: SHIPPED | OUTPATIENT
Start: 2025-04-23

## 2025-04-23 RX ORDER — LEVOTHYROXINE SODIUM 125 UG/1
125 TABLET ORAL
Qty: 64.28 TABLET | Refills: 3 | Status: SHIPPED | OUTPATIENT
Start: 2025-04-23 | End: 2026-04-18

## 2025-04-23 RX ORDER — LEVOTHYROXINE SODIUM 137 UG/1
137 TABLET ORAL
Qty: 24 TABLET | Refills: 3 | Status: SHIPPED | OUTPATIENT
Start: 2025-04-28 | End: 2026-03-30

## 2025-04-23 ASSESSMENT — PAIN SCALES - GENERAL: PAINLEVEL_OUTOF10: NO PAIN (0)

## 2025-04-23 NOTE — PROGRESS NOTES
ASSESSMENT & PLAN    Dimitrios was seen today for pain.    Diagnoses and all orders for this visit:    Arthritis of left hip  -     acetaminophen (TYLENOL) 500 MG tablet; Take 2 tablets (1,000 mg) by mouth 3 times daily as needed for pain.  -     methylPREDNISolone (MEDROL DOSEPAK) 4 MG tablet therapy pack; Follow Package Directions  -     MR Hip Left w/o Contrast; Future  -     Crutches Order for DME - ONLY FOR DME      This issue is acute on chronic and Worsening.    # Left hip pain: Hugh Mariano Jr.  was seen today for left hip pain. Symptoms had been going on for several months, much worse in the last week. On examination there are positive findings of pain with ambulation, pain with all ROM of the hip, stiffness/ tightness with back exam but no definite reproduction of radicular symptoms.     Likely cause of patient's condition due to left hip osteoarthritis. Other possible conditions contributing to symptoms include AVN of the hip. Numbness down the leg is likely from sciatica/ lumbar radiculopathy, but is not primary source of pain today.  Counseled patient on nature of condition and treatment options.    - Workup: MRI L hip  - Activity: weight bearing as tolerated. Crutches as needed  - Work note asking for light duty  - Ice, heat, massage as needed  - Medications:      - avoiding NSAIDs due to solitary kidney     - medrol dose pack      - tylenol 1000mg three times daily as needed     - can consider over the counter CBD (no THC), this can show up positive on drug screening tests, but is a reasonable medical treatment for pain due to osteoarthritis    Follow-up: after MRI    Ankush Banda MD  [facility] [place]    -----  Chief Complaint   Patient presents with    Left Hip - Pain       SUBJECTIVE  Dimitrios Goldberg is a/an 60 year old male who is seen as a WALK IN patient for evaluation of left hip.     The patient is seen by themselves.    Onset: 1 week(s) ago. The patient  reports that pain started at the end of a session of bowling. He reports that pain significantly worsened over the next 2 days to the point where he was having difficulty walking and getting off of his couch.  The patient was seen in urgent care on 4/17/25 and was given prednisone and Flexeril. He reports good relief with oral prednisone but states that his pain has returned to baseline since he finished the prescription.  Location of Pain: left anterior hip, lateral hip, posterior hip (superior glute)  Worsened by: rising from sitting, walking, laying on left side  Better with: corticosteroid injection (most recent date: 3/24/25) that provided  3 week(s) of relief, prednisone  Treatments tried: corticosteroid injection (most recent date: 3/24/25) that provided  3 week(s) of relief, prednisone, Flexeril  Associated symptoms: limping, feeling of instability    Orthopedic/Surgical history: YES - prior diagnosis of left hip OA, left hip steroid injection 3/24/25  Social History/Occupation: works for [employer]      REVIEW OF SYSTEMS:  Review of Systems    OBJECTIVE:  There were no vitals taken for this visit.   General: healthy, alert and in no distress  Skin: no suspicious lesions or rash.  CV: distal perfusion intact   Resp: normal respiratory effort without conversational dyspnea   Psych: normal mood and affect  Gait: antalgic  Neuro: Normal light sensory exam of left lower extremity    LEFT HIP  Inspection:    No swelling, bruising, discoloration, or obvious deformity or asymmetry  Palpation:    Minimally tender along the hip flexors/ anterior hip. Otherwise all other landmarks are nontender.    Crepitus is Absent  Active Range of Motion:     Flexion full with pain / IR limited by pain and tightness / ER  full with pain  Strength:    Flexion  / extension  / adduction  / abduction all limited by pain  Special Tests:    Positive: KARAN, anterior impingement (FADIR) for anterior hip pain    Negative:  Logroll    THORACIC/LUMBAR SPINE  Inspection:    No redness, swelling, overlying skin change, gross deformity/asymmetry, scapular winging  Palpation:    Minimally Tender about the left para lumbar muscles, SI joint. Otherwise remainder of landmarks are nontender.  Range of Motion:     Lumbar flexion limited by tightness    Lumbar extension limited by tightness    Right side bend full    Left side bend full    Right rotation full    Left rotation full  Strength:    Limited due to pain  Special Tests:      Equivocal: : straight leg raise (left), slump test (left)       RADIOLOGY:  Final results and radiologist's interpretation, available in the [facility] health record.  Images were reviewed with the patient in the office today.  My personal interpretation of the performed imaging:   - mild bilateral hip OA    HIP TWO VIEWS LEFT 10/10/2024 4:25 PM      HISTORY: Arthritis of left hip  COMPARISON: 3/31/2023                                                                      IMPRESSION: No acute fracture or malalignment. Mild left hip joint  degenerative changes.     TEE MARIA MD

## 2025-04-23 NOTE — TELEPHONE ENCOUNTER
Spoke to Dimitrios and informed him that if he is in this much pain that he should call and inquire about a same day access appointment at a different location as we do not have any providers in clinic that can see him this week. He understood and plans to call the general scheduling line to determine his options at an alternate sports medicine clinic. All questions were answered.      Byron Thomson M.A., LAT, ATC  Certified Athletic Trainer
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
## 2025-04-23 NOTE — LETTER
April 23, 2025      Dimitrios Goldberg  [address]  [place] [facility] [address]        To Whom It May Concern:    Dimitrios Goldberg was seen in our clinic. He may return to work with the following:  - limited to light duty  - lifting no greater than 20 pounds  - should be able to use crutches  - should be able to take medications      Sincerely,      Ankush Banda        Electronically signed

## 2025-04-23 NOTE — NURSING NOTE
Chief Complaint   Patient presents with    RECHECK       Vitals:    04/23/25 1353 04/23/25 1401 04/23/25 1402   BP: 111/80 113/79 114/79   Pulse: 86     SpO2: 95%     Weight: 97.4 kg (214 lb 12.8 oz)         BP Readings from Last 3 Encounters:   04/23/25 114/79   04/17/25 113/79   03/14/25 127/85       /79   Pulse 86   Wt 97.4 kg (214 lb 12.8 oz)   SpO2 95%   BMI 28.87 kg/m       Reji Blackwell

## 2025-04-23 NOTE — LETTER
To Whom It May Concern:    Dimitrios Goldberg was seen in our clinic. He may return to work with the following:  - limited to light duty  - lifting no greater than 20 pounds  - should be able to use crutches  - should be able to take medications    He will be re-evaluated at his follow-up appointment on 5/15/15 and these restrictions will be re-assessed at that time.      Sincerely,      Ankush Banda        Electronically signed

## 2025-04-23 NOTE — PATIENT INSTRUCTIONS
Advanced imaging is done by appointment. Please call [facility] ([facility]/[place]) [phone] , [facility] ([facility]/[place]) [phone] , or [facility] ([facility]) [phone]  to schedule your MRI.     Some insurance companies may require a prior authorization to be completed which can delay the time until you are able to schedule your appointment.       If you are active on [employer], you may have access to your test results before your provider is able to review the study and advise on next steps.      The clinic will contact you with results either via phone or [employer]. If you have not heard from the clinic within 2-3 days following your MRI, please contact us at [phone] or via [email or web address]
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
## 2025-04-23 NOTE — PATIENT INSTRUCTIONS
-Please take levothyroxine 125 mcg 5 days a week and levothyroxine 137 mcg two days a week  -Please increase metoprolol succinate to 2 tablets daily  -Please do a TSH in one  month and repeat labs and follow up in one year

## 2025-04-23 NOTE — LETTER
4/23/2025      Dimitrios Goldberg  [address]  [place] [address]      Dear Colleague,    Thank you for referring your patient, Dimitrios Goldberg, to the [facility]. Please see a copy of my visit note below.    ASSESSMENT & PLAN    Dimitrios was seen today for pain.    Diagnoses and all orders for this visit:    Arthritis of left hip  -     acetaminophen (TYLENOL) 500 MG tablet; Take 2 tablets (1,000 mg) by mouth 3 times daily as needed for pain.  -     methylPREDNISolone (MEDROL DOSEPAK) 4 MG tablet therapy pack; Follow Package Directions  -     MR Hip Left w/o Contrast; Future  -     Crutches Order for DME - ONLY FOR DME      This issue is acute on chronic and Worsening.    # Left hip pain: Hugh Mariano Jr.  was seen today for left hip pain. Symptoms had been going on for several months, much worse in the last week. On examination there are positive findings of pain with ambulation, pain with all ROM of the hip, stiffness/ tightness with back exam but no definite reproduction of radicular symptoms.     Likely cause of patient's condition due to left hip osteoarthritis. Other possible conditions contributing to symptoms include AVN of the hip. Numbness down the leg is likely from sciatica/ lumbar radiculopathy, but is not primary source of pain today.  Counseled patient on nature of condition and treatment options.    - Workup: MRI L hip  - Activity: weight bearing as tolerated. Crutches as needed  - Work note asking for light duty  - Ice, heat, massage as needed  - Medications:      - avoiding NSAIDs due to solitary kidney     - medrol dose pack      - tylenol 1000mg three times daily as needed     - can consider over the counter CBD (no THC), this can show up positive on drug screening tests, but is a reasonable medical treatment for pain due to osteoarthritis    Follow-up: after MRI    Ankush Banda MD  [facility]  [facility]    -----  Chief Complaint   Patient presents with     Left Hip - Pain       SUBJECTIVE  Dimitrios Goldberg is a/an 60 year old male who is seen as a WALK IN patient for evaluation of left hip.     The patient is seen by themselves.    Onset: 1 week(s) ago. The patient reports that pain started at the end of a session of bowling. He reports that pain significantly worsened over the next 2 days to the point where he was having difficulty walking and getting off of his couch.  The patient was seen in urgent care on 4/17/25 and was given prednisone and Flexeril. He reports good relief with oral prednisone but states that his pain has returned to baseline since he finished the prescription.  Location of Pain: left anterior hip, lateral hip, posterior hip (superior glute)  Worsened by: rising from sitting, walking, laying on left side  Better with: corticosteroid injection (most recent date: 3/24/25) that provided  3 week(s) of relief, prednisone  Treatments tried: corticosteroid injection (most recent date: 3/24/25) that provided  3 week(s) of relief, prednisone, Flexeril  Associated symptoms: limping, feeling of instability    Orthopedic/Surgical history: YES - prior diagnosis of left hip OA, left hip steroid injection 3/24/25  Social History/Occupation: works for [employer]      REVIEW OF SYSTEMS:  Review of Systems    OBJECTIVE:  There were no vitals taken for this visit.   General: healthy, alert and in no distress  Skin: no suspicious lesions or rash.  CV: distal perfusion intact   Resp: normal respiratory effort without conversational dyspnea   Psych: normal mood and affect  Gait: antalgic  Neuro: Normal light sensory exam of left lower extremity    LEFT HIP  Inspection:    No swelling, bruising, discoloration, or obvious deformity or asymmetry  Palpation:    Minimally tender along the hip flexors/ anterior hip. Otherwise all other landmarks are nontender.    Crepitus is Absent  Active Range of  Motion:     Flexion full with pain / IR limited by pain and tightness / ER  full with pain  Strength:    Flexion  / extension  / adduction  / abduction all limited by pain  Special Tests:    Positive: KARAN, anterior impingement (FADIR) for anterior hip pain    Negative: Logroll    THORACIC/LUMBAR SPINE  Inspection:    No redness, swelling, overlying skin change, gross deformity/asymmetry, scapular winging  Palpation:    Minimally Tender about the left para lumbar muscles, SI joint. Otherwise remainder of landmarks are nontender.  Range of Motion:     Lumbar flexion limited by tightness    Lumbar extension limited by tightness    Right side bend full    Left side bend full    Right rotation full    Left rotation full  Strength:    Limited due to pain  Special Tests:      Equivocal: : straight leg raise (left), slump test (left)       RADIOLOGY:  Final results and radiologist's interpretation, available in the [facility] health record.  Images were reviewed with the patient in the office today.  My personal interpretation of the performed imaging:   - mild bilateral hip OA    HIP TWO VIEWS LEFT 10/10/2024 4:25 PM      HISTORY: Arthritis of left hip  COMPARISON: 3/31/2023                                                                      IMPRESSION: No acute fracture or malalignment. Mild left hip joint  degenerative changes.     TEE MARIA MD          Again, thank you for allowing me to participate in the care of your patient.        Sincerely,        Ankush Banda MD    Electronically signed

## 2025-05-03 ENCOUNTER — HEALTH MAINTENANCE LETTER (OUTPATIENT)
Age: 60
End: 2025-05-03

## 2025-05-06 ENCOUNTER — OFFICE VISIT (OUTPATIENT)
Dept: URGENT CARE | Facility: URGENT CARE | Age: 60
End: 2025-05-06
Payer: COMMERCIAL

## 2025-05-06 VITALS
HEIGHT: 72 IN | DIASTOLIC BLOOD PRESSURE: 82 MMHG | BODY MASS INDEX: 29.12 KG/M2 | WEIGHT: 215 LBS | HEART RATE: 92 BPM | TEMPERATURE: 98.2 F | SYSTOLIC BLOOD PRESSURE: 112 MMHG | RESPIRATION RATE: 16 BRPM | OXYGEN SATURATION: 97 %

## 2025-05-06 DIAGNOSIS — L03.213 PRESEPTAL CELLULITIS OF LEFT UPPER EYELID: Primary | ICD-10-CM

## 2025-05-06 PROCEDURE — 1125F AMNT PAIN NOTED PAIN PRSNT: CPT | Performed by: PHYSICIAN ASSISTANT

## 2025-05-06 PROCEDURE — 3079F DIAST BP 80-89 MM HG: CPT | Performed by: PHYSICIAN ASSISTANT

## 2025-05-06 PROCEDURE — 3074F SYST BP LT 130 MM HG: CPT | Performed by: PHYSICIAN ASSISTANT

## 2025-05-06 PROCEDURE — 99213 OFFICE O/P EST LOW 20 MIN: CPT | Performed by: PHYSICIAN ASSISTANT

## 2025-05-06 RX ORDER — CETIRIZINE HYDROCHLORIDE 10 MG/1
10 TABLET ORAL DAILY
Qty: 30 TABLET | Refills: 0 | Status: SHIPPED | OUTPATIENT
Start: 2025-05-06 | End: 2025-05-07

## 2025-05-06 RX ORDER — CETIRIZINE HYDROCHLORIDE 10 MG/1
10 TABLET ORAL DAILY
Qty: 30 TABLET | Refills: 0 | Status: SHIPPED | OUTPATIENT
Start: 2025-05-06 | End: 2025-05-06

## 2025-05-06 ASSESSMENT — ENCOUNTER SYMPTOMS
COLOR CHANGE: 1
SORE THROAT: 0
EYE ITCHING: 0
COUGH: 0
FEVER: 0
CARDIOVASCULAR NEGATIVE: 1
SHORTNESS OF BREATH: 0
WHEEZING: 0
FATIGUE: 0
WOUND: 0
EYE DISCHARGE: 0
SINUS PAIN: 0
PALPITATIONS: 0
PHOTOPHOBIA: 0
EYE PAIN: 0
CHILLS: 0
SINUS PRESSURE: 0
EYE REDNESS: 0
RHINORRHEA: 0

## 2025-05-06 ASSESSMENT — PAIN SCALES - GENERAL: PAINLEVEL_OUTOF10: MODERATE PAIN (5)
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
## 2025-05-06 NOTE — PROGRESS NOTES
Urgent Care Clinic Visit    Chief Complaint   Patient presents with    Facial Swelling     Left upper eyelid swelling beginning Sunday morning; swelling is worsening.                5/6/2025     6:17 PM   Additional Questions   Roomed by IRINA Hines   Accompanied by Self     Donny Dubon LPN

## 2025-05-07 ENCOUNTER — TELEPHONE (OUTPATIENT)
Dept: URGENT CARE | Facility: URGENT CARE | Age: 60
End: 2025-05-07
Payer: COMMERCIAL

## 2025-05-07 RX ORDER — CETIRIZINE HYDROCHLORIDE 10 MG/1
10 TABLET ORAL DAILY
Qty: 30 TABLET | Refills: 0 | Status: SHIPPED | OUTPATIENT
Start: 2025-05-07

## 2025-05-07 NOTE — TELEPHONE ENCOUNTER
Medication Question or Refill    Contacts       Contact Date/Time Type Contact Phone/Fax    05/07/2025 11:52 AM CDT Phone (Incoming) YusufDimitrios sullivan (Self) [phone] (M)            What medication are you calling about (include dose and sig)?: na just from yesterday, the 2 from yesterday     Preferred Pharmacy:   [facility] [id number] [place] [facility] [address] [facility]  [address] [facility] [address]  Phone: [phone] Fax: [phone]    [id number] [address] [place] AT [facility]  [address] [place] [address]  Phone: [phone] Fax: [phone]      Controlled Substance Agreement on file:   CSA -- Patient Level:    CSA: None found at the patient level.       Who prescribed the medication?: Juliana    Do you need a refill? No    When did you use the medication last? NA    Patient offered an appointment? No    Do you have any questions or concerns?  Yes: pt needs the medication changed from [facility] and needs to be sent to the [facility]   Please call the pt when this is done.     [facility] or would you prefer to receive a phone call?:   Patient would prefer a phone call   Okay to leave a detailed message?: Yes at Cell number on file:    Telephone Information:   Mobile [phone]

## 2025-05-09 ENCOUNTER — ANCILLARY PROCEDURE (OUTPATIENT)
Dept: MRI IMAGING | Facility: CLINIC | Age: 60
End: 2025-05-09
Attending: STUDENT IN AN ORGANIZED HEALTH CARE EDUCATION/TRAINING PROGRAM
Payer: COMMERCIAL

## 2025-05-09 DIAGNOSIS — M16.12 ARTHRITIS OF LEFT HIP: ICD-10-CM

## 2025-05-09 PROCEDURE — 73721 MRI JNT OF LWR EXTRE W/O DYE: CPT | Mod: LT | Performed by: FAMILY MEDICINE

## 2025-05-10 ENCOUNTER — LAB (OUTPATIENT)
Dept: LAB | Facility: CLINIC | Age: 60
End: 2025-05-10
Payer: COMMERCIAL

## 2025-05-10 ENCOUNTER — ANCILLARY PROCEDURE (OUTPATIENT)
Dept: CT IMAGING | Facility: CLINIC | Age: 60
End: 2025-05-10
Attending: INTERNAL MEDICINE
Payer: COMMERCIAL

## 2025-05-10 DIAGNOSIS — N18.4 CKD (CHRONIC KIDNEY DISEASE) STAGE 4, GFR 15-29 ML/MIN (H): ICD-10-CM

## 2025-05-10 DIAGNOSIS — E03.4 HYPOTHYROIDISM DUE TO ACQUIRED ATROPHY OF THYROID: ICD-10-CM

## 2025-05-10 DIAGNOSIS — C64.1 PRIMARY MALIGNANT NEOPLASM OF RIGHT KIDNEY WITH METASTASIS FROM KIDNEY TO OTHER SITE (H): ICD-10-CM

## 2025-05-10 LAB
ALBUMIN SERPL BCG-MCNC: 4.1 G/DL (ref 3.5–5.2)
ALP SERPL-CCNC: 85 U/L (ref 40–150)
ALT SERPL W P-5'-P-CCNC: 15 U/L (ref 0–70)
ANION GAP SERPL CALCULATED.3IONS-SCNC: 10 MMOL/L (ref 7–15)
AST SERPL W P-5'-P-CCNC: 18 U/L (ref 0–45)
BASOPHILS # BLD AUTO: 0 10E3/UL (ref 0–0.2)
BASOPHILS NFR BLD AUTO: 0 %
BILIRUB SERPL-MCNC: 0.4 MG/DL
BUN SERPL-MCNC: 18.1 MG/DL (ref 8–23)
CALCIUM SERPL-MCNC: 9.5 MG/DL (ref 8.8–10.4)
CHLORIDE SERPL-SCNC: 104 MMOL/L (ref 98–107)
CREAT SERPL-MCNC: 1.78 MG/DL (ref 0.67–1.17)
EGFRCR SERPLBLD CKD-EPI 2021: 43 ML/MIN/1.73M2
EOSINOPHIL # BLD AUTO: 0.1 10E3/UL (ref 0–0.7)
EOSINOPHIL NFR BLD AUTO: 1 %
ERYTHROCYTE [DISTWIDTH] IN BLOOD BY AUTOMATED COUNT: 17.1 % (ref 10–15)
GLUCOSE SERPL-MCNC: 115 MG/DL (ref 70–99)
HCO3 SERPL-SCNC: 27 MMOL/L (ref 22–29)
HCT VFR BLD AUTO: 45.5 % (ref 40–53)
HGB BLD-MCNC: 14.5 G/DL (ref 13.3–17.7)
IMM GRANULOCYTES # BLD: 0 10E3/UL
IMM GRANULOCYTES NFR BLD: 0 %
LYMPHOCYTES # BLD AUTO: 1.5 10E3/UL (ref 0.8–5.3)
LYMPHOCYTES NFR BLD AUTO: 31 %
MCH RBC QN AUTO: 27.8 PG (ref 26.5–33)
MCHC RBC AUTO-ENTMCNC: 31.9 G/DL (ref 31.5–36.5)
MCV RBC AUTO: 87 FL (ref 78–100)
MONOCYTES # BLD AUTO: 0.3 10E3/UL (ref 0–1.3)
MONOCYTES NFR BLD AUTO: 7 %
NEUTROPHILS # BLD AUTO: 2.9 10E3/UL (ref 1.6–8.3)
NEUTROPHILS NFR BLD AUTO: 60 %
NRBC # BLD AUTO: 0 10E3/UL
NRBC BLD AUTO-RTO: 0 /100
PLATELET # BLD AUTO: 239 10E3/UL (ref 150–450)
POTASSIUM SERPL-SCNC: 4.4 MMOL/L (ref 3.4–5.3)
PROT SERPL-MCNC: 6.9 G/DL (ref 6.4–8.3)
RBC # BLD AUTO: 5.22 10E6/UL (ref 4.4–5.9)
SODIUM SERPL-SCNC: 141 MMOL/L (ref 135–145)
TSH SERPL DL<=0.005 MIU/L-ACNC: 1.27 UIU/ML (ref 0.3–4.2)
WBC # BLD AUTO: 4.8 10E3/UL (ref 4–11)

## 2025-05-10 PROCEDURE — 80053 COMPREHEN METABOLIC PANEL: CPT | Performed by: PATHOLOGY

## 2025-05-10 PROCEDURE — 84443 ASSAY THYROID STIM HORMONE: CPT | Performed by: PATHOLOGY

## 2025-05-10 PROCEDURE — 74176 CT ABD & PELVIS W/O CONTRAST: CPT | Mod: GC | Performed by: RADIOLOGY

## 2025-05-10 PROCEDURE — 36415 COLL VENOUS BLD VENIPUNCTURE: CPT | Performed by: PATHOLOGY

## 2025-05-10 PROCEDURE — 71250 CT THORAX DX C-: CPT | Mod: GC | Performed by: RADIOLOGY

## 2025-05-10 PROCEDURE — 85025 COMPLETE CBC W/AUTO DIFF WBC: CPT | Performed by: PATHOLOGY

## 2025-05-13 ENCOUNTER — ONCOLOGY VISIT (OUTPATIENT)
Dept: ONCOLOGY | Facility: CLINIC | Age: 60
End: 2025-05-13
Attending: INTERNAL MEDICINE
Payer: COMMERCIAL

## 2025-05-13 VITALS
RESPIRATION RATE: 20 BRPM | DIASTOLIC BLOOD PRESSURE: 77 MMHG | HEART RATE: 75 BPM | WEIGHT: 212.5 LBS | TEMPERATURE: 98.2 F | SYSTOLIC BLOOD PRESSURE: 114 MMHG | BODY MASS INDEX: 28.82 KG/M2 | OXYGEN SATURATION: 98 %

## 2025-05-13 DIAGNOSIS — N18.4 CKD (CHRONIC KIDNEY DISEASE) STAGE 4, GFR 15-29 ML/MIN (H): ICD-10-CM

## 2025-05-13 DIAGNOSIS — K25.9 GASTRIC EROSION, UNSPECIFIED ULCER CHRONICITY: ICD-10-CM

## 2025-05-13 DIAGNOSIS — E03.4 HYPOTHYROIDISM DUE TO ACQUIRED ATROPHY OF THYROID: ICD-10-CM

## 2025-05-13 DIAGNOSIS — C64.1 PRIMARY MALIGNANT NEOPLASM OF RIGHT KIDNEY WITH METASTASIS FROM KIDNEY TO OTHER SITE (H): Primary | ICD-10-CM

## 2025-05-13 PROCEDURE — 99213 OFFICE O/P EST LOW 20 MIN: CPT | Performed by: INTERNAL MEDICINE

## 2025-05-13 ASSESSMENT — PAIN SCALES - GENERAL: PAINLEVEL_OUTOF10: NO PAIN (0)
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
## 2025-05-13 NOTE — NURSING NOTE
Oncology Rooming Note    May 13, 2025 4:58 PM   Dimitrios Goldberg is a 60 year old male who presents for:    Chief Complaint   Patient presents with    Oncology Clinic Visit     Renal cell carcinoma      Initial Vitals: /77 (BP Location: Right arm, Patient Position: Sitting, Cuff Size: Adult Regular)   Pulse 75   Temp 98.2  F (36.8  C) (Oral)   Resp 20   Wt 96.4 kg (212 lb 8 oz)   SpO2 98%   BMI 28.82 kg/m   Estimated body mass index is 28.82 kg/m  as calculated from the following:    Height as of 5/6/25: 1.829 m (6').    Weight as of this encounter: 96.4 kg (212 lb 8 oz). Body surface area is 2.21 meters squared.  No Pain (0) Comment: Data Unavailable   No LMP for male patient.  Allergies reviewed: Yes  Medications reviewed: Yes    Medications: Medication refills not needed today.  Pharmacy name entered into [facility]: [facility] [id number] [place] [address]    Frailty Screening:   Is the patient here for a new oncology consult visit in cancer care? 2. No    PHQ9:  Did this patient require a PHQ9?: No      Clinical concerns:  none      Ghada Solorzano

## 2025-05-14 SDOH — HEALTH STABILITY: PHYSICAL HEALTH: ON AVERAGE, HOW MANY MINUTES DO YOU ENGAGE IN EXERCISE AT THIS LEVEL?: 0 MIN

## 2025-05-14 SDOH — HEALTH STABILITY: PHYSICAL HEALTH: ON AVERAGE, HOW MANY DAYS PER WEEK DO YOU ENGAGE IN MODERATE TO STRENUOUS EXERCISE (LIKE A BRISK WALK)?: 0 DAYS
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
## 2025-05-15 ENCOUNTER — OFFICE VISIT (OUTPATIENT)
Dept: ORTHOPEDICS | Facility: CLINIC | Age: 60
End: 2025-05-15
Payer: COMMERCIAL

## 2025-05-15 DIAGNOSIS — M87.052 AVASCULAR NECROSIS OF BONE OF LEFT HIP (H): ICD-10-CM

## 2025-05-15 DIAGNOSIS — M16.12 ARTHRITIS OF LEFT HIP: Primary | ICD-10-CM
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
## 2025-05-15 NOTE — PATIENT INSTRUCTIONS
[facility] Outpatient Physical Therapy Locations    To schedule an appointment please call our scheduling department at [phone]    To fax a referral to be scheduled fax to [phone]    [place]: [address], [facility]: [address] [facility]: [address], [facility]: [address], [facility]: [address], [facility]: [address] [facility]: [address] [place], [place]  [place]: [address] [facility] , [address], [facility]: [address] [facility] , [address], [facility]: [address] [facility]: [address], [facility]: [address], [facility]: [address], [facility]: [address], [facility]: [address] [facility]: [address] [facility]: [facility]: [address], [facility]: [address], [facility]: [address], [place], [place]  [place]: [address] [facility]: [address] [place] [address]: [address], [facility]: [address] [facility] [address] [place]  [place]: [address], [facility]: [address], [facility]: [address] [facility] [place], [place]  [facility]: [address] [place], [place]  [facility]: [address] [place] [address]  [place]: [address] [facility], [address], [facility]] [address] [facility],  [facility]: [address] [facility] [place], [place]

## 2025-05-15 NOTE — PROGRESS NOTES
Addendum:  Patient returned to the PAD area to ask for a note clearing him for work.  After chart review, Dr. Ankush Banda gave him a weight restriction with light duty, and the ability to use crutches on April 23, 2025.  For the past week the patient has returned to his full duties and is looking for a note for clearance.  The patient is not looking for any limitations at this point, given the fact that he has been performing his full job duties without any disruption for the past week.  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  I have given the patient a note clearing him to return to full duty, but if he has any disruptions or pain during this, he should immediately cease.  Return precautions to clinic have been advised.    Dick Adrian D.O., [facility]  , Sports Medicine  Department of Family Medicine and [facility]

## 2025-05-15 NOTE — LETTER
[facility] [address]  [address] [facility] [address]  [phone]          May 15, 2025    RE:  Dimitrios Goldberg                                                                                                                                                       [address]  [facility] [address]            To whom it may concern:    Dimitrios Goldberg is under my professional care for    Arthritis of left hip  Avascular necrosis of bone of left hip (H) He  may return to work without restriction. Should cease work activity if pain returns. Return to clinic precautions have been advised.     Sincerely,        Dick Adrian DO

## 2025-05-19 ENCOUNTER — THERAPY VISIT (OUTPATIENT)
Dept: PHYSICAL THERAPY | Facility: CLINIC | Age: 60
End: 2025-05-19
Attending: STUDENT IN AN ORGANIZED HEALTH CARE EDUCATION/TRAINING PROGRAM
Payer: COMMERCIAL

## 2025-05-19 DIAGNOSIS — M16.12 ARTHRITIS OF LEFT HIP: ICD-10-CM

## 2025-05-19 DIAGNOSIS — M87.052 AVASCULAR NECROSIS OF BONE OF LEFT HIP (H): ICD-10-CM

## 2025-05-19 DIAGNOSIS — M25.552 HIP PAIN, LEFT: Primary | Chronic | ICD-10-CM

## 2025-05-19 PROCEDURE — 97161 PT EVAL LOW COMPLEX 20 MIN: CPT | Mod: GP | Performed by: PHYSICAL THERAPIST

## 2025-05-19 PROCEDURE — 97110 THERAPEUTIC EXERCISES: CPT | Mod: GP | Performed by: PHYSICAL THERAPIST

## 2025-05-19 ASSESSMENT — ACTIVITIES OF DAILY LIVING (ADL)
ADL_COUNT: 17
HOW_WOULD_YOU_RATE_YOUR_CURRENT_LEVEL_OF_FUNCTION?: ABNORMAL
RECREATIONAL_ACTIVITIES: EXTREME DIFFICULTY
PUTTING_ON_SOCKS_AND_SHOES: MODERATE DIFFICULTY
GETTING_INTO_AND_OUT_OF_A_BATHTUB: SLIGHT DIFFICULTY
SPORTS_COUNT: 9
PLEASE_INDICATE_YOR_PRIMARY_REASON_FOR_REFERRAL_TO_THERAPY:: HIP
SITTING_FOR_15_MINUTES: NO DIFFICULTY AT ALL
JUMPING: MODERATE DIFFICULTY
SPORTS_TOTAL_ITEM_SCORE: 0
GOING_DOWN_1_FLIGHT_OF_STAIRS: SLIGHT DIFFICULTY
RECREATIONAL ACTIVITIES: EXTREME DIFFICULTY
SPORTS_HIGHEST_POTENTIAL_SCORE: 36
LIGHT_TO_MODERATE_WORK: SLIGHT DIFFICULTY
LANDING: MODERATE DIFFICULTY
DEEP_SQUATTING: SLIGHT DIFFICULTY
STEPPING UP AND DOWN CURBS: SLIGHT DIFFICULTY
ABILITY_TO_PERFORM_ACTIVITY_WITH_YOUR_NORMAL_TECHNIQUE: MODERATE DIFFICULTY
GOING DOWN 1 FLIGHT OF STAIRS: SLIGHT DIFFICULTY
TWISTING/PIVOTING ON INVOLVED LEG: SLIGHT DIFFICULTY
WALKING_DOWN_STEEP_HILLS: SLIGHT DIFFICULTY
WALKING_APPROXIMATELY_10_MINUTES: MODERATE DIFFICULTY
LOW_IMPACT_ACTIVITIES_LIKE_FAST_WALKING: SLIGHT DIFFICULTY
ROLLING_OVER_IN_BED: SLIGHT DIFFICULTY
STARTING_AND_STOPPING_QUICKLY: MODERATE DIFFICULTY
GETTING INTO AND OUT OF AN AVERAGE CAR: SLIGHT DIFFICULTY
GOING_UP_1_FLIGHT_OF_STAIRS: SLIGHT DIFFICULTY
HOW_WOULD_YOU_RATE_YOUR_CURRENT_LEVEL_OF_FUNCTION_DURING_YOUR_USUAL_ACTIVITIES_OF_DAILY_LIVING_FROM_0_TO_100_WITH_100_BEING_YOUR_LEVEL_OF_FUNCTION_PRIOR_TO_YOUR_HIP_PROBLEM_AND_0_BEING_THE_INABILITY_TO_PERFORM_ANY_OF_YOUR_USUAL_DAILY_ACTIVITIES?: 50
DEEP SQUATTING: SLIGHT DIFFICULTY
ADL_SCORE(%): 0
GETTING_INTO_AND_OUT_OF_A_BATHTUB: SLIGHT DIFFICULTY
HEAVY_WORK: MODERATE DIFFICULTY
HOS_ADL_ITEM_SCORE_TOTAL: 46
HOS_ADL_HIGHEST_POTENTIAL_SCORE: 68
ROLLING OVER IN BED: SLIGHT DIFFICULTY
TWISTING/PIVOTING_ON_INVOLVED_LEG: SLIGHT DIFFICULTY
GETTING_INTO_AND_OUT_OF_AN_AVERAGE_CAR: SLIGHT DIFFICULTY
ABILITY_TO_PARTICIPATE_IN_YOUR_DESIRED_SPORT_AS_LONG_AS_YOU_WOULD_LIKE: EXTREME DIFFICULTY
SPORTS_SCORE(%): 0
WALKING_DOWN_STEEP_HILLS: SLIGHT DIFFICULTY
LIGHT_TO_MODERATE_WORK: SLIGHT DIFFICULTY
HOS_ADL_SCORE(%): 67.65
WALKING_15_MINUTES_OR_GREATER: MODERATE DIFFICULTY
CUTTING/LATERAL_MOVEMENTS: MODERATE DIFFICULTY
HOW_WOULD_YOU_RATE_YOUR_CURRENT_LEVEL_OF_FUNCTION_DURING_YOUR_USUAL_ACTIVITIES_OF_DAILY_LIVING_FROM_0_TO_100_WITH_100_BEING_YOUR_LEVEL_OF_FUNCTION_PRIOR_TO_YOUR_HIP_PROBLEM_AND_0_BEING_THE_INABILITY_TO_PERFORM_ANY_OF_YOUR_USUAL_DAILY_ACTIVITIES?: 50
SITTING FOR 15 MINUTES: NO DIFFICULTY AT ALL
WALKING_UP_STEEP_HILLS: SLIGHT DIFFICULTY
HEAVY_WORK: MODERATE DIFFICULTY
STEPPING_UP_AND_DOWN_CURBS: SLIGHT DIFFICULTY
WALKING_INITIALLY: SLIGHT DIFFICULTY
GOING UP 1 FLIGHT OF STAIRS: SLIGHT DIFFICULTY
WALKING_UP_STEEP_HILLS: SLIGHT DIFFICULTY
WALKING_15_MINUTES_OR_GREATER: MODERATE DIFFICULTY
PUTTING ON SOCKS AND SHOES: MODERATE DIFFICULTY
WALKING_INITIALLY: SLIGHT DIFFICULTY
WALKING_FOR_APPROXIMATELY_10_MINUTES: MODERATE DIFFICULTY
ADL_TOTAL_ITEM_SCORE: 0
STANDING_FOR_15_MINUTES: SLIGHT DIFFICULTY
ADL_HIGHEST_POTENTIAL_SCORE: 68
STANDING FOR 15 MINUTES: SLIGHT DIFFICULTY

## 2025-05-19 NOTE — PROGRESS NOTES
PHYSICAL THERAPY EVALUATION  Type of Visit: Evaluation       Fall Risk Screen:  Have you fallen 2 or more times in the past year?: No  Have you fallen and had an injury in the past year?: No    Subjective         Presenting condition or subjective complaint: therapy in past 1 injection which  did not helped. but then got 2nd shot and that helped a ton. Was bowling 2 x/ week and just fine. and then around 1st week of april was bowling and mid way through started getting some hip pain.  Date of onset: 05/15/25    Relevant medical history: Cancer; High blood pressure; Kidney disease; Thyroid problems   Dates & types of surgery:      Prior diagnostic imaging/testing results: MRI; CT scan     Prior therapy history for the same diagnosis, illness or injury: Yes      Prior Level of Function  Transfers:   Ambulation:   ADL:   IADL:     Living Environment  Social support: With a significant other or spouse   Type of home: House; Basement   Stairs to enter the home: No       Ramp: No   Stairs inside the home: Yes   Is there a railing: Yes     Help at home: Self Cares (home health aide/personal care attendant, family, etc)  Equipment owned:       Employment: Yes [employer]   Hobbies/Interests: Bowling    Patient goals for therapy: bowling, walk without pain, hurts when weight bearing.    Pain assessment: Pain present     Objective   HIP EVALUATION  PAIN: Pain Level at Rest: 0/10  Pain Level with Use: 7/10  Pain Location: anteiror lateral hip / low back tailbone area.   Pain Quality: Sharp  Pain Frequency: intermittent  Pain is Exacerbated By: putting weight on it, sit to stand, walking, lifting leg to get leg out of car.   Pain is Relieved By: none  Pain Progression: Unchanged  INTEGUMENTARY (edema, incisions):   POSTURE:   GAIT:   Weightbearing Status:   Assistive Device(s): None  Gait Deviations: Antalgic  BALANCE/PROPRIOCEPTION:   WEIGHTBEARING ALIGNMENT:   NON-WEIGHTBEARING ALIGNMENT:    ROM:    (Degrees) Left AROM Left PROM  Right AROM Right PROM   Hip Flexion  105 120    Hip Extension       Hip Abduction  35 with joint pain      Hip Adduction       Hip Internal Rotation  0 25    Hip External Rotation  48 51    Knee Flexion       Knee Extension       Lumbar Side glide     Lumbar Flexion    Lumbar Extension    Pain:   End feel:     PELVIC/SI SCREEN:   STRENGTH:   Pain: - none + mild ++ moderate +++ severe  Strength Scale: 0-5/5 Left Right   Hip Flexion 5, ++ (mod) 5   Hip Extension     Hip Abduction 5, ++ (mod) 5   Hip Adduction     Hip Internal Rotation     Hip External Rotation     Knee Flexion 5 5   Knee Extension       LE FLEXIBILITY:   SPECIAL TESTS:    Left Right   KARAN Negative     FADIR/Labrum/YISEL Positive    Femoral Nerve     Karina's     Piriformis     Quadrant Testing     SLR     Slump     Stork with Extension     Zen            FUNCTIONAL TESTS:   PALPATION:   JOINT MOBILITY:     Assessment & Plan   CLINICAL IMPRESSIONS  Medical Diagnosis: Arthritis of left hip  Avascular necrosis of bone of left hip (H)    Treatment Diagnosis: left hip pain   Impression/Assessment: Patient is a 60 year old male with left hip pain complaints.  The following significant findings have been identified: Pain, Decreased ROM/flexibility, Decreased joint mobility, Decreased strength, Impaired gait, and Decreased activity tolerance. These impairments interfere with their ability to perform self care tasks, work tasks, and recreational activities as compared to previous level of function.     Clinical Decision Making (Complexity):  Clinical Presentation: Stable/Uncomplicated  Clinical Presentation Rationale: based on medical and personal factors listed in PT evaluation  Clinical Decision Making (Complexity): Low complexity    PLAN OF CARE  Treatment Interventions:  Interventions: Manual Therapy, Neuromuscular Re-education, Therapeutic Activity, Therapeutic Exercise, Self-Care/Home Management    Long Term Goals     PT  Goal 1  Goal Identifier: sit to stand  Goal Description: pt will be able to get and out of chair and in and out of truck painfree  Rationale: to maximize safety and independence with performance of ADLs and functional tasks  Goal Progress: getting in and out of chair / truck with 7/10 PL after prolonged sitting  Target Date: 06/30/25  PT Goal 2  Goal Identifier: bowling  Goal Description: pt will be able to bowl painfree  Rationale: to maximize safety and independence with performance of ADLs and functional tasks  Goal Progress: unable to bowl.  Target Date: 07/14/25      Frequency of Treatment: 1 x/ week  Duration of Treatment: 8 weeks    Recommended Referrals to Other Professionals:   Education Assessment:   Learner/Method: Patient;Demonstration;Pictures/Video;No Barriers to Learning    Risks and benefits of evaluation/treatment have been explained.   Patient/Family/caregiver agrees with Plan of Care.     Evaluation Time:     PT Eval, Low Complexity Minutes (67025): 15       Signing Clinician: Alan Harrell PT

## 2025-06-05 ENCOUNTER — THERAPY VISIT (OUTPATIENT)
Dept: PHYSICAL THERAPY | Facility: CLINIC | Age: 60
End: 2025-06-05
Attending: STUDENT IN AN ORGANIZED HEALTH CARE EDUCATION/TRAINING PROGRAM
Payer: COMMERCIAL

## 2025-06-05 DIAGNOSIS — M25.552 HIP PAIN, LEFT: Primary | Chronic | ICD-10-CM

## 2025-06-06 NOTE — TELEPHONE ENCOUNTER
Action June 5, 2025 8:01 PM MT   Action Taken Sent a request for imaging from .        DIAGNOSIS:   Arthritis of left hip [M16.12]  - Primary  Avascular necrosis of bone of left hip (H) [M87.052]   APPOINTMENT DATE: 06/25/2025   NOTES STATUS DETAILS   OFFICE NOTE from referring provider Internal Dick Adrian DO  Sports Medicine   OFFICE NOTE from other specialist Internal [facility]   INJECTIONS DONE IN RADIOLOGY PACS Internal   MRI PACS Internal   CT SCAN PACS Internal    HP:  07/25/2021 - Abd/Pel   ULTRASOUND In process HP:  07/25/2021 - Bilateral Lower Extremity   XRAYS (IMAGES & REPORTS) PACS Internal

## 2025-06-18 DIAGNOSIS — M25.552 HIP PAIN, LEFT: Primary | Chronic | ICD-10-CM

## 2025-06-21 ASSESSMENT — HOOS JR
WALKING ON UNEVEN SURFACE: MODERATE
RISING FROM SITTING: MODERATE
LYING IN BED (TURNING OVER, MAINTAINING HIP POSITION): MILD
SITTING: MILD
HOOS JR TOTAL INTERVAL SCORE: 61.82
GOING UP OR DOWN STAIRS: MILD
BENDING TO THE FLOOR TO PICK UP OBJECT: MODERATE

## 2025-06-25 ENCOUNTER — OFFICE VISIT (OUTPATIENT)
Dept: ORTHOPEDICS | Facility: CLINIC | Age: 60
End: 2025-06-25
Attending: STUDENT IN AN ORGANIZED HEALTH CARE EDUCATION/TRAINING PROGRAM
Payer: COMMERCIAL

## 2025-06-25 ENCOUNTER — PRE VISIT (OUTPATIENT)
Dept: ORTHOPEDICS | Facility: CLINIC | Age: 60
End: 2025-06-25

## 2025-06-25 ENCOUNTER — ANCILLARY PROCEDURE (OUTPATIENT)
Dept: GENERAL RADIOLOGY | Facility: CLINIC | Age: 60
End: 2025-06-25
Attending: STUDENT IN AN ORGANIZED HEALTH CARE EDUCATION/TRAINING PROGRAM
Payer: COMMERCIAL

## 2025-06-25 VITALS — WEIGHT: 200 LBS | BODY MASS INDEX: 28 KG/M2 | HEIGHT: 71 IN

## 2025-06-25 DIAGNOSIS — M25.552 HIP PAIN, LEFT: Chronic | ICD-10-CM

## 2025-06-25 DIAGNOSIS — M16.12 OSTEOARTHRITIS OF ONE HIP, LEFT: ICD-10-CM

## 2025-06-25 DIAGNOSIS — M87.052 AVASCULAR NECROSIS OF BONE OF LEFT HIP (H): ICD-10-CM

## 2025-06-25 PROCEDURE — 99204 OFFICE O/P NEW MOD 45 MIN: CPT | Performed by: STUDENT IN AN ORGANIZED HEALTH CARE EDUCATION/TRAINING PROGRAM

## 2025-06-25 PROCEDURE — 73502 X-RAY EXAM HIP UNI 2-3 VIEWS: CPT | Mod: LT | Performed by: RADIOLOGY

## 2025-06-25 RX ORDER — CEFAZOLIN SODIUM 2 G/50ML
2 SOLUTION INTRAVENOUS SEE ADMIN INSTRUCTIONS
OUTPATIENT
Start: 2025-06-25

## 2025-06-25 RX ORDER — TRANEXAMIC ACID 650 MG/1
1950 TABLET ORAL ONCE
OUTPATIENT
Start: 2025-06-25 | End: 2025-06-25

## 2025-06-25 RX ORDER — CEFAZOLIN SODIUM 2 G/50ML
2 SOLUTION INTRAVENOUS
OUTPATIENT
Start: 2025-06-25
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
## 2025-06-26 ENCOUNTER — THERAPY VISIT (OUTPATIENT)
Dept: PHYSICAL THERAPY | Facility: CLINIC | Age: 60
End: 2025-06-26
Payer: COMMERCIAL

## 2025-06-26 DIAGNOSIS — M25.552 HIP PAIN, LEFT: Primary | Chronic | ICD-10-CM

## 2025-06-26 NOTE — PROGRESS NOTES
06/26/25 0500   Appointment Info   Signing clinician's name / credentials Alan Harrell DPT   Total/Authorized Visits 8 E&T   Visits Used 3   Medical Diagnosis Arthritis of left hip  Avascular necrosis of bone of left hip (H)   PT Tx Diagnosis left hip pain   Progress Note/Certification   Onset of illness/injury or Date of Surgery 05/15/25   Therapy Frequency 1 x/ week   Predicted Duration 8 weeks   Progress Note Due Date 07/14/25   Progress Note Completed Date 05/19/25       Present No   GOALS   PT Goals 2   PT Goal 1   Goal Identifier sit to stand   Goal Description pt will be able to get and out of chair and in and out of truck painfree   Rationale to maximize safety and independence with performance of ADLs and functional tasks   Goal Progress getting in and out of chair / truck with 7/10 PL after prolonged sitting   Target Date 06/30/25   PT Goal 2   Goal Identifier bowling   Goal Description pt will be able to bowl painfree   Rationale to maximize safety and independence with performance of ADLs and functional tasks   Goal Progress unable to bowl.   Target Date 07/14/25   Subjective Report   Subjective Report getting hip replacement is the plan on 9/2/25 so wants to know what will work best leading up to surgery.   Objective Measures   Objective Measures Objective Measure 1;Objective Measure 2   Objective Measure 1   Objective Measure left HIP PROM   Details flex 105 compared to 120, IR 0 to 25, ER 48 compared to 51   Objective Measure 2   Objective Measure strength   Details hip flexion left 3+/5 with moderate pain, hip abd 5/5 with moderate pain, hip extension 5/5.   Treatment Interventions (PT)   Interventions Therapeutic Procedure/Exercise   Therapeutic Procedure/Exercise   Therapeutic Procedures: strength, endurance, ROM, flexibility minutes (26886) 26   Ther Proc 1 bike 5 min warm up.   PTRx Ther Proc 1 Standing Hip Flexor Stretch   PTRx Ther Proc 1 - Details 3 x 30 sec    PTRx  Ther Proc 2 Supine Butterfly   PTRx Ther Proc 2 - Details 3 x 30 sec    PTRx Ther Proc 3 Standing Quadriceps Stretch using Chair   PTRx Ther Proc 3 - Details 3 x 30 sec    PTRx Ther Proc 4 Standing Adductor Stretch   PTRx Ther Proc 4 - Details 3 x 30 sec    PTRx Ther Proc 5 Hip Abduction Straight Leg Raise   PTRx Ther Proc 5 - Details reviewed   PTRx Ther Proc 6 Bridging   PTRx Ther Proc 6 - Details reviewed   PTRx Ther Proc 7 Supine Abdominal Exercise #3 (Marching)   PTRx Ther Proc 7 - Details x 10   Skilled Intervention continue light mobliity and strengthening before surgery.   Patient Response/Progress continued ROM , hip flexor weakness.   Education   Learner/Method Patient;Demonstration;Pictures/Video;No Barriers to Learning   Plan   Home program PTRX   Updates to plan of care DC continue with pre op exercises till 9/2/25   Total Session Time   Timed Code Treatment Minutes 26   Total Treatment Time (sum of timed and untimed services) 26         DISCHARGE  Reason for Discharge: No further expectation of progress.    Equipment Issued:     Discharge Plan: Patient to continue home program.    Referring Provider:  Dick Adrian

## 2025-06-30 ENCOUNTER — TELEPHONE (OUTPATIENT)
Dept: ORTHOPEDICS | Facility: CLINIC | Age: 60
End: 2025-06-30
Payer: COMMERCIAL

## 2025-06-30 DIAGNOSIS — M16.12 OSTEOARTHRITIS OF ONE HIP, LEFT: Primary | ICD-10-CM

## 2025-06-30 DIAGNOSIS — Z96.642 S/P TOTAL LEFT HIP ARTHROPLASTY: ICD-10-CM

## 2025-06-30 DIAGNOSIS — M87.052 AVASCULAR NECROSIS OF BONE OF LEFT HIP (H): ICD-10-CM
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
## 2025-07-01 ENCOUNTER — OFFICE VISIT (OUTPATIENT)
Dept: FAMILY MEDICINE | Facility: CLINIC | Age: 60
End: 2025-07-01
Payer: COMMERCIAL

## 2025-07-01 VITALS
HEIGHT: 72 IN | OXYGEN SATURATION: 97 % | WEIGHT: 213.2 LBS | SYSTOLIC BLOOD PRESSURE: 108 MMHG | BODY MASS INDEX: 28.88 KG/M2 | HEART RATE: 77 BPM | RESPIRATION RATE: 16 BRPM | DIASTOLIC BLOOD PRESSURE: 76 MMHG | TEMPERATURE: 98.2 F

## 2025-07-01 DIAGNOSIS — M16.12 ARTHRITIS OF LEFT HIP: ICD-10-CM

## 2025-07-01 DIAGNOSIS — N18.32 ANEMIA IN STAGE 3B CHRONIC KIDNEY DISEASE (H): ICD-10-CM

## 2025-07-01 DIAGNOSIS — I12.9 HYPERTENSION, RENAL: ICD-10-CM

## 2025-07-01 DIAGNOSIS — D63.1 ANEMIA IN STAGE 3B CHRONIC KIDNEY DISEASE (H): ICD-10-CM

## 2025-07-01 DIAGNOSIS — Z01.818 PREOP GENERAL PHYSICAL EXAM: Primary | ICD-10-CM

## 2025-07-01 LAB
BASOPHILS # BLD AUTO: 0 10E3/UL (ref 0–0.2)
BASOPHILS NFR BLD AUTO: 0 %
EOSINOPHIL # BLD AUTO: 0.2 10E3/UL (ref 0–0.7)
EOSINOPHIL NFR BLD AUTO: 4 %
ERYTHROCYTE [DISTWIDTH] IN BLOOD BY AUTOMATED COUNT: 15.5 % (ref 10–15)
HCT VFR BLD AUTO: 42.9 % (ref 40–53)
HGB BLD-MCNC: 13.9 G/DL (ref 13.3–17.7)
IMM GRANULOCYTES # BLD: 0 10E3/UL
IMM GRANULOCYTES NFR BLD: 0 %
LYMPHOCYTES # BLD AUTO: 2.4 10E3/UL (ref 0.8–5.3)
LYMPHOCYTES NFR BLD AUTO: 35 %
MCH RBC QN AUTO: 27.7 PG (ref 26.5–33)
MCHC RBC AUTO-ENTMCNC: 32.4 G/DL (ref 31.5–36.5)
MCV RBC AUTO: 86 FL (ref 78–100)
MONOCYTES # BLD AUTO: 0.5 10E3/UL (ref 0–1.3)
MONOCYTES NFR BLD AUTO: 7 %
NEUTROPHILS # BLD AUTO: 3.6 10E3/UL (ref 1.6–8.3)
NEUTROPHILS NFR BLD AUTO: 54 %
PLATELET # BLD AUTO: 279 10E3/UL (ref 150–450)
RBC # BLD AUTO: 5.01 10E6/UL (ref 4.4–5.9)
WBC # BLD AUTO: 6.8 10E3/UL (ref 4–11)

## 2025-07-01 PROCEDURE — 80048 BASIC METABOLIC PNL TOTAL CA: CPT | Performed by: PHYSICIAN ASSISTANT

## 2025-07-01 PROCEDURE — 36415 COLL VENOUS BLD VENIPUNCTURE: CPT | Performed by: PHYSICIAN ASSISTANT

## 2025-07-01 PROCEDURE — 3074F SYST BP LT 130 MM HG: CPT | Performed by: PHYSICIAN ASSISTANT

## 2025-07-01 PROCEDURE — 3078F DIAST BP <80 MM HG: CPT | Performed by: PHYSICIAN ASSISTANT

## 2025-07-01 PROCEDURE — 99214 OFFICE O/P EST MOD 30 MIN: CPT | Performed by: PHYSICIAN ASSISTANT

## 2025-07-01 PROCEDURE — 85025 COMPLETE CBC W/AUTO DIFF WBC: CPT | Performed by: PHYSICIAN ASSISTANT

## 2025-07-01 PROCEDURE — 93000 ELECTROCARDIOGRAM COMPLETE: CPT | Performed by: PHYSICIAN ASSISTANT

## 2025-07-01 NOTE — PATIENT INSTRUCTIONS
Hold all herbal and vitamin supplements 2 weeks prior to surgery.    Hold aspirin and nsaids 7 days prior to his procedure/surgery.  Hold jardiance on the morning your your surgery. You may take all of your other meds with a couple of sips of water, and otherwise, be fasting.
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
## 2025-07-01 NOTE — PROGRESS NOTES
Ortho Navigator Note      Pre-op Date 7/7/25     Medical Clearance  Cleared     Labs Stable      COVID Test Date No longer indicated      Skin  Intact      Activity: Ambulates independently without assistive device      Equipment Need Patient will likely need a walker for discharge. Defer to PT/OT for recs.       Meds to Hold Held all supplements 14 days prior to surgery  Defer to primary at pre-op   * Medication recommendations are not intended to be exhaustive; they are limited to common medications that are potentially dangerous if incorrectly managed   NPO Instructions  Instructed to stop solids 8 hr prior to arrival and clears 2 hr prior to arrival.       Pre-op Joint Education Complete? Complete    Discharge Plan Patient has plan to discharge home on morning of POD 1.   Spouse Caroline will arrive at hospital at 0800 to participate in therapy and discharge education. Patient would like to discharge ASAP the morning of POD1 in order to make it to his daughter's graduation party. Email sent to RN Manager on Ortho to accommodate this request.    /Transportation Caroline will be  and transportation.  is physically able to care for patient.      Barriers to Discharge No barriers to discharge.      Additional Info/   Special Needs : Patient had no unanswered questions or concerns.           07/01/25 1146   Discharge Planning   Patient/Family Anticipates Transition to home with family   Concerns to be Addressed no discharge needs identified   Living Arrangements   People in Home significant other   Type of Residence Private Residence   Is your private residence a single family home or apartment? Single family home   Number of Stairs, Within Home, Primary seven   Stair Railings, Within Home, Primary railings safe and in good condition   Once home, are you able to live on one level? Yes   Which level? Upper Level   Bathroom Shower/Tub Tub/Shower unit   Equipment Currently Used at Home none   Support  System   Support Systems Spouse/Significant Other   Do you have someone available to stay with you one or two nights once you are home? Yes   Medical Clearance   Date of Physical 07/01/25   It is recommended that you call and check with any specialty providers before surgery to see if you need surgical clearance.  Do you see any specialty providers outside of your primary care provider? No   Blood   Known Bleeding Disorder or Coagulopathy No   Does the patient have any Denominational/cultural preferences related to blood products? No   Education   Has the patient scheduled or completed pre-op total joint education, either in class or online, in the last 12 months? Yes   Patient attended total joint pre-op class/received pre-op teaching  online   Falls Risk   Has the patient been identified as a high falls risk before surgery? (fallen in the last 6 months, history of falls, unsteady gait, or balance issues) No   Did an order get placed to attend outpatient pre-surgery balance evaluation? No   Relationship/Environment   Name(s) of People in Home Caroline (spouse)

## 2025-07-02 LAB
ANION GAP SERPL CALCULATED.3IONS-SCNC: 11 MMOL/L (ref 7–15)
BUN SERPL-MCNC: 25.5 MG/DL (ref 8–23)
CALCIUM SERPL-MCNC: 9.7 MG/DL (ref 8.8–10.4)
CHLORIDE SERPL-SCNC: 102 MMOL/L (ref 98–107)
CREAT SERPL-MCNC: 1.7 MG/DL (ref 0.67–1.17)
EGFRCR SERPLBLD CKD-EPI 2021: 46 ML/MIN/1.73M2
GLUCOSE SERPL-MCNC: 80 MG/DL (ref 70–99)
HCO3 SERPL-SCNC: 26 MMOL/L (ref 22–29)
POTASSIUM SERPL-SCNC: 4.1 MMOL/L (ref 3.4–5.3)
SODIUM SERPL-SCNC: 139 MMOL/L (ref 135–145)

## 2025-07-09 NOTE — PROGRESS NOTES
Chief Complaint:   Follow up, DOS 7/11/25 with Dr. Mao    Procedures:  Left direct anterior total hip arthroplasty    History:  Dimitrios Goldberg is a 60 year old     Attending PT at ***. Pain being managed with ***    Denies fevers, chills or sweats. Denies calf pain or tenderness, chest pain or shortness of breath.     Exam:    General: Awake, Alert, and oriented. Articulates and communicates with a normal affect     *** Lower Extremity:  Ambulating with ***  Incision is healing, well approximated without evidence of infection. There is no erythema, drainage, or wound dehiscence   Thigh and calf compartments are soft and compressible  Range of motion: tolerates gentle PROM of hip  Active hip flexion against gravity in tact  TA/Gsc/EHL/FHL with 5/5 strength  Distal pulses palpable, toes are warm and well perfused   Calf soft and nontender  Edema ***      Imaging:  Radiographs of the *** hip were ordered, performed and reviewed today including AP pelvis and cross table lateral views. These were independently reviewed by me and findings were discussed with the patient today. The imaging demonstrates ***.    Medications:     Current Outpatient Medications:     acetaminophen (TYLENOL) 500 MG tablet, Take 2 tablets (1,000 mg) by mouth 3 times daily as needed for pain., Disp: 90 tablet, Rfl: 1    acyclovir (ZOVIRAX) 400 MG tablet, Take 1 tablet (400 mg) by mouth every 8 hours., Disp: 270 tablet, Rfl: 2    amLODIPine (NORVASC) 10 MG tablet, Take 1 tablet (10 mg) by mouth daily. Take one tablet by mouth daily. (Take note- this is now a 10mg tablet-not a 5 mg tablet), Disp: 90 tablet, Rfl: 3    atorvastatin (LIPITOR) 20 MG tablet, Take 1 tablet (20 mg) by mouth daily., Disp: 90 tablet, Rfl: 1    empagliflozin (JARDIANCE) 10 MG TABS tablet, Take 1 tablet (10 mg) by mouth daily., Disp: 90 tablet, Rfl: 1    levothyroxine (SYNTHROID/LEVOTHROID) 125 MCG tablet, Take 1 tablet (125 mcg) by mouth five times a week., Disp:  64.28 tablet, Rfl: 3    levothyroxine (SYNTHROID/LEVOTHROID) 137 MCG tablet, Take 1 tablet (137 mcg) by mouth Every Monday, Tuesday in the morning., Disp: 24 tablet, Rfl: 3    lifitegrast (XIIDRA) 5 % opthalmic solution, Apply 1 drop to eye 2 times daily., Disp: 90 each, Rfl: 3    metoprolol succinate ER (TOPROL XL) 50 MG 24 hr tablet, Take 2 tablets (100 mg) by mouth daily., Disp: 90 tablet, Rfl: 3    nortriptyline (PAMELOR) 10 MG capsule, Take 1 capsule (10 mg) by mouth at bedtime., Disp: 90 capsule, Rfl: 3    omeprazole (PRILOSEC) 40 MG DR capsule, TAKE 1 CAPSULE BY MOUTH EVERY DAY, Disp: 90 capsule, Rfl: 1    rizatriptan (MAXALT-MLT) 10 MG ODT, Take 1 tablet (10 mg) by mouth as needed for migraine. symptoms persist or return, may repeat dose after 2 hours. The 's labeling recommends a maximum daily dose of 30 mg per 24 hours;, Disp: 30 tablet, Rfl: 2    vardenafil (LEVITRA) 20 MG tablet, Take 0.5 tablets (10 mg) by mouth daily as needed (Erectile Dysfunction)., Disp: 6 tablet, Rfl: 3    Assessment:  Doing well 2 weeks s/p *** total hip arthroplasty      Plan:   - Basic wound cares were performed today.  They were directed to refrain from submerging in a tub or a pool until incision is a well-healed scar. No lotions or ointments on incision while healing. Directed to call with any drainage or redness about the incision area.   - Reviewed importance of maintaining posterior hip precautions including no hip flexion > 90 degrees, no adduction past midline, no internal rotation/pivoting.   - Encouraged consistent work with PT for gait training and mobilization  -Reviewed signs and symptoms of DVT/PE including calf pain or tenderness, chest pain or shortness of breath and directed them to report to call in to clinic or report to ED with any concerns or if they symptoms occur.   -DVT prophylaxis: ***  -Follow up: 6 weeks with . ***, No new x-rays unless concerns.     Mei Cline PA-C   7/9/2025 12:01  PM  Orthopedic Surgery

## 2025-07-10 ASSESSMENT — ACTIVITIES OF DAILY LIVING (ADL)
STANDING FOR 15 MINUTES: MODERATE DIFFICULTY
ADL_SCORE(%): INCOMPLETE
ABILITY_TO_PARTICIPATE_IN_YOUR_DESIRED_SPORT_AS_LONG_AS_YOU_WOULD_LIKE: UNABLE TO DO
SITTING FOR 15 MINUTES: SLIGHT DIFFICULTY
SPORTS_TOTAL_ITEM_SCORE: 0
GOING DOWN 1 FLIGHT OF STAIRS: SLIGHT DIFFICULTY
ROLLING_OVER_IN_BED: MODERATE DIFFICULTY
GETTING_INTO_AND_OUT_OF_A_BATHTUB: SLIGHT DIFFICULTY
WALKING_INITIALLY: SLIGHT DIFFICULTY
SITTING_FOR_15_MINUTES: SLIGHT DIFFICULTY
PUTTING ON SOCKS AND SHOES: MODERATE DIFFICULTY
HOW_WOULD_YOU_RATE_YOUR_CURRENT_LEVEL_OF_FUNCTION_DURING_YOUR_SPORTS_RELATED_ACTIVITIES_FROM_0_TO_100_WITH_100_BEING_YOUR_LEVEL_OF_FUNCTION_PRIOR_TO_YOUR_HIP_PROBLEM_AND_0_BEING_THE_INABILITY_TO_PERFORM_ANY_OF_YOUR_USUAL_DAILY_ACTIVITIES?: 50
GOING_DOWN_1_FLIGHT_OF_STAIRS: SLIGHT DIFFICULTY
PUTTING_ON_SOCKS_AND_SHOES: MODERATE DIFFICULTY
GETTING_INTO_AND_OUT_OF_A_BATHTUB: SLIGHT DIFFICULTY
ABILITY_TO_PERFORM_ACTIVITY_WITH_YOUR_NORMAL_TECHNIQUE: SLIGHT DIFFICULTY
HOS_ADL_ITEM_SCORE_TOTAL: 24
LOW_IMPACT_ACTIVITIES_LIKE_FAST_WALKING: MODERATE DIFFICULTY
GETTING INTO AND OUT OF AN AVERAGE CAR: MODERATE DIFFICULTY
SPORTS_COUNT: 9
LIGHT_TO_MODERATE_WORK: MODERATE DIFFICULTY
WALKING_INITIALLY: SLIGHT DIFFICULTY
ADL_HIGHEST_POTENTIAL_SCORE: 64
STEPPING UP AND DOWN CURBS: SLIGHT DIFFICULTY
ROLLING OVER IN BED: MODERATE DIFFICULTY
ADL_COUNT: 16
PLEASE_INDICATE_YOR_PRIMARY_REASON_FOR_REFERRAL_TO_THERAPY:: HIP
HOS_ADL_HIGHEST_POTENTIAL_SCORE: 40
SPORTS_SCORE(%): 0
TWISTING/PIVOTING ON INVOLVED LEG: EXTREME DIFFICULTY
LIGHT_TO_MODERATE_WORK: MODERATE DIFFICULTY
ADL_TOTAL_ITEM_SCORE: INCOMPLETE
SPORTS_HIGHEST_POTENTIAL_SCORE: 36
GOING UP 1 FLIGHT OF STAIRS: SLIGHT DIFFICULTY
GOING_UP_1_FLIGHT_OF_STAIRS: SLIGHT DIFFICULTY
GETTING_INTO_AND_OUT_OF_AN_AVERAGE_CAR: MODERATE DIFFICULTY
HOW_WOULD_YOU_RATE_YOUR_CURRENT_LEVEL_OF_FUNCTION?: ABNORMAL
TWISTING/PIVOTING_ON_INVOLVED_LEG: EXTREME DIFFICULTY
HOW_WOULD_YOU_RATE_YOUR_CURRENT_LEVEL_OF_FUNCTION_DURING_YOUR_USUAL_ACTIVITIES_OF_DAILY_LIVING_FROM_0_TO_100_WITH_100_BEING_YOUR_LEVEL_OF_FUNCTION_PRIOR_TO_YOUR_HIP_PROBLEM_AND_0_BEING_THE_INABILITY_TO_PERFORM_ANY_OF_YOUR_USUAL_DAILY_ACTIVITIES?: 50
HOW_WOULD_YOU_RATE_YOUR_CURRENT_LEVEL_OF_FUNCTION_DURING_YOUR_USUAL_ACTIVITIES_OF_DAILY_LIVING_FROM_0_TO_100_WITH_100_BEING_YOUR_LEVEL_OF_FUNCTION_PRIOR_TO_YOUR_HIP_PROBLEM_AND_0_BEING_THE_INABILITY_TO_PERFORM_ANY_OF_YOUR_USUAL_DAILY_ACTIVITIES?: 50
STANDING_FOR_15_MINUTES: MODERATE DIFFICULTY
STEPPING_UP_AND_DOWN_CURBS: SLIGHT DIFFICULTY

## 2025-07-11 ENCOUNTER — HOSPITAL ENCOUNTER (OUTPATIENT)
Facility: CLINIC | Age: 60
Discharge: HOME | End: 2025-07-11
Attending: STUDENT IN AN ORGANIZED HEALTH CARE EDUCATION/TRAINING PROGRAM | Admitting: STUDENT IN AN ORGANIZED HEALTH CARE EDUCATION/TRAINING PROGRAM
Payer: COMMERCIAL

## 2025-07-11 ENCOUNTER — APPOINTMENT (OUTPATIENT)
Dept: GENERAL RADIOLOGY | Facility: CLINIC | Age: 60
End: 2025-07-11
Attending: STUDENT IN AN ORGANIZED HEALTH CARE EDUCATION/TRAINING PROGRAM
Payer: COMMERCIAL

## 2025-07-11 ENCOUNTER — APPOINTMENT (OUTPATIENT)
Dept: PHYSICAL THERAPY | Facility: CLINIC | Age: 60
End: 2025-07-11
Attending: STUDENT IN AN ORGANIZED HEALTH CARE EDUCATION/TRAINING PROGRAM
Payer: COMMERCIAL

## 2025-07-11 VITALS
WEIGHT: 213.63 LBS | SYSTOLIC BLOOD PRESSURE: 108 MMHG | OXYGEN SATURATION: 95 % | TEMPERATURE: 98.5 F | DIASTOLIC BLOOD PRESSURE: 75 MMHG | RESPIRATION RATE: 16 BRPM | HEIGHT: 72 IN | BODY MASS INDEX: 28.93 KG/M2 | HEART RATE: 82 BPM

## 2025-07-11 DIAGNOSIS — M16.12 OSTEOARTHRITIS OF ONE HIP, LEFT: ICD-10-CM

## 2025-07-11 DIAGNOSIS — M87.052 AVASCULAR NECROSIS OF BONE OF LEFT HIP (H): ICD-10-CM

## 2025-07-11 LAB
ABO + RH BLD: NORMAL
BLD GP AB SCN SERPL QL: NEGATIVE
GLUCOSE BLDC GLUCOMTR-MCNC: 101 MG/DL (ref 70–99)
SPECIMEN EXP DATE BLD: NORMAL

## 2025-07-11 PROCEDURE — 999N000065 XR PELVIS AND HIP PORTABLE LEFT 1 VIEW

## 2025-07-11 PROCEDURE — 710N000012 HC RECOVERY PHASE 2, PER MINUTE: Performed by: STUDENT IN AN ORGANIZED HEALTH CARE EDUCATION/TRAINING PROGRAM

## 2025-07-11 PROCEDURE — 258N000003 HC RX IP 258 OP 636: Performed by: STUDENT IN AN ORGANIZED HEALTH CARE EDUCATION/TRAINING PROGRAM

## 2025-07-11 PROCEDURE — 250N000025 HC SEVOFLURANE, PER MIN: Performed by: STUDENT IN AN ORGANIZED HEALTH CARE EDUCATION/TRAINING PROGRAM

## 2025-07-11 PROCEDURE — 99222 1ST HOSP IP/OBS MODERATE 55: CPT | Performed by: INTERNAL MEDICINE

## 2025-07-11 PROCEDURE — 27130 TOTAL HIP ARTHROPLASTY: CPT | Mod: LT | Performed by: STUDENT IN AN ORGANIZED HEALTH CARE EDUCATION/TRAINING PROGRAM

## 2025-07-11 PROCEDURE — 360N000084 HC SURGERY LEVEL 4 W/ FLUORO, PER MIN: Performed by: STUDENT IN AN ORGANIZED HEALTH CARE EDUCATION/TRAINING PROGRAM

## 2025-07-11 PROCEDURE — 250N000009 HC RX 250: Performed by: STUDENT IN AN ORGANIZED HEALTH CARE EDUCATION/TRAINING PROGRAM

## 2025-07-11 PROCEDURE — 370N000017 HC ANESTHESIA TECHNICAL FEE, PER MIN: Performed by: STUDENT IN AN ORGANIZED HEALTH CARE EDUCATION/TRAINING PROGRAM

## 2025-07-11 PROCEDURE — 272N000001 HC OR GENERAL SUPPLY STERILE: Performed by: STUDENT IN AN ORGANIZED HEALTH CARE EDUCATION/TRAINING PROGRAM

## 2025-07-11 PROCEDURE — 710N000010 HC RECOVERY PHASE 1, LEVEL 2, PER MIN: Performed by: STUDENT IN AN ORGANIZED HEALTH CARE EDUCATION/TRAINING PROGRAM

## 2025-07-11 PROCEDURE — 82962 GLUCOSE BLOOD TEST: CPT

## 2025-07-11 PROCEDURE — 250N000011 HC RX IP 250 OP 636

## 2025-07-11 PROCEDURE — 250N000013 HC RX MED GY IP 250 OP 250 PS 637

## 2025-07-11 PROCEDURE — 999N000141 HC STATISTIC PRE-PROCEDURE NURSING ASSESSMENT: Performed by: STUDENT IN AN ORGANIZED HEALTH CARE EDUCATION/TRAINING PROGRAM

## 2025-07-11 PROCEDURE — 999N000180 XR SURGERY CARM FLUORO LESS THAN 5 MIN

## 2025-07-11 PROCEDURE — C1776 JOINT DEVICE (IMPLANTABLE): HCPCS | Performed by: STUDENT IN AN ORGANIZED HEALTH CARE EDUCATION/TRAINING PROGRAM

## 2025-07-11 PROCEDURE — 250N000011 HC RX IP 250 OP 636: Performed by: STUDENT IN AN ORGANIZED HEALTH CARE EDUCATION/TRAINING PROGRAM

## 2025-07-11 PROCEDURE — 86900 BLOOD TYPING SEROLOGIC ABO: CPT

## 2025-07-11 PROCEDURE — 258N000001 HC RX 258: Performed by: STUDENT IN AN ORGANIZED HEALTH CARE EDUCATION/TRAINING PROGRAM

## 2025-07-11 PROCEDURE — 250N000013 HC RX MED GY IP 250 OP 250 PS 637: Performed by: STUDENT IN AN ORGANIZED HEALTH CARE EDUCATION/TRAINING PROGRAM

## 2025-07-11 DEVICE — IMP SHELL BIOM G7 ACETAB PPS LIM HOLE 60MM SZ G 010000667: Type: IMPLANTABLE DEVICE | Site: HIP | Status: FUNCTIONAL

## 2025-07-11 DEVICE — IMPLANTABLE DEVICE
Type: IMPLANTABLE DEVICE | Site: HIP | Status: FUNCTIONAL
Brand: BIOLOX® DELTA MODULAR CERAMIC HEAD

## 2025-07-11 DEVICE — IMPLANTABLE DEVICE: Type: IMPLANTABLE DEVICE | Site: HIP | Status: FUNCTIONAL

## 2025-07-11 DEVICE — IMPLANTABLE DEVICE
Type: IMPLANTABLE DEVICE | Site: HIP | Status: FUNCTIONAL
Brand: TAPERLOC COMPLETE PRIMARY FEMORAL

## 2025-07-11 RX ORDER — HYDROMORPHONE HYDROCHLORIDE 1 MG/ML
0.4 INJECTION, SOLUTION INTRAMUSCULAR; INTRAVENOUS; SUBCUTANEOUS
Status: DISCONTINUED | OUTPATIENT
Start: 2025-07-11 | End: 2025-07-11 | Stop reason: HOSPADM

## 2025-07-11 RX ORDER — ONDANSETRON 2 MG/ML
4 INJECTION INTRAMUSCULAR; INTRAVENOUS EVERY 30 MIN PRN
Status: DISCONTINUED | OUTPATIENT
Start: 2025-07-11 | End: 2025-07-11 | Stop reason: HOSPADM

## 2025-07-11 RX ORDER — DEXAMETHASONE SODIUM PHOSPHATE 4 MG/ML
4 INJECTION, SOLUTION INTRA-ARTICULAR; INTRALESIONAL; INTRAMUSCULAR; INTRAVENOUS; SOFT TISSUE
Status: DISCONTINUED | OUTPATIENT
Start: 2025-07-11 | End: 2025-07-11 | Stop reason: HOSPADM

## 2025-07-11 RX ORDER — ACYCLOVIR 400 MG/1
400 TABLET ORAL EVERY 8 HOURS
Status: CANCELLED | OUTPATIENT
Start: 2025-07-11

## 2025-07-11 RX ORDER — ACETAMINOPHEN 325 MG/1
975 TABLET ORAL EVERY 8 HOURS
Status: DISCONTINUED | OUTPATIENT
Start: 2025-07-11 | End: 2025-07-11 | Stop reason: HOSPADM

## 2025-07-11 RX ORDER — OXYCODONE HYDROCHLORIDE 5 MG/1
5 TABLET ORAL EVERY 4 HOURS PRN
Status: DISCONTINUED | OUTPATIENT
Start: 2025-07-11 | End: 2025-07-11 | Stop reason: HOSPADM

## 2025-07-11 RX ORDER — FENTANYL CITRATE 50 UG/ML
50 INJECTION, SOLUTION INTRAMUSCULAR; INTRAVENOUS EVERY 5 MIN PRN
Status: DISCONTINUED | OUTPATIENT
Start: 2025-07-11 | End: 2025-07-11 | Stop reason: HOSPADM

## 2025-07-11 RX ORDER — HYDROMORPHONE HYDROCHLORIDE 1 MG/ML
0.4 INJECTION, SOLUTION INTRAMUSCULAR; INTRAVENOUS; SUBCUTANEOUS EVERY 5 MIN PRN
Status: DISCONTINUED | OUTPATIENT
Start: 2025-07-11 | End: 2025-07-11 | Stop reason: HOSPADM

## 2025-07-11 RX ORDER — METOPROLOL SUCCINATE 100 MG/1
100 TABLET, EXTENDED RELEASE ORAL DAILY
Status: CANCELLED | OUTPATIENT
Start: 2025-07-12

## 2025-07-11 RX ORDER — LIDOCAINE 40 MG/G
CREAM TOPICAL
Status: DISCONTINUED | OUTPATIENT
Start: 2025-07-11 | End: 2025-07-11 | Stop reason: HOSPADM

## 2025-07-11 RX ORDER — AMLODIPINE BESYLATE 10 MG/1
10 TABLET ORAL DAILY
Status: CANCELLED | OUTPATIENT
Start: 2025-07-12

## 2025-07-11 RX ORDER — NALOXONE HYDROCHLORIDE 0.4 MG/ML
0.1 INJECTION, SOLUTION INTRAMUSCULAR; INTRAVENOUS; SUBCUTANEOUS
Status: DISCONTINUED | OUTPATIENT
Start: 2025-07-11 | End: 2025-07-11 | Stop reason: HOSPADM

## 2025-07-11 RX ORDER — ACETAMINOPHEN 325 MG/1
650 TABLET ORAL EVERY 4 HOURS PRN
Qty: 100 TABLET | Refills: 0 | Status: SHIPPED | OUTPATIENT
Start: 2025-07-11

## 2025-07-11 RX ORDER — HYDROMORPHONE HYDROCHLORIDE 1 MG/ML
0.2 INJECTION, SOLUTION INTRAMUSCULAR; INTRAVENOUS; SUBCUTANEOUS EVERY 5 MIN PRN
Status: DISCONTINUED | OUTPATIENT
Start: 2025-07-11 | End: 2025-07-11 | Stop reason: HOSPADM

## 2025-07-11 RX ORDER — ONDANSETRON 4 MG/1
4 TABLET, ORALLY DISINTEGRATING ORAL EVERY 30 MIN PRN
Status: DISCONTINUED | OUTPATIENT
Start: 2025-07-11 | End: 2025-07-11 | Stop reason: HOSPADM

## 2025-07-11 RX ORDER — CEFAZOLIN SODIUM/WATER 2 G/20 ML
2 SYRINGE (ML) INTRAVENOUS ONCE
Status: COMPLETED | OUTPATIENT
Start: 2025-07-11 | End: 2025-07-11

## 2025-07-11 RX ORDER — SODIUM CHLORIDE, SODIUM LACTATE, POTASSIUM CHLORIDE, CALCIUM CHLORIDE 600; 310; 30; 20 MG/100ML; MG/100ML; MG/100ML; MG/100ML
INJECTION, SOLUTION INTRAVENOUS CONTINUOUS
Status: DISCONTINUED | OUTPATIENT
Start: 2025-07-11 | End: 2025-07-11 | Stop reason: HOSPADM

## 2025-07-11 RX ORDER — OXYCODONE HYDROCHLORIDE 10 MG/1
10 TABLET ORAL
Status: DISCONTINUED | OUTPATIENT
Start: 2025-07-11 | End: 2025-07-11 | Stop reason: HOSPADM

## 2025-07-11 RX ORDER — ONDANSETRON 4 MG/1
4 TABLET, ORALLY DISINTEGRATING ORAL EVERY 6 HOURS PRN
Status: DISCONTINUED | OUTPATIENT
Start: 2025-07-11 | End: 2025-07-11 | Stop reason: HOSPADM

## 2025-07-11 RX ORDER — PROCHLORPERAZINE MALEATE 10 MG
10 TABLET ORAL EVERY 6 HOURS PRN
Status: DISCONTINUED | OUTPATIENT
Start: 2025-07-11 | End: 2025-07-11 | Stop reason: HOSPADM

## 2025-07-11 RX ORDER — OXYCODONE HYDROCHLORIDE 5 MG/1
5 TABLET ORAL
Status: COMPLETED | OUTPATIENT
Start: 2025-07-11 | End: 2025-07-11

## 2025-07-11 RX ORDER — NORTRIPTYLINE HYDROCHLORIDE 10 MG/1
10 CAPSULE ORAL AT BEDTIME
Status: CANCELLED | OUTPATIENT
Start: 2025-07-11

## 2025-07-11 RX ORDER — AMOXICILLIN 250 MG
1 CAPSULE ORAL 2 TIMES DAILY
Status: DISCONTINUED | OUTPATIENT
Start: 2025-07-11 | End: 2025-07-11 | Stop reason: HOSPADM

## 2025-07-11 RX ORDER — LEVOTHYROXINE SODIUM 125 UG/1
125 TABLET ORAL
Status: CANCELLED | OUTPATIENT
Start: 2025-07-14

## 2025-07-11 RX ORDER — OXYCODONE HYDROCHLORIDE 5 MG/1
5-10 TABLET ORAL EVERY 4 HOURS PRN
Qty: 26 TABLET | Refills: 0 | Status: SHIPPED | OUTPATIENT
Start: 2025-07-11

## 2025-07-11 RX ORDER — CEFAZOLIN SODIUM/WATER 2 G/20 ML
2 SYRINGE (ML) INTRAVENOUS
Status: DISCONTINUED | OUTPATIENT
Start: 2025-07-11 | End: 2025-07-11 | Stop reason: HOSPADM

## 2025-07-11 RX ORDER — POLYETHYLENE GLYCOL 3350 17 G/17G
17 POWDER, FOR SOLUTION ORAL DAILY
Status: DISCONTINUED | OUTPATIENT
Start: 2025-07-12 | End: 2025-07-11 | Stop reason: HOSPADM

## 2025-07-11 RX ORDER — KETOROLAC TROMETHAMINE 30 MG/ML
15 INJECTION, SOLUTION INTRAMUSCULAR; INTRAVENOUS EVERY 6 HOURS
Status: DISCONTINUED | OUTPATIENT
Start: 2025-07-11 | End: 2025-07-11 | Stop reason: HOSPADM

## 2025-07-11 RX ORDER — BISACODYL 10 MG
10 SUPPOSITORY, RECTAL RECTAL DAILY PRN
Status: DISCONTINUED | OUTPATIENT
Start: 2025-07-11 | End: 2025-07-11 | Stop reason: HOSPADM

## 2025-07-11 RX ORDER — POLYETHYLENE GLYCOL 3350 17 G/17G
1 POWDER, FOR SOLUTION ORAL DAILY
Qty: 7 PACKET | Refills: 0 | Status: SHIPPED | OUTPATIENT
Start: 2025-07-11

## 2025-07-11 RX ORDER — CEFAZOLIN SODIUM/WATER 2 G/20 ML
2 SYRINGE (ML) INTRAVENOUS SEE ADMIN INSTRUCTIONS
Status: DISCONTINUED | OUTPATIENT
Start: 2025-07-11 | End: 2025-07-11 | Stop reason: HOSPADM

## 2025-07-11 RX ORDER — ONDANSETRON 2 MG/ML
4 INJECTION INTRAMUSCULAR; INTRAVENOUS EVERY 6 HOURS PRN
Status: DISCONTINUED | OUTPATIENT
Start: 2025-07-11 | End: 2025-07-11 | Stop reason: HOSPADM

## 2025-07-11 RX ORDER — ASPIRIN 81 MG/1
81 TABLET ORAL 2 TIMES DAILY
Status: DISCONTINUED | OUTPATIENT
Start: 2025-07-11 | End: 2025-07-11 | Stop reason: HOSPADM

## 2025-07-11 RX ORDER — TRANEXAMIC ACID 650 MG/1
1950 TABLET ORAL ONCE
Status: COMPLETED | OUTPATIENT
Start: 2025-07-11 | End: 2025-07-11

## 2025-07-11 RX ORDER — ASPIRIN 81 MG/1
81 TABLET ORAL 2 TIMES DAILY
Qty: 60 TABLET | Refills: 0 | Status: SHIPPED | OUTPATIENT
Start: 2025-07-11

## 2025-07-11 RX ORDER — AMOXICILLIN 250 MG
1-2 CAPSULE ORAL 2 TIMES DAILY
Qty: 30 TABLET | Refills: 0 | Status: SHIPPED | OUTPATIENT
Start: 2025-07-11

## 2025-07-11 RX ORDER — ATORVASTATIN CALCIUM 20 MG/1
20 TABLET, FILM COATED ORAL DAILY
Status: CANCELLED | OUTPATIENT
Start: 2025-07-12

## 2025-07-11 RX ORDER — OMEPRAZOLE 20 MG/1
40 CAPSULE, DELAYED RELEASE ORAL DAILY
Status: CANCELLED | OUTPATIENT
Start: 2025-07-11

## 2025-07-11 RX ORDER — CEFAZOLIN SODIUM/WATER 2 G/20 ML
2 SYRINGE (ML) INTRAVENOUS EVERY 8 HOURS
Status: DISCONTINUED | OUTPATIENT
Start: 2025-07-11 | End: 2025-07-11 | Stop reason: HOSPADM

## 2025-07-11 RX ORDER — HYDROMORPHONE HYDROCHLORIDE 1 MG/ML
0.2 INJECTION, SOLUTION INTRAMUSCULAR; INTRAVENOUS; SUBCUTANEOUS
Status: DISCONTINUED | OUTPATIENT
Start: 2025-07-11 | End: 2025-07-11 | Stop reason: HOSPADM

## 2025-07-11 RX ORDER — OXYCODONE HYDROCHLORIDE 10 MG/1
10 TABLET ORAL EVERY 4 HOURS PRN
Status: DISCONTINUED | OUTPATIENT
Start: 2025-07-11 | End: 2025-07-11 | Stop reason: HOSPADM

## 2025-07-11 RX ORDER — FENTANYL CITRATE 50 UG/ML
25 INJECTION, SOLUTION INTRAMUSCULAR; INTRAVENOUS EVERY 5 MIN PRN
Status: DISCONTINUED | OUTPATIENT
Start: 2025-07-11 | End: 2025-07-11 | Stop reason: HOSPADM

## 2025-07-11 RX ADMIN — ACETAMINOPHEN 975 MG: 325 TABLET ORAL at 13:30

## 2025-07-11 RX ADMIN — FENTANYL CITRATE 25 MCG: 0.05 INJECTION, SOLUTION INTRAMUSCULAR; INTRAVENOUS at 12:40

## 2025-07-11 RX ADMIN — ACETAMINOPHEN 975 MG: 325 TABLET ORAL at 19:38

## 2025-07-11 RX ADMIN — FENTANYL CITRATE 25 MCG: 0.05 INJECTION, SOLUTION INTRAMUSCULAR; INTRAVENOUS at 12:50

## 2025-07-11 RX ADMIN — HYDROMORPHONE HYDROCHLORIDE 0.4 MG: 1 INJECTION, SOLUTION INTRAMUSCULAR; INTRAVENOUS; SUBCUTANEOUS at 13:30

## 2025-07-11 RX ADMIN — OXYCODONE HYDROCHLORIDE 5 MG: 5 TABLET ORAL at 14:08

## 2025-07-11 RX ADMIN — Medication 2 G: at 14:56

## 2025-07-11 RX ADMIN — OXYCODONE HYDROCHLORIDE 10 MG: 5 TABLET ORAL at 16:52

## 2025-07-11 RX ADMIN — OXYCODONE HYDROCHLORIDE 5 MG: 5 TABLET ORAL at 12:43

## 2025-07-11 RX ADMIN — TRANEXAMIC ACID 1950 MG: 650 TABLET ORAL at 06:33

## 2025-07-11 ASSESSMENT — ACTIVITIES OF DAILY LIVING (ADL)
ADLS_ACUITY_SCORE: 32
ADLS_ACUITY_SCORE: 31
ADLS_ACUITY_SCORE: 32
ADLS_ACUITY_SCORE: 31
ADLS_ACUITY_SCORE: 32
ADLS_ACUITY_SCORE: 32
ADLS_ACUITY_SCORE: 31
ADLS_ACUITY_SCORE: 32
ADLS_ACUITY_SCORE: 31
ADLS_ACUITY_SCORE: 32
ADLS_ACUITY_SCORE: 31
ADLS_ACUITY_SCORE: 31
ADLS_ACUITY_SCORE: 32
ADLS_ACUITY_SCORE: 31
ADLS_ACUITY_SCORE: 31
ADLS_ACUITY_SCORE: 32

## 2025-07-11 NOTE — PROGRESS NOTES
Orthopedic surgery team was contacted by nursing team with concern for the patient.  Patient was unable to urinate following left JEB.  PVR has shown approximately 900 mL of urine in the bladder.  Patient states that he is slowly starting to feel sensation in his left leg and thigh.  Patient denies any other symptoms.  No other concerns following surgery.  Was cleared by PT to discharge home.    We discussed the plan to ensure adequate fluid intake and attempt a trial of void at that time.  We informed the patient that would be appropriate to discharge once they are successfully able to urinate.  Patient voiced understanding and was agreeable with the plan
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
## 2025-07-11 NOTE — OR NURSING
Dr. Elvis Bolton was contacted at 1800 about patient's inability to pee. Patient has tried a number of times to go with minimal success. Dr. Bolton does not want patient to be straight cathed then sent home right after; his recommendation is to have patient wait another hour and then we will reconvene and figure out if patient needs to be admitted at that point.

## 2025-07-11 NOTE — DISCHARGE INSTRUCTIONS
To contact a doctor, call Dr. Mao, Orthopedic, [phone] or:  '   [phone] and ask for the Resident On Call for          Orthopedic Surgery (answered 24 hours a day)  '   Emergency Department:  [facility]'s Emergency Department:  [phone]

## 2025-07-11 NOTE — PROGRESS NOTES
[facility]                                                                                   OUTPATIENT PHYSICAL THERAPY    PLAN OF TREATMENT FOR OUTPATIENT REHABILITATION   Patient's Last Name, First Name, Dimitrios Roblero YOB: 1965   Provider's Name   [facility]   Medical Record No.  [id number]     Onset Date: 07/11/25 Start of Care Date:       Medical Diagnosis:                  PT Diagnosis:  Difficulty with walking Certification Dates:  From:    To:         See note for plan of treatment, functional goals, and certification details.    I CERTIFY THE NEED FOR THESE SERVICES FURNISHED UNDER        THIS PLAN OF TREATMENT AND WHILE UNDER MY CARE (Physician co-signature of this document indicates review and certification of the therapy plan).               07/11/25 1413   Appointment Info   Signing Clinician's Name / Credentials (PT) Omega Gleason DPT   Living Environment   People in Home spouse   Current Living Arrangements house   Home Accessibility stairs within home;stairs to enter home   Number of Stairs, Main Entrance 2   Stair Railings, Main Entrance railing on right side (ascending)   Number of Stairs, Within Home, Primary seven   Stair Railings, Within Home, Primary railing on right side (ascending)   Transportation Anticipated family or friend will provide   Self-Care   Usual Activity Tolerance moderate   Current Activity Tolerance moderate   Regular Exercise No   Equipment Currently Used at Home crutches;shower chair   Fall history within last six months no   General Information   Onset of Illness/Injury or Date of Surgery 07/11/25   Referring Physician Dick Adrian, DO   Patient/Family Therapy Goals Statement (PT) Pt would like to return home once medically appropriate   Pertinent History of Current Problem (include personal factors and/or comorbidities that impact the POC) S/p L JEB POD0   Existing  "Precautions/Restrictions weight bearing;fall   Weight-Bearing Status - LLE weight-bearing as tolerated   Cognition   Affect/Mental Status (Cognition) WFL   Orientation Status (Cognition) oriented x 4   Follows Commands (Cognition) WFL   Pain Assessment   Patient Currently in Pain Yes, see Vital Sign flowsheet   Integumentary/Edema   Integumentary/Edema other (describe)   Integumentary/Edema Comments unable to see surgical site   Posture    Posture Not impaired   Range of Motion (ROM)   Range of Motion ROM is WFL   Strength (Manual Muscle Testing)   Strength (Manual Muscle Testing) strength is WFL   Strength Comments Left hip not formally tested 2/2 pain however patient able to perform transfers with CGA   Transfers   Transfers sit-stand transfer   Transfer Safety Concerns Noted decreased weight-shifting ability   Impairments Contributing to Impaired Transfers pain   Sit-Stand Transfer   Sit-Stand Bureau (Transfers) contact guard;verbal cues;1 person assist   Assistive Device (Sit-Stand Transfers) walker, front-wheeled   Gait/Stairs (Locomotion)   Bureau Level (Gait) contact guard;verbal cues;1 person assist   Assistive Device (Gait) walker, front-wheeled   Distance in Feet (Gait) 30   Pattern (Gait) step-to   Deviations/Abnormal Patterns (Gait) antalgic   Negotiation (Stairs) stairs independence;handrail location;number of steps;ascending technique;descending technique   Bureau Level (Stairs) contact guard;minimum assist (75% patient effort)   Handrail Location (Stairs) right side (ascending)   Number of Steps (Stairs) 5   Ascending Technique (Stairs) step-to-step   Descending Technique (Stairs) step-to-step   Balance   Balance other (describe)   Balance Comments Periods of LOB during stair training with min assistance to correct however did report feeling more \"swimmy headed\" after pain medication   Sensory Examination   Sensory Perception WFL   Coordination   Coordination no deficits were " identified   Muscle Tone   Muscle Tone no deficits were identified   Clinical Impression   Criteria for Skilled Therapeutic Intervention Yes, treatment indicated   PT Diagnosis (PT) Difficulty with walking   Influenced by the following impairments L hip pain   Functional limitations due to impairments decreased gait mechanics and transfers   Clinical Presentation (PT Evaluation Complexity) stable   Clinical Presentation Rationale Clinical judgement   Clinical Decision Making (Complexity) low complexity   Planned Therapy Interventions (PT) balance training;bed mobility training;gait training;neuromuscular re-education;strengthening;stair training;transfer training   Risk & Benefits of therapy have been explained evaluation/treatment results reviewed;care plan/treatment goals reviewed;risks/benefits reviewed;current/potential barriers reviewed;participants voiced agreement with care plan;participants included;patient;spouse/significant other   Clinical Impression Comments Pt showed good mobility this date s/p L JEB POD 0. Pt able to ambulate in room and ascend/descend 5 steps with teaching to family and patient on techniques and safety. Pt main limitations were pain and feelings induced by medication. Pt is safe to return home once medically appropriate with assistance from family.   PT Total Evaluation Time   PT Cornell Low Complexity Minutes (54002) 10   Physical Therapy Goals   PT Frequency Daily   PT Predicted Duration/Target Date for Goal Attainment 08/08/25   PT Goals Bed Mobility;Gait;Stairs;Transfers   PT: Bed Mobility Supervision/stand-by assist;Supine to/from sit   PT: Transfers Supervision/stand-by assist;Sit to/from stand;Assistive device   PT: Gait Supervision/stand-by assist;Greater than 200 feet;Assistive device   PT: Stairs 7 stairs;Supervision/stand-by assist;Rail on right   Interventions   Interventions Quick Adds Therapeutic Activity   Therapeutic Activity   Therapeutic Activities: dynamic activities  to improve functional performance Minutes (16399) 19   Symptoms Noted During/After Treatment Increased pain   Treatment Detail/Skilled Intervention Pt receieved and left in recliner. Pt reported increased pain however did not limit his participation. Pt also indicated he felt effects of pain medications so therapist continuously checked in on patient during mobility for symptom provocation. Pt able to perform sit to stand x2 reps with CGA and vc for UE placement for safety. He ambulated in room with step to gait and vc for sequencing and AD proximity. Pt then wheeled to stairs and performed 5 steps ascending/descending with single HR and HHA. PT demo sequencing prior to attempting and vc for ascending with strong leg and descending with surgical leg. Pt initially CGA however several LOB with min assistance to correct. This was after patient reported increased effeects of medication. Pt returned to recliner after stair training. Pt then educated on HEP pamphlet given this date and demo to therapist appropraite form. Ecercises included: ankle pumps, quad set, SAQ, LAQ,  heel slides, and glute sets. Pt and family acknowledged understanding of education this date. It is believed that patient has appropriate assistance and will perform well once home with mobility with assistance and RW. Pt is safe to return home once medically appropriate.   PT Discharge Planning   PT Plan Progress gait mechanics and activity tolerance   PT Discharge Recommendation (DC Rec) other (see comments)  (defer to ortho)   PT Rationale for DC Rec pt performed well POD 0 and patient has appropriate assistance at home.   PT Brief overview of current status Ax1 RW and GB   PT Total Distance Amb During Session (feet) 30   PT Equipment Needed at Discharge walker, rolling   Physical Therapy Time and Intention   Timed Code Treatment Minutes 19   Total Session Time (sum of timed and untimed services) 29

## 2025-07-11 NOTE — OP NOTE
[facility]    Operative Note    Pre-operative diagnosis: 60-year-old male presenting with chronic left hip/groin pain due to osteoarthritic changes in setting of avascular necrosis which are insufficiently responding to nonsurgical treatment options.   Post-operative diagnosis Same as pre-operative diagnosis    Procedure: ARTHROPLASTY, HIP, TOTAL VIA DIRECT ANTERIOR APPROACH, Left - Hip    Surgeon: Surgeons and Role:     * Jimmy Mao MD - Primary     * Candis Roger PA-JUVE - Assisting     * Elvis Bolton DO - Fellow - Assisting  Anesthesia: Choice   Estimated Blood Loss: Less than 100 ml    Drains: None  Specimens: * No specimens in log *    Complications: None.  Implants:   Implant Name Type Inv. Item Serial No.  Lot No. LRB No. Used Action   IMP SHELL BIOM G7 ACETAB PPS TINEO HOLE 60MM SZ G [id number] - [id number] Total Joint Component/Insert IMP SHELL BIOM G7 ACETAB PPS TINEO HOLE 60MM SZ G [id number]  [employer] [id number] Left 1 Implanted   LINER ACTB G7 G 36MM LNGVT HIP STRL LUM LF - [id number] Metallic Hardware/[place] LINER ACTB G7 G 36MM LNGVT HIP STRL LUM LF  [employer] [id number] Left 1 Implanted   IMP STEM FEM BIOM TAPERLOC HI OFFSET TYPE 1 [id number] - [id number] Total Joint Component/Insert IMP STEM FEM BIOM TAPERLOC HI OFFSET TYPE 1 [id number]  [employer] [id number] Left 1 Implanted   HEAD FEM 0MM OFST 36MM HIP ACTB MDLR TY 1 BLX D G7 - [id number] Total Joint Component/Insert HEAD FEM 0MM OFST 36MM HIP ACTB MDLR TY 1 BLX D G7  [employer] [id number] Left 1 Implanted     The presence of CHRISTIANO Babin was essential throughout this case for assistance with patient transfer to and from the operative bed, draping, irrigation, cautery usage, retraction, implant placement, cement removal, arthrotomy closure,  incisional closure, dressing placement.      COMPONENTS USED:  1. Biomet Taperloc Size 14, high Offset  2. Biomet  G7 Acetabular Component Size 60mm,   3. Biomet ArCOM XL Polyethylene Liner neutarl  4. Biolox Delta Ceramic Head Size 36mm +0         FINDINGS: Cartilage loss, irregularity of the femoral head, diffuse cartilage loss, osteophyte formation of the native acetabulum.  Acetabular component well positioned. Femoral component well positioned.  Intraoperative stability with full extension and ER, flexion to 90 with IR to max. Appropriate limb length.  Verified clinically and by means of fluoroscopy.    OPERATIVE PROCEDURE:     Patient was seen in the pre-operative area.  Procedure, risks and complications, expectations and rehabilitation were discussed once more.  Patient understands and agrees to the treatment plan as set forth.  Informed consent was obtained.  The left hip was marked by the patient and the operating surgeon.  The patient was brought to the operating room and unsuccessful spinal and thus general anesthesia was induced. Sof-Rol was placed on operative foot along with a Coban wrap.  Appropriately sized, the boots were placed on bilateral feet.  The patient was transfered to the [place] table in a safe manner using a rolling board and an assistant at the foot of the table holding both boots and delivering them into the spars and locking them in place.  The locking perineal post was then placed between the patient's legs.  The ipsilateral arm of the procedure was draped across the patient's chest and padded and taped in an appropriate fashion. The contralateral arm was left on an armboard and securely fastened.  The patient's ipsilateral hip was placed in a position of 10 degrees of flexion.  A safety strap was placed around the patient securing him to the table.  Fluoroscopy was brought into the room on the contralateral side of the patient.  The patient's skin was prepped from the umbilicus to the ipsilateral knee. The surgical team was then gowned and gloved in standard sterile fashion. A bone sheet was  placed inferiorly over the boots and up to the knee.    A shower curtain drape was then applied over the sticky blue U drape.  We then marked our incision 2 fingerbreadths distal and lateral to the anterior superior iliac spine with a skin marker.    The femoral elevator bracket was palpated through the drape, and using a scissors a hole was cut in the drape, the bracket was malleted on top of this component and this area was isolated with Ioban strips. Draping gloves were then removed and the scissors was removed from sterile field.  Prior to initiation of the procedure a final multidisciplinary timeout was performed to confirm the correct patient correct operative site, and that preoperative antibiotics had been administered.  All in the room were in agreement with the above assessment and plan.    Skin incision was performed 2 cm lateral and 2 centimeters distal to the ASIS centered over the tensor fascia dewayne muscle belly.  This was carried down to the fascial layer which was incised in line with the skin, and retracted medially with Allis clamp.  Using blunt palpation the tensor fascia dewayne muscle belly was isolated and retracted laterally. The space immediately above the femoral neck was palpated with blunt palpation and a cobra retractor was placed clamping it into the elevator bracket.  A right angle self retractor was utilized to provide visualization in the distal aspect of the wound.  The lateral femoral circumflex vessels were visualized, isolated, coagulated using Cathryn clamp and suture ligation.  The floor of the fascial compartment was then incised providing visualization of the intracapsular fat pad.  This fat pad was excised using rongeur.  A second cobra was placed along the inferior aspect of the femoral neck and capsule.  When appropriate visualization of the capsule was verified, the anterior capsule was excised. The rotation lock was released and the leg was rotated to 70 degrees external  rotation to provide an ability to release the inferior capsule down to the lesser trochanter and palpation in the same area.  The leg was brought back to neutral.  The 2 Cobra retractors were moved into the intra-capsular position. The superior cobra was rotated with the handle towards the patient to put the superior capsule on stretch and provide appropriate visualization for further release of this tissue.  An sawblade of appropriate size and width was utilized to perform the femoral osteotomy.  Next utilizing an osteotome and a a corkscrew allowed controlled rotation of the femoral head to loosen all soft tissues and appropriately extract the femoral head which was then measured.    Attention was then directed at the acetabulum with retractors placed in the anterior, posterior proximal, posterior distal positions. This provided adequate visualization of the acetabulum. The pulvinar was excised.  Any remaining labrum was excised.  A starting small reamer was utilized to carry the reaming down to the appropriately templated depth of the cup.  Sequentially reamers were carried up to 1 minus the external diameter of the final cup size of  60 mm.  The definitive 60 millimeter cup was placed.  This had excellent initial stability. The cup was found to be stable and no screws were utilized for additional fixation. The final neutral liner was then placed.    Fluoroscopy was utilized to verify appropriate abduction and anteversion angles with the image machine placed over the pubic symphysis, as close to the patient's body as possible.   The final cup was placed under fluoroscopy as well noting no change in the abduction or anteversion angles.     The proximal femur was then positioned first by placing the femoral elevator hook in a loose unconnected fashion with the hook initially pointed proximally towards the patient's head then sliding between the tensor and vastus lateralis, with the hook sitting approximately at the  vastus ridge or just distal to it. Green handle utilized to release all gross traction. The leg was externally rotated until some tension is reached. The hip was then extended to the floor and adducted.  The femoral hook then connected to the bracket.  And a Wesley retractor placed over the medial calcar.  A bent Hohmann then placed proximally.  The conjoint and piriformis tendon visualized and released.  Once appropriate visualization of the proximal femur was obtained, broaching was initiated utilizing a box osteotome, and canal finder to establish a collinear tract with intramedullary space.Next the broaching initiated.  This was conducted in a controlled and limited force manner. It was noted that our neck cut was of appropriate height and set in approximately the same position as we had planned based on our template.  A high offset neck with a 36+0 head was placed for trialing.  All retractors were removed.  The hip was brought out of abduction and extension.  A finger was held on the femoral head trial to provide stable compression, slight internal rotation, then traction and more internal rotation was utilized to reduce the hip.     Fluoroscopy was utilized to verify appropriate trial position, with regards to the neck resection height, the fill of the proximal metaphyseal region, rotation under live fluoroscopy was utilized to verify appropriate intramedullary position. Appropriate leg length was noted by placing a metallic vlad along the inferior ischium and comparing to position of the lesser trochanter bilaterally.  Which demonstrated appropriate leg length within 2 to 3 mm.     Stability examination was performed with a trial head by releasing the silver key boot clasp. With hyperextension and maximal external rotation no instability was appreciated.     Utilizing a bone hook with the hip in a position of neutral extension as well as external rotation and traction provided by the assistant the femoral  head was dislocated.  Again the proximal femur was exposed by placing the femoral elevator hook, utilizing external rotation, extension, adduction, connecting the femoral hook to the bracket, Wesley retractor medially, bent Hohmann proximally.  The trial head and neck removed. The empty broach handle was utilized to remove the broach.  The canal was then prepared, and irrigated and the final stem was placed. The final head was then placed on the stem, malleted in place and confirmed to be stable, all retractors removed, one finger on the femoral head internal rotation applied gently, leg brought to neutral, with traction applied, further internal rotation which reduced the hip.     The hip was thoroughly irrigated with iodine soak and normal saline. Intracapsular block was delivered to the surrounding periarticular tissues.  Fascial layer closed with 0 Vicryl.  Skin closed with 2-0 Vicryl and running 3-0 Monocryl.  Sterile dressings applied patient transferred off of [place] table without incident.  Patient transferred to postoperative recovery suite.      POST OPERATIVE PLAN    Activity: Up with assist and assistive devices as needed until independent.  Precautions:No precautions  Weight bearing status: WBAT  Antibiotics per protocol  Diet: Begin with clear fluids and progress diet as tolerated. Bowel regimen. Anti-emetics PRN.    DVT prophylaxis: Aspirin 162 mg daily for 4 weeks   Elevation: Elevate heels off of bed on pillows   Pain management: Orals PRN, IV for breakthrough only  X-rays: AP Pelvis and lateral in PACU  Physical Therapy: Mobilization, ROM, ADL's, anterior hip precautions  Discharge today or tomorrow when requirements are met.      Jimmy Mao MD, PhD     Adult Reconstruction  [facility] Department of Orthopaedic Surgery

## 2025-07-12 ENCOUNTER — TELEPHONE (OUTPATIENT)
Dept: OTHER | Facility: CLINIC | Age: 60
End: 2025-07-12
Payer: COMMERCIAL

## 2025-07-12 NOTE — PLAN OF CARE
Physical Therapy Discharge Summary    Reason for therapy discharge:    Discharged to home.    Progress towards therapy goal(s). See goals on Care Plan in [facility] electronic health record for goal details.  Goals partially met.  Barriers to achieving goals:   discharge from facility.    Therapy recommendation(s):    Continued therapy is recommended.  Rationale/Recommendations:  per outpatient recommendations .

## 2025-07-12 NOTE — TELEPHONE ENCOUNTER
Orthopaedic Surgery Phone Consult      DATE OF SERVICE:  2025    Orthopedic Surgery was contacted to discuss with Dimitrios Goldberg and provide recommendations.     I was called by Dimitrios Goldberg regarding this patient's pain and was provided with the following information. He is having soreness in the left groin. He is taking Tylenol and oxycodone for pain management. He has not been icing. He has been able to ambulate with a walker. Denies fevers, chills, chest pain, SOB, no falls.  No instability.      Based on the limited information provided, I recommended the followin year old male POD1 s/p left direct anterior JEB with Dr. Mao.     - Discussed with patient to continue using the analgesics as prescribed.  He may also ice the hip and thigh as needed ensuring there is a barrier between the ice and the skin.  Also discussed periods of rest between bouts of activities.  - Follow-up: as scheduled.  Informed patient that he may call back at any time if he has additional questions.     I did not personally see or examine the patient at this time. Orthopedic Surgery did not see nor was involved with the physical examination of the patient for this encounter.     --  Elvis Bolton, DO  Orthopaedic Surgery Resident  Pager: [phone]  2025, 12:11 PM    Please page me directly with any questions/concerns during regular weekday hours before 5pm. If there is no response, if it is a weekend, or if it is during evening hours, then please page the orthopedic surgery resident on call.

## 2025-07-12 NOTE — OR NURSING
Patient voided 650mL. Criteria met for patient to go home. RN reviewed that patient should still monitor their urine output and go back to the ER if they don't void in 8 hours.

## 2025-07-13 NOTE — TELEPHONE ENCOUNTER
Orthopaedic Surgery Phone Consult      DATE OF SERVICE:  7/12/2025    Orthopedic Surgery was contacted to discuss with Dimitrios Goldberg and provide recommendations.     I was called by Dimitrios Goldberg regarding this patient's pain and was provided with the following information. He is having pain on the front of the left mid-thigh that has been worsening today. He took 500 mg of Tylenol this morning but none since, and took a dose of oxycodone at 10 AM and 7 PM today. He sat outside for approximately 2 hours this afternoon at a graduation party, and has had increasing pain in the left thigh since. He has had some swelling and paresthesias in the foot as well. He denies chills, chest pain, SOB, no falls. He took his temperature after coming in from outside and it was 100.4F, and repeat after laying in bed without covers on was 100.8F. He denies generalized body aches. The dressing is intact without drainage.  He has been taking his aspirin for DVT prophylaxis, denies pain or swelling in his calf or behind his knee.    60 year old male POD1 s/p left direct anterior JEB with Dr. Mao.     Based on the information provided, I recommended the following:   - Discussed with patient to use the analgesics as prescribed. This includes taking Tylenol 650 mg every 4 hours for the next several days in addition to oxycodone every 4-6 hours. Also instructed him to ice the hip and thigh, ensuring there is a barrier between the ice and the skin. Explained that while he was sitting outside at the graduation party his leg is likely swollen, which is contributing to his symptoms.  - Encouraged him to ensure he is not covered with blankets and equilibrated before checking his temperature. Advised him to call back if his temperature continues to rise and he feels subjective fevers.  - Advised him to call if he develops pain in the calf or behind the knee, with swelling., and to continue taking his aspirin as prescribed for DVT  prophylaxis.  - Follow-up: as scheduled. Informed patient that he may call back at any time if he has additional questions.     I did not personally see or examine the patient at this time. Orthopedic Surgery did not see nor was involved with the physical examination of the patient for this encounter.     --  Elvis Bolton DO  Orthopaedic Surgery Resident  Pager: [phone]  July 12, 2025, 9:08 PM    Please page me directly with any questions/concerns during regular weekday hours before 5pm. If there is no response, if it is a weekend, or if it is during evening hours, then please page the orthopedic surgery resident on call.

## 2025-07-14 ENCOUNTER — DOCUMENTATION ONLY (OUTPATIENT)
Dept: ORTHOPEDICS | Facility: CLINIC | Age: 60
End: 2025-07-14
Payer: COMMERCIAL

## 2025-07-14 NOTE — PROGRESS NOTES
Received Completed forms Yes   Faxed Forms Faxed To: [facility]  Fax Number: [phone]   Sent to HIM (Date) 7/14/25

## 2025-07-15 ENCOUNTER — THERAPY VISIT (OUTPATIENT)
Dept: PHYSICAL THERAPY | Facility: CLINIC | Age: 60
End: 2025-07-15
Payer: COMMERCIAL

## 2025-07-15 DIAGNOSIS — M87.052 AVASCULAR NECROSIS OF BONE OF LEFT HIP (H): ICD-10-CM

## 2025-07-15 DIAGNOSIS — Z96.642 AFTERCARE FOLLOWING LEFT HIP JOINT REPLACEMENT SURGERY: Primary | ICD-10-CM

## 2025-07-15 DIAGNOSIS — Z96.642 S/P TOTAL LEFT HIP ARTHROPLASTY: ICD-10-CM

## 2025-07-15 DIAGNOSIS — M16.12 OSTEOARTHRITIS OF ONE HIP, LEFT: ICD-10-CM

## 2025-07-15 DIAGNOSIS — Z47.1 AFTERCARE FOLLOWING LEFT HIP JOINT REPLACEMENT SURGERY: Primary | ICD-10-CM

## 2025-07-15 PROCEDURE — 97161 PT EVAL LOW COMPLEX 20 MIN: CPT | Mod: GP | Performed by: PHYSICAL THERAPIST

## 2025-07-15 PROCEDURE — 97110 THERAPEUTIC EXERCISES: CPT | Mod: GP | Performed by: PHYSICAL THERAPIST

## 2025-07-15 NOTE — PROGRESS NOTES
PHYSICAL THERAPY EVALUATION  Type of Visit: Evaluation       Fall Risk Screen:  Have you fallen 2 or more times in the past year?: No  Have you fallen and had an injury in the past year?: No    Subjective         Presenting condition or subjective complaint: Hip replacement on 7/11/25  Date of onset: 07/11/25    Relevant medical history: Cancer; Kidney disease; Migraines or headaches; Smoking; Thyroid problems   Dates & types of surgery:      Prior diagnostic imaging/testing results: CT scan; X-ray     Prior therapy history for the same diagnosis, illness or injury: Yes      Prior Level of Function  Transfers:   Ambulation:   ADL:   IADL:     Living Environment  Social support: With a significant other or spouse   Type of home: House; 2-story   Stairs to enter the home: Yes 2 Is there a railing: Yes     Ramp: No   Stairs inside the home: Yes 7 Is there a railing: Yes     Help at home: Self Cares (home health aide/personal care attendant, family, etc); Home management tasks (cooking, cleaning); Medication and/or finances; Home and Yard maintenance tasks  Equipment owned: Crutches; Bath bench     Employment: Yes Sr [employer]   Hobbies/Interests: Bowling    Patient goals for therapy: walk without AD, stairs.    Pain assessment: Pain present     Objective   HIP EVALUATION  PAIN: Pain Level at Rest: 3/10  Pain Level with Use: 5/10  Pain Location: anterior hip  Pain Quality: Aching and Sharp  Pain Frequency: intermittent  Pain is Exacerbated By: walking, lifting leg up  Pain is Relieved By: pain meds  Pain Progression: Unchanged  INTEGUMENTARY (edema, incisions):   POSTURE:   GAIT:   Weightbearing Status:   Assistive Device(s): Walker (front wheeled)  Gait Deviations: Antalgic  BALANCE/PROPRIOCEPTION:   WEIGHTBEARING ALIGNMENT:   NON-WEIGHTBEARING ALIGNMENT:    ROM:   (Degrees) Left AROM Left PROM  Right AROM Right PROM   Hip Flexion 90   120    Hip Extension 0      Hip Abduction 20      Hip Adduction        Hip Internal Rotation   25    Hip External Rotation 25  51    Knee Flexion       Knee Extension       Lumbar Side glide     Lumbar Flexion    Lumbar Extension    Pain:   End feel:     PELVIC/SI SCREEN:   STRENGTH:   Pain: - none + mild ++ moderate +++ severe  Strength Scale: 0-5/5 Left Right   Hip Flexion 3- 5   Hip Extension 4 5   Hip Abduction 4 5   Hip Adduction     Hip Internal Rotation     Hip External Rotation 3- 5   Knee Flexion 5 5   Knee Extension 5 5     LE FLEXIBILITY:   SPECIAL TESTS:   FUNCTIONAL TESTS:   PALPATION: + pain over left anterior hip  JOINT MOBILITY:     Assessment & Plan   CLINICAL IMPRESSIONS  Medical Diagnosis: Osteoarthritis of one hip, left  Avascular necrosis of bone of left hip (H)  S/P total left hip arthroplasty    Treatment Diagnosis: left hip S/p   Impression/Assessment: Patient is a 60 year old male with left hip pain complaints.  The following significant findings have been identified: Pain, Decreased ROM/flexibility, Decreased strength, Impaired gait, and Decreased activity tolerance. These impairments interfere with their ability to perform self care tasks, work tasks, and recreational activities as compared to previous level of function.     Clinical Decision Making (Complexity):  Clinical Presentation: Stable/Uncomplicated  Clinical Presentation Rationale: based on medical and personal factors listed in PT evaluation  Clinical Decision Making (Complexity): Low complexity    PLAN OF CARE  Treatment Interventions:  Interventions: Manual Therapy, Neuromuscular Re-education, Therapeutic Activity, Therapeutic Exercise, Self-Care/Home Management    Long Term Goals     PT Goal 1  Goal Identifier: Ambulation  Goal Description: Paient will be able to ambulate with No AD for 30 min 1/10 PL or less  Rationale: to maximize safety and independence with performance of ADLs and functional tasks  Goal Progress: ambulating walker 5 min 4/10 PL  Target Date: 08/26/25  PT Goal 2  Goal  Identifier: Stairs  Goal Description: Patient will be able ascend / descend 7 inch stairs in a normal reciprocal pattern  Rationale: to maximize safety and independence with performance of ADLs and functional tasks  Goal Progress: not doing stairs.  Target Date: 10/07/25      Frequency of Treatment: 2 x/ week for 6 weeks tapering to 1 x/ week for 6 weeks  Duration of Treatment: 12 weeks    Recommended Referrals to Other Professionals:   Education Assessment:   Learner/Method: Patient;Pictures/Video;Demonstration;No Barriers to Learning    Risks and benefits of evaluation/treatment have been explained.   Patient/Family/caregiver agrees with Plan of Care.     Evaluation Time:     PT Eval, Low Complexity Minutes (51822): 15       Signing Clinician: Alan Harrell PT

## 2025-07-21 ASSESSMENT — HOOS JR
SITTING: MODERATE
RISING FROM SITTING: MODERATE
WALKING ON UNEVEN SURFACE: MODERATE
HOOS JR TOTAL INTERVAL SCORE: 49.86
GOING UP OR DOWN STAIRS: MODERATE
BENDING TO THE FLOOR TO PICK UP OBJECT: MODERATE
LYING IN BED (TURNING OVER, MAINTAINING HIP POSITION): SEVERE

## 2025-07-22 ENCOUNTER — THERAPY VISIT (OUTPATIENT)
Dept: PHYSICAL THERAPY | Facility: CLINIC | Age: 60
End: 2025-07-22
Payer: COMMERCIAL

## 2025-07-22 DIAGNOSIS — Z47.1 AFTERCARE FOLLOWING LEFT HIP JOINT REPLACEMENT SURGERY: ICD-10-CM

## 2025-07-22 DIAGNOSIS — Z96.642 AFTERCARE FOLLOWING LEFT HIP JOINT REPLACEMENT SURGERY: ICD-10-CM

## 2025-07-22 DIAGNOSIS — M25.552 HIP PAIN, LEFT: Primary | Chronic | ICD-10-CM

## 2025-07-22 PROCEDURE — 97140 MANUAL THERAPY 1/> REGIONS: CPT | Mod: GP | Performed by: PHYSICAL THERAPIST

## 2025-07-22 PROCEDURE — 97110 THERAPEUTIC EXERCISES: CPT | Mod: GP | Performed by: PHYSICAL THERAPIST

## 2025-07-25 ENCOUNTER — OFFICE VISIT (OUTPATIENT)
Dept: ORTHOPEDICS | Facility: CLINIC | Age: 60
End: 2025-07-25
Payer: COMMERCIAL

## 2025-07-29 ENCOUNTER — OFFICE VISIT (OUTPATIENT)
Dept: OPHTHALMOLOGY | Facility: CLINIC | Age: 60
End: 2025-07-29
Attending: OPHTHALMOLOGY
Payer: COMMERCIAL

## 2025-07-29 DIAGNOSIS — H35.373 EPIRETINAL MEMBRANE (ERM) OF BOTH EYES: ICD-10-CM

## 2025-07-29 DIAGNOSIS — Z96.1 PSEUDOPHAKIA OF BOTH EYES: ICD-10-CM

## 2025-07-29 DIAGNOSIS — H04.123 BILATERAL DRY EYES: ICD-10-CM

## 2025-07-29 DIAGNOSIS — H00.14 CHALAZION OF LEFT UPPER EYELID: Primary | ICD-10-CM

## 2025-07-29 PROCEDURE — 99214 OFFICE O/P EST MOD 30 MIN: CPT | Performed by: OPHTHALMOLOGY

## 2025-07-29 PROCEDURE — 99211 OFF/OP EST MAY X REQ PHY/QHP: CPT | Performed by: OPHTHALMOLOGY

## 2025-07-29 RX ORDER — NEOMYCIN SULFATE, POLYMYXIN B SULFATE, AND DEXAMETHASONE 3.5; 10000; 1 MG/G; [USP'U]/G; MG/G
0.5 OINTMENT OPHTHALMIC AT BEDTIME
Qty: 7 G | Refills: 0 | Status: SHIPPED | OUTPATIENT
Start: 2025-07-29

## 2025-07-29 ASSESSMENT — VISUAL ACUITY
OS_CC+: -1
OD_CC: 20/20
OD_CC+: -2
METHOD: SNELLEN - LINEAR
OS_CC: 20/20
CORRECTION_TYPE: GLASSES

## 2025-07-29 ASSESSMENT — REFRACTION_WEARINGRX
OS_VBASE: DOWN
OD_AXIS: 099
SPECS_TYPE: PAL
OS_SPHERE: -1.50
OD_VPRISM: 0.5
OD_VBASE: UP
OD_ADD: +2.50
OD_SPHERE: -2.00
OD_CYLINDER: +1.00
OS_CYLINDER: +1.25
OS_AXIS: 042
OS_ADD: +2.50
OS_VPRISM: 0.5

## 2025-07-29 ASSESSMENT — CONF VISUAL FIELD
OD_SUPERIOR_TEMPORAL_RESTRICTION: 0
OD_INFERIOR_TEMPORAL_RESTRICTION: 0
OD_SUPERIOR_NASAL_RESTRICTION: 0
OS_INFERIOR_TEMPORAL_RESTRICTION: 0
OS_SUPERIOR_TEMPORAL_RESTRICTION: 0
OD_INFERIOR_NASAL_RESTRICTION: 0
OS_NORMAL: 1
METHOD: COUNTING FINGERS
OS_INFERIOR_NASAL_RESTRICTION: 0
OD_NORMAL: 1
OS_SUPERIOR_NASAL_RESTRICTION: 0

## 2025-07-29 ASSESSMENT — REFRACTION_MANIFEST
OS_ADD: +2.50
OS_SPHERE: -1.25
OD_CYLINDER: +0.75
OD_ADD: +2.50
OS_AXIS: 015
OD_AXIS: 140
OD_SPHERE: -1.75
OS_CYLINDER: +1.00

## 2025-07-29 ASSESSMENT — TONOMETRY
OS_IOP_MMHG: 15
IOP_METHOD: TONOPEN
OD_IOP_MMHG: 17

## 2025-07-29 ASSESSMENT — EXTERNAL EXAM - LEFT EYE: OS_EXAM: NORMAL

## 2025-07-29 ASSESSMENT — EXTERNAL EXAM - RIGHT EYE: OD_EXAM: NORMAL

## 2025-07-29 ASSESSMENT — SLIT LAMP EXAM - LIDS
COMMENTS: PTOSIS, MEIBOMIAN GLAND DYSFUNCTION
COMMENTS: PTOSIS, MEIBOMIAN GLAND DYSFUNCTION

## 2025-07-29 NOTE — PROGRESS NOTES
HPI       Follow Up    In both eyes.  Since onset it is stable.  Associated symptoms include dryness.  Negative for eye pain, headache, photophobia and flashes.  Treatments tried include artificial tears. Additional comments: Vitreous syneresis of both eyes              Comments    He is here for a follow up for previous Sx.  He has a stye on his left upper lid for 2 month that has getting better with warm compress and Antibiotics.  He had hip replacement sx on left hip 7/11/2025  Jossy Rosales 1:36 PM July 29, 2025            Last edited by Jossy Rosales on 7/29/2025  1:38 PM.          Review of systems for the eyes was negative other than the pertinent positives/negatives listed in the HPI.      Assessment & Plan      Dimitrios Goldberg is a 60 year old male with the following diagnoses:   1. Chalazion of left upper eyelid    2. Bilateral dry eyes    3. Pseudophakia of both eyes    4. Epiretinal membrane (ERM) of both eyes        Here for vision and dry eye recheck   Doing well with warm compresses     Using xiidra twice a day in both eyes (less compliant since recent hip surgery)  Currently doing warm compresses with hot towel 2-3x daily   Continue artificial tears twice a day and as needed     Add massage to left upper lid lesion after heat  Add maxitrol ointment three times a day to the lesion    Refractive options reviewed  Refraction given     Patient disposition:   Return in about 6 weeks (around 9/9/2025) for VT only. As needed   If resolved, ok to see Ernesto in Feb and Freda as needed            Attending Physician Attestation:  Complete documentation of historical and exam elements from today's encounter can be found in the full encounter summary report (not reduplicated in this progress note).  I personally obtained the chief complaint(s) and history of present illness.  I confirmed and edited as necessary the review of systems, past medical/surgical history, family history, social history, and  examination findings as documented by others; and I examined the patient myself.  I personally reviewed the relevant tests, images, and reports as documented above.  I formulated and edited as necessary the assessment and plan and discussed the findings and management plan with the patient and family. . - Navdeep Barba MD

## 2025-08-05 ENCOUNTER — THERAPY VISIT (OUTPATIENT)
Dept: PHYSICAL THERAPY | Facility: CLINIC | Age: 60
End: 2025-08-05
Payer: COMMERCIAL

## 2025-08-05 DIAGNOSIS — M25.552 HIP PAIN, LEFT: Primary | Chronic | ICD-10-CM

## 2025-08-05 DIAGNOSIS — Z96.642 AFTERCARE FOLLOWING LEFT HIP JOINT REPLACEMENT SURGERY: ICD-10-CM

## 2025-08-05 DIAGNOSIS — Z47.1 AFTERCARE FOLLOWING LEFT HIP JOINT REPLACEMENT SURGERY: ICD-10-CM

## 2025-08-05 PROCEDURE — 97110 THERAPEUTIC EXERCISES: CPT | Mod: GP | Performed by: PHYSICAL THERAPIST

## 2025-08-12 ENCOUNTER — THERAPY VISIT (OUTPATIENT)
Dept: PHYSICAL THERAPY | Facility: CLINIC | Age: 60
End: 2025-08-12
Payer: COMMERCIAL

## 2025-08-12 DIAGNOSIS — Z96.642 AFTERCARE FOLLOWING LEFT HIP JOINT REPLACEMENT SURGERY: ICD-10-CM

## 2025-08-12 DIAGNOSIS — Z47.1 AFTERCARE FOLLOWING LEFT HIP JOINT REPLACEMENT SURGERY: ICD-10-CM

## 2025-08-12 DIAGNOSIS — M25.552 HIP PAIN, LEFT: Primary | Chronic | ICD-10-CM

## 2025-08-12 PROCEDURE — 97110 THERAPEUTIC EXERCISES: CPT | Mod: GP | Performed by: PHYSICAL THERAPIST

## 2025-08-19 ENCOUNTER — THERAPY VISIT (OUTPATIENT)
Dept: PHYSICAL THERAPY | Facility: CLINIC | Age: 60
End: 2025-08-19
Payer: COMMERCIAL

## 2025-08-19 DIAGNOSIS — M25.552 HIP PAIN, LEFT: Primary | Chronic | ICD-10-CM

## 2025-08-19 DIAGNOSIS — Z96.642 AFTERCARE FOLLOWING LEFT HIP JOINT REPLACEMENT SURGERY: ICD-10-CM

## 2025-08-19 DIAGNOSIS — Z47.1 AFTERCARE FOLLOWING LEFT HIP JOINT REPLACEMENT SURGERY: ICD-10-CM

## 2025-08-19 PROCEDURE — 97140 MANUAL THERAPY 1/> REGIONS: CPT | Mod: GP | Performed by: PHYSICAL THERAPIST

## 2025-08-19 PROCEDURE — 97110 THERAPEUTIC EXERCISES: CPT | Mod: GP | Performed by: PHYSICAL THERAPIST

## 2025-08-19 ASSESSMENT — ACTIVITIES OF DAILY LIVING (ADL)
STANDING_FOR_15_MINUTES: SLIGHT DIFFICULTY
DEEP_SQUATTING: NO DIFFICULTY AT ALL
PUTTING_ON_SOCKS_AND_SHOES: NO DIFFICULTY AT ALL
HOS_ADL_HIGHEST_POTENTIAL_SCORE: 40
ROLLING_OVER_IN_BED: SLIGHT DIFFICULTY
GETTING_INTO_AND_OUT_OF_A_BATHTUB: NO DIFFICULTY AT ALL
GOING_UP_1_FLIGHT_OF_STAIRS: NO DIFFICULTY AT ALL
GOING_DOWN_1_FLIGHT_OF_STAIRS: NO DIFFICULTY AT ALL
LIGHT_TO_MODERATE_WORK: SLIGHT DIFFICULTY
HOS_ADL_ITEM_SCORE_TOTAL: 37
SITTING_FOR_15_MINUTES: NO DIFFICULTY AT ALL
HOW_WOULD_YOU_RATE_YOUR_CURRENT_LEVEL_OF_FUNCTION_DURING_YOUR_USUAL_ACTIVITIES_OF_DAILY_LIVING_FROM_0_TO_100_WITH_100_BEING_YOUR_LEVEL_OF_FUNCTION_PRIOR_TO_YOUR_HIP_PROBLEM_AND_0_BEING_THE_INABILITY_TO_PERFORM_ANY_OF_YOUR_USUAL_DAILY_ACTIVITIES?: 75
WALKING_INITIALLY: NO DIFFICULTY AT ALL
HOS_ADL_COUNT: 10
GETTING_INTO_AND_OUT_OF_AN_AVERAGE_CAR: NO DIFFICULTY AT ALL
STEPPING_UP_AND_DOWN_CURBS: NO DIFFICULTY AT ALL

## 2025-08-21 DIAGNOSIS — Z96.642 S/P TOTAL LEFT HIP ARTHROPLASTY: Primary | ICD-10-CM

## 2025-08-22 ENCOUNTER — ANCILLARY PROCEDURE (OUTPATIENT)
Dept: GENERAL RADIOLOGY | Facility: CLINIC | Age: 60
End: 2025-08-22
Attending: PHYSICIAN ASSISTANT
Payer: COMMERCIAL

## 2025-08-22 DIAGNOSIS — Z96.642 S/P TOTAL LEFT HIP ARTHROPLASTY: ICD-10-CM

## 2025-08-22 PROCEDURE — 73502 X-RAY EXAM HIP UNI 2-3 VIEWS: CPT | Mod: LT | Performed by: RADIOLOGY

## 2025-08-28 ENCOUNTER — THERAPY VISIT (OUTPATIENT)
Dept: PHYSICAL THERAPY | Facility: CLINIC | Age: 60
End: 2025-08-28
Payer: COMMERCIAL

## 2025-08-28 DIAGNOSIS — M25.552 HIP PAIN, LEFT: Primary | Chronic | ICD-10-CM

## 2025-08-28 DIAGNOSIS — Z96.642 AFTERCARE FOLLOWING LEFT HIP JOINT REPLACEMENT SURGERY: ICD-10-CM

## 2025-08-28 DIAGNOSIS — Z47.1 AFTERCARE FOLLOWING LEFT HIP JOINT REPLACEMENT SURGERY: ICD-10-CM

## 2025-09-04 ENCOUNTER — THERAPY VISIT (OUTPATIENT)
Dept: PHYSICAL THERAPY | Facility: CLINIC | Age: 60
End: 2025-09-04
Payer: COMMERCIAL

## 2025-09-04 DIAGNOSIS — M25.552 HIP PAIN, LEFT: Primary | Chronic | ICD-10-CM

## 2025-09-04 DIAGNOSIS — Z47.1 AFTERCARE FOLLOWING LEFT HIP JOINT REPLACEMENT SURGERY: ICD-10-CM

## 2025-09-04 DIAGNOSIS — Z96.642 AFTERCARE FOLLOWING LEFT HIP JOINT REPLACEMENT SURGERY: ICD-10-CM

## (undated) DEVICE — SU PROLENE 6-0 RB-2DA 30" 8711H

## (undated) DEVICE — RX VISTASEAL FIBRIN SEALANT W/THROMBIN 10ML VST10

## (undated) DEVICE — SU PROLENE 3-0 SHDA 36" 8522H

## (undated) DEVICE — DRAPE TIBURON TOP SHEET 100X60" 29352

## (undated) DEVICE — SU PROLENE 5-0 RB-2DA 30" 8710H

## (undated) DEVICE — PACK GOWN 3/PK DISP XL SBA32GPFCB

## (undated) DEVICE — PREP CHLORAPREP 26ML TINTED HI-LITE ORANGE 930815

## (undated) DEVICE — SOL NACL 0.9% IRRIG 1000ML BOTTLE 2F7124

## (undated) DEVICE — SU PROLENE 5-0 RB-1DA 36"  8556H

## (undated) DEVICE — SYR 30ML SLIP TIP W/O NDL 302833

## (undated) DEVICE — WIPES FOLEY CARE SURESTEP PROVON DFC100

## (undated) DEVICE — GLOVE PROTEXIS BLUE W/NEU-THERA 8.0  2D73EB80

## (undated) DEVICE — CLIP HORIZON LG ORANGE 004200

## (undated) DEVICE — BONE CLEANING TIP INTERPULSE  0210-010-000

## (undated) DEVICE — SU SILK 3-0 TIE 12X30" A304H

## (undated) DEVICE — SU SILK 3-0 SH CR 8X18" C013D

## (undated) DEVICE — GLOVE PROTEXIS W/NEU-THERA 7.5  2D73TE75

## (undated) DEVICE — DRAPE IOBAN INCISE 23X17" 6650EZ

## (undated) DEVICE — SU SILK 4-0 TIE 12X30" A303H

## (undated) DEVICE — DRAPE U SPLIT 74X120" 29440

## (undated) DEVICE — TOURNIQUET VASCULAR KIT 7 1/2" 79012

## (undated) DEVICE — ESU GROUND PAD ADULT W/CORD E7507

## (undated) DEVICE — ADH SKIN CLOSURE PREMIERPRO EXOFIN 1.0ML 3470

## (undated) DEVICE — DRAIN JACKSON PRATT RESERVOIR 100ML SU130-1305

## (undated) DEVICE — GOWN IMPERVIOUS SPECIALTY XLG/XLONG 32474

## (undated) DEVICE — DRSG MEDIPORE 3 1/2X13 3/4" 3573

## (undated) DEVICE — SUTURE VICRYL+ 1 CT-1 CR 8X18" VIO VCP741D

## (undated) DEVICE — LINEN BACK PACK 5440

## (undated) DEVICE — SU MONOCRYL 4-0 PS-2 27" UND Y426H

## (undated) DEVICE — PACK TOTAL HIP W/U DRAPE RIVERSIDE LATEX FREE

## (undated) DEVICE — ESU LIGASURE IMPACT OPEN SEALER/DVDR CVD LG JAW LF4418

## (undated) DEVICE — SU VICRYL 3-0 SH 27" UND J416H

## (undated) DEVICE — STPL POWERED ECHELON 45MM PSEE45A

## (undated) DEVICE — PACK AB HYST II

## (undated) DEVICE — SOL WATER IRRIG 1000ML BOTTLE 2F7114

## (undated) DEVICE — SOL WATER IRRIG 500ML BOTTLE 2F7113

## (undated) DEVICE — SU PROLENE 4-0 RB-1DA 36" 8557H

## (undated) DEVICE — SUCTION MANIFOLD NEPTUNE 2 SYS 4 PORT 0702-020-000

## (undated) DEVICE — Device

## (undated) DEVICE — DEVICE RETRIEVER HEWSON 71111579

## (undated) DEVICE — SU VICRYL 0 CT-1 CR 8X18" J740D

## (undated) DEVICE — CLIP HORIZON SM RED WIDE SLOT 001201

## (undated) DEVICE — CLIP HORIZON MED BLUE 002200

## (undated) DEVICE — SUCTION CATH AIRLIFE TRI-FLO W/CONTROL PORT 14FR  T60C

## (undated) DEVICE — DRAPE IOBAN ISOLATION VERTICAL 6619

## (undated) DEVICE — RETR VISCERA FISH MED 3204

## (undated) DEVICE — KIT ENDO TURNOVER/PROCEDURE CARRY-ON 101822

## (undated) DEVICE — SU PROLENE 3-0 SH-1 30" 8762H

## (undated) DEVICE — DRAPE C-ARM W/STRAPS 42X72" 07-CA104

## (undated) DEVICE — SU VICRYL+ 2-0 CT 36IN UND VCP957H

## (undated) DEVICE — SU PROLENE 4-0 SHDA 36" 8521H

## (undated) DEVICE — KIT ENDO FIRST STEP DISINFECTANT 200ML W/POUCH EP-4

## (undated) DEVICE — SU MONOCRYL 3-0 PS-1 27" Y936H

## (undated) DEVICE — SU ETHIBOND 0 CT-2 30" X412H

## (undated) DEVICE — SOLUTION IV IRRIGATION 0.9% NACL 3L R8206

## (undated) DEVICE — DRAIN JACKSON PRATT ROUND W/TROCAR 19FR JP-HUR195

## (undated) DEVICE — LINEN TOWEL PACK X30 5481

## (undated) DEVICE — LINEN DRAPE 54X72" 5467

## (undated) DEVICE — SPONGE LAP 18X18" X8435

## (undated) DEVICE — DRAPE SHEET REV FOLD 3/4 9349

## (undated) DEVICE — SNARE CAPIVATOR ROUND COLD SNR BX10 M00561101

## (undated) DEVICE — DRAIN JACKSON PRATT ROUND SIL 19FR W/TROCAR LF JP-2232

## (undated) DEVICE — EYE PREP BETADINE 5% SOLUTION 30ML 0065-0411-30

## (undated) DEVICE — KIT PATIENT CARE HANA PROFX W/BOOT LINER SUPINE 6851

## (undated) DEVICE — SU SILK 0 TIE 6X30" A306H

## (undated) DEVICE — LINEN TOWEL PACK X6 WHITE 5487

## (undated) DEVICE — SU PDS II 0 TP-1 60" Z991G

## (undated) DEVICE — SPONGE KITTNER 30-101

## (undated) DEVICE — BRUSH SURGICAL SCRUB W/4% CHLORHEXIDINE GLUCONATE SOL 4458A

## (undated) DEVICE — ENDO BITE BLOCK ADULT OMNI-BLOC

## (undated) DEVICE — TUBING SUCTION 12"X1/4" N612

## (undated) DEVICE — SUTURE BOOTS 051003PBX

## (undated) DEVICE — DRSG STERI STRIP 1/2X4" R1547

## (undated) DEVICE — SU UMBILICAL TAPE .125X30" U11T

## (undated) DEVICE — DRSG SILVERCEL 4.25X4.25" 900404

## (undated) DEVICE — SU SILK 2-0 TIE 12X30" A305H

## (undated) DEVICE — VIAL DECANTER STERILE WHITE DYNJDEC06

## (undated) DEVICE — DRSG TEGADERM 4X10" 1627

## (undated) DEVICE — GLOVE PROTEXIS MICRO 7.5 LT BLUE 2D73PM75

## (undated) DEVICE — SUCTION MANIFOLD NEPTUNE 2 SYS 1 PORT 702-025-000

## (undated) DEVICE — BLADE SAW SAGITTAL STRK 18X90X1.37MM HD SYS 6 6118-137-090

## (undated) DEVICE — SU ETHILON 2-0 FS 18" 664H

## (undated) DEVICE — SPECIMEN CONTAINER 3OZ W/FORMALIN 59901

## (undated) DEVICE — SU VICRYL 0 CT-1 27" J340H

## (undated) DEVICE — SUCTION IRR SYSTEM W/O TIP INTERPULSE HANDPIECE 0210-100-000

## (undated) DEVICE — BLADE SAW RECIP STRK 70X6X0.025MM 0277-096-250S5

## (undated) DEVICE — GLOVE EXAM NITRILE LG PF LATEX FREE 5064

## (undated) DEVICE — BLADE CLIPPER SGL USE 9680

## (undated) DEVICE — ENDO FORCEP BX CAPTURA PRO SPIKE G50696

## (undated) DEVICE — SU ETHILON 3-0 PS-1 18" 1663H

## (undated) DEVICE — SOLUTION IRRIGATION 0.9% NACL 1000ML BOTTLE R5200-01

## (undated) DEVICE — ESU GROUND PAD THERMOGUARD PLUS ABC PEDS 7-382

## (undated) DEVICE — LINEN TOWEL PACK X5 5464

## (undated) DEVICE — CATH TRAY FOLEY SURESTEP 16FR W/URNE MTR STLK LATEX A303316A

## (undated) DEVICE — GOWN IMPERVIOUS 2XL BLUE

## (undated) DEVICE — SU SILK 4-0 RB-1 CR 8X18" C054D

## (undated) DEVICE — HOOD SURG T7PLUS PEEL AWAY FACE SHIELD STRL LF 0416-801-100

## (undated) DEVICE — SU FIBERWIRE 2 38" T-8 NDL  AR-7206

## (undated) DEVICE — SURGICEL ABSORBABLE HEMOSTAT SNOW 4"X4" 2083

## (undated) DEVICE — DRSG PRIMAPORE 02X3" 7133

## (undated) DEVICE — SOL NACL 0.9% IRRIG 1000ML BOTTLE 07138-09

## (undated) DEVICE — SOLUTION WATER 1000ML BOTTLE R5000-01

## (undated) DEVICE — SU SILK 1 TIE 6X30" A307H

## (undated) DEVICE — SU SILK 0 SH 30" K834H

## (undated) DEVICE — SU PROLENE 6-0 24" 8726H

## (undated) DEVICE — SUCTION TIP YANKAUER STR K87

## (undated) RX ORDER — ONDANSETRON 2 MG/ML
INJECTION INTRAMUSCULAR; INTRAVENOUS
Status: DISPENSED
Start: 2022-08-29

## (undated) RX ORDER — CEFAZOLIN SODIUM 2 G/100ML
INJECTION, SOLUTION INTRAVENOUS
Status: DISPENSED
Start: 2021-08-10

## (undated) RX ORDER — FENTANYL CITRATE-0.9 % NACL/PF 10 MCG/ML
PLASTIC BAG, INJECTION (ML) INTRAVENOUS
Status: DISPENSED
Start: 2025-07-11

## (undated) RX ORDER — PROPOFOL 10 MG/ML
INJECTION, EMULSION INTRAVENOUS
Status: DISPENSED
Start: 2025-07-11

## (undated) RX ORDER — CEFAZOLIN SODIUM/WATER 2 G/20 ML
SYRINGE (ML) INTRAVENOUS
Status: DISPENSED
Start: 2025-07-11

## (undated) RX ORDER — PROPOFOL 10 MG/ML
INJECTION, EMULSION INTRAVENOUS
Status: DISPENSED
Start: 2021-08-10

## (undated) RX ORDER — HYDROMORPHONE HYDROCHLORIDE 1 MG/ML
INJECTION, SOLUTION INTRAMUSCULAR; INTRAVENOUS; SUBCUTANEOUS
Status: DISPENSED
Start: 2025-07-11

## (undated) RX ORDER — LIDOCAINE HYDROCHLORIDE 10 MG/ML
INJECTION, SOLUTION EPIDURAL; INFILTRATION; INTRACAUDAL; PERINEURAL
Status: DISPENSED
Start: 2024-10-18

## (undated) RX ORDER — ACETAMINOPHEN 325 MG/1
TABLET ORAL
Status: DISPENSED
Start: 2022-08-29

## (undated) RX ORDER — FENTANYL CITRATE 50 UG/ML
INJECTION, SOLUTION INTRAMUSCULAR; INTRAVENOUS
Status: DISPENSED
Start: 2022-08-29

## (undated) RX ORDER — LIDOCAINE HYDROCHLORIDE 20 MG/ML
INJECTION, SOLUTION EPIDURAL; INFILTRATION; INTRACAUDAL; PERINEURAL
Status: DISPENSED
Start: 2021-08-10

## (undated) RX ORDER — FENTANYL CITRATE 50 UG/ML
INJECTION, SOLUTION INTRAMUSCULAR; INTRAVENOUS
Status: DISPENSED
Start: 2025-07-11

## (undated) RX ORDER — FENTANYL CITRATE 50 UG/ML
INJECTION, SOLUTION INTRAMUSCULAR; INTRAVENOUS
Status: DISPENSED
Start: 2021-08-10

## (undated) RX ORDER — ACETAMINOPHEN 325 MG/1
TABLET ORAL
Status: DISPENSED
Start: 2025-07-11

## (undated) RX ORDER — FENTANYL CITRATE 50 UG/ML
INJECTION, SOLUTION INTRAMUSCULAR; INTRAVENOUS
Status: DISPENSED
Start: 2023-08-09

## (undated) RX ORDER — ALBUMIN, HUMAN INJ 5% 5 %
SOLUTION INTRAVENOUS
Status: DISPENSED
Start: 2021-08-10

## (undated) RX ORDER — OXYCODONE HYDROCHLORIDE 5 MG/1
TABLET ORAL
Status: DISPENSED
Start: 2025-07-11

## (undated) RX ORDER — EPHEDRINE SULFATE 50 MG/ML
INJECTION, SOLUTION INTRAMUSCULAR; INTRAVENOUS; SUBCUTANEOUS
Status: DISPENSED
Start: 2025-07-11

## (undated) RX ORDER — ONDANSETRON 2 MG/ML
INJECTION INTRAMUSCULAR; INTRAVENOUS
Status: DISPENSED
Start: 2025-07-11

## (undated) RX ORDER — HEPARIN SODIUM 5000 [USP'U]/.5ML
INJECTION, SOLUTION INTRAVENOUS; SUBCUTANEOUS
Status: DISPENSED
Start: 2022-08-29

## (undated) RX ORDER — HEPARIN SODIUM 5000 [USP'U]/.5ML
INJECTION, SOLUTION INTRAVENOUS; SUBCUTANEOUS
Status: DISPENSED
Start: 2021-08-10

## (undated) RX ORDER — LIDOCAINE HYDROCHLORIDE 10 MG/ML
INJECTION, SOLUTION EPIDURAL; INFILTRATION; INTRACAUDAL; PERINEURAL
Status: DISPENSED
Start: 2025-03-24

## (undated) RX ORDER — SODIUM CHLORIDE, SODIUM LACTATE, POTASSIUM CHLORIDE, CALCIUM CHLORIDE 600; 310; 30; 20 MG/100ML; MG/100ML; MG/100ML; MG/100ML
INJECTION, SOLUTION INTRAVENOUS
Status: DISPENSED
Start: 2022-08-29

## (undated) RX ORDER — DEXAMETHASONE SODIUM PHOSPHATE 4 MG/ML
INJECTION, SOLUTION INTRA-ARTICULAR; INTRALESIONAL; INTRAMUSCULAR; INTRAVENOUS; SOFT TISSUE
Status: DISPENSED
Start: 2025-07-11

## (undated) RX ORDER — CALCIUM CHLORIDE 100 MG/ML
INJECTION INTRAVENOUS; INTRAVENTRICULAR
Status: DISPENSED
Start: 2021-08-10

## (undated) RX ORDER — VANCOMYCIN HYDROCHLORIDE 1 G/200ML
INJECTION, SOLUTION INTRAVENOUS
Status: DISPENSED
Start: 2025-07-11

## (undated) RX ORDER — GLYCOPYRROLATE 0.2 MG/ML
INJECTION, SOLUTION INTRAMUSCULAR; INTRAVENOUS
Status: DISPENSED
Start: 2025-07-11

## (undated) RX ORDER — DEXAMETHASONE SODIUM PHOSPHATE 4 MG/ML
INJECTION, SOLUTION INTRA-ARTICULAR; INTRALESIONAL; INTRAMUSCULAR; INTRAVENOUS; SOFT TISSUE
Status: DISPENSED
Start: 2021-08-10

## (undated) RX ORDER — TRIAMCINOLONE ACETONIDE 40 MG/ML
INJECTION, SUSPENSION INTRA-ARTICULAR; INTRAMUSCULAR
Status: DISPENSED
Start: 2024-10-18

## (undated) RX ORDER — CEFAZOLIN SODIUM 1 G/3ML
INJECTION, POWDER, FOR SOLUTION INTRAMUSCULAR; INTRAVENOUS
Status: DISPENSED
Start: 2021-08-10

## (undated) RX ORDER — HEPARIN SODIUM 1000 [USP'U]/ML
INJECTION, SOLUTION INTRAVENOUS; SUBCUTANEOUS
Status: DISPENSED
Start: 2021-08-10

## (undated) RX ORDER — FENTANYL CITRATE-0.9 % NACL/PF 10 MCG/ML
PLASTIC BAG, INJECTION (ML) INTRAVENOUS
Status: DISPENSED
Start: 2021-08-10

## (undated) RX ORDER — LIDOCAINE HYDROCHLORIDE 10 MG/ML
INJECTION, SOLUTION EPIDURAL; INFILTRATION; INTRACAUDAL; PERINEURAL
Status: DISPENSED
Start: 2023-08-09

## (undated) RX ORDER — TRANEXAMIC ACID 650 MG/1
TABLET ORAL
Status: DISPENSED
Start: 2025-07-11

## (undated) RX ORDER — HYDROMORPHONE HYDROCHLORIDE 1 MG/ML
INJECTION, SOLUTION INTRAMUSCULAR; INTRAVENOUS; SUBCUTANEOUS
Status: DISPENSED
Start: 2022-08-29

## (undated) RX ORDER — CEFAZOLIN SODIUM/WATER 2 G/20 ML
SYRINGE (ML) INTRAVENOUS
Status: DISPENSED
Start: 2022-08-29

## (undated) RX ORDER — TRIAMCINOLONE ACETONIDE 40 MG/ML
INJECTION, SUSPENSION INTRA-ARTICULAR; INTRAMUSCULAR
Status: DISPENSED
Start: 2025-03-24